# Patient Record
Sex: MALE | Race: WHITE | NOT HISPANIC OR LATINO | Employment: OTHER | URBAN - METROPOLITAN AREA
[De-identification: names, ages, dates, MRNs, and addresses within clinical notes are randomized per-mention and may not be internally consistent; named-entity substitution may affect disease eponyms.]

---

## 2017-01-03 ENCOUNTER — ALLSCRIPTS OFFICE VISIT (OUTPATIENT)
Dept: OTHER | Facility: OTHER | Age: 57
End: 2017-01-03

## 2017-01-05 ENCOUNTER — TRANSCRIBE ORDERS (OUTPATIENT)
Dept: ADMINISTRATIVE | Facility: HOSPITAL | Age: 57
End: 2017-01-05

## 2017-01-05 ENCOUNTER — APPOINTMENT (OUTPATIENT)
Dept: LAB | Facility: HOSPITAL | Age: 57
End: 2017-01-05
Payer: COMMERCIAL

## 2017-01-05 DIAGNOSIS — E11.9 DIABETES MELLITUS WITH NO COMPLICATION (HCC): ICD-10-CM

## 2017-01-05 DIAGNOSIS — Z87.891 HISTORY OF SMOKING 30 OR MORE PACK YEARS: Primary | ICD-10-CM

## 2017-01-05 DIAGNOSIS — Z87.891 HISTORY OF SMOKING 30 OR MORE PACK YEARS: ICD-10-CM

## 2017-01-05 LAB
ANION GAP SERPL CALCULATED.3IONS-SCNC: 8 MMOL/L (ref 4–13)
BUN SERPL-MCNC: 11 MG/DL (ref 5–25)
CALCIUM SERPL-MCNC: 8.8 MG/DL (ref 8.3–10.1)
CHLORIDE SERPL-SCNC: 106 MMOL/L (ref 100–108)
CHOLEST SERPL-MCNC: 171 MG/DL (ref 50–200)
CO2 SERPL-SCNC: 23 MMOL/L (ref 21–32)
CREAT SERPL-MCNC: 0.76 MG/DL (ref 0.6–1.3)
CREAT UR-MCNC: 191 MG/DL
GFR SERPL CREATININE-BSD FRML MDRD: >60 ML/MIN/1.73SQ M
GLUCOSE SERPL-MCNC: 272 MG/DL (ref 65–140)
HDLC SERPL-MCNC: 41 MG/DL (ref 40–60)
LDLC SERPL CALC-MCNC: 118 MG/DL (ref 0–100)
MICROALBUMIN UR-MCNC: 39.4 MG/L (ref 0–20)
MICROALBUMIN/CREAT 24H UR: 21 MG/G CREATININE (ref 0–30)
POTASSIUM SERPL-SCNC: 4.6 MMOL/L (ref 3.5–5.3)
SODIUM SERPL-SCNC: 137 MMOL/L (ref 136–145)
TRIGL SERPL-MCNC: 61 MG/DL

## 2017-01-05 PROCEDURE — 82043 UR ALBUMIN QUANTITATIVE: CPT | Performed by: FAMILY MEDICINE

## 2017-01-05 PROCEDURE — 80061 LIPID PANEL: CPT

## 2017-01-05 PROCEDURE — 36415 COLL VENOUS BLD VENIPUNCTURE: CPT

## 2017-01-05 PROCEDURE — 82570 ASSAY OF URINE CREATININE: CPT | Performed by: FAMILY MEDICINE

## 2017-01-05 PROCEDURE — 80048 BASIC METABOLIC PNL TOTAL CA: CPT

## 2017-01-06 ENCOUNTER — GENERIC CONVERSION - ENCOUNTER (OUTPATIENT)
Dept: OTHER | Facility: OTHER | Age: 57
End: 2017-01-06

## 2017-01-06 ENCOUNTER — ALLSCRIPTS OFFICE VISIT (OUTPATIENT)
Dept: RADIOLOGY | Facility: CLINIC | Age: 57
End: 2017-01-06
Payer: COMMERCIAL

## 2017-01-14 ENCOUNTER — HOSPITAL ENCOUNTER (OUTPATIENT)
Dept: CT IMAGING | Facility: HOSPITAL | Age: 57
Discharge: HOME | End: 2017-01-14
Payer: COMMERCIAL

## 2017-01-14 ENCOUNTER — TRANSCRIBE ORDERS (OUTPATIENT)
Dept: ADMINISTRATIVE | Facility: HOSPITAL | Age: 57
End: 2017-01-14

## 2017-01-14 DIAGNOSIS — Z87.891 HISTORY OF SMOKING 30 OR MORE PACK YEARS: ICD-10-CM

## 2017-01-19 ENCOUNTER — GENERIC CONVERSION - ENCOUNTER (OUTPATIENT)
Dept: OTHER | Facility: OTHER | Age: 57
End: 2017-01-19

## 2017-02-07 ENCOUNTER — ALLSCRIPTS OFFICE VISIT (OUTPATIENT)
Dept: OTHER | Facility: OTHER | Age: 57
End: 2017-02-07

## 2017-02-07 DIAGNOSIS — Z01.818 ENCOUNTER FOR OTHER PREPROCEDURAL EXAMINATION: ICD-10-CM

## 2017-02-07 DIAGNOSIS — Z12.11 ENCOUNTER FOR SCREENING FOR MALIGNANT NEOPLASM OF COLON: ICD-10-CM

## 2017-02-14 ENCOUNTER — ALLSCRIPTS OFFICE VISIT (OUTPATIENT)
Dept: OTHER | Facility: OTHER | Age: 57
End: 2017-02-14

## 2017-02-20 ENCOUNTER — APPOINTMENT (OUTPATIENT)
Dept: LAB | Facility: HOSPITAL | Age: 57
End: 2017-02-20
Attending: INTERNAL MEDICINE
Payer: COMMERCIAL

## 2017-02-20 ENCOUNTER — GENERIC CONVERSION - ENCOUNTER (OUTPATIENT)
Dept: OTHER | Facility: OTHER | Age: 57
End: 2017-02-20

## 2017-02-20 ENCOUNTER — TRANSCRIBE ORDERS (OUTPATIENT)
Dept: LAB | Facility: HOSPITAL | Age: 57
End: 2017-02-20

## 2017-02-20 ENCOUNTER — ALLSCRIPTS OFFICE VISIT (OUTPATIENT)
Dept: OTHER | Facility: OTHER | Age: 57
End: 2017-02-20

## 2017-02-20 DIAGNOSIS — Z12.11 ENCOUNTER FOR SCREENING FOR MALIGNANT NEOPLASM OF COLON: ICD-10-CM

## 2017-02-20 LAB
ERYTHROCYTE [DISTWIDTH] IN BLOOD BY AUTOMATED COUNT: 13.1 % (ref 11.6–15.1)
HCT VFR BLD AUTO: 46.7 % (ref 36.5–49.3)
HGB BLD-MCNC: 16.4 G/DL (ref 12–17)
MCH RBC QN AUTO: 31.1 PG (ref 26.8–34.3)
MCHC RBC AUTO-ENTMCNC: 35.1 G/DL (ref 31.4–37.4)
MCV RBC AUTO: 88 FL (ref 82–98)
PLATELET # BLD AUTO: 206 THOUSANDS/UL (ref 149–390)
PMV BLD AUTO: 10.5 FL (ref 8.9–12.7)
RBC # BLD AUTO: 5.28 MILLION/UL (ref 3.88–5.62)
WBC # BLD AUTO: 11.49 THOUSAND/UL (ref 4.31–10.16)

## 2017-02-20 PROCEDURE — 36415 COLL VENOUS BLD VENIPUNCTURE: CPT

## 2017-02-20 PROCEDURE — 85027 COMPLETE CBC AUTOMATED: CPT

## 2017-02-21 ENCOUNTER — ALLSCRIPTS OFFICE VISIT (OUTPATIENT)
Dept: OTHER | Facility: OTHER | Age: 57
End: 2017-02-21

## 2017-02-24 ENCOUNTER — GENERIC CONVERSION - ENCOUNTER (OUTPATIENT)
Dept: OTHER | Facility: OTHER | Age: 57
End: 2017-02-24

## 2017-03-03 RX ORDER — TAMSULOSIN HYDROCHLORIDE 0.4 MG/1
0.4 CAPSULE ORAL
COMMUNITY
End: 2018-03-13 | Stop reason: SDUPTHER

## 2017-03-06 ENCOUNTER — ANESTHESIA EVENT (OUTPATIENT)
Dept: GASTROENTEROLOGY | Facility: HOSPITAL | Age: 57
End: 2017-03-06
Payer: COMMERCIAL

## 2017-03-06 ENCOUNTER — HOSPITAL ENCOUNTER (OUTPATIENT)
Facility: HOSPITAL | Age: 57
Setting detail: OUTPATIENT SURGERY
Discharge: HOME | End: 2017-03-06
Attending: INTERNAL MEDICINE | Admitting: INTERNAL MEDICINE
Payer: COMMERCIAL

## 2017-03-06 ENCOUNTER — ANESTHESIA (OUTPATIENT)
Dept: GASTROENTEROLOGY | Facility: HOSPITAL | Age: 57
End: 2017-03-06
Payer: COMMERCIAL

## 2017-03-06 ENCOUNTER — GENERIC CONVERSION - ENCOUNTER (OUTPATIENT)
Dept: OTHER | Facility: OTHER | Age: 57
End: 2017-03-06

## 2017-03-06 VITALS
SYSTOLIC BLOOD PRESSURE: 109 MMHG | BODY MASS INDEX: 30.1 KG/M2 | HEIGHT: 71 IN | DIASTOLIC BLOOD PRESSURE: 70 MMHG | WEIGHT: 215 LBS | OXYGEN SATURATION: 97 % | TEMPERATURE: 97.8 F | HEART RATE: 79 BPM | RESPIRATION RATE: 18 BRPM

## 2017-03-06 DIAGNOSIS — Z12.11 ENCOUNTER FOR SCREENING FOR MALIGNANT NEOPLASM OF COLON: ICD-10-CM

## 2017-03-06 PROCEDURE — 88305 TISSUE EXAM BY PATHOLOGIST: CPT | Performed by: INTERNAL MEDICINE

## 2017-03-06 RX ORDER — PROPOFOL 10 MG/ML
INJECTION, EMULSION INTRAVENOUS AS NEEDED
Status: DISCONTINUED | OUTPATIENT
Start: 2017-03-06 | End: 2017-03-06 | Stop reason: SURG

## 2017-03-06 RX ORDER — SODIUM CHLORIDE 9 MG/ML
100 INJECTION, SOLUTION INTRAVENOUS CONTINUOUS
Status: CANCELLED | OUTPATIENT
Start: 2017-03-06

## 2017-03-06 RX ORDER — SODIUM CHLORIDE 9 MG/ML
50 INJECTION, SOLUTION INTRAVENOUS CONTINUOUS
Status: DISCONTINUED | OUTPATIENT
Start: 2017-03-06 | End: 2017-03-06 | Stop reason: HOSPADM

## 2017-03-06 RX ADMIN — PROPOFOL 50 MG: 10 INJECTION, EMULSION INTRAVENOUS at 15:24

## 2017-03-06 RX ADMIN — PROPOFOL 20 MG: 10 INJECTION, EMULSION INTRAVENOUS at 15:35

## 2017-03-06 RX ADMIN — SODIUM CHLORIDE 50 ML/HR: 0.9 INJECTION, SOLUTION INTRAVENOUS at 14:02

## 2017-03-06 RX ADMIN — PROPOFOL 30 MG: 10 INJECTION, EMULSION INTRAVENOUS at 15:27

## 2017-03-06 RX ADMIN — PROPOFOL 30 MG: 10 INJECTION, EMULSION INTRAVENOUS at 15:30

## 2017-03-06 RX ADMIN — PROPOFOL 120 MG: 10 INJECTION, EMULSION INTRAVENOUS at 15:21

## 2017-03-06 RX ADMIN — PROPOFOL 50 MG: 10 INJECTION, EMULSION INTRAVENOUS at 15:33

## 2017-03-07 ENCOUNTER — GENERIC CONVERSION - ENCOUNTER (OUTPATIENT)
Dept: OTHER | Facility: OTHER | Age: 57
End: 2017-03-07

## 2017-03-07 ENCOUNTER — HOSPITAL ENCOUNTER (OUTPATIENT)
Dept: RADIOLOGY | Facility: HOSPITAL | Age: 57
Discharge: HOME | End: 2017-03-07
Payer: COMMERCIAL

## 2017-03-07 DIAGNOSIS — Z01.818 ENCOUNTER FOR OTHER PREPROCEDURAL EXAMINATION: ICD-10-CM

## 2017-03-07 PROCEDURE — 72146 MRI CHEST SPINE W/O DYE: CPT

## 2017-03-08 ENCOUNTER — GENERIC CONVERSION - ENCOUNTER (OUTPATIENT)
Dept: OTHER | Facility: OTHER | Age: 57
End: 2017-03-08

## 2017-03-22 ENCOUNTER — GENERIC CONVERSION - ENCOUNTER (OUTPATIENT)
Dept: OTHER | Facility: OTHER | Age: 57
End: 2017-03-22

## 2017-04-04 ENCOUNTER — APPOINTMENT (OUTPATIENT)
Dept: LAB | Facility: HOSPITAL | Age: 57
End: 2017-04-04
Attending: FAMILY MEDICINE
Payer: COMMERCIAL

## 2017-04-04 ENCOUNTER — ALLSCRIPTS OFFICE VISIT (OUTPATIENT)
Dept: OTHER | Facility: OTHER | Age: 57
End: 2017-04-04

## 2017-04-04 ENCOUNTER — TRANSCRIBE ORDERS (OUTPATIENT)
Dept: LAB | Facility: HOSPITAL | Age: 57
End: 2017-04-04

## 2017-04-04 DIAGNOSIS — E13.8 DIABETES MELLITUS OF OTHER TYPE WITH COMPLICATION: Primary | ICD-10-CM

## 2017-04-04 DIAGNOSIS — E13.8 DIABETES MELLITUS OF OTHER TYPE WITH COMPLICATION: ICD-10-CM

## 2017-04-04 LAB
EST. AVERAGE GLUCOSE BLD GHB EST-MCNC: 226 MG/DL
HBA1C MFR BLD: 9.5 % (ref 4.2–6.3)

## 2017-04-04 PROCEDURE — 36415 COLL VENOUS BLD VENIPUNCTURE: CPT

## 2017-04-04 PROCEDURE — 83036 HEMOGLOBIN GLYCOSYLATED A1C: CPT

## 2017-04-06 ENCOUNTER — GENERIC CONVERSION - ENCOUNTER (OUTPATIENT)
Dept: OTHER | Facility: OTHER | Age: 57
End: 2017-04-06

## 2017-04-10 ENCOUNTER — ALLSCRIPTS OFFICE VISIT (OUTPATIENT)
Dept: OTHER | Facility: OTHER | Age: 57
End: 2017-04-10

## 2017-04-24 ENCOUNTER — GENERIC CONVERSION - ENCOUNTER (OUTPATIENT)
Dept: OTHER | Facility: OTHER | Age: 57
End: 2017-04-24

## 2017-04-25 ENCOUNTER — GENERIC CONVERSION - ENCOUNTER (OUTPATIENT)
Dept: OTHER | Facility: OTHER | Age: 57
End: 2017-04-25

## 2017-05-04 ENCOUNTER — GENERIC CONVERSION - ENCOUNTER (OUTPATIENT)
Dept: OTHER | Facility: OTHER | Age: 57
End: 2017-05-04

## 2017-05-11 ENCOUNTER — GENERIC CONVERSION - ENCOUNTER (OUTPATIENT)
Dept: OTHER | Facility: OTHER | Age: 57
End: 2017-05-11

## 2017-05-23 ENCOUNTER — GENERIC CONVERSION - ENCOUNTER (OUTPATIENT)
Dept: OTHER | Facility: OTHER | Age: 57
End: 2017-05-23

## 2017-05-23 DIAGNOSIS — Z98.890 OTHER SPECIFIED POSTPROCEDURAL STATES: ICD-10-CM

## 2017-05-23 DIAGNOSIS — M96.1 POSTLAMINECTOMY SYNDROME, NOT ELSEWHERE CLASSIFIED: ICD-10-CM

## 2017-05-23 DIAGNOSIS — Z01.812 ENCOUNTER FOR PREPROCEDURAL LABORATORY EXAMINATION: ICD-10-CM

## 2017-05-23 DIAGNOSIS — M54.16 RADICULOPATHY OF LUMBAR REGION: ICD-10-CM

## 2017-05-23 DIAGNOSIS — G89.4 CHRONIC PAIN SYNDROME: ICD-10-CM

## 2017-05-23 DIAGNOSIS — F52.21 MALE ERECTILE DISORDER (CODE): ICD-10-CM

## 2017-05-24 ENCOUNTER — ALLSCRIPTS OFFICE VISIT (OUTPATIENT)
Dept: OTHER | Facility: OTHER | Age: 57
End: 2017-05-24

## 2017-05-25 ENCOUNTER — TRANSCRIBE ORDERS (OUTPATIENT)
Dept: LAB | Facility: HOSPITAL | Age: 57
End: 2017-05-25

## 2017-05-25 ENCOUNTER — APPOINTMENT (OUTPATIENT)
Dept: LAB | Facility: HOSPITAL | Age: 57
End: 2017-05-25
Attending: ANESTHESIOLOGY
Payer: COMMERCIAL

## 2017-05-25 DIAGNOSIS — G89.4 CHRONIC PAIN SYNDROME: ICD-10-CM

## 2017-05-25 DIAGNOSIS — E11.9 TYPE 2 DIABETES MELLITUS WITHOUT COMPLICATION, WITHOUT LONG-TERM CURRENT USE OF INSULIN (HCC): Primary | ICD-10-CM

## 2017-05-25 DIAGNOSIS — Z01.812 ENCOUNTER FOR PREPROCEDURAL LABORATORY EXAMINATION: ICD-10-CM

## 2017-05-25 DIAGNOSIS — M96.1 POSTLAMINECTOMY SYNDROME, NOT ELSEWHERE CLASSIFIED: ICD-10-CM

## 2017-05-25 DIAGNOSIS — E11.9 TYPE 2 DIABETES MELLITUS WITHOUT COMPLICATION, WITHOUT LONG-TERM CURRENT USE OF INSULIN (HCC): ICD-10-CM

## 2017-05-25 DIAGNOSIS — M54.16 RADICULOPATHY OF LUMBAR REGION: ICD-10-CM

## 2017-05-25 LAB
ALBUMIN SERPL BCP-MCNC: 3.8 G/DL (ref 3.5–5)
ALP SERPL-CCNC: 69 U/L (ref 46–116)
ALT SERPL W P-5'-P-CCNC: 62 U/L (ref 12–78)
ANION GAP SERPL CALCULATED.3IONS-SCNC: 6 MMOL/L (ref 4–13)
APTT PPP: 28 SECONDS (ref 23–35)
AST SERPL W P-5'-P-CCNC: 23 U/L (ref 5–45)
BASOPHILS # BLD AUTO: 0.04 THOUSANDS/ΜL (ref 0–0.1)
BASOPHILS NFR BLD AUTO: 0 % (ref 0–1)
BILIRUB SERPL-MCNC: 0.45 MG/DL (ref 0.2–1)
BUN SERPL-MCNC: 13 MG/DL (ref 5–25)
CALCIUM SERPL-MCNC: 8.6 MG/DL (ref 8.3–10.1)
CHLORIDE SERPL-SCNC: 106 MMOL/L (ref 100–108)
CO2 SERPL-SCNC: 26 MMOL/L (ref 21–32)
CREAT SERPL-MCNC: 0.78 MG/DL (ref 0.6–1.3)
CREAT UR-MCNC: 114 MG/DL
EOSINOPHIL # BLD AUTO: 0.28 THOUSAND/ΜL (ref 0–0.61)
EOSINOPHIL NFR BLD AUTO: 3 % (ref 0–6)
ERYTHROCYTE [DISTWIDTH] IN BLOOD BY AUTOMATED COUNT: 13.7 % (ref 11.6–15.1)
EST. AVERAGE GLUCOSE BLD GHB EST-MCNC: 189 MG/DL
GFR SERPL CREATININE-BSD FRML MDRD: >60 ML/MIN/1.73SQ M
GLUCOSE SERPL-MCNC: 226 MG/DL (ref 65–140)
HBA1C MFR BLD: 8.2 % (ref 4.2–6.3)
HCT VFR BLD AUTO: 42.3 % (ref 36.5–49.3)
HGB BLD-MCNC: 14.7 G/DL (ref 12–17)
INR PPP: 0.96 (ref 0.86–1.16)
LYMPHOCYTES # BLD AUTO: 2.55 THOUSANDS/ΜL (ref 0.6–4.47)
LYMPHOCYTES NFR BLD AUTO: 28 % (ref 14–44)
MCH RBC QN AUTO: 30.7 PG (ref 26.8–34.3)
MCHC RBC AUTO-ENTMCNC: 34.8 G/DL (ref 31.4–37.4)
MCV RBC AUTO: 88 FL (ref 82–98)
MICROALBUMIN UR-MCNC: 9.6 MG/L (ref 0–20)
MICROALBUMIN/CREAT 24H UR: 8 MG/G CREATININE (ref 0–30)
MONOCYTES # BLD AUTO: 0.55 THOUSAND/ΜL (ref 0.17–1.22)
MONOCYTES NFR BLD AUTO: 6 % (ref 4–12)
NEUTROPHILS # BLD AUTO: 5.56 THOUSANDS/ΜL (ref 1.85–7.62)
NEUTS SEG NFR BLD AUTO: 63 % (ref 43–75)
NRBC BLD AUTO-RTO: 0 /100 WBCS
PLATELET # BLD AUTO: 207 THOUSANDS/UL (ref 149–390)
PMV BLD AUTO: 9.9 FL (ref 8.9–12.7)
POTASSIUM SERPL-SCNC: 4.5 MMOL/L (ref 3.5–5.3)
PROT SERPL-MCNC: 6.8 G/DL (ref 6.4–8.2)
PROTHROMBIN TIME: 12.8 SECONDS (ref 12.1–14.4)
RBC # BLD AUTO: 4.79 MILLION/UL (ref 3.88–5.62)
SODIUM SERPL-SCNC: 138 MMOL/L (ref 136–145)
WBC # BLD AUTO: 9.01 THOUSAND/UL (ref 4.31–10.16)

## 2017-05-25 PROCEDURE — 85610 PROTHROMBIN TIME: CPT

## 2017-05-25 PROCEDURE — 82570 ASSAY OF URINE CREATININE: CPT | Performed by: FAMILY MEDICINE

## 2017-05-25 PROCEDURE — 83036 HEMOGLOBIN GLYCOSYLATED A1C: CPT

## 2017-05-25 PROCEDURE — 85025 COMPLETE CBC W/AUTO DIFF WBC: CPT

## 2017-05-25 PROCEDURE — 36415 COLL VENOUS BLD VENIPUNCTURE: CPT

## 2017-05-25 PROCEDURE — 80053 COMPREHEN METABOLIC PANEL: CPT

## 2017-05-25 PROCEDURE — 85730 THROMBOPLASTIN TIME PARTIAL: CPT

## 2017-05-25 PROCEDURE — 82043 UR ALBUMIN QUANTITATIVE: CPT | Performed by: FAMILY MEDICINE

## 2017-06-02 ENCOUNTER — GENERIC CONVERSION - ENCOUNTER (OUTPATIENT)
Dept: OTHER | Facility: OTHER | Age: 57
End: 2017-06-02

## 2017-06-09 ENCOUNTER — ALLSCRIPTS OFFICE VISIT (OUTPATIENT)
Dept: RADIOLOGY | Facility: CLINIC | Age: 57
End: 2017-06-09
Payer: COMMERCIAL

## 2017-06-09 PROCEDURE — C1787 PATIENT PROGR, NEUROSTIM: HCPCS | Performed by: ANESTHESIOLOGY

## 2017-06-09 PROCEDURE — 96374 THER/PROPH/DIAG INJ IV PUSH: CPT | Performed by: ANESTHESIOLOGY

## 2017-06-09 PROCEDURE — C1897 LEAD, NEUROSTIM TEST KIT: HCPCS | Performed by: ANESTHESIOLOGY

## 2017-06-14 ENCOUNTER — GENERIC CONVERSION - ENCOUNTER (OUTPATIENT)
Dept: OTHER | Facility: OTHER | Age: 57
End: 2017-06-14

## 2017-06-16 ENCOUNTER — ALLSCRIPTS OFFICE VISIT (OUTPATIENT)
Dept: OTHER | Facility: OTHER | Age: 57
End: 2017-06-16

## 2017-06-16 ENCOUNTER — HOSPITAL ENCOUNTER (OUTPATIENT)
Dept: RADIOLOGY | Facility: HOSPITAL | Age: 57
Discharge: HOME | End: 2017-06-16
Attending: ANESTHESIOLOGY
Payer: COMMERCIAL

## 2017-06-16 DIAGNOSIS — Z98.890 OTHER SPECIFIED POSTPROCEDURAL STATES: ICD-10-CM

## 2017-06-16 PROCEDURE — 72070 X-RAY EXAM THORAC SPINE 2VWS: CPT

## 2017-06-20 ENCOUNTER — ALLSCRIPTS OFFICE VISIT (OUTPATIENT)
Dept: OTHER | Facility: OTHER | Age: 57
End: 2017-06-20

## 2017-06-27 ENCOUNTER — GENERIC CONVERSION - ENCOUNTER (OUTPATIENT)
Dept: OTHER | Facility: OTHER | Age: 57
End: 2017-06-27

## 2017-06-27 ENCOUNTER — ALLSCRIPTS OFFICE VISIT (OUTPATIENT)
Dept: OTHER | Facility: OTHER | Age: 57
End: 2017-06-27

## 2017-07-13 ENCOUNTER — GENERIC CONVERSION - ENCOUNTER (OUTPATIENT)
Dept: OTHER | Facility: OTHER | Age: 57
End: 2017-07-13

## 2017-07-18 ENCOUNTER — ALLSCRIPTS OFFICE VISIT (OUTPATIENT)
Dept: OTHER | Facility: OTHER | Age: 57
End: 2017-07-18

## 2017-07-18 ENCOUNTER — GENERIC CONVERSION - ENCOUNTER (OUTPATIENT)
Dept: OTHER | Facility: OTHER | Age: 57
End: 2017-07-18

## 2017-07-31 ENCOUNTER — GENERIC CONVERSION - ENCOUNTER (OUTPATIENT)
Dept: OTHER | Facility: OTHER | Age: 57
End: 2017-07-31

## 2017-08-02 ENCOUNTER — GENERIC CONVERSION - ENCOUNTER (OUTPATIENT)
Dept: OTHER | Facility: OTHER | Age: 57
End: 2017-08-02

## 2017-08-18 ENCOUNTER — ALLSCRIPTS OFFICE VISIT (OUTPATIENT)
Dept: RADIOLOGY | Facility: CLINIC | Age: 57
End: 2017-08-18
Payer: COMMERCIAL

## 2017-08-22 ENCOUNTER — GENERIC CONVERSION - ENCOUNTER (OUTPATIENT)
Dept: OTHER | Facility: OTHER | Age: 57
End: 2017-08-22

## 2017-08-31 ENCOUNTER — GENERIC CONVERSION - ENCOUNTER (OUTPATIENT)
Dept: OTHER | Facility: OTHER | Age: 57
End: 2017-08-31

## 2017-09-05 ENCOUNTER — TRANSCRIBE ORDERS (OUTPATIENT)
Dept: LAB | Facility: HOSPITAL | Age: 57
End: 2017-09-05

## 2017-09-05 ENCOUNTER — LAB REQUISITION (OUTPATIENT)
Dept: LAB | Facility: HOSPITAL | Age: 57
End: 2017-09-05
Payer: COMMERCIAL

## 2017-09-05 ENCOUNTER — LAB (OUTPATIENT)
Dept: LAB | Facility: HOSPITAL | Age: 57
End: 2017-09-05
Payer: COMMERCIAL

## 2017-09-05 DIAGNOSIS — G89.4 CHRONIC PAIN SYNDROME: ICD-10-CM

## 2017-09-05 DIAGNOSIS — F52.21 ERECTILE DYSFUNCTION OF NONORGANIC ORIGIN: Primary | ICD-10-CM

## 2017-09-05 DIAGNOSIS — Z79.01 LONG TERM CURRENT USE OF ANTICOAGULANT: ICD-10-CM

## 2017-09-05 DIAGNOSIS — F52.21 ERECTILE DYSFUNCTION OF NONORGANIC ORIGIN: ICD-10-CM

## 2017-09-05 DIAGNOSIS — E11.9 TYPE 2 DIABETES MELLITUS WITHOUT COMPLICATION, WITHOUT LONG-TERM CURRENT USE OF INSULIN (HCC): ICD-10-CM

## 2017-09-05 DIAGNOSIS — Z01.812 PRE-OPERATIVE LABORATORY EXAMINATION: ICD-10-CM

## 2017-09-05 DIAGNOSIS — Z79.01 LONG TERM (CURRENT) USE OF ANTICOAGULANTS: ICD-10-CM

## 2017-09-05 DIAGNOSIS — F52.21 MALE ERECTILE DISORDER (CODE): ICD-10-CM

## 2017-09-05 DIAGNOSIS — Z01.812 ENCOUNTER FOR PREPROCEDURAL LABORATORY EXAMINATION: ICD-10-CM

## 2017-09-05 DIAGNOSIS — G89.4 CHRONIC PAIN SYNDROME: Primary | ICD-10-CM

## 2017-09-05 LAB
ABO GROUP BLD: NORMAL
ANION GAP SERPL CALCULATED.3IONS-SCNC: 8 MMOL/L (ref 4–13)
APTT PPP: 30 SECONDS (ref 23–35)
ATRIAL RATE: 89 BPM
ATRIAL RATE: 91 BPM
BASOPHILS # BLD AUTO: 0.05 THOUSANDS/ΜL (ref 0–0.1)
BASOPHILS NFR BLD AUTO: 0 % (ref 0–1)
BILIRUB UR QL STRIP: NEGATIVE
BLD GP AB SCN SERPL QL: NEGATIVE
BUN SERPL-MCNC: 10 MG/DL (ref 5–25)
CALCIUM SERPL-MCNC: 9.4 MG/DL (ref 8.3–10.1)
CHLORIDE SERPL-SCNC: 101 MMOL/L (ref 100–108)
CLARITY UR: CLEAR
CO2 SERPL-SCNC: 26 MMOL/L (ref 21–32)
COLOR UR: YELLOW
CREAT SERPL-MCNC: 0.76 MG/DL (ref 0.6–1.3)
EOSINOPHIL # BLD AUTO: 0.31 THOUSAND/ΜL (ref 0–0.61)
EOSINOPHIL NFR BLD AUTO: 3 % (ref 0–6)
ERYTHROCYTE [DISTWIDTH] IN BLOOD BY AUTOMATED COUNT: 13.1 % (ref 11.6–15.1)
EST. AVERAGE GLUCOSE BLD GHB EST-MCNC: 151 MG/DL
GFR SERPL CREATININE-BSD FRML MDRD: 101 ML/MIN/1.73SQ M
GLUCOSE SERPL-MCNC: 164 MG/DL (ref 65–140)
GLUCOSE UR STRIP-MCNC: ABNORMAL MG/DL
HBA1C MFR BLD: 6.9 % (ref 4.2–6.3)
HCT VFR BLD AUTO: 48.2 % (ref 36.5–49.3)
HGB BLD-MCNC: 16.6 G/DL (ref 12–17)
HGB UR QL STRIP.AUTO: NEGATIVE
INR PPP: 0.91 (ref 0.86–1.16)
KETONES UR STRIP-MCNC: NEGATIVE MG/DL
LEUKOCYTE ESTERASE UR QL STRIP: NEGATIVE
LYMPHOCYTES # BLD AUTO: 3.42 THOUSANDS/ΜL (ref 0.6–4.47)
LYMPHOCYTES NFR BLD AUTO: 30 % (ref 14–44)
MCH RBC QN AUTO: 30.7 PG (ref 26.8–34.3)
MCHC RBC AUTO-ENTMCNC: 34.4 G/DL (ref 31.4–37.4)
MCV RBC AUTO: 89 FL (ref 82–98)
MONOCYTES # BLD AUTO: 0.97 THOUSAND/ΜL (ref 0.17–1.22)
MONOCYTES NFR BLD AUTO: 8 % (ref 4–12)
NEUTROPHILS # BLD AUTO: 6.83 THOUSANDS/ΜL (ref 1.85–7.62)
NEUTS SEG NFR BLD AUTO: 59 % (ref 43–75)
NITRITE UR QL STRIP: NEGATIVE
NRBC BLD AUTO-RTO: 0 /100 WBCS
P AXIS: 59 DEGREES
P AXIS: 65 DEGREES
PH UR STRIP.AUTO: 5.5 [PH] (ref 4.5–8)
PLATELET # BLD AUTO: 237 THOUSANDS/UL (ref 149–390)
PMV BLD AUTO: 10.1 FL (ref 8.9–12.7)
POTASSIUM SERPL-SCNC: 4.3 MMOL/L (ref 3.5–5.3)
PR INTERVAL: 188 MS
PR INTERVAL: 196 MS
PROT UR STRIP-MCNC: NEGATIVE MG/DL
PROTHROMBIN TIME: 12.2 SECONDS (ref 12.1–14.4)
QRS AXIS: 25 DEGREES
QRS AXIS: 28 DEGREES
QRSD INTERVAL: 94 MS
QRSD INTERVAL: 96 MS
QT INTERVAL: 340 MS
QT INTERVAL: 344 MS
QTC INTERVAL: 418 MS
QTC INTERVAL: 418 MS
RBC # BLD AUTO: 5.4 MILLION/UL (ref 3.88–5.62)
RH BLD: NEGATIVE
SODIUM SERPL-SCNC: 135 MMOL/L (ref 136–145)
SP GR UR STRIP.AUTO: 1.02 (ref 1–1.03)
SPECIMEN EXPIRATION DATE: NORMAL
T WAVE AXIS: 56 DEGREES
T WAVE AXIS: 60 DEGREES
TSH SERPL DL<=0.05 MIU/L-ACNC: 3.16 UIU/ML (ref 0.36–3.74)
UROBILINOGEN UR QL STRIP.AUTO: 0.2 E.U./DL
VENTRICULAR RATE: 89 BPM
VENTRICULAR RATE: 91 BPM
WBC # BLD AUTO: 11.61 THOUSAND/UL (ref 4.31–10.16)

## 2017-09-05 PROCEDURE — 84443 ASSAY THYROID STIM HORMONE: CPT

## 2017-09-05 PROCEDURE — 84402 ASSAY OF FREE TESTOSTERONE: CPT

## 2017-09-05 PROCEDURE — 36415 COLL VENOUS BLD VENIPUNCTURE: CPT

## 2017-09-05 PROCEDURE — 93005 ELECTROCARDIOGRAM TRACING: CPT

## 2017-09-05 PROCEDURE — 84403 ASSAY OF TOTAL TESTOSTERONE: CPT

## 2017-09-05 PROCEDURE — 85025 COMPLETE CBC W/AUTO DIFF WBC: CPT

## 2017-09-05 PROCEDURE — 85730 THROMBOPLASTIN TIME PARTIAL: CPT

## 2017-09-05 PROCEDURE — 81003 URINALYSIS AUTO W/O SCOPE: CPT | Performed by: NEUROLOGICAL SURGERY

## 2017-09-05 PROCEDURE — 80048 BASIC METABOLIC PNL TOTAL CA: CPT

## 2017-09-05 PROCEDURE — 86901 BLOOD TYPING SEROLOGIC RH(D): CPT | Performed by: NEUROLOGICAL SURGERY

## 2017-09-05 PROCEDURE — 85610 PROTHROMBIN TIME: CPT

## 2017-09-05 PROCEDURE — 83036 HEMOGLOBIN GLYCOSYLATED A1C: CPT

## 2017-09-05 PROCEDURE — 86900 BLOOD TYPING SEROLOGIC ABO: CPT | Performed by: NEUROLOGICAL SURGERY

## 2017-09-05 PROCEDURE — 86850 RBC ANTIBODY SCREEN: CPT | Performed by: NEUROLOGICAL SURGERY

## 2017-09-06 LAB
TESTOST FREE SERPL-MCNC: 9.3 PG/ML (ref 7.2–24)
TESTOST SERPL-MCNC: 564 NG/DL (ref 264–916)

## 2017-09-12 ENCOUNTER — GENERIC CONVERSION - ENCOUNTER (OUTPATIENT)
Dept: OTHER | Facility: OTHER | Age: 57
End: 2017-09-12

## 2017-09-28 RX ORDER — LISINOPRIL 5 MG/1
5 TABLET ORAL DAILY
COMMUNITY
End: 2018-03-13 | Stop reason: HOSPADM

## 2017-09-28 RX ORDER — CYCLOBENZAPRINE HCL 10 MG
10 TABLET ORAL
Status: ON HOLD | COMMUNITY
End: 2018-05-18 | Stop reason: CLARIF

## 2017-09-28 RX ORDER — GLIPIZIDE 10 MG/1
10 TABLET, FILM COATED, EXTENDED RELEASE ORAL DAILY
COMMUNITY
End: 2018-07-09 | Stop reason: SDUPTHER

## 2017-09-28 RX ORDER — ATORVASTATIN CALCIUM 10 MG/1
10 TABLET, FILM COATED ORAL DAILY
COMMUNITY
End: 2018-02-06 | Stop reason: SDUPTHER

## 2017-09-28 RX ORDER — NORTRIPTYLINE HYDROCHLORIDE 25 MG/1
75 CAPSULE ORAL
Status: ON HOLD | COMMUNITY
End: 2018-04-26

## 2017-09-28 NOTE — PRE-PROCEDURE INSTRUCTIONS
Pre-Surgery Instructions:   Medication Instructions    atorvastatin (LIPITOR) 10 mg tablet Patient was instructed by Physician and understands   cyclobenzaprine (FLEXERIL) 10 mg tablet Patient was instructed by Physician and understands   glipiZIDE (GLUCOTROL XL) 10 mg 24 hr tablet Patient was instructed by Physician and understands   lisinopril (ZESTRIL) 5 mg tablet Patient was instructed by Physician and understands   metFORMIN (GLUCOPHAGE) 1000 MG tablet Patient was instructed by Physician and understands   nortriptyline (PAMELOR) 25 mg capsule Patient was instructed by Physician and understands   tamsulosin (FLOMAX) 0 4 mg Patient was instructed by Physician and understands  Before your operation, you play an important role in decreasing your risk for infection by washing with special antiseptic soap  This is an effective way to reduce bacteria on the skin which may help to prevent infections at the surgical site  Please read the following directions in advance  1  In the week before your operation purchase a 4 ounce bottle of antiseptic soap containing chlorhexidine gluconate 4%  Some brand names include: Aplicare, Endure, and Hibiclens  The cost is usually less than $5 00  · For your convenience, the [address] [place] [address] carries the soap  · It may also be available at your doctor's office or pre-admission testing center, and at most retail pharmacies  · If you are allergic or sensitive to soaps containing chlorhexidine gluconate (CHG), please let your doctor know so another antiseptic soap can be suggested  · CHG antiseptic soap is for external use only  2      The day before your operation follow these directions carefully to get ready  · Place clean lines (sheets) on your bed; you should sleep on clean sheets after your evening shower    · Get clean towels and washcloths ready - you need enough for 2 showers  · Set aside clean underwear, pajamas, and clothing to wear after the shower  Reminders:  · DO NOT use any other soap or body rinse on your skin during or after the antiseptic showers  · DO NOT use lotion , powder, deodorant, or perfume/aftershave of any kind on your skin after your antiseptic shower  · DO NOT shave any body parts in the 24 hours/the day before your operation  · DO NOT get the antiseptic soap in your eyes, ears, nose, mouth, or vaginal area  3      You will need to shower the night before AND the morning of your Surgery  Shower 1:  · The evening before your operation, take the fist shower  · First, shampoo your hair with regular shampoo and rinse it completely before you use the anitseptic soap  After washing and rinsing your hair, rinse your body  · Next, use a clean wash cloth to apply the antiseptic soap and wash your body from the neck down to your toes using 1/2 bottle of the antiseptic soap  You will use the other 1/2 bottle for the second shower  · Clean the area where your incision will be; later this area well for about 2 minutes  · If you ar having head or neck surgery, wash areas with the antiseptic soap  · Rinse yourself completely with running water  · Use a clean towel to dry off  · Wear clean underwear and clothing/pajamas  Shower 2:  · The Morning of your operation, take the second shower following the same steps as Shower 1 using the second 1/2 of the bottle of antiseptic soap  · Use clean cloths and towels to was and dry yourself off  · Wear clean underwear and clothing

## 2017-10-02 ENCOUNTER — GENERIC CONVERSION - ENCOUNTER (OUTPATIENT)
Dept: OTHER | Facility: OTHER | Age: 57
End: 2017-10-02

## 2017-10-02 RX ORDER — CHLORHEXIDINE GLUCONATE 0.12 MG/ML
15 RINSE ORAL ONCE
Status: COMPLETED | OUTPATIENT
Start: 2017-10-03 | End: 2017-10-03

## 2017-10-02 RX ORDER — SODIUM CHLORIDE, SODIUM LACTATE, POTASSIUM CHLORIDE, CALCIUM CHLORIDE 600; 310; 30; 20 MG/100ML; MG/100ML; MG/100ML; MG/100ML
20 INJECTION, SOLUTION INTRAVENOUS CONTINUOUS
Status: DISCONTINUED | OUTPATIENT
Start: 2017-10-03 | End: 2017-10-03 | Stop reason: HOSPADM

## 2017-10-03 ENCOUNTER — HOSPITAL ENCOUNTER (OUTPATIENT)
Facility: HOSPITAL | Age: 57
Setting detail: OUTPATIENT SURGERY
Discharge: HOME | End: 2017-10-03
Attending: NEUROLOGICAL SURGERY | Admitting: NEUROLOGICAL SURGERY
Payer: COMMERCIAL

## 2017-10-03 ENCOUNTER — ANESTHESIA EVENT (OUTPATIENT)
Dept: PERIOP | Facility: HOSPITAL | Age: 57
End: 2017-10-03
Payer: COMMERCIAL

## 2017-10-03 ENCOUNTER — APPOINTMENT (OUTPATIENT)
Dept: RADIOLOGY | Facility: HOSPITAL | Age: 57
End: 2017-10-03
Payer: COMMERCIAL

## 2017-10-03 ENCOUNTER — ANESTHESIA (OUTPATIENT)
Dept: PERIOP | Facility: HOSPITAL | Age: 57
End: 2017-10-03
Payer: COMMERCIAL

## 2017-10-03 VITALS
WEIGHT: 220 LBS | HEIGHT: 71 IN | RESPIRATION RATE: 20 BRPM | SYSTOLIC BLOOD PRESSURE: 127 MMHG | OXYGEN SATURATION: 98 % | BODY MASS INDEX: 30.8 KG/M2 | HEART RATE: 76 BPM | TEMPERATURE: 97.8 F | DIASTOLIC BLOOD PRESSURE: 71 MMHG

## 2017-10-03 LAB
ABO GROUP BLD: NORMAL
BLD GP AB SCN SERPL QL: NEGATIVE
GLUCOSE SERPL-MCNC: 134 MG/DL (ref 65–140)
GLUCOSE SERPL-MCNC: 167 MG/DL (ref 65–140)
RH BLD: NEGATIVE
SPECIMEN EXPIRATION DATE: NORMAL

## 2017-10-03 PROCEDURE — C1778 LEAD, NEUROSTIMULATOR: HCPCS | Performed by: NEUROLOGICAL SURGERY

## 2017-10-03 PROCEDURE — 72070 X-RAY EXAM THORAC SPINE 2VWS: CPT

## 2017-10-03 PROCEDURE — 86900 BLOOD TYPING SEROLOGIC ABO: CPT | Performed by: NEUROLOGICAL SURGERY

## 2017-10-03 PROCEDURE — 82948 REAGENT STRIP/BLOOD GLUCOSE: CPT

## 2017-10-03 PROCEDURE — 86901 BLOOD TYPING SEROLOGIC RH(D): CPT | Performed by: NEUROLOGICAL SURGERY

## 2017-10-03 PROCEDURE — 86850 RBC ANTIBODY SCREEN: CPT | Performed by: NEUROLOGICAL SURGERY

## 2017-10-03 PROCEDURE — C1820 GENERATOR NEURO RECHG BAT SY: HCPCS | Performed by: NEUROLOGICAL SURGERY

## 2017-10-03 DEVICE — LEAD NEUROSTIM PENTA 3MM X 60CM: Type: IMPLANTABLE DEVICE | Site: EPIDURAL SPACE | Status: FUNCTIONAL

## 2017-10-03 DEVICE — IMPLANTABLE PULSE GENERATOR
Type: IMPLANTABLE DEVICE | Site: BUTTOCKS | Status: FUNCTIONAL
Brand: PROCLAIM™

## 2017-10-03 RX ORDER — OMEPRAZOLE 20 MG/1
20 CAPSULE, DELAYED RELEASE ORAL DAILY
COMMUNITY
End: 2019-01-14 | Stop reason: SDUPTHER

## 2017-10-03 RX ORDER — BUPIVACAINE HYDROCHLORIDE AND EPINEPHRINE 5; 5 MG/ML; UG/ML
INJECTION, SOLUTION EPIDURAL; INTRACAUDAL; PERINEURAL AS NEEDED
Status: DISCONTINUED | OUTPATIENT
Start: 2017-10-03 | End: 2017-10-03 | Stop reason: HOSPADM

## 2017-10-03 RX ORDER — IBUPROFEN 200 MG
TABLET ORAL EVERY 6 HOURS PRN
COMMUNITY
End: 2017-10-03 | Stop reason: HOSPADM

## 2017-10-03 RX ORDER — GLYCOPYRROLATE 0.2 MG/ML
INJECTION INTRAMUSCULAR; INTRAVENOUS AS NEEDED
Status: DISCONTINUED | OUTPATIENT
Start: 2017-10-03 | End: 2017-10-03 | Stop reason: SURG

## 2017-10-03 RX ORDER — ROCURONIUM BROMIDE 10 MG/ML
INJECTION, SOLUTION INTRAVENOUS AS NEEDED
Status: DISCONTINUED | OUTPATIENT
Start: 2017-10-03 | End: 2017-10-03 | Stop reason: SURG

## 2017-10-03 RX ORDER — PROPOFOL 10 MG/ML
INJECTION, EMULSION INTRAVENOUS AS NEEDED
Status: DISCONTINUED | OUTPATIENT
Start: 2017-10-03 | End: 2017-10-03 | Stop reason: SURG

## 2017-10-03 RX ORDER — CHLORHEXIDINE GLUCONATE 0.12 MG/ML
RINSE ORAL
Status: DISCONTINUED
Start: 2017-10-03 | End: 2017-10-03 | Stop reason: HOSPADM

## 2017-10-03 RX ORDER — SUCCINYLCHOLINE CHLORIDE 20 MG/ML
INJECTION INTRAMUSCULAR; INTRAVENOUS AS NEEDED
Status: DISCONTINUED | OUTPATIENT
Start: 2017-10-03 | End: 2017-10-03 | Stop reason: SURG

## 2017-10-03 RX ORDER — KETAMINE HYDROCHLORIDE 50 MG/ML
INJECTION, SOLUTION, CONCENTRATE INTRAMUSCULAR; INTRAVENOUS AS NEEDED
Status: DISCONTINUED | OUTPATIENT
Start: 2017-10-03 | End: 2017-10-03 | Stop reason: SURG

## 2017-10-03 RX ORDER — FENTANYL CITRATE/PF 50 MCG/ML
25 SYRINGE (ML) INJECTION
Status: DISCONTINUED | OUTPATIENT
Start: 2017-10-03 | End: 2017-10-03 | Stop reason: HOSPADM

## 2017-10-03 RX ORDER — FENTANYL CITRATE 50 UG/ML
INJECTION, SOLUTION INTRAMUSCULAR; INTRAVENOUS AS NEEDED
Status: DISCONTINUED | OUTPATIENT
Start: 2017-10-03 | End: 2017-10-03 | Stop reason: SURG

## 2017-10-03 RX ORDER — ONDANSETRON 2 MG/ML
INJECTION INTRAMUSCULAR; INTRAVENOUS AS NEEDED
Status: DISCONTINUED | OUTPATIENT
Start: 2017-10-03 | End: 2017-10-03 | Stop reason: SURG

## 2017-10-03 RX ORDER — MIDAZOLAM HYDROCHLORIDE 1 MG/ML
INJECTION INTRAMUSCULAR; INTRAVENOUS AS NEEDED
Status: DISCONTINUED | OUTPATIENT
Start: 2017-10-03 | End: 2017-10-03 | Stop reason: SURG

## 2017-10-03 RX ORDER — OXYCODONE HYDROCHLORIDE AND ACETAMINOPHEN 5; 325 MG/1; MG/1
2 TABLET ORAL EVERY 4 HOURS PRN
Status: DISCONTINUED | OUTPATIENT
Start: 2017-10-03 | End: 2017-10-03 | Stop reason: HOSPADM

## 2017-10-03 RX ORDER — PROPOFOL 10 MG/ML
INJECTION, EMULSION INTRAVENOUS CONTINUOUS PRN
Status: DISCONTINUED | OUTPATIENT
Start: 2017-10-03 | End: 2017-10-03 | Stop reason: SURG

## 2017-10-03 RX ADMIN — VANCOMYCIN HYDROCHLORIDE 1500 MG: 1 INJECTION, POWDER, LYOPHILIZED, FOR SOLUTION INTRAVENOUS at 06:15

## 2017-10-03 RX ADMIN — CHLORHEXIDINE GLUCONATE 15 ML: 1.2 RINSE ORAL at 06:01

## 2017-10-03 RX ADMIN — PROPOFOL 200 MG: 10 INJECTION, EMULSION INTRAVENOUS at 06:40

## 2017-10-03 RX ADMIN — SODIUM CHLORIDE, SODIUM LACTATE, POTASSIUM CHLORIDE, AND CALCIUM CHLORIDE: .6; .31; .03; .02 INJECTION, SOLUTION INTRAVENOUS at 07:45

## 2017-10-03 RX ADMIN — ROCURONIUM BROMIDE 5 MG: 10 INJECTION, SOLUTION INTRAVENOUS at 06:39

## 2017-10-03 RX ADMIN — KETAMINE HYDROCHLORIDE 10 MG: 50 INJECTION INTRAMUSCULAR; INTRAVENOUS at 06:53

## 2017-10-03 RX ADMIN — ONDANSETRON 4 MG: 2 INJECTION INTRAMUSCULAR; INTRAVENOUS at 07:53

## 2017-10-03 RX ADMIN — ROCURONIUM BROMIDE 10 MG: 10 INJECTION, SOLUTION INTRAVENOUS at 06:50

## 2017-10-03 RX ADMIN — OXYCODONE HYDROCHLORIDE AND ACETAMINOPHEN 2 TABLET: 5; 325 TABLET ORAL at 09:42

## 2017-10-03 RX ADMIN — SODIUM CHLORIDE, SODIUM LACTATE, POTASSIUM CHLORIDE, AND CALCIUM CHLORIDE 20 ML/HR: .6; .31; .03; .02 INJECTION, SOLUTION INTRAVENOUS at 05:56

## 2017-10-03 RX ADMIN — MIDAZOLAM HYDROCHLORIDE 2 MG: 1 INJECTION, SOLUTION INTRAMUSCULAR; INTRAVENOUS at 06:32

## 2017-10-03 RX ADMIN — PROPOFOL 100 MCG/KG/MIN: 10 INJECTION, EMULSION INTRAVENOUS at 06:53

## 2017-10-03 RX ADMIN — FENTANYL CITRATE 50 MCG: 50 INJECTION, SOLUTION INTRAMUSCULAR; INTRAVENOUS at 07:48

## 2017-10-03 RX ADMIN — FENTANYL CITRATE 100 MCG: 50 INJECTION, SOLUTION INTRAMUSCULAR; INTRAVENOUS at 06:36

## 2017-10-03 RX ADMIN — GLYCOPYRROLATE 0.2 MG: 0.2 INJECTION, SOLUTION INTRAMUSCULAR; INTRAVENOUS at 06:36

## 2017-10-03 RX ADMIN — VANCOMYCIN HYDROCHLORIDE 1500 MG: 1 INJECTION, POWDER, LYOPHILIZED, FOR SOLUTION INTRAVENOUS at 06:24

## 2017-10-03 RX ADMIN — KETAMINE HYDROCHLORIDE 10 MG: 50 INJECTION INTRAMUSCULAR; INTRAVENOUS at 07:04

## 2017-10-03 RX ADMIN — FENTANYL CITRATE 50 MCG: 50 INJECTION, SOLUTION INTRAMUSCULAR; INTRAVENOUS at 07:00

## 2017-10-03 RX ADMIN — SUCCINYLCHOLINE CHLORIDE 100 MG: 20 INJECTION, SOLUTION INTRAMUSCULAR; INTRAVENOUS at 06:43

## 2017-10-03 NOTE — ANESTHESIA POSTPROCEDURE EVALUATION
Post-Op Assessment Note      CV Status:  Stable    Mental Status:  Alert and awake    Hydration Status:  Euvolemic    PONV Controlled:  Controlled    Airway Patency:  Patent    Post Op Vitals Reviewed: Yes          Staff: Anesthesiologist, CRNA           BP      Temp      Pulse     Resp      SpO2

## 2017-10-03 NOTE — OP NOTE
OPERATIVE REPORT  PATIENT NAME: Michelet Prado    :  1960  MRN: [id number]  Pt Location: [facility] OR ROOM 01    SURGERY DATE: 10/3/2017    Surgeon(s) and Role:     * Marisabel Huang MD - Primary     * Christin Peters PA-C  No qualified resident available for exposure  PA Present throughout procedure  Assisted with exposure and wound closure providing essential assistance throughout including retraction and hemostasis to achieve the necessary surgical goal       Preop Diagnosis:  Chronic pain syndrome [G89 4]  Post laminectomy syndrome [M96 1]    Post-Op Diagnosis Codes:     * Chronic pain syndrome [G89 4]     * Post laminectomy syndrome [M96 1]    Specimen(s):  * No specimens in log *    Estimated Blood Loss:   100 mL    Drains:       Anesthesia Type:   General    Operative Indications:  Chronic pain syndrome [G89 4]  Post laminectomy syndrome [H20 8]      Complications:   None    Procedure and Technique:  1  Placement of a thoracic spinal cord stimulator paddle electrode via T9-10 laminectomy    2  Placement of a left buttock implantable pulse generator and    3  Electronic analysis complex programming spinal cord stimulator system postoperative period approximately 1 hour    Operative Findings:  SSEPS and Motors Stable, EMG responses, One repositioning of lead    Implants:  1  [employer] Penta 60cm electrode  Ref 3228  SN [id number]  2  [employer] Proclaim 5 Elite Non- rechargeable Generator Ref [id number]  SN [id number]    Indications for procedure; This is a 51-year-old male with a long-standing history of chronic pain involving the lower back and lower extremities  Underwent successful SCS trial and presents for permament implantation  The risks and benefits of the procedure reviewed with the patient and family and they wished to proceed  Description of procedure; The patient was identified and brought to the operating room where they underwent the induction of general anesthesia   Placed prone on the operating room table with chest rolls  Prepped and draped in the usual sterile fashion  1 5g Vanco was administered as antibiotic prophylaxis  Bilateral lower extremity SCDs were placed for DVT prophylaxis  A timeout was then performed  Live fluoroscopic imaging was performed in order to liss appropriate positioning of the spine as well as skin incision  The thoracic midline incision was incised with a 10 blade after it was anesthetized with 1% lidocaine with epinephrine  Bovie electrocautery dissected the subcutaneous tissue down to the underlying spinous processes  Fluoroscopic imaging then confirmed appropriate level  Once this was confirmed the muscle was dissected off in a medial to lateral direction exposing the underlying lamina of T9 and T10  The superior portion of the T10 spinous process and the inferior portion of the T9 spinous process was removed with a rongeur  A laminectomy was then completed utilizing the [employer] drill bit and Kerrison punches to expose the ligamentum flavum  Nerve hook was then passed freely beneath the ligamentum flavum and it was resected with a Kerrison punch to expose the epidural space  Paulino Conception was then passed freely superiorly in the epidural space  The penta paddle electrode was then advanced under flouroscopc imaging to cover T8-T9 interspace  EMG motor responses were then utilized to determine physiological midline  There was one repositioning of the lead that was necessary  The lead was then secured in place with a 2-0 silk suture at the base of the paddle to the ligamentum flavum  An additional strain relief loop was created and secured in 3 locations with a 2-0 silk suture to the paraspinal musculature  Attention was then placed on the left buttock incision which was anesthetized with 1% lidocaine with epinephrine and incised with a 10 blade  Bovie electrocautery dissected the subcutaneous tissue   Then using sharp and blunt dissection a pocket was created in the inferior direction above the fascia  A tunneling tool then used to connect both incisions and the electrode was then passed through the tunneling tool to the buttock incision  The distal portion of the electrode was then connected to the generator and secured  Both the excess lead and generator were then placed into the pocket and secured with a 2-0 silk suture  System was interrogated and working within normal limits with normal impedances  Both incisions were copiously irrigated with antibiotic-induced solution  Closure was begun  Thoracic paraspinal musculature and fascia was reapproximated over the strain relief loop with an interrupted 2-0 Vicryl plus suture  The skin was then approximated with an interrupted inverted 2-0 Vicryl plus suture with stainless steel staples for the skin  The left buttock incision had the pocket closed over the generator with an interrupted 2-0 Vicryl plus suture  The skin was approximated with interrupted inverted 2-0 Vicryl plus suture with stainless steel staples for the skin  Routine sterile dressings were then applied  At the end of the case all counts were reported to be correct  There was no breaks in surgical technique or complications encountered during the procedure  The patient woke from anesthesia in stable condition being taken to the recovery room  In the postoperative period the patient underwent electronic analysis and complex programming of the spinal cord stimulator system for approximately 1 hour  The final settings used gaped bipole configuration on the Penta electrode, contact 10 positive and 8 negative  This was run at [place] with a pulse width of 1000 and a rate of 40 Hz with intraburst at 500Hz         I was present for the entire procedure    Patient Disposition:  extubated and stable    SIGNATURE: Shannan Perez MD  DATE: October 3, 2017  TIME: 8:02 AM

## 2017-10-03 NOTE — DISCHARGE INSTRUCTIONS
Follow Up Dr Taylor Tobar 2 weeks  Remove Dressing 3 days  Antibiotics prescription at pharmacy  Oxycodone prescription for pain  No NSAIDs for 2 weeks, may resume after that     Juan Christianson    What happens when I go home after the Spinal Cord Stimulator? Surgical site: The dressing may be removed after 3 days and left off  There is no special care for your incision  Activity:   Resume normal activity level, but limit bending, lifting, and twisting for 2 weeks  Bathing: You may shower after 3 days  Driving: You may resume driving in one week  The stimulator must be turned off when driving  If riding as a passenger, it is okay to leave the stimulator on  Pain Medicine: If you were given a prescription for a new pain medicine, it can be taken in addition to any pain medicine you are already taking  The new medicine should only be needed for about one week to help with any surgical pain  Take the new medicine only as needed  Please call the office at [phone] if you have any of the followin  Redness, swelling, heat, or unusual drainage (green, yellow, white, smelly) from the surgical site  2  Heavy bleeding from the surgical site  3  Chills or fever above 101 degrees   4  Pain not relieved by medicine  5  Questions or concerns about your surgery

## 2017-10-03 NOTE — INTERIM OP NOTE
PLACEMENT THORACIC FOR INSERTION DORSAL COLUMN SPINAL CORD STIMULATOR (DCS) WITH BUTTOCK IMPLANTABLE PULSE GENERATOR(IMPULSE)  Postoperative Note  PATIENT NAME: Horacio Baum  : 1960  MRN: [id number]  [facility]    Surgery Date: 10/3/2017    Preop Diagnosis:  Chronic pain syndrome [G89 4]  Post laminectomy syndrome [M96 1]    Post-Op Diagnosis Codes:     * Chronic pain syndrome [G89 4]     * Post laminectomy syndrome [M96 1]    Procedure(s) (LRB):  PLACEMENT THORACIC FOR INSERTION DORSAL COLUMN SPINAL CORD STIMULATOR (DCS) WITH BUTTOCK IMPLANTABLE PULSE GENERATOR(IMPULSE) (Left)    Surgeon(s) and Role:     * Lonnie Hamilton MD - Primary     * Stanley White PA-C    Specimens:  * No specimens in log *    Estimated Blood Loss:   100 mL    Anesthesia Type:   General     Findings:     SSEPS and Motors Stable  EMG responses   One repositioning of lead    Complications:   None    SIGNATURE: Lonnie Hamilton MD   DATE: October 3, 2017   TIME: 8:02 AM

## 2017-10-03 NOTE — ANESTHESIA PREPROCEDURE EVALUATION
Review of Systems/Medical History  Patient summary reviewed  Chart reviewed      Cardiovascular  EKG reviewed, Exercise tolerance: good,     Pulmonary  Smoker more than 10 packs per year , ,        GI/Hepatic    GERD well controlled,             Endo/Other  Diabetes type 2 Oral agent,      GYN       Hematology   Musculoskeletal       Neurology    Neuromuscular disease ,   Comment: Chronic pain Psychology           Physical Exam    Airway    Mallampati score:  I  TM Distance: >3 FB  Neck ROM: full     Dental       Cardiovascular  Cardiovascular exam normal    Pulmonary  Pulmonary exam normal Breath sounds clear to auscultation,     Other Findings        Anesthesia Plan  ASA Score- 2       Anesthesia Type- general        Induction- intravenous      Informed Consent  Anesthetic plan and risks discussed with patient

## 2017-10-05 ENCOUNTER — GENERIC CONVERSION - ENCOUNTER (OUTPATIENT)
Dept: OTHER | Facility: OTHER | Age: 57
End: 2017-10-05

## 2017-10-17 ENCOUNTER — ALLSCRIPTS OFFICE VISIT (OUTPATIENT)
Dept: OTHER | Facility: OTHER | Age: 57
End: 2017-10-17

## 2017-10-20 NOTE — PROGRESS NOTES
Assessment  1  Chronic low back pain (724 2,338 29) (M54 5,G89 29)   2  Chronic lumbar radiculopathy (724 4) (M54 16)   3  Chronic pain syndrome (338 4) (G89 4)   4  Herniation of lumbar intervertebral disc with radiculopathy (722 10) (M51 16)   5  Lumbar spondylosis (721 3) (M47 816)   6  Postlaminectomy syndrome, lumbar (722 83) (M96 1)   7  Status post surgery (V45 89) (Z98 890)   8  Encounter for staple removal (V58 32) (Z48 02)    Plan  DM type 2 (diabetes mellitus, type 2)    · Atorvastatin Calcium 10 MG Oral Tablet; TAKE 1 TABLET AT BEDTIME   Rx By: Ephriam Barrington; Dispense: 30 Days ; #:30 Tablet; Refill: 3;For: DM type 2 (diabetes mellitus, type 2); BEATRIZ = N; Verified Transmission to [facility]/PHARMACY #[id number] Last Updated By: System, [employer]; 10/16/2017 5:43:55 PM   · Lisinopril 5 MG Oral Tablet; TAKE 1 TABLET DAILY AS DIRECTED   Rx By: [facility]; Dispense: 0 Days ; #:30 Tablet; Refill: 3;For: DM type 2 (diabetes mellitus, type 2); BEATRIZ = N; Verified Transmission to [facility]/PHARMACY #[id number] Last Updated By: System, 1calendar; 10/16/2017 5:43:54 PM    Discussion/Summary    61 YO F presents for 2 week postoperative visit for staple removal  is s/p surgery 10/3 w/ DR Tia Neville;  and Technique:Placement of a thoracic spinal cord stimulator paddle electrode via T9-10 laminectomy  Placement of a left buttock implantable pulse generator and  Electronic analysis complex programming spinal cord stimulator system postoperative period approximately 1 hour  Findings:and Motors Stable, EMG responses, One repositioning of lead  [employer] Penta 60cm electrode  Ref 3228  SN [id number] Proclaim 5 Elite Non- rechargeable Generator Ref [id number]  SN [id number]  for procedure;is a 62 -year -old male with a long-standing history of chronic pain involving the lower back and lower extremities     SCS efficacy, following instructions are reviewed in detail and continue thru next 4 week until follow-up visit with Dr Jena Neville to observe incisions for redness, drainage, swelling dehiscence, increased pain, fever >/= 101, warmth to touch incision or skin surrounding, if occur call or report to office immediately for reassessment  not apply any lotions, creams, powder, or ointments to surgical incisionsas tolerated, no bending, lifting greater than 10 lbs, turning, or stretching, ambulation as tolerated  Immersion in water such as swimming, hot tub, or tub bath  of follow-up appointment in 4 week with Dr Strickland Sport there is any significant change in her neurologic status, call and/or return to the office immediately for reassessment expressed an understanding of information communicated during visit  with product rep for SCS programing and teaching for device use and management  with Patient representative, Briana Frazier , completed handicap plackard form , temporary  Chief Complaint  2 week postoperative visit for staple removal  Complains of incisional irritation and burning on his left buttock area incision  Post-Op  Post-Op Lumbar/Sacral Spine:   Mansoor Sole is status post on 10/3/2017--   refer to discussio for procedure  History of Present Illness:   Physical Examination:   Evaluation of the back demonstrates swelling, but-- no warmth,-- no erythema,-- no induration,-- no ecchymosis-- and-- no tenderness  The gait was normal and the station was normal    Lower Extremity DVT Assessment: no signs or symptoms suggestive of deep vein thrombosis  Motor Exam: motor groups within normal limits of strength & tone bilaterally  Assessment:   Post-op, the patient is doing well--   Met with Rep for SCS programing  Plan:   Patient instructed to follow up in 4 weeks  To Do For Next Visit: clinical recheck  Review of Systems    Constitutional: No fever or chills, feels well, no tiredness, no recent weight gain or weight loss  Eyes: No complaints of eye pain, no red eyes, no discharge from eyes, no itchy eyes     ENT: Dry throat, but-- no earache,-- no nosebleeds,-- no sore throat,-- no hearing loss,-- no nasal discharge-- and-- no hoarseness  Cardiovascular: No complaints of slow heart rate, no fast heart rate, no chest pain, no palpitations, no leg claudication, no lower extremity  Respiratory: cough, but-- no shortness of breath,-- no orthopnea,-- no wheezing,-- no shortness of breath during exertion-- and-- no PND  Gastrointestinal: No complaints of abdominal pain, no constipation, no nausea or vomiting, no diarrhea or bloody stools  Genitourinary: No complaints of dysuria, no incontinence, no hesitancy, no nocturia, no genital lesion, no testicular pain  Musculoskeletal: limb pain,-- myalgias-- and-- joint stiffness, but-- no joint swelling-- and-- no limb swelling--    The patient presents with complaints of lower back arthralgias (right upper leg)  Integumentary: incisional irritation, but-- no rashes,-- no itching,-- no dry skin,-- no skin lesions-- and-- no skin wound  Neurological: numbness-- and-- difficulty walking, but-- no headache,-- no tingling,-- no confusion,-- no dizziness,-- no limb weakness,-- no convulsions-- and-- no fainting  Psychiatric: Is not suicidal, no sleep disturbances, no anxiety or depression, no change in personality, no emotional problems  Endocrine: No complaints of proptosis, no hot flashes, no muscle weakness, no erectile dysfunction, no deepening of the voice, no feelings of weakness  Hematologic/Lymphatic: No complaints of swollen glands, no swollen glands in the neck, does not bleed easily, no easy bruising  Active Problems  1  Acute post-operative pain (338 18) (G89 18)   2  Blood pressure elevated without history of HTN (796 2) (R03 0)   3  BPH with urinary obstruction (600 01,599 69) (N40 1,N13 8)   4  Chronic low back pain (724 2,338 29) (M54 5,G89 29)   5  Chronic lumbar radiculopathy (724 4) (M54 16)   6  Chronic pain syndrome (338 4) (G89 4)   7   Colon cancer screening (V76 51) (Z12 11)   8  DM type 2 (diabetes mellitus, type 2) (250 00) (E11 9)   9  Erectile dysfunction of non-organic origin (302 72) (F52 21)   10  Heartburn (787 1) (R12)   11  Herniation of lumbar intervertebral disc with radiculopathy (722 10) (M51 16)   12  History of smoking 30 or more pack years (V15 82) (Z87 891)   13  Lumbar spondylosis (721 3) (M47 816)   14  Myofascial pain syndrome (729 1) (M79 1)   15  Postlaminectomy syndrome, lumbar (722 83) (M96 1)   16  Pre-procedural examination (V72 84) (Z01 818)   17  Pre-procedure lab exam (V72 63) (Z01 812)   18  Sciatica (724 3) (M54 30)   19  Special screening examination for neoplasm of prostate (V76 44) (Z12 5)   20  Status post surgery (V45 89) (Z98 890)    Social History   · Active smoker (305 1) (Z72 0)   · Denied: History of Alcohol Use (History)   · Caffeine Use   · Coffee   · Denied: History of Drug Use (305 90)   · History of smoking 30 or more pack years (V15 82) (Z87 891)   · Marital History - Single   · Occupation:   ·  for a [employer]    Current Meds   1  Atorvastatin Calcium 10 MG Oral Tablet; TAKE 1 TABLET AT BEDTIME; Therapy: 50BSU4590 to (Anay Laurenia)  Requested for: 65WKK0491; Last   Rx:59Vcy2458 Ordered   2  Cyclobenzaprine HCl - 10 MG Oral Tablet; TAKE 1 TABLET Bedtime PRN muscle   spasms; Therapy: 49SQG6098 to (Evaluate:53Smi3225)  Requested for: 26Mwc3593; Last   Rx:38Ptp2791 Ordered   3  FreeStyle Insu[employer]x System w/Device Kit; ACCU-CHEK SUZANNA SMART VIEW   GLUCOMETER USE AS DIRECTED TWICE DAILY DX 82406; Therapy: 32Qxl5864 to (Last Rx:01Fhr2460)  Requested for: 11Otk7763 Ordered   4  FreeStyle [employer]x Test In Vitro Strip; TEST TWICE DAILY DX 58480; Therapy: 71Qju5928 to (Last Rx:14Epl1098)  Requested for: 80OYK0860 Ordered   5  FreeStyle Lancets Miscellaneous; BID USE AS DIRECTED DX E11 65; Therapy: 44Nwv8778 to (Last Rx:71Ouz1584)  Requested for: 14QIC7335 Ordered   6   GlipiZIDE XL 10 MG Oral Tablet Extended Release 24 Hour; TAKE 1 TABLET DAILY WITH   BREAKFAST; Therapy: 18Gxw2381 to (Evaluate:59Mvw5826)  Requested for: 61Irx5733; Last   Rx:93Qwv0993 Ordered   7  Lisinopril 5 MG Oral Tablet; TAKE 1 TABLET DAILY AS DIRECTED; Therapy: 39Aqo3021 to (Last [place]coso Ty)  Requested for: 20Jun2017 Ordered   8  MetFORMIN HCl - 1000 MG Oral Tablet; TAKE 1 TABLET TWICE DAILY; Therapy: 35PAB1408 to (Evaluate:92Scq4374)  Requested for: 20Jun2017; Last   Rx:20Jun2017 Ordered   9  Oxycodone-Acetaminophen 5-325 MG Oral Tablet; TAKE 1 TO 2 TABLETS EVERY 4   HOURS AS NEEDED FOR PAIN  NO MORE THAN 8 TABLETS PER DAY; Therapy: 89LRK7191 to (Last Rx:02Oct2017) Ordered   10  Tamsulosin HCl - 0 4 MG Oral Capsule; TAKE 1 CAPSULE Bedtime; Therapy: 26QTW6050 to (Evaluate:18Oct2017)  Requested for: 20Jun2017; Last    Rx:20Jun2017 Ordered    Allergies  1  Penicillin V Potassium SOLR   2  Penicillins    Vitals   Recorded: 51EIE4442 09:54AM   Temperature 97 6 F   Heart Rate 92   Respiration 18   Systolic 033   Diastolic 90   Height 5 ft 11 in   Weight 224 lb    BMI Calculated 31 24   BSA Calculated 2 21   O2 Saturation 97     Future Appointments    Date/Time Provider Specialty Site   12/19/2017 09:00 AM ELAINE Reyes  [address] [place] [address]   11/20/2017 02:30 PM ELAINE Mejía   [address]     Signatures   Electronically signed by : Alieen Alfaro; Oct 18 2017 10:00AM EST                       (Author)    Electronically signed by : ELAINE Irvin ; Oct 19 2017  9:56AM EST                       (Author)

## 2017-11-20 ENCOUNTER — GENERIC CONVERSION - ENCOUNTER (OUTPATIENT)
Dept: OTHER | Facility: OTHER | Age: 57
End: 2017-11-20

## 2018-01-09 NOTE — MISCELLANEOUS
Message  Received message from Cleve Solomon on Monday 04/03/17 in regards to a request that was sent to our office  I called and left message for Cleve Solomon @ # [phone]  Diamante Amezcua that I haven't received or seen such request come thru to my office  I advised her it probably went to one of our other offices and it's probably on it's way to me  I also provided my fax # in case she would like to fax over the request to my attn directly  Waiting on a return call for a fax  Active Problems    1  BPH with urinary obstruction (600 01,599 69) (N40 1,N13 8)   2  Chronic pain syndrome (338 4) (G89 4)   3  Colon cancer screening (V76 51) (Z12 11)   4  Cough (786 2) (R05)   5  DM type 2 (diabetes mellitus, type 2) (250 00) (E11 9)   6  Erectile dysfunction of non-organic origin (302 72) (F52 21)   7  Heartburn (787 1) (R12)   8  Herniation of lumbar intervertebral disc with radiculopathy (722 10) (M51 16)   9  History of smoking 30 or more pack years (V15 82) (Z87 891)   10  Lumbago (724 2) (M54 5)   11  Lumbar radiculopathy (724 4) (M54 16)   12  Lumbar spondylosis (721 3) (M47 816)   13  Myofascial pain syndrome (729 1) (M79 1)   14  Postlaminectomy syndrome, lumbar (722 83) (M96 1)   15  Pre-procedural examination (V72 84) (Z01 818)   16  Sciatica (724 3) (M54 30)   17  Severe low back pain (724 2) (M54 5)   18  Special screening examination for neoplasm of prostate (V76 44) (Z12 5)    Current Meds   1  Cyclobenzaprine HCl - 10 MG Oral Tablet; TAKE 1 TABLET Bedtime for muscle spasm; Therapy: 71DXH2446 to (Evaluate:03Jun2017)  Requested for: 31FCT6351; Last   Rx:04Apr2017 Ordered   2  MetFORMIN HCl - 1000 MG Oral Tablet; TAKE 1 TABLET TWICE DAILY; Therapy: 29TON0283 to (Evaluate:61Szq2973)  Requested for: 87LSK1741; Last   Rx:15Jan2017 Ordered   3  Nicorette 2 MG Mouth/Throat Lozenge; ALLOW 1 LOZENGE TO DISSOLVE SLOWLY IN   MOUTH AS DIRECTED;    Therapy: 03BYO4251 to (Evaluate:23Jan2017)  Requested for: 47ABR1687; Last   LH:21YBQ3764 Ordered   4  Nortriptyline HCl - 25 MG Oral Capsule; TAKE 2 CAPSULES AT BEDTIME; Therapy: 48NPU8122 to (Evaluate:03Jun2017)  Requested for: 61PUV7526; Last   Rx:04Apr2017 Ordered   5  Tamsulosin HCl - 0 4 MG Oral Capsule; TAKE 1 CAPSULE Bedtime; Therapy: 99PHT0434 to (Zabrina Valdovinos)  Requested for: 43SEY3690; Last   Rx:03Jan2017 Ordered    Allergies    1  Penicillin V Potassium SOLR   2   Penicillins    Signatures   Electronically signed by : Teetee Horta, ; Apr 6 2017  9:18AM EST                       (Author)

## 2018-01-09 NOTE — RESULT NOTES
Message   Please forward to Dr Sania Cain pre SCS trial     Verified Results  * MRI THORACIC SPINE [address] [id number] 06:44AM Nabila Agrawaldavid Order Number: [id number]   Performing Comments: Pre SCS trial   - Patient Instructions: To schedule this appointment, please contact Central Scheduling at [phone]  Test Name Result Flag Reference   MRI THORACIC SPINE [address] (Report)     This is a summary report  The complete report is available in the patient's medical record  If you cannot access the medical record, please contact the sending organization for a detailed fax or copy  MRI THORACIC SPINE WITHOUT CONTRAST     INDICATION: 71-year-old male, chronic back pain, pre neurostimulator placement   COMPARISON: None  TECHNIQUE: Sagittal T1, sagittal T2, sagittal inversion recovery, axial T2, axial 2D MERGE  IMAGE QUALITY: Diagnostic  FINDINGS:   Incompletely visualized degenerative spondylosis with central canal and bilateral foraminal stenosis C4-5 and C5-C6  Dedicated cervical spine MRI exam recommended if clinically appropriate     ALIGNMENT: Normal alignment of the thoracic spine  No compression fracture  No subluxation  No scoliosis  MARROW SIGNAL: Normal marrow signal is identified within the visualized bony structures  No discrete marrow lesion  THORACIC CORD: Normal signal within the thoracic cord  PARAVERTEBRAL SOFT TISSUES: Paraspinal soft tissues are unremarkable  THORACIC DEGENERATIVE CHANGE:   Minimal degenerative disc and facet disease involves mid and lower thoracic segments  No evidence of disc herniation, central canal or foraminal stenosis  IMPRESSION:   Minimal multilevel degenerative spondylosis     No evidence of thoracic disc herniation, central canal or foraminal stenosis     Normal appearance thoracic spinal cord     Degenerative spondylosis, central canal and bilateral foraminal stenosis C4-5 and C5-6, incompletely characterized  Workstation performed: [id number]     Signed by:   Azul Mccarthy MD   3/7/17

## 2018-01-10 NOTE — RESULT NOTES
Message   Recorded as Task   Date: 01/13/2017 09:11 AM, Created By: Monica Diego   Task Name: Follow Up   Assigned To: [facility]   Regarding Patient: Courtney Henderson, Status: Active   Comment:    Sera Zepeda - 13 Jan 2017 9:11 AM     TASK CREATED  L/m to return call  S/pRIGHT L4 L5 TFESI done 1/6/17 @ [facility]  Sera Zepeda - 13 Jan 2017 9:35 AM     TASK EDITED   [facility] - 18 Jan 4125 27:26 AM     TASK EDITED  Pt reports not much difference in pain  He states he still has nerve pain and numbness in right leg  He rates his pain about 3/10  Please advise next step  [facility] - 18 Jan 2017 12:13 PM     TASK REPLIED TO: Previously Assigned To [facility]  At this point, he can trial a repeat right L4 and L5 TFESI to see if he would get any added benefit from a second injection close to the first injection  If he is agreeable, please schedule him for a repeat injection at least 3 weeks from the date of the first     If he is not interested, I think it is reasonable to get a surgical evaluation to see if he is a surgical candidate to get the lumbar disc herniation surgically repaired  If he is interested, I can refer him to a spinal surgeon  [facility] - 19 Jan 4661 22:39 AM     TASK EDITED  Patient scheduled w/ Vera Davey to discuss tx   [facility] - 19 Jan 1968 40:65 AM     TASK EDITED  Patient not really interested in surgery          Signatures   Electronically signed by : Janny Pace, ; Jan 19 2017 10:14AM EST                       (Author)

## 2018-01-10 NOTE — MISCELLANEOUS
Message   Recorded as Task   Date: 06/09/2017 03:30 PM, Created By: Shamar Bhardwaj   Task Name: Follow Up   Assigned To: [facility]   Regarding Patient: Yfn Marvin, Status: In Progress   Belem Watsoner - 09 Jun 2017 3:30 PM     TASK CREATED  *Pt to be called 6/12/17*    Pt is post [facility] SCS trial done on 6/9/17 w/Dr Olmedo  Lead removal 6/16/17 arrival for 2:45pm    Agnes Steward - 12 Jun 2017 11:17 AM     TASK EDITED     ***BTH pt**    pt reports things are going well and he's hardly having any nerve pain and not really having any LB pain  He has talked with Meryle Course from [facility]  He said his drsg is fine they just had to reinforce it last night  He denies chills or fevers and is taking his abx  I confirmed arrival time for lead removal for Fri 6/16 at 2:45  Pt said he was told that if it was working he would have to go get an xray taken before it was removed so he wanted to know who decides if it is working and if he should go get an xray? Told pt I would need to ask Dr Antoine Tee to advise about this as I am not familiar with Dr Carias Pulling post SCS routine  Our office will c/b with reply from Dr Antoine Tee  [address] - 12 Jun 2017 12:04 PM     TASK REPLIED TO: Previously Assigned To [address]  Before his appointment to have it removed, he should go to [facility] to have the x-ray done  He needs to come early to get it done  [facility] - 12 Jun 2017 12:56 PM     TASK EDITED  Left vm for pt to c/b and leave best c/b time and c/b number and then I will return his call  Agnes Steward - 12 Jun 2017 12:56 PM     TASK IN PROGRESS   Jacquelin Keys - 14 Jun 2017 8:43 AM     TASK EDITED  S/W pt  Advised pt of the same  Pt appreciative  Active Problems    1  BPH with urinary obstruction (600 01,599 69) (N40 1,N13 8)   2  Chronic low back pain (724 2,338 29) (M54 5,G89 29)   3  Chronic lumbar radiculopathy (724 4) (T15 16)   4   Chronic pain syndrome (338 4) (G89 4)   5  Colon cancer screening (V76 51) (Z12 11)   6  DM type 2 (diabetes mellitus, type 2) (250 00) (E11 9)   7  Erectile dysfunction of non-organic origin (302 72) (F52 21)   8  Heartburn (787 1) (R12)   9  Herniation of lumbar intervertebral disc with radiculopathy (722 10) (M51 16)   10  History of smoking 30 or more pack years (V15 82) (Z87 891)   11  Lumbar spondylosis (721 3) (M47 816)   12  Myofascial pain syndrome (729 1) (M79 1)   13  Postlaminectomy syndrome, lumbar (722 83) (M96 1)   14  Pre-procedural examination (V72 84) (Z01 818)   15  Pre-procedure lab exam (V72 63) (Z01 812)   16  Sciatica (724 3) (M54 30)   17  Special screening examination for neoplasm of prostate (V76 44) (Z12 5)   18  Status post surgery (V45 89) (Z53 623)    Current Meds   1  Ciprofloxacin HCl - 500 MG Oral Tablet; 1 (one) tablet BID x 7 days; Therapy: 02CWO8018 to (Last Rx:09Jun2017)  Requested for: 36UPN1353 Ordered   2  FreeStyle InsuLinx System w/Device Kit; ACCU-CHEK SUZANNA SMART VIEW   GLUCOMETER USE AS DIRECTED TWICE DAILY DX 44447; Therapy: 62Eli6204 to (Last Rx:92Htd5052)  Requested for: 65Rjq3976 Ordered   3  FreeStyle InsuLinx Test In Vitro Strip; TEST TWICE DAILY DX 73656; Therapy: 96Qic0377 to (Last Rx:16Tzo2544)  Requested for: 42Svs7187 Ordered   4  FreeStyle Lancets Miscellaneous; BID USE AS DIRECTED DX E11 65; Therapy: 13Klc8875 to (Last Rx:98Msu2112)  Requested for: 76Qor2293 Ordered   5  GlipiZIDE XL 10 MG Oral Tablet Extended Release 24 Hour; TAKE 1 TABLET DAILY   WITH BREAKFAST; Therapy: 93Uzx3637 to (Evaluate:98Cte8019)  Requested for: 02Lve4975; Last   Rx:78Bdu8085 Ordered   6  Lisinopril 5 MG Oral Tablet; TAKE 1 TABLET DAILY AS DIRECTED; Therapy: 58Uxa1856 to (Last Rx:84Pwo3165)  Requested for: 54Ghq8816 Ordered   7  MetFORMIN HCl - 1000 MG Oral Tablet; TAKE 1 TABLET TWICE DAILY;    Therapy: 18IJR5453 to (Evaluate:22Dlv7489)  Requested for: 61SSJ1528; Last Rx:73Hov0947 Ordered   8  Tamsulosin HCl - 0 4 MG Oral Capsule; TAKE 1 CAPSULE Bedtime; Therapy: 57NXB6790 to (Evaluate:50Eis7055)  Requested for: 86BXN8449; Last   UL:87KEB6295 Ordered    Allergies    1  Penicillin V Potassium SOLR   2   Penicillins    Signatures   Electronically signed by : Shakila Ontiveros, ; Jun 14 2017  8:43AM EST                       (Author)

## 2018-01-10 NOTE — MISCELLANEOUS
Message  Post operative call s/p surgery on 10/3 w/ DR Fior Cortez s/p ;   Procedure and Technique:  1  Placement of a thoracic spinal cord stimulator paddle electrode via T9-10 laminectomy Â   2  Placement of a left buttock implantable pulse generator and Â   3  Electronic analysis complex programming spinal cord stimulator system postoperative period approximately 1 hour    Operative Findings:  SSEPS and Motors Stable, EMG responses, One repositioning of lead    Implants:  1  [facility] [employer] Penta 60cm electrode  Ref 3228  SN [id number]  2  [employer] Proclaim 5 Elite Non- rechargeable Generator Ref [id number]  SN [id number]    Post operative pain:  Antibiotic:CLINDAMYCIN   Gastrointestinal (BM, NVD, Appetite /Fluid intake) NO bm X 2 DAYS PLAN TO START EXLAX TONIGHT , HE REPORTS IF THIS DOES NOT WORK WILL DRINK MILK, THAT ALWAYS WORKS  Urine; denies urinary problems   Call if you develop any signs or symptoms of incision (s) redness, drainage, dehiscence, or a fever of 101 or higher , uncontrolled nausea and /or vomiting  Wound/dressing; as per postoperative discharge instructions remove dressings today, plan to remove tonight   Post operative Hygiene: Gently wash surgical incision(s) with a clean wash cloth and pat dry using a clean wash towel  Reinforced use clean wash cloth and towel daily, use antibacterial soap, change bed linens 1-2 times per week and pajamas several times per week  Post operative Activity: Ambulate as tolerate, Lift no greater than 10 LBS until 6 weeks, Avoid submersion in water x 3 weeks, and refrain for bending or twisting until about 4 weeks -light duty until then      Patent verbalized an understanding of information communicated       Signatures   Electronically signed by : CORAZON Klein; Oct  5 2017 11:19AM EST                       (Author)

## 2018-01-10 NOTE — MISCELLANEOUS
Message   Recorded as Task   Date: 06/26/2017 08:08 AM, Created By: Leny Leon   Task Name: Miscellaneous   Assigned To: [facility]   Regarding Patient: Yobani Jose, Status: In Progress   Tez Egan - 26 Jun 2017 8:08 AM     TASK CREATED  Pt called and wanted to know if he really needs to keep his appt  with Rios Jung tomorrow since he is seeing Dr Flaca Matias  Please call to discuss at [phone]  Janina Dunn - 26 Jun 2017 8:28 AM     TASK EDITED  PT is wondering if he needs to keep his Aptt with you, if he is seeing Dr Flaca Matias        ****please Advise******   Citlalli Smoker - 27 Jun 2017 9:24 AM     TASK REPLIED TO: Previously Assigned To Citlalli Smoker  no he does not need to keep his appt  will wait to see what Dr Flaca Matias says Isabel Sands - 27 Jun 2017 11:24 AM     TASK EDITED  lm TO HAVE PT Gilma Reich - 27 Jun 2017 2:14 PM     TASK EDITED  pt returning call he is unsure why you are calling him  please call pt back at [phone]  he has an appt  with Dr Flaca Matias on 7/31/17 so he thought that he didnt need to keep his appt today because he called yesterday  Janina Dunn - 27 Jun 2017 2:19 PM     TASK EDITED  spoke with pt , about not having to keeping appt with AO        Active Problems    1  BPH with urinary obstruction (600 01,599 69) (N40 1,N13 8)   2  Chronic low back pain (724 2,338 29) (M54 5,G89 29)   3  Chronic lumbar radiculopathy (724 4) (M54 16)   4  Chronic pain syndrome (338 4) (G89 4)   5  Colon cancer screening (V76 51) (Z12 11)   6  DM type 2 (diabetes mellitus, type 2) (250 00) (E11 9)   7  Erectile dysfunction of non-organic origin (302 72) (F52 21)   8  Heartburn (787 1) (R12)   9  Herniation of lumbar intervertebral disc with radiculopathy (722 10) (M51 16)   10  History of smoking 30 or more pack years (V15 82) (Z87 891)   11  Lumbar spondylosis (721 3) (M47 816)   12  Myofascial pain syndrome (729 1) (M79 1)   13  Postlaminectomy syndrome, lumbar (722 83) (M96 1)   14  Pre-procedural examination (V72 84) (Z01 818)   15  Pre-procedure lab exam (V72 63) (Z01 812)   16  Sciatica (724 3) (M54 30)   17  Special screening examination for neoplasm of prostate (V76 44) (Z12 5)   18  Status post surgery (V45 89) (D73 268)    Current Meds   1  Ciprofloxacin HCl - 500 MG Oral Tablet; 1 (one) tablet BID x 7 days; Therapy: 15NMM4480 to (Last Rx:09Jun2017)  Requested for: 55LIT4611 Ordered   2  FreeStyle InsuLinx System w/Device Kit; ACCU-CHEK SUZANNA SMART VIEW   GLUCOMETER USE AS DIRECTED TWICE DAILY DX 04956; Therapy: 48Fsm1828 to (Last Rx:10Apr2017)  Requested for: 10Apr2017 Ordered   3  FreeStyle InsuLinx Test In Vitro Strip; TEST TWICE DAILY DX 03289; Therapy: 10Apr2017 to (Last Rx:10Apr2017)  Requested for: 12Jio9019 Ordered   4  FreeStyle Lancets Miscellaneous; BID USE AS DIRECTED DX E11 65; Therapy: 11Klk1220 to (Last Rx:10Apr2017)  Requested for: 10Apr2017 Ordered   5  GlipiZIDE XL 10 MG Oral Tablet Extended Release 24 Hour; TAKE 1 TABLET DAILY   WITH BREAKFAST; Therapy: 91Rxx7084 to (Evaluate:06Jan2018)  Requested for: 20Jun2017; Last   Rx:20Jun2017 Ordered   6  Lisinopril 5 MG Oral Tablet; TAKE 1 TABLET DAILY AS DIRECTED; Therapy: 95Zqa4439 to (Last Lupie Lei)  Requested for: 20Jun2017 Ordered   7  MetFORMIN HCl - 1000 MG Oral Tablet; TAKE 1 TABLET TWICE DAILY; Therapy: 50TCC4569 to (Evaluate:43Dde6023)  Requested for: 20Jun2017; Last   Rx:20Jun2017 Ordered   8  Tamsulosin HCl - 0 4 MG Oral Capsule; TAKE 1 CAPSULE Bedtime; Therapy: 66IKM8254 to (Evaluate:18Oct2017)  Requested for: 20Jun2017; Last   Rx:20Jun2017 Ordered    Allergies    1  Penicillin V Potassium SOLR   2   Penicillins    Signatures   Electronically signed by : Princess He, ; Jun 27 2017  2:20PM EST                       (Author)

## 2018-01-10 NOTE — MISCELLANEOUS
Message   Recorded as Task   Date: 05/11/2017 11:34 AM, Created By: Jonathan Martin   Task Name: Miscellaneous   Assigned To: [facility],Team   Regarding Patient: Feliciano Del Angel, Status: Active   Comment:    Jonathan Martin - 11 May 2017 11:34 AM     TASK CREATED  Caller: Self; [phone] (Home); [phone] (Work)  Eyal Obando called for [employer] in regards to psych eval  he spoke w/ his Leaf and they stated they have not heard from us yet so he was calling for a follow up  Please call patient at [phone]   Magdalena Rivers - 11 May 2017 4:24 PM     TASK EDITED  Spoke to pt and this is being addressed in a seperate task  Active Problems    1  BPH with urinary obstruction (600 01,599 69) (N40 1,N13 8)   2  Chronic pain syndrome (338 4) (G89 4)   3  Colon cancer screening (V76 51) (Z12 11)   4  DM type 2 (diabetes mellitus, type 2) (250 00) (E11 9)   5  Erectile dysfunction of non-organic origin (302 72) (F52 21)   6  Heartburn (787 1) (R12)   7  Herniation of lumbar intervertebral disc with radiculopathy (722 10) (M51 16)   8  History of smoking 30 or more pack years (V15 82) (Z87 891)   9  Lumbago (724 2) (M54 5)   10  Lumbar radiculopathy (724 4) (M54 16)   11  Lumbar spondylosis (721 3) (M47 816)   12  Myofascial pain syndrome (729 1) (M79 1)   13  Postlaminectomy syndrome, lumbar (722 83) (M96 1)   14  Pre-procedural examination (V72 84) (Z01 818)   15  Sciatica (724 3) (M54 30)   16  Severe low back pain (724 2) (M54 5)   17  Special screening examination for neoplasm of prostate (V76 44) (Z12 5)    Current Meds   1  Chlorzoxazone 500 MG Oral Tablet; TAKE 1 TABLET 3 times daily PRN muscle spasms; Therapy: 33Jfy4125 to (Evaluate:80Dyc1167)  Requested for: 11Gba6978; Last   Rx:25Apr2017 Ordered   2  Cyclobenzaprine HCl - 10 MG Oral Tablet; TAKE 1 TABLET Bedtime for muscle spasm; Therapy: 59FWF0437 to (Evaluate:03Jun2017)  Requested for: 63KHN4508; Last   Rx:04Apr2017 Ordered   3   FreeStyle InsuLinx System w/Device Kit; ACCU-CHEK SUZANNA SMART VIEW   GLUCOMETER USE AS DIRECTED TWICE DAILY DX 50411; Therapy: 74Ott1033 to (Last Rx:84Olk4148)  Requested for: 98Eqf2944 Ordered   4  FreeStyle InsuLinx Test In Vitro Strip; TEST TWICE DAILY DX 44520; Therapy: 10Apr2017 to (Last Rx:10Apr2017)  Requested for: 20Gih7284 Ordered   5  FreeStyle Lancets Miscellaneous; BID USE AS DIRECTED DX E11 65; Therapy: 10Apr2017 to (Last Rx:10Apr2017)  Requested for: 10Apr2017 Ordered   6  GlipiZIDE XL 10 MG Oral Tablet Extended Release 24 Hour; TAKE 1 TABLET DAILY   WITH BREAKFAST; Therapy: 10Apr2017 to (Evaluate:02Odf4913)  Requested for: 24Hqm4125; Last   Rx:10Apr2017 Ordered   7  Lisinopril 5 MG Oral Tablet; TAKE 1 TABLET DAILY AS DIRECTED; Therapy: 10Apr2017 to (Last Rx:56Hdf4862)  Requested for: 10Apr2017 Ordered   8  Medrol 4 MG Oral Tablet Therapy Pack (MethylPREDNISolone); TAKE AS PRESRIBED; Therapy: 18TBB1265 to (Evaluate:06Pgy9454)  Requested for: 36CIS5898; Last   SH:98AQN1730 Ordered   9  MetFORMIN HCl - 1000 MG Oral Tablet; TAKE 1 TABLET TWICE DAILY; Therapy: 38SJO3336 to (Evaluate:34Aqa2610)  Requested for: 10NJV7638; Last   Rx:15Jan2017 Ordered   10  Nicorette 2 MG Mouth/Throat Lozenge; ALLOW 1 LOZENGE TO DISSOLVE SLOWLY IN    MOUTH AS DIRECTED; Therapy: 36CBV0776 to (AQGSSEGZ:14QDF6989)  Requested for: 79AVU8917; Last    Rx:03Jan2017 Ordered   11  Nortriptyline HCl - 25 MG Oral Capsule; TAKE 2 CAPSULES AT BEDTIME; Therapy: 55MXZ1518 to (Evaluate:03Jun2017)  Requested for: 06PTT1870; Last    Rx:04Apr2017 Ordered   12  Tamsulosin HCl - 0 4 MG Oral Capsule; TAKE 1 CAPSULE Bedtime; Therapy: 22IJR4124 to (Evaluate:85Wlm1900)  Requested for: 46TII8661; Last    AU:67YVC5518 Ordered    Allergies    1  Penicillin V Potassium SOLR   2   Penicillins    Signatures   Electronically signed by : Jw Ellis, ; May 11 2017  4:24PM EST                       (Author)

## 2018-01-10 NOTE — RESULT NOTES
Message   Recorded as Task   Date: 08/22/2017 10:40 AM, Created By: Yanna Clements   Task Name: Miscellaneous   Assigned To: [facility]   Regarding Patient: Felicity Almanzar, Status: Active   CommentIlene Blum - 22 Aug 2017 10:40 AM     TASK CREATED  Pt called and stated he needs pain meds for sleeping at night  The shot helped his leg but not the middle of his back  Please call [phone]  Keya Wetzel - 22 Aug 2017 10:46 AM     TASK EDITED  Attempted to call the pt  to clarify  Looks like he had a lumbar TFESI on 8/18  LMOM to CB  Keya Wetzel - 22 Aug 2017 10:46 AM     TASK IN PROGRESS   Yanna Clements - 22 Aug 2017 10:50 AM     TASK EDITED  Pt called back  Please call [phone]  Keya Wetzel - 22 Aug 2017 11:29 AM     TASK EDITED  Attempted to call the pt  again and left a detailed mom in regards to postop instructions  Pt  to cb if any questions  Keya Wetzel - 22 Aug 2017 11:29 AM     TASK IN PROGRESS   Denise Menon - 22 Aug 2017 11:50 AM     TASK EDITED  [address] [place] [address]- patient returned call   c/b [phone]   Gogo Wetzely - 22 Aug 2017 1:16 PM     TASK EDITED  S/w the pt  and he stated he feels alot of relief in his LE's but no relief in his LB, middle more on the left side  He said there is nothing he can do for that and he is on the list to have the permanent stimulator placed  He stated he is taking nothing for pain  He is requesting something strong for pain that he can take before bed , so that he sleep  AS to advise  Thanks   [address] - 22 Aug 2017 2:09 PM     TASK REPLIED TO: Previously Assigned To Mamadou Rubi 44 like Larry Fofana had prescribed him nortriptyline before bedtime before and cyclobenzaprine as needed for muscle spasms  Is he still taking those? Are they helping? Keya Wetzel - 22 Aug 2017 2:21 PM     TASK EDITED  Inquired about medications  He  stated he is taking nothing  He requested something strong to take because he does not sleep  Luba Aviles - 22 Aug 2017 2:28 PM     TASK REPLIED TO: Previously Assigned To Luba Aviles  Refilled the cyclobenzaprine and nortriptyline  Keya Wetzel - 22 Aug 2017 3:51 PM     TASK EDITED  S/w the pt  and he is aware          Signatures   Electronically signed by : Valentino Nelson, ; Aug 22 2017  3:51PM EST                       (Author)

## 2018-01-11 NOTE — MISCELLANEOUS
Message   Recorded as Task   Date: 04/24/2017 04:04 PM, Created By: Lamberto Alanis   Task Name: Call Back   Assigned To: Jae Stahl   Regarding Patient: Arielle Grey, Status: Active   Comment:    Michaelle Santos - 24 Apr 2017 4:04 PM     TASK CREATED  received message from answering service that pt would like you to call them regarding the status of psych testing [phone]  Thank you  Ale Bland - 24 Apr 2017 5:52 PM     TASK EDITED  Spoke with pt and he informed me he had his psych eval done by Dr Pendleton More on 4/13/17  Advised pt that as soon as I received eval I will call him and let him know we will begin the insurance auth process  Ale Bland - 24 Apr 2017 5:53 PM     TASK EDITED        Active Problems    1  BPH with urinary obstruction (600 01,599 69) (N40 1,N13 8)   2  Chronic pain syndrome (338 4) (G89 4)   3  Colon cancer screening (V76 51) (Z12 11)   4  DM type 2 (diabetes mellitus, type 2) (250 00) (E11 9)   5  Erectile dysfunction of non-organic origin (302 72) (F52 21)   6  Heartburn (787 1) (R12)   7  Herniation of lumbar intervertebral disc with radiculopathy (722 10) (M51 16)   8  History of smoking 30 or more pack years (V15 82) (Z87 891)   9  Lumbago (724 2) (M54 5)   10  Lumbar radiculopathy (724 4) (M54 16)   11  Lumbar spondylosis (721 3) (M47 816)   12  Myofascial pain syndrome (729 1) (M79 1)   13  Postlaminectomy syndrome, lumbar (722 83) (M96 1)   14  Pre-procedural examination (V72 84) (Z01 818)   15  Sciatica (724 3) (M54 30)   16  Severe low back pain (724 2) (M54 5)   17  Special screening examination for neoplasm of prostate (V76 44) (Z12 5)    Current Meds   1  Cyclobenzaprine HCl - 10 MG Oral Tablet; TAKE 1 TABLET Bedtime for muscle spasm; Therapy: 91FBZ2599 to (Evaluate:03Jun2017)  Requested for: 20PNR1743; Last   Rx:04Apr2017 Ordered   2  FreeStyle InsuLinx System w/Device Kit; ACCU-CHEK SUZANNA SMART VIEW   GLUCOMETER USE AS DIRECTED TWICE DAILY DX 88496;    Therapy: 66PHZ5022 to (Last Rx:10Apr2017)  Requested for: 10Apr2017 Ordered   3  FreeStyle InsuLinx Test In Vitro Strip; TEST TWICE DAILY DX 45803; Therapy: 10Apr2017 to (Last Rx:10Apr2017)  Requested for: 10Apr2017 Ordered   4  FreeStyle Lancets Miscellaneous; BID USE AS DIRECTED DX E11 65; Therapy: 10Apr2017 to (Last Rx:10Apr2017)  Requested for: 10Apr2017 Ordered   5  GlipiZIDE XL 10 MG Oral Tablet Extended Release 24 Hour; TAKE 1 TABLET DAILY   WITH BREAKFAST; Therapy: 10Apr2017 to (Evaluate:92Sxg2356)  Requested for: 10Apr2017; Last   Rx:10Apr2017 Ordered   6  Lisinopril 5 MG Oral Tablet; TAKE 1 TABLET DAILY AS DIRECTED; Therapy: 10Apr2017 to (Last Rx:10Apr2017)  Requested for: 10Apr2017 Ordered   7  MetFORMIN HCl - 1000 MG Oral Tablet; TAKE 1 TABLET TWICE DAILY; Therapy: 47SVF7521 to (Evaluate:86Zdw9751)  Requested for: 49AKS2973; Last   Rx:15Jan2017 Ordered   8  Nicorette 2 MG Mouth/Throat Lozenge; ALLOW 1 LOZENGE TO DISSOLVE SLOWLY IN   MOUTH AS DIRECTED; Therapy: 70TMF7609 to (FSBDLRYJ:71TUX5023)  Requested for: 54ZIE9998; Last   XJ:29TZF5360 Ordered   9  Nortriptyline HCl - 25 MG Oral Capsule; TAKE 2 CAPSULES AT BEDTIME; Therapy: 68OEF9922 to (Evaluate:03Jun2017)  Requested for: 75UJZ6523; Last   Rx:04Apr2017 Ordered   10  Tamsulosin HCl - 0 4 MG Oral Capsule; TAKE 1 CAPSULE Bedtime; Therapy: 19EAN7200 to (Rigo Loges)  Requested for: 16JXD4205; Last    Rx:03Jan2017 Ordered    Allergies    1  Penicillin V Potassium SOLR   2   Penicillins    Signatures   Electronically signed by : Ke Ochoa, ; Apr 24 2017  5:53PM EST                       (Author)

## 2018-01-11 NOTE — MISCELLANEOUS
Message   Recorded as Task   Date: 2017 09:04 AM, Created By: Suman Sawant   Task Name: Miscellaneous   Assigned To: [facility]   Regarding Patient: Edmar Marsh, Status: Active   CommentChalice Kathleen - 2017 9:04 AM     TASK CREATED  Given to: [address] [facility]  Reason: ROUTINE/OFFICE  Pt's Dr: Abiola Holcomb  For: OFFICE     2nd Call: NO    From: [facility]  Phone: [phone]   Ext:     Pt Name: Junito Pierre  Pt :   /  /        Message: NEEDS TO [address] [place] [address]              -----------------------------------------------------------------  CALLER ID: [id number]      CSN: [id number]  Taken By: Ella Anderson 2017 08:59 AM  Delivered By: FERMIN 2017 09:01 AM  -----------------------------------------------------------------   [facility] - 2017 9:22 AM     TASK EDITED  Spoke with pt, pt has a case that is in litigation for workman compensation and because it takes awhile to [employer] [place] the pt had obtain a  to apply for SSI disability and needs you to fill paperwork out  Crystal Olmedo - 2017 12:30 PM     TASK REPLIED TO: Previously Assigned To Natalie Hyman  I don't do disability paperwork  I refer the patient's back to the PCP for the paperwork  Janina Dunn - 2017 12:35 PM     TASK EDITED  made pt aware that he has to take the disability paperwork to his pcp        Active Problems    1  BPH with urinary obstruction (600 01,599 69) (N40 1,N13 8)   2  Chronic low back pain (724 2,338 29) (M54 5,G89 29)   3  Chronic lumbar radiculopathy (724 4) (M54 16)   4  Chronic pain syndrome (338 4) (G89 4)   5  Colon cancer screening (V76 51) (Z12 11)   6  DM type 2 (diabetes mellitus, type 2) (250 00) (E11 9)   7  Erectile dysfunction of non-organic origin (302 72) (F52 21)   8  Heartburn (787 1) (R12)   9  Herniation of lumbar intervertebral disc with radiculopathy (722 10) (M51 16)   10   History of smoking 30 or more pack years (V15 82) (Z87 891)   11  Lumbar spondylosis (721 3) (M47 816)   12  Myofascial pain syndrome (729 1) (M79 1)   13  Postlaminectomy syndrome, lumbar (722 83) (M96 1)   14  Pre-procedural examination (V72 84) (Z01 818)   15  Pre-procedure lab exam (V72 63) (Z01 812)   16  Sciatica (724 3) (M54 30)   17  Special screening examination for neoplasm of prostate (V76 44) (Z12 5)   18  Status post surgery (V45 89) (T51 097)    Current Meds   1  Atorvastatin Calcium 10 MG Oral Tablet; TAKE 1 TABLET AT BEDTIME; Therapy: 45KJN9575 to (Geraldine Maloney)  Requested for: 77AEL1363; Last   Rx:35Dzr2018 Ordered   2  FreeStyle InsuLinx System w/Device Kit; ACCU-CHEK SUZANNA SMART VIEW   GLUCOMETER USE AS DIRECTED TWICE DAILY DX 04117; Therapy: 79Ihx3881 to (Last Rx:65Ggj3607)  Requested for: 63Hsv7069 Ordered   3  FreeStyle InsuLinx Test In Vitro Strip; TEST TWICE DAILY DX 06954; Therapy: 50Irn1151 to (Last Rx:77Dlu3515)  Requested for: 38Hxn4380 Ordered   4  FreeStyle Lancets Miscellaneous; BID USE AS DIRECTED DX E11 65; Therapy: 06Nvt0032 to (Last Rx:49Iam1070)  Requested for: 34Rbb0561 Ordered   5  GlipiZIDE XL 10 MG Oral Tablet Extended Release 24 Hour; TAKE 1 TABLET DAILY   WITH BREAKFAST; Therapy: 16Qxz7306 to (Evaluate:06Jan2018)  Requested for: 20Jun2017; Last   Rx:20Jun2017 Ordered   6  Lisinopril 5 MG Oral Tablet; TAKE 1 TABLET DAILY AS DIRECTED; Therapy: 57Cly3231 to (Last Amarjit Span)  Requested for: 20Jun2017 Ordered   7  MetFORMIN HCl - 1000 MG Oral Tablet; TAKE 1 TABLET TWICE DAILY; Therapy: 91BOE6276 to (Evaluate:29Pfy5193)  Requested for: 20Jun2017; Last   Rx:20Jun2017 Ordered   8  Tamsulosin HCl - 0 4 MG Oral Capsule; TAKE 1 CAPSULE Bedtime; Therapy: 50LWC6407 to (Evaluate:18Oct2017)  Requested for: 20Jun2017; Last   Rx:20Jun2017 Ordered    Allergies    1  Penicillin V Potassium SOLR   2   Penicillins    Signatures   Electronically signed by : Elvis Alas, ; Jul 13 2017 12:35PM EST                       (Author)

## 2018-01-11 NOTE — MISCELLANEOUS
Message   Recorded as Task   Date: 08/24/2017 12:50 PM, Created By: Daysi Monique   Task Name: Follow Up   Assigned To: [facility]   Regarding Patient: Anthony Pineda, Status: Active   Comment:    DuaneSera - 24 Aug 2017 12:50 PM     TASK CREATED  L/m to return call with % of relief  S/p RT  L4-L5 TFESI done 8/18/17  by dr Mona ZepedaSera - 24 Aug 2017 1:08 PM     TASK EDITED   DuaneSera - 30 Aug 2017 10:19 AM     TASK EDITED  2nd Attempt  Kait Dennis L - 30 Aug 2017 1:58 PM     TASK EDITED  [facility]- patient called back stating he feels the same  Stated the pill prescribed to help him sleep are not helping  Relief 20% if that  Elisabeth Settle [place] Sett[place] Pain level 5/10   any questions c/b [phone]   DuaneSera - 30 Aug 2017 2:23 PM     TASK EDITED  Please read previous note first     Patient was given Nortiptyline 25mg 2 tabs at bed time and Cyclobenzaprine 1tab at bedtime prn  Also patient has an appt for [facility] with Dr Jose M Carey on 10/3/17  Please advise  Leah Navas - 30 Aug 2017 4:14 PM     TASK REPLIED TO: Previously Assigned To Leah vann, can try 3 tabs of nortriptyline but would not go higher than that    would also recommend follow up with NP LUCHO ZepedaJully - 31 Aug 2017 11:19 AM     TASK EDITED  Patient agreed with you recommendation  He will wait to see if  increasing the Notriptyline will help him before he schedules a f/u with Dr Valentino Nelson, Thank you  Leah Navas - 31 Aug 2017 12:15 PM     TASK REPLIED TO: Previously Assigned To Donnell Schmidt        Active Problems    1  BPH with urinary obstruction (600 01,599 69) (N40 1,N13 8)   2  Chronic low back pain (724 2,338 29) (M54 5,G89 29)   3  Chronic lumbar radiculopathy (724 4) (M54 16)   4  Chronic pain syndrome (338 4) (G89 4)   5  Colon cancer screening (V76 51) (Z12 11)   6  DM type 2 (diabetes mellitus, type 2) (250 00) (E11 9)   7   Erectile dysfunction of non-organic origin (302 72) (F52 21)   8  Heartburn (787 1) (R12)   9  Herniation of lumbar intervertebral disc with radiculopathy (722 10) (M51 16)   10  History of smoking 30 or more pack years (V15 82) (Z87 891)   11  Lumbar spondylosis (721 3) (M47 816)   12  Myofascial pain syndrome (729 1) (M79 1)   13  Postlaminectomy syndrome, lumbar (722 83) (M96 1)   14  Pre-procedural examination (V72 84) (Z01 818)   15  Pre-procedure lab exam (V72 63) (Z01 812)   16  Sciatica (724 3) (M54 30)   17  Special screening examination for neoplasm of prostate (V76 44) (Z12 5)   18  Status post surgery (V45 89) (H00 956)    Current Meds   1  Atorvastatin Calcium 10 MG Oral Tablet; TAKE 1 TABLET AT BEDTIME; Therapy: 07OSK1119 to (Gladys Chua)  Requested for: 51ART4941; Last   Rx:47Owj4802 Ordered   2  Cyclobenzaprine HCl - 10 MG Oral Tablet; TAKE 1 TABLET Bedtime PRN muscle   spasms; Therapy: 31OEA4706 to (Evaluate:97Bmd9466)  Requested for: 50Nbw7859; Last   Rx:88Fbg3652 Ordered   3  FreeStyle InsuLinx System w/Device Kit; ACCU-CHEK SUZANNA SMART VIEW   GLUCOMETER USE AS DIRECTED TWICE DAILY DX 73244; Therapy: 02Edo8291 to (Last Rx:10Apr2017)  Requested for: 57Hbo5609 Ordered   4  FreeStyle InsuLinx Test In Vitro Strip; TEST TWICE DAILY DX 16297; Therapy: 38Exp2688 to (Last Rx:35Nqh3763)  Requested for: 74Rdi6455 Ordered   5  FreeStyle Lancets Miscellaneous; BID USE AS DIRECTED DX E11 65; Therapy: 82Met4042 to (Last Rx:60Uxb7270)  Requested for: 28Iti1075 Ordered   6  GlipiZIDE XL 10 MG Oral Tablet Extended Release 24 Hour; TAKE 1 TABLET DAILY   WITH BREAKFAST; Therapy: 10Apr2017 to (Evaluate:06Jan2018)  Requested for: 20Jun2017; Last   Rx:20Jun2017 Ordered   7  Lisinopril 5 MG Oral Tablet; TAKE 1 TABLET DAILY AS DIRECTED; Therapy: 94Shi7267 to (Last Geremias Newell)  Requested for: 20Jun2017 Ordered   8  MetFORMIN HCl - 1000 MG Oral Tablet; TAKE 1 TABLET TWICE DAILY;    Therapy: 65VNG4224 to (Evaluate:98Elg0364) Requested for: 20Jun2017; Last   Rx:20Jun2017 Ordered   9  Nortriptyline HCl - 25 MG Oral Capsule; TAKE 2 CAPSULES AT BEDTIME; Therapy: 39ITP9431 to (Evaluate:73Ucc6634)  Requested for: 22Aug2017; Last   Rx:22Aug2017 Ordered   10  Tamsulosin HCl - 0 4 MG Oral Capsule; TAKE 1 CAPSULE Bedtime; Therapy: 93COW7349 to (Evaluate:18Oct2017)  Requested for: 20Jun2017; Last    Rx:20Jun2017 Ordered    Allergies    1  Penicillin V Potassium SOLR   2   Penicillins    Signatures   Electronically signed by : Haris Wade MA; Aug 31 2017 12:41PM EST                       (Author)

## 2018-01-11 NOTE — MISCELLANEOUS
Message   Recorded as Task   Date: 04/25/2017 07:49 AM, Created By: John Pearson   Task Name: Medical Complaint Callback   Assigned To: [facility]   Regarding Patient: Alannah Peña, Status: Active   CommentDuc Quezada - 25 Apr 2017 7:49 AM     TASK CREATED  Caller: Self; Medical Complaint; [phone] (Home)  pt left message on nurse triage line at 5:51pm on 4/24/17  Pt stating "wants to talk to Dr Richard Bartlett or Dr Jori Grace if I could change the muscle relaxer pills? I can't take them  They are very bad for me "   Hermelinda Anthony - 25 Apr 2017 9:00 AM     TASK EDITED  S/W pt  Pt stated Cyclobenzaprine 10mg, takes 1 tablet at hs- gives him "really severe heart burn  Couple of times I felt like throwing up "  Pt stated he stopped the cyclobenzaprine last week but "took one by accident last night and then got heart burn "  Please advise  Jerry Marie - 25 Apr 2017 10:57 AM     TASK REPLIED TO: Previously Assigned To Jerry Marie  stop Cyclobenzaprine  I sent chlorzoxazone 500mg he can take 1 tablet  up to 3 times daily as needed  Hermelinda Anthony - 25 Apr 2017 11:20 AM     TASK EDITED  S/W pt  Advised pt of the same  Pt aware [facility]   Pt appreciative  Active Problems    1  BPH with urinary obstruction (600 01,599 69) (N40 1,N13 8)   2  Chronic pain syndrome (338 4) (G89 4)   3  Colon cancer screening (V76 51) (Z12 11)   4  DM type 2 (diabetes mellitus, type 2) (250 00) (E11 9)   5  Erectile dysfunction of non-organic origin (302 72) (F52 21)   6  Heartburn (787 1) (R12)   7  Herniation of lumbar intervertebral disc with radiculopathy (722 10) (M51 16)   8  History of smoking 30 or more pack years (V15 82) (Z87 891)   9  Lumbago (724 2) (M54 5)   10  Lumbar radiculopathy (724 4) (M54 16)   11  Lumbar spondylosis (721 3) (M47 816)   12  Myofascial pain syndrome (729 1) (M79 1)   13  Postlaminectomy syndrome, lumbar (722 83) (M96 1)   14   Pre-procedural examination (V72 84) (Z01 818)   15  Sciatica (724 3) (M54 30)   16  Severe low back pain (724 2) (M54 5)   17  Special screening examination for neoplasm of prostate (V76 44) (Z12 5)    Current Meds   1  Chlorzoxazone 500 MG Oral Tablet; TAKE 1 TABLET 3 times daily PRN muscle spasms; Therapy: 44Xvh0117 to (Evaluate:75Ygu4178)  Requested for: 35Qbg6386; Last   Rx:52Ytr6642 Ordered   2  Cyclobenzaprine HCl - 10 MG Oral Tablet; TAKE 1 TABLET Bedtime for muscle spasm; Therapy: 97YED3114 to (Evaluate:78Kll7956)  Requested for: 79LRK6288; Last   Rx:06Oal7282 Ordered   3  FreeStyle InsuLinx System w/Device Kit; ACCU-CHEK SUZANNA SMART VIEW   GLUCOMETER USE AS DIRECTED TWICE DAILY DX 53604; Therapy: 30Wcd3463 to (Last Rx:54Nqz3245)  Requested for: 23Nnp0576 Ordered   4  FreeStyle InsuLinx Test In Vitro Strip; TEST TWICE DAILY DX 43407; Therapy: 60Ggb4725 to (Last Rx:80Kfs4124)  Requested for: 28Bhm1235 Ordered   5  FreeStyle Lancets Miscellaneous; BID USE AS DIRECTED DX E11 65; Therapy: 56Cbx5107 to (Last Rx:39Vum2935)  Requested for: 24Sdi9011 Ordered   6  GlipiZIDE XL 10 MG Oral Tablet Extended Release 24 Hour; TAKE 1 TABLET DAILY   WITH BREAKFAST; Therapy: 78Ekl2815 to (Evaluate:01Ebm1541)  Requested for: 21Osj6267; Last   Rx:41Oin9220 Ordered   7  Lisinopril 5 MG Oral Tablet; TAKE 1 TABLET DAILY AS DIRECTED; Therapy: 79Nft3425 to (Last Rx:96Rqf5246)  Requested for: 00Imx1261 Ordered   8  MetFORMIN HCl - 1000 MG Oral Tablet; TAKE 1 TABLET TWICE DAILY; Therapy: 17HHV6783 to (Evaluate:66Pko8204)  Requested for: 56ONU1731; Last   Rx:15Jan2017 Ordered   9  Nicorette 2 MG Mouth/Throat Lozenge; ALLOW 1 LOZENGE TO DISSOLVE SLOWLY IN   MOUTH AS DIRECTED; Therapy: 58QBZ6472 to (TMACVWIM:52YRK3962)  Requested for: 93OVM6001; Last   EM:73FGE2190 Ordered   10  Nortriptyline HCl - 25 MG Oral Capsule; TAKE 2 CAPSULES AT BEDTIME;     Therapy: 70ZDO8199 to (Evaluate:03Jun2017)  Requested for: 87FWY9115; Last    Rx:04Apr2017 Ordered 11  Tamsulosin HCl - 0 4 MG Oral Capsule; TAKE 1 CAPSULE Bedtime; Therapy: 49LEJ0192 to (Laura Arnold)  Requested for: 61ZIV5683; Last    Rx:03Jan2017 Ordered    Allergies    1  Penicillin V Potassium SOLR   2   Penicillins    Signatures   Electronically signed by : Constantine Dillard, ; Apr 25 2017 11:20AM EST                       (Author)

## 2018-01-11 NOTE — RESULT NOTES
Message   Recorded as Task   Date: 03/07/2017 08:15 AM, Created By: Lonnie Lerma   Task Name: Call Back   Assigned To: [facility]   Regarding Patient: Alexis Ferris, Status: Active   Comment:    SonnyKait GRETEL - 07 Mar 2017 8:15 AM     TASK CREATED  Please call patient back  Patient called stating he ran out of meds and would like something stronger as he is still unable to sleep  [phone]  RashardKeya - 07 Mar 2017 8:55 AM     TASK EDITED  S/w the pt  today and he stated he has been taking 2 of the nortriptyline at HS and it is not helping with the pain in his LB and down his legs  He stated he is not really sleeping  AO to advise  Thanks  Wilhemenia Runner - 07 Mar 2017 12:13 PM     TASK REPLIED TO: Previously Assigned To Wilhemenia Runner  patient needs to give the medication more time, it could take 4 weeks   Keya Wetzel - 07 Mar 2017 12:48 PM     TASK EDITED  S/w the pt  and he is aware          Signatures   Electronically signed by : Betty Galeas, ; Mar  7 2017 12:48PM EST                       (Author)

## 2018-01-12 VITALS
RESPIRATION RATE: 18 BRPM | OXYGEN SATURATION: 97 % | TEMPERATURE: 97.6 F | HEART RATE: 92 BPM | DIASTOLIC BLOOD PRESSURE: 90 MMHG | WEIGHT: 224 LBS | SYSTOLIC BLOOD PRESSURE: 114 MMHG | HEIGHT: 71 IN | BODY MASS INDEX: 31.36 KG/M2

## 2018-01-12 VITALS
WEIGHT: 218 LBS | HEART RATE: 78 BPM | BODY MASS INDEX: 30.52 KG/M2 | SYSTOLIC BLOOD PRESSURE: 112 MMHG | DIASTOLIC BLOOD PRESSURE: 80 MMHG | TEMPERATURE: 96.7 F | RESPIRATION RATE: 16 BRPM | HEIGHT: 71 IN

## 2018-01-12 NOTE — MISCELLANEOUS
Signatures   Electronically signed by : Bryant Muñoz, [address]; Jun 27 2017  2:22PM EST                       (Author)

## 2018-01-12 NOTE — MISCELLANEOUS
Message   Recorded as Task   Date: 03/07/2017 08:15 AM, Created By: Katya Jimenez   Task Name: Call Back   Assigned To: [facility]   Regarding Patient: Nadeem Noel, Status: Active   Comment:    Kait Dennis - 07 Mar 2017 8:15 AM     TASK CREATED  Please call patient back  Patient called stating he ran out of meds and would like something stronger as he is still unable to sleep  [phone]  Keya Wetzel - 07 Mar 2017 8:55 AM     TASK EDITED  S/w the pt  today and he stated he has been taking 2 of the nortriptyline at  and it is not helping with the pain in his LB and down his legs  He stated he is not really sleeping  AO to advise  Thanks  Arloa Gaucher - 07 Mar 2017 12:13 PM     TASK REPLIED TO: Previously Assigned To Argeorgea Gaucher  patient needs to give the medication more time, it could take 4 weeks   Keya Wetzel - 07 Mar 2017 12:48 PM     TASK EDITED  S/w the pt  and he is aware  Keya Wetzel - 07 Mar 2017 12:48 PM     TASK COMPLETED   Agnes Steward - 07 Mar 2017 4:12 PM     TASK REACTIVATED   Agnes Steward - 07 Mar 2017 4:18 PM     TASK EDITED  Message from phone room forwarded to [facility] that pt needs RF and request c/b at [phone]  S/W pt who said he s/w Fco Dubon earlier today about the nortriptyline, he said at last ov Landon Aragon told him he could inc to 2 capsules at [facility] which he's doing but now he is running out of them sooner b/c of that  He has 1 pill left, he doesn't think our office called in a RF when he called earlier today  Pt [facility]  Told pt I would send message to Landon Araogn but it prob won't get addressed by Landon Aragon until tomorrow  Pt understood and ok with that  *Pt said [facility] will text him once the RF is ready for p/u  Consuella Emperor - 08 Mar 2017 9:35 AM     TASK REPLIED TO: Previously Assigned To Arloa Gaucher  refill sent to pharmacy   Alcira Gibson - 08 Mar 2017 9:56 AM     TASK EDITED  Pt aware  Active Problems    1  BPH with urinary obstruction (600 01,599 69) (N40 1,N13 8)   2  Chronic pain syndrome (338 4) (G89 4)   3  Colon cancer screening (V76 51) (Z12 11)   4  Cough (786 2) (R05)   5  DM type 2 (diabetes mellitus, type 2) (250 00) (E11 9)   6  Erectile dysfunction of non-organic origin (302 72) (F52 21)   7  Heartburn (787 1) (R12)   8  Herniation of lumbar intervertebral disc with radiculopathy (722 10) (M51 16)   9  History of smoking 30 or more pack years (V15 82) (Z87 891)   10  Lumbago (724 2) (M54 5)   11  Lumbar radiculopathy (724 4) (M54 16)   12  Lumbar spondylosis (721 3) (M47 816)   13  Myofascial pain syndrome (729 1) (M79 1)   14  Postlaminectomy syndrome, lumbar (722 83) (M96 1)   15  Pre-procedural examination (V72 84) (Z01 818)   16  Sciatica (724 3) (M54 30)   17  Severe low back pain (724 2) (M54 5)   18  Special screening examination for neoplasm of prostate (V76 44) (Z12 5)    Current Meds   1  MetFORMIN HCl - 1000 MG Oral Tablet; TAKE 1 TABLET TWICE DAILY; Therapy: 28LGK3963 to (Evaluate:13Xhn5224)  Requested for: 10LTD6723; Last   Rx:15Jan2017 Ordered   2  Nicorette 2 MG Mouth/Throat Lozenge; ALLOW 1 LOZENGE TO DISSOLVE SLOWLY IN   MOUTH AS DIRECTED; Therapy: 29PWS3435 to (RNXNZGZC:53AYL8470)  Requested for: 90BSX5670; Last   Rx:03Jan2017 Ordered   3  Nortriptyline HCl - 25 MG Oral Capsule; TAKE 2 CAPSULES AT BEDTIME; Therapy: 93TAH3629 to (Yesica Gupta)  Requested for: 20SMC4014; Last   Rx:08Mar2017 Ordered   4  Tamsulosin HCl - 0 4 MG Oral Capsule; TAKE 1 CAPSULE Bedtime; Therapy: 37YTZ8273 to (Rhett Navas)  Requested for: 20ZEP3622; Last   Rx:03Jan2017 Ordered    Allergies    1  Penicillin V Potassium SOLR   2   Penicillins    Signatures   Electronically signed by : Christopher Patel RN; Mar  8 2017  9:56AM EST                       (Author)

## 2018-01-13 VITALS
OXYGEN SATURATION: 98 % | BODY MASS INDEX: 29.57 KG/M2 | HEART RATE: 83 BPM | WEIGHT: 212 LBS | TEMPERATURE: 94.9 F | SYSTOLIC BLOOD PRESSURE: 122 MMHG | DIASTOLIC BLOOD PRESSURE: 84 MMHG

## 2018-01-13 VITALS
BODY MASS INDEX: 30.17 KG/M2 | WEIGHT: 215.5 LBS | TEMPERATURE: 96.8 F | SYSTOLIC BLOOD PRESSURE: 118 MMHG | DIASTOLIC BLOOD PRESSURE: 60 MMHG | HEIGHT: 71 IN | RESPIRATION RATE: 18 BRPM | HEART RATE: 100 BPM

## 2018-01-13 VITALS
HEIGHT: 71 IN | TEMPERATURE: 98.1 F | RESPIRATION RATE: 16 BRPM | SYSTOLIC BLOOD PRESSURE: 111 MMHG | WEIGHT: 212 LBS | DIASTOLIC BLOOD PRESSURE: 80 MMHG | BODY MASS INDEX: 29.68 KG/M2 | HEART RATE: 98 BPM

## 2018-01-13 VITALS
SYSTOLIC BLOOD PRESSURE: 107 MMHG | TEMPERATURE: 98.4 F | BODY MASS INDEX: 30.24 KG/M2 | HEART RATE: 98 BPM | DIASTOLIC BLOOD PRESSURE: 68 MMHG | WEIGHT: 216 LBS | HEIGHT: 71 IN

## 2018-01-13 NOTE — RESULT NOTES
Message   Pelase inform the patient that he is not anemic, will proceed with colonoscopy alone, thanks     Verified Results  (1) CBC/ PLT (NO DIFF) 86IJK0996 11:06AM Regis Nicholas Order Number: [id number]     Test Name Result Flag Reference   HEMATOCRIT 46 7 %  36 5-49 3   HEMOGLOBIN 16 4 g/dL  12 0-17 0   MCHC 35 1 g/dL  31 4-37 4   MCH 31 1 pg  26 8-34 3   MCV 88 fL  82-98   PLATELET COUNT 963 Thousands/uL  149-390   RBC COUNT 5 28 Million/uL  3 88-5 62   RDW 13 1 %  11 6-15 1   WBC COUNT 11 49 Thousand/uL H 4 31-10 16   MPV 10 5 fL  8 9-12 7

## 2018-01-13 NOTE — CONSULTS
Assessment    1  Lumbar radiculopathy (724 4) (M54 16)   2  Lumbar spondylosis (721 3) (M47 816)    Plan  Lumbar spondylosis    · Follow-up PRN Evaluation and Treatment  Follow-up  Status: Complete  Done:  09CBB6050   Ordered; For: Lumbar spondylosis; Ordered By: Colin Tijerina Performed:  Due: 11WAG8655   · 1 - Honorio LAZARO, Katarina Costello (Pain Management) Physician Referral  Consult Only: the  expectation is that the referring provider will communicate back to the patient on  treatment options  Evaluation and Treatment: the expectation is that the referred to  provider will communicate back to the patient on treatment options  please assess for SCS  Status: Active  Requested for: 32Cqi5834   Ordered; For: Lumbar spondylosis; Ordered By: Colin Tijerina Performed:  Due: 24ARA8499; Last Updated By: Mil Emerson; 2/20/2017 11:01:40 AM  Care Summary provided  : Yes    Discussion/Summary    Mr Jaycee Ag is being referred by Dr June Proctor  He presents with acute on chronic LBP with right sided radiculopathy  we reviewed conservative medical options including PT, VIDHYA and medications  i asked him to continue to fu with Dr June Proctor and to discuss the option of SCS  I explained to him that revision surgery in the form of decompression/fusion, especially in the setting of epidural scarring, would offer him no guarantee of relief  I will see him on a PRN basis  The patient has the current Goals: Reduce pain  The patent has the current Barriers: None  Patient is able to Self-Care  Chief Complaint    1  Back Pain  Hx of previous right L5/S1 hemilaminectomy/decompression  Acute on chronic back and right leg pain in S1 distribution x 6 months, relief with VIDHYA  Modest benefit with PT  History of Present Illness    ANASTACIO ESTRELLA presents with complaints of gradual onset of constant episodes of mild bilateral lower back pain, radiating to the right buttock, right thigh and right lower leg   On a scale of 1 to 10, the patient rates the pain as 2  Episodes started about 15 years ago  Symptoms are made worse by prolonged standing, lifting and bending  Symptoms are improving  Associated symptoms include spasm and stiffness, but no fever, no night sweats, no abdominal pain, no general malaise, no weight loss, no arm numbness, no arm weakness, no leg weakness, no urinary incontinence, no fecal incontinence, no urinary retention and no rash localized to the area of pain    leg numbness (right lateral LE numbness into the toes)  Review of Systems    Constitutional: no fever and no chills  Eyes: no eyesight problems  ENT: no hearing loss  Cardiovascular: no chest pain and no palpitations  Respiratory: no shortness of breath, no cough and no wheezing  Gastrointestinal: no nausea  Genitourinary: no incontinence  Musculoskeletal: limb pain and joint stiffness, but as noted in HPI, no joint swelling, no myalgias and no limb swelling    The patient presents with complaints of gradual onset of mild lower back arthralgias  Neurological: numbness, but as noted in HPI, no headache, no tingling, no confusion, no dizziness, no limb weakness, no convulsions, no fainting and no difficulty walking  Psychiatric: anxiety, but no depression  Endocrine: no muscle weakness  Hematologic/Lymphatic: no tendency for easy bleeding and no tendency for easy bruising  ROS reviewed  Active Problems    1  BPH with urinary obstruction (600 01,599 69) (N40 1,N13 8)   2  Colon cancer screening (V76 51) (Z12 11)   3  Cough (786 2) (R05)   4  DM type 2 (diabetes mellitus, type 2) (250 00) (E11 9)   5  Erectile dysfunction of non-organic origin (302 72) (F52 21)   6  Heartburn (787 1) (R12)   7  Herniation of lumbar intervertebral disc with radiculopathy (722 10) (M51 16)   8  History of smoking 30 or more pack years (V15 82) (Z87 891)   9  Lumbago (724 2) (M54 5)   10  Lumbar radiculopathy (724 4) (M54 16)   11   Lumbar spondylosis (721 3) (M47 816)   12  Myofascial pain syndrome (729 1) (M79 1)   13  Postlaminectomy syndrome, lumbar (722 83) (M96 1)   14  Sciatica (724 3) (M54 30)   15  Severe low back pain (724 2) (M54 5)   16  Special screening examination for neoplasm of prostate (V76 44) (Z12 5)    Past Medical History    1  History of Acute bronchitis with bronchospasm (466 0) (J20 9)   2  History of Denial Of Any Significant Medical History   3  History of arthritis (V13 4) (Z87 39)    The active problems and past medical history were reviewed and updated today  Surgical History    1  History of Back Surgery   2  History of Nose Surgery    The surgical history was reviewed and updated today  Family History  Mother    1  Denied: Family history of Colon cancer   2  Denied: Family history of colonic polyps   3  Denied: Family history of liver disease  Father    4  Family history of Acute Myocardial Infarction (V17 3)   5  Denied: Family history of Colon cancer   6  Family history of Diabetes Mellitus (V18 0)   7  Denied: Family history of colonic polyps   8  Denied: Family history of liver disease   9  Family history of Hypertension (V17 49)    The family history was reviewed and updated today  Social History    · Active smoker (305 1) (Z72 0)   · Denied: History of Alcohol Use (History)   · Caffeine Use   · Denied: History of Drug Use (305 90)   · History of smoking 30 or more pack years (V15 82) (Z87 891)   · Marital History - Single   · Occupation:  The social history was reviewed and updated today  Current Meds   1  MetFORMIN HCl - 1000 MG Oral Tablet; TAKE 1 TABLET TWICE DAILY; Therapy: 68GEY4513 to (Evaluate:67Joe5425)  Requested for: 49AOO8895; Last   Rx:15Jan2017 Ordered   2  MetFORMIN HCl - 500 MG Oral Tablet; TAKE 1 TABLET TWICE DAILY WITH MEALS; Therapy: 42DZX0530 to ([phone] 46 74)  Requested for: 45TTT7105; Last   Rx:03Jan2017 Ordered   3   Nicorette 2 MG Mouth/Throat Lozenge; ALLOW 1 LOZENGE TO DISSOLVE SLOWLY IN   MOUTH AS DIRECTED; Therapy: 91TCQ4888 to (ZSNGWNRU:33TMC3220)  Requested for: 58XXR9061; Last   Rx:03Jan2017 Ordered   4  Nortriptyline HCl - 25 MG Oral Capsule; TAKE 1 CAPSULE AT BEDTIME; Therapy: 60KDE6789 to (Evaluate:15Apr2017)  Requested for: 41KJR1564; Last   Rx:17Pyd3796 Ordered   5  Tamsulosin HCl - 0 4 MG Oral Capsule; TAKE 1 CAPSULE Bedtime; Therapy: 85QIR7006 to (Amaury Cullenricky)  Requested for: 48HJG1042; Last   Rx:03Jan2017 Ordered    The medication list was reviewed and updated today  Allergies    1  Penicillin V Potassium SOLR   2  Penicillins    Vitals  Vital Signs    Recorded: 20Feb2017 10:33AM   Temperature 98 1 F   Heart Rate 98   Respiration 16   Systolic 931   Diastolic 80   Height 5 ft 11 in   Weight 212 lb    BMI Calculated 29 57   BSA Calculated 2 16   Pain Scale 2     Physical Exam   (negative SLR, intact strength bilateral LE, reduced sensatoin right side in S1 distribution, intact DTR, normal gait, paravertebral spasm)   Constitutional Patient appears healthy and well developed  No signs of acute distress present  Eyes Vision is grossly normal  Discs flat with sharp margins  Respiratory Auscultation of lungs: Clear to auscultation bilaterally  Cardiovascular Auscultation of heart: Rate is regular  Rhythm is regular  No heart murmur appreciated  Carotid pulses: 2+ and equal bilaterally, without bruits  Peripheral vascular exam: Pedal Pulses: 2+ and equal bilaterally  Radial pulses: 2+ and equal bilaterally  Extremities: No edema of the lower limbs bilaterally  Abdomen The abdomen is flat  No abdominal scars  Abdomen is soft, nontender, and nondistended  Anus, perineum, and rectum: Exam deferred  Neurologic - Mental Status: Alert and Oriented x3  Mood and affect: Affect is normal   Memory is grossly intact  Attention is WNL  Speech is articulated and fluent  Knowledge and vocabulary consistent with education      Cranial Nerve Exam:  2nd cranial nerve: Visual field was full to confrontation  3rd, 4th, and 6th cranial nerves: Normal with no deficit  5th cranial nerve: Sensation was intact in all three divisions to light touch and temperature  Motor function was intact  7th cranial nerve: Face symmetrical at grimace and at rest  8th cranial nerve: Grossly intact to finger rub bilaterally  9th and 10th cranial nerves: Uvula is midline  11th cranial nerve: Shoulder shrug equal bilaterally  12th cranial nerve: Tongue mideline, no atrophy present  Motor System General Motor Strength: No pronator drift and no parietal drift  Motor System - Upper Extremities: Normal to inspection and palpation  Strength: Deltoids 5/5 bilaterally  Biceps 5/5 bilaterally  Triceps 5/5 bilaterally  Extensor carpi radials is 5/5 bilaterally  Extensor digitorum 5/5 bilaterally  Intrinsic 5/5 bilaterally   5/5 bilaterally  Muscle tone: Normal bilaterally  Muscle Bulk: Normal bilaterally  Motor System - Lower Extremities: Normal to inspection and palpation  Strength: Iliopsoas 5/5 bilaterally  Quadriceps 5/5 bilaterally  Hamstrings 5/5 bilaterally  Gastrocnemius 5/5 bilaterally  Muscle tone: Normal bilaterally  Reflexes: Biceps reflexes are 2+ bilaterally  Triceps reflexes are 2+ bilaterally  Achilles reflexes are 2+ bilaterally  Babinski's reflex is 2+ down going bilaterally  Ankle clonus is absent bilaterally  Coordination: Finger to nose was normal    Sensory: Sensation grossly intact to light touch  Sensation grossly intact to light touch  Gait and Station: [place] with a normal gait  Results/Data    I personally reviewed the MRI in detail with the patient  MRI Review multilevel lumbar DDD worse at L4/5 and L5/S1, previous right L5/S1 hemilaminectomy with lateral recess/foraminal stenosis secondary to epidural scarring, L4/5 disc degeneration with lateral recess involvement        Future Appointments    Date/Time Provider Specialty Site   04/10/2017 01:00 PM ELAINE Casillas  [address] [facility]   04/04/2017 02:45 PM [facility] [place] [facility]   03/06/2017 02:30 PM ELAINE Mayorga   Gastroenterology Adult [address]     Signatures   Electronically signed by : ELAINE Stock ; Feb 20 2017 11:05AM EST                       (Author)

## 2018-01-13 NOTE — MISCELLANEOUS
Message   Recorded as Task   Date: 05/04/2017 08:06 AM, Created By: Stephenie Hodgkin   Task Name: Miscellaneous   Assigned To: [facility] [facility],Team   Regarding Patient: Justin Allison, Status: Active   CommentJabier Davis - 04 May 2017 8:06 AM     TASK CREATED  Pt called and is having a lot of pain in his lower back  Would like to know what he can do  Please call [phone]  Keya Wetzel - 04 May 2017 10:11 AM     TASK EDITED  ******[facility] PT*****    S/w the pt  and he stated "none of these pills are working" I'm still having alot of LBP  He stated he is taking the Chlorzoxazone and nortriptyline  He has not heard back about the SCS trial  He is aware that AS is away and oncall provider to advise  Tami FQ  Please advise  Smitha Melendez - 04 May 2017 11:50 AM     TASK REPLIED TO: Previously Assigned To Mono Melendez  does he want to try nucynta again? Keya Wetzel - 04 May 2017 1:16 PM     TASK EDITED  S/w the pt  and he stated he " just wants something for the pain and so that he can sleep at night " He does not want to try the Katie Allred again  FQ to advise  Smitha Melendez - 04 May 2017 1:50 PM     TASK REPLIED TO: Previously Assigned To Mono Melendez  can call in a steroid cathy for him if he would like   Keya Wetzel - 04 May 2017 2:11 PM     TASK EDITED  S/w the pt  and he stated at this point he will try anything  He [facility] uses the [facility] on [facility]  Reviewed not take any NSAID's while taking the steroid pack  Can you please forward this to Bautista Hereford to see where we are in the approval for the SCS trial? Thanks! !   Mono Melendez - 04 May 2017 2:23 PM     TASK REPLIED TO: Previously Assigned To Mono Melendez  e-rx sent for medrol dosepak  received psych eval and signed off on it   awaiting insurance authorization   [facility] - 04 May 2017 2:44 PM     TASK EDITED  s/w pt  Advised pt of the same  Pt verbalized understanding  Active Problems    1   BPH with urinary obstruction (600 01,599 69) (N40 1,N13 8)   2  Chronic pain syndrome (338 4) (G89 4)   3  Colon cancer screening (V76 51) (Z12 11)   4  DM type 2 (diabetes mellitus, type 2) (250 00) (E11 9)   5  Erectile dysfunction of non-organic origin (302 72) (F52 21)   6  Heartburn (787 1) (R12)   7  Herniation of lumbar intervertebral disc with radiculopathy (722 10) (M51 16)   8  History of smoking 30 or more pack years (V15 82) (Z87 891)   9  Lumbago (724 2) (M54 5)   10  Lumbar radiculopathy (724 4) (M54 16)   11  Lumbar spondylosis (721 3) (M47 816)   12  Myofascial pain syndrome (729 1) (M79 1)   13  Postlaminectomy syndrome, lumbar (722 83) (M96 1)   14  Pre-procedural examination (V72 84) (Z01 818)   15  Sciatica (724 3) (M54 30)   16  Severe low back pain (724 2) (M54 5)   17  Special screening examination for neoplasm of prostate (V76 44) (Z12 5)    Current Meds   1  Chlorzoxazone 500 MG Oral Tablet; TAKE 1 TABLET 3 times daily PRN muscle spasms; Therapy: 08Kfp8253 to (Evaluate:54Yta8105)  Requested for: 84Fsr3771; Last   Rx:37Wyy8174 Ordered   2  Cyclobenzaprine HCl - 10 MG Oral Tablet; TAKE 1 TABLET Bedtime for muscle spasm; Therapy: 77JSJ5876 to (Evaluate:90Ipe8213)  Requested for: 60WLW0489; Last   Rx:58Epp7479 Ordered   3  FreeStyle InsuLinx System w/Device Kit; ACCU-CHEK SUZANNA SMART VIEW   GLUCOMETER USE AS DIRECTED TWICE DAILY DX 48164; Therapy: 33Vjg6590 to (Last Rx:43Igf4837)  Requested for: 29Jib6381 Ordered   4  FreeStyle InsuLinx Test In Vitro Strip; TEST TWICE DAILY DX 05558; Therapy: 36Plj2775 to (Last Rx:80Gci0370)  Requested for: 41Iqs0813 Ordered   5  FreeStyle Lancets Miscellaneous; BID USE AS DIRECTED DX E11 65; Therapy: 73Dne9525 to (Last Rx:10Apr2017)  Requested for: 10Apr2017 Ordered   6  GlipiZIDE XL 10 MG Oral Tablet Extended Release 24 Hour; TAKE 1 TABLET DAILY   WITH BREAKFAST; Therapy: 10Apr2017 to (Evaluate:85Pro5455)  Requested for: 10Apr2017; Last   Rx:10Apr2017 Ordered   7   Lisinopril 5 MG Oral Tablet; TAKE 1 TABLET DAILY AS DIRECTED; Therapy: 89Men9050 to (Last Rx:55Fxf8932)  Requested for: 57Wfr8487 Ordered   8  Medrol 4 MG Oral Tablet Therapy Pack (MethylPREDNISolone); TAKE AS PRESRIBED; Therapy: 91AVG2534 to (Evaluate:01Iav9350)  Requested for: 40MAZ0321; Last   SA:57NGU1943 Ordered   9  MetFORMIN HCl - 1000 MG Oral Tablet; TAKE 1 TABLET TWICE DAILY; Therapy: 76FMY1321 to (Evaluate:17Nym7853)  Requested for: 83XSS3519; Last   Rx:15Jan2017 Ordered   10  Nicorette 2 MG Mouth/Throat Lozenge; ALLOW 1 LOZENGE TO DISSOLVE SLOWLY IN    MOUTH AS DIRECTED; Therapy: 36AAH7332 to (PJIMGESS:68ADF4847)  Requested for: 91ASS8452; Last    Rx:03Jan2017 Ordered   11  Nortriptyline HCl - 25 MG Oral Capsule; TAKE 2 CAPSULES AT BEDTIME; Therapy: 07QJC3593 to (Evaluate:03Jun2017)  Requested for: 30GEZ0448; Last    Rx:04Apr2017 Ordered   12  Tamsulosin HCl - 0 4 MG Oral Capsule; TAKE 1 CAPSULE Bedtime; Therapy: 26FCH4510 to (Evaluate:03Yjg8501)  Requested for: 55IBV1621; Last    IX:73NNR2827 Ordered    Allergies    1  Penicillin V Potassium SOLR   2   Penicillins    Signatures   Electronically signed by : Harish Barlow, ; May  4 2017  2:44PM EST                       (Author)

## 2018-01-13 NOTE — MISCELLANEOUS
Message   Recorded as Task   Date: 07/18/2017 10:58 AM, Created By: Lilliam Vickers   Task Name: Care Coordination   Assigned To: Lilliam Vickres   Regarding Patient: Emily Martinez, Status: Active   CommentEdwina Sear - 18 Jul 2017 10:58 Georgie Beltran from [address] [facility] regarding this pt  Pt is there now and he just needs 2 minutes  He's calling to check if disability is needed  Would ilke to discuss with you  Please call [phone]  [facility] - 18 Jul 2017 1:55 PM     TASK REPLIED TO: Previously Assigned To [address] to him and gave him recommendations regarding either filling out the disability paperwork himself or to refer to Dr Ryder Dominguez if he is uncomfortable filling it out  Active Problems    1  BPH with urinary obstruction (600 01,599 69) (N40 1,N13 8)   2  Chronic low back pain (724 2,338 29) (M54 5,G89 29)   3  Chronic lumbar radiculopathy (724 4) (M54 16)   4  Chronic pain syndrome (338 4) (G89 4)   5  Colon cancer screening (V76 51) (Z12 11)   6  DM type 2 (diabetes mellitus, type 2) (250 00) (E11 9)   7  Erectile dysfunction of non-organic origin (302 72) (F52 21)   8  Heartburn (787 1) (R12)   9  Herniation of lumbar intervertebral disc with radiculopathy (722 10) (M51 16)   10  History of smoking 30 or more pack years (V15 82) (Z87 891)   11  Lumbar spondylosis (721 3) (M47 816)   12  Myofascial pain syndrome (729 1) (M79 1)   13  Postlaminectomy syndrome, lumbar (722 83) (M96 1)   14  Pre-procedural examination (V72 84) (Z01 818)   15  Pre-procedure lab exam (V72 63) (Z01 812)   16  Sciatica (724 3) (M54 30)   17  Special screening examination for neoplasm of prostate (V76 44) (Z12 5)   18  Status post surgery (V45 89) (H06 533)    Current Meds   1  Atorvastatin Calcium 10 MG Oral Tablet; TAKE 1 TABLET AT BEDTIME; Therapy: 73BWX0289 to (Marcus Francis)  Requested for: 05LJA8100; Last   Rx:93Gbg5686 Ordered   2   FreeStyle InsuLinx System w/Device Kit; ACCU-CHEK SUZANNA SMART VIEW   GLUCOMETER USE AS DIRECTED TWICE DAILY DX 44965; Therapy: 27Hsb2243 to (Last Rx:10Apr2017)  Requested for: 10Apr2017 Ordered   3  FreeStyle InsuLinx Test In Vitro Strip; TEST TWICE DAILY DX 82262; Therapy: 75Izw2319 to (Last Rx:10Apr2017)  Requested for: 10Apr2017 Ordered   4  FreeStyle Lancets Miscellaneous; BID USE AS DIRECTED DX E11 65; Therapy: 10Apr2017 to (Last Rx:10Apr2017)  Requested for: 10Apr2017 Ordered   5  GlipiZIDE XL 10 MG Oral Tablet Extended Release 24 Hour; TAKE 1 TABLET DAILY   WITH BREAKFAST; Therapy: 58Flp4750 to (Evaluate:06Jan2018)  Requested for: 20Jun2017; Last   Rx:20Jun2017 Ordered   6  Lisinopril 5 MG Oral Tablet; TAKE 1 TABLET DAILY AS DIRECTED; Therapy: 10Nlg7512 to (Last Renee Muskrat)  Requested for: 20Jun2017 Ordered   7  MetFORMIN HCl - 1000 MG Oral Tablet; TAKE 1 TABLET TWICE DAILY; Therapy: 90HUK3010 to (Evaluate:84Uhe3162)  Requested for: 20Jun2017; Last   Rx:20Jun2017 Ordered   8  Tamsulosin HCl - 0 4 MG Oral Capsule; TAKE 1 CAPSULE Bedtime; Therapy: 33HOI7489 to (Evaluate:18Oct2017)  Requested for: 20Jun2017; Last   Rx:20Jun2017 Ordered    Allergies    1  Penicillin V Potassium SOLR   2   Penicillins    Signatures   Electronically signed by : Kelsi Spain, ; Jul 18 2017  2:53PM EST                       (Author)

## 2018-01-14 VITALS
SYSTOLIC BLOOD PRESSURE: 100 MMHG | WEIGHT: 214.25 LBS | DIASTOLIC BLOOD PRESSURE: 70 MMHG | RESPIRATION RATE: 16 BRPM | HEIGHT: 71 IN | OXYGEN SATURATION: 98 % | BODY MASS INDEX: 29.99 KG/M2 | HEART RATE: 84 BPM

## 2018-01-14 VITALS
BODY MASS INDEX: 29.68 KG/M2 | WEIGHT: 212 LBS | HEART RATE: 79 BPM | DIASTOLIC BLOOD PRESSURE: 71 MMHG | TEMPERATURE: 98 F | HEIGHT: 71 IN | SYSTOLIC BLOOD PRESSURE: 122 MMHG

## 2018-01-14 VITALS
WEIGHT: 212 LBS | SYSTOLIC BLOOD PRESSURE: 120 MMHG | DIASTOLIC BLOOD PRESSURE: 72 MMHG | BODY MASS INDEX: 29.68 KG/M2 | HEART RATE: 80 BPM | HEIGHT: 71 IN

## 2018-01-14 NOTE — RESULT NOTES
Verified Results  (1) MICROALBUMIN CREATININE RATIO, RANDOM URINE 44QFI4160 09:45AM [facility] [employer]     Test Name Result Flag Reference   MICROALBUMIN/ CREAT R 21 mg/g creatinine  0-30   MICROALBUMIN,URINE 39 4 mg/L H 0 0-20 0   CREATININE URINE 191 0 mg/dL       (1) LIPID PANEL FASTING W DIRECT LDL REFLEX 94KEJ5992 09:42AM [facility]     Test Name Result Flag Reference   CHOLESTEROL 171 mg/dL     LDL CHOLESTEROL CALCULATED 118 mg/dL H 0-100   Triglyceride:         Normal              <150 mg/dl       Borderline High    150-199 mg/dl       High               200-499 mg/dl       Very High          >499 mg/dl  Cholesterol:         Desirable        <200 mg/dl      Borderline High  200-239 mg/dl      High             >239 mg/dl  HDL Cholesterol:        High    >59 mg/dL      Low     <41 mg/dL  LDL Cholesterol:        Optimal          <100 mg/dl        Near Optimal     100-129 mg/dl        Above Optimal          Borderline High   130-159 mg/dl          High              160-189 mg/dl          Very High        >189 mg/dl  LDL CALCULATED:    This screening LDL is a calculated result  It does not have the accuracy of the Direct Measured LDL in the monitoring of patients with hyperlipidemia and/or statin therapy  Direct Measure LDL (SQQ515) must be ordered separately in these patients  TRIGLYCERIDES 61 mg/dL  <=150   Specimen collection should occur prior to N-Acetylcysteine or Metamizole administration due to the potential for falsely depressed results  HDL,DIRECT 41 mg/dL  40-60   Specimen collection should occur prior to Metamizole administration due to the potential for falsely depressed results

## 2018-01-14 NOTE — MISCELLANEOUS
Message   Recorded as Task   Date: 03/07/2017 02:47 PM, Created By: Awa Miller   Task Name: Intake   Assigned To: [facility] Mendel Courser   Regarding Patient: Bryanna Miller, Status: Active   Comment:    Nevin Lindquist - 07 Mar 2017 2:47 PM     TASK CREATED  Pt called with medication issues -- refill and dosage change  Pls call pt at [phone]  Agnes Steward - 07 Mar 2017 4:19 PM     TASK EDITED  Already addressed in another task  Active Problems    1  BPH with urinary obstruction (600 01,599 69) (N40 1,N13 8)   2  Chronic pain syndrome (338 4) (G89 4)   3  Colon cancer screening (V76 51) (Z12 11)   4  Cough (786 2) (R05)   5  DM type 2 (diabetes mellitus, type 2) (250 00) (E11 9)   6  Erectile dysfunction of non-organic origin (302 72) (F52 21)   7  Heartburn (787 1) (R12)   8  Herniation of lumbar intervertebral disc with radiculopathy (722 10) (M51 16)   9  History of smoking 30 or more pack years (V15 82) (Z87 891)   10  Lumbago (724 2) (M54 5)   11  Lumbar radiculopathy (724 4) (M54 16)   12  Lumbar spondylosis (721 3) (M47 816)   13  Myofascial pain syndrome (729 1) (M79 1)   14  Postlaminectomy syndrome, lumbar (722 83) (M96 1)   15  Pre-procedural examination (V72 84) (Z01 818)   16  Sciatica (724 3) (M54 30)   17  Severe low back pain (724 2) (M54 5)   18  Special screening examination for neoplasm of prostate (V76 44) (Z12 5)    Current Meds   1  MetFORMIN HCl - 1000 MG Oral Tablet; TAKE 1 TABLET TWICE DAILY; Therapy: 57GOM9071 to (Evaluate:14Wbv0533)  Requested for: 02JNX4839; Last   Rx:15Jan2017 Ordered   2  Nicorette 2 MG Mouth/Throat Lozenge; ALLOW 1 LOZENGE TO DISSOLVE SLOWLY IN   MOUTH AS DIRECTED; Therapy: 51BBO7727 to (UCYSZKTB:11LCM2287)  Requested for: 88HBM8067; Last   Rx:03Jan2017 Ordered   3  Nortriptyline HCl - 25 MG Oral Capsule; TAKE 1 CAPSULE AT BEDTIME;    Therapy: 52LFD3242 to (Evaluate:15Apr2017)  Requested for: 31ZVP7909; Last   Rx:79Dwm9968 Ordered 4  Tamsulosin HCl - 0 4 MG Oral Capsule; TAKE 1 CAPSULE Bedtime; Therapy: 29OVP2958 to (Patrick Burgos)  Requested for: 47CFO4977; Last   Rx:03Jan2017 Ordered    Allergies    1  Penicillin V Potassium SOLR   2   Penicillins    Signatures   Electronically signed by : Brandon Askew, ; Mar  7 2017  4:19PM EST                       (Author)

## 2018-01-14 NOTE — MISCELLANEOUS
Message   Recorded as Task   Date: 02/24/2017 12:12 PM, Created By: Brandon Brown   Task Name: Miscellaneous   Assigned To: [facility] stim,Team   Regarding Patient: Sarika Osborn, Status: In Progress   Comment:    Anabelle Arauz - 24 Feb 2017 12:12 PM     TASK CREATED  Auth panfilo'd on MRI Tspine and patient will schedule for hopefully next week  He told me that he was having a very difficult time finding PAR provider for the neuropysch evaluation  He states that he has the list that he was provided and has called several but notes that a few of phone numbers do not connect  I told him that I would pass this along to you and have you call him, perhaps you know who would accept his [place]  [phone]   Ale Bland - 27 Feb 2017 9:19 AM     TASK EDITED  Dr Jayne Duran, Are you okay with pt doing the Jordan eval due to his health insurance? Via [employer] 17 - 06 Mar 2017 1:07 PM     TASK REPLIED TO: Previously Assigned To Ale Marcelino - 07 Mar 2017 8:21 AM     TASK EDITED  Spoke with pt and he is going to come to the [facility] office on 3/10/17 to take the Jordan evalElise Kate - 07 Mar 2017 8:37 AM     TASK EDITED  Pt to be a [facility] S[facility] trial with Dr Jayne Duran at [place]  Pt signed release of info  Pt completed education  Pt  completed thoracic MRI  Clinical info excluding jordan eval has been faxed to [facility] at [address] [place] [address] Mar 2017 8:37 AM     TASK REASSIGNED: Previously Assigned To Vinod Talbot - 10 Mar 2017 11:27 AM     TASK EDITED  Completed Jordan eval faxed to [facility] for scoring  Pt aware I will call him once we have ins auth  Ale Bland - 10 Mar 2017 2:53 PM     TASK EDITED  Scored [facility] sent to Dr Jayne Duran for review     Ale Bland - 10 Mar 2017 3:24 PM     TASK EDITED  Signed off psych eval faxed to [facility] at [facility] - 16 Mar 2017 8:35 AM     TASK EDITED  Dr Verlena Nyhan denied trial stating no evidence of pt being cleared from a psychological stand point, meaning they do not recognize Cline eval  Would you like pt to have a psychological eval or do a peer to peer? Via [employer] 17 - 16 Mar 2017 3:31 PM     TASK REPLIED TO: Previously Assigned To Via [place] 17  Have patient see a psychologist    Win Vanegas - 17 Mar 2017 10:52 AM     TASK EDITED  Attempt to reach pt to discuss below  Left detailed message informing pt that he will need a face to face psych eval for [place] to consider authorization of stim trial    Ale Bland - 17 Mar 2017 1:46 PM     TASK EDITED  Spoke with pt and he will have a psych eval done  Ale Bland - 24 Apr 2017 5:54 PM     TASK EDITED  Pt reports having psych eval done by Dr Alexandra Perez on 4/13/17  Advised pt I am still waiting report and once I have received it I will call him to let him know we will start auth process  Ale Bland - 27 Apr 2017 7:58 AM     TASK EDITED  Received psych eval, forwarded to Dr Evert Lyles to review since Dr Gorge Hernandez is out of the office for an extended time  Ale Bland - 27 Apr 2017 8:55 AM     TASK EDITED  LM informing pt of staus  Ale Bland - 11 May 2017 4:24 PM     TASK EDITED  Signed off psych eval faxed to [facility] at [facility]  Spoke with pt and made him aware  Win Vanegas - 23 May 2017 10:36 AM     TASK EDITED  Received Lance Haile confirmation from [place], [place] [id number], valid 5/24/17-6/23/17  Spoke with pt and he is scheduled as follows:  5/24/17 @ 0499 52 06 34 w/ Damian in [place]  6/9/17 arriving @ 1030 for 1130 trial in [place]  6/16/17 @ 1500 for lead removal  PCN allergy  Pt takes PRN Ibuprofen but denies any other blood thinning meds  Email sent to [facility] to inform them of trial dates   Ale Bland - 23 May 2017 11:56 AM     TASK EDITED  Paperwork for 5/24/17 SOVS sent to the [place] office via email, copies scanned in to pt's chart     Ale Bland - 23 May 2017 12:00 PM     TASK EDITED        Active Problems    1  BPH with urinary obstruction (600 01,599 69) (N40 1,N13 8)   2  Chronic pain syndrome (338 4) (G89 4)   3  Colon cancer screening (V76 51) (Z12 11)   4  DM type 2 (diabetes mellitus, type 2) (250 00) (E11 9)   5  Erectile dysfunction of non-organic origin (302 72) (F52 21)   6  Heartburn (787 1) (R12)   7  Herniation of lumbar intervertebral disc with radiculopathy (722 10) (M51 16)   8  History of smoking 30 or more pack years (V15 82) (Z87 891)   9  Lumbago (724 2) (M54 5)   10  Lumbar radiculopathy (724 4) (M54 16)   11  Lumbar spondylosis (721 3) (M47 816)   12  Myofascial pain syndrome (729 1) (M79 1)   13  Postlaminectomy syndrome, lumbar (722 83) (M96 1)   14  Pre-procedural examination (V72 84) (Z01 818)   15  Pre-procedure lab exam (V72 63) (Z01 812)   16  Sciatica (724 3) (M54 30)   17  Severe low back pain (724 2) (M54 5)   18  Special screening examination for neoplasm of prostate (V76 44) (Z12 5)   19  Status post surgery (V45 89) (Z00 165)    Current Meds   1  Chlorzoxazone 500 MG Oral Tablet; TAKE 1 TABLET 3 times daily PRN muscle spasms; Therapy: 28Bky4512 to (Evaluate:24Jun2017)  Requested for: 40Kjv0433; Last   Rx:25Apr2017 Ordered   2  Cyclobenzaprine HCl - 10 MG Oral Tablet; TAKE 1 TABLET Bedtime for muscle spasm; Therapy: 57NWX9521 to (Evaluate:89Qby5804)  Requested for: 92SNW7805; Last   Rx:69Fun2873 Ordered   3  FreeStyle InsuLinx System w/Device Kit; ACCU-CHEK SUZANNA SMART VIEW   GLUCOMETER USE AS DIRECTED TWICE DAILY DX 46733; Therapy: 80Hoe0761 to (Last Rx:44Fyj6093)  Requested for: 22Ywn0061 Ordered   4  FreeStyle InsuLinx Test In Vitro Strip; TEST TWICE DAILY DX 28376; Therapy: 63Ibo0070 to (Last Rx:10Apr2017)  Requested for: 10Apr2017 Ordered   5  FreeStyle Lancets Miscellaneous; BID USE AS DIRECTED DX E11 65; Therapy: 10Apr2017 to (Last Rx:10Apr2017)  Requested for: 10Apr2017 Ordered   6   GlipiZIDE XL 10 MG Oral Tablet Extended Release 24 Hour; TAKE 1 TABLET DAILY   WITH BREAKFAST; Therapy: 70Bbn7860 to (Evaluate:76Irf6224)  Requested for: 77Vqg9041; Last   Rx:10Apr2017 Ordered   7  Lisinopril 5 MG Oral Tablet; TAKE 1 TABLET DAILY AS DIRECTED; Therapy: 84Vda1776 to (Last Rx:10Apr2017)  Requested for: 10Apr2017 Ordered   8  Medrol 4 MG Oral Tablet Therapy Pack (MethylPREDNISolone); TAKE AS PRESRIBED; Therapy: 33QTE8315 to (Evaluate:24Mok4690)  Requested for: 80NEZ3036; Last   GN:12RVL3415 Ordered   9  MetFORMIN HCl - 1000 MG Oral Tablet; TAKE 1 TABLET TWICE DAILY; Therapy: 89EQK1763 to (Evaluate:51Uyi6856)  Requested for: 02NEW3051; Last   Rx:15Jan2017 Ordered   10  Nicorette 2 MG Mouth/Throat Lozenge; ALLOW 1 LOZENGE TO DISSOLVE SLOWLY IN    MOUTH AS DIRECTED; Therapy: 76WAQ5147 to ([facility])  Requested for: 07BHG8391; Last    Rx:03Jan2017 Ordered   11  Nortriptyline HCl - 25 MG Oral Capsule; TAKE 2 CAPSULES AT BEDTIME; Therapy: 12GJN4440 to (Evaluate:03Jun2017)  Requested for: 14SCK6496; Last    Rx:04Apr2017 Ordered   12  Tamsulosin HCl - 0 4 MG Oral Capsule; TAKE 1 CAPSULE Bedtime; Therapy: 31AAW0368 to (Evaluate:31Ket7123)  Requested for: 11TIQ4252; Last    NV:75EGQ7781 Ordered    Allergies    1  Penicillin V Potassium SOLR   2  Penicillins    Plan  Chronic pain syndrome, Lumbar radiculopathy, Postlaminectomy syndrome, lumbar,  Pre-procedure lab exam    · (1) APTT; Status:Active; Requested for:23May2017;    · (1) CBC/PLT/DIFF; Status:Active; Requested for:23May2017;    · (1) COMPREHENSIVE METABOLIC PANEL; Status:Active; Requested for:23May2017;    · (1) PT WITH INR; Status:Active; Requested for:23May2017;   Status post surgery    · Ciprofloxacin HCl - 500 MG Oral Tablet; 1 (one) tablet BID x 7 days   · XR SPINE THORACIC 2 VIEW; Status:Active;  Requested for:78Ymg7799;     Signatures   Electronically signed by : Kristin Hernandez, ; May 23 2017 12:00PM EST                       (Author)

## 2018-01-15 VITALS
HEIGHT: 71 IN | SYSTOLIC BLOOD PRESSURE: 120 MMHG | TEMPERATURE: 96.9 F | BODY MASS INDEX: 29.68 KG/M2 | HEART RATE: 72 BPM | DIASTOLIC BLOOD PRESSURE: 80 MMHG | WEIGHT: 212 LBS | RESPIRATION RATE: 16 BRPM

## 2018-01-15 VITALS
SYSTOLIC BLOOD PRESSURE: 111 MMHG | HEIGHT: 71 IN | DIASTOLIC BLOOD PRESSURE: 67 MMHG | BODY MASS INDEX: 30.94 KG/M2 | WEIGHT: 221 LBS | TEMPERATURE: 98.2 F | HEART RATE: 84 BPM

## 2018-01-15 VITALS
HEIGHT: 71 IN | SYSTOLIC BLOOD PRESSURE: 131 MMHG | BODY MASS INDEX: 30.24 KG/M2 | DIASTOLIC BLOOD PRESSURE: 80 MMHG | HEART RATE: 93 BPM | TEMPERATURE: 98.2 F | WEIGHT: 216 LBS

## 2018-01-15 NOTE — MISCELLANEOUS
Message  Pre operative call day prior surgery scheduled in the AM w/ DR Perry Coffey the following information is confirmed / discussed: Allergies Reviewed yes PCN   Hold medications reviewed: yes metformin and glipizide  NPO after MN, night prior surgery reviewed:  Medication (s) instructed by healthcare provider to take the morning of surgery w/ sip of water 4 OZ discussed:-  Post operative scripts electronic transmission: clindamycin  PDMP site reviewed accessed and reviewed scheduled drug list printed and scanned into record  Pain management script:oxycodone x 4 days supply  Pre- operative shower protocol reviewed; Clarify instructions as per protocol, third chlorhexidine shower tonight before surgery, then use WANDA wipes as per packaging instructions, Use a clean towel and wash cloth starting tonight and continue nightly until seen 2 weeks post operative visit for staple removal  Change bed linens tonight and continue at least 1-2 times weekly  Smoking cessation discussed ---trying to cutback has NicoDerm  Informed will receive a telephone call tonight from a hospital representative with time to report on surgery day: Informed will receive a f/u call within in 24 -48 hours post op for DR Perry Coffey NP to see how you are recovering, provide additional instructions, and to answer any questions  PAT packet review complete per protocol:done pending call back form medical DR RE; > WBC 11 61 not addressed in medical clearance---Dr/ in office this afternoon  Follow-up appointments reviewed 2 week w/ CORAZON 6 week w/ Perry Coffey  Patient verbalized understanding information provided /discussed  ---yes      Plan  Acute post-operative pain    · Oxycodone-Acetaminophen 5-325 MG Oral Tablet (Percocet); TAKE 1 TO 2  TABLETS EVERY 4 HOURS AS NEEDED FOR PAIN   NO MORE THAN 8 TABLETS  PER DAY  Status post surgery    · Clindamycin HCl - 300 MG Oral Capsule; TAKE 2 CAPSULE Every 8 hours    Signatures   Electronically signed by : CORAZON Farah; Oct  2 2017 12:30PM EST                       (Author)

## 2018-01-16 NOTE — RESULT NOTES
Verified Results  (1) HEMOGLOBIN A1C [id number] 09:09AM [facility]     Test Name Result Flag Reference   HEMOGLOBIN A1C 8 2 % H 4 2-6 3   EST  AVG   GLUCOSE 189 mg/dl       (1) MICROALBUMIN CREATININE RATIO, RANDOM URINE [id number] 09:09AM [facility]     Test Name Result Flag Reference   MICROALBUMIN/ CREAT R 8 mg/g creatinine  0-30   MICROALBUMIN,URINE 9 6 mg/L  0 0-20 0   CREATININE URINE 114 0 mg/dL

## 2018-01-16 NOTE — MISCELLANEOUS
Message  Received call form PAC SLQ RN there is no mention of leukocytosis in the medical clearance note form medicine  Several attempts to reach  [facility] today , per office nurse, Adair Sanderson, not in the office today, messages to cell phone are going to VM  She finally reached DR Sandoval ([facility] group) ---Her statement is the leukocytosis is likely secondary to the spinal steroid injection and should not prevent surgery scheduled for 10/3/2017 w/ DR Hodan Delgadillo , patient remains medically cleared to proceed        Signatures   Electronically signed by : Tommy Perez; Oct  2 2017  3:08PM EST                       (Author)

## 2018-01-16 NOTE — MISCELLANEOUS
Message   Recorded as Task   Date: 08/01/2017 08:41 AM, Created By: Karen Dixon   Task Name: Miscellaneous   Assigned To: [facility] [place] [facility],Team   Regarding Patient: Feliciano Del Angel, Status: Active   Comment:    Karen Dixon - 01 Aug 2017 8:41 AM     TASK CREATED  pt saw Franck Wong and is scheduled for the implant surgery in october and the pain is getting worse and needs something for the pain or maybe injection to hold him over until he gets the implant please call pt back at [phone]   [facility] - 01 Aug 2017 9:08 AM     TASK EDITED  Spoke with pt, having alot of pain in his lower back  pt states that he saw DR Colleen Hadley yesterday and that pt is scheduled for the implant surgery  pt wants to know if he can have a injection to help him with the pain due to the implant being 2 months away  Norwood Moritz - 01 Aug 2017 9:16 AM     TASK REPLIED TO: Previously Assigned To Norwood Moritz  If he is interested, we can schedule the repeat TFESI  Janina Dunn - 64 Aug 2017 10:23 AM     TASK EDITED  sheduled pt for a repeat TFESI ON 08/18/17  MIMA OVER ORE-OPERARIVE  INSTRUCTION   PT VERBALIZED UNDERSTANDING   Crystal Olmedo - 22 Aug 2017 10:35 AM     TASK REPLIED TO: Previously Assigned To [place]  Thanks  Active Problems    1  BPH with urinary obstruction (600 01,599 69) (N40 1,N13 8)   2  Chronic low back pain (724 2,338 29) (M54 5,G89 29)   3  Chronic lumbar radiculopathy (724 4) (M54 16)   4  Chronic pain syndrome (338 4) (G89 4)   5  Colon cancer screening (V76 51) (Z12 11)   6  DM type 2 (diabetes mellitus, type 2) (250 00) (E11 9)   7  Erectile dysfunction of non-organic origin (302 72) (F52 21)   8  Heartburn (787 1) (R12)   9  Herniation of lumbar intervertebral disc with radiculopathy (722 10) (M51 16)   10  History of smoking 30 or more pack years (V15 82) (Z87 891)   11  Lumbar spondylosis (721 3) (M47 816)   12  Myofascial pain syndrome (729 1) (M79 1)   13  Postlaminectomy syndrome, lumbar (722 83) (M96 1)   14  Pre-procedural examination (V72 84) (Z01 818)   15  Pre-procedure lab exam (V72 63) (Z01 812)   16  Sciatica (724 3) (M54 30)   17  Special screening examination for neoplasm of prostate (V76 44) (Z12 5)   18  Status post surgery (V45 89) (U14 728)    Current Meds   1  Atorvastatin Calcium 10 MG Oral Tablet; TAKE 1 TABLET AT BEDTIME; Therapy: 43APK4931 to (Suellyn Frankel)  Requested for: 32MHP7669; Last   Rx:12Whu8604 Ordered   2  FreeStyle InsuLinx System w/Device Kit; ACCU-CHEK SUZANNA SMART VIEW   GLUCOMETER USE AS DIRECTED TWICE DAILY DX 63897; Therapy: 25Vur1478 to (Last Rx:64Ddw5986)  Requested for: 31Msk9267 Ordered   3  FreeStyle InsuLinx Test In Vitro Strip; TEST TWICE DAILY DX 54837; Therapy: 84Cbs3106 to (Last Rx:17Hxs0703)  Requested for: 23Uay7412 Ordered   4  FreeStyle Lancets Miscellaneous; BID USE AS DIRECTED DX E11 65; Therapy: 47Ifk0177 to (Last Rx:18Utp0263)  Requested for: 88Mya3060 Ordered   5  GlipiZIDE XL 10 MG Oral Tablet Extended Release 24 Hour; TAKE 1 TABLET DAILY   WITH BREAKFAST; Therapy: 98Bnd3599 to (Evaluate:06Jan2018)  Requested for: 20Jun2017; Last   Rx:20Jun2017 Ordered   6  Lisinopril 5 MG Oral Tablet; TAKE 1 TABLET DAILY AS DIRECTED; Therapy: 73Osg1306 to (Last Rip Ray)  Requested for: 20Jun2017 Ordered   7  MetFORMIN HCl - 1000 MG Oral Tablet; TAKE 1 TABLET TWICE DAILY; Therapy: 05GLU8164 to (Evaluate:53Cgn8462)  Requested for: 20Jun2017; Last   Rx:20Jun2017 Ordered   8  Tamsulosin HCl - 0 4 MG Oral Capsule; TAKE 1 CAPSULE Bedtime; Therapy: 27OEX2718 to (Evaluate:18Oct2017)  Requested for: 20Jun2017; Last   Rx:20Jun2017 Ordered    Allergies    1  Penicillin V Potassium SOLR   2   Penicillins    Signatures   Electronically signed by : Latasha Da Silva, ; Aug  2 2017 11:15AM EST                       (Author)

## 2018-01-17 NOTE — RESULT NOTES
Message   Recorded as Task   Date: 11/15/2016 10:29 AM, Created By: Alicja Craig   Task Name: Financial Authorization   Assigned To: Alicja Craig   Regarding Patient: Anthony Pineda, Status: Active   Comment:    Alicja Craig - 15 Nov 2016 10:29 AM     TASK CREATED  Patient was seen today by Carmen Muñoz requested the patient be scheduled for an injection - called patient's worker's comp and S/W Maryan Koyanagi (since [place], patient's  was unavailable) - she stated the claim is open and active but all medical's are being denied (effective 10/26/16) due to claim is under investigation and in litagation - Rachellebai [address]u 39  and patient aware - patient has court hearing on 11/29/16 so we will wait for the determination        Signatures   Electronically signed by : Fatoumata Diez, ; Nov 15 2016 10:29AM EST                       (Author)

## 2018-01-18 NOTE — RESULT NOTES
Message   2 precancerous polyps, needs repeat colonoscopy in 3 years with 2 day prep  Verified Results  (1) TISSUE EXAM [id number] 03:31PM Bard Watson     Test Name Result Flag Reference   LAB AP CASE REPORT (Report)     Surgical Pathology Report             Case: [id number]                   Authorizing Provider: Shaorn Henry MD    Collected:      03/06/2017 1531        Ordering Location:   [address] [place] [address]   Received:      03/07/2017 [facility]                               Pathologist:      Stepan Madera DO                               Specimen:  Polyp, Colorectal, Sigmoid x 2   LAB AP FINAL DIAGNOSIS      A  Colon, sigmoid polyps x2, biopsy:  - Tubular adenomas (2)  - Negative for high-grade dysplasia  Interpretation performed at [facility] [address]  [facility] [address],   [address]  Electronically signed by Stepan Madera DO on 3/8/2017 at 11:50 AM   LAB AP SURGICAL ADDITIONAL INFORMATION (Report)     These tests were developed and their performance characteristics   determined by [facility] or [address] [place] [address]  They may not be cleared or approved by the [place]  Food and   Drug Administration  The FDA has determined that such clearance or   approval is not necessary  These tests are used for clinical purposes  They should not be regarded as investigational or for research  This   laboratory has been approved by [place] [address], designated as a high-complexity   laboratory and is qualified to perform these tests  LAB AP GROSS DESCRIPTION (Report)     A  The specimen is received in formalin, labeled with the patient's name   and hospital number, and is designated sigmoid ??2   The specimen   consists of 3 rubbery tan-brown tissue fragments measuring from 0 2 up to   0 3 cm in greatest dimension  Entirely submitted  One cassette      Note: The estimated total formalin fixation time based upon information   provided by the submitting clinician and the standard processing schedule   is 37 0 hours  José Pelaez 43     COLONOSCOPY [id number] 03:04PM Lore Wallace     Test Name Result Flag Reference   Colonoscopy 03/06/2017        Summary / No summary entered :      No summary entered   Documents attached :      EPIC OP NOTE - Lore Wallace; Enc: [id number] - Appointment -      Lore Wallace - (Gastroenterology Adult) (Additional Information      Document)    Plan  Colon cancer screening    · COLONOSCOPY ; every 3 years;  Last 90DXV8567; Next [id number]; Status:Active

## 2018-01-22 VITALS
BODY MASS INDEX: 30.1 KG/M2 | WEIGHT: 215 LBS | HEIGHT: 71 IN | RESPIRATION RATE: 20 BRPM | HEART RATE: 95 BPM | SYSTOLIC BLOOD PRESSURE: 142 MMHG | TEMPERATURE: 97.2 F | DIASTOLIC BLOOD PRESSURE: 76 MMHG

## 2018-01-22 VITALS
HEART RATE: 84 BPM | TEMPERATURE: 95.6 F | WEIGHT: 224.25 LBS | DIASTOLIC BLOOD PRESSURE: 62 MMHG | BODY MASS INDEX: 31.4 KG/M2 | HEIGHT: 71 IN | RESPIRATION RATE: 16 BRPM | SYSTOLIC BLOOD PRESSURE: 116 MMHG

## 2018-01-22 VITALS
BODY MASS INDEX: 31.08 KG/M2 | DIASTOLIC BLOOD PRESSURE: 70 MMHG | WEIGHT: 222 LBS | RESPIRATION RATE: 16 BRPM | TEMPERATURE: 96.9 F | SYSTOLIC BLOOD PRESSURE: 129 MMHG | HEIGHT: 71 IN | HEART RATE: 127 BPM

## 2018-02-06 ENCOUNTER — OFFICE VISIT (OUTPATIENT)
Dept: FAMILY MEDICINE CLINIC | Facility: CLINIC | Age: 58
End: 2018-02-06
Payer: COMMERCIAL

## 2018-02-06 VITALS
DIASTOLIC BLOOD PRESSURE: 60 MMHG | BODY MASS INDEX: 28.84 KG/M2 | SYSTOLIC BLOOD PRESSURE: 114 MMHG | WEIGHT: 206 LBS | RESPIRATION RATE: 16 BRPM | HEIGHT: 71 IN | TEMPERATURE: 96.8 F | HEART RATE: 78 BPM

## 2018-02-06 DIAGNOSIS — J11.1 FLU: ICD-10-CM

## 2018-02-06 DIAGNOSIS — E11.9 DM2 (DIABETES MELLITUS, TYPE 2) (HCC): ICD-10-CM

## 2018-02-06 DIAGNOSIS — F32.A DEPRESSION: ICD-10-CM

## 2018-02-06 DIAGNOSIS — M54.9 BACK PAIN: Primary | ICD-10-CM

## 2018-02-06 DIAGNOSIS — R42 DIZZINESS: ICD-10-CM

## 2018-02-06 PROCEDURE — 99213 OFFICE O/P EST LOW 20 MIN: CPT | Performed by: FAMILY MEDICINE

## 2018-02-06 RX ORDER — DULOXETIN HYDROCHLORIDE 20 MG/1
20 CAPSULE, DELAYED RELEASE ORAL DAILY
Qty: 30 CAPSULE | Refills: 1 | Status: ON HOLD | OUTPATIENT
Start: 2018-02-06 | End: 2018-05-18 | Stop reason: CLARIF

## 2018-02-06 RX ORDER — ATORVASTATIN CALCIUM 10 MG/1
20 TABLET, FILM COATED ORAL DAILY
Qty: 60 TABLET | Refills: 1 | Status: SHIPPED | OUTPATIENT
Start: 2018-02-06 | End: 2018-05-23 | Stop reason: SDUPTHER

## 2018-02-06 NOTE — PROGRESS NOTES
Triny Lee 1960 male MRN: [id number]    Family Medicine Follow-up Visit    ASSESSMENT/PLAN  Problem List Items Addressed This Visit     None      Visit Diagnoses     Back pain    -  Primary    Relevant Medications    DULoxetine (CYMBALTA) 20 mg capsule    Depression        Relevant Medications    DULoxetine (CYMBALTA) 20 mg capsule    Dizziness        Relevant Orders    Ambulatory referral to Ophthalmology    Basic metabolic panel    DM2 (diabetes mellitus, type 2) (HCC)        Relevant Medications    atorvastatin (LIPITOR) 10 mg tablet    Other Relevant Orders    Ambulatory referral to Ophthalmology    Ambulatory referral to Podiatry    Lipid Panel with Direct LDL reflex    Hemoglobin A1c    Flu              40-year-old male with the followin  Lower back pain -  Status post recent spinal stimulator insertion  Advised to follow up with pain specialist   2  Depression - Lower back pain is a large contributor  No current suicidal ideations, but admits to recent thoughts of "offing himself"  Having his wife has kept him from making any steps towards this  Start him on duloxetine as this can also assist with #1  Will monitor closely  3  Dizziness and blurry vision - Possibly secondary to uncontrolled blood sugars  Will obtain blood work for further evaluation  Due to new onset worsening of blurry vision will refer to ophthalmologist for retinopathy screening  4  DM2 - Has only taking metformin 1 g b i d   Encouraged to fill prescription for glipizide 10 mg daily now that he has insurance  Microfilament examination positive for decreased sensation to the left heel  Referred to Podiatry  Will obtain hemoglobin A1c   5   Hyperlipidemia - Well controlled per lipid panel of 2017  However, will increase Atorvastatin from 10 to 20 mg due to #4, smoking history and an ASCVD risk of 17 38% which would require moderate to high-intensity statin therapy   At follow-up appointment in 1 month, will increase to 40 mg daily  Will obtain a lipid panel  6  Return to clinic in 1 month for re-evaluation of the above  No future appointments  SUBJECTIVE  CC: Back Pain and Cold Like Symptoms      HPI:  Cameron Rincon is a 62 y o  male who presents for lower back pain, flu-like symptoms and dizziness  Status post permanent spinal cord stimulator insertion  Followed by pain specialist   Reports alterations in milliamps dose to achieve best pain control  Still has lower back pain  Admitted to sometimes having suicidal ideation, but denies any today  Had recently changed insurance to [facility]  There was an initial lapse in his insurance which caused him to be unable to afford renewing his medications  Currently is only taking metformin 1 g b i d  Has not been taking his glipizide for the past month  Today complains of intermittent dizziness and blurry vision  He last saw an ophthalmologist years ago  Last week he began having flu-like symptoms  Was in bed for long periods of time during the day  Remained well hydrated and a lot of chicken soup  Symptoms are now much improved  Quit smoking about 2 weeks ago  Review of Systems   Constitutional: Positive for chills  Negative for fever  HENT: Positive for ear pain  Negative for rhinorrhea  Eyes: Negative for pain and redness  Respiratory: Negative for cough and shortness of breath  Cardiovascular: Negative for chest pain  Gastrointestinal: Negative for abdominal pain, nausea and vomiting  Genitourinary: Negative for dysuria  Musculoskeletal: Positive for back pain  Negative for joint swelling and myalgias  Skin: Positive for rash  Neurological: Positive for dizziness  Negative for weakness and headaches  Psychiatric/Behavioral: Positive for dysphoric mood and suicidal ideas  Negative for agitation and confusion         Historical Information   The patient history was reviewed as follows:    Past Medical History:   Diagnosis Date    BPH (benign prostatic hypertrophy) with urinary obstruction     Chronic pain disorder     Diabetes mellitus ([facility] [address] )     type 2    Herniation of lumbar intervertebral disc with radiculopathy     Lumbago     Myofascial pain syndrome     Sciatica      Past Surgical History:   Procedure Laterality Date    ABSCESS DRAINAGE      groin    BACK SURGERY      COLONOSCOPY      NOSE SURGERY      [place] COLONOSCOPY FLX DX W/COLLJ SPEC WHEN PFRMD N/A 3/6/2017    Procedure: COLONOSCOPY;  Surgeon: Mariana Jaeger MD;  Location: [facility]; Service: Gastroenterology    [place] SURG IMPLNT NEUROELECT,EPIDURAL Left 10/3/2017    Procedure: PLACEMENT THORACIC FOR INSERTION DORSAL COLUMN SPINAL CORD STIMULATOR (DCS) WITH BUTTOCK IMPLANTABLE PULSE GENERATOR(IMPULSE);   Surgeon: Kassandra Porter MD;  [facility];  Service: Neurosurgery     Family History   Problem Relation Age of Onset    Diabetes Mother     Diabetes Father       Social History   History   Alcohol Use    Yes     Comment: social     History   Drug Use No     History   Smoking Status    Former Smoker    Packs/day: 0 25    Quit date: 1/27/2018   Smokeless Tobacco    Never Used     Comment: encouraged smoking cessation       Medications:     Current Outpatient Prescriptions:     metFORMIN (GLUCOPHAGE) 1000 MG tablet, Take 1,000 mg by mouth 2 (two) times a day with meals, Disp: , Rfl:     atorvastatin (LIPITOR) 10 mg tablet, Take 10 mg by mouth daily, Disp: , Rfl:     cyclobenzaprine (FLEXERIL) 10 mg tablet, Take 10 mg by mouth daily at bedtime as needed for muscle spasms, Disp: , Rfl:     glipiZIDE (GLUCOTROL XL) 10 mg 24 hr tablet, Take 10 mg by mouth daily, Disp: , Rfl:     lisinopril (ZESTRIL) 5 mg tablet, Take 5 mg by mouth daily, Disp: , Rfl:     nortriptyline (PAMELOR) 25 mg capsule, Take 75 mg by mouth daily at bedtime, Disp: , Rfl:     omeprazole (PriLOSEC) 20 mg delayed release capsule, Take 20 mg by mouth daily, Disp: , Rfl:     tamsulosin (FLOMAX) 0 4 mg, Take 0 4 mg by mouth daily with dinner, Disp: , Rfl:   Allergies   Allergen Reactions    Penicillins Swelling       OBJECTIVE    Vitals:   Vitals:    02/06/18 1126   BP: 114/60   Pulse: 78   Resp: 16   Temp: (!) 96 8 °F (36 °C)   Weight: 93 4 kg (206 lb)   Height: 5' 11" (1 803 m)         Physical Exam   Constitutional: He is oriented to person, place, and time  He appears well-developed and well-nourished  He appears distressed (discomfort from back pain)  HENT:   Head: Normocephalic and atraumatic  Right Ear: External ear normal    Left Ear: External ear normal    Eyes: Conjunctivae and EOM are normal  Pupils are equal, round, and reactive to light  Right eye exhibits no discharge  Left eye exhibits no discharge  Neck: Normal range of motion  No tracheal deviation present  Cardiovascular: Normal rate, regular rhythm and normal heart sounds  No murmur heard  Pulmonary/Chest: Effort normal and breath sounds normal  No respiratory distress  He has no wheezes  He has no rales  Musculoskeletal: He exhibits tenderness  He exhibits no edema  Mild tenderness over the paraspinal areas of the sacral region  Neurological: He is alert and oriented to person, place, and time  Skin: Skin is warm and dry  No rash noted  He is not diaphoretic  No erythema  Psychiatric: He has a normal mood and affect   His behavior is normal  Judgment and thought content normal

## 2018-02-06 NOTE — PROGRESS NOTES
Attending Physician Statement  I have discussed the care of Jonathon Ospina including pertinent history and exam findings,  with the resident  I have reviewed the key elements of all parts of the encounter with the resident  I agree with the assessment, plan and orders as documented by the resident    (GE Modifier)

## 2018-02-27 PROBLEM — N40.1 BPH WITH URINARY OBSTRUCTION: Status: ACTIVE | Noted: 2017-01-03

## 2018-02-27 PROBLEM — G89.18 ACUTE POST-OPERATIVE PAIN: Status: ACTIVE | Noted: 2017-10-02

## 2018-02-27 PROBLEM — G89.4 CHRONIC PAIN DISORDER: Status: ACTIVE | Noted: 2017-02-21

## 2018-02-27 PROBLEM — R03.0 BLOOD PRESSURE ELEVATED WITHOUT HISTORY OF HTN: Status: ACTIVE | Noted: 2017-09-12

## 2018-02-27 PROBLEM — E11.9 DM TYPE 2 (DIABETES MELLITUS, TYPE 2) (HCC): Status: ACTIVE | Noted: 2017-01-03

## 2018-02-27 PROBLEM — N13.8 BPH WITH URINARY OBSTRUCTION: Status: ACTIVE | Noted: 2017-01-03

## 2018-02-27 PROBLEM — R12 HEARTBURN: Status: ACTIVE | Noted: 2017-02-07

## 2018-03-09 ENCOUNTER — APPOINTMENT (OUTPATIENT)
Dept: LAB | Facility: HOSPITAL | Age: 58
End: 2018-03-09
Payer: COMMERCIAL

## 2018-03-09 DIAGNOSIS — E11.9 DM2 (DIABETES MELLITUS, TYPE 2) (HCC): ICD-10-CM

## 2018-03-09 DIAGNOSIS — R42 DIZZINESS: ICD-10-CM

## 2018-03-09 LAB
ANION GAP SERPL CALCULATED.3IONS-SCNC: 7 MMOL/L (ref 4–13)
BUN SERPL-MCNC: 9 MG/DL (ref 5–25)
CALCIUM SERPL-MCNC: 8.8 MG/DL (ref 8.3–10.1)
CHLORIDE SERPL-SCNC: 100 MMOL/L (ref 100–108)
CHOLEST SERPL-MCNC: 104 MG/DL (ref 50–200)
CO2 SERPL-SCNC: 27 MMOL/L (ref 21–32)
CREAT SERPL-MCNC: 0.75 MG/DL (ref 0.6–1.3)
EST. AVERAGE GLUCOSE BLD GHB EST-MCNC: 235 MG/DL
GFR SERPL CREATININE-BSD FRML MDRD: 102 ML/MIN/1.73SQ M
GLUCOSE P FAST SERPL-MCNC: 316 MG/DL (ref 65–99)
HBA1C MFR BLD: 9.8 % (ref 4.2–6.3)
HDLC SERPL-MCNC: 40 MG/DL (ref 40–60)
LDLC SERPL CALC-MCNC: 52 MG/DL (ref 0–100)
POTASSIUM SERPL-SCNC: 4.5 MMOL/L (ref 3.5–5.3)
SODIUM SERPL-SCNC: 134 MMOL/L (ref 136–145)
TRIGL SERPL-MCNC: 58 MG/DL

## 2018-03-09 PROCEDURE — 36415 COLL VENOUS BLD VENIPUNCTURE: CPT

## 2018-03-09 PROCEDURE — 80061 LIPID PANEL: CPT

## 2018-03-09 PROCEDURE — 83036 HEMOGLOBIN GLYCOSYLATED A1C: CPT

## 2018-03-09 PROCEDURE — 80048 BASIC METABOLIC PNL TOTAL CA: CPT

## 2018-03-13 ENCOUNTER — OFFICE VISIT (OUTPATIENT)
Dept: FAMILY MEDICINE CLINIC | Facility: CLINIC | Age: 58
End: 2018-03-13
Payer: COMMERCIAL

## 2018-03-13 VITALS
RESPIRATION RATE: 18 BRPM | HEART RATE: 90 BPM | SYSTOLIC BLOOD PRESSURE: 128 MMHG | HEIGHT: 71 IN | WEIGHT: 213.4 LBS | OXYGEN SATURATION: 97 % | BODY MASS INDEX: 29.88 KG/M2 | DIASTOLIC BLOOD PRESSURE: 82 MMHG

## 2018-03-13 DIAGNOSIS — G89.29 CHRONIC LOW BACK PAIN, UNSPECIFIED BACK PAIN LATERALITY, WITH SCIATICA PRESENCE UNSPECIFIED: ICD-10-CM

## 2018-03-13 DIAGNOSIS — N40.1 BPH WITH URINARY OBSTRUCTION: Primary | ICD-10-CM

## 2018-03-13 DIAGNOSIS — G47.09 OTHER INSOMNIA: ICD-10-CM

## 2018-03-13 DIAGNOSIS — M54.5 CHRONIC LOW BACK PAIN, UNSPECIFIED BACK PAIN LATERALITY, WITH SCIATICA PRESENCE UNSPECIFIED: ICD-10-CM

## 2018-03-13 DIAGNOSIS — E11.9 TYPE 2 DIABETES MELLITUS WITHOUT COMPLICATION, WITHOUT LONG-TERM CURRENT USE OF INSULIN (HCC): ICD-10-CM

## 2018-03-13 DIAGNOSIS — N13.8 BPH WITH URINARY OBSTRUCTION: Primary | ICD-10-CM

## 2018-03-13 PROCEDURE — 99214 OFFICE O/P EST MOD 30 MIN: CPT | Performed by: PHYSICIAN ASSISTANT

## 2018-03-13 RX ORDER — HYDROCODONE BITARTRATE AND ACETAMINOPHEN 5; 325 MG/1; MG/1
1 TABLET ORAL EVERY 6 HOURS PRN
COMMUNITY
End: 2018-03-13 | Stop reason: SDUPTHER

## 2018-03-13 RX ORDER — TAMSULOSIN HYDROCHLORIDE 0.4 MG/1
0.8 CAPSULE ORAL
Qty: 60 CAPSULE | Refills: 1 | Status: SHIPPED | OUTPATIENT
Start: 2018-03-13 | End: 2018-05-05 | Stop reason: SDUPTHER

## 2018-03-13 RX ORDER — HYDROCODONE BITARTRATE AND ACETAMINOPHEN 5; 325 MG/1; MG/1
1 TABLET ORAL EVERY 6 HOURS PRN
Qty: 8 TABLET | Refills: 0 | Status: SHIPPED | OUTPATIENT
Start: 2018-03-13 | End: 2018-06-25 | Stop reason: ALTCHOICE

## 2018-03-13 RX ORDER — OXYCODONE HYDROCHLORIDE AND ACETAMINOPHEN 5; 325 MG/1; MG/1
1-2 TABLET ORAL EVERY 4 HOURS PRN
COMMUNITY
End: 2018-04-17 | Stop reason: SDUPTHER

## 2018-03-13 RX ORDER — GLIPIZIDE 10 MG/1
10 TABLET, FILM COATED, EXTENDED RELEASE ORAL DAILY
Qty: 30 TABLET | Refills: 1 | Status: ON HOLD | OUTPATIENT
Start: 2018-03-13 | End: 2018-04-26

## 2018-03-13 NOTE — ASSESSMENT & PLAN NOTE
- Recommended trial of melatonin  - Recommended improved sleep hygiene  Discussed no screen time prior to bed  Sleeping on a schedule in a cool dark room  No exercise 2-3 hours prior to bed     - Return in 4 weeks to FU

## 2018-03-13 NOTE — ASSESSMENT & PLAN NOTE
- Discussed recent elevated A1C of 9 8   - Emphasized importance of tight diabetes management  - Continue Metformin 1000 mg twice daily  - Restart on Glipizide, 10mg  - FU in 4 weeks  - Will repeat A1C in 3 months  - Poor management may have been contributing to previous burry vision

## 2018-03-13 NOTE — ASSESSMENT & PLAN NOTE
- Directed Pt to go to pain management for continued management  - Discussed that opioids are not long a viable term management  - PDMP showed only 1 percocet filled last year  - Percocet 5-325 PRN for unremitting pain  - Recommended continued activity and exercise  - Recommended Physical therapy   Pt deferred as "it didn't help in the past"

## 2018-03-13 NOTE — ASSESSMENT & PLAN NOTE
- Pt has discontinued lisinopril 5mg for the past 2 months  - BP us currently moderate well controlled  Discontinued lisinopril currently  May need to restart if change to Bp

## 2018-03-13 NOTE — PATIENT INSTRUCTIONS
Insomnia   AMBULATORY CARE:   Insomnia  is a condition that makes it hard to fall or stay asleep  Lack of sleep can lead to attention or memory problems during the day  You may also be gao, depressed, clumsy, or have headaches  Contact your healthcare provider if:   · Your symptoms do not get better, or they get worse  · You begin to use drugs or alcohol to fall asleep  · You have questions or concerns about your condition or care  Medicines:   · Medicines  may help you sleep more regularly or help you feel less anxious  · Take your medicine as directed  Contact your healthcare provider if you think your medicine is not helping or if you have side effects  Tell him or her if you are allergic to any medicine  Keep a list of the medicines, vitamins, and herbs you take  Include the amounts, and when and why you take them  Bring the list or the pill bottles to follow-up visits  Carry your medicine list with you in case of an emergency  What you can do to improve your sleep:   · Create a sleep schedule  This will help you form a sleep routine  Keep a record of your sleep patterns, and any sleeping problems you have  Bring the record to follow-up visits with healthcare providers  · Do not take naps  Naps could make it hard for you to fall asleep at bedtime  · Keep your bedroom cool, quiet, and dark  Turn on white noise, such as a fan, to help you relax  Do not use your bed for any activity that will keep you awake  Do not read, exercise, eat, or watch TV in your bedroom  · Get up if you do not fall asleep within 20 minutes  Move to another room and do something relaxing until you become sleepy  · Limit caffeine, alcohol, and food to earlier in the day  Only drink caffeine in the morning  Do not drink alcohol within 6 hours of bedtime  Do not eat a heavy meal right before you go to bed  · Exercise regularly  Daily exercise may help you sleep better   Do not exercise within 4 hours of bedtime  Follow up with your healthcare provider as directed: Your healthcare provider may refer you for cognitive behavioral therapy  A behavioral therapist may help you find ways to relax, decrease stress, and improve sleep  Write down your questions so you remember to ask them during your visits  © 2017 [address] Information is for End User's use only and may not be sold, redistributed or otherwise used for commercial purposes  All illustrations and images included in CareNotes® are the copyrighted property of [employer] , [employer]  or [employer]ais[employer]  The above information is an  only  It is not intended as medical advice for individual conditions or treatments  Talk to your doctor, nurse or pharmacist before following any medical regimen to see if it is safe and effective for you

## 2018-03-28 ENCOUNTER — TELEPHONE (OUTPATIENT)
Dept: PAIN MEDICINE | Facility: CLINIC | Age: 58
End: 2018-03-28

## 2018-03-29 ENCOUNTER — TELEPHONE (OUTPATIENT)
Dept: PAIN MEDICINE | Facility: CLINIC | Age: 58
End: 2018-03-29

## 2018-04-05 ENCOUNTER — TELEPHONE (OUTPATIENT)
Dept: PAIN MEDICINE | Facility: CLINIC | Age: 58
End: 2018-04-05

## 2018-04-05 NOTE — TELEPHONE ENCOUNTER
Pt called stating he was a prior pt of Dr Clinton Durham in [place] and is in a lot of pain  Pt isn't scheduled to see Dr Loly Gill until May 7  Pt doesn't know what to do  Would like to speak with someone  Please call [phone]

## 2018-04-05 NOTE — TELEPHONE ENCOUNTER
S/W pt  Pt states he is in a lot of pain with his back and nothing is helping  He has been in touch with Andrew Najera) regarding the SCS and have made several adjustments and still no relief  Pt states, " this is a workers compensation claim and in the beginning he was seeing [facility] and [facility] and had [employer] when he had the accident at work"  Pt said at that time he had "temporary workers compensation"  Now the patient states that the judgement is in his favor and the  is in his favor regarding the claim and he has " full workers compensation"  Pt states that he has relayed all of this information to the [place] office  Pt is scheduled in May with Amber Diego, but says he only lives 10 minutes from [facility] in [place]  Pt states he would like to transfer to [facility] office due to being close to home and the fact AS knows him  Advised pt that if he is in that much pain he should go to the ER  Pt verbalized understanding  Explained to patient that AS will have to accept the patient from Amber Diego  Advised pt will c/b with JW recommendations

## 2018-04-05 NOTE — TELEPHONE ENCOUNTER
AS, please read previous notes regarding transfer of care from [address] [place] [address] to [facility]  This is a patient seen by AS in [place]

## 2018-04-06 ENCOUNTER — HOSPITAL ENCOUNTER (EMERGENCY)
Facility: HOSPITAL | Age: 58
Discharge: HOME | End: 2018-04-06
Attending: EMERGENCY MEDICINE | Admitting: EMERGENCY MEDICINE
Payer: OTHER MISCELLANEOUS

## 2018-04-06 ENCOUNTER — DOCUMENTATION (OUTPATIENT)
Dept: PAIN MEDICINE | Facility: CLINIC | Age: 58
End: 2018-04-06

## 2018-04-06 ENCOUNTER — TELEPHONE (OUTPATIENT)
Dept: OBGYN CLINIC | Facility: HOSPITAL | Age: 58
End: 2018-04-06

## 2018-04-06 VITALS
HEART RATE: 97 BPM | RESPIRATION RATE: 18 BRPM | DIASTOLIC BLOOD PRESSURE: 76 MMHG | OXYGEN SATURATION: 99 % | BODY MASS INDEX: 30.1 KG/M2 | SYSTOLIC BLOOD PRESSURE: 138 MMHG | WEIGHT: 215 LBS | HEIGHT: 71 IN | TEMPERATURE: 97.9 F

## 2018-04-06 DIAGNOSIS — M54.9 BACK PAIN: Primary | ICD-10-CM

## 2018-04-06 PROCEDURE — 96372 THER/PROPH/DIAG INJ SC/IM: CPT

## 2018-04-06 PROCEDURE — 99283 EMERGENCY DEPT VISIT LOW MDM: CPT

## 2018-04-06 RX ORDER — NAPROXEN 500 MG/1
500 TABLET ORAL 2 TIMES DAILY WITH MEALS
Qty: 30 TABLET | Refills: 0 | Status: SHIPPED | OUTPATIENT
Start: 2018-04-06 | End: 2018-04-17 | Stop reason: SDUPTHER

## 2018-04-06 RX ORDER — METHOCARBAMOL 500 MG/1
500 TABLET, FILM COATED ORAL 2 TIMES DAILY
Qty: 20 TABLET | Refills: 0 | Status: SHIPPED | OUTPATIENT
Start: 2018-04-06 | End: 2018-04-17 | Stop reason: SDUPTHER

## 2018-04-06 RX ORDER — HYDROCODONE BITARTRATE AND ACETAMINOPHEN 5; 325 MG/1; MG/1
1 TABLET ORAL EVERY 6 HOURS PRN
Qty: 8 TABLET | Refills: 0 | Status: SHIPPED | OUTPATIENT
Start: 2018-04-06 | End: 2018-04-08

## 2018-04-06 RX ORDER — KETOROLAC TROMETHAMINE 30 MG/ML
30 INJECTION, SOLUTION INTRAMUSCULAR; INTRAVENOUS ONCE
Status: COMPLETED | OUTPATIENT
Start: 2018-04-06 | End: 2018-04-06

## 2018-04-06 RX ORDER — LIDOCAINE 50 MG/G
2 PATCH TOPICAL DAILY
Status: DISCONTINUED | OUTPATIENT
Start: 2018-04-06 | End: 2018-04-06 | Stop reason: HOSPADM

## 2018-04-06 RX ORDER — LIDOCAINE 50 MG/G
1 PATCH TOPICAL DAILY
Qty: 30 PATCH | Refills: 0 | Status: SHIPPED | OUTPATIENT
Start: 2018-04-06 | End: 2018-05-23

## 2018-04-06 RX ADMIN — LIDOCAINE 2 PATCH: 50 PATCH TOPICAL at 11:17

## 2018-04-06 RX ADMIN — KETOROLAC TROMETHAMINE 30 MG: 30 INJECTION, SOLUTION INTRAMUSCULAR at 11:13

## 2018-04-06 NOTE — PROGRESS NOTES
FYI - AS, Management aware of open workers compensation claim and states that the patient has to continue care in [place] since that is where the claim took place  Still waiting for fax from pt's

## 2018-04-06 NOTE — TELEPHONE ENCOUNTER
Patient is returning a call  He is unsure who called him  He is a work comp case and has been seen here before for this  Is is asking for a call back   Please advise  Call back number: [phone]

## 2018-04-06 NOTE — TELEPHONE ENCOUNTER
Aware  Thanks  Ok to schedule the patient with me  Also ask Anabelle/Driss/Chucky to look at programming issues with the SCS  Sometimes another set of eyes helps with the programming of the SCS

## 2018-04-06 NOTE — TELEPHONE ENCOUNTER
FYI - AS, Management aware of open workers compensation claim and states that the patient has to continue care in [address]  Advised pt  Pt states that he is going to fax over information from his  that states he does not have to continue care in [address]  Advised will make AS aware

## 2018-04-06 NOTE — TELEPHONE ENCOUNTER
ZUYL - S/W patient  Pt is currently in the ER with increased pain in his back  Advised pt that [facility] and  AS agree the patient can transfer care to AS office  Patient has an open worker's compensation claim in [place]  Awaiting to receive a fax from his  with the details regarding his claim  Spoke with Alisa Foreman at [facility] and he is going to follow up with the patient

## 2018-04-06 NOTE — ED PROVIDER NOTES
History  Chief Complaint   Patient presents with    Back Pain     Pt  reports low back pain and right buttock pain  Pt  reports ongoing for months, pt  has stimulator that is fairly new  Pt  reports high tolerance for pain and medication  Pt  reports percocet not working  Pt  told to come to ER by pain and spine specialist     61 y/o M with Pmhx of Dm, sciatica s/p thoracic spinal cord stimulator (placed 10/3/2017 by Dr [facility]), who presents to the ED for acute on chronic low back pain x 1 week  Patient reports that he has continued to experience pain following the placement of the spinal cord stimulator, but that the pain has improved  The sharp shooting pain has only gotten worse over the past week  Endorses intermittent radiation down the right lower extremity  Pain is worse with movement and walking  Improved with lying down  Endorses numbness (unchanged from baseline), tingling, weakness, bowel/bladder dysfunction  Denies fevers, chills, redness, warmth, nausea, vomiting, diarrhea, abdominal pain, urinary pain/freq/urngeyc  Denies penile or testicular pain or discharge  Attempted to resolve pain with IBU, APAP without relief  Finally attained relief with use of Norco, but states that he took the last one  PAPMED reviewed and showed last narcotic fill was on 3 13 18 for 8 tablets for Norco  He denies new trauma  History provided by:  Patient and medical records   used: No        Prior to Admission Medications   Prescriptions Last Dose Informant Patient Reported? Taking?    DULoxetine (CYMBALTA) 20 mg capsule 4/6/2018 at Unknown time  No Yes   Sig: Take 1 capsule (20 mg total) by mouth daily   HYDROcodone-acetaminophen (NORCO) 5-325 mg per tablet 4/6/2018 at Unknown time  No Yes   Sig: Take 1 tablet by mouth every 6 (six) hours as needed for pain Earliest Fill Date: 3/13/18 Max Daily Amount: 4 tablets   atorvastatin (LIPITOR) 10 mg tablet 4/6/2018 at Unknown time  No Yes   Sig: Take 2 tablets (20 mg total) by mouth daily   cyclobenzaprine (FLEXERIL) 10 mg tablet  Self Yes No   Sig: Take 10 mg by mouth daily at bedtime as needed for muscle spasms   glipiZIDE (GLUCOTROL XL) 10 mg 24 hr tablet 4/6/2018 at Unknown time Self Yes Yes   Sig: Take 10 mg by mouth daily   glipiZIDE (GLUCOTROL XL) 10 mg 24 hr tablet 4/5/2018 at Unknown time  No Yes   Sig: Take 1 tablet (10 mg total) by mouth daily   metFORMIN (GLUCOPHAGE) 1000 MG tablet Unknown at Unknown time Self Yes No   Sig: Take 1,000 mg by mouth 2 (two) times a day with meals   nortriptyline (PAMELOR) 25 mg capsule Unknown at Unknown time Self Yes No   Sig: Take 75 mg by mouth daily at bedtime   omeprazole (PriLOSEC) 20 mg delayed release capsule Unknown at Unknown time Self Yes No   Sig: Take 20 mg by mouth daily   oxyCODONE-acetaminophen (PERCOCET) 5-325 mg per tablet Unknown at Unknown time Self Yes No   Sig: Take 1-2 tablets by mouth every 4 (four) hours as needed for moderate pain   tamsulosin (FLOMAX) 0 4 mg 4/5/2018 at Unknown time  No Yes   Sig: Take 2 capsules (0 8 mg total) by mouth daily with dinner      Facility-Administered Medications: None       Past Medical History:   Diagnosis Date    BPH (benign prostatic hypertrophy) with urinary obstruction     Chronic pain disorder     Diabetes mellitus (HCC)     type 2    Herniation of lumbar intervertebral disc with radiculopathy     Lumbago     Myofascial pain syndrome     Sciatica        Past Surgical History:   Procedure Laterality Date    ABSCESS DRAINAGE      groin    BACK SURGERY      COLONOSCOPY      NOSE SURGERY      LA COLONOSCOPY FLX DX W/COLLJ SPEC WHEN PFRMD N/A 3/6/2017    Procedure: COLONOSCOPY;  Surgeon: Colin Mars MD;  Location: [facility];   Service: Gastroenterology    LA SURG IMPLNT NEUROELECT,EPIDURAL Left 10/3/2017    Procedure: PLACEMENT THORACIC FOR INSERTION DORSAL COLUMN SPINAL CORD STIMULATOR (DCS) WITH BUTTOCK IMPLANTABLE PULSE GENERATOR(IMPULSE); Surgeon: Neda Rausch MD;  Location:  MAIN OR;  Service: Neurosurgery       Family History   Problem Relation Age of Onset    Diabetes Mother     Diabetes Father      I have reviewed and agree with the history as documented  Social History   Substance Use Topics    Smoking status: Former Smoker     Packs/day: 0 25    Smokeless tobacco: Never Used      Comment: encouraged smoking cessation    Alcohol use No        Review of Systems   Constitutional: Negative for chills and fever  HENT: Negative for congestion, ear pain, postnasal drip, rhinorrhea, sinus pain, sinus pressure, sore throat and trouble swallowing  Eyes: Negative  Respiratory: Negative for cough, chest tightness, shortness of breath and wheezing  Cardiovascular: Negative for chest pain, palpitations and leg swelling  Gastrointestinal: Negative for abdominal pain, anal bleeding, constipation, diarrhea, nausea and vomiting  Genitourinary: Negative for dysuria, flank pain, frequency, hematuria and urgency  Musculoskeletal: Positive for back pain  Negative for arthralgias, gait problem, joint swelling, myalgias, neck pain and neck stiffness  Skin: Negative for color change, pallor, rash and wound  Neurological: Negative for dizziness, syncope, weakness, light-headedness, numbness and headaches  Psychiatric/Behavioral: Negative  Physical Exam  ED Triage Vitals [04/06/18 1008]   Temperature Pulse Respirations Blood Pressure SpO2   97 9 °F (36 6 °C) 80 18 137/78 99 %      Temp Source Heart Rate Source Patient Position - Orthostatic VS BP Location FiO2 (%)   Oral Monitor Sitting Right arm --      Pain Score       7           Orthostatic Vital Signs  Vitals:    04/06/18 1008 04/06/18 1015 04/06/18 1118   BP: 137/78 137/78 138/76   Pulse: 80  97   Patient Position - Orthostatic VS: Sitting  Sitting       Physical Exam   Constitutional: He is oriented to person, place, and time   He appears well-developed and well-nourished  HENT:   Head: Normocephalic and atraumatic  Eyes: Conjunctivae and EOM are normal  Pupils are equal, round, and reactive to light  Neck: Normal range of motion  Neck supple  Cardiovascular: Normal rate, regular rhythm and intact distal pulses  Pulmonary/Chest: Effort normal and breath sounds normal  He has no wheezes  He has no rales  Abdominal: Soft  Bowel sounds are normal  He exhibits no distension  There is no tenderness  There is no rebound and no guarding  Musculoskeletal: Normal range of motion  He exhibits tenderness (bilateral lower back (R>L), TTP right gluteus contreras  )  He exhibits no edema  Positive straight leg raise on the right  Neurological: He is alert and oriented to person, place, and time  No cranial nerve deficit or sensory deficit  He exhibits normal muscle tone  Coordination normal    Skin: Skin is warm and dry  Capillary refill takes less than 2 seconds  Psychiatric: He has a normal mood and affect  His behavior is normal    Nursing note and vitals reviewed  ED Medications  Medications   ketorolac (TORADOL) injection 30 mg (30 mg Intramuscular Given 4/6/18 1113)       Diagnostic Studies  Results Reviewed     None                 No orders to display              Procedures  Procedures       Phone Contacts  ED Phone Contact    ED Course  ED Course                                MDM  Number of Diagnoses or Management Options  Back pain:   Diagnosis management comments: 63 y/o M with Pmhx of Dm, sciatica s/p thoracic spinal cord stimulator (placed 10/3/2017 by Dr Samantha Reynoso), who presents to the ED for acute on chronic low back pain x 1 week  Differential Diagnosis includes but is not limited to:  Musculoskeletal pain, muscle strain/sprain, cauda equina, contusion  Low suspicion for cauda equina as patient is neurovascularly intact  Low suspicion for renal or GI pathology as no associated symptoms   Pain improved with lidocaine patch, anti-inflammatory  Unable to give muscle relaxants the patient is driving home  Given short prescription for Norco (8 tablets)  Patient informed that he should follow up with his PMD for narcotic medications, but will make the exception to manage his chronic pain as he has documented notes in [facility] stating that he is trying to get into pain management, but is having difficulty getting the workman's comp pushed through  Patient will be discharged home with primary care, pain management, and spine specialist follow-up  Ambulatory upon discharge  CritCare Time    Disposition  Final diagnoses:   Back pain     Time reflects when diagnosis was documented in both MDM as applicable and the Disposition within this note     Time User Action Codes Description Comment    4/6/2018 11:02 AM Gilma Osborn Add [M54 9] Back pain       ED Disposition     ED Disposition Condition Comment    Discharge  Rene Araiza discharge to home/self care      Condition at discharge: Good        Follow-up Information     Follow up With Specialties Details Why Chelo Reed 1, [facility], Physician Assistant Schedule an appointment as soon as possible for a visit in 3 days  [address]  [phone]      Spine specialist  Schedule an appointment as soon as possible for a visit in 3 days          Discharge Medication List as of 4/6/2018 11:07 AM      START taking these medications    Details   !! HYDROcodone-acetaminophen (NORCO) 5-325 mg per tablet Take 1 tablet by mouth every 6 (six) hours as needed for pain for up to 2 days Max Daily Amount: 4 tablets, Starting Fri 4/6/2018, Until Sun 4/8/2018, Print      lidocaine (LIDODERM) 5 % Place 1 patch on the skin daily Remove & Discard patch within 12 hours or as directed by MD, Starting Fri 4/6/2018, Print      methocarbamol (ROBAXIN) 500 mg tablet Take 1 tablet (500 mg total) by mouth 2 (two) times a day, Starting Fri 4/6/2018, Print naproxen (NAPROSYN) 500 mg tablet Take 1 tablet (500 mg total) by mouth 2 (two) times a day with meals, Starting Fri 4/6/2018, Print       !! - Potential duplicate medications found  Please discuss with provider  CONTINUE these medications which have NOT CHANGED    Details   atorvastatin (LIPITOR) 10 mg tablet Take 2 tablets (20 mg total) by mouth daily, Starting Tue 2/6/2018, Normal      cyclobenzaprine (FLEXERIL) 10 mg tablet Take 10 mg by mouth daily at bedtime as needed for muscle spasms, Historical Med      DULoxetine (CYMBALTA) 20 mg capsule Take 1 capsule (20 mg total) by mouth daily, Starting Tue 2/6/2018, Normal      !! glipiZIDE (GLUCOTROL XL) 10 mg 24 hr tablet Take 10 mg by mouth daily, Historical Med      !! glipiZIDE (GLUCOTROL XL) 10 mg 24 hr tablet Take 1 tablet (10 mg total) by mouth daily, Starting Tue 3/13/2018, Normal      !! HYDROcodone-acetaminophen (NORCO) 5-325 mg per tablet Take 1 tablet by mouth every 6 (six) hours as needed for pain Earliest Fill Date: 3/13/18 Max Daily Amount: 4 tablets, Starting Tue 3/13/2018, Print      metFORMIN (GLUCOPHAGE) 1000 MG tablet Take 1,000 mg by mouth 2 (two) times a day with meals, Historical Med      nortriptyline (PAMELOR) 25 mg capsule Take 75 mg by mouth daily at bedtime, Historical Med      omeprazole (PriLOSEC) 20 mg delayed release capsule Take 20 mg by mouth daily, Historical Med      oxyCODONE-acetaminophen (PERCOCET) 5-325 mg per tablet Take 1-2 tablets by mouth every 4 (four) hours as needed for moderate pain, Historical Med      tamsulosin (FLOMAX) 0 4 mg Take 2 capsules (0 8 mg total) by mouth daily with dinner, Starting Tue 3/13/2018, Normal       !! - Potential duplicate medications found  Please discuss with provider  No discharge procedures on file      ED Provider  Electronically Signed by           Joycelyn Jerome PA-C  04/08/18 2021

## 2018-04-06 NOTE — ED NOTES
PT goes to pain management and has spinal cord stimulator     Carmenza Cortes, TIFFNAIE  04/06/18 [address] Monica Rogers [place]  04/06/18 1014

## 2018-04-12 ENCOUNTER — OFFICE VISIT (OUTPATIENT)
Dept: FAMILY MEDICINE CLINIC | Facility: CLINIC | Age: 58
End: 2018-04-12
Payer: OTHER MISCELLANEOUS

## 2018-04-12 VITALS
SYSTOLIC BLOOD PRESSURE: 124 MMHG | RESPIRATION RATE: 18 BRPM | BODY MASS INDEX: 30.94 KG/M2 | WEIGHT: 221 LBS | DIASTOLIC BLOOD PRESSURE: 70 MMHG | OXYGEN SATURATION: 98 % | HEART RATE: 86 BPM | HEIGHT: 71 IN

## 2018-04-12 DIAGNOSIS — N40.1 BPH WITH URINARY OBSTRUCTION: ICD-10-CM

## 2018-04-12 DIAGNOSIS — G47.09 OTHER INSOMNIA: ICD-10-CM

## 2018-04-12 DIAGNOSIS — N13.8 BPH WITH URINARY OBSTRUCTION: ICD-10-CM

## 2018-04-12 DIAGNOSIS — E11.9 TYPE 2 DIABETES MELLITUS WITHOUT COMPLICATION, WITHOUT LONG-TERM CURRENT USE OF INSULIN (HCC): Primary | ICD-10-CM

## 2018-04-12 DIAGNOSIS — M54.16 CHRONIC LUMBAR RADICULOPATHY: ICD-10-CM

## 2018-04-12 DIAGNOSIS — R03.0 BLOOD PRESSURE ELEVATED WITHOUT HISTORY OF HTN: ICD-10-CM

## 2018-04-12 PROCEDURE — 99214 OFFICE O/P EST MOD 30 MIN: CPT | Performed by: PHYSICIAN ASSISTANT

## 2018-04-12 NOTE — ASSESSMENT & PLAN NOTE
- Scheduled to go to pain management for continued management in 3 5 weeks  - Pt has prescriptions from ED for Robaxin, naproxen, and Norco  - Recommended continued activity and exercise  - Recommended Physical therapy  Pt agreed that he would try it at this time     - FU in 2 months

## 2018-04-12 NOTE — ASSESSMENT & PLAN NOTE
- Recommended trial of melatonin  - Discussed need for improved sleep hygiene  Discussed no screen time prior to bed  Sleeping on a schedule in a cool dark room  No exercise 2-3 hours prior to bed

## 2018-04-12 NOTE — ASSESSMENT & PLAN NOTE
Improved visual blurriness  - Emphasized importance of tight diabetes management  - Continue Metformin 1000 mg, twice daily  - continue Glipizide 10mg  - FU in 2 months  - Will repeat A1C in 2 months  - Referral to see Optometry  Pt agreed and will schedule an appointment soon

## 2018-04-12 NOTE — PROGRESS NOTES
Assessment/Plan:    Other insomnia  - Recommended trial of melatonin  - Discussed need for improved sleep hygiene  Discussed no screen time prior to bed  Sleeping on a schedule in a cool dark room  No exercise 2-3 hours prior to bed  Blood pressure elevated without history of HTN  - Recent BP WNL  Previously managed by lisinopril but none for the last 2 months   - Continue to follow    BPH with urinary obstruction  - Discussed importance of adherence to tamsulosin 0 8mg nighly  - FU in 2 months    Chronic lumbar radiculopathy  - Scheduled to go to pain management for continued management in 3 5 weeks  - Pt has prescriptions from ED for Robaxin, naproxen, and Norco  - Recommended continued activity and exercise  - Recommended Physical therapy  Pt agreed that he would try it at this time  - FU in 2 months    DM type 2 (diabetes mellitus, type 2) ([facility])  Improved visual blurriness  - Emphasized importance of tight diabetes management  - Continue Metformin 1000 mg, twice daily  - continue Glipizide 10mg  - FU in 2 months  - Will repeat A1C in 2 months  - Referral to see Optometry  Pt agreed and will schedule an appointment soon  Diagnoses and all orders for this visit:    Type 2 diabetes mellitus without complication, without long-term current use of insulin ([facility])  -     metFORMIN (GLUCOPHAGE) 1000 MG tablet; Take 1 tablet (1,000 mg total) by mouth 2 (two) times a day with meals for 30 days    Other insomnia    Blood pressure elevated without history of HTN    Chronic lumbar radiculopathy  -     Ambulatory referral to Physical Therapy; Future    BPH with urinary obstruction          Subjective:    Patient ID: Patricia Rooney is a 62 y o  male  Pt is presenting today for FU from ED visit 6 days ago  He visited due to increased back pain and was prescribed 4 medications including additional Norco  He has been unable to fill the prescriptions due to no coverage from his workers compensation    2 days ago he saw [facility] for any update to the programing of spinal nerve implant  He has an appointment with pain specialists in 3 5 weeks  He is hoping to change his pain specliast to get into Dr Bonnell Apgar  His back pain today is well controlled with no numbness or tingling of his leg, which is his usual baseline  When asked about blurriness he denies any in the past month  He thinks he is better controlling his BGL  He continues to have difficulty with urinary flow and is getting up twice most nights  He does not take his flomax regularly and will try to improve his adherence  He has continued to have issues with insomnia, secondary to his back pain  He has not tried melatonin or made any adjustments to his sleep schedule or adhered to any sleep hygiene  The following portions of the patient's history were reviewed and updated as appropriate: allergies, current medications and problem list     Review of Systems   Constitutional: Negative for activity change, fatigue, fever and unexpected weight change  HENT: Negative for rhinorrhea and sore throat  Respiratory: Negative for cough, shortness of breath and wheezing  Cardiovascular: Negative for chest pain, palpitations and leg swelling  Gastrointestinal: Negative for constipation, diarrhea, nausea and vomiting  Genitourinary: Positive for decreased urine volume and difficulty urinating  Negative for flank pain, scrotal swelling and testicular pain  Musculoskeletal: Positive for back pain  Negative for neck pain and neck stiffness  Neurological: Negative for dizziness, weakness, light-headedness and headaches  Psychiatric/Behavioral: Negative for behavioral problems  The patient is not nervous/anxious  Objective:  /70   Pulse 86   Resp 18   Ht 5' 11" (1 803 m)   Wt 100 kg (221 lb)   SpO2 98%   BMI 30 82 kg/m²      Physical Exam   Constitutional: He appears well-developed and well-nourished     HENT:   Head: Normocephalic and atraumatic  Right Ear: Tympanic membrane and external ear normal    Left Ear: Tympanic membrane and external ear normal    Nose: Nose normal  No rhinorrhea  Mouth/Throat: Oropharynx is clear and moist  No oropharyngeal exudate  Cardiovascular: Normal rate, regular rhythm and normal heart sounds  Exam reveals no gallop and no friction rub  No murmur heard  Pulmonary/Chest: Effort normal and breath sounds normal  He has no wheezes  He has no rales  Musculoskeletal:        Lumbar back: He exhibits no tenderness, no bony tenderness, no pain and no spasm  Right upper leg: He exhibits no tenderness  Left upper leg: He exhibits no tenderness  Lymphadenopathy:        Head (right side): No submental, no submandibular, no tonsillar, no preauricular and no posterior auricular adenopathy present  Head (left side): No submental, no submandibular, no tonsillar, no preauricular and no posterior auricular adenopathy present  He has no cervical adenopathy  Psychiatric: He has a normal mood and affect  His behavior is normal    Vitals reviewed

## 2018-04-12 NOTE — ASSESSMENT & PLAN NOTE
- Recent BP WNL   Previously managed by lisinopril but none for the last 2 months   - Continue to follow

## 2018-04-17 ENCOUNTER — TELEPHONE (OUTPATIENT)
Dept: PAIN MEDICINE | Facility: MEDICAL CENTER | Age: 58
End: 2018-04-17

## 2018-04-17 ENCOUNTER — OFFICE VISIT (OUTPATIENT)
Dept: PAIN MEDICINE | Facility: CLINIC | Age: 58
End: 2018-04-17
Payer: OTHER MISCELLANEOUS

## 2018-04-17 VITALS
HEART RATE: 89 BPM | HEIGHT: 71 IN | WEIGHT: 220 LBS | TEMPERATURE: 97.8 F | DIASTOLIC BLOOD PRESSURE: 60 MMHG | RESPIRATION RATE: 18 BRPM | BODY MASS INDEX: 30.8 KG/M2 | SYSTOLIC BLOOD PRESSURE: 110 MMHG

## 2018-04-17 DIAGNOSIS — G89.29 CHRONIC LOW BACK PAIN, UNSPECIFIED BACK PAIN LATERALITY, WITH SCIATICA PRESENCE UNSPECIFIED: ICD-10-CM

## 2018-04-17 DIAGNOSIS — M47.816 LUMBAR SPONDYLOSIS: ICD-10-CM

## 2018-04-17 DIAGNOSIS — M54.5 CHRONIC LOW BACK PAIN, UNSPECIFIED BACK PAIN LATERALITY, WITH SCIATICA PRESENCE UNSPECIFIED: ICD-10-CM

## 2018-04-17 DIAGNOSIS — M54.16 LUMBAR RADICULOPATHY: ICD-10-CM

## 2018-04-17 DIAGNOSIS — G89.4 CHRONIC PAIN DISORDER: Primary | ICD-10-CM

## 2018-04-17 DIAGNOSIS — M54.5 CHRONIC BILATERAL LOW BACK PAIN, WITH SCIATICA PRESENCE UNSPECIFIED: ICD-10-CM

## 2018-04-17 DIAGNOSIS — G89.29 CHRONIC BILATERAL LOW BACK PAIN, WITH SCIATICA PRESENCE UNSPECIFIED: ICD-10-CM

## 2018-04-17 PROBLEM — M54.50 LOW BACK PAIN: Status: ACTIVE | Noted: 2018-04-17

## 2018-04-17 PROCEDURE — 99214 OFFICE O/P EST MOD 30 MIN: CPT | Performed by: ANESTHESIOLOGY

## 2018-04-17 RX ORDER — NAPROXEN 500 MG/1
500 TABLET ORAL 2 TIMES DAILY PRN
Qty: 60 TABLET | Refills: 0 | Status: SHIPPED | OUTPATIENT
Start: 2018-04-17 | End: 2018-05-31 | Stop reason: ALTCHOICE

## 2018-04-17 RX ORDER — OXYCODONE HYDROCHLORIDE AND ACETAMINOPHEN 5; 325 MG/1; MG/1
1 TABLET ORAL DAILY PRN
Qty: 30 TABLET | Refills: 0 | Status: SHIPPED | OUTPATIENT
Start: 2018-04-17 | End: 2018-06-25 | Stop reason: SDUPTHER

## 2018-04-17 RX ORDER — METHOCARBAMOL 500 MG/1
500 TABLET, FILM COATED ORAL 3 TIMES DAILY PRN
Qty: 90 TABLET | Refills: 0 | Status: SHIPPED | OUTPATIENT
Start: 2018-04-17 | End: 2018-05-31 | Stop reason: ALTCHOICE

## 2018-04-17 NOTE — PROGRESS NOTES
Pain Medicine Follow-Up Note    Assessment:  1  Chronic pain disorder    2  Chronic low back pain, unspecified back pain laterality, with sciatica presence unspecified    3  Lumbar spondylosis    4  Lumbar radiculopathy    5  Chronic bilateral low back pain, with sciatica presence unspecified        Plan:  My impressions and treatment recommendations were discussed in detail with the patient who verbalized understanding and had no further questions  The patient is reporting primarily low back pain with radiation into the right lower extremity  He states that the [employer] spinal cord stimulator permanent implantation is taking care of his right lower extremity pain, but he is still left with low back pain that is significantly debilitating  The patient does have signs and symptoms consistent with bilateral lumbar spondylosis at today's visit, so I felt a reasonable to have the patient undergo bilateral L3-S1 diagnostic and confirmatory medial branch blocks  The procedures, its risks, and benefits were explained in detail to the patient  Risks include but are not limited to bleeding, infection, hematoma formation, abscess formation, weakness, headache, failure the pain to improve, nerve irritation or damage, and potential worsening of the pain  The patient verbalized understanding and wished to proceed with the procedure  The patient is also requesting oxycodone/acetaminophen 5/325 mg 1 tablet once daily as needed for pain  He reports that he was given Vicodin and the emergency department, but it was not giving him significant relief so he has not been using it  I did feel it reasonable to have the patient trial using oxycodone/acetaminophen 5/325 mg 1 tablet once daily as needed for pain  I had the patient sign an opioid treatment agreement at today's visit  The risks and side effects of chronic opioid treatment were discussed in detail with the patient   Side effects include but are not limited to nausea, vomiting, GI intolerance, sedation, constipation, mental clouding, opioid-induced hyperalgesia, endocrine dysfunction, addiction, dependence, and tolerance  The patient was asked to take his medications only as prescribed and directed, never in excess, and never for any other reason other than for pain control  The patient was also asked to keep his medications out of the reach of others and away from children, preferably in a locked drawer  The patient verbalized understanding and wished to use these opioid medications  A urine drug test was collected at today's office visit as part of our medication management protocol  The point of care testing results were appropriate for what was being prescribed  The specimen will be sent for confirmatory testing  The drug screen is medically necessary because the patient is either dependent on opioid medication or is being considered for opioid medication therapy and the results could impact ongoing or future treatment  The drug screen is to evaluate for the presence or absence of prescribed, non-prescribed, and/or illicit drugs and substances  In addition, the patient is requesting refills on naproxen as well as methocarbamol  I felt a reasonable to prescribe the patient naproxen 500 mg 1 tablet twice daily as needed for pain as well as methocarbamol 500 mg 1 tablet 3 times daily as needed for muscle spasms  Side effects were reviewed with the patient  Follow-up is planned with the patient in 4 weeks time or sooner as warranted  Discharge instructions were provided  I personally saw and examined the patient and I agree with the above discussed plan of care  History of Present Illness:    Gillian Seals is a 62 y o  male who presents to [facility] for interval re-evaluation of the above stated pain complaints  The patient has a past medical and chronic pain history as outlined in the assessment section   He was last seen on August 18, 2017  At today's office visit, the patient's pain score is 6 to 7/10 on the verbal numerical pain rating scale  The patient states that his pain is primarily in his low back with radiation to the right lower extremity  He states that his pain is worse in the morning, evening, and night  His pain is constant in nature and he describes the quality of his pain as sharp, throbbing, pressure-like, shooting, numbness, and pins and needles    The patient does state that his right lower extremity pain is well controlled with the spinal cord stimulator  He is complaining primarily of low back pain at today's visit  He is requesting opioid pain medications at today's visit for his low back  He is also requesting naproxen and methocarbamol for his low back pain  Other than as stated above, the patient denies any interval changes in medications, medical condition, mental condition, symptoms, or allergies since the last office visit  Review of Systems:    Review of Systems   Respiratory: Negative for shortness of breath  Cardiovascular: Negative for chest pain  Gastrointestinal: Negative for constipation, diarrhea, nausea and vomiting  Musculoskeletal: Positive for gait problem (difficulty walking/ decreased range of motion) and joint swelling (joint stiffness)  Negative for arthralgias and myalgias  Skin: Negative for rash  Neurological: Negative for dizziness, seizures and weakness  All other systems reviewed and are negative          Patient Active Problem List   Diagnosis    Heartburn    Erectile dysfunction of non-organic origin    DM type 2 (diabetes mellitus, type 2) ([facility])    Chronic pain disorder    Chronic low back pain    Lumbar radiculopathy    BPH with urinary obstruction    Blood pressure elevated without history of HTN    Sciatica    Postlaminectomy syndrome, lumbar    Myofascial pain syndrome    Lumbar spondylosis    Herniation of lumbar intervertebral disc with radiculopathy    Acute post-operative pain    Other insomnia       Past Medical History:   Diagnosis Date    BPH (benign prostatic hypertrophy) with urinary obstruction     Chronic pain disorder     Diabetes mellitus (HCC)     type 2    Herniation of lumbar intervertebral disc with radiculopathy     Lumbago     Myofascial pain syndrome     Sciatica        Past Surgical History:   Procedure Laterality Date    ABSCESS DRAINAGE      groin    BACK SURGERY      COLONOSCOPY      NOSE SURGERY      WI COLONOSCOPY FLX DX W/COLLJ SPEC WHEN PFRMD N/A 3/6/2017    Procedure: COLONOSCOPY;  Surgeon: Chanda Velasco MD;  Location: [facility]; Service: Gastroenterology    WI SURG IMPLNT NEUROELECT,EPIDURAL Left 10/3/2017    Procedure: PLACEMENT THORACIC FOR INSERTION DORSAL COLUMN SPINAL CORD STIMULATOR (DCS) WITH BUTTOCK IMPLANTABLE PULSE GENERATOR(IMPULSE); Surgeon: Alon Pastor MD;  Location:  MAIN OR;  Service: Neurosurgery       Family History   Problem Relation Age of Onset    Diabetes Mother     Diabetes Father        Social History     Occupational History    Not on file       Social History Main Topics    Smoking status: Former Smoker     Packs/day: 0 25    Smokeless tobacco: Never Used      Comment: encouraged smoking cessation    Alcohol use No    Drug use: No    Sexual activity: Not on file         Current Outpatient Prescriptions:     atorvastatin (LIPITOR) 10 mg tablet, Take 2 tablets (20 mg total) by mouth daily, Disp: 60 tablet, Rfl: 1    glipiZIDE (GLUCOTROL XL) 10 mg 24 hr tablet, Take 10 mg by mouth daily, Disp: , Rfl:     HYDROcodone-acetaminophen (NORCO) 5-325 mg per tablet, Take 1 tablet by mouth every 6 (six) hours as needed for pain Earliest Fill Date: 3/13/18 Max Daily Amount: 4 tablets, Disp: 8 tablet, Rfl: 0    lidocaine (LIDODERM) 5 %, Place 1 patch on the skin daily Remove & Discard patch within 12 hours or as directed by MD, Disp: 30 patch, Rfl: 0    metFORMIN (GLUCOPHAGE) 1000 MG tablet, Take 1 tablet (1,000 mg total) by mouth 2 (two) times a day with meals for 30 days, Disp: 60 tablet, Rfl: 2    methocarbamol (ROBAXIN) 500 mg tablet, Take 1 tablet (500 mg total) by mouth 3 (three) times a day as needed for muscle spasms for up to 30 days, Disp: 90 tablet, Rfl: 0    naproxen (NAPROSYN) 500 mg tablet, Take 1 tablet (500 mg total) by mouth 2 (two) times a day as needed (PAIN (WITH FOOD)) for up to 30 days, Disp: 60 tablet, Rfl: 0    tamsulosin (FLOMAX) 0 4 mg, Take 2 capsules (0 8 mg total) by mouth daily with dinner, Disp: 60 capsule, Rfl: 1    cyclobenzaprine (FLEXERIL) 10 mg tablet, Take 10 mg by mouth daily at bedtime as needed for muscle spasms, Disp: , Rfl:     DULoxetine (CYMBALTA) 20 mg capsule, Take 1 capsule (20 mg total) by mouth daily, Disp: 30 capsule, Rfl: 1    glipiZIDE (GLUCOTROL XL) 10 mg 24 hr tablet, Take 1 tablet (10 mg total) by mouth daily, Disp: 30 tablet, Rfl: 1    nortriptyline (PAMELOR) 25 mg capsule, Take 75 mg by mouth daily at bedtime, Disp: , Rfl:     omeprazole (PriLOSEC) 20 mg delayed release capsule, Take 20 mg by mouth daily, Disp: , Rfl:     oxyCODONE-acetaminophen (PERCOCET) 5-325 mg per tablet, Take 1 tablet by mouth daily as needed (PAIN) for up to 30 days Max Daily Amount: 1 tablet, Disp: 30 tablet, Rfl: 0    Allergies   Allergen Reactions    Penicillins Swelling       Physical Exam:    /60 (BP Location: Right arm, Patient Position: Standing, Cuff Size: Standard)   Pulse 89   Temp 97 8 °F (36 6 °C) (Oral)   Resp 18   Ht 5' 11" (1 803 m)   Wt 99 8 kg (220 lb)   BMI 30 68 kg/m²     Constitutional:normal, well developed, well nourished, alert, in no distress and non-toxic and no overt pain behavior    Eyes:anicteric  HEENT:grossly intact  Neck:supple, symmetric, trachea midline and no masses   Pulmonary:even and unlabored  Cardiovascular:No edema or pitting edema present  Skin:Normal without rashes or lesions and well hydrated  Psychiatric:Mood and affect appropriate  Neurologic:Cranial Nerves II-XII grossly intact  Musculoskeletal:normal, lumbar facet loading is positive bilaterally  Imaging  No orders to display   3/7/17  MRI THORACIC SPINE WITHOUT CONTRAST     INDICATION:  14-year-old male, chronic back pain, pre neurostimulator placement  COMPARISON:  None      TECHNIQUE:  Sagittal T1, sagittal T2, sagittal inversion recovery, axial T2,  axial 2D MERGE          IMAGE QUALITY: Diagnostic      FINDINGS:  Incompletely visualized degenerative spondylosis with central canal and bilateral foraminal stenosis C4-5 and C5-C6  Dedicated cervical spine MRI exam recommended if clinically appropriate     ALIGNMENT: Normal alignment of the thoracic spine  No compression fracture  No subluxation  No scoliosis      MARROW SIGNAL:  Normal marrow signal is identified within the visualized bony structures  No discrete marrow lesion      THORACIC CORD: Normal signal within the thoracic cord      PARAVERTEBRAL SOFT TISSUES: Paraspinal soft tissues are unremarkable      THORACIC DEGENERATIVE CHANGE:  Minimal degenerative disc and facet disease involves mid and lower thoracic segments    No evidence of disc herniation, central canal or foraminal stenosis      IMPRESSION:  Minimal multilevel degenerative spondylosis     No evidence of thoracic disc herniation, central canal or foraminal stenosis     Normal appearance thoracic spinal cord     Degenerative spondylosis, central canal and bilateral foraminal stenosis C4-5 and C5-6, incompletely characterized         Workstation performed: [id number]      Imaging     MRI thoracic spine wo contrast (Order #[id number]) on 3/7/2017 - Imaging Information   Result History     MRI thoracic spine wo contrast (Order #[id number]) on 3/7/2017 - Order Result History Report

## 2018-04-17 NOTE — TELEPHONE ENCOUNTER
Karrie Mendieta called from [facility] called stating that he received prescriptions today for the Methocarbamol and Naproxen, but not for the Oxycodone  He said if we can escribe it, they can accept it that way  The mailing address is [facility] [address] [place] [address]  He will be faxing the forms over as well  He can be reached at [phone] option 3

## 2018-04-17 NOTE — LETTER
April 17, 2018     Fallon Amado PA-C  [address]    Patient: Susie Mendez   YOB: 1960   Date of Visit: 4/17/2018       Dear Dr Lindsey Fairly: Thank you for referring Susie Mendez to me for evaluation  Below are my notes for this consultation  If you have questions, please do not hesitate to call me  I look forward to following your patient along with you  Sincerely,        Vicente Hennessy MD        CC: No Recipients  Vicente Hennessy MD  4/17/2018  3:14 PM  Sign at close encounter  Pain Medicine Follow-Up Note    Assessment:  1  Chronic pain disorder    2  Chronic low back pain, unspecified back pain laterality, with sciatica presence unspecified    3  Lumbar spondylosis    4  Lumbar radiculopathy    5  Chronic bilateral low back pain, with sciatica presence unspecified        Plan:  My impressions and treatment recommendations were discussed in detail with the patient who verbalized understanding and had no further questions  The patient is reporting primarily low back pain with radiation into the right lower extremity  He states that the [employer] spinal cord stimulator permanent implantation is taking care of his right lower extremity pain, but he is still left with low back pain that is significantly debilitating  The patient does have signs and symptoms consistent with bilateral lumbar spondylosis at today's visit, so I felt a reasonable to have the patient undergo bilateral L3-S1 diagnostic and confirmatory medial branch blocks  The procedures, its risks, and benefits were explained in detail to the patient  Risks include but are not limited to bleeding, infection, hematoma formation, abscess formation, weakness, headache, failure the pain to improve, nerve irritation or damage, and potential worsening of the pain  The patient verbalized understanding and wished to proceed with the procedure      The patient is also requesting oxycodone/acetaminophen 5/325 mg 1 tablet once daily as needed for pain  He reports that he was given Vicodin and the emergency department, but it was not giving him significant relief so he has not been using it  I did feel it reasonable to have the patient trial using oxycodone/acetaminophen 5/325 mg 1 tablet once daily as needed for pain  I had the patient sign an opioid treatment agreement at today's visit  The risks and side effects of chronic opioid treatment were discussed in detail with the patient  Side effects include but are not limited to nausea, vomiting, GI intolerance, sedation, constipation, mental clouding, opioid-induced hyperalgesia, endocrine dysfunction, addiction, dependence, and tolerance  The patient was asked to take his medications only as prescribed and directed, never in excess, and never for any other reason other than for pain control  The patient was also asked to keep his medications out of the reach of others and away from children, preferably in a locked drawer  The patient verbalized understanding and wished to use these opioid medications  A urine drug test was collected at today's office visit as part of our medication management protocol  The point of care testing results were appropriate for what was being prescribed  The specimen will be sent for confirmatory testing  The drug screen is medically necessary because the patient is either dependent on opioid medication or is being considered for opioid medication therapy and the results could impact ongoing or future treatment  The drug screen is to evaluate for the presence or absence of prescribed, non-prescribed, and/or illicit drugs and substances  In addition, the patient is requesting refills on naproxen as well as methocarbamol  I felt a reasonable to prescribe the patient naproxen 500 mg 1 tablet twice daily as needed for pain as well as methocarbamol 500 mg 1 tablet 3 times daily as needed for muscle spasms    Side effects were reviewed with the patient  Follow-up is planned with the patient in 4 weeks time or sooner as warranted  Discharge instructions were provided  I personally saw and examined the patient and I agree with the above discussed plan of care  History of Present Illness:    Yolie Etienne is a 62 y o  male who presents to [facility] for interval re-evaluation of the above stated pain complaints  The patient has a past medical and chronic pain history as outlined in the assessment section  He was last seen on August 18, 2017  At today's office visit, the patient's pain score is 6 to 7/10 on the verbal numerical pain rating scale  The patient states that his pain is primarily in his low back with radiation to the right lower extremity  He states that his pain is worse in the morning, evening, and night  His pain is constant in nature and he describes the quality of his pain as sharp, throbbing, pressure-like, shooting, numbness, and pins and needles    The patient does state that his right lower extremity pain is well controlled with the spinal cord stimulator  He is complaining primarily of low back pain at today's visit  He is requesting opioid pain medications at today's visit for his low back  He is also requesting naproxen and methocarbamol for his low back pain  Other than as stated above, the patient denies any interval changes in medications, medical condition, mental condition, symptoms, or allergies since the last office visit  Review of Systems:    Review of Systems   Respiratory: Negative for shortness of breath  Cardiovascular: Negative for chest pain  Gastrointestinal: Negative for constipation, diarrhea, nausea and vomiting  Musculoskeletal: Positive for gait problem (difficulty walking/ decreased range of motion) and joint swelling (joint stiffness)  Negative for arthralgias and myalgias  Skin: Negative for rash     Neurological: Negative for dizziness, seizures and weakness  All other systems reviewed and are negative  Patient Active Problem List   Diagnosis    Heartburn    Erectile dysfunction of non-organic origin    DM type 2 (diabetes mellitus, type 2) ([facility])    Chronic pain disorder    Chronic low back pain    Lumbar radiculopathy    BPH with urinary obstruction    Blood pressure elevated without history of HTN    Sciatica    Postlaminectomy syndrome, lumbar    Myofascial pain syndrome    Lumbar spondylosis    Herniation of lumbar intervertebral disc with radiculopathy    Acute post-operative pain    Other insomnia       Past Medical History:   Diagnosis Date    BPH (benign prostatic hypertrophy) with urinary obstruction     Chronic pain disorder     Diabetes mellitus ([facility] [address] )     type 2    Herniation of lumbar intervertebral disc with radiculopathy     Lumbago     Myofascial pain syndrome     Sciatica        Past Surgical History:   Procedure Laterality Date    ABSCESS DRAINAGE      groin    BACK SURGERY      COLONOSCOPY      NOSE SURGERY      HI COLONOSCOPY FLX DX W/COLLJ SPEC WHEN PFRMD N/A 3/6/2017    Procedure: COLONOSCOPY;  Surgeon: Ruy Stewart MD;  Location: [facility]; Service: Gastroenterology    HI SURG IMPLNT NEUROELECT,EPIDURAL Left 10/3/2017    Procedure: PLACEMENT THORACIC FOR INSERTION DORSAL COLUMN SPINAL CORD STIMULATOR (DCS) WITH BUTTOCK IMPLANTABLE PULSE GENERATOR(IMPULSE); Surgeon: Domenic Combs MD;  Location:  MAIN OR;  Service: Neurosurgery       Family History   Problem Relation Age of Onset    Diabetes Mother     Diabetes Father        Social History     Occupational History    Not on file       Social History Main Topics    Smoking status: Former Smoker     Packs/day: 0 25    Smokeless tobacco: Never Used      Comment: encouraged smoking cessation    Alcohol use No    Drug use: No    Sexual activity: Not on file         Current Outpatient Prescriptions:     atorvastatin (LIPITOR) 10 mg tablet, Take 2 tablets (20 mg total) by mouth daily, Disp: 60 tablet, Rfl: 1    glipiZIDE (GLUCOTROL XL) 10 mg 24 hr tablet, Take 10 mg by mouth daily, Disp: , Rfl:     HYDROcodone-acetaminophen (NORCO) 5-325 mg per tablet, Take 1 tablet by mouth every 6 (six) hours as needed for pain Earliest Fill Date: 3/13/18 Max Daily Amount: 4 tablets, Disp: 8 tablet, Rfl: 0    lidocaine (LIDODERM) 5 %, Place 1 patch on the skin daily Remove & Discard patch within 12 hours or as directed by MD, Disp: 30 patch, Rfl: 0    metFORMIN (GLUCOPHAGE) 1000 MG tablet, Take 1 tablet (1,000 mg total) by mouth 2 (two) times a day with meals for 30 days, Disp: 60 tablet, Rfl: 2    methocarbamol (ROBAXIN) 500 mg tablet, Take 1 tablet (500 mg total) by mouth 3 (three) times a day as needed for muscle spasms for up to 30 days, Disp: 90 tablet, Rfl: 0    naproxen (NAPROSYN) 500 mg tablet, Take 1 tablet (500 mg total) by mouth 2 (two) times a day as needed (PAIN (WITH FOOD)) for up to 30 days, Disp: 60 tablet, Rfl: 0    tamsulosin (FLOMAX) 0 4 mg, Take 2 capsules (0 8 mg total) by mouth daily with dinner, Disp: 60 capsule, Rfl: 1    cyclobenzaprine (FLEXERIL) 10 mg tablet, Take 10 mg by mouth daily at bedtime as needed for muscle spasms, Disp: , Rfl:     DULoxetine (CYMBALTA) 20 mg capsule, Take 1 capsule (20 mg total) by mouth daily, Disp: 30 capsule, Rfl: 1    glipiZIDE (GLUCOTROL XL) 10 mg 24 hr tablet, Take 1 tablet (10 mg total) by mouth daily, Disp: 30 tablet, Rfl: 1    nortriptyline (PAMELOR) 25 mg capsule, Take 75 mg by mouth daily at bedtime, Disp: , Rfl:     omeprazole (PriLOSEC) 20 mg delayed release capsule, Take 20 mg by mouth daily, Disp: , Rfl:     oxyCODONE-acetaminophen (PERCOCET) 5-325 mg per tablet, Take 1 tablet by mouth daily as needed (PAIN) for up to 30 days Max Daily Amount: 1 tablet, Disp: 30 tablet, Rfl: 0    Allergies   Allergen Reactions    Penicillins Swelling       Physical Exam:    BP 110/60 (BP Location: Right arm, Patient Position: Standing, Cuff Size: Standard)   Pulse 89   Temp 97 8 °F (36 6 °C) (Oral)   Resp 18   Ht 5' 11" (1 803 m)   Wt 99 8 kg (220 lb)   BMI 30 68 kg/m²      Constitutional:normal, well developed, well nourished, alert, in no distress and non-toxic and no overt pain behavior  Eyes:anicteric  HEENT:grossly intact  Neck:supple, symmetric, trachea midline and no masses   Pulmonary:even and unlabored  Cardiovascular:No edema or pitting edema present  Skin:Normal without rashes or lesions and well hydrated  Psychiatric:Mood and affect appropriate  Neurologic:Cranial Nerves II-XII grossly intact  Musculoskeletal:normal, lumbar facet loading is positive bilaterally  Imaging  No orders to display   3/7/17  MRI THORACIC SPINE WITHOUT CONTRAST     INDICATION:  49-year-old male, chronic back pain, pre neurostimulator placement  COMPARISON:  None      TECHNIQUE:  Sagittal T1, sagittal T2, sagittal inversion recovery, axial T2,  axial 2D MERGE          IMAGE QUALITY: Diagnostic      FINDINGS:  Incompletely visualized degenerative spondylosis with central canal and bilateral foraminal stenosis C4-5 and C5-C6  Dedicated cervical spine MRI exam recommended if clinically appropriate     ALIGNMENT: Normal alignment of the thoracic spine  No compression fracture  No subluxation  No scoliosis      MARROW SIGNAL:  Normal marrow signal is identified within the visualized bony structures  No discrete marrow lesion      THORACIC CORD: Normal signal within the thoracic cord      PARAVERTEBRAL SOFT TISSUES: Paraspinal soft tissues are unremarkable      THORACIC DEGENERATIVE CHANGE:  Minimal degenerative disc and facet disease involves mid and lower thoracic segments    No evidence of disc herniation, central canal or foraminal stenosis      IMPRESSION:  Minimal multilevel degenerative spondylosis     No evidence of thoracic disc herniation, central canal or foraminal stenosis     Normal appearance thoracic spinal cord     Degenerative spondylosis, central canal and bilateral foraminal stenosis C4-5 and C5-6, incompletely characterized         Workstation performed: [id number]      Imaging     MRI thoracic spine wo contrast (Order #[id number]) on 3/7/2017 - Imaging Information   Result History     MRI thoracic spine wo contrast (Order #[id number]) on 3/7/2017 - Order Result History Report

## 2018-04-18 NOTE — TELEPHONE ENCOUNTER
S/w Raffi from [facility], pt's [facility], he received the Robaxin and Naproxen scripts yesterday from [facility] but did not receive the Percocet  He said the rx could be e-scribed, and that the patient has enough for the next four days  Could you retry to escribe it to [facility]? I put it on the patient's chart as the preferred pharmacy  Informed him that Dr Fredy Brewer is out today and it will be handled tomorrow when he returns, verbalized understanding

## 2018-04-19 ENCOUNTER — TELEPHONE (OUTPATIENT)
Dept: PAIN MEDICINE | Facility: MEDICAL CENTER | Age: 58
End: 2018-04-19

## 2018-04-19 ENCOUNTER — TELEPHONE (OUTPATIENT)
Dept: PAIN MEDICINE | Facility: CLINIC | Age: 58
End: 2018-04-19

## 2018-04-19 NOTE — TELEPHONE ENCOUNTER
RN s/w Raffi from [facility] and advised him of Dr Luigi Han response  Raffi aware that the oxycodone script was mailed and that when the pt may need future medications that they can be exscribed  Raffi appreciative of call back

## 2018-04-19 NOTE — TELEPHONE ENCOUNTER
We spoke to the pharmacy when I saw the patient  There was no e-prescribing available in [facility] at the time  The pharmacy said to mail in the oxycodone prescription, so we did that  I had EPIC update it so that pharmacy is now listed for future e-prescribing

## 2018-04-19 NOTE — TELEPHONE ENCOUNTER
Spoke w/ pt today and ask if he can come in tomorrow to get a urine sample, because there was a rejection with the first one we received   He will be here between 8am- 4pm 4/20/18

## 2018-04-23 ENCOUNTER — EVALUATION (OUTPATIENT)
Dept: PHYSICAL THERAPY | Facility: REHABILITATION | Age: 58
End: 2018-04-23
Payer: OTHER MISCELLANEOUS

## 2018-04-23 DIAGNOSIS — M54.16 CHRONIC LUMBAR RADICULOPATHY: ICD-10-CM

## 2018-04-23 PROCEDURE — G8990 OTHER PT/OT CURRENT STATUS: HCPCS | Performed by: PHYSICAL THERAPIST

## 2018-04-23 PROCEDURE — 97164 PT RE-EVAL EST PLAN CARE: CPT | Performed by: PHYSICAL THERAPIST

## 2018-04-23 PROCEDURE — G8991 OTHER PT/OT GOAL STATUS: HCPCS | Performed by: PHYSICAL THERAPIST

## 2018-04-25 ENCOUNTER — OFFICE VISIT (OUTPATIENT)
Dept: PHYSICAL THERAPY | Facility: REHABILITATION | Age: 58
End: 2018-04-25
Payer: OTHER MISCELLANEOUS

## 2018-04-25 DIAGNOSIS — M54.16 CHRONIC LUMBAR RADICULOPATHY: Primary | ICD-10-CM

## 2018-04-25 PROCEDURE — 97010 HOT OR COLD PACKS THERAPY: CPT | Performed by: PHYSICAL THERAPIST

## 2018-04-25 PROCEDURE — 97110 THERAPEUTIC EXERCISES: CPT | Performed by: PHYSICAL THERAPIST

## 2018-04-25 NOTE — PROGRESS NOTES
Daily Note     Today's date: 2018  Patient name: Chrystal Tobias  : 1960  MRN: [id number]  Referring provider: Trudi Conway  Dx:   Encounter Diagnosis     ICD-10-CM    1  Chronic lumbar radiculopathy M54 [id number]                   Subjective: Marquis Amado reports pain rating 7/10 upon arrival to therapy today  he verbalizes non-compliance with HEP at this time  He reports increased pain following IE , with PT explaining to patient need to examine as much of low back and lower quadrant as possible to best assess  He reports he was approved for "more shots" and will have those performed tomorrow, 18  Objective: See treatment diary below  Precautions: chronic pain, elevated fear avoidance, BMI > 30, type II diabetes, sleep dysfunction  Access code: [email or web address]  * Indicates part of HEP     Daily Treatment Diary     Manual              QL TrP bilateral             Bilateral hip ROM                                                        Exercise Diary             Abdominal bracing* with DB :05x10 3 min           LTR* :05x5 :05x5 ea           Clamshells              Isometrics- hip abd/add             Hamstring stretch             Diaphragmatic breathing  3 min           Frogs             Prone lying*  3 min           Bike   5 min                                                                                                                                                              Modalities              MHP to finish 10 min                                          Assessment: Tolerated treatment poor  Patient limited with exercises able to perform throughout treatment session at this time  Patient noting "feeling the nerve pain" when in supine hooklying position  Patient reporting increased pain following 3 minutes of diaphragmatic breathing and abdominal bracing with DB   Patient unable to tolerate any position for more than 3 minutes   Frequently noting throughout treatment session, "i'll do whatever you want me to do, I just know whatever I try it's going to hurt " No manuals performed today secondary to patient reporting inability to tolerate any position for more than 3 minutes with therapeutic exercises today  However, inconsistency noted when Patient able to lay prone for 10 minutes of MHP following treatment session today  Plan: Progress treatment as tolerated

## 2018-04-26 ENCOUNTER — HOSPITAL ENCOUNTER (OUTPATIENT)
Dept: RADIOLOGY | Facility: HOSPITAL | Age: 58
Setting detail: OUTPATIENT SURGERY
Discharge: HOME | End: 2018-04-26
Payer: OTHER MISCELLANEOUS

## 2018-04-26 ENCOUNTER — HOSPITAL ENCOUNTER (OUTPATIENT)
Facility: AMBULARY SURGERY CENTER | Age: 58
Setting detail: OUTPATIENT SURGERY
Discharge: HOME | End: 2018-04-26
Attending: ANESTHESIOLOGY | Admitting: ANESTHESIOLOGY
Payer: OTHER MISCELLANEOUS

## 2018-04-26 VITALS
DIASTOLIC BLOOD PRESSURE: 80 MMHG | HEART RATE: 83 BPM | OXYGEN SATURATION: 98 % | SYSTOLIC BLOOD PRESSURE: 136 MMHG | RESPIRATION RATE: 18 BRPM | TEMPERATURE: 97 F

## 2018-04-26 PROCEDURE — 72020 X-RAY EXAM OF SPINE 1 VIEW: CPT

## 2018-04-26 PROCEDURE — 64494 INJ PARAVERT F JNT L/S 2 LEV: CPT | Performed by: ANESTHESIOLOGY

## 2018-04-26 PROCEDURE — 64493 INJ PARAVERT F JNT L/S 1 LEV: CPT | Performed by: ANESTHESIOLOGY

## 2018-04-26 PROCEDURE — 64495 INJ PARAVERT F JNT L/S 3 LEV: CPT | Performed by: ANESTHESIOLOGY

## 2018-04-26 RX ORDER — LIDOCAINE HYDROCHLORIDE 10 MG/ML
INJECTION, SOLUTION EPIDURAL; INFILTRATION; INTRACAUDAL; PERINEURAL AS NEEDED
Status: DISCONTINUED | OUTPATIENT
Start: 2018-04-26 | End: 2018-04-26 | Stop reason: HOSPADM

## 2018-04-26 RX ORDER — LIDOCAINE WITH 8.4% SOD BICARB 0.9%(10ML)
SYRINGE (ML) INJECTION AS NEEDED
Status: DISCONTINUED | OUTPATIENT
Start: 2018-04-26 | End: 2018-04-26 | Stop reason: HOSPADM

## 2018-04-26 NOTE — OP NOTE
ATTENDING PHYSICIAN:  Garett Oro MD     PRE-PROCEDURE DIAGNOSIS:  Lumbar facet arthropathy  POST-PROCEDURE DIAGNOSIS:  Lumbar facet arthropathy  PROCEDURE:  Bilateral lumbar diagnostic medial branch blocks at the L3, L4, L5, and S1 levels  ESTIMATED BLOOD LOSS:  Minimal     COMPLICATIONS:  None  ANESTHESIA:  Local     LOCATION:  [address]  CONSENT: Today's procedure and its risks, benefits, as well as alternatives were explained to the patient  Risks include but are not limited to bleeding, infection, weakness, nerve irritation or damage, hematoma formation, abscess formation, failure the pain to improve, or potential worsening of the pain  The patient verbalized understanding and wished to proceed with the procedure  Written informed consent was thereby obtained  DESCRIPTION OF PROCEDURE: After written informed consent was obtained, the patient was taken to the fluoroscopy suite and placed in the prone position  Anatomical landmarks were identified with the aid of fluoroscopy in the PA and oblique views  The patient's lumbar region was prepped with antiseptic solution and draped in the usual sterile fashion  Strict aseptic technique was utilized  The skin and subcutaneous tissues at each needle entry site was infiltrated with a small amount of 1% preservative-free lidocaine using a 25-gauge 1-1/2-inch needle  A 25-gauge 3-1/2-inch needle was then incrementally advanced under fluoroscopic guidance in multiple views at each level such that the needle tip was advanced to contact os at the junction of the superior articulating process and the superomedial border of the transverse process at each of the cephalad levels as well as to contact os at the junction of the superior articulating process and the superomedial border of the sacral ala at the S1 level   After negative aspiration was confirmed, 0 5 mL of preservative-free 1% Lidocaine was injected into the cephalad needles and 1 mL of preservative-free 1% Lidocaine was injected into the needles at the S1 level  All needles were removed intact and hemostasis was maintained throughout  The patient tolerated the procedure well and no paresthesias or other complications were reported during or following this procedure  The antiseptic was wiped clean and Band-Aids were placed as appropriate  The patient was transferred to the recovery area and was observed for an appropriate period of time during which the patient remained hemodynamically stable and neurovascularly intact as the patient was prior to the procedure  The patient was subsequently discharged home in stable condition with supervision  The patient was instructed to call the office in a few days with an update  Discharge instructions were provided  I was present for and participated in all key and critical portions of this procedure      Katie Colindres MD  4/26/2018  3:36 PM

## 2018-04-26 NOTE — DISCHARGE INSTRUCTIONS

## 2018-04-26 NOTE — H&P (VIEW-ONLY)
Pain Medicine Follow-Up Note    Assessment:  1  Chronic pain disorder    2  Chronic low back pain, unspecified back pain laterality, with sciatica presence unspecified    3  Lumbar spondylosis    4  Lumbar radiculopathy    5  Chronic bilateral low back pain, with sciatica presence unspecified        Plan:  My impressions and treatment recommendations were discussed in detail with the patient who verbalized understanding and had no further questions  The patient is reporting primarily low back pain with radiation into the right lower extremity  He states that the [employer] spinal cord stimulator permanent implantation is taking care of his right lower extremity pain, but he is still left with low back pain that is significantly debilitating  The patient does have signs and symptoms consistent with bilateral lumbar spondylosis at today's visit, so I felt a reasonable to have the patient undergo bilateral L3-S1 diagnostic and confirmatory medial branch blocks  The procedures, its risks, and benefits were explained in detail to the patient  Risks include but are not limited to bleeding, infection, hematoma formation, abscess formation, weakness, headache, failure the pain to improve, nerve irritation or damage, and potential worsening of the pain  The patient verbalized understanding and wished to proceed with the procedure  The patient is also requesting oxycodone/acetaminophen 5/325 mg 1 tablet once daily as needed for pain  He reports that he was given Vicodin and the emergency department, but it was not giving him significant relief so he has not been using it  I did feel it reasonable to have the patient trial using oxycodone/acetaminophen 5/325 mg 1 tablet once daily as needed for pain  I had the patient sign an opioid treatment agreement at today's visit  The risks and side effects of chronic opioid treatment were discussed in detail with the patient   Side effects include but are not limited to nausea, vomiting, GI intolerance, sedation, constipation, mental clouding, opioid-induced hyperalgesia, endocrine dysfunction, addiction, dependence, and tolerance  The patient was asked to take his medications only as prescribed and directed, never in excess, and never for any other reason other than for pain control  The patient was also asked to keep his medications out of the reach of others and away from children, preferably in a locked drawer  The patient verbalized understanding and wished to use these opioid medications  A urine drug test was collected at today's office visit as part of our medication management protocol  The point of care testing results were appropriate for what was being prescribed  The specimen will be sent for confirmatory testing  The drug screen is medically necessary because the patient is either dependent on opioid medication or is being considered for opioid medication therapy and the results could impact ongoing or future treatment  The drug screen is to evaluate for the presence or absence of prescribed, non-prescribed, and/or illicit drugs and substances  In addition, the patient is requesting refills on naproxen as well as methocarbamol  I felt a reasonable to prescribe the patient naproxen 500 mg 1 tablet twice daily as needed for pain as well as methocarbamol 500 mg 1 tablet 3 times daily as needed for muscle spasms  Side effects were reviewed with the patient  Follow-up is planned with the patient in 4 weeks time or sooner as warranted  Discharge instructions were provided  I personally saw and examined the patient and I agree with the above discussed plan of care  History of Present Illness:    Rivka Oakley is a 62 y o  male who presents to [facility] for interval re-evaluation of the above stated pain complaints  The patient has a past medical and chronic pain history as outlined in the assessment section   He was last seen on August 18, 2017  At today's office visit, the patient's pain score is 6 to 7/10 on the verbal numerical pain rating scale  The patient states that his pain is primarily in his low back with radiation to the right lower extremity  He states that his pain is worse in the morning, evening, and night  His pain is constant in nature and he describes the quality of his pain as sharp, throbbing, pressure-like, shooting, numbness, and pins and needles    The patient does state that his right lower extremity pain is well controlled with the spinal cord stimulator  He is complaining primarily of low back pain at today's visit  He is requesting opioid pain medications at today's visit for his low back  He is also requesting naproxen and methocarbamol for his low back pain  Other than as stated above, the patient denies any interval changes in medications, medical condition, mental condition, symptoms, or allergies since the last office visit  Review of Systems:    Review of Systems   Respiratory: Negative for shortness of breath  Cardiovascular: Negative for chest pain  Gastrointestinal: Negative for constipation, diarrhea, nausea and vomiting  Musculoskeletal: Positive for gait problem (difficulty walking/ decreased range of motion) and joint swelling (joint stiffness)  Negative for arthralgias and myalgias  Skin: Negative for rash  Neurological: Negative for dizziness, seizures and weakness  All other systems reviewed and are negative          Patient Active Problem List   Diagnosis    Heartburn    Erectile dysfunction of non-organic origin    DM type 2 (diabetes mellitus, type 2) ([facility])    Chronic pain disorder    Chronic low back pain    Lumbar radiculopathy    BPH with urinary obstruction    Blood pressure elevated without history of HTN    Sciatica    Postlaminectomy syndrome, lumbar    Myofascial pain syndrome    Lumbar spondylosis    Herniation of lumbar intervertebral disc with radiculopathy    Acute post-operative pain    Other insomnia       Past Medical History:   Diagnosis Date    BPH (benign prostatic hypertrophy) with urinary obstruction     Chronic pain disorder     Diabetes mellitus (HCC)     type 2    Herniation of lumbar intervertebral disc with radiculopathy     Lumbago     Myofascial pain syndrome     Sciatica        Past Surgical History:   Procedure Laterality Date    ABSCESS DRAINAGE      groin    BACK SURGERY      COLONOSCOPY      NOSE SURGERY      NC COLONOSCOPY FLX DX W/COLLJ SPEC WHEN PFRMD N/A 3/6/2017    Procedure: COLONOSCOPY;  Surgeon: Angel Christie MD;  Location: [facility]; Service: Gastroenterology    NC SURG IMPLNT NEUROELECT,EPIDURAL Left 10/3/2017    Procedure: PLACEMENT THORACIC FOR INSERTION DORSAL COLUMN SPINAL CORD STIMULATOR (DCS) WITH BUTTOCK IMPLANTABLE PULSE GENERATOR(IMPULSE); Surgeon: Bhavana Santizo MD;  Location:  MAIN OR;  Service: Neurosurgery       Family History   Problem Relation Age of Onset    Diabetes Mother     Diabetes Father        Social History     Occupational History    Not on file       Social History Main Topics    Smoking status: Former Smoker     Packs/day: 0 25    Smokeless tobacco: Never Used      Comment: encouraged smoking cessation    Alcohol use No    Drug use: No    Sexual activity: Not on file         Current Outpatient Prescriptions:     atorvastatin (LIPITOR) 10 mg tablet, Take 2 tablets (20 mg total) by mouth daily, Disp: 60 tablet, Rfl: 1    glipiZIDE (GLUCOTROL XL) 10 mg 24 hr tablet, Take 10 mg by mouth daily, Disp: , Rfl:     HYDROcodone-acetaminophen (NORCO) 5-325 mg per tablet, Take 1 tablet by mouth every 6 (six) hours as needed for pain Earliest Fill Date: 3/13/18 Max Daily Amount: 4 tablets, Disp: 8 tablet, Rfl: 0    lidocaine (LIDODERM) 5 %, Place 1 patch on the skin daily Remove & Discard patch within 12 hours or as directed by MD, Disp: 30 patch, Rfl: 0    metFORMIN (GLUCOPHAGE) 1000 MG tablet, Take 1 tablet (1,000 mg total) by mouth 2 (two) times a day with meals for 30 days, Disp: 60 tablet, Rfl: 2    methocarbamol (ROBAXIN) 500 mg tablet, Take 1 tablet (500 mg total) by mouth 3 (three) times a day as needed for muscle spasms for up to 30 days, Disp: 90 tablet, Rfl: 0    naproxen (NAPROSYN) 500 mg tablet, Take 1 tablet (500 mg total) by mouth 2 (two) times a day as needed (PAIN (WITH FOOD)) for up to 30 days, Disp: 60 tablet, Rfl: 0    tamsulosin (FLOMAX) 0 4 mg, Take 2 capsules (0 8 mg total) by mouth daily with dinner, Disp: 60 capsule, Rfl: 1    cyclobenzaprine (FLEXERIL) 10 mg tablet, Take 10 mg by mouth daily at bedtime as needed for muscle spasms, Disp: , Rfl:     DULoxetine (CYMBALTA) 20 mg capsule, Take 1 capsule (20 mg total) by mouth daily, Disp: 30 capsule, Rfl: 1    glipiZIDE (GLUCOTROL XL) 10 mg 24 hr tablet, Take 1 tablet (10 mg total) by mouth daily, Disp: 30 tablet, Rfl: 1    nortriptyline (PAMELOR) 25 mg capsule, Take 75 mg by mouth daily at bedtime, Disp: , Rfl:     omeprazole (PriLOSEC) 20 mg delayed release capsule, Take 20 mg by mouth daily, Disp: , Rfl:     oxyCODONE-acetaminophen (PERCOCET) 5-325 mg per tablet, Take 1 tablet by mouth daily as needed (PAIN) for up to 30 days Max Daily Amount: 1 tablet, Disp: 30 tablet, Rfl: 0    Allergies   Allergen Reactions    Penicillins Swelling       Physical Exam:    /60 (BP Location: Right arm, Patient Position: Standing, Cuff Size: Standard)   Pulse 89   Temp 97 8 °F (36 6 °C) (Oral)   Resp 18   Ht 5' 11" (1 803 m)   Wt 99 8 kg (220 lb)   BMI 30 68 kg/m²     Constitutional:normal, well developed, well nourished, alert, in no distress and non-toxic and no overt pain behavior    Eyes:anicteric  HEENT:grossly intact  Neck:supple, symmetric, trachea midline and no masses   Pulmonary:even and unlabored  Cardiovascular:No edema or pitting edema present  Skin:Normal without rashes or lesions and well hydrated  Psychiatric:Mood and affect appropriate  Neurologic:Cranial Nerves II-XII grossly intact  Musculoskeletal:normal, lumbar facet loading is positive bilaterally  Imaging  No orders to display   3/7/17  MRI THORACIC SPINE WITHOUT CONTRAST     INDICATION:  55-year-old male, chronic back pain, pre neurostimulator placement  COMPARISON:  None      TECHNIQUE:  Sagittal T1, sagittal T2, sagittal inversion recovery, axial T2,  axial 2D MERGE          IMAGE QUALITY: Diagnostic      FINDINGS:  Incompletely visualized degenerative spondylosis with central canal and bilateral foraminal stenosis C4-5 and C5-C6  Dedicated cervical spine MRI exam recommended if clinically appropriate     ALIGNMENT: Normal alignment of the thoracic spine  No compression fracture  No subluxation  No scoliosis      MARROW SIGNAL:  Normal marrow signal is identified within the visualized bony structures  No discrete marrow lesion      THORACIC CORD: Normal signal within the thoracic cord      PARAVERTEBRAL SOFT TISSUES: Paraspinal soft tissues are unremarkable      THORACIC DEGENERATIVE CHANGE:  Minimal degenerative disc and facet disease involves mid and lower thoracic segments    No evidence of disc herniation, central canal or foraminal stenosis      IMPRESSION:  Minimal multilevel degenerative spondylosis     No evidence of thoracic disc herniation, central canal or foraminal stenosis     Normal appearance thoracic spinal cord     Degenerative spondylosis, central canal and bilateral foraminal stenosis C4-5 and C5-6, incompletely characterized         Workstation performed: [id number]      Imaging     MRI thoracic spine wo contrast (Order #[id number]) on 3/7/2017 - Imaging Information   Result History     MRI thoracic spine wo contrast (Order #[id number]) on 3/7/2017 - Order Result History Report

## 2018-04-30 ENCOUNTER — OFFICE VISIT (OUTPATIENT)
Dept: PHYSICAL THERAPY | Facility: REHABILITATION | Age: 58
End: 2018-04-30
Payer: OTHER MISCELLANEOUS

## 2018-04-30 DIAGNOSIS — M54.16 CHRONIC LUMBAR RADICULOPATHY: Primary | ICD-10-CM

## 2018-04-30 PROCEDURE — 97010 HOT OR COLD PACKS THERAPY: CPT

## 2018-04-30 PROCEDURE — 97110 THERAPEUTIC EXERCISES: CPT | Performed by: PHYSICAL THERAPIST

## 2018-04-30 PROCEDURE — 97140 MANUAL THERAPY 1/> REGIONS: CPT | Performed by: PHYSICAL THERAPIST

## 2018-04-30 NOTE — PROGRESS NOTES
Daily Note     Today's date: 2018  Patient name: Michelet Prado  : 1960  MRN: [id number]  Referring provider: Melyssa Dang  Dx:   Encounter Diagnosis     ICD-10-CM    1  Chronic lumbar radiculopathy M54 16                   Subjective: Patient reports receiving multiple injections on Thursday reporting relief after completion until last evening stating "felt good all weekend, but last night back to a lot of pain " He reports increased discomfort after previous session, with only relief coming from heat  Patient denies compliance with HEP  Objective: See treatment diary below  Precautions: chronic pain, elevated fear avoidance, BMI > 30, type II diabetes, sleep dysfunction  Access code: [email or web address]  * Indicates part of HEP     Daily Treatment Diary     Manual             QL TrP bilateral             Bilateral hip ROM   10 min                                                     Exercise Diary            Abdominal bracing* with DB :05x10 3 min :05x10          LTR* :05x5 :05x5 ea :05x10 ea          Clamshells              Isometrics- hip abd/add   :05x10          Hamstring stretch             Diaphragmatic breathing  3 min 3 min          Frogs             Prone lying*  3 min 3 min          Bike   5 min 5 min                                                                                                                                                             Modalities             MHP to finish 10 min 10 min                                             Assessment: Tolerated treatment poor  Patient demonstrated fatigue post treatment and would benefit from continued PT Patient required constant verbal and tactile cues for proper form with abdominal bracing, no carry over  Patient had reports of pain with all TE, however patient able to tolerate manuals this session, constant cues required for relaxation   Patient unable to lie prone for 3 min with reports of significant pain, however patient able to lie prone for 10 minutes with heat with no reports of pain, only relief  Plan: Continue per plan of care  Progress treatment as tolerated

## 2018-05-02 ENCOUNTER — OFFICE VISIT (OUTPATIENT)
Dept: PHYSICAL THERAPY | Facility: REHABILITATION | Age: 58
End: 2018-05-02
Payer: OTHER MISCELLANEOUS

## 2018-05-02 DIAGNOSIS — M54.16 CHRONIC LUMBAR RADICULOPATHY: Primary | ICD-10-CM

## 2018-05-02 PROCEDURE — 97010 HOT OR COLD PACKS THERAPY: CPT | Performed by: PHYSICAL THERAPIST

## 2018-05-02 PROCEDURE — 97112 NEUROMUSCULAR REEDUCATION: CPT | Performed by: PHYSICAL THERAPIST

## 2018-05-02 NOTE — PROGRESS NOTES
Daily Note     Today's date: 2018  Patient name: Chris George  : 1960  MRN: [id number]  Referring provider: Jagruti Srivastava  Dx:   Encounter Diagnosis     ICD-10-CM    1  Chronic lumbar radiculopathy M54 16                   Subjective: Jolly Simms reports his back "about the same " He reports he is to have another series of injections performed on 18  Objective: See treatment diary below  Precautions: chronic pain, elevated fear avoidance, BMI > 30, type II diabetes, sleep dysfunction  Access code: [email or web address]  * Indicates part of HEP     Daily Treatment Diary     Manual            QL TrP bilateral             Bilateral hip ROM   10 min          Hip flexion     5 reps ea         Supine sciatic nerve glides    10 ea                          Exercise Diary           Abdominal bracing* with DB :05x10 3 min :05x10 :05x10         LTR* :05x5 :05x5 ea :05x10 ea :05x10 ea         Clamshells              Isometrics- hip abd/add   :05x10 :05x10          Hamstring stretch             Diaphragmatic breathing  3 min 3 min 3 min         Frogs             Prone lying*  3 min 3 min 3 min         Bike   5 min 5 min 5 min                                                                                                                                                            Modalities            MHP to finish 10 min 10 min 10 min                                          Assessment: Tolerated treatment poor  Patient unable to tolerate exercises today, exercises limited as a result  Patient reporting "back starting to hurt" at the end of 3 minutes of diaphragmatic breathing  Trialed manual sciatic nerve glides, only 10 repetitions performed on each LE secondary to intolerance to manuals  Patient also noting "numbness" with hip flexion PROM, only 5 repetitions performed as a result   Patient able to tolerate 10 minutes of prone heat today, unable to tolerate any exercise position for more than 3 minutes without needing to take a break and switch positions  Plan: Continue per plan of care  Progress treatment as tolerated

## 2018-05-03 ENCOUNTER — OFFICE VISIT (OUTPATIENT)
Dept: PHYSICAL THERAPY | Facility: REHABILITATION | Age: 58
End: 2018-05-03
Payer: OTHER MISCELLANEOUS

## 2018-05-03 DIAGNOSIS — M54.16 CHRONIC LUMBAR RADICULOPATHY: Primary | ICD-10-CM

## 2018-05-03 PROCEDURE — 97010 HOT OR COLD PACKS THERAPY: CPT | Performed by: PHYSICAL THERAPIST

## 2018-05-03 PROCEDURE — 97110 THERAPEUTIC EXERCISES: CPT | Performed by: PHYSICAL THERAPIST

## 2018-05-03 NOTE — PROGRESS NOTES
Daily Note     Today's date: 5/3/2018  Patient name: Rivka Oakley  : 1960  MRN: [id number]  Referring provider: Aletta Goldberg  Dx:   Encounter Diagnosis     ICD-10-CM    1  Chronic lumbar radiculopathy M54 16                   Subjective: Jairo Beltran reports "little sore" upon arrival to therapy today, reporting "tried to clean the house today, the more I do the more sore I get "         Objective: See treatment diary below  Precautions: chronic pain, elevated fear avoidance, BMI > 30, type II diabetes, sleep dysfunction  Access code: [email or web address]  * Indicates part of HEP     Daily Treatment Diary     Manual   4/25 4/30 5/2 5/3        QL TrP bilateral             Bilateral hip ROM   10 min          Hip flexion     5 reps ea 6 reps ea        Supine sciatic nerve glides    10 ea 12 ea                         Exercise Diary  4/23 4/25 4/30 5/2 5/3        Abdominal bracing* with DB :05x10 3 min :05x10 :05x10 :05x   3 min        LTR* :05x5 :05x5 ea :05x10 ea :05x10 ea :05x10 ea        Clamshells              Isometrics- hip abd/add   :05x10 :05x10  :05x10        Hamstring stretch             Diaphragmatic breathing  3 min 3 min 3 min 3 min        Frogs             Prone lying*  3 min 3 min 3 min NP today        Bike   5 min 5 min 5 min 5 min level 1                                                                                                                                                           Modalities  4/25 4/30 5/2 5/3         MHP to finish in prone 10 min 10 min 10 min 10 min                                         Assessment: Tolerated treatment poor  Patient reporting N/T in R foot with minimal manuals performed today  Patient reporting most discomfort with isometric hip adduction  Able to complete diaphragmatic breathing for 3 minutes today without noting increase in pain  Plan: Continue per plan of care  Progress treatment as tolerated

## 2018-05-05 DIAGNOSIS — E11.9 TYPE 2 DIABETES MELLITUS WITHOUT COMPLICATION, WITHOUT LONG-TERM CURRENT USE OF INSULIN (HCC): ICD-10-CM

## 2018-05-05 DIAGNOSIS — N40.1 BPH WITH URINARY OBSTRUCTION: ICD-10-CM

## 2018-05-05 DIAGNOSIS — N13.8 BPH WITH URINARY OBSTRUCTION: ICD-10-CM

## 2018-05-07 ENCOUNTER — OFFICE VISIT (OUTPATIENT)
Dept: PHYSICAL THERAPY | Facility: REHABILITATION | Age: 58
End: 2018-05-07
Payer: OTHER MISCELLANEOUS

## 2018-05-07 DIAGNOSIS — M54.16 CHRONIC LUMBAR RADICULOPATHY: Primary | ICD-10-CM

## 2018-05-07 PROCEDURE — 97112 NEUROMUSCULAR REEDUCATION: CPT | Performed by: PHYSICAL THERAPIST

## 2018-05-07 PROCEDURE — 97010 HOT OR COLD PACKS THERAPY: CPT | Performed by: PHYSICAL THERAPIST

## 2018-05-07 PROCEDURE — 97110 THERAPEUTIC EXERCISES: CPT | Performed by: PHYSICAL THERAPIST

## 2018-05-07 RX ORDER — TAMSULOSIN HYDROCHLORIDE 0.4 MG/1
0.8 CAPSULE ORAL
Qty: 60 CAPSULE | Refills: 1 | Status: SHIPPED | OUTPATIENT
Start: 2018-05-07 | End: 2018-07-03 | Stop reason: SDUPTHER

## 2018-05-07 RX ORDER — GLIPIZIDE 10 MG/1
10 TABLET, FILM COATED, EXTENDED RELEASE ORAL DAILY
Qty: 30 TABLET | Refills: 1 | Status: SHIPPED | OUTPATIENT
Start: 2018-05-07 | End: 2018-07-03 | Stop reason: SDUPTHER

## 2018-05-07 NOTE — PROGRESS NOTES
Daily Note     Today's date: 2018  Patient name: Christal Rouse  : 1960  MRN: [id number]  Referring provider: Kristin Hernández  Dx:   Encounter Diagnosis     ICD-10-CM    1  Chronic lumbar radiculopathy M54 16        Start Time: 758  Stop Time: 835  Total time in clinic (min): 37 minutes    Subjective: Rolf Perry reports his back "not too bad" upon arrival to therapy today, also noting "wasn't too bad yesterday either " Patient to follow-up with MD on 18        Objective: See treatment diary below  Precautions: chronic pain, elevated fear avoidance, BMI > 30, type II diabetes, sleep dysfunction  Access code: [email or web address]  * Indicates part of HEP     Daily Treatment Diary     Manual   4/25 4/30 5/2 5/3 5/7       QL TrP bilateral             Bilateral hip ROM   10 min          Hip flexion     5 reps ea 6 reps ea NP today       Supine sciatic nerve glides    10 ea 12 ea NP today                        Exercise Diary  4/23 4/25 4/30 5/2 5/3 5/7       Abdominal bracing* with DB :05x10 3 min :05x10 :05x10 :05x   3 min :05x3 min       LTR* :05x5 :05x5 ea :05x10 ea :05x10 ea :05x10 ea :05x10 ea       Clamshells              Isometrics- hip abd/add   :05x10 :05x10  :05x10 :05x10       Hamstring stretch             Diaphragmatic breathing  3 min 3 min 3 min 3 min 3 min       Frogs             Prone lying*  3 min 3 min 3 min NP today        Bike   5 min 5 min 5 min 5 min level 1 5 min level 1       TG squats      Level 15 2x10                                                                                                                                             Modalities  4/25 4/30 5/2 5/3 5/7        MHP to finish in prone 10 min 10 min 10 min 10 min 10 min                                        Assessment: Tolerated treatment poor  Patient continues to be limited by exercises performed today   Patient able to perform all exercises, PT needing to tell patient to stay within "range that doesn't increase pain " He is demonstrating improvements in diaphragmatic breathing, less accessory musculature used with breathing  Added TG squats today with fair tolerance, patient able to perform, most difficulty getting on and off of the machine  Patient finished with MHP in prone with patient reporting "feel a pain, but I know it's not from the heat "        Plan: Continue per plan of care  Progress treatment as tolerated

## 2018-05-09 ENCOUNTER — OFFICE VISIT (OUTPATIENT)
Dept: PHYSICAL THERAPY | Facility: REHABILITATION | Age: 58
End: 2018-05-09
Payer: OTHER MISCELLANEOUS

## 2018-05-09 DIAGNOSIS — M54.16 CHRONIC LUMBAR RADICULOPATHY: Primary | ICD-10-CM

## 2018-05-09 PROCEDURE — 97112 NEUROMUSCULAR REEDUCATION: CPT

## 2018-05-09 PROCEDURE — 97010 HOT OR COLD PACKS THERAPY: CPT

## 2018-05-09 PROCEDURE — 97140 MANUAL THERAPY 1/> REGIONS: CPT

## 2018-05-09 PROCEDURE — 97110 THERAPEUTIC EXERCISES: CPT

## 2018-05-11 ENCOUNTER — APPOINTMENT (OUTPATIENT)
Dept: PHYSICAL THERAPY | Facility: REHABILITATION | Age: 58
End: 2018-05-11
Payer: OTHER MISCELLANEOUS

## 2018-05-11 ENCOUNTER — HOSPITAL ENCOUNTER (OUTPATIENT)
Dept: RADIOLOGY | Facility: HOSPITAL | Age: 58
Setting detail: OUTPATIENT SURGERY
Discharge: HOME | End: 2018-05-11
Payer: OTHER MISCELLANEOUS

## 2018-05-11 ENCOUNTER — HOSPITAL ENCOUNTER (OUTPATIENT)
Facility: AMBULARY SURGERY CENTER | Age: 58
Setting detail: OUTPATIENT SURGERY
Discharge: HOME | End: 2018-05-11
Attending: ANESTHESIOLOGY | Admitting: ANESTHESIOLOGY
Payer: OTHER MISCELLANEOUS

## 2018-05-11 VITALS
TEMPERATURE: 96.2 F | OXYGEN SATURATION: 97 % | RESPIRATION RATE: 18 BRPM | SYSTOLIC BLOOD PRESSURE: 125 MMHG | HEART RATE: 77 BPM | DIASTOLIC BLOOD PRESSURE: 67 MMHG

## 2018-05-11 DIAGNOSIS — E11.9 TYPE 2 DIABETES MELLITUS WITHOUT COMPLICATION, WITHOUT LONG-TERM CURRENT USE OF INSULIN (HCC): ICD-10-CM

## 2018-05-11 PROCEDURE — 64495 INJ PARAVERT F JNT L/S 3 LEV: CPT | Performed by: ANESTHESIOLOGY

## 2018-05-11 PROCEDURE — 72020 X-RAY EXAM OF SPINE 1 VIEW: CPT

## 2018-05-11 PROCEDURE — 64493 INJ PARAVERT F JNT L/S 1 LEV: CPT | Performed by: ANESTHESIOLOGY

## 2018-05-11 PROCEDURE — 64494 INJ PARAVERT F JNT L/S 2 LEV: CPT | Performed by: ANESTHESIOLOGY

## 2018-05-11 RX ORDER — LIDOCAINE WITH 8.4% SOD BICARB 0.9%(10ML)
SYRINGE (ML) INJECTION AS NEEDED
Status: DISCONTINUED | OUTPATIENT
Start: 2018-05-11 | End: 2018-05-11 | Stop reason: HOSPADM

## 2018-05-11 RX ORDER — BUPIVACAINE HYDROCHLORIDE 2.5 MG/ML
INJECTION, SOLUTION EPIDURAL; INFILTRATION; INTRACAUDAL AS NEEDED
Status: DISCONTINUED | OUTPATIENT
Start: 2018-05-11 | End: 2018-05-11 | Stop reason: HOSPADM

## 2018-05-11 NOTE — H&P (VIEW-ONLY)
Pain Medicine Follow-Up Note    Assessment:  1  Chronic pain disorder    2  Chronic low back pain, unspecified back pain laterality, with sciatica presence unspecified    3  Lumbar spondylosis    4  Lumbar radiculopathy    5  Chronic bilateral low back pain, with sciatica presence unspecified        Plan:  My impressions and treatment recommendations were discussed in detail with the patient who verbalized understanding and had no further questions  The patient is reporting primarily low back pain with radiation into the right lower extremity  He states that the [employer] spinal cord stimulator permanent implantation is taking care of his right lower extremity pain, but he is still left with low back pain that is significantly debilitating  The patient does have signs and symptoms consistent with bilateral lumbar spondylosis at today's visit, so I felt a reasonable to have the patient undergo bilateral L3-S1 diagnostic and confirmatory medial branch blocks  The procedures, its risks, and benefits were explained in detail to the patient  Risks include but are not limited to bleeding, infection, hematoma formation, abscess formation, weakness, headache, failure the pain to improve, nerve irritation or damage, and potential worsening of the pain  The patient verbalized understanding and wished to proceed with the procedure  The patient is also requesting oxycodone/acetaminophen 5/325 mg 1 tablet once daily as needed for pain  He reports that he was given Vicodin and the emergency department, but it was not giving him significant relief so he has not been using it  I did feel it reasonable to have the patient trial using oxycodone/acetaminophen 5/325 mg 1 tablet once daily as needed for pain  I had the patient sign an opioid treatment agreement at today's visit  The risks and side effects of chronic opioid treatment were discussed in detail with the patient   Side effects include but are not limited to nausea, vomiting, GI intolerance, sedation, constipation, mental clouding, opioid-induced hyperalgesia, endocrine dysfunction, addiction, dependence, and tolerance  The patient was asked to take his medications only as prescribed and directed, never in excess, and never for any other reason other than for pain control  The patient was also asked to keep his medications out of the reach of others and away from children, preferably in a locked drawer  The patient verbalized understanding and wished to use these opioid medications  A urine drug test was collected at today's office visit as part of our medication management protocol  The point of care testing results were appropriate for what was being prescribed  The specimen will be sent for confirmatory testing  The drug screen is medically necessary because the patient is either dependent on opioid medication or is being considered for opioid medication therapy and the results could impact ongoing or future treatment  The drug screen is to evaluate for the presence or absence of prescribed, non-prescribed, and/or illicit drugs and substances  In addition, the patient is requesting refills on naproxen as well as methocarbamol  I felt a reasonable to prescribe the patient naproxen 500 mg 1 tablet twice daily as needed for pain as well as methocarbamol 500 mg 1 tablet 3 times daily as needed for muscle spasms  Side effects were reviewed with the patient  Follow-up is planned with the patient in 4 weeks time or sooner as warranted  Discharge instructions were provided  I personally saw and examined the patient and I agree with the above discussed plan of care  History of Present Illness:    Horacio Baum is a 62 y o  male who presents to [facility] for interval re-evaluation of the above stated pain complaints  The patient has a past medical and chronic pain history as outlined in the assessment section   He was last seen on August 18, 2017  At today's office visit, the patient's pain score is 6 to 7/10 on the verbal numerical pain rating scale  The patient states that his pain is primarily in his low back with radiation to the right lower extremity  He states that his pain is worse in the morning, evening, and night  His pain is constant in nature and he describes the quality of his pain as sharp, throbbing, pressure-like, shooting, numbness, and pins and needles    The patient does state that his right lower extremity pain is well controlled with the spinal cord stimulator  He is complaining primarily of low back pain at today's visit  He is requesting opioid pain medications at today's visit for his low back  He is also requesting naproxen and methocarbamol for his low back pain  Other than as stated above, the patient denies any interval changes in medications, medical condition, mental condition, symptoms, or allergies since the last office visit  Review of Systems:    Review of Systems   Respiratory: Negative for shortness of breath  Cardiovascular: Negative for chest pain  Gastrointestinal: Negative for constipation, diarrhea, nausea and vomiting  Musculoskeletal: Positive for gait problem (difficulty walking/ decreased range of motion) and joint swelling (joint stiffness)  Negative for arthralgias and myalgias  Skin: Negative for rash  Neurological: Negative for dizziness, seizures and weakness  All other systems reviewed and are negative          Patient Active Problem List   Diagnosis    Heartburn    Erectile dysfunction of non-organic origin    DM type 2 (diabetes mellitus, type 2) ([facility])    Chronic pain disorder    Chronic low back pain    Lumbar radiculopathy    BPH with urinary obstruction    Blood pressure elevated without history of HTN    Sciatica    Postlaminectomy syndrome, lumbar    Myofascial pain syndrome    Lumbar spondylosis    Herniation of lumbar intervertebral disc with radiculopathy    Acute post-operative pain    Other insomnia       Past Medical History:   Diagnosis Date    BPH (benign prostatic hypertrophy) with urinary obstruction     Chronic pain disorder     Diabetes mellitus (HCC)     type 2    Herniation of lumbar intervertebral disc with radiculopathy     Lumbago     Myofascial pain syndrome     Sciatica        Past Surgical History:   Procedure Laterality Date    ABSCESS DRAINAGE      groin    BACK SURGERY      COLONOSCOPY      NOSE SURGERY      ND COLONOSCOPY FLX DX W/COLLJ SPEC WHEN PFRMD N/A 3/6/2017    Procedure: COLONOSCOPY;  Surgeon: Anna Brownlee MD;  Location: [facility]; Service: Gastroenterology    ND SURG IMPLNT NEUROELECT,EPIDURAL Left 10/3/2017    Procedure: PLACEMENT THORACIC FOR INSERTION DORSAL COLUMN SPINAL CORD STIMULATOR (DCS) WITH BUTTOCK IMPLANTABLE PULSE GENERATOR(IMPULSE); Surgeon: Oliva Montilla MD;  Location:  MAIN OR;  Service: Neurosurgery       Family History   Problem Relation Age of Onset    Diabetes Mother     Diabetes Father        Social History     Occupational History    Not on file       Social History Main Topics    Smoking status: Former Smoker     Packs/day: 0 25    Smokeless tobacco: Never Used      Comment: encouraged smoking cessation    Alcohol use No    Drug use: No    Sexual activity: Not on file         Current Outpatient Prescriptions:     atorvastatin (LIPITOR) 10 mg tablet, Take 2 tablets (20 mg total) by mouth daily, Disp: 60 tablet, Rfl: 1    glipiZIDE (GLUCOTROL XL) 10 mg 24 hr tablet, Take 10 mg by mouth daily, Disp: , Rfl:     HYDROcodone-acetaminophen (NORCO) 5-325 mg per tablet, Take 1 tablet by mouth every 6 (six) hours as needed for pain Earliest Fill Date: 3/13/18 Max Daily Amount: 4 tablets, Disp: 8 tablet, Rfl: 0    lidocaine (LIDODERM) 5 %, Place 1 patch on the skin daily Remove & Discard patch within 12 hours or as directed by MD, Disp: 30 patch, Rfl: 0    metFORMIN (GLUCOPHAGE) 1000 MG tablet, Take 1 tablet (1,000 mg total) by mouth 2 (two) times a day with meals for 30 days, Disp: 60 tablet, Rfl: 2    methocarbamol (ROBAXIN) 500 mg tablet, Take 1 tablet (500 mg total) by mouth 3 (three) times a day as needed for muscle spasms for up to 30 days, Disp: 90 tablet, Rfl: 0    naproxen (NAPROSYN) 500 mg tablet, Take 1 tablet (500 mg total) by mouth 2 (two) times a day as needed (PAIN (WITH FOOD)) for up to 30 days, Disp: 60 tablet, Rfl: 0    tamsulosin (FLOMAX) 0 4 mg, Take 2 capsules (0 8 mg total) by mouth daily with dinner, Disp: 60 capsule, Rfl: 1    cyclobenzaprine (FLEXERIL) 10 mg tablet, Take 10 mg by mouth daily at bedtime as needed for muscle spasms, Disp: , Rfl:     DULoxetine (CYMBALTA) 20 mg capsule, Take 1 capsule (20 mg total) by mouth daily, Disp: 30 capsule, Rfl: 1    glipiZIDE (GLUCOTROL XL) 10 mg 24 hr tablet, Take 1 tablet (10 mg total) by mouth daily, Disp: 30 tablet, Rfl: 1    nortriptyline (PAMELOR) 25 mg capsule, Take 75 mg by mouth daily at bedtime, Disp: , Rfl:     omeprazole (PriLOSEC) 20 mg delayed release capsule, Take 20 mg by mouth daily, Disp: , Rfl:     oxyCODONE-acetaminophen (PERCOCET) 5-325 mg per tablet, Take 1 tablet by mouth daily as needed (PAIN) for up to 30 days Max Daily Amount: 1 tablet, Disp: 30 tablet, Rfl: 0    Allergies   Allergen Reactions    Penicillins Swelling       Physical Exam:    /60 (BP Location: Right arm, Patient Position: Standing, Cuff Size: Standard)   Pulse 89   Temp 97 8 °F (36 6 °C) (Oral)   Resp 18   Ht 5' 11" (1 803 m)   Wt 99 8 kg (220 lb)   BMI 30 68 kg/m²     Constitutional:normal, well developed, well nourished, alert, in no distress and non-toxic and no overt pain behavior    Eyes:anicteric  HEENT:grossly intact  Neck:supple, symmetric, trachea midline and no masses   Pulmonary:even and unlabored  Cardiovascular:No edema or pitting edema present  Skin:Normal without rashes or lesions and well hydrated  Psychiatric:Mood and affect appropriate  Neurologic:Cranial Nerves II-XII grossly intact  Musculoskeletal:normal, lumbar facet loading is positive bilaterally  Imaging  No orders to display   3/7/17  MRI THORACIC SPINE WITHOUT CONTRAST     INDICATION:  14-year-old male, chronic back pain, pre neurostimulator placement  COMPARISON:  None      TECHNIQUE:  Sagittal T1, sagittal T2, sagittal inversion recovery, axial T2,  axial 2D MERGE          IMAGE QUALITY: Diagnostic      FINDINGS:  Incompletely visualized degenerative spondylosis with central canal and bilateral foraminal stenosis C4-5 and C5-C6  Dedicated cervical spine MRI exam recommended if clinically appropriate     ALIGNMENT: Normal alignment of the thoracic spine  No compression fracture  No subluxation  No scoliosis      MARROW SIGNAL:  Normal marrow signal is identified within the visualized bony structures  No discrete marrow lesion      THORACIC CORD: Normal signal within the thoracic cord      PARAVERTEBRAL SOFT TISSUES: Paraspinal soft tissues are unremarkable      THORACIC DEGENERATIVE CHANGE:  Minimal degenerative disc and facet disease involves mid and lower thoracic segments    No evidence of disc herniation, central canal or foraminal stenosis      IMPRESSION:  Minimal multilevel degenerative spondylosis     No evidence of thoracic disc herniation, central canal or foraminal stenosis     Normal appearance thoracic spinal cord     Degenerative spondylosis, central canal and bilateral foraminal stenosis C4-5 and C5-6, incompletely characterized         Workstation performed: [id number]      Imaging     MRI thoracic spine wo contrast (Order #[id number]) on 3/7/2017 - Imaging Information   Result History     MRI thoracic spine wo contrast (Order #[id number]) on 3/7/2017 - Order Result History Report

## 2018-05-11 NOTE — DISCHARGE INSTRUCTIONS

## 2018-05-11 NOTE — OP NOTE
ATTENDING PHYSICIAN:  Adam Mabry MD     PRE-PROCEDURE DIAGNOSIS:  Lumbar facet arthropathy  POST-PROCEDURE DIAGNOSIS:  Lumbar facet arthropathy  PROCEDURE:  Bilateral lumbar diagnostic medial branch blocks at the L3, L4, L5, and S1 levels  ESTIMATED BLOOD LOSS:  Minimal     COMPLICATIONS:  None  ANESTHESIA:  Local     LOCATION:  [facility]  CONSENT: Today's procedure and its risks, benefits, as well as alternatives were explained to the patient  Risks include but are not limited to bleeding, infection, weakness, nerve irritation or damage, hematoma formation, abscess formation, failure the pain to improve, or potential worsening of the pain  The patient verbalized understanding and wished to proceed with the procedure  Written informed consent was thereby obtained  DESCRIPTION OF PROCEDURE: After written informed consent was obtained, the patient was taken to the fluoroscopy suite and placed in the prone position  Anatomical landmarks were identified with the aid of fluoroscopy in the PA and oblique views  The patient's lumbar region was prepped with antiseptic solution and draped in the usual sterile fashion  Strict aseptic technique was utilized  The skin and subcutaneous tissues at each needle entry site was infiltrated with a small amount of 1% preservative-free lidocaine using a 25-gauge 1-1/2-inch needle  A 25-gauge 3-1/2-inch needle was then incrementally advanced under fluoroscopic guidance in multiple views at each level such that the needle tip was advanced to contact os at the junction of the superior articulating process and the superomedial border of the transverse process at each of the cephalad levels as well as to contact os at the junction of the superior articulating process and the superomedial border of the sacral ala at the S1 level   After negative aspiration was confirmed, 0 5 mL of preservative-free 0 25% Bupivicaine was injected into the cephalad needles and 1 mL of preservative-free 0 25% Bupivicaine was injected into the needles at the S1 level  All needles were removed intact and hemostasis was maintained throughout  The patient tolerated the procedure well and no paresthesias or other complications were reported during or following this procedure  The antiseptic was wiped clean and Band-Aids were placed as appropriate  The patient was transferred to the recovery area and was observed for an appropriate period of time during which the patient remained hemodynamically stable and neurovascularly intact as the patient was prior to the procedure  The patient was subsequently discharged home in stable condition with supervision  The patient was instructed to call the office in a few days with an update  Discharge instructions were provided  I was present for and participated in all key and critical portions of this procedure      Abad Deutsch MD  5/11/2018  7:59 AM

## 2018-05-14 ENCOUNTER — OFFICE VISIT (OUTPATIENT)
Dept: PHYSICAL THERAPY | Facility: REHABILITATION | Age: 58
End: 2018-05-14
Payer: OTHER MISCELLANEOUS

## 2018-05-14 DIAGNOSIS — M54.16 CHRONIC LUMBAR RADICULOPATHY: Primary | ICD-10-CM

## 2018-05-14 PROCEDURE — 97110 THERAPEUTIC EXERCISES: CPT | Performed by: PHYSICAL THERAPIST

## 2018-05-14 PROCEDURE — 97010 HOT OR COLD PACKS THERAPY: CPT | Performed by: PHYSICAL THERAPIST

## 2018-05-14 NOTE — PROGRESS NOTES
Daily Note     Today's date: 2018  Patient name: Alfornia Rinne  : 1960  MRN: [id number]  Referring provider: Maverick Davis  Dx:   Encounter Diagnosis     ICD-10-CM    1  Chronic lumbar radiculopathy M54 16        Start Time: 0800  Stop Time: 0830  Total time in clinic (min): 30 minutes    Subjective: Karyn Anderson reports his back "really hurting today " He reports he had injections performed on 18 and "felt really good Friday and Saturday and then the pain came back " He reports he is to follow-up with MD within next 2 weeks           Objective: See treatment diary below  Precautions: chronic pain, elevated fear avoidance, BMI > 30, type II diabetes, sleep dysfunction  Access code: [email or web address]  * Indicates part of HEP     Daily Treatment Diary     Manual   4/25 4/30 5/2 5/3 5/7 5/9      QL TrP bilateral             Bilateral hip ROM   10 min    8 min      Hip flexion     5 reps ea 6 reps ea NP today       Supine sciatic nerve glides    10 ea 12 ea NP today                        Exercise Diary  4/23 4/25 4/30 5/2 5/3 5/7 5/9 5/14     Abdominal bracing* with DB :05x10 3 min :05x10 :05x10 :05x   3 min :05x3 min :05x3 min :05x3 min     LTR* :05x5 :05x5 ea :05x10 ea :05x10 ea :05x10 ea :05x10 ea :05x10 ea      Clamshells              Isometrics- hip abd/add   :05x10 :05x10  :05x10 :05x10 :05x20      Hamstring stretch       :10x1 ea      Diaphragmatic breathing  3 min 3 min 3 min 3 min 3 min 3 min 3 min     Frogs             Prone lying*  3 min 3 min 3 min NP today   3 min     Bike   5 min 5 min 5 min 5 min level 1 5 min level 1 5 min Level 2 5 min level 2     TG squats      Level 15 2x10 L17 3x10      Frogs         :05x 10                                                                                                                              Modalities  4/25 4/30 5/2 5/3 5/7 5/9 5/14      MHP to finish in prone 10 min 10 min 10 min 10 min 10 min 10 min 10 min Assessment: Tolerated treatment poor  Patient unable to tolerate exercises secondary to "can't get comfortable in any position " Patient reporting increased pain with all exercises including diaphragmatic breathing  Patient able to tolerate 10 minutes in prone for MHP  Plan: Continue per plan of care  Progress treatment as tolerated

## 2018-05-16 ENCOUNTER — OFFICE VISIT (OUTPATIENT)
Dept: PHYSICAL THERAPY | Facility: REHABILITATION | Age: 58
End: 2018-05-16
Payer: OTHER MISCELLANEOUS

## 2018-05-16 DIAGNOSIS — M54.16 CHRONIC LUMBAR RADICULOPATHY: Primary | ICD-10-CM

## 2018-05-16 PROCEDURE — 97140 MANUAL THERAPY 1/> REGIONS: CPT

## 2018-05-16 PROCEDURE — 97010 HOT OR COLD PACKS THERAPY: CPT

## 2018-05-16 PROCEDURE — 97110 THERAPEUTIC EXERCISES: CPT

## 2018-05-16 NOTE — PROGRESS NOTES
Daily Note     Today's date: 2018  Patient name: Adryan Marie  : 1960  MRN: [id number]  Referring provider: Jose Swartz  Dx:   Encounter Diagnosis     ICD-10-CM    1  Chronic lumbar radiculopathy M54 16                   Subjective:  Patient reports "a lot of pain today " He reports this Friday he is to have a rhizotomy completed  Objective: See treatment diary below  Precautions: chronic pain, elevated fear avoidance, BMI > 30, type II diabetes, sleep dysfunction  Access code: [email or web address]  * Indicates part of HEP     Daily Treatment Diary     Manual   4/25 4/30 5/2 5/3 5/7 5/9 5/16     QL TrP bilateral             Bilateral hip ROM   10 min    8 min 8 min     Hip flexion     5 reps ea 6 reps ea NP today       Supine sciatic nerve glides    10 ea 12 ea NP today                        Exercise Diary  4/23 4/25 4/30 5/2 5/3 5/7 5/9 5/14 5/16    Abdominal bracing* with DB :05x10 3 min :05x10 :05x10 :05x   3 min :05x3 min :05x3 min :05x3 min :05x3 min    LTR* :05x5 :05x5 ea :05x10 ea :05x10 ea :05x10 ea :05x10 ea :05x10 ea  :05x10 ea    Clamshells              Isometrics- hip abd/add   :05x10 :05x10  :05x10 :05x10 :05x20  :05x10    Hamstring stretch       :10x1 ea  :10x3 ea    Diaphragmatic breathing  3 min 3 min 3 min 3 min 3 min 3 min 3 min 3 min    Frogs             Prone lying*  3 min 3 min 3 min NP today   3 min NP pain    Bike   5 min 5 min 5 min 5 min level 1 5 min level 1 5 min Level 2 5 min level 2 5 min level 2    TG squats      Level 15 2x10 L17 3x10  L16 2x10    Frogs         :05x 10 NP                                                                                                                             Modalities  4/25 4/30 5/2 5/3 5/7 5/9 5/14 5/16     MHP to finish in prone 10 min 10 min 10 min 10 min 10 min 10 min 10 min 10 min                                   Assessment: Tolerated treatment poor   Patient demonstrated fatigue post treatment and would benefit from continued PT Patient had reports of discomfort with all TE performed, unable to complete certain TE secondary to pain levels  Patient unable to lie prone during session, however patient able to lie prone 10 minutes with heat this session  Plan: Continue per plan of care  Progress treatment as tolerated

## 2018-05-17 ENCOUNTER — OFFICE VISIT (OUTPATIENT)
Dept: PHYSICAL THERAPY | Facility: REHABILITATION | Age: 58
End: 2018-05-17
Payer: OTHER MISCELLANEOUS

## 2018-05-17 DIAGNOSIS — M54.16 CHRONIC LUMBAR RADICULOPATHY: Primary | ICD-10-CM

## 2018-05-17 PROCEDURE — G8991 OTHER PT/OT GOAL STATUS: HCPCS | Performed by: PHYSICAL THERAPIST

## 2018-05-17 PROCEDURE — G8990 OTHER PT/OT CURRENT STATUS: HCPCS | Performed by: PHYSICAL THERAPIST

## 2018-05-17 PROCEDURE — 97140 MANUAL THERAPY 1/> REGIONS: CPT

## 2018-05-17 PROCEDURE — G8992 OTHER PT/OT  D/C STATUS: HCPCS | Performed by: PHYSICAL THERAPIST

## 2018-05-17 PROCEDURE — 97110 THERAPEUTIC EXERCISES: CPT

## 2018-05-17 PROCEDURE — 97010 HOT OR COLD PACKS THERAPY: CPT

## 2018-05-17 NOTE — PROGRESS NOTES
Daily Note     Today's date: 2018  Patient name: Sharifa Allen  : 1960  MRN: [id number]  Referring provider: Ty Hoffmann  Dx:   Encounter Diagnosis     ICD-10-CM    1  Chronic lumbar radiculopathy M54 16                   Subjective: Patient reports feeling "a little better than yesterday, not much but a little "       Objective: See treatment diary below    Precautions: chronic pain, elevated fear avoidance, BMI > 30, type II diabetes, sleep dysfunction  Access code: [email or web address]  * Indicates part of HEP     Daily Treatment Diary     Manual   4/25 4/30 5/2 5/3 5/7 5/9 5/16 5/17    QL TrP bilateral             Bilateral hip ROM   10 min    8 min 8 min 8 min    Hip flexion     5 reps ea 6 reps ea NP today       Supine sciatic nerve glides    10 ea 12 ea NP today                        Exercise Diary  4/23 4/25 4/30 5/2 5/3 5/7 5/9 5/14 5/16 5/17   Abdominal bracing* with DB :05x10 3 min :05x10 :05x10 :05x   3 min :05x3 min :05x3 min :05x3 min :05x3 min :05x3 min   LTR* :05x5 :05x5 ea :05x10 ea :05x10 ea :05x10 ea :05x10 ea :05x10 ea  :05x10 ea :05x10 ea   Clamshells              Isometrics- hip abd/add   :05x10 :05x10  :05x10 :05x10 :05x20  :05x10 :05x20   Hamstring stretch       :10x1 ea  :10x3 ea :10x3 ea   Diaphragmatic breathing  3 min 3 min 3 min 3 min 3 min 3 min 3 min 3 min 3 min   Frogs             Prone lying*  3 min 3 min 3 min NP today   3 min NP pain np   Bike   5 min 5 min 5 min 5 min level 1 5 min level 1 5 min Level 2 5 min level 2 5 min level 2 5 min level 2   TG squats      Level 15 2x10 L17 3x10  L16 2x10 L17 2x10   Frogs         :05x 10 NP pain                                                                                                                            Modalities   5/ 5/3 5/7 5/9 5/14 5/16     MHP to finish in prone 10 min 10 min 10 min 10 min 10 min 10 min 10 min 10 min                                     Assessment: Tolerated treatment poor   Patient demonstrated fatigue post treatment and therapist instructed patient on discussing with MD tomorrow after procedure what process should be taken in regards to PT with patient reporting understanding  Patient continues to report pain with all exercise today, only relief noted with heat  Plan: Patient will follow up with therapist after MD appointment tomorrow

## 2018-05-17 NOTE — PROGRESS NOTES
PT Re-Evaluation and PT Discharge    Today's date: 2018  Patient name: Danica Mark  : 1960  MRN: [id number]  Referring provider: Karina Mauricio  Dx:   Encounter Diagnosis     ICD-10-CM    1  Chronic lumbar radiculopathy M54 16 PT plan of care cert/re-cert       Start Time: 0800  Stop Time: 0845  Total time in clinic (min): 45 minutes    Assessment  Impairments: abnormal coordination, abnormal or restricted ROM, activity intolerance, impaired physical strength, lacks appropriate home exercise program and pain with function  Functional limitations: Patient reports to evaluation with Chronic lumbar radiculopathy with the following functional impairments: walking,  stair ambulation, squatting, lifting, sitting, driving   Assessment details: Danica Mark is a 62y o  year old male who presents to [facility] with:   Chronic lumbar radiculopathy     Elvin Fall has made the following improvements since beginning PT: decreased pain, increased ROM and increased strength   Elvin Fall continues to present with the impairments as listed above  Patient would continue to benefit from skilled PT to address these deficits and to maximize function  Understanding of Dx/Px/POC: good   Prognosis: good  Prognosis details: Elvin Fall prognosis may be affected by the following: chronic pain, elevated fear avoidance, BMI > 30, type II diabetes, sleep dysfunction  Patient noting throughout treatment session "we'll see if this works, I don't know about this," "I haven't really been doing anything,"    Goals  Short-Term Goals:   1  Patient's ROM will increase by 25% in 4 weeks - Partially Met  2  Patient will have pain less than 5/10 in 4 weeks  - Partially Met    Long-Term Goals:   1  Patient will be able to ambulate with minimal to no pain at time of discharge  - Partially Met  2  Patient independent with HEP at time of discharge  - Partially Met    Plan  Treatment plan discussed with: patient  Plan details:  Thank you for referring Elvin Fall Rex Kay to Physical Therapy at [place] [address] and for the opportunity to coordinate care  Patient reporting elevated pain levels with entire physical examination  Tegan Maya called PT office following his procedure performed on 18 to report he is on hold by MD at this time secondary to procedure for remainder of month  Patient to be discharged at this time secondary to expiration of POC at end of the month  Subjective Evaluation    History of Present Illness  Date of onset: 8/10/2016  Mechanism of injury: Tegan Maya has been seen for total of 10 visits for OP PT for low back pain  Patient's global rating of change is " A little bit better (2) " Patient reports most difficulty with prolonged walking, standing, sitting  He reports "the pain in the leg is just all the time, but the stimulator helps with that " He continues to report fluctuations in pain levels and N/T in R LE  Patient is to have nerve ablation performed on 18  Quality of life: fair    Pain  Current pain ratin  At best pain ratin  At worst pain ratin  Location: Low back   Quality: throbbing  Aggravating factors: standing, walking and sitting  Progression: no change    Social Support    Employment status: working ([employer]- [employer] )  Treatments  Current treatment: physical therapy  Patient Goals  Patient goals for therapy: decreased pain, increased motion, increased strength, independence with ADLs/IADLs and return to work          Objective     Special Questions  Positive for disturbed sleep  Negative for bladder dysfunction, bowel dysfunction and saddle (S4) numbness    Postural Observations    Additional Postural Observation Details  Patient demonstrates following posture: Increased thoracic kyphosis   Cervical protrusion  Rounded shoulders, increased IR  Patient demonstrating R lateral shift posture while sitting in chair with L LE extended  He currently uses back L pocket for wallet     He has scar along thoracic spine and L posterior ilium for cortical stimulator  Palpation   Left   Hypertonic in the quadratus lumborum  Tenderness of the erector spinae and quadratus lumborum  Right   Hypertonic in the quadratus lumborum  Tenderness of the erector spinae and quadratus lumborum  Tenderness     Lumbar Spine  Tenderness in the spinous process  Left Hip   Tenderness in the PSIS  Right Hip   Tenderness in the PSIS  Additional Tenderness Details  He reports pain with P/A mobilizations T10-S1    Neurological Testing     Sensation     Lumbar   Left   Intact: light touch    Right   Diminished: light touch    Additional Neurological Details  Patient reports N/T in R LE at this time  20% sensation loss in dermatomes L3, L4, and L5  Active Range of Motion     Additional Active Range of Motion Details  Lumbar flexion: 50%  Lumbar extension: 50%  L Lateral flexion: 25%  R lateral flexion: 25%  L lateral rotation: 25%  R lateral rotation: 25%  *patient reporting pain with all ROM  Strength/Myotome Testing     Left Hip   Planes of Motion   Flexion: 4  Extension: 3  Abduction: 3    Right Hip   Planes of Motion   Flexion: 4  Extension: 3  Abduction: 3    Left Knee   Flexion: 4  Extension: 4    Right Knee   Flexion: 4  Extension: 4    Left Ankle/Foot   Dorsiflexion: 4  Plantar flexion: 4    Right Ankle/Foot   Dorsiflexion: 4  Plantar flexion: 4    Tests       Thoracic   Positive slump  Lumbar   Positive slumped  Left   Positive passive SLR and lumbar push  Right   Positive passive SLR and lumbar push  Left Pelvic Girdle/Sacrum   Positive: sacrum compression, sacral spring and thigh thrust      Right Pelvic Girdle/Sacrum   Positive: sacrum compression, sacral spring and thigh thrust      General Comments     Lumbar Comments  Gait: Patient ambulating with R antalgic gait at this time, increased forward trunk flexion and R lateral flexion with decreased R hip extension with toe off  Flowsheet Rows      Most Recent Value   PT/OT G-Codes   Current Score  44   Projected Score  42   FOTO information reviewed  Yes   Assessment Type  Discharge   G code set  Other PT/OT Primary   Other PT Primary Current Status ()  CK   Other PT Primary Goal Status ()  CK   Other PT Primary Discharge Status ()  CK

## 2018-05-18 ENCOUNTER — TELEPHONE (OUTPATIENT)
Dept: PAIN MEDICINE | Facility: CLINIC | Age: 58
End: 2018-05-18

## 2018-05-18 ENCOUNTER — APPOINTMENT (OUTPATIENT)
Dept: PHYSICAL THERAPY | Facility: REHABILITATION | Age: 58
End: 2018-05-18
Payer: OTHER MISCELLANEOUS

## 2018-05-18 ENCOUNTER — HOSPITAL ENCOUNTER (OUTPATIENT)
Facility: AMBULARY SURGERY CENTER | Age: 58
Setting detail: OUTPATIENT SURGERY
Discharge: HOME | End: 2018-05-18
Attending: ANESTHESIOLOGY | Admitting: ANESTHESIOLOGY
Payer: OTHER MISCELLANEOUS

## 2018-05-18 ENCOUNTER — HOSPITAL ENCOUNTER (OUTPATIENT)
Dept: RADIOLOGY | Facility: HOSPITAL | Age: 58
Setting detail: OUTPATIENT SURGERY
Discharge: HOME | End: 2018-05-18
Payer: OTHER MISCELLANEOUS

## 2018-05-18 VITALS
TEMPERATURE: 96.8 F | WEIGHT: 215 LBS | HEART RATE: 83 BPM | DIASTOLIC BLOOD PRESSURE: 75 MMHG | OXYGEN SATURATION: 100 % | HEIGHT: 71 IN | RESPIRATION RATE: 18 BRPM | BODY MASS INDEX: 30.1 KG/M2 | SYSTOLIC BLOOD PRESSURE: 125 MMHG

## 2018-05-18 DIAGNOSIS — Z92.241 S/P EPIDURAL STEROID INJECTION: ICD-10-CM

## 2018-05-18 PROCEDURE — 64494 INJ PARAVERT F JNT L/S 2 LEV: CPT | Performed by: ANESTHESIOLOGY

## 2018-05-18 PROCEDURE — 72020 X-RAY EXAM OF SPINE 1 VIEW: CPT

## 2018-05-18 PROCEDURE — 64495 INJ PARAVERT F JNT L/S 3 LEV: CPT | Performed by: ANESTHESIOLOGY

## 2018-05-18 PROCEDURE — 64493 INJ PARAVERT F JNT L/S 1 LEV: CPT | Performed by: ANESTHESIOLOGY

## 2018-05-18 RX ORDER — LIDOCAINE HYDROCHLORIDE 20 MG/ML
INJECTION, SOLUTION EPIDURAL; INFILTRATION; INTRACAUDAL; PERINEURAL AS NEEDED
Status: DISCONTINUED | OUTPATIENT
Start: 2018-05-18 | End: 2018-05-18 | Stop reason: HOSPADM

## 2018-05-18 RX ORDER — LIDOCAINE WITH 8.4% SOD BICARB 0.9%(10ML)
SYRINGE (ML) INJECTION AS NEEDED
Status: DISCONTINUED | OUTPATIENT
Start: 2018-05-18 | End: 2018-05-18 | Stop reason: HOSPADM

## 2018-05-18 NOTE — H&P
History of Present Illness: The patient is a 62 y o  male who presents with complaints of low back pain  Patient Active Problem List   Diagnosis    Heartburn    Erectile dysfunction of non-organic origin    DM type [facility]di[facility] mellitus[facility] type 2) [facility])    Chronic pain disorder    Chronic low back pain    Lumbar radiculopathy    BPH with urinary obstruction    Blood pressure elevated without history of HTN    Sciatica    Postlaminectomy syndrome, lumbar    Myofascial pain syndrome    Lumbar spondylosis    Herniation of lumbar intervertebral disc with radiculopathy    Acute post-operative pain    Other insomnia    Chronic pain syndrome    Low back pain       Past Medical History:   Diagnosis Date    BPH (benign prostatic hypertrophy) with urinary obstruction     Chronic pain disorder     Diabetes mellitus ([facility] [address] )     type 2    Herniation of lumbar intervertebral disc with radiculopathy     Lumbago     Myofascial pain syndrome     Sciatica        Past Surgical History:   Procedure Laterality Date    ABSCESS DRAINAGE      groin    BACK SURGERY      implant    COLONOSCOPY      NERVE BLOCK Bilateral 4/26/2018    Procedure: B/L L3 L4 L5 S1 MBB #1 ([id number]); Surgeon: Lori Douglas MD;  Location: [facility] MAIN OR;  Service: Pain Management     NERVE BLOCK Bilateral 5/11/2018    Procedure: B/L L3 L4 L5 S1 Medial Branch Block #2;  Surgeon: Lori Douglas MD;  Location: [facility] MAIN OR;  Service: Pain Management     NOSE SURGERY      WI COLONOSCOPY FLX DX W/COLLJ SPEC WHEN PFRMD N/A 3/6/2017    Procedure: COLONOSCOPY;  Surgeon: Ave Davidson MD;  [facility]: [facility]; Service: Gastroenterology    WI SURG IMPLNT NEUROELECT,EPIDURAL Left 10/3/2017    Procedure: PLACEMENT THORACIC FOR INSERTION DORSAL COLUMN SPINAL CORD STIMULATOR (DCS) WITH BUTTOCK IMPLANTABLE PULSE GENERATOR(IMPULSE);   Surgeon: Neda Rausch MD;  Location:  MAIN OR;  Service: Neurosurgery       No current facility-administered medications for this encounter       Allergies   Allergen Reactions    Penicillins Swelling       Physical Exam:   Vitals:    05/18/18 0931   BP: 133/70   Pulse: 77   Resp: 16   Temp: (!) 96 8 °F (36 °C)   SpO2: 99%     General: Awake, Alert, Oriented x 3, Mood and affect appropriate  Respiratory: Respirations even and unlabored  Cardiovascular: Peripheral pulses intact; no edema  Musculoskeletal Exam:  Lumbar facet loading is positive bilaterally    ASA Score:  2    Assessment:  Bilateral lumbar spondylosis    Plan:  Proceed with L3-S1 lumbar facet radiofrequency ablation procedure

## 2018-05-18 NOTE — TELEPHONE ENCOUNTER
To be called on 5/21/18  S/P Right L3,L4,L5,S1 RFA with AS on 5/18/18  Pt has an f/u appt scheduled on 5/24 with AS  Pt may need to be rescheduled for 4 week f/u

## 2018-05-18 NOTE — DISCHARGE INSTRUCTIONS

## 2018-05-18 NOTE — OP NOTE
ATTENDING PHYSICIAN:   Katie Colindres MD      PREPROCEDURE DIAGNOSIS:  Right lumbar facet arthropathy  POSTPROCEDURE DIAGNOSIS:  Right lumbar facet arthropathy  PROCEDURE: Right L3, L4, L5, and S1 lumbar facet nerve radiofrequency ablation under fluoroscopic guidance  ANESTHESIA:  Local     ESTIMATED BLOOD LOSS:  Minimal     COMPLICATIONS:  None  LOCATION:   [facility]  HISTORY OF PRESENTING ILLNESS:   We feel radiofrequency denervation of the L3, L4, L5, and S1 facets is medically necessary, given the patient has disabling low back pain, which we suspect is facet mediated in the absence of nerve root compression or radicular pain  The patient has had two positive confirmatory diagnostic medial branch blocks  The patient has also failed three months of conservative therapy, including nonopioid medications, manipulation, physical therapy and home exercise program   The patient has not been treated with radiofrequency denervation at this anatomic location within the past six months  CONSENT:  Today's procedure, its potential benefits as well as its risks and potential side effects were reviewed  Discussed risks of the procedure including bleeding, infection, nerve irritation or damage, reactions to the medications, failure of the pain to improve, and potential worsening of the pain were explained in detail to the patient who verbalized understanding and who wished to proceed  Written informed consent was thereby obtained  DESCRIPTION OF THE PROCEDURE: After written informed consent was obtained, the patient was taken to the fluoroscopy suite and placed in the prone position  Anatomical landmarks were identified by way of palpation with fluoroscopy in the PA and oblique views  The patient's lumbar region was then prepped and draped in the usual sterile fashion using Chlorhexidine    The skin and subcutaneous tissues were subsequently infiltrated with approximately 1 ml of 1% preservative free Lidocaine at each of the four intended needle entry sites using a 25 gauge 1-1/2 inch needle  Via fluoroscopy in the AP and oblique views, an active tip needle was then incrementally advanced under fluoroscopic guidance at each level  At each of these levels, the needle tip was made to contact os at the superior medial border of the junction of the transverse process of the lumbar levels and to contact the os at the medial aspect of the groove formed by the sacral ala in the superior articular process of S1  After proper needle placement was confirmed with fluoroscopic guidance at each level, sensory and motor stimulation was performed at 2 Hz and 50 Hz  At all levels, there was negative extension into the lower extremities  Following this portion of the procedure, a 1 ml injectate of 2% preservative free Lidocaine was instilled at all of the levels  After a period of approximately 90 seconds, each level was lesioned at 90 degrees Celsius  Following the initial lesioning, each needle tip was repositioned x 2 under fluoroscopic guidance in a clockwise and counterclockwise fashion  Following each reposition, a total of two additional lesions at each side were performed for 90 seconds at 90 degrees Celsius  All needles were removed with the tips intact  The patient tolerated the procedure and hemostasis was maintained  There were no apparent paresthesias or complications  This skin was wiped clean and a Band-Aid was placed as appropriate  The patient was monitored for an appropriate period of time following the procedure and remained hemodynamically stable and neurovascularly intact  The patient was ultimately discharged to home with supervision in good condition and instructed to call the office to report the response  I was present for and participated in all key and critical portions of this procedure      Adán Cruz MD  5/18/2018  10:49 AM

## 2018-05-21 ENCOUNTER — TELEPHONE (OUTPATIENT)
Dept: PAIN MEDICINE | Facility: CLINIC | Age: 58
End: 2018-05-21

## 2018-05-21 ENCOUNTER — APPOINTMENT (OUTPATIENT)
Dept: PHYSICAL THERAPY | Facility: REHABILITATION | Age: 58
End: 2018-05-21
Payer: OTHER MISCELLANEOUS

## 2018-05-21 NOTE — TELEPHONE ENCOUNTER
S/W patient, states he has some bruising at the injection site  Patient states he did  not use ice or take anything for pain  Pt denies any fever, redness, drainage, s/s of infection, sun-burning sensation, stiffness of neck or headache  Confirmed appt with AS on 5/23/18  Pt is scheduled for 6/1/18 for Left L3,L4,L5,S1 RFA

## 2018-05-21 NOTE — TELEPHONE ENCOUNTER
Scheduled pt for Lt L3 L4 L5 S1 RFA ([id number])  Went over pre-procedure instructions below:    Nothing to eat or drink for 1 hr prior to procedure  Need to arrange transportation  Proper clothing for procedure  If ill or put on antibiotics please call to reschedule

## 2018-05-23 ENCOUNTER — OFFICE VISIT (OUTPATIENT)
Dept: PAIN MEDICINE | Facility: CLINIC | Age: 58
End: 2018-05-23
Payer: OTHER MISCELLANEOUS

## 2018-05-23 ENCOUNTER — APPOINTMENT (OUTPATIENT)
Dept: PHYSICAL THERAPY | Facility: REHABILITATION | Age: 58
End: 2018-05-23
Payer: OTHER MISCELLANEOUS

## 2018-05-23 VITALS
BODY MASS INDEX: 31.64 KG/M2 | SYSTOLIC BLOOD PRESSURE: 130 MMHG | DIASTOLIC BLOOD PRESSURE: 48 MMHG | HEART RATE: 83 BPM | WEIGHT: 226 LBS | TEMPERATURE: 96.4 F | HEIGHT: 71 IN | RESPIRATION RATE: 18 BRPM

## 2018-05-23 DIAGNOSIS — M54.16 LUMBAR RADICULOPATHY: ICD-10-CM

## 2018-05-23 DIAGNOSIS — E78.2 MIXED HYPERLIPIDEMIA: Primary | ICD-10-CM

## 2018-05-23 DIAGNOSIS — M54.5 CHRONIC LOW BACK PAIN, UNSPECIFIED BACK PAIN LATERALITY, WITH SCIATICA PRESENCE UNSPECIFIED: ICD-10-CM

## 2018-05-23 DIAGNOSIS — M47.816 LUMBAR SPONDYLOSIS: ICD-10-CM

## 2018-05-23 DIAGNOSIS — M96.1 POSTLAMINECTOMY SYNDROME, LUMBAR: ICD-10-CM

## 2018-05-23 DIAGNOSIS — G89.29 CHRONIC LOW BACK PAIN, UNSPECIFIED BACK PAIN LATERALITY, WITH SCIATICA PRESENCE UNSPECIFIED: ICD-10-CM

## 2018-05-23 DIAGNOSIS — G89.4 CHRONIC PAIN SYNDROME: Primary | ICD-10-CM

## 2018-05-23 PROCEDURE — 99214 OFFICE O/P EST MOD 30 MIN: CPT | Performed by: ANESTHESIOLOGY

## 2018-05-23 RX ORDER — ATORVASTATIN CALCIUM 20 MG/1
20 TABLET, FILM COATED ORAL DAILY
Qty: 90 TABLET | Refills: 0 | Status: SHIPPED | OUTPATIENT
Start: 2018-05-23 | End: 2018-08-24 | Stop reason: SDUPTHER

## 2018-05-23 RX ORDER — LIDOCAINE 50 MG/G
1 PATCH TOPICAL DAILY
Qty: 30 PATCH | Refills: 0 | Status: SHIPPED | OUTPATIENT
Start: 2018-05-23 | End: 2018-06-25 | Stop reason: SDUPTHER

## 2018-05-23 NOTE — TELEPHONE ENCOUNTER
His prescription was updated to 20mg daily  It has been submitted to his pharmacy  Please inform the patient of this

## 2018-05-23 NOTE — PATIENT INSTRUCTIONS
Cervical, Thoracic and Lumbar Medial Branch (Facet) Radiofrequency Neurotomy (Rhizotomy)      What is a medial branch neurotomy and why is it helpful? A medial branch neurotomy is a non-surgical procedure which cauterizes the nerves (through very localized heating) that allow you to feel pain caused by your facet joints  Your facet has been proven to be painful by diagnostic injection procedures, but your pain has not been reduced by other treatment methods  Likely, we have previously numbed the medial branch nerves to see if you were a candidate for the neurotomy  The pain relief you obtained from numbing the facet nerve supply (medial branch nerves) should persist for several months after locally heating  (cauterizing) these nerves  In some patients, the pain never returns  The neurotomy (if technically successful) theoretically prevents the pain signal from traveling through these nerves (from your joints to your brain) so you cannot feel or sense your injured and/or diseased spinal joints  These medial branch nerves do not control any muscles or sensation in your arms or legs  They only allow you to feel these joints and small nearby ligaments and control a short, small muscle (multifidus muscle) in your neck, mid-back or low back  Studies have shown that there is no functional or clinical significance from ablating the multifidi muscles  What happens during the procedure? Lying on your stomach, the skin over your neck, mid-back or low back will be well cleaned  The physician will numb a small area of skin with numbing medicine which may sting for a few seconds  The physician will use X-ray guidance to direct a special (radiofrequency) needle along side the targeted medial branch nerve  A small amount of electrical current will be carefully given through the tip of the radiofrequency needle to assure the needle is precisely next to the target medial branch nerve and not any other larger nerves   This may, for a few seconds, recreate your pain and cause a muscle twitch in your neck or back  The medial branch nerves will then be numbed so you will feel little to nothing while the nerve is being heated (locally destroyed) for one minute  This process will be repeated for usually one to five additional nerves  The entire procedure takes about 30 minutes  What should I do after the procedure? You will wait 30 to 60 minutes in recovery before going home  You may not drive for eight hours  You will want to substantially limit your activity for two days after the procedure  What should I expect after the procedure? Your neck or back may often be moderately sore during the next one to four days  This pain is usually caused by muscle spasms and irritability while the medial branch nerves are disrupted from the heat lesion over the next 7 to 14 days  Your physician will give you medicine to treat the expected spasms and soreness  Pain relief usually isnt experienced until about two to three weeks after the procedure when, on occasion, your back or neck may feel slightly weak for several weeks after the procedure  The nerves will eventually grow back (regenerate) but the pain may not recur in 9 to 14 months, or on occasion, sooner  If the pain does recur when the nerves grow back and the nerve signal is re-established, you may have the procedure repeated with equal success seen in most patients  Some patients never have a return of their pain, but we cannot predict when this happens  General Pre/Post Instructions  Eating  You may eat a light, but not full meal at least six hours before the procedure  If you are an insulin dependent diabetic do not alter your normal food intake     Medications  Take your routine medications before the procedure (such as high blood pressure and diabetes medications) except for those that need to be discontinued five days before the procedure such as aspirin and all anti-inflammatory medications (e g  Motrin/Ibuprofen, Aleve, Relafen, Daypro)  These medicines may be re-started the day after the procedure  You may take your regular pain medicine as needed before/after the procedure  If you are taking Coumadin, Heparin, Lovenox, Plavix or Ticlid you must notify the office so that the timing of stopping these medications can be explained  Exercise  You must bring a  with you  You may return to your current level of activities the next day including return to work  Things that may Delay the Procedure  If you are on antibiotics please notify our office; we may delay the procedure  If you have an active infection or fever we will not do the procedure

## 2018-05-23 NOTE — TELEPHONE ENCOUNTER
Patient stopped in the office regarding his Atorvastatin 10 mg had the bottle with him and it was 10 mg once a day  Per his previous physician note in Feb patient was to increase to 10 mg 2 x daily making it 20 mg and then mentioned increased to possible 40 mg  However, in notes when you saw patient hyperlipidemia is not mentioned and the medication is on his medication list   Patient is now going to be out of meds as he was taking 2    He does have a follow up with you June 12 th

## 2018-05-23 NOTE — LETTER
May 23, 2018     Flakita Hensley PA-C  [address] [facility] [place],[address]  [address]    Patient: Horacio Baum   YOB: 1960   Date of Visit: 5/23/2018       Dear Dr Antoine Flank: Thank you for referring Horacio Baum to me for evaluation  Below are my notes for this consultation  If you have questions, please do not hesitate to call me  I look forward to following your patient along with you  Sincerely,        Kay Ludwig MD        CC: No Recipients  Kay Ludwig MD  5/23/2018 10:29 AM  Sign at close encounter  Pain Medicine Follow-Up Note    Assessment:  1  Chronic pain syndrome    2  Chronic low back pain, unspecified back pain laterality, with sciatica presence unspecified    3  Postlaminectomy syndrome, lumbar    4  Lumbar spondylosis    5  Lumbar radiculopathy        Plan:  New Medications Ordered This Visit   Medications    lidocaine (LIDODERM) 5 %     Sig: Place 1 patch on the skin daily Remove & Discard patch within 12 hours or as directed by MD     Dispense:  30 patch     Refill:  0     My impressions and treatment recommendations were discussed in detail with the patient who verbalized understanding and had no further questions  The patient has been doing extraordinarily well since undergoing the right L3, L4, L5, and S1 lumbar facet radiofrequency ablation procedure on May 18, 2018  He reports that he is using less of all of his medications including the methocarbamol, naproxen, and oxycodone/acetaminophen  He currently has 12 tablets remaining on the oxycodone/acetaminophen and states that he would like to continue using the medications until he has the left L3-S1 lumbar facet radiofrequency ablation procedure performed in June 2018  The procedures, its risks, and benefits were explained in detail to the patient   Risks include but are not limited to bleeding, infection, hematoma formation, abscess formation, weakness, headache, failure the pain to improve, nerve irritation or damage, and potential worsening of the pain  The patient verbalized understanding and wished to proceed with the procedure  In addition, the patient reports excellent pain relief with the lidocaine 5% patch applied to the affected area 12 hr on and 12 hr off  He is requesting a refill of this medication  I provided him a refill of this  Side effects were reviewed with the patient  Follow-up is planned with the patient in 4 weeks time or sooner as warranted  Discharge instructions were provided  I personally saw and examined the patient and I agree with the above discussed plan of care  [place] Prescription Drug Monitoring Program report was reviewed and was appropriate     History of Present Illness:    Rocio Parkinson is a 62 y o  male who presents to [facility] for interval re-evaluation of the above stated pain complaints  The patient has a past medical and chronic pain history as outlined in the assessment section  He was last seen on April 17, 2018 at which time he was maintained on oxycodone/acetaminophen 5/325 mg 1 tablet twice daily as needed for pain, naproxen 500 mg every 12 hr as needed for pain, and methocarbamol 500 mg 3 times daily as needed for muscle spasms  At today's office visit, the patient's pain score is 5 to 6/10 on the verbal numerical pain rating scale  The patient states that his pain is worse in the morning, evening, and night  His pain is constant in nature and he reports the quality of his pain as burning, sharp, throbbing, pressure-like, shooting, numbness, and pins and needles    The patient reports 25% relief of symptoms with the combination of his medications  He also reports that right L3-S1 lumbar facet radiofrequency ablation procedure has given him significant relief of symptoms and he is anxious Lili awaiting the left lumbar facet radiofrequency ablation procedure   He also met with the spinal cord stimulator representatives at today's office visit for an adjustment in his spinal cord stimulator settings  Last Urine Drug Screen:  April 17, 2018    Other than as stated above, the patient denies any interval changes in medications, medical condition, mental condition, symptoms, or allergies since the last office visit  Review of Systems:    Review of Systems   Respiratory: Negative for shortness of breath  Cardiovascular: Negative for chest pain  Gastrointestinal: Negative for constipation, diarrhea, nausea and vomiting  Musculoskeletal: Positive for gait problem (difficulty walking) and joint swelling (joint stiffness)  Negative for arthralgias and myalgias  Skin: Negative for rash  Neurological: Positive for weakness (muscle weakness)  Negative for dizziness and seizures  All other systems reviewed and are negative  Patient Active Problem List   Diagnosis    Heartburn    Erectile dysfunction of non-organic origin    DM type 2 (diabetes mellitus, type 2) ([facility])    Chronic low back pain    Lumbar radiculopathy    BPH with urinary obstruction    Blood pressure elevated without history of HTN    Sciatica    Postlaminectomy syndrome, lumbar    Myofascial pain syndrome    Lumbar spondylosis    Herniation of lumbar intervertebral disc with radiculopathy    Acute post-operative pain    Other insomnia    Chronic pain syndrome    Low back pain       Past Medical History:   Diagnosis Date    BPH (benign prostatic hypertrophy) with urinary obstruction     Chronic pain disorder     [facility]     type 2    Herniation of lumbar intervertebral disc with radiculopathy     Lumbago     Myofascial pain syndrome     Sciatica        Past Surgical History:   Procedure Laterality Date    ABSCESS DRAINAGE      groin    BACK SURGERY      implant    COLONOSCOPY      NERVE BLOCK Bilateral 4/26/2018    Procedure: B/L L3 L4 L5 S1 MBB #1 ([id number]);   Surgeon: Yanely Mon Cheri Hyatt MD;  [facility] MAIN OR;  Service: Pain Management     NERVE BLOCK Bilateral 5/11/2018    Procedure: B/L L3 L4 L5 S1 Medial Branch Block #2;  Surgeon: Bessie Don MD;  [facility] MAIN OR;  Service: Pain Management     NOSE SURGERY      ID COLONOSCOPY FLX DX W/COLLJ SPEC WHEN PFRMD N/A 3/6/2017    Procedure: COLONOSCOPY;  Surgeon: Keeley Aguila MD;  [facility]; Service: Gastroenterology    ID SURG IMPLNT NEUROELECT,EPIDURAL Left 10/3/2017    Procedure: PLACEMENT THORACIC FOR INSERTION DORSAL COLUMN SPINAL CORD STIMULATOR (DCS) WITH BUTTOCK IMPLANTABLE PULSE GENERATOR(IMPULSE); Surgeon: Israel Centeno MD;  Location:  MAIN OR;  Service: Neurosurgery    [address] Right 5/18/2018    Procedure: Rt L3 L4 L5 S1 Radio Frequency Ablation ([id number]); Surgeon: Bessie Don MD;  Location: [facility] MAIN OR;  Service: Pain Management        Family History   Problem Relation Age of Onset    Diabetes Mother     Diabetes Father        Social History     Occupational History    Not on file       Social History Main Topics    Smoking status: Current Every Day Smoker     Packs/day: 1 00     Years: 35 00    Smokeless tobacco: Never Used      Comment: encouraged smoking cessation    Alcohol use No    Drug use: No    Sexual activity: Not on file         Current Outpatient Prescriptions:     atorvastatin (LIPITOR) 10 mg tablet, Take 2 tablets (20 mg total) by mouth daily, Disp: 60 tablet, Rfl: 1    glipiZIDE (GLUCOTROL XL) 10 mg 24 hr tablet, Take 10 mg by mouth daily, Disp: , Rfl:     glipiZIDE (GLUCOTROL XL) 10 mg 24 hr tablet, TAKE 1 TABLET (10 MG TOTAL) BY MOUTH DAILY, Disp: 30 tablet, Rfl: 1    metFORMIN (GLUCOPHAGE) 1000 MG tablet, Take 1 tablet (1,000 mg total) by mouth 2 (two) times a day with meals for 30 days, Disp: 180 tablet, Rfl: 0    omeprazole (PriLOSEC) 20 mg delayed release capsule, Take 20 mg by mouth daily, Disp: , Rfl:     tamsulosin (FLOMAX) 0 4 mg, TAKE 2 CAPSULES (0 8 MG TOTAL) BY MOUTH DAILY WITH DINNER, Disp: 60 capsule, Rfl: 1    HYDROcodone-acetaminophen (NORCO) 5-325 mg per tablet, Take 1 tablet by mouth every 6 (six) hours as needed for pain Earliest Fill Date: 3/13/18 Max Daily Amount: 4 tablets, Disp: 8 tablet, Rfl: 0    lidocaine (LIDODERM) 5 %, Place 1 patch on the skin daily Remove & Discard patch within 12 hours or as directed by MD, Disp: 30 patch, Rfl: 0    methocarbamol (ROBAXIN) 500 mg tablet, Take 1 tablet (500 mg total) by mouth 3 (three) times a day as needed for muscle spasms for up to 30 days, Disp: 90 tablet, Rfl: 0    naproxen (NAPROSYN) 500 mg tablet, Take 1 tablet (500 mg total) by mouth 2 (two) times a day as needed (PAIN (WITH FOOD)) for up to 30 days, Disp: 60 tablet, Rfl: 0    oxyCODONE-acetaminophen (PERCOCET) 5-325 mg per tablet, Take 1 tablet by mouth daily as needed (PAIN) for up to 30 days Max Daily Amount: 1 tablet, Disp: 30 tablet, Rfl: 0    Allergies   Allergen Reactions    Penicillins Swelling       Physical Exam:    BP (!) 130/48 (BP Location: Left arm, Patient Position: Sitting, Cuff Size: Standard)   Pulse 83   Temp (!) 96 4 °F (35 8 °C) (Oral)   Resp 18   Ht 5' 11" (1 803 m)   Wt 103 kg (226 lb)   BMI 31 52 kg/m²      Constitutional:normal, well developed, well nourished, alert, in no distress and non-toxic and no overt pain behavior    Eyes:anicteric  HEENT:grossly intact  Neck:supple, symmetric, trachea midline and no masses   Pulmonary:even and unlabored  Cardiovascular:No edema or pitting edema present  Skin:Normal without rashes or lesions and well hydrated  Psychiatric:Mood and affect appropriate  Neurologic:Cranial Nerves II-XII grossly intact  Musculoskeletal:normal

## 2018-05-23 NOTE — PROGRESS NOTES
Pain Medicine Follow-Up Note    Assessment:  1  Chronic pain syndrome    2  Chronic low back pain, unspecified back pain laterality, with sciatica presence unspecified    3  Postlaminectomy syndrome, lumbar    4  Lumbar spondylosis    5  Lumbar radiculopathy        Plan:  New Medications Ordered This Visit   Medications    lidocaine (LIDODERM) 5 %     Sig: Place 1 patch on the skin daily Remove & Discard patch within 12 hours or as directed by MD     Dispense:  30 patch     Refill:  0     My impressions and treatment recommendations were discussed in detail with the patient who verbalized understanding and had no further questions  The patient has been doing extraordinarily well since undergoing the right L3, L4, L5, and S1 lumbar facet radiofrequency ablation procedure on May 18, 2018  He reports that he is using less of all of his medications including the methocarbamol, naproxen, and oxycodone/acetaminophen  He currently has 12 tablets remaining on the oxycodone/acetaminophen and states that he would like to continue using the medications until he has the left L3-S1 lumbar facet radiofrequency ablation procedure performed in June 2018  The procedures, its risks, and benefits were explained in detail to the patient  Risks include but are not limited to bleeding, infection, hematoma formation, abscess formation, weakness, headache, failure the pain to improve, nerve irritation or damage, and potential worsening of the pain  The patient verbalized understanding and wished to proceed with the procedure  In addition, the patient reports excellent pain relief with the lidocaine 5% patch applied to the affected area 12 hr on and 12 hr off  He is requesting a refill of this medication  I provided him a refill of this  Side effects were reviewed with the patient  Follow-up is planned with the patient in 4 weeks time or sooner as warranted  Discharge instructions were provided   I personally saw and examined the patient and I agree with the above discussed plan of care  [place] Prescription Drug Monitoring Program report was reviewed and was appropriate     History of Present Illness:    Horacio Baum is a 62 y o  male who presents to [facility] for interval re-evaluation of the above stated pain complaints  The patient has a past medical and chronic pain history as outlined in the assessment section  He was last seen on April 17, 2018 at which time he was maintained on oxycodone/acetaminophen 5/325 mg 1 tablet twice daily as needed for pain, naproxen 500 mg every 12 hr as needed for pain, and methocarbamol 500 mg 3 times daily as needed for muscle spasms  At today's office visit, the patient's pain score is 5 to 6/10 on the verbal numerical pain rating scale  The patient states that his pain is worse in the morning, evening, and night  His pain is constant in nature and he reports the quality of his pain as burning, sharp, throbbing, pressure-like, shooting, numbness, and pins and needles    The patient reports 25% relief of symptoms with the combination of his medications  He also reports that right L3-S1 lumbar facet radiofrequency ablation procedure has given him significant relief of symptoms and he is anxious Lili awaiting the left lumbar facet radiofrequency ablation procedure  He also met with the spinal cord stimulator representatives at today's office visit for an adjustment in his spinal cord stimulator settings  Last Urine Drug Screen:  April 17, 2018    Other than as stated above, the patient denies any interval changes in medications, medical condition, mental condition, symptoms, or allergies since the last office visit  Review of Systems:    Review of Systems   Respiratory: Negative for shortness of breath  Cardiovascular: Negative for chest pain  Gastrointestinal: Negative for constipation, diarrhea, nausea and vomiting     Musculoskeletal: Positive for gait problem (difficulty walking) and joint swelling (joint stiffness)  Negative for arthralgias and myalgias  Skin: Negative for rash  Neurological: Positive for weakness (muscle weakness)  Negative for dizziness and seizures  All other systems reviewed and are negative  Patient Active Problem List   Diagnosis    Heartburn    Erectile dysfunction of non-organic origin    DM type 2 (diabetes mellitus, type 2) ([facility])    Chronic low back pain    Lumbar radiculopathy    BPH with urinary obstruction    Blood pressure elevated without history of HTN    Sciatica    Postlaminectomy syndrome, lumbar    Myofascial pain syndrome    Lumbar spondylosis    Herniation of lumbar intervertebral disc with radiculopathy    Acute post-operative pain    Other insomnia    Chronic pain syndrome    Low back pain       Past Medical History:   Diagnosis Date    BPH (benign prostatic hypertrophy) with urinary obstruction     Chronic pain disorder     Diabetes mellitus ([facility] )     type 2    Herniation of lumbar intervertebral disc with radiculopathy     Lumbago     Myofascial pain syndrome     Sciatica        Past Surgical History:   Procedure Laterality Date    ABSCESS DRAINAGE      groin    BACK SURGERY      implant    COLONOSCOPY      NERVE BLOCK Bilateral 4/26/2018    Procedure: B/L L3 L4 L5 S1 MBB #1 ([id number]); Surgeon: Lori Douglas MD;  Location: [facility] MAIN OR;  Service: Pain Management     NERVE BLOCK Bilateral 5/11/2018    Procedure: B/L L3 L4 L5 S1 Medial Branch Block #2;  Surgeon: Lori Douglas MD;  Location: [facility] MAIN OR;  Service: Pain Management     NOSE SURGERY      NJ COLONOSCOPY FLX DX W/COLLJ SPEC WHEN PFRMD N/A 3/6/2017    Procedure: COLONOSCOPY;  Surgeon: Ave Davidson MD;  [facility];   Service: Gastroenterology    [place] SURG IMPLNT NEUROELECT,EPIDURAL Left 10/3/2017    Procedure: PLACEMENT THORACIC FOR INSERTION DORSAL COLUMN SPINAL CORD STIMULATOR (DCS) WITH BUTTOCK IMPLANTABLE PULSE GENERATOR(IMPULSE); Surgeon: Lazaro Bran MD;  Location:  MAIN OR;  Service: Neurosurgery    [address] 5/18/2018    Procedure: Rt L3 L4 L5 S1 Radio Frequency Ablation ([id number]); Surgeon: Stella Conway MD;  Location: [facility] MAIN OR;  Service: Pain Management        Family History   Problem Relation Age of Onset    Diabetes Mother     Diabetes Father        Social History     Occupational History    Not on file       Social History Main Topics    Smoking status: Current Every Day Smoker     Packs/day: 1 00     Years: 35 00    Smokeless tobacco: Never Used      Comment: encouraged smoking cessation    Alcohol use No    Drug use: No    Sexual activity: Not on file         Current Outpatient Prescriptions:     atorvastatin (LIPITOR) 10 mg tablet, Take 2 tablets (20 mg total) by mouth daily, Disp: 60 tablet, Rfl: 1    glipiZIDE (GLUCOTROL XL) 10 mg 24 hr tablet, Take 10 mg by mouth daily, Disp: , Rfl:     glipiZIDE (GLUCOTROL XL) 10 mg 24 hr tablet, TAKE 1 TABLET (10 MG TOTAL) BY MOUTH DAILY, Disp: 30 tablet, Rfl: 1    metFORMIN (GLUCOPHAGE) 1000 MG tablet, Take 1 tablet (1,000 mg total) by mouth 2 (two) times a day with meals for 30 days, Disp: 180 tablet, Rfl: 0    omeprazole (PriLOSEC) 20 mg delayed release capsule, Take 20 mg by mouth daily, Disp: , Rfl:     tamsulosin (FLOMAX) 0 4 mg, TAKE 2 CAPSULES (0 8 MG TOTAL) BY MOUTH DAILY WITH DINNER, Disp: 60 capsule, Rfl: 1    HYDROcodone-acetaminophen (NORCO) 5-325 mg per tablet, Take 1 tablet by mouth every 6 (six) hours as needed for pain Earliest Fill Date: 3/13/18 Max Daily Amount: 4 tablets, Disp: 8 tablet, Rfl: 0    lidocaine (LIDODERM) 5 %, Place 1 patch on the skin daily Remove & Discard patch within 12 hours or as directed by MD, Disp: 30 patch, Rfl: 0    methocarbamol (ROBAXIN) 500 mg tablet, Take 1 tablet (500 mg total) by mouth 3 (three) times a day as needed for muscle spasms for up to 30 days, Disp: 90 tablet, Rfl: 0    naproxen (NAPROSYN) 500 mg tablet, Take 1 tablet (500 mg total) by mouth 2 (two) times a day as needed (PAIN (WITH FOOD)) for up to 30 days, Disp: 60 tablet, Rfl: 0    oxyCODONE-acetaminophen (PERCOCET) 5-325 mg per tablet, Take 1 tablet by mouth daily as needed (PAIN) for up to 30 days Max Daily Amount: 1 tablet, Disp: 30 tablet, Rfl: 0    Allergies   Allergen Reactions    Penicillins Swelling       Physical Exam:    BP (!) 130/48 (BP Location: Left arm, Patient Position: Sitting, Cuff Size: Standard)   Pulse 83   Temp (!) 96 4 °F (35 8 °C) (Oral)   Resp 18   Ht 5' 11" (1 803 m)   Wt 103 kg (226 lb)   BMI 31 52 kg/m²     Constitutional:normal, well developed, well nourished, alert, in no distress and non-toxic and no overt pain behavior    Eyes:anicteric  HEENT:grossly intact  Neck:supple, symmetric, trachea midline and no masses   Pulmonary:even and unlabored  Cardiovascular:No edema or pitting edema present  Skin:Normal without rashes or lesions and well hydrated  Psychiatric:Mood and affect appropriate  Neurologic:Cranial Nerves II-XII grossly intact  Musculoskeletal:normal

## 2018-05-23 NOTE — TELEPHONE ENCOUNTER
Thank you for reviewing the previous notes  His prescription was updated to 20mg daily  It has been submitted to his pharmacy  Please inform the patient of this

## 2018-05-29 ENCOUNTER — APPOINTMENT (OUTPATIENT)
Dept: PHYSICAL THERAPY | Facility: REHABILITATION | Age: 58
End: 2018-05-29
Payer: OTHER MISCELLANEOUS

## 2018-05-31 ENCOUNTER — OFFICE VISIT (OUTPATIENT)
Dept: FAMILY MEDICINE CLINIC | Facility: CLINIC | Age: 58
End: 2018-05-31
Payer: COMMERCIAL

## 2018-05-31 ENCOUNTER — APPOINTMENT (OUTPATIENT)
Dept: PHYSICAL THERAPY | Facility: REHABILITATION | Age: 58
End: 2018-05-31
Payer: OTHER MISCELLANEOUS

## 2018-05-31 VITALS
HEART RATE: 87 BPM | SYSTOLIC BLOOD PRESSURE: 132 MMHG | DIASTOLIC BLOOD PRESSURE: 82 MMHG | OXYGEN SATURATION: 97 % | RESPIRATION RATE: 20 BRPM | TEMPERATURE: 98.2 F | WEIGHT: 219.8 LBS | HEIGHT: 71 IN | BODY MASS INDEX: 30.77 KG/M2

## 2018-05-31 DIAGNOSIS — H66.92 OTITIS OF LEFT EAR: ICD-10-CM

## 2018-05-31 DIAGNOSIS — E11.9 TYPE 2 DIABETES MELLITUS WITHOUT COMPLICATION, WITHOUT LONG-TERM CURRENT USE OF INSULIN (HCC): Primary | ICD-10-CM

## 2018-05-31 PROBLEM — E66.811 CLASS 1 OBESITY DUE TO EXCESS CALORIES WITH SERIOUS COMORBIDITY AND BODY MASS INDEX (BMI) OF 30.0 TO 30.9 IN ADULT: Status: ACTIVE | Noted: 2018-05-31

## 2018-05-31 PROBLEM — E66.09 CLASS 1 OBESITY DUE TO EXCESS CALORIES WITH SERIOUS COMORBIDITY AND BODY MASS INDEX (BMI) OF 30.0 TO 30.9 IN ADULT: Status: ACTIVE | Noted: 2018-05-31

## 2018-05-31 PROCEDURE — 99213 OFFICE O/P EST LOW 20 MIN: CPT | Performed by: PHYSICIAN ASSISTANT

## 2018-05-31 RX ORDER — CIPROFLOXACIN AND DEXAMETHASONE 3; 1 MG/ML; MG/ML
4 SUSPENSION/ DROPS AURICULAR (OTIC) 2 TIMES DAILY
Qty: 7.5 ML | Refills: 0 | Status: SHIPPED | OUTPATIENT
Start: 2018-05-31 | End: 2018-08-27 | Stop reason: HOSPADM

## 2018-05-31 NOTE — PROGRESS NOTES
Assessment/Plan:     Diagnoses and all orders for this visit:    Otitis of left ear  Increased concern due to poorly controlled diabetes  Tenderness of tragus but otherwise canal is with out drainage or swelling    - Ciprodex solution  - Directed to return if any fever, chills, increase in pain or drainage  - FU scheduled in 2 weeks  -     ciprofloxacin-dexamethasone (CIPRODEX) otic suspension; Administer 4 drops into the left ear 2 (two) times a day          Subjective:    Patient ID: Horacio Baum is a 62 y o  male  Pt is presenting today for Sharp left ear pain for 2 days  He went swimming twice 6 days ago and progressed to left ear pain and sore throat on his neck  When he pushes on his tragus he is able to reproduce the pain  He denies any fevers, chills or pain behind his ear  He been used unknown ear drops the last 2 days which have not helped  He is concerned due a surgery which is scheduled to be performed tomorrow  He will have left L3 L4 L5 S1 Radio Frequency Ablation for his chronic back pain  Earache    There is pain in the left ear  This is a new problem  There has been no fever  The pain is moderate  Associated symptoms include a sore throat (left)  Pertinent negatives include no abdominal pain, coughing, diarrhea, ear discharge, headaches, hearing loss, neck pain, rhinorrhea or vomiting  He has tried ear drops for the symptoms  The treatment provided no relief  There is no history of a chronic ear infection, hearing loss or a tympanostomy tube  The following portions of the patient's history were reviewed and updated as appropriate: allergies, current medications and problem list     Review of Systems   Constitutional: Negative for activity change, fatigue, fever and unexpected weight change  HENT: Positive for ear pain and sore throat (left)  Negative for ear discharge, hearing loss and rhinorrhea  Respiratory: Negative for cough, shortness of breath and wheezing  Cardiovascular: Negative for chest pain, palpitations and leg swelling  Gastrointestinal: Negative for abdominal pain, constipation, diarrhea, nausea and vomiting  Musculoskeletal: Negative for back pain, neck pain and neck stiffness  Neurological: Negative for headaches  Objective:  /82   Pulse 87   Temp 98 2 °F (36 8 °C)   Resp 20   Ht 5' 11" (1 803 m)   Wt 99 7 kg (219 lb 12 8 oz)   SpO2 97%   BMI 30 66 kg/m²      Physical Exam   Constitutional: He appears well-developed and well-nourished  HENT:   Head: Normocephalic and atraumatic  Right Ear: Tympanic membrane, external ear and ear canal normal  No drainage or tenderness  No mastoid tenderness  No decreased hearing is noted  Left Ear: Tympanic membrane normal  There is tenderness  No drainage or swelling  No mastoid tenderness  No decreased hearing is noted  Nose: Nose normal  No rhinorrhea  Mouth/Throat: Oropharynx is clear and moist  No oropharyngeal exudate  Cardiovascular: Normal rate, regular rhythm and normal heart sounds  Exam reveals no gallop and no friction rub  No murmur heard  Pulmonary/Chest: Effort normal and breath sounds normal  He has no wheezes  He has no rales  Lymphadenopathy:        Head (right side): No submental, no submandibular, no tonsillar, no preauricular and no posterior auricular adenopathy present  Head (left side): No submental, no submandibular, no tonsillar, no preauricular and no posterior auricular adenopathy present  He has no cervical adenopathy

## 2018-06-01 ENCOUNTER — HOSPITAL ENCOUNTER (OUTPATIENT)
Facility: AMBULARY SURGERY CENTER | Age: 58
Setting detail: OUTPATIENT SURGERY
Discharge: HOME | End: 2018-06-01
Attending: ANESTHESIOLOGY | Admitting: ANESTHESIOLOGY
Payer: OTHER MISCELLANEOUS

## 2018-06-01 ENCOUNTER — HOSPITAL ENCOUNTER (OUTPATIENT)
Dept: RADIOLOGY | Facility: HOSPITAL | Age: 58
Setting detail: OUTPATIENT SURGERY
Discharge: HOME | End: 2018-06-01
Payer: OTHER MISCELLANEOUS

## 2018-06-01 ENCOUNTER — TELEPHONE (OUTPATIENT)
Dept: PAIN MEDICINE | Facility: CLINIC | Age: 58
End: 2018-06-01

## 2018-06-01 VITALS
HEART RATE: 88 BPM | TEMPERATURE: 96.4 F | SYSTOLIC BLOOD PRESSURE: 129 MMHG | OXYGEN SATURATION: 98 % | DIASTOLIC BLOOD PRESSURE: 67 MMHG | RESPIRATION RATE: 18 BRPM

## 2018-06-01 PROCEDURE — 64635 DESTROY LUMB/SAC FACET JNT: CPT | Performed by: ANESTHESIOLOGY

## 2018-06-01 PROCEDURE — 64636 DESTROY L/S FACET JNT ADDL: CPT | Performed by: ANESTHESIOLOGY

## 2018-06-01 PROCEDURE — 72020 X-RAY EXAM OF SPINE 1 VIEW: CPT

## 2018-06-01 RX ORDER — LIDOCAINE HYDROCHLORIDE 20 MG/ML
INJECTION, SOLUTION EPIDURAL; INFILTRATION; INTRACAUDAL; PERINEURAL AS NEEDED
Status: DISCONTINUED | OUTPATIENT
Start: 2018-06-01 | End: 2018-06-01 | Stop reason: HOSPADM

## 2018-06-01 RX ORDER — LIDOCAINE WITH 8.4% SOD BICARB 0.9%(10ML)
SYRINGE (ML) INJECTION AS NEEDED
Status: DISCONTINUED | OUTPATIENT
Start: 2018-06-01 | End: 2018-06-01 | Stop reason: HOSPADM

## 2018-06-01 NOTE — OP NOTE
ATTENDING PHYSICIAN:   Ivette Delgado MD      PREPROCEDURE DIAGNOSIS:  Left lumbar facet arthropathy  POSTPROCEDURE DIAGNOSIS:  Left lumbar facet arthropathy  PROCEDURE: Left L3, L4, L5, and S1 lumbar facet nerve radiofrequency ablation under fluoroscopic guidance  ANESTHESIA:  Local     ESTIMATED BLOOD LOSS:  Minimal     COMPLICATIONS:  None  LOCATION:   [place] [address], [facility]  HISTORY OF PRESENTING ILLNESS:   We feel radiofrequency denervation of the L3, L4, L5, and S1 facets is medically necessary, given the patient has disabling low back pain, which we suspect is facet mediated in the absence of nerve root compression or radicular pain  The patient has had two positive confirmatory diagnostic medial branch blocks  The patient has also failed three months of conservative therapy, including nonopioid medications, manipulation, physical therapy and home exercise program   The patient has not been treated with radiofrequency denervation at this anatomic location within the past six months  CONSENT:  Today's procedure, its potential benefits as well as its risks and potential side effects were reviewed  Discussed risks of the procedure including bleeding, infection, nerve irritation or damage, reactions to the medications, failure of the pain to improve, and potential worsening of the pain were explained in detail to the patient who verbalized understanding and who wished to proceed  Written informed consent was thereby obtained  DESCRIPTION OF THE PROCEDURE: After written informed consent was obtained, the patient was taken to the fluoroscopy suite and placed in the prone position  Anatomical landmarks were identified by way of palpation with fluoroscopy in the PA and oblique views  The patient's lumbar region was then prepped and draped in the usual sterile fashion using Chlorhexidine    The skin and subcutaneous tissues were subsequently infiltrated with approximately 1 ml of 1% preservative free Lidocaine at each of the four intended needle entry sites using a 25 gauge 1-1/2 inch needle  Via fluoroscopy in the AP and oblique views, an active tip needle was then incrementally advanced under fluoroscopic guidance at each level  At each of these levels, the needle tip was made to contact os at the superior medial border of the junction of the transverse process of the lumbar levels and to contact the os at the medial aspect of the groove formed by the sacral ala in the superior articular process of S1  After proper needle placement was confirmed with fluoroscopic guidance at each level, sensory and motor stimulation was performed at 2 Hz and 50 Hz  At all levels, there was negative extension into the lower extremities  Following this portion of the procedure, a 1 ml injectate of 2% preservative free Lidocaine was instilled at all of the levels  After a period of approximately 90 seconds, each level was lesioned at 90 degrees Celsius  Following the initial lesioning, each needle tip was repositioned x 2 under fluoroscopic guidance in a clockwise and counterclockwise fashion  Following each reposition, a total of two additional lesions at each side were performed for 90 seconds at 90 degrees Celsius  All needles were removed with the tips intact  The patient tolerated the procedure and hemostasis was maintained  There were no apparent paresthesias or complications  This skin was wiped clean and a Band-Aid was placed as appropriate  The patient was monitored for an appropriate period of time following the procedure and remained hemodynamically stable and neurovascularly intact  The patient was ultimately discharged to home with supervision in good condition and instructed to call the office to report the response  I was present for and participated in all key and critical portions of this procedure      Liza Zepeda MD  6/1/2018  9:51 AM

## 2018-06-01 NOTE — TELEPHONE ENCOUNTER
To be called on 6/4/18  S/P Left L3, L4,L5, S1 RFA on 6/1/18 with AS  Pt is scheduled for 4 week SOVS on 6/25/18 with AS

## 2018-06-01 NOTE — H&P (VIEW-ONLY)
Pain Medicine Follow-Up Note    Assessment:  1  Chronic pain syndrome    2  Chronic low back pain, unspecified back pain laterality, with sciatica presence unspecified    3  Postlaminectomy syndrome, lumbar    4  Lumbar spondylosis    5  Lumbar radiculopathy        Plan:  New Medications Ordered This Visit   Medications    lidocaine (LIDODERM) 5 %     Sig: Place 1 patch on the skin daily Remove & Discard patch within 12 hours or as directed by MD     Dispense:  30 patch     Refill:  0     My impressions and treatment recommendations were discussed in detail with the patient who verbalized understanding and had no further questions  The patient has been doing extraordinarily well since undergoing the right L3, L4, L5, and S1 lumbar facet radiofrequency ablation procedure on May 18, 2018  He reports that he is using less of all of his medications including the methocarbamol, naproxen, and oxycodone/acetaminophen  He currently has 12 tablets remaining on the oxycodone/acetaminophen and states that he would like to continue using the medications until he has the left L3-S1 lumbar facet radiofrequency ablation procedure performed in June 2018  The procedures, its risks, and benefits were explained in detail to the patient  Risks include but are not limited to bleeding, infection, hematoma formation, abscess formation, weakness, headache, failure the pain to improve, nerve irritation or damage, and potential worsening of the pain  The patient verbalized understanding and wished to proceed with the procedure  In addition, the patient reports excellent pain relief with the lidocaine 5% patch applied to the affected area 12 hr on and 12 hr off  He is requesting a refill of this medication  I provided him a refill of this  Side effects were reviewed with the patient  Follow-up is planned with the patient in 4 weeks time or sooner as warranted  Discharge instructions were provided   I personally saw and examined the patient and I agree with the above discussed plan of care  [place] Prescription Drug Monitoring Program report was reviewed and was appropriate     History of Present Illness:    Bean Rodriguez is a 62 y o  male who presents to [facility] for interval re-evaluation of the above stated pain complaints  The patient has a past medical and chronic pain history as outlined in the assessment section  He was last seen on April 17, 2018 at which time he was maintained on oxycodone/acetaminophen 5/325 mg 1 tablet twice daily as needed for pain, naproxen 500 mg every 12 hr as needed for pain, and methocarbamol 500 mg 3 times daily as needed for muscle spasms  At today's office visit, the patient's pain score is 5 to 6/10 on the verbal numerical pain rating scale  The patient states that his pain is worse in the morning, evening, and night  His pain is constant in nature and he reports the quality of his pain as burning, sharp, throbbing, pressure-like, shooting, numbness, and pins and needles    The patient reports 25% relief of symptoms with the combination of his medications  He also reports that right L3-S1 lumbar facet radiofrequency ablation procedure has given him significant relief of symptoms and he is anxious Lili awaiting the left lumbar facet radiofrequency ablation procedure  He also met with the spinal cord stimulator representatives at today's office visit for an adjustment in his spinal cord stimulator settings  Last Urine Drug Screen:  April 17, 2018    Other than as stated above, the patient denies any interval changes in medications, medical condition, mental condition, symptoms, or allergies since the last office visit  Review of Systems:    Review of Systems   Respiratory: Negative for shortness of breath  Cardiovascular: Negative for chest pain  Gastrointestinal: Negative for constipation, diarrhea, nausea and vomiting     Musculoskeletal: Positive for gait problem (difficulty walking) and joint swelling (joint stiffness)  Negative for arthralgias and myalgias  Skin: Negative for rash  Neurological: Positive for weakness (muscle weakness)  Negative for dizziness and seizures  All other systems reviewed and are negative  Patient Active Problem List   Diagnosis    Heartburn    Erectile dysfunction of non-organic origin    DM type 2 (diabetes mellitus, type 2) ([facility])    Chronic low back pain    Lumbar radiculopathy    BPH with urinary obstruction    Blood pressure elevated without history of HTN    Sciatica    Postlaminectomy syndrome, lumbar    Myofascial pain syndrome    Lumbar spondylosis    Herniation of lumbar intervertebral disc with radiculopathy    Acute post-operative pain    Other insomnia    Chronic pain syndrome    Low back pain       Past Medical History:   Diagnosis Date    BPH (benign prostatic hypertrophy) with urinary obstruction     Chronic pain disorder     Diabetes mellitus ([facility] [address] )     type 2    Herniation of lumbar intervertebral disc with radiculopathy     Lumbago     Myofascial pain syndrome     Sciatica        Past Surgical History:   Procedure Laterality Date    ABSCESS DRAINAGE      groin    BACK SURGERY      implant    COLONOSCOPY      NERVE BLOCK Bilateral 4/26/2018    Procedure: B/L L3 L4 L5 S1 MBB #1 ([id number]); Surgeon: Sabiha Shirley MD;  Location: [facility] MAIN OR;  Service: Pain Management     NERVE BLOCK Bilateral 5/11/2018    Procedure: B/L L3 L4 L5 S1 Medial Branch Block #2;  Surgeon: Sabiha Shirley MD;  Location: [facility] MAIN OR;  Service: Pain Management     NOSE SURGERY      FL COLONOSCOPY FLX DX W/COLLJ SPEC WHEN PFRMD N/A 3/6/2017    Procedure: COLONOSCOPY;  Surgeon: Jamal oD MD;  [facility];   Service: Gastroenterology    FL SURG IMPLNT NEUROELECT,EPIDURAL Left 10/3/2017    Procedure: PLACEMENT THORACIC FOR INSERTION DORSAL COLUMN SPINAL CORD STIMULATOR (DCS) WITH BUTTOCK IMPLANTABLE PULSE GENERATOR(IMPULSE); Surgeon: Taye Joy MD;  Location:  MAIN OR;  Service: Neurosurgery    [address] Right 5/18/2018    Procedure: Rt L3 L4 L5 S1 Radio Frequency Ablation ([id number]); Surgeon: Ehsan Dyer MD;  Location: [facility] MAIN OR;  Service: Pain Management        Family History   Problem Relation Age of Onset    Diabetes Mother     Diabetes Father        Social History     Occupational History    Not on file       Social History Main Topics    Smoking status: Current Every Day Smoker     Packs/day: 1 00     Years: 35 00    Smokeless tobacco: Never Used      Comment: encouraged smoking cessation    Alcohol use No    Drug use: No    Sexual activity: Not on file         Current Outpatient Prescriptions:     atorvastatin (LIPITOR) 10 mg tablet, Take 2 tablets (20 mg total) by mouth daily, Disp: 60 tablet, Rfl: 1    glipiZIDE (GLUCOTROL XL) 10 mg 24 hr tablet, Take 10 mg by mouth daily, Disp: , Rfl:     glipiZIDE (GLUCOTROL XL) 10 mg 24 hr tablet, TAKE 1 TABLET (10 MG TOTAL) BY MOUTH DAILY, Disp: 30 tablet, Rfl: 1    metFORMIN (GLUCOPHAGE) 1000 MG tablet, Take 1 tablet (1,000 mg total) by mouth 2 (two) times a day with meals for 30 days, Disp: 180 tablet, Rfl: 0    omeprazole (PriLOSEC) 20 mg delayed release capsule, Take 20 mg by mouth daily, Disp: , Rfl:     tamsulosin (FLOMAX) 0 4 mg, TAKE 2 CAPSULES (0 8 MG TOTAL) BY MOUTH DAILY WITH DINNER, Disp: 60 capsule, Rfl: 1    HYDROcodone-acetaminophen (NORCO) 5-325 mg per tablet, Take 1 tablet by mouth every 6 (six) hours as needed for pain Earliest Fill Date: 3/13/18 Max Daily Amount: 4 tablets, Disp: 8 tablet, Rfl: 0    lidocaine (LIDODERM) 5 %, Place 1 patch on the skin daily Remove & Discard patch within 12 hours or as directed by MD, Disp: 30 patch, Rfl: 0    methocarbamol (ROBAXIN) 500 mg tablet, Take 1 tablet (500 mg total) by mouth 3 (three) times a day as needed for muscle spasms for up to 30 days, Disp: 90 tablet, Rfl: 0    naproxen (NAPROSYN) 500 mg tablet, Take 1 tablet (500 mg total) by mouth 2 (two) times a day as needed (PAIN (WITH FOOD)) for up to 30 days, Disp: 60 tablet, Rfl: 0    oxyCODONE-acetaminophen (PERCOCET) 5-325 mg per tablet, Take 1 tablet by mouth daily as needed (PAIN) for up to 30 days Max Daily Amount: 1 tablet, Disp: 30 tablet, Rfl: 0    Allergies   Allergen Reactions    Penicillins Swelling       Physical Exam:    BP (!) 130/48 (BP Location: Left arm, Patient Position: Sitting, Cuff Size: Standard)   Pulse 83   Temp (!) 96 4 °F (35 8 °C) (Oral)   Resp 18   Ht 5' 11" (1 803 m)   Wt 103 kg (226 lb)   BMI 31 52 kg/m²     Constitutional:normal, well developed, well nourished, alert, in no distress and non-toxic and no overt pain behavior    Eyes:anicteric  HEENT:grossly intact  Neck:supple, symmetric, trachea midline and no masses   Pulmonary:even and unlabored  Cardiovascular:No edema or pitting edema present  Skin:Normal without rashes or lesions and well hydrated  Psychiatric:Mood and affect appropriate  Neurologic:Cranial Nerves II-XII grossly intact  Musculoskeletal:normal

## 2018-06-01 NOTE — DISCHARGE INSTRUCTIONS

## 2018-06-01 NOTE — PERIOPERATIVE NURSING NOTE
Blood Sugar checked at home 244  MD aware  Patient taking Ear drops for ear infection   MD made aware

## 2018-06-04 ENCOUNTER — OFFICE VISIT (OUTPATIENT)
Dept: FAMILY MEDICINE CLINIC | Facility: CLINIC | Age: 58
End: 2018-06-04
Payer: COMMERCIAL

## 2018-06-04 ENCOUNTER — TELEPHONE (OUTPATIENT)
Dept: FAMILY MEDICINE CLINIC | Facility: CLINIC | Age: 58
End: 2018-06-04

## 2018-06-04 VITALS
TEMPERATURE: 99.2 F | BODY MASS INDEX: 30.24 KG/M2 | HEART RATE: 97 BPM | OXYGEN SATURATION: 97 % | DIASTOLIC BLOOD PRESSURE: 66 MMHG | SYSTOLIC BLOOD PRESSURE: 128 MMHG | WEIGHT: 216 LBS | HEIGHT: 71 IN | RESPIRATION RATE: 18 BRPM

## 2018-06-04 DIAGNOSIS — H66.92 OTITIS OF LEFT EAR: ICD-10-CM

## 2018-06-04 DIAGNOSIS — J00 ACUTE NASOPHARYNGITIS: Primary | ICD-10-CM

## 2018-06-04 PROCEDURE — 99213 OFFICE O/P EST LOW 20 MIN: CPT | Performed by: PHYSICIAN ASSISTANT

## 2018-06-04 RX ORDER — BENZONATATE 100 MG/1
100 CAPSULE ORAL 3 TIMES DAILY PRN
Qty: 20 CAPSULE | Refills: 0 | Status: SHIPPED | OUTPATIENT
Start: 2018-06-04 | End: 2018-08-24 | Stop reason: ALTCHOICE

## 2018-06-04 NOTE — PROGRESS NOTES
Assessment/Plan:     Diagnoses and all orders for this visit:    Acute nasopharyngitis, x 4 days  Lungs CTA, VS WNL  - Started on TESSALON perles Prn cough  - advised to continue OTC supportive care with Tylenol/Motrin, Mucinex and saline gargle   - encouraged rest and fluid intake   - educated Pt that cough can take 10-14 days total to resolve  - directed to return if fever over 102, symptoms persist for 14 days, thick productive cough  - Pt will call back in 3 days if symptoms continue  Will consider sending antibiotic at that time  Otitis of left ear  Greatly improved with otic drops  - Directed to continue Ciprodex  - Directed to return if any fever, chills, increase in pain or drainage        Subjective:    Patient ID: Cece Bush is a 62 y o  male  Pt is presenting today for Dry Cough and left sided nasal congestion for 4 days  He denies fever, chills, N/V/D  He was seen last week for sharp left ear pain and was started on Ciprodex  His pain on left ear has greatly decreased  He was taking Nyquil all last night which helped him sleep  He is wanting an antibiotic to feel all better  Cough   The cough is non-productive  Associated symptoms include ear pain and nasal congestion  Pertinent negatives include no chest pain, chills, fever, headaches, heartburn, postnasal drip, rhinorrhea, sore throat, shortness of breath or wheezing  The following portions of the patient's history were reviewed and updated as appropriate: allergies, current medications and problem list     Review of Systems   Constitutional: Negative for activity change, chills, fatigue, fever and unexpected weight change  HENT: Positive for ear pain  Negative for postnasal drip, rhinorrhea and sore throat  Respiratory: Positive for cough  Negative for shortness of breath and wheezing  Cardiovascular: Negative for chest pain, palpitations and leg swelling     Gastrointestinal: Negative for constipation, diarrhea, heartburn, nausea and vomiting  Musculoskeletal: Negative for back pain, neck pain and neck stiffness  Neurological: Negative for headaches  Objective:  /66   Pulse 97   Temp 99 2 °F (37 3 °C)   Resp 18   Ht 5' 11" (1 803 m)   Wt 98 kg (216 lb)   SpO2 97%   BMI 30 13 kg/m²      Physical Exam   Constitutional: He appears well-developed and well-nourished  HENT:   Head: Normocephalic and atraumatic  Right Ear: Tympanic membrane, external ear and ear canal normal    Left Ear: External ear and ear canal normal  No drainage, swelling or tenderness  Tympanic membrane is injected  Nose: Nose normal  No rhinorrhea  Right sinus exhibits no maxillary sinus tenderness and no frontal sinus tenderness  Left sinus exhibits no maxillary sinus tenderness and no frontal sinus tenderness  Mouth/Throat: Oropharynx is clear and moist  No oropharyngeal exudate or posterior oropharyngeal erythema  Cardiovascular: Normal rate, regular rhythm and normal heart sounds  Exam reveals no gallop and no friction rub  No murmur heard  Pulmonary/Chest: Effort normal and breath sounds normal  He has no wheezes  He has no rales  Lymphadenopathy:        Head (right side): No submental, no submandibular, no tonsillar, no preauricular and no posterior auricular adenopathy present  Head (left side): No submental, no submandibular, no tonsillar, no preauricular and no posterior auricular adenopathy present  He has no cervical adenopathy

## 2018-06-04 NOTE — TELEPHONE ENCOUNTER
Please contact the patient to schedule an appointment  We can get an assessment and discuss possible treatments

## 2018-06-04 NOTE — TELEPHONE ENCOUNTER
**FYI**    S/w pt, s/p RFA on 6/1, pt said he was in a lot of pain on Friday and now it is sore in his back  RN asked pt if he has tried using any heat/ice or pain medication for the soreness, pt denied  RN explained he can use ice/heat/pain medication as prescribed  Pt denied any s/s of infection, fevers or sunburn like sensation  Confirmed f/u appt, pt to c/b with any issues before then

## 2018-06-04 NOTE — TELEPHONE ENCOUNTER
Patient was recently seen by you and called today to advise he is not feeling better  Advised that the drops you gave him for his ear helped the ear pain but he feels no better  Face feels swollen and he has a cough  Is requesting an antibiotic

## 2018-06-06 ENCOUNTER — TELEPHONE (OUTPATIENT)
Dept: FAMILY MEDICINE CLINIC | Facility: CLINIC | Age: 58
End: 2018-06-06

## 2018-06-06 DIAGNOSIS — J00 ACUTE NASOPHARYNGITIS: Primary | ICD-10-CM

## 2018-06-06 NOTE — TELEPHONE ENCOUNTER
Pt called stated that if his symptoms persisted that Margarita Williamson told him to call in and he would send a script for him   I did advise him that Margarita Williamson was not here today

## 2018-06-06 NOTE — TELEPHONE ENCOUNTER
Please let patient know if he is feeling very sick that he can be checked by another provider today or michael can address tomorrow

## 2018-06-07 RX ORDER — AZITHROMYCIN 250 MG/1
TABLET, FILM COATED ORAL
Qty: 6 TABLET | Refills: 0 | Status: SHIPPED | OUTPATIENT
Start: 2018-06-07 | End: 2018-06-11

## 2018-06-07 NOTE — TELEPHONE ENCOUNTER
Spoke with the patient on the phone  He is continuing with nasal congestion and is needing an antibiotic  He denies any fevers or chills, N/V/D  His left sinus are congested  His cough improves with the Tessalon at night  Pt is allergic to PCN  Pt will FU in 4 days if symptoms persist   - ZPak sent to pharmacy  Pt notified

## 2018-06-07 NOTE — TELEPHONE ENCOUNTER
Patient came to the window to say that he is still not feeling better and that he needs antibiotics  This task has not been addressed and all providers were in a meeting  I told the patient he can wait until the meeting is over or we can give him a call  He also cancelled his appointment for 06/12/18

## 2018-06-12 ENCOUNTER — OFFICE VISIT (OUTPATIENT)
Dept: FAMILY MEDICINE CLINIC | Facility: CLINIC | Age: 58
End: 2018-06-12
Payer: COMMERCIAL

## 2018-06-12 ENCOUNTER — TELEPHONE (OUTPATIENT)
Dept: FAMILY MEDICINE CLINIC | Facility: CLINIC | Age: 58
End: 2018-06-12

## 2018-06-12 VITALS
OXYGEN SATURATION: 99 % | HEIGHT: 71 IN | TEMPERATURE: 97.8 F | WEIGHT: 217 LBS | RESPIRATION RATE: 16 BRPM | BODY MASS INDEX: 30.38 KG/M2 | HEART RATE: 79 BPM | SYSTOLIC BLOOD PRESSURE: 120 MMHG | DIASTOLIC BLOOD PRESSURE: 70 MMHG

## 2018-06-12 DIAGNOSIS — H66.92 OTITIS OF LEFT EAR: ICD-10-CM

## 2018-06-12 DIAGNOSIS — E11.9 TYPE 2 DIABETES MELLITUS WITHOUT COMPLICATION, WITHOUT LONG-TERM CURRENT USE OF INSULIN (HCC): Primary | ICD-10-CM

## 2018-06-12 PROCEDURE — 99213 OFFICE O/P EST LOW 20 MIN: CPT | Performed by: PHYSICIAN ASSISTANT

## 2018-06-12 RX ORDER — ATORVASTATIN CALCIUM 10 MG/1
10 TABLET, FILM COATED ORAL
Refills: 3 | COMMUNITY
Start: 2018-06-04 | End: 2018-08-24 | Stop reason: SDUPTHER

## 2018-06-12 NOTE — TELEPHONE ENCOUNTER
PT seen today at [facility] in [place]  He wasn't happy with the Dr's assessment and wants to speak with Dr Pamela Mar  He understands if he wants to be seen here he would have to change the PCP back to our office  Please explain to him you are leaving at end of Sarah

## 2018-06-12 NOTE — PROGRESS NOTES
Assessment/Plan:     Diagnoses and all orders for this visit:    Otitis of left ear  Pt was treated for Otitis media and Otitis externa  No abnormal findings in left ear was noted  No mastoid tenderness noted or LAD  - Referral to ENT  - Requested call back if difficulty getting in   - FU if fever, chills, or ear drainage   -     Ambulatory Referral to Otolaryngology; Future        Subjective:    Patient ID: Josiah Vickers is a 62 y o  male  Pt is presenting today for Continued left ear pain and "buzzing of his ear" for 2 weeks  He was started on Ciprodex ear drops approximately 2 weeks ago after being diagnosed with Otitis Externa which improved his pain and reduced his swelling  Pt followed up last with with no drainage but continued ear pain  Pt was prescribed Azithromycin which did not improve his symptoms  Pt is requesting antibiotics to manage his pain  Denies fevers, drainage, nausea or dizziness  Drops decreases the pain but does not change anything else  The following portions of the patient's history were reviewed and updated as appropriate: allergies, current medications, past medical history, past social history, past surgical history and problem list     Review of Systems   Constitutional: Negative for activity change, chills, fatigue, fever and unexpected weight change  HENT: Positive for ear pain  Negative for ear discharge, postnasal drip, rhinorrhea, sinus pain, sinus pressure, sore throat and trouble swallowing  Respiratory: Negative for shortness of breath and wheezing  Cardiovascular: Negative for chest pain, palpitations and leg swelling  Gastrointestinal: Negative for constipation, diarrhea, nausea and vomiting  Musculoskeletal: Negative for back pain, neck pain and neck stiffness  Neurological: Negative for headaches           Objective:  /70   Pulse 79   Temp 97 8 °F (36 6 °C)   Resp 16   Ht 5' 11" (1 803 m)   Wt 98 4 kg (217 lb)   SpO2 99%   BMI 30 27 kg/m²      Physical Exam   Constitutional: He appears well-developed and well-nourished  HENT:   Head: Normocephalic and atraumatic  Right Ear: Tympanic membrane, external ear and ear canal normal    Left Ear: Tympanic membrane, external ear and ear canal normal    Nose: Nose normal  No rhinorrhea  Mouth/Throat: Oropharynx is clear and moist  No oropharyngeal exudate  Cardiovascular: Normal rate, regular rhythm and normal heart sounds  Exam reveals no gallop and no friction rub  No murmur heard  Pulmonary/Chest: Effort normal and breath sounds normal  He has no wheezes  He has no rales  Lymphadenopathy:        Head (right side): No submental, no submandibular, no tonsillar, no preauricular and no posterior auricular adenopathy present  Head (left side): No submental, no submandibular, no tonsillar, no preauricular and no posterior auricular adenopathy present  He has no cervical adenopathy

## 2018-06-12 NOTE — ASSESSMENT & PLAN NOTE
Lab Results   Component Value Date    HGBA1C 9 8 (H) 03/09/2018       No results for input(s): POCGLU in the last 72 hours  Blood Sugar Average: Last 72 hrs:   BGL log of last month extremes in 276-137, averaging in the 140-170

## 2018-06-25 ENCOUNTER — OFFICE VISIT (OUTPATIENT)
Dept: PAIN MEDICINE | Facility: CLINIC | Age: 58
End: 2018-06-25
Payer: OTHER MISCELLANEOUS

## 2018-06-25 VITALS
BODY MASS INDEX: 30.8 KG/M2 | RESPIRATION RATE: 16 BRPM | SYSTOLIC BLOOD PRESSURE: 118 MMHG | WEIGHT: 220 LBS | DIASTOLIC BLOOD PRESSURE: 58 MMHG | TEMPERATURE: 98.1 F | HEIGHT: 71 IN | HEART RATE: 83 BPM

## 2018-06-25 DIAGNOSIS — M54.5 CHRONIC LOW BACK PAIN, UNSPECIFIED BACK PAIN LATERALITY, WITH SCIATICA PRESENCE UNSPECIFIED: ICD-10-CM

## 2018-06-25 DIAGNOSIS — M47.816 LUMBAR SPONDYLOSIS: ICD-10-CM

## 2018-06-25 DIAGNOSIS — G89.29 CHRONIC LOW BACK PAIN, UNSPECIFIED BACK PAIN LATERALITY, WITH SCIATICA PRESENCE UNSPECIFIED: ICD-10-CM

## 2018-06-25 DIAGNOSIS — M54.16 LUMBAR RADICULOPATHY: ICD-10-CM

## 2018-06-25 DIAGNOSIS — G89.4 CHRONIC PAIN SYNDROME: Primary | ICD-10-CM

## 2018-06-25 DIAGNOSIS — M96.1 POSTLAMINECTOMY SYNDROME, LUMBAR: ICD-10-CM

## 2018-06-25 PROCEDURE — 99214 OFFICE O/P EST MOD 30 MIN: CPT | Performed by: ANESTHESIOLOGY

## 2018-06-25 RX ORDER — OXYCODONE HYDROCHLORIDE AND ACETAMINOPHEN 5; 325 MG/1; MG/1
1 TABLET ORAL DAILY PRN
Qty: 30 TABLET | Refills: 0 | Status: SHIPPED | OUTPATIENT
Start: 2018-06-25 | End: 2018-07-03 | Stop reason: SDUPTHER

## 2018-06-25 RX ORDER — LIDOCAINE 50 MG/G
1 PATCH TOPICAL DAILY
Qty: 30 PATCH | Refills: 1 | Status: SHIPPED | OUTPATIENT
Start: 2018-06-25 | End: 2018-08-27 | Stop reason: SDUPTHER

## 2018-06-25 NOTE — PROGRESS NOTES
Pain Medicine Follow-Up Note    Assessment:  1  Chronic pain syndrome    2  Chronic low back pain, unspecified back pain laterality, with sciatica presence unspecified    3  Lumbar spondylosis    4  Lumbar radiculopathy    5  Postlaminectomy syndrome, lumbar        Plan:  New Medications Ordered This Visit   Medications    oxyCODONE-acetaminophen (PERCOCET) 5-325 mg per tablet     Sig: Take 1 tablet by mouth daily as needed (PAIN) for up to 30 days Max Daily Amount: 1 tablet     Dispense:  30 tablet     Refill:  0    lidocaine (LIDODERM) 5 %     Sig: Place 1 patch on the skin daily Remove & Discard patch within 12 hours or as directed by MD     Dispense:  30 patch     Refill:  1    etodolac (LODINE) 300 MG capsule     Sig: Take 1 capsule (300 mg total) by mouth 3 (three) times a day as needed (pain (with food)) for up to 30 days     Dispense:  90 capsule     Refill:  1     My impressions and treatment recommendations were discussed in detail with the patient who verbalized understanding and had no further questions  The patient does report significant relief following the left L3-S1 lumbar facet radiofrequency ablation on June 1, 2018 as well as the right L3-S1 lumbar facet radiofrequency ablation on May 18, 2018  He does report that he has increased his activity level and is stating that he is getting some slight pain in his left-sided low back  I did discuss with the patient that he could continue to get pain relief for up to 8 weeks following the procedure  The patient verbalized understanding  Given that the patient reports overall reduced pain and improved level of functioning without significant side effects, I felt a reasonable to continue the patient on oxycodone/acetaminophen 5/325 mg 1 tablet once daily as needed for pain, lidocaine 5% patch to be applied to his low back region 12 hr on and 12 hr off    I did feel reasonable to trial the patient on and nonsteroidal anti-inflammatory medication in the form of etodolac 300 mg 3 times daily as needed for pain  Asked the patient to take this medication with food  Side effects were reviewed with the patient  The risks and side effects of chronic opioid treatment were discussed in detail with the patient  Side effects include but are not limited to nausea, vomiting, GI intolerance, sedation, constipation, mental clouding, opioid-induced hyperalgesia, endocrine dysfunction, addiction, dependence, and tolerance  The patient was asked to take his medications only as prescribed and directed, never in excess, and never for any other reason other than for pain control  The patient was also asked to keep his medications out of the reach of others and away from children, preferably in a locked drawer  The patient verbalized understanding and wished to use these opioid medications  The [place] prescription monitoring program was reviewed and was appropriate  Follow-up is planned in 2 months time or sooner as warranted  Discharge instructions were provided  I personally saw and examined the patient and I agree with the above discussed plan of care  History of Present Illness:    Christal Rouse is a 62 y o  male who presents to [facility] for interval re-evaluation of the above stated pain complaints  The patient has a past medical and chronic pain history as outlined in the assessment section  He was last seen on June 1, 2018 at which time he underwent a left L3-S1 lumbar facet radiofrequency ablation  Of note, he did undergo a right L3-S1 lumbar facet radiofrequency ablation on May 18, 2018  At today's office visit, the patient's pain score is 7 to 8/10 on the verbal numerical pain rating scale  The patient states that his pain is worse in the morning, evening, and night  His pain is constant in nature and he reports the quality of his pain as dull/aching, throbbing, shooting, numbness, and pins and needles    He reports significant relief of symptoms following the left L3-S1 lumbar facet radiofrequency ablation as well as the right L3-S1 lumbar facet radiofrequency ablation  He is reporting increased pain in his low back as result of increased activity  He does continue to use the oxycodone/acetaminophen 5/325 mg 1 tablet once daily as needed for pain  He is requesting a nonsteroidal anti-inflammatory medication at today's visit  The patient denies opioid induced constipation    Last Urine Drug Screen:  April 7, 2018    Other than as stated above, the patient denies any interval changes in medications, medical condition, mental condition, symptoms, or allergies since the last office visit  Review of Systems:    Review of Systems   Respiratory: Negative for shortness of breath  Cardiovascular: Negative for chest pain  Gastrointestinal: Negative for constipation, diarrhea, nausea and vomiting  Musculoskeletal: Positive for gait problem (difficulty walking/' decreased range of motion) and joint swelling (joint stiffness)  Negative for arthralgias and myalgias  Skin: Negative for rash  Neurological: Negative for dizziness, seizures and weakness  All other systems reviewed and are negative          Patient Active Problem List   Diagnosis    Heartburn    Erectile dysfunction of non-organic origin    DM type 2 (diabetes mellitus, type 2) ([facility])    Chronic low back pain    Lumbar radiculopathy    BPH with urinary obstruction    Blood pressure elevated without history of HTN    Sciatica    Postlaminectomy syndrome, lumbar    Myofascial pain syndrome    Lumbar spondylosis    Herniation of lumbar intervertebral disc with radiculopathy    Acute post-operative pain    Other insomnia    Chronic pain syndrome    Low back pain    Class 1 obesity due to excess calories with serious comorbidity and body mass index (BMI) of 30 0 to 30 9 in adult       Past Medical History:   Diagnosis Date    Arthritis     BPH (benign prostatic hypertrophy) with urinary obstruction     Chronic pain disorder     Diabetes mellitus ([facility] [address] )     type 2    Ear infection     Herniation of lumbar intervertebral disc with radiculopathy     Lumbago     Myofascial pain syndrome     Sciatica        Past Surgical History:   Procedure Laterality Date    ABSCESS DRAINAGE      groin    BACK SURGERY      implant - resolved 2004    COLONOSCOPY      NERVE BLOCK Bilateral 4/26/2018    Procedure: B/L L3 L4 L5 S1 MBB #1 ([id number]); Surgeon: Garett Oro MD;  Location[facility] MAIN OR;  Service: Pain Management     NERVE BLOCK Bilateral 5/11/2018    Procedure: B/L L3 L4 L5 S1 Medial Branch Block #2;  Surgeon: Garett Oro MD;  Location: [facility] MAIN OR;  Service: Pain Management     NOSE SURGERY      NV COLONOSCOPY FLX DX W/COLLJ SPEC WHEN PFRMD N/A 3/6/2017    Procedure: COLONOSCOPY;  Surgeon: Adonay Viveros MD;  Location:  GI LAB; Service: Gastroenterology    NV SURG IMPLNT NEUROELECT,EPIDURAL Left 10/3/2017    Procedure: PLACEMENT THORACIC FOR INSERTION DORSAL COLUMN SPINAL CORD STIMULATOR (DCS) WITH BUTTOCK IMPLANTABLE PULSE GENERATOR(IMPULSE); Surgeon: Wei Khan MD;  Location:  MAIN OR;  Service: Neurosurgery    [address]  Right 5/18/2018    Procedure: Rt L3 L4 L5 S1 Radio Frequency Ablation ([id number]); Surgeon: Garett Oro MD;  Location: [facility] MAIN OR;  Service: Pain Management     RADIOFREQUENCY ABLATION Left 6/1/2018    Procedure: Lt L3 L4 L5 S1 Radio Frequency Ablation ([id number]);   Surgeon: Garett Oro MD;  Location: [facility] MAIN OR;  Service: Pain Management        Family History   Problem Relation Age of Onset    Diabetes Mother     Diabetes Father         mellitus     Heart attack Father     Hypertension Father        Social History     Occupational History     for [employer]       Social History Main Topics    Smoking status: Current Every Day Smoker     Packs/day: 1 00     Years: 35 00    Smokeless tobacco: Never Used      Comment: encouraged smoking cessation - history of smoking 30 or more pack years     Alcohol use No      Comment: Social alcohol use noted in "allscripts"     Drug use: No    Sexual activity: Not on file         Current Outpatient Prescriptions:     atorvastatin (LIPITOR) 10 mg tablet, Take 10 mg by mouth daily at bedtime, Disp: , Rfl: 3    atorvastatin (LIPITOR) 20 mg tablet, Take 1 tablet (20 mg total) by mouth daily for 90 days, Disp: 90 tablet, Rfl: 0    glipiZIDE (GLUCOTROL XL) 10 mg 24 hr tablet, Take 10 mg by mouth daily, Disp: , Rfl:     glipiZIDE (GLUCOTROL XL) 10 mg 24 hr tablet, TAKE 1 TABLET (10 MG TOTAL) BY MOUTH DAILY, Disp: 30 tablet, Rfl: 1    lidocaine (LIDODERM) 5 %, Place 1 patch on the skin daily Remove & Discard patch within 12 hours or as directed by MD, Disp: 30 patch, Rfl: 1    omeprazole (PriLOSEC) 20 mg delayed release capsule, Take 20 mg by mouth daily, Disp: , Rfl:     tamsulosin (FLOMAX) 0 4 mg, TAKE 2 CAPSULES (0 8 MG TOTAL) BY MOUTH DAILY WITH DINNER, Disp: 60 capsule, Rfl: 1    benzonatate (TESSALON PERLES) 100 mg capsule, Take 1 capsule (100 mg total) by mouth 3 (three) times a day as needed for cough, Disp: 20 capsule, Rfl: 0    ciprofloxacin-dexamethasone (CIPRODEX) otic suspension, Administer 4 drops into the left ear 2 (two) times a day, Disp: 7 5 mL, Rfl: 0    etodolac (LODINE) 300 MG capsule, Take 1 capsule (300 mg total) by mouth 3 (three) times a day as needed (pain (with food)) for up to 30 days, Disp: 90 capsule, Rfl: 1    metFORMIN (GLUCOPHAGE) 1000 MG tablet, Take 1 tablet (1,000 mg total) by mouth 2 (two) times a day with meals for 30 days, Disp: 180 tablet, Rfl: 0    oxyCODONE-acetaminophen (PERCOCET) 5-325 mg per tablet, Take 1 tablet by mouth daily as needed (PAIN) for up to 30 days Max Daily Amount: 1 tablet, Disp: 30 tablet, Rfl: 0    Allergies   Allergen Reactions  Penicillins Swelling       Physical Exam:    /58 (BP Location: Left arm, Patient Position: Sitting, Cuff Size: Standard)   Pulse 83   Temp 98 1 °F (36 7 °C) (Oral)   Resp 16   Ht 5' 11" (1 803 m)   Wt 99 8 kg (220 lb)   BMI 30 68 kg/m²     Constitutional:overweight  Eyes:anicteric  HEENT:grossly intact  Neck:supple, symmetric, trachea midline and no masses   Pulmonary:even and unlabored  Cardiovascular:No edema or pitting edema present  Skin:Normal without rashes or lesions and well hydrated  Psychiatric:Mood and affect appropriate  Neurologic:Cranial Nerves II-XII grossly intact  Musculoskeletal:normal

## 2018-06-25 NOTE — LETTER
June 25, 2018     Theresa Rodriguez PA-C  [address]    Patient: Gillian Seals   YOB: 1960   Date of Visit: 6/25/2018       Dear Dr Ted Pyle: Thank you for referring Gillian Pod to me for evaluation  Below are my notes for this consultation  If you have questions, please do not hesitate to call me  I look forward to following your patient along with you  Sincerely,        Jody Pringle MD        CC: No Recipients  Jody Pringle MD  6/25/2018  9:21 AM  Sign at close encounter  Pain Medicine Follow-Up Note    Assessment:  1  Chronic pain syndrome    2  Chronic low back pain, unspecified back pain laterality, with sciatica presence unspecified    3  Lumbar spondylosis    4  Lumbar radiculopathy    5  Postlaminectomy syndrome, lumbar        Plan:  New Medications Ordered This Visit   Medications    oxyCODONE-acetaminophen (PERCOCET) 5-325 mg per tablet     Sig: Take 1 tablet by mouth daily as needed (PAIN) for up to 30 days Max Daily Amount: 1 tablet     Dispense:  30 tablet     Refill:  0    lidocaine (LIDODERM) 5 %     Sig: Place 1 patch on the skin daily Remove & Discard patch within 12 hours or as directed by MD     Dispense:  30 patch     Refill:  1    etodolac (LODINE) 300 MG capsule     Sig: Take 1 capsule (300 mg total) by mouth 3 (three) times a day as needed (pain (with food)) for up to 30 days     Dispense:  90 capsule     Refill:  1     My impressions and treatment recommendations were discussed in detail with the patient who verbalized understanding and had no further questions  The patient does report significant relief following the left L3-S1 lumbar facet radiofrequency ablation on June 1, 2018 as well as the right L3-S1 lumbar facet radiofrequency ablation on May 18, 2018  He does report that he has increased his activity level and is stating that he is getting some slight pain in his left-sided low back    I did discuss with the patient that he could continue to get pain relief for up to 8 weeks following the procedure  The patient verbalized understanding  Given that the patient reports overall reduced pain and improved level of functioning without significant side effects, I felt a reasonable to continue the patient on oxycodone/acetaminophen 5/325 mg 1 tablet once daily as needed for pain, lidocaine 5% patch to be applied to his low back region 12 hr on and 12 hr off  I did feel reasonable to trial the patient on and nonsteroidal anti-inflammatory medication in the form of etodolac 300 mg 3 times daily as needed for pain  Asked the patient to take this medication with food  Side effects were reviewed with the patient  The risks and side effects of chronic opioid treatment were discussed in detail with the patient  Side effects include but are not limited to nausea, vomiting, GI intolerance, sedation, constipation, mental clouding, opioid-induced hyperalgesia, endocrine dysfunction, addiction, dependence, and tolerance  The patient was asked to take his medications only as prescribed and directed, never in excess, and never for any other reason other than for pain control  The patient was also asked to keep his medications out of the reach of others and away from children, preferably in a locked drawer  The patient verbalized understanding and wished to use these opioid medications  The [place] prescription monitoring program was reviewed and was appropriate  Follow-up is planned in 2 months time or sooner as warranted  Discharge instructions were provided  I personally saw and examined the patient and I agree with the above discussed plan of care  History of Present Illness:    Christal Rouse is a 62 y o  male who presents to [facility] for interval re-evaluation of the above stated pain complaints  The patient has a past medical and chronic pain history as outlined in the assessment section   He was last seen on June 1, 2018 at which time he underwent a left L3-S1 lumbar facet radiofrequency ablation  Of note, he did undergo a right L3-S1 lumbar facet radiofrequency ablation on May 18, 2018  At today's office visit, the patient's pain score is 7 to 8/10 on the verbal numerical pain rating scale  The patient states that his pain is worse in the morning, evening, and night  His pain is constant in nature and he reports the quality of his pain as dull/aching, throbbing, shooting, numbness, and pins and needles    He reports significant relief of symptoms following the left L3-S1 lumbar facet radiofrequency ablation as well as the right L3-S1 lumbar facet radiofrequency ablation  He is reporting increased pain in his low back as result of increased activity  He does continue to use the oxycodone/acetaminophen 5/325 mg 1 tablet once daily as needed for pain  He is requesting a nonsteroidal anti-inflammatory medication at today's visit  The patient denies opioid induced constipation    Last Urine Drug Screen:  April 7, 2018    Other than as stated above, the patient denies any interval changes in medications, medical condition, mental condition, symptoms, or allergies since the last office visit  Review of Systems:    Review of Systems   Respiratory: Negative for shortness of breath  Cardiovascular: Negative for chest pain  Gastrointestinal: Negative for constipation, diarrhea, nausea and vomiting  Musculoskeletal: Positive for gait problem (difficulty walking/' decreased range of motion) and joint swelling (joint stiffness)  Negative for arthralgias and myalgias  Skin: Negative for rash  Neurological: Negative for dizziness, seizures and weakness  All other systems reviewed and are negative          Patient Active Problem List   Diagnosis    Heartburn    Erectile dysfunction of non-organic origin    DM type 2 (diabetes mellitus, type 2) ([facility])    Chronic low back pain    Lumbar radiculopathy    BPH with urinary obstruction    Blood pressure elevated without history of HTN    Sciatica    Postlaminectomy syndrome, lumbar    Myofascial pain syndrome    Lumbar spondylosis    Herniation of lumbar intervertebral disc with radiculopathy    Acute post-operative pain    Other insomnia    Chronic pain syndrome    Low back pain    Class 1 obesity due to excess calories with serious comorbidity and body mass index (BMI) of 30 0 to 30 9 in adult       Past Medical History:   Diagnosis Date    Arthritis     BPH (benign prostatic hypertrophy) with urinary obstruction     Chronic pain disorder     Diabetes mellitus ([facility] [address] )     type 2    Ear infection     Herniation of lumbar intervertebral disc with radiculopathy     Lumbago     Myofascial pain syndrome     Sciatica        Past Surgical History:   Procedure Laterality Date    ABSCESS DRAINAGE      groin    BACK SURGERY      implant - resolved 2004    COLONOSCOPY      NERVE BLOCK Bilateral 4/26/2018    Procedure: B/L L3 L4 L5 S1 MBB #1 ([id number]); Surgeon: Tash Gilmore MD;  Location: [facility] MAIN OR;  Service: Pain Management     NERVE BLOCK Bilateral 5/11/2018    Procedure: B/L L3 L4 L5 S1 Medial Branch Block #2;  Surgeon: Tash Gilmore MD;  Location: [facility] MAIN OR;  Service: Pain Management     NOSE SURGERY      WI COLONOSCOPY FLX DX W/COLLJ SPEC WHEN PFRMD N/A 3/6/2017    Procedure: COLONOSCOPY;  Surgeon: Hawk Pickard MD;  [facility]; Service: Gastroenterology    WI SURG IMPLNT NEUROELECT,EPIDURAL Left 10/3/2017    Procedure: PLACEMENT THORACIC FOR INSERTION DORSAL COLUMN SPINAL CORD STIMULATOR (DCS) WITH BUTTOCK IMPLANTABLE PULSE GENERATOR(IMPULSE); Surgeon: Braeden Shannon MD;  Location:  MAIN OR;  Service: Neurosurgery    [address] [facility] Right 5/18/2018    Procedure: Rt L3 L4 L5 S1 Radio Frequency Ablation ([id number]);   Surgeon: Tash Gilmore MD;  Location: [facility] MAIN OR; Service: Pain Management     RADIOFREQUENCY ABLATION Left 6/1/2018    Procedure: Lt L3 L4 L5 S1 Radio Frequency Ablation ([id number]);   Surgeon: Suzanne Parham MD;  Location: [facility] MAIN OR;  Service: Pain Management        Family History   Problem Relation Age of Onset    Diabetes Mother     Diabetes Father         mellitus     Heart attack Father     Hypertension Father        Social History     Occupational History     for [employer]       Social History Main Topics    Smoking status: Current Every Day Smoker     Packs/day: 1 00     Years: 35 00    Smokeless tobacco: Never Used      Comment: encouraged smoking cessation - history of smoking 30 or more pack years     Alcohol use No      Comment: Social alcohol use noted in "allscripts"     Drug use: No    Sexual activity: Not on file         Current Outpatient Prescriptions:     atorvastatin (LIPITOR) 10 mg tablet, Take 10 mg by mouth daily at bedtime, Disp: , Rfl: 3    atorvastatin (LIPITOR) 20 mg tablet, Take 1 tablet (20 mg total) by mouth daily for 90 days, Disp: 90 tablet, Rfl: 0    glipiZIDE (GLUCOTROL XL) 10 mg 24 hr tablet, Take 10 mg by mouth daily, Disp: , Rfl:     glipiZIDE (GLUCOTROL XL) 10 mg 24 hr tablet, TAKE 1 TABLET (10 MG TOTAL) BY MOUTH DAILY, Disp: 30 tablet, Rfl: 1    lidocaine (LIDODERM) 5 %, Place 1 patch on the skin daily Remove & Discard patch within 12 hours or as directed by MD, Disp: 30 patch, Rfl: 1    omeprazole (PriLOSEC) 20 mg delayed release capsule, Take 20 mg by mouth daily, Disp: , Rfl:     tamsulosin (FLOMAX) 0 4 mg, TAKE 2 CAPSULES (0 8 MG TOTAL) BY MOUTH DAILY WITH DINNER, Disp: 60 capsule, Rfl: 1    benzonatate (TESSALON PERLES) 100 mg capsule, Take 1 capsule (100 mg total) by mouth 3 (three) times a day as needed for cough, Disp: 20 capsule, Rfl: 0    ciprofloxacin-dexamethasone (CIPRODEX) otic suspension, Administer 4 drops into the left ear 2 (two) times a day, Disp: 7 5 mL, Rfl: 0   etodolac (LODINE) 300 MG capsule, Take 1 capsule (300 mg total) by mouth 3 (three) times a day as needed (pain (with food)) for up to 30 days, Disp: 90 capsule, Rfl: 1    metFORMIN (GLUCOPHAGE) 1000 MG tablet, Take 1 tablet (1,000 mg total) by mouth 2 (two) times a day with meals for 30 days, Disp: 180 tablet, Rfl: 0    oxyCODONE-acetaminophen (PERCOCET) 5-325 mg per tablet, Take 1 tablet by mouth daily as needed (PAIN) for up to 30 days Max Daily Amount: 1 tablet, Disp: 30 tablet, Rfl: 0    Allergies   Allergen Reactions    Penicillins Swelling       Physical Exam:    /58 (BP Location: Left arm, Patient Position: Sitting, Cuff Size: Standard)   Pulse 83   Temp 98 1 °F (36 7 °C) (Oral)   Resp 16   Ht 5' 11" (1 803 m)   Wt 99 8 kg (220 lb)   BMI 30 68 kg/m²      Constitutional:overweight  Eyes:anicteric  HEENT:grossly intact  Neck:supple, symmetric, trachea midline and no masses   Pulmonary:even and unlabored  Cardiovascular:No edema or pitting edema present  Skin:Normal without rashes or lesions and well hydrated  Psychiatric:Mood and affect appropriate  Neurologic:Cranial Nerves II-XII grossly intact  Musculoskeletal:normal

## 2018-07-03 ENCOUNTER — TELEPHONE (OUTPATIENT)
Dept: OBGYN CLINIC | Facility: HOSPITAL | Age: 58
End: 2018-07-03

## 2018-07-03 DIAGNOSIS — E11.9 TYPE 2 DIABETES MELLITUS WITHOUT COMPLICATION, WITHOUT LONG-TERM CURRENT USE OF INSULIN (HCC): ICD-10-CM

## 2018-07-03 DIAGNOSIS — N40.1 BPH WITH URINARY OBSTRUCTION: ICD-10-CM

## 2018-07-03 DIAGNOSIS — N13.8 BPH WITH URINARY OBSTRUCTION: ICD-10-CM

## 2018-07-03 RX ORDER — OXYCODONE HYDROCHLORIDE AND ACETAMINOPHEN 5; 325 MG/1; MG/1
1 TABLET ORAL DAILY PRN
Qty: 30 TABLET | Refills: 0 | Status: SHIPPED | OUTPATIENT
Start: 2018-07-03 | End: 2018-10-12

## 2018-07-03 RX ORDER — TAMSULOSIN HYDROCHLORIDE 0.4 MG/1
0.8 CAPSULE ORAL
Qty: 60 CAPSULE | Refills: 1 | Status: SHIPPED | OUTPATIENT
Start: 2018-07-03 | End: 2018-07-18 | Stop reason: SDUPTHER

## 2018-07-03 RX ORDER — GLIPIZIDE 10 MG/1
10 TABLET, FILM COATED, EXTENDED RELEASE ORAL DAILY
Qty: 30 TABLET | Refills: 1 | Status: SHIPPED | OUTPATIENT
Start: 2018-07-03 | End: 2018-07-09

## 2018-07-03 NOTE — TELEPHONE ENCOUNTER
[facility] will not allow me to e-prescribe to the pharmacy -- please reach out to IT to help fix this issue  I printed out a prescription and gave it to Rosibel  Please let her know exactly who to send this prescription to

## 2018-07-03 NOTE — TELEPHONE ENCOUNTER
Called and s/w Francie from [facility], she said that they did not receive the rx  She said it was most likely b/c it was sent to their physical address rather than their PO box  She said we can re-mail it OR they do accept e-scribing of narcotics now  Please advise

## 2018-07-03 NOTE — TELEPHONE ENCOUNTER
Patient is calling stating that the script for oxycodone has to get [facility] and they said they tried to call but we would not accept the phone call  Patient would like a call back when everything is redone      Honorio velásquez

## 2018-07-09 DIAGNOSIS — E11.9 TYPE 2 DIABETES MELLITUS WITHOUT COMPLICATION, WITHOUT LONG-TERM CURRENT USE OF INSULIN (HCC): ICD-10-CM

## 2018-07-09 NOTE — TELEPHONE ENCOUNTER
Pharmacy called stating that Pt needs refills and they must be sent in as 80 day supplies in order for the insurance to cover it

## 2018-07-10 RX ORDER — GLIPIZIDE 10 MG/1
10 TABLET, FILM COATED, EXTENDED RELEASE ORAL DAILY
Qty: 90 TABLET | Refills: 0 | Status: SHIPPED | OUTPATIENT
Start: 2018-07-10 | End: 2018-11-14 | Stop reason: SDUPTHER

## 2018-07-18 DIAGNOSIS — N40.1 BPH WITH URINARY OBSTRUCTION: ICD-10-CM

## 2018-07-18 DIAGNOSIS — N13.8 BPH WITH URINARY OBSTRUCTION: ICD-10-CM

## 2018-07-18 RX ORDER — TAMSULOSIN HYDROCHLORIDE 0.4 MG/1
0.8 CAPSULE ORAL
Qty: 60 CAPSULE | Refills: 0 | Status: SHIPPED | OUTPATIENT
Start: 2018-07-18 | End: 2018-09-21 | Stop reason: SDUPTHER

## 2018-08-24 DIAGNOSIS — R03.0 BLOOD PRESSURE ELEVATED WITHOUT HISTORY OF HTN: ICD-10-CM

## 2018-08-24 DIAGNOSIS — E11.9 TYPE 2 DIABETES MELLITUS WITHOUT COMPLICATION, WITHOUT LONG-TERM CURRENT USE OF INSULIN (HCC): Primary | ICD-10-CM

## 2018-08-24 DIAGNOSIS — E66.09 CLASS 1 OBESITY DUE TO EXCESS CALORIES WITH SERIOUS COMORBIDITY AND BODY MASS INDEX (BMI) OF 30.0 TO 30.9 IN ADULT: ICD-10-CM

## 2018-08-24 DIAGNOSIS — E78.2 MIXED HYPERLIPIDEMIA: ICD-10-CM

## 2018-08-24 RX ORDER — ATORVASTATIN CALCIUM 20 MG/1
TABLET, FILM COATED ORAL
Qty: 30 TABLET | Refills: 0 | Status: SHIPPED | OUTPATIENT
Start: 2018-08-24 | End: 2018-09-24 | Stop reason: SDUPTHER

## 2018-08-24 NOTE — TELEPHONE ENCOUNTER
Please call patient and request that he schedule a FU to discuss his diabetes, elevated blood pressure and other chronic conditions  Orders have been submitted for lab work to be completed before the appointment  30 day refill of his lipitor will be sent but we need to check his A1C to follow his blood sugar which has been elevated

## 2018-08-27 ENCOUNTER — TELEPHONE (OUTPATIENT)
Dept: PAIN MEDICINE | Facility: CLINIC | Age: 58
End: 2018-08-27

## 2018-08-27 ENCOUNTER — OFFICE VISIT (OUTPATIENT)
Dept: PAIN MEDICINE | Facility: CLINIC | Age: 58
End: 2018-08-27
Payer: OTHER MISCELLANEOUS

## 2018-08-27 VITALS
HEIGHT: 71 IN | HEART RATE: 84 BPM | SYSTOLIC BLOOD PRESSURE: 112 MMHG | BODY MASS INDEX: 16.6 KG/M2 | DIASTOLIC BLOOD PRESSURE: 64 MMHG | WEIGHT: 118.6 LBS | RESPIRATION RATE: 16 BRPM

## 2018-08-27 DIAGNOSIS — M54.5 CHRONIC LOW BACK PAIN, UNSPECIFIED BACK PAIN LATERALITY, WITH SCIATICA PRESENCE UNSPECIFIED: ICD-10-CM

## 2018-08-27 DIAGNOSIS — M96.1 POSTLAMINECTOMY SYNDROME, LUMBAR: ICD-10-CM

## 2018-08-27 DIAGNOSIS — M54.16 LUMBAR RADICULOPATHY: ICD-10-CM

## 2018-08-27 DIAGNOSIS — Z79.891 LONG-TERM CURRENT USE OF OPIATE ANALGESIC: ICD-10-CM

## 2018-08-27 DIAGNOSIS — F11.20 UNCOMPLICATED OPIOID DEPENDENCE (HCC): ICD-10-CM

## 2018-08-27 DIAGNOSIS — G89.4 CHRONIC PAIN SYNDROME: Primary | ICD-10-CM

## 2018-08-27 DIAGNOSIS — G89.29 CHRONIC LOW BACK PAIN, UNSPECIFIED BACK PAIN LATERALITY, WITH SCIATICA PRESENCE UNSPECIFIED: ICD-10-CM

## 2018-08-27 DIAGNOSIS — M47.816 LUMBAR SPONDYLOSIS: ICD-10-CM

## 2018-08-27 PROCEDURE — 80305 DRUG TEST PRSMV DIR OPT OBS: CPT | Performed by: ANESTHESIOLOGY

## 2018-08-27 PROCEDURE — 99214 OFFICE O/P EST MOD 30 MIN: CPT | Performed by: ANESTHESIOLOGY

## 2018-08-27 RX ORDER — FLUTICASONE PROPIONATE 50 MCG
SPRAY, SUSPENSION (ML) NASAL
Refills: 6 | COMMUNITY
Start: 2018-08-03 | End: 2018-09-12 | Stop reason: SDUPTHER

## 2018-08-27 RX ORDER — LIDOCAINE 50 MG/G
1 PATCH TOPICAL DAILY
Qty: 30 PATCH | Refills: 1 | Status: SHIPPED | OUTPATIENT
Start: 2018-08-27 | End: 2018-10-18 | Stop reason: SDUPTHER

## 2018-08-27 RX ORDER — TRAMADOL HYDROCHLORIDE 50 MG/1
50 TABLET ORAL 2 TIMES DAILY PRN
Qty: 60 TABLET | Refills: 0 | Status: SHIPPED | OUTPATIENT
Start: 2018-08-27 | End: 2018-10-18 | Stop reason: SDUPTHER

## 2018-08-27 NOTE — LETTER
August 27, 2018     Kelsi Shah PA-C  [address]    Patient: Rocio Parkinson   YOB: 1960   Date of Visit: 8/27/2018       Dear Dr Zane Grove: Thank you for referring Rocoi Parkinson to me for evaluation  Below are my notes for this consultation  If you have questions, please do not hesitate to call me  I look forward to following your patient along with you  Sincerely,        Kalani Garcia MD        CC: No Recipients  Kalani Garcia MD  8/27/2018  8:54 AM  Sign at close encounter  Pain Medicine Follow-Up Note    Assessment:  1  Chronic pain syndrome    2  Chronic low back pain, unspecified back pain laterality, with sciatica presence unspecified    3  Lumbar spondylosis    4  Lumbar radiculopathy    5  Postlaminectomy syndrome, lumbar        Plan:  New Medications Ordered This Visit   Medications    fluticasone (FLONASE) 50 mcg/act nasal spray     Sig: USE 2 (TWO) SPRAYS DAILY     Refill:  6    etodolac (LODINE) 300 MG capsule     Sig: Take 1 capsule (300 mg total) by mouth 3 (three) times a day as needed (pain (with food)) for up to 30 days     Dispense:  90 capsule     Refill:  1    lidocaine (LIDODERM) 5 %     Sig: Place 1 patch on the skin daily Remove & Discard patch within 12 hours or as directed by MD     Dispense:  30 patch     Refill:  1    traMADol (ULTRAM) 50 mg tablet     Sig: Take 1 tablet (50 mg total) by mouth 2 (two) times a day as needed for severe pain     Dispense:  60 tablet     Refill:  0     My impressions and treatment recommendations were discussed in detail with the patient who verbalized understanding and had no further questions  The patient reports that the etodolac 300 mg 3 times daily as needed for pain as well as lidocaine 5% 12 hr on and 12 hr off is giving him significant relief of symptoms    He does report using the oxycodone/acetaminophen 5/325 mg 1 tablet once daily as needed for pain very sparingly, but notes that it is not giving him significant relief of symptoms and is making him very itchy  He is willing to trial a different medication to help alleviate his pain  As such, I felt a reasonable to continue the patient on etodolac and lidocaine and trial the patient on tramadol 50 mg 1 tablet up to twice daily as needed for pain  The risks and side effects of chronic opioid treatment were discussed in detail with the patient  Side effects include but are not limited to nausea, vomiting, GI intolerance, sedation, constipation, mental clouding, opioid-induced hyperalgesia, endocrine dysfunction, addiction, dependence, and tolerance  The patient was asked to take his medications only as prescribed and directed, never in excess, and never for any other reason other than for pain control  The patient was also asked to keep his medications out of the reach of others and away from children, preferably in a locked drawer  The patient verbalized understanding and wished to use these opioid medications  A urine drug test was collected at today's office visit as part of our medication management protocol  The point of care testing results were appropriate for what was being prescribed  The specimen will be sent for confirmatory testing  The drug screen is medically necessary because the patient is either dependent on opioid medication or is being considered for opioid medication therapy and the results could impact ongoing or future treatment  The drug screen is to evaluate for the presence or absence of prescribed, non-prescribed, and/or illicit drugs and substances  [place] Prescription Drug Monitoring Program report was reviewed and was appropriate     Follow-up is planned in 2 months time or sooner as warranted  Discharge instructions were provided  I personally saw and examined the patient and I agree with the above discussed plan of care      History of Present Illness:    Alfornia Rinne is a 62 y o  male who presents to [facility] for interval re-evaluation of the above stated pain complaints  The patient has a past medical and chronic pain history as outlined in the assessment section  He was last seen on June 25, 2018 at which time he was maintained on oxycodone/acetaminophen 5/325 mg 1 tablet up to once daily as needed for pain, etodolac 300 mg 3 times daily as needed for pain, and lidocaine 5% patch 12 hr on and 12 hr off  At today's office visit, the patient's pain score is 3 to 6/10 on the verbal numerical pain rating scale  The patient states that his pain is worse in the morning, evening, and night  His pain is constant in nature and he reports the quality of his pain as dull/aching, sharp, throbbing, shooting, and numbness    Patient reports 25% relief of symptoms with the combination of his medications  He denies opioid induced constipation  Last Urine Drug Screen:  August 27, 2018    Other than as stated above, the patient denies any interval changes in medications, medical condition, mental condition, symptoms, or allergies since the last office visit  Review of Systems:    Review of Systems   Respiratory: Negative for shortness of breath  Cardiovascular: Negative for chest pain  Gastrointestinal: Negative for constipation, diarrhea, nausea and vomiting  Musculoskeletal: Positive for gait problem (difficulty walking/ decreased range of motion) and joint swelling (joint stiffness)  Negative for arthralgias and myalgias  Skin: Negative for rash  Neurological: Negative for dizziness, seizures and weakness  All other systems reviewed and are negative          Patient Active Problem List   Diagnosis    Heartburn    Erectile dysfunction of non-organic origin    DM type 2 [facility]diabetes mellitus[facility] type 2) [facility])    Chronic low back pain    Lumbar radiculopathy    BPH with urinary obstruction    Blood pressure elevated without history of HTN    Sciatica    Postlaminectomy syndrome, lumbar    Myofascial pain syndrome    Lumbar spondylosis    Herniation of lumbar intervertebral disc with radiculopathy    Acute post-operative pain    Other insomnia    Chronic pain syndrome    Low back pain    Class 1 obesity due to excess calories with serious comorbidity and body mass index (BMI) of 30 0 to 30 9 in adult       Past Medical History:   Diagnosis Date    Arthritis     BPH (benign prostatic hypertrophy) with urinary obstruction     Chronic pain disorder     Diabetes mellitus ([facility] [address] )     type 2    Ear infection     Herniation of lumbar intervertebral disc with radiculopathy     Lumbago     Myofascial pain syndrome     Sciatica        Past Surgical History:   Procedure Laterality Date    ABSCESS DRAINAGE      groin    BACK SURGERY      implant - resolved 2004    COLONOSCOPY      NERVE BLOCK Bilateral 4/26/2018    Procedure: B/L L3 L4 L5 S1 MBB #1 ([id number]); Surgeon: Cruz Edgar MD;  Location: [facility] MAIN OR;  Service: Pain Management     NERVE BLOCK Bilateral 5/11/2018    Procedure: B/L L3 L4 L5 S1 Medial Branch Block #2;  Surgeon: Cruz Edgar MD;  Location: [facility] MAIN OR;  Service: Pain Management     NOSE SURGERY      CT COLONOSCOPY FLX DX W/COLLJ SPEC WHEN PFRMD N/A 3/6/2017    Procedure: COLONOSCOPY;  Surgeon: Mirela Xavier MD;  [facility]; Service: Gastroenterology    CT SURG IMPLNT NEUROELECT,EPIDURAL Left 10/3/2017    Procedure: PLACEMENT THORACIC FOR INSERTION DORSAL COLUMN SPINAL CORD STIMULATOR (DCS) WITH BUTTOCK IMPLANTABLE PULSE GENERATOR(IMPULSE); Surgeon: Susie Trujillo MD;  Location:  MAIN OR;  Service: Neurosurgery    [address] [place] [address] Right 5/18/2018    Procedure: Rt L3 L4 L5 S1 Radio Frequency Ablation ([id number]);   Surgeon: Cruz Edgar MD;  Location: [facility] MAIN OR;  Service: Pain Management     RADIOFREQUENCY ABLATION Left 6/1/2018    Procedure: Lt L3 L4 L5 S1 Radio Frequency Ablation ([id number]);   Surgeon: Bessie Don MD;  Location: [facility] MAIN OR;  Service: Pain Management        Family History   Problem Relation Age of Onset    Diabetes Mother     Diabetes Father         mellitus     Heart attack Father     Hypertension Father        Social History     Occupational History     for [employer]       Social History Main Topics    Smoking status: Current Every Day Smoker     Packs/day: 1 00     Years: 35 00    Smokeless tobacco: Never Used      Comment: encouraged smoking cessation - history of smoking 30 or more pack years     Alcohol use No      Comment: Social alcohol use noted in "allscripts"     Drug use: No    Sexual activity: Not on file         Current Outpatient Prescriptions:     atorvastatin (LIPITOR) 20 mg tablet, TAKE 1 TABLET BY MOUTH EVERY DAY, Disp: 30 tablet, Rfl: 0    fluticasone (FLONASE) 50 mcg/act nasal spray, USE 2 (TWO) SPRAYS DAILY, Disp: , Rfl: 6    glipiZIDE (GLUCOTROL XL) 10 mg 24 hr tablet, Take 1 tablet (10 mg total) by mouth daily, Disp: 90 tablet, Rfl: 0    lidocaine (LIDODERM) 5 %, Place 1 patch on the skin daily Remove & Discard patch within 12 hours or as directed by MD, Disp: 30 patch, Rfl: 1    omeprazole (PriLOSEC) 20 mg delayed release capsule, Take 20 mg by mouth daily, Disp: , Rfl:     tamsulosin (FLOMAX) 0 4 mg, Take 2 capsules (0 8 mg total) by mouth daily with dinner, Disp: 60 capsule, Rfl: 0    etodolac (LODINE) 300 MG capsule, Take 1 capsule (300 mg total) by mouth 3 (three) times a day as needed (pain (with food)) for up to 30 days, Disp: 90 capsule, Rfl: 1    metFORMIN (GLUCOPHAGE) 1000 MG tablet, Take 1 tablet (1,000 mg total) by mouth 2 (two) times a day with meals for 30 days, Disp: 180 tablet, Rfl: 0    oxyCODONE-acetaminophen (PERCOCET) 5-325 mg per tablet, Take 1 tablet by mouth daily as needed (PAIN) for up to 30 days Max Daily Amount: 1 tablet, Disp: 30 tablet, Rfl: 0    traMADol (ULTRAM) 50 mg tablet, Take 1 tablet (50 mg total) by mouth 2 (two) times a day as needed for severe pain, Disp: 60 tablet, Rfl: 0    Allergies   Allergen Reactions    Penicillins Swelling       Physical Exam:    /64 (BP Location: Left arm, Patient Position: Sitting, Cuff Size: Standard)   Pulse 84   Resp 16   Ht 5' 11" (1 803 m)   Wt 53 8 kg (118 lb 9 6 oz)   BMI 16 54 kg/m²      Constitutional:normal, well developed, well nourished, alert, in no distress and non-toxic and no overt pain behavior    Eyes:anicteric  HEENT:grossly intact  Neck:supple, symmetric, trachea midline and no masses   Pulmonary:even and unlabored  Cardiovascular:No edema or pitting edema present  Skin:Normal without rashes or lesions and well hydrated  Psychiatric:Mood and affect appropriate  Neurologic:Cranial Nerves II-XII grossly intact  Musculoskeletal:normal

## 2018-08-27 NOTE — PROGRESS NOTES
Pain Medicine Follow-Up Note    Assessment:  1  Chronic pain syndrome    2  Chronic low back pain, unspecified back pain laterality, with sciatica presence unspecified    3  Lumbar spondylosis    4  Lumbar radiculopathy    5  Postlaminectomy syndrome, lumbar        Plan:  New Medications Ordered This Visit   Medications    fluticasone (FLONASE) 50 mcg/act nasal spray     Sig: USE 2 (TWO) SPRAYS DAILY     Refill:  6    etodolac (LODINE) 300 MG capsule     Sig: Take 1 capsule (300 mg total) by mouth 3 (three) times a day as needed (pain (with food)) for up to 30 days     Dispense:  90 capsule     Refill:  1    lidocaine (LIDODERM) 5 %     Sig: Place 1 patch on the skin daily Remove & Discard patch within 12 hours or as directed by MD     Dispense:  30 patch     Refill:  1    traMADol (ULTRAM) 50 mg tablet     Sig: Take 1 tablet (50 mg total) by mouth 2 (two) times a day as needed for severe pain     Dispense:  60 tablet     Refill:  0     My impressions and treatment recommendations were discussed in detail with the patient who verbalized understanding and had no further questions  The patient reports that the etodolac 300 mg 3 times daily as needed for pain as well as lidocaine 5% 12 hr on and 12 hr off is giving him significant relief of symptoms  He does report using the oxycodone/acetaminophen 5/325 mg 1 tablet once daily as needed for pain very sparingly, but notes that it is not giving him significant relief of symptoms and is making him very itchy  He is willing to trial a different medication to help alleviate his pain  As such, I felt a reasonable to continue the patient on etodolac and lidocaine and trial the patient on tramadol 50 mg 1 tablet up to twice daily as needed for pain  The risks and side effects of chronic opioid treatment were discussed in detail with the patient   Side effects include but are not limited to nausea, vomiting, GI intolerance, sedation, constipation, mental clouding, opioid-induced hyperalgesia, endocrine dysfunction, addiction, dependence, and tolerance  The patient was asked to take his medications only as prescribed and directed, never in excess, and never for any other reason other than for pain control  The patient was also asked to keep his medications out of the reach of others and away from children, preferably in a locked drawer  The patient verbalized understanding and wished to use these opioid medications  A urine drug test was collected at today's office visit as part of our medication management protocol  The point of care testing results were appropriate for what was being prescribed  The specimen will be sent for confirmatory testing  The drug screen is medically necessary because the patient is either dependent on opioid medication or is being considered for opioid medication therapy and the results could impact ongoing or future treatment  The drug screen is to evaluate for the presence or absence of prescribed, non-prescribed, and/or illicit drugs and substances  [place] Prescription Drug Monitoring Program report was reviewed and was appropriate     Follow-up is planned in 2 months time or sooner as warranted  Discharge instructions were provided  I personally saw and examined the patient and I agree with the above discussed plan of care  History of Present Illness:    Chrystal Tobias is a 62 y o  male who presents to [facility] for interval re-evaluation of the above stated pain complaints  The patient has a past medical and chronic pain history as outlined in the assessment section  He was last seen on June 25, 2018 at which time he was maintained on oxycodone/acetaminophen 5/325 mg 1 tablet up to once daily as needed for pain, etodolac 300 mg 3 times daily as needed for pain, and lidocaine 5% patch 12 hr on and 12 hr off      At today's office visit, the patient's pain score is 3 to 6/10 on the verbal numerical pain rating scale  The patient states that his pain is worse in the morning, evening, and night  His pain is constant in nature and he reports the quality of his pain as dull/aching, sharp, throbbing, shooting, and numbness    Patient reports 25% relief of symptoms with the combination of his medications  He denies opioid induced constipation  Last Urine Drug Screen:  August 27, 2018    Other than as stated above, the patient denies any interval changes in medications, medical condition, mental condition, symptoms, or allergies since the last office visit  Review of Systems:    Review of Systems   Respiratory: Negative for shortness of breath  Cardiovascular: Negative for chest pain  Gastrointestinal: Negative for constipation, diarrhea, nausea and vomiting  Musculoskeletal: Positive for gait problem (difficulty walking/ decreased range of motion) and joint swelling (joint stiffness)  Negative for arthralgias and myalgias  Skin: Negative for rash  Neurological: Negative for dizziness, seizures and weakness  All other systems reviewed and are negative          Patient Active Problem List   Diagnosis    Heartburn    Erectile dysfunction of non-organic origin    DM type 2 (diabetes mellitus, type 2) ([facility])    Chronic low back pain    Lumbar radiculopathy    BPH with urinary obstruction    Blood pressure elevated without history of HTN    Sciatica    Postlaminectomy syndrome, lumbar    Myofascial pain syndrome    Lumbar spondylosis    Herniation of lumbar intervertebral disc with radiculopathy    Acute post-operative pain    Other insomnia    Chronic pain syndrome    Low back pain    Class 1 obesity due to excess calories with serious comorbidity and body mass index (BMI) of 30 0 to 30 9 in adult       Past Medical History:   Diagnosis Date    Arthritis     BPH (benign prostatic hypertrophy) with urinary obstruction     Chronic pain disorder     [facility]     type 2  Ear infection     Herniation of lumbar intervertebral disc with radiculopathy     Lumbago     Myofascial pain syndrome     Sciatica        Past Surgical History:   Procedure Laterality Date    ABSCESS DRAINAGE      groin    BACK SURGERY      implant - resolved 2004    COLONOSCOPY      NERVE BLOCK Bilateral 4/26/2018    Procedure: B/L L3 L4 L5 S1 MBB #1 ([id number]); Surgeon: Jody Pringle MD;  [facility]: [facility] MAIN OR;  Service: Pain Management     NERVE BLOCK Bilateral 5/11/2018    Procedure: B/L L3 L4 L5 S1 Medial Branch Block #2;  Surgeon: Jody Pringle MD;  Location: [place] [address] MAIN OR;  Service: Pain Management     NOSE SURGERY      DE COLONOSCOPY FLX DX W/COLLJ SPEC WHEN PFRMD N/A 3/6/2017    Procedure: COLONOSCOPY;  Surgeon: Denton Muñoz MD;  Location:  GI LAB; Service: Gastroenterology    DE SURG IMPLNT NEUROELECT,EPIDURAL Left 10/3/2017    Procedure: PLACEMENT THORACIC FOR INSERTION DORSAL COLUMN SPINAL CORD STIMULATOR (DCS) WITH BUTTOCK IMPLANTABLE PULSE GENERATOR(IMPULSE); Surgeon: Mara Baca MD;  Location:  MAIN OR;  Service: Neurosurgery    [address] 5/18/2018    Procedure: Rt L3 L4 L5 S1 Radio Frequency Ablation ([id number]); Surgeon: Jody Pringle MD;  Location: [facility] MAIN OR;  Service: Pain Management     RADIOFREQUENCY ABLATION Left 6/1/2018    Procedure: Lt L3 L4 L5 S1 Radio Frequency Ablation ([id number]);   Surgeon: Jody Pringle MD;  Location: [facility] MAIN OR;  Service: Pain Management        Family History   Problem Relation Age of Onset    Diabetes Mother     Diabetes Father         mellitus     Heart attack Father     Hypertension Father        Social History     Occupational History     for [employer]       Social History Main Topics    Smoking status: Current Every Day Smoker     Packs/day: 1 00     Years: 35 00    Smokeless tobacco: Never Used      Comment: encouraged smoking cessation - history of smoking 30 or more pack years     Alcohol use No      Comment: Social alcohol use noted in "allscripts"     Drug use: No    Sexual activity: Not on file         Current Outpatient Prescriptions:     atorvastatin (LIPITOR) 20 mg tablet, TAKE 1 TABLET BY MOUTH EVERY DAY, Disp: 30 tablet, Rfl: 0    fluticasone (FLONASE) 50 mcg/act nasal spray, USE 2 (TWO) SPRAYS DAILY, Disp: , Rfl: 6    glipiZIDE (GLUCOTROL XL) 10 mg 24 hr tablet, Take 1 tablet (10 mg total) by mouth daily, Disp: 90 tablet, Rfl: 0    lidocaine (LIDODERM) 5 %, Place 1 patch on the skin daily Remove & Discard patch within 12 hours or as directed by MD, Disp: 30 patch, Rfl: 1    omeprazole (PriLOSEC) 20 mg delayed release capsule, Take 20 mg by mouth daily, Disp: , Rfl:     tamsulosin (FLOMAX) 0 4 mg, Take 2 capsules (0 8 mg total) by mouth daily with dinner, Disp: 60 capsule, Rfl: 0    etodolac (LODINE) 300 MG capsule, Take 1 capsule (300 mg total) by mouth 3 (three) times a day as needed (pain (with food)) for up to 30 days, Disp: 90 capsule, Rfl: 1    metFORMIN (GLUCOPHAGE) 1000 MG tablet, Take 1 tablet (1,000 mg total) by mouth 2 (two) times a day with meals for 30 days, Disp: 180 tablet, Rfl: 0    oxyCODONE-acetaminophen (PERCOCET) 5-325 mg per tablet, Take 1 tablet by mouth daily as needed (PAIN) for up to 30 days Max Daily Amount: 1 tablet, Disp: 30 tablet, Rfl: 0    traMADol (ULTRAM) 50 mg tablet, Take 1 tablet (50 mg total) by mouth 2 (two) times a day as needed for severe pain, Disp: 60 tablet, Rfl: 0    Allergies   Allergen Reactions    Penicillins Swelling       Physical Exam:    /64 (BP Location: Left arm, Patient Position: Sitting, Cuff Size: Standard)   Pulse 84   Resp 16   Ht 5' 11" (1 803 m)   Wt 53 8 kg (118 lb 9 6 oz)   BMI 16 54 kg/m²     Constitutional:normal, well developed, well nourished, alert, in no distress and non-toxic and no overt pain behavior    Eyes:anicteric  HEENT:grossly intact  Neck:supple, symmetric, trachea midline and no masses   Pulmonary:even and unlabored  Cardiovascular:No edema or pitting edema present  Skin:Normal without rashes or lesions and well hydrated  Psychiatric:Mood and affect appropriate  Neurologic:Cranial Nerves II-XII grossly intact  Musculoskeletal:normal

## 2018-08-27 NOTE — TELEPHONE ENCOUNTER
Left message for patient's adjustor to call back to verify that claim is still open and active  Patient's W/C was shown as inactive in registration

## 2018-09-05 ENCOUNTER — APPOINTMENT (OUTPATIENT)
Dept: LAB | Facility: HOSPITAL | Age: 58
End: 2018-09-05
Payer: COMMERCIAL

## 2018-09-05 DIAGNOSIS — E66.09 CLASS 1 OBESITY DUE TO EXCESS CALORIES WITH SERIOUS COMORBIDITY AND BODY MASS INDEX (BMI) OF 30.0 TO 30.9 IN ADULT: ICD-10-CM

## 2018-09-05 DIAGNOSIS — R03.0 BLOOD PRESSURE ELEVATED WITHOUT HISTORY OF HTN: ICD-10-CM

## 2018-09-05 DIAGNOSIS — E11.9 TYPE 2 DIABETES MELLITUS WITHOUT COMPLICATION, WITHOUT LONG-TERM CURRENT USE OF INSULIN (HCC): ICD-10-CM

## 2018-09-05 LAB
ANION GAP SERPL CALCULATED.3IONS-SCNC: 7 MMOL/L (ref 4–13)
BUN SERPL-MCNC: 13 MG/DL (ref 5–25)
CALCIUM SERPL-MCNC: 9 MG/DL (ref 8.3–10.1)
CHLORIDE SERPL-SCNC: 107 MMOL/L (ref 100–108)
CO2 SERPL-SCNC: 24 MMOL/L (ref 21–32)
CREAT SERPL-MCNC: 0.7 MG/DL (ref 0.6–1.3)
ERYTHROCYTE [DISTWIDTH] IN BLOOD BY AUTOMATED COUNT: 13.2 % (ref 11.6–15.1)
EST. AVERAGE GLUCOSE BLD GHB EST-MCNC: 146 MG/DL
GFR SERPL CREATININE-BSD FRML MDRD: 104 ML/MIN/1.73SQ M
GLUCOSE SERPL-MCNC: 75 MG/DL (ref 65–140)
HBA1C MFR BLD: 6.7 % (ref 4.2–6.3)
HCT VFR BLD AUTO: 41.8 % (ref 36.5–49.3)
HGB BLD-MCNC: 13.8 G/DL (ref 12–17)
MCH RBC QN AUTO: 30.4 PG (ref 26.8–34.3)
MCHC RBC AUTO-ENTMCNC: 33 G/DL (ref 31.4–37.4)
MCV RBC AUTO: 92 FL (ref 82–98)
PLATELET # BLD AUTO: 222 THOUSANDS/UL (ref 149–390)
PMV BLD AUTO: 10 FL (ref 8.9–12.7)
POTASSIUM SERPL-SCNC: 3.9 MMOL/L (ref 3.5–5.3)
RBC # BLD AUTO: 4.54 MILLION/UL (ref 3.88–5.62)
SODIUM SERPL-SCNC: 138 MMOL/L (ref 136–145)
WBC # BLD AUTO: 10.27 THOUSAND/UL (ref 4.31–10.16)

## 2018-09-05 PROCEDURE — 3044F HG A1C LEVEL LT 7.0%: CPT | Performed by: PHYSICIAN ASSISTANT

## 2018-09-05 PROCEDURE — 36415 COLL VENOUS BLD VENIPUNCTURE: CPT | Performed by: PHYSICIAN ASSISTANT

## 2018-09-05 PROCEDURE — 83036 HEMOGLOBIN GLYCOSYLATED A1C: CPT | Performed by: PHYSICIAN ASSISTANT

## 2018-09-05 PROCEDURE — 80048 BASIC METABOLIC PNL TOTAL CA: CPT

## 2018-09-05 PROCEDURE — 85027 COMPLETE CBC AUTOMATED: CPT

## 2018-09-12 ENCOUNTER — OFFICE VISIT (OUTPATIENT)
Dept: FAMILY MEDICINE CLINIC | Facility: CLINIC | Age: 58
End: 2018-09-12
Payer: COMMERCIAL

## 2018-09-12 VITALS
HEART RATE: 87 BPM | DIASTOLIC BLOOD PRESSURE: 78 MMHG | BODY MASS INDEX: 30.24 KG/M2 | OXYGEN SATURATION: 98 % | WEIGHT: 216 LBS | SYSTOLIC BLOOD PRESSURE: 122 MMHG | HEIGHT: 71 IN | RESPIRATION RATE: 16 BRPM

## 2018-09-12 DIAGNOSIS — J30.2 SEASONAL ALLERGIC RHINITIS, UNSPECIFIED TRIGGER: ICD-10-CM

## 2018-09-12 DIAGNOSIS — E11.9 TYPE 2 DIABETES MELLITUS WITHOUT COMPLICATION, WITHOUT LONG-TERM CURRENT USE OF INSULIN (HCC): Primary | ICD-10-CM

## 2018-09-12 DIAGNOSIS — E11.41 DIABETIC MONONEUROPATHY ASSOCIATED WITH TYPE 2 DIABETES MELLITUS (HCC): ICD-10-CM

## 2018-09-12 DIAGNOSIS — Z23 NEED FOR INFLUENZA VACCINATION: ICD-10-CM

## 2018-09-12 DIAGNOSIS — Z23 NEED FOR PNEUMOCOCCAL VACCINATION: ICD-10-CM

## 2018-09-12 DIAGNOSIS — F17.200 SMOKER: ICD-10-CM

## 2018-09-12 DIAGNOSIS — Z23 NEED FOR TDAP VACCINATION: ICD-10-CM

## 2018-09-12 DIAGNOSIS — G47.00 INSOMNIA, UNSPECIFIED TYPE: ICD-10-CM

## 2018-09-12 PROCEDURE — 90471 IMMUNIZATION ADMIN: CPT

## 2018-09-12 PROCEDURE — 90682 RIV4 VACC RECOMBINANT DNA IM: CPT

## 2018-09-12 PROCEDURE — 3008F BODY MASS INDEX DOCD: CPT | Performed by: FAMILY MEDICINE

## 2018-09-12 PROCEDURE — 90732 PPSV23 VACC 2 YRS+ SUBQ/IM: CPT

## 2018-09-12 PROCEDURE — 90715 TDAP VACCINE 7 YRS/> IM: CPT

## 2018-09-12 PROCEDURE — 99214 OFFICE O/P EST MOD 30 MIN: CPT | Performed by: FAMILY MEDICINE

## 2018-09-12 PROCEDURE — 90472 IMMUNIZATION ADMIN EACH ADD: CPT

## 2018-09-12 RX ORDER — TRAZODONE HYDROCHLORIDE 50 MG/1
50 TABLET ORAL
Qty: 30 TABLET | Refills: 0 | Status: SHIPPED | OUTPATIENT
Start: 2018-09-12 | End: 2018-10-12 | Stop reason: SDUPTHER

## 2018-09-12 RX ORDER — GABAPENTIN 100 MG/1
100 CAPSULE ORAL 3 TIMES DAILY
Qty: 90 CAPSULE | Refills: 0 | Status: SHIPPED | OUTPATIENT
Start: 2018-09-12 | End: 2018-10-12 | Stop reason: SDUPTHER

## 2018-09-12 RX ORDER — FLUTICASONE PROPIONATE 50 MCG
2 SPRAY, SUSPENSION (ML) NASAL DAILY
Qty: 16 G | Refills: 0 | Status: SHIPPED | OUTPATIENT
Start: 2018-09-12 | End: 2018-11-05 | Stop reason: SDUPTHER

## 2018-09-12 NOTE — PROGRESS NOTES
Assessment/Plan:  No problem-specific Assessment & Plan notes found for this encounter  Diagnoses and all orders for this visit:  Type 2 diabetes mellitus without complication, without long-term current use of insulin ([facility])  -  HbA1c 6 7 and BMP within normal limits  - well controlled on Metformin 1000mg BID and Glipizide 10mg QD   - does not check his daily sugars  - discussed diet and exercise  - will get Tdap, Pneumovax and Flu in the office today   - referral given for Optho for full fundoscopic eye exam   -   Pneumococcal Polysaccharide Vaccine 23-Valent =>3yo SQ IM  -     Ambulatory referral to Ophthalmology; Future    Need for Tdap vaccination  -     TDAP VACCINE GREATER THAN OR EQUAL TO 6YO IM    Need for pneumococcal vaccination  -     Pneumococcal Polysaccharide Vaccine 23-Valent =>3yo SQ IM    Diabetic mononeuropathy associated with type 2 diabetes mellitus ([facility] [address] )  - some concerns for peripheral neuropathy - though in the office sensation in b/l feet intact  - will do trial of Gabapentin 100mg TID and refer to Podiatry  -   gabapentin (NEURONTIN) 100 mg capsule; Take 1 capsule (100 mg total) by mouth 3 (three) times a day  -     Ambulatory referral to Podiatry; Future    Smoker  - current everyday smoker - 1PPD/35yrs   -      CT lung screening program; Future    Need for influenza vaccination  -     influenza vaccine, [id number], quadrivalent, recombinant, PF, 0 5 mL, for patients 18-49 yr with comorbidities ([facility])    Insomnia, unspecified type  - discussed sleep hygiene and to limit/stop fluid intake 2hrs before bed  -    traZODone (DESYREL) 50 mg tablet;  Take 1 tablet (50 mg total) by mouth daily at bedtime Take 25mg QHS x4days and then uptitrate to 50mg QHS  - RTO in 1month for f/u - pt aware and agreeable     Seasonal allergic rhinitis, unspecified trigger  -     fluticasone (FLONASE) 50 mcg/act nasal spray; 2 sprays into each nostril daily          Subjective:    Patient ID: Mariana Costelloon is a 62 y o  male  63yo M presents to the office for f/u of labs   1) DM  - HbA1c 6 7  - currently on Metformin 1000mg BID and Glipizide 10mg QD   - has been noticing numbness in his b/l feet for the past 2-3months (R>L) - next to the big toe on the R-has been numb   - has h/o chronic low back pain   - does not check his sugars at home   - has not gone to see Podiatry or Optho in the past = though has been referred   - does not exercise   - (+) current everyday smoker - 1PPD/35yrs   - interested in getting Tdap, Pneumovax and Flu vaccine in the office today  - denies F/C/N/V/CP/palpitations/SOB/wheezing/abd pain/D  2) HL   - currently on Lipitor 20mg QHS and tolerating it well   3) Insomnia  - hasn't been able to sleep   - went to bed to 9pm and got up at 330am and then unable to fall back asleep   - could be 2/2 to his chronic pain syndrome as having a hard time getting comfortable   - (+) b/l UE gets numb in the middle of the night, but gets better as he changes positions (tends to sleep on his outstretched arms)   4) needs a RF for his Flonase       The following portions of the patient's history were reviewed and updated as appropriate: allergies, current medications, past family history, past medical history, past social history, past surgical history and problem list     Review of Systems  as per HPI     Objective:  /78   Pulse 87   Resp 16   Ht 5' 11" (1 803 m)   Wt 98 kg (216 lb)   SpO2 98%   BMI 30 13 kg/m²    Physical Exam   Constitutional: He is oriented to person, place, and time  He appears well-developed and well-nourished  No distress  HENT:   Head: Normocephalic and atraumatic  Nose: Nose normal    Eyes: Conjunctivae and EOM are normal  Right eye exhibits no discharge  Left eye exhibits no discharge  No scleral icterus  Neck: Normal range of motion  Neck supple  Cardiovascular: Normal rate, regular rhythm and normal heart sounds  Exam reveals no gallop and no friction rub      No murmur heard  Pulmonary/Chest: Effort normal and breath sounds normal  No respiratory distress  He has no wheezes  He has no rales  Abdominal: Soft  Bowel sounds are normal    Musculoskeletal: Normal range of motion  He exhibits no edema, tenderness or deformity  Feet:   Right Foot:   Skin Integrity: Negative for ulcer, skin breakdown, erythema, warmth, callus or dry skin  Left Foot:   Skin Integrity: Negative for ulcer, skin breakdown, erythema, warmth, callus or dry skin  Neurological: He is alert and oriented to person, place, and time  Skin: He is not diaphoretic  Vitals reviewed  Patient's shoes and socks removed  Right Foot/Ankle   Right Foot Inspection  Skin Exam: skin normal and skin intact no dry skin, no warmth, no callus, no erythema, no maceration, no abnormal color, no pre-ulcer, no ulcer and no callus                          Toe Exam: ROM and strength within normal limitsno swelling, no tenderness, erythema and  no right toe deformity  Sensory   Vibration: intact  Proprioception: intact   Monofilament testing: intact      Left Foot/Ankle  Left Foot Inspection  Skin Exam: skin normal and skin intactno dry skin, no warmth, no erythema, no maceration, normal color, no pre-ulcer, no ulcer and no callus                         Toe Exam: ROM and strength within normal limitsno swelling, no tenderness, no erythema and no left toe deformity                   Sensory   Vibration: intact  Proprioception: intact  Monofilament: intact    Assign Risk Category:  No deformity present;  No loss of protective sensation;        Risk: 0

## 2018-09-21 DIAGNOSIS — N13.8 BPH WITH URINARY OBSTRUCTION: ICD-10-CM

## 2018-09-21 DIAGNOSIS — N40.1 BPH WITH URINARY OBSTRUCTION: ICD-10-CM

## 2018-09-21 RX ORDER — TAMSULOSIN HYDROCHLORIDE 0.4 MG/1
0.8 CAPSULE ORAL
Qty: 60 CAPSULE | Refills: 1 | Status: SHIPPED | OUTPATIENT
Start: 2018-09-21 | End: 2018-10-23 | Stop reason: SDUPTHER

## 2018-09-24 DIAGNOSIS — E78.2 MIXED HYPERLIPIDEMIA: ICD-10-CM

## 2018-09-24 RX ORDER — ATORVASTATIN CALCIUM 20 MG/1
TABLET, FILM COATED ORAL
Qty: 30 TABLET | Refills: 0 | Status: SHIPPED | OUTPATIENT
Start: 2018-09-24 | End: 2018-10-21 | Stop reason: SDUPTHER

## 2018-10-05 ENCOUNTER — TELEPHONE (OUTPATIENT)
Dept: PAIN MEDICINE | Facility: CLINIC | Age: 58
End: 2018-10-05

## 2018-10-05 NOTE — TELEPHONE ENCOUNTER
Attempted to reach patient to find out how many pills he has left and if the medication is continuing to help with his pain  Patient is scheduled for ov on 10/18/18 with AS  Left c/b #, [place] provided

## 2018-10-05 NOTE — TELEPHONE ENCOUNTER
SW patient, states same as previous task  Patient has 8 pills left  Advised patient will make AS aware and will c/b with recommendations on Monday  Patient states that the medication is not helping much  Discussed with patient that AS may want to change the medication or he may want to discuss his medications at his office visit on 10/18/18

## 2018-10-05 NOTE — TELEPHONE ENCOUNTER
Pt contacted Call Center requested refill of their medication  Medication Name: Tramadol      Dosage of Med: 50mg      Frequency of Med: Take 1 tablet (50 mg total) by mouth 2 (two) times a day as needed for severe pain      Remaining Medication:      Pharmacy and [address]      Thank you

## 2018-10-05 NOTE — TELEPHONE ENCOUNTER
Pt is calling back stating its hard to say if its working cause his family doctor has been giving him sleeping pills but pt doesn't think its helping much  Pt has 8 pills left

## 2018-10-08 DIAGNOSIS — G47.00 INSOMNIA, UNSPECIFIED TYPE: ICD-10-CM

## 2018-10-08 DIAGNOSIS — E11.41 DIABETIC MONONEUROPATHY ASSOCIATED WITH TYPE 2 DIABETES MELLITUS (HCC): ICD-10-CM

## 2018-10-08 NOTE — TELEPHONE ENCOUNTER
SW patient, advised AS recommendations  Patient states he is going to stop taking the tramadol and see if he can tell a difference  Patient states he has an appt with AS on 10/18/18 and will follow up then    Patient appreciative of c/b

## 2018-10-10 RX ORDER — TRAZODONE HYDROCHLORIDE 50 MG/1
TABLET ORAL
Qty: 30 TABLET | Refills: 0 | OUTPATIENT
Start: 2018-10-10

## 2018-10-10 RX ORDER — GABAPENTIN 100 MG/1
CAPSULE ORAL
Qty: 90 CAPSULE | Refills: 0 | OUTPATIENT
Start: 2018-10-10

## 2018-10-12 ENCOUNTER — OFFICE VISIT (OUTPATIENT)
Dept: FAMILY MEDICINE CLINIC | Facility: CLINIC | Age: 58
End: 2018-10-12
Payer: COMMERCIAL

## 2018-10-12 VITALS
DIASTOLIC BLOOD PRESSURE: 60 MMHG | BODY MASS INDEX: 30.94 KG/M2 | SYSTOLIC BLOOD PRESSURE: 110 MMHG | WEIGHT: 221 LBS | HEIGHT: 71 IN | OXYGEN SATURATION: 98 % | HEART RATE: 76 BPM | RESPIRATION RATE: 16 BRPM

## 2018-10-12 DIAGNOSIS — E11.41 DIABETIC MONONEUROPATHY ASSOCIATED WITH TYPE 2 DIABETES MELLITUS (HCC): Primary | ICD-10-CM

## 2018-10-12 DIAGNOSIS — G47.00 INSOMNIA, UNSPECIFIED TYPE: ICD-10-CM

## 2018-10-12 PROCEDURE — 99214 OFFICE O/P EST MOD 30 MIN: CPT | Performed by: FAMILY MEDICINE

## 2018-10-12 RX ORDER — TRAZODONE HYDROCHLORIDE 50 MG/1
50 TABLET ORAL
Qty: 30 TABLET | Refills: 2 | Status: SHIPPED | OUTPATIENT
Start: 2018-10-12 | End: 2018-11-14 | Stop reason: SDUPTHER

## 2018-10-12 RX ORDER — GABAPENTIN 100 MG/1
100 CAPSULE ORAL 3 TIMES DAILY
Qty: 90 CAPSULE | Refills: 2 | Status: SHIPPED | OUTPATIENT
Start: 2018-10-12 | End: 2018-11-14 | Stop reason: SDUPTHER

## 2018-10-12 NOTE — PROGRESS NOTES
Assessment/Plan:  No problem-specific Assessment & Plan notes found for this encounter  Diagnoses and all orders for this visit:  Diabetic mononeuropathy associated with type 2 diabetes mellitus ([facility] [address] )  - HbA1c 6 7 (9/5/2018) and BMP within normal limits  - well controlled on Metformin 1000mg BID, Glipizide 10mg QD and Gabapentin 100mg TID   - UTD with Tdap, Pneumovax and Flu vaccines  - has referral to Optho and Podiatry but has not been able to f/u as having financial strains   - referred to [facility] and pt will think about it   - unable to exercise 2/2 chronic pain, but discussed diet and to watch his "cheat days" as has gained a few lbs since last OV  - cont Neurontin 100mg TID as working for neuropathy   - RTO in 3months - pt aware and agreeable   -    gabapentin (NEURONTIN) 100 mg capsule; Take 1 capsule (100 mg total) by mouth 3 (three) times a day  -     Microalbumin / creatinine urine ratio  -     HEMOGLOBIN A1C W/ EAG ESTIMATION; Future    Insomnia, unspecified type  - discussed sleep hygiene   - advised to take Trazodone 50mg QHS vs PRN as pt has been taking  -    traZODone (DESYREL) 50 mg tablet; Take 1 tablet (50 mg total) by mouth daily at bedtime          Subjective:    Patient ID: Savannah Valentin is a 62 y o  male    63yo M presents to the office for f/u of insomnia and DM neuropathy   1) DM neuropathy   - HbA1c 6 7  - currently on Metformin 1000mg BID, Glipizide 10mg QD and Gabapentin 100mg TID   - has h/o chronic low back pain   - has gained a few lbs since last OV - has been "cheating"   - feels like his vision has gotten better   - has not gone to see Podiatry or Optho in the past = though has been referred bc having financial probs (SS cut his paycheck by $1400/month)   - does not exercise   - (+) current everyday smoker - 1PPD/35yrs   - denies F/C/N/V/CP/palpitations/SOB/wheezing/abd pain/D  - feels like the numbness on the R-side has gotten better with Neurontin, difficult to discern on the L-side as has ongoing chronic back issues/pain  - did get Tdap, Pneumovax and Flu vaccine at last OV   2) Insomnia  - has been taking the Trazodone 50mg QHS on a PRN basis  - tried to increase Trazodone to 100mg QHS and had nausea and diarrhea   - could be 2/2 to his chronic pain syndrome as having a hard time getting comfortable - also of note, his Tramadol was d/c'd by his Pain Specialist (whom he has an appt with on 10/18)   3) Seasonal Allergies  - much better after restarting his Flonase        The following portions of the patient's history were reviewed and updated as appropriate: allergies, current medications, past family history, past medical history, past social history, past surgical history and problem list     Review of Systems  as per HPI     Objective:  /68   Pulse 76   Resp 16   Ht 5' 11" (1 803 m)   Wt 100 kg (221 lb)   SpO2 98%   BMI 30 82 kg/m²    Physical Exam   Constitutional: He is oriented to person, place, and time  He appears well-developed and well-nourished  No distress  HENT:   Head: Normocephalic and atraumatic  Nose: Nose normal    Eyes: Conjunctivae and EOM are normal  Right eye exhibits no discharge  Left eye exhibits no discharge  No scleral icterus  Neck: Normal range of motion  Neck supple  Cardiovascular: Normal rate, regular rhythm and normal heart sounds  Exam reveals no gallop and no friction rub  No murmur heard  Pulmonary/Chest: Effort normal and breath sounds normal  No stridor  No respiratory distress  He has no wheezes  He has no rales  Abdominal: Soft  Bowel sounds are normal    Musculoskeletal: He exhibits no edema, tenderness or deformity  Neurological: He is alert and oriented to person, place, and time  Skin: Skin is warm and dry  He is not diaphoretic  Psychiatric: He has a normal mood and affect  His behavior is normal  Judgment and thought content normal    Vitals reviewed

## 2018-10-18 ENCOUNTER — OFFICE VISIT (OUTPATIENT)
Dept: PAIN MEDICINE | Facility: CLINIC | Age: 58
End: 2018-10-18
Payer: OTHER MISCELLANEOUS

## 2018-10-18 VITALS
HEIGHT: 71 IN | RESPIRATION RATE: 16 BRPM | DIASTOLIC BLOOD PRESSURE: 68 MMHG | WEIGHT: 225.2 LBS | HEART RATE: 82 BPM | BODY MASS INDEX: 31.53 KG/M2 | SYSTOLIC BLOOD PRESSURE: 126 MMHG

## 2018-10-18 DIAGNOSIS — G89.4 CHRONIC PAIN SYNDROME: ICD-10-CM

## 2018-10-18 DIAGNOSIS — M54.16 LUMBAR RADICULOPATHY: ICD-10-CM

## 2018-10-18 DIAGNOSIS — M54.5 CHRONIC LOW BACK PAIN, UNSPECIFIED BACK PAIN LATERALITY, WITH SCIATICA PRESENCE UNSPECIFIED: ICD-10-CM

## 2018-10-18 DIAGNOSIS — M96.1 POSTLAMINECTOMY SYNDROME, LUMBAR: ICD-10-CM

## 2018-10-18 DIAGNOSIS — M47.816 LUMBAR SPONDYLOSIS: ICD-10-CM

## 2018-10-18 DIAGNOSIS — G89.29 CHRONIC LOW BACK PAIN, UNSPECIFIED BACK PAIN LATERALITY, WITH SCIATICA PRESENCE UNSPECIFIED: ICD-10-CM

## 2018-10-18 PROCEDURE — 99214 OFFICE O/P EST MOD 30 MIN: CPT | Performed by: ANESTHESIOLOGY

## 2018-10-18 RX ORDER — TRAMADOL HYDROCHLORIDE 50 MG/1
100 TABLET ORAL 2 TIMES DAILY PRN
Qty: 120 TABLET | Refills: 0 | Status: SHIPPED | OUTPATIENT
Start: 2018-10-18 | End: 2018-11-27 | Stop reason: SDUPTHER

## 2018-10-18 RX ORDER — LIDOCAINE 50 MG/G
2 PATCH TOPICAL DAILY
Qty: 60 PATCH | Refills: 1 | Status: SHIPPED | OUTPATIENT
Start: 2018-10-18 | End: 2018-11-27 | Stop reason: SDUPTHER

## 2018-10-18 NOTE — PATIENT INSTRUCTIONS
Caudal Epidural Injection      What is the epidural space? The covering over the nerve roots in the spine is called the dura  The space surrounding this dura is called the epidural space  This space is commonly used to deliver medications to the spine  Nerves travel through the epidural space before they travel down into your legs  The nerves leave the spine from small nerve holes, foramen  Inflammation of these nerve roots may cause pain in your hip, buttock and legs  These nerve roots may become inflamed due to irritation from a damaged disc or from contact with a bone spur  What is a caudal epidural injection and why is it helpful? A caudal epidural injection places anti-inflammatory medicine into the epidural space to stop nerve root inflammation, therefore hopefully reducing hip, buttock and leg pain  This is more commonly used in those with previous back surgery to avoid the scar tissue at the surgical levels  By stopping or limiting nerve root inflammation we may be able to reduce your pain  The epidural injection may assist the injury to heal by reducing inflammation  Although not always helpful, it usually reduces pain within three to seven days  It may provide permanent relief or provide a period of pain relief that will allow other treatments like physical therapy to be more effective  What happens during the procedure? The physician will numb a small area of skin just above the crease in between your buttocks which may sting for a few seconds  Next, the physician will use X-ray guidance to direct a small needle into the epidural space through the tiny bone opening (sacral hiatus) just above the crease in between your buttocks  He will then inject contrast dye to confirm that the medicine spreads to the affected nerve root(s) in the epidural space   If the medicine does not travel high enough to reach the affected nerve root(s) the physician may use a small catheter to reach the target epidural space  After this, the physician will inject a combination of numbing medicine and anti-inflammatory steroid  What happens after the procedure? A dressing may be applied to the injection site  You will remain in the office for about 15-20 minutes and the nurse will monitor your blood pressure and pulse  The nurse will review your discharge instructions and you will be able to go home  You may experience numbness or weakness to the affected limb for a few hours after the procedure  If this happens do not walk without assistance  Your physician may refer you to a physical therapist or chiropractor immediately afterwards while the numbing medicine is effective and over the next two weeks  while the cortisone is working  It may be beneficial to repeat the procedure in about two weeks  A series of treatments (up to four injections one to two weeks apart) often proves more helpful than a single injection  General Pre/Post Instructions  Eating: You may eat a light, but not full meal at least one hour before the procedure, unless receiving intravenous sedation  If you are an insulin dependent diabetic do not alter your normal food intake  Medications: Take your routine medications before the procedure (such as high blood pressure and diabetes medications) except for those that need to be discontinued five days before the procedure such as aspirin and all anti-inflammatory medications (e g  Motrin/Ibuprofen, Aleve, Relafen, Daypro)  These medicines may be re-started the day after the procedure  You may take your regular pain medicine as needed before/after the procedure  If you are taking Coumadin, Heparin, Lovenox, Plavix or Ticlid you must notify the office so that the timing of stopping these medications can be explained  Exercise: You must bring a  with you  You may return to your current level of activities the next day including return to work      Things that may Delay the Procedure  If you are on antibiotics please notify our office; we may delay the procedure  If you have an active infection or fever we will not do the procedure

## 2018-10-18 NOTE — LETTER
October 18, 2018     Cora Hahn PA-C  [address]    Patient: Mehreen Moreno   YOB: 1960   Date of Visit: 10/18/2018       Dear Dr Inna Sagastume: Thank you for referring Mehreen Moreno to me for evaluation  Below are my notes for this consultation  If you have questions, please do not hesitate to call me  I look forward to following your patient along with you  Sincerely,        Samaria Solitario MD        CC: No Recipients  Samaria Solitario MD  10/18/2018  8:53 AM  Sign at close encounter  Pain Medicine Follow-Up Note    Assessment:  1  Chronic low back pain, unspecified back pain laterality, with sciatica presence unspecified    2  Lumbar spondylosis    3  Lumbar radiculopathy    4  Postlaminectomy syndrome, lumbar    5  Chronic pain syndrome        Plan:  New Medications Ordered This Visit   Medications    traMADol (ULTRAM) 50 mg tablet     Sig: Take 2 tablets (100 mg total) by mouth 2 (two) times a day as needed for severe pain     Dispense:  120 tablet     Refill:  0    lidocaine (LIDODERM) 5 %     Sig: Apply 2 patches topically daily Remove & Discard patch within 12 hours or as directed by MD     Dispense:  60 patch     Refill:  1    etodolac (LODINE) 300 MG capsule     Sig: Take 1 capsule (300 mg total) by mouth 3 (three) times a day as needed (pain (with food)) for up to 30 days     Dispense:  90 capsule     Refill:  1     My impressions and treatment recommendations were discussed in detail with the patient who verbalized understanding and had no further questions  Given that the patient has signs and symptoms of low back pain and right lower extremity radiculopathy in the context of lumbar post-laminectomy syndrome, I discussed the rationale of undergoing a caudal epidural steroid injections since this could be potentially therapeutic  The procedures, its risks, and benefits were explained in detail to the patient   Risks include but are not limited to bleeding, infection, hematoma formation, abscess formation, weakness, headache, failure the pain to improve, nerve irritation or damage, and potential worsening of the pain  The patient verbalized understanding and wished to proceed with the procedure  The patient also reports that his best pain relief occurs when he uses 2 lidocaine patches on his back every 12 hours  As such, I felt a reasonable to have the patient use lidocaine 5% patch 2 patches applied 12 hours on and 12 hours off  I also felt a reasonable to increase the patient's tramadol to tramadol 100 mg twice daily as needed for pain  Side effects were reviewed with the patient  In addition, I did feel reasonable to reach out to the spinal cord stimulator representatives to help adjust the spinal cord stimulator he has in place  Follow-up is planned with the patient in 2 months time or sooner as warranted  Discharge instructions were provided  I personally saw and examined patient and I agree with the above discussed plan of care  [place] Prescription Drug Monitoring Program report was reviewed and was appropriate     History of Present Illness:    Althea Vallecillo is a 62 y o  male who presents to [facility] for interval re-evaluation of the above stated pain complaints  The patient has a past medical and chronic pain history as outlined in the assessment section  He was last seen on August 28, 2018 at which time he was maintained on etodolac 300 mg 3 times daily as needed for pain, lidocaine 5% patch 12 hours on and 12 hours off, and tramadol 50 mg twice daily as needed for pain  At today's office visit, the patient's pain score is 4 to 7/10 on the verbal numerical pain rating scale  The patient states that his pain is worse in the morning, evening, and night    His pain is constant in nature and he describes the quality of his pain as burning, dull/aching, sharp, throbbing, shooting, numbness, and pins and needles    He reports that the best pain relief he gets is using the lidocaine 5% patch, but when he uses 2 patches applied to his low back  He also reports that the tramadol is somewhat helpful, but he is not sure whether he is on an adequate dosage of tramadol to help him  He continues to use 3 tablets of etodolac per day  Last Urine Drug Screen:  August 27, 2018    Other than as stated above, the patient denies any interval changes in medications, medical condition, mental condition, symptoms, or allergies since the last office visit  Review of Systems:    Review of Systems   Respiratory: Negative for shortness of breath  Cardiovascular: Negative for chest pain  Gastrointestinal: Positive for diarrhea  Negative for constipation, nausea and vomiting  Musculoskeletal: Positive for gait problem (difficulty walking/ decreased range of motion) and joint swelling (joint stiffness)  Negative for arthralgias and myalgias  Skin: Negative for rash  Neurological: Negative for dizziness, seizures and weakness  All other systems reviewed and are negative          Patient Active Problem List   Diagnosis    Heartburn    Erectile dysfunction of non-organic origin    DM type 2 [facility]di[facility] mellitus, type 2) [facility]    Chronic low back pain    Lumbar radiculopathy    BPH with urinary obstruction    Blood pressure elevated without history of HTN    Sciatica    Postlaminectomy syndrome, lumbar    Myofascial pain syndrome    Lumbar spondylosis    Herniation of lumbar intervertebral disc with radiculopathy    Acute post-operative pain    Insomnia    Chronic pain syndrome    Low back pain    Class 1 obesity due to excess calories with serious comorbidity and body mass index (BMI) of 30 0 to 30 9 in adult    Smoker    Seasonal allergic rhinitis    Diabetic mononeuropathy associated with type 2 diabetes mellitus ([facility] )       Past Medical History:   Diagnosis Date    Arthritis     BPH (benign prostatic hypertrophy) with urinary obstruction     Chronic pain disorder     Diabetes mellitus ([facility] [address] )     type 2    Ear infection     Herniation of lumbar intervertebral disc with radiculopathy     Lumbago     Myofascial pain syndrome     Sciatica        Past Surgical History:   Procedure Laterality Date    ABSCESS DRAINAGE      groin    BACK SURGERY      implant - resolved 2004    COLONOSCOPY      NERVE BLOCK Bilateral 4/26/2018    Procedure: B/L L3 L4 L5 S1 MBB #1 ([id number]); Surgeon: Marvel Ahn MD;  Location: [facility] MAIN OR;  Service: Pain Management     NERVE BLOCK Bilateral 5/11/2018    Procedure: B/L L3 L4 L5 S1 Medial Branch Block #2;  Surgeon: Marvel Ahn MD;  Location: [facility] MAIN OR;  Service: Pain Management     NOSE SURGERY      AZ COLONOSCOPY FLX DX W/COLLJ SPEC WHEN PFRMD N/A 3/6/2017    Procedure: COLONOSCOPY;  Surgeon: Priscila Chavarria MD;  Location:  GI LAB; Service: Gastroenterology    [place] SURG IMPLNT NEUROELECT,EPIDURAL Left 10/3/2017    Procedure: PLACEMENT THORACIC FOR INSERTION DORSAL COLUMN SPINAL CORD STIMULATOR (DCS) WITH BUTTOCK IMPLANTABLE PULSE GENERATOR(IMPULSE); Surgeon: Alton Dean MD;  Location:  MAIN OR;  Service: Neurosurgery    [address] Right 5/18/2018    Procedure: Rt L3 L4 L5 S1 Radio Frequency Ablation ([id number]); Surgeon: Marvel Ahn MD;  Location: [facility] MAIN OR;  Service: Pain Management     RADIOFREQUENCY ABLATION Left 6/1/2018    Procedure: Lt L3 L4 L5 S1 Radio Frequency Ablation ([id number]);   Surgeon: Marvel Ahn MD;  Location: [facility] MAIN OR;  Service: Pain Management        Family History   Problem Relation Age of Onset    Diabetes Mother     Diabetes Father         mellitus     Heart attack Father 58    Hypertension Father        Social History     Occupational History     for [employer]       Social History Main Topics    Smoking status: Current Every Day Smoker Packs/day: 1 00     Years: 35 00    Smokeless tobacco: Never Used      Comment: encouraged smoking cessation - history of smoking 30 or more pack years     Alcohol use No      Comment: rare    Drug use: No    Sexual activity: Not on file         Current Outpatient Prescriptions:     atorvastatin (LIPITOR) 20 mg tablet, TAKE 1 TABLET BY MOUTH EVERY DAY, Disp: 30 tablet, Rfl: 0    fluticasone (FLONASE) 50 mcg/act nasal spray, 2 sprays into each nostril daily, Disp: 16 g, Rfl: 0    gabapentin (NEURONTIN) 100 mg capsule, Take 1 capsule (100 mg total) by mouth 3 (three) times a day, Disp: 90 capsule, Rfl: 2    glipiZIDE (GLUCOTROL XL) 10 mg 24 hr tablet, Take 1 tablet (10 mg total) by mouth daily, Disp: 90 tablet, Rfl: 0    lidocaine (LIDODERM) 5 %, Apply 2 patches topically daily Remove & Discard patch within 12 hours or as directed by MD, Disp: 60 patch, Rfl: 1    omeprazole (PriLOSEC) 20 mg delayed release capsule, Take 20 mg by mouth daily, Disp: , Rfl:     tamsulosin (FLOMAX) 0 4 mg, TAKE 2 CAPSULES (0 8 MG TOTAL) BY MOUTH DAILY WITH DINNER, Disp: 60 capsule, Rfl: 1    traMADol (ULTRAM) 50 mg tablet, Take 2 tablets (100 mg total) by mouth 2 (two) times a day as needed for severe pain, Disp: 120 tablet, Rfl: 0    traZODone (DESYREL) 50 mg tablet, Take 1 tablet (50 mg total) by mouth daily at bedtime, Disp: 30 tablet, Rfl: 2    etodolac (LODINE) 300 MG capsule, Take 1 capsule (300 mg total) by mouth 3 (three) times a day as needed (pain (with food)) for up to 30 days, Disp: 90 capsule, Rfl: 1    metFORMIN (GLUCOPHAGE) 1000 MG tablet, Take 1 tablet (1,000 mg total) by mouth 2 (two) times a day with meals for 30 days, Disp: 180 tablet, Rfl: 0    Allergies   Allergen Reactions    Penicillins Swelling       Physical Exam:    /68 (BP Location: Left arm, Patient Position: Sitting, Cuff Size: Standard)   Pulse 82   Resp 16   Ht 5' 11" (1 803 m)   Wt 102 kg (225 lb 3 2 oz)   BMI 31 41 kg/m² Constitutional:obese  Eyes:anicteric  HEENT:grossly intact  Neck:supple, symmetric, trachea midline and no masses   Pulmonary:even and unlabored  Cardiovascular:No edema or pitting edema present  Skin:Normal without rashes or lesions and well hydrated  Psychiatric:Mood and affect appropriate  Neurologic:Cranial Nerves II-XII grossly intact  Musculoskeletal:normal

## 2018-10-18 NOTE — PROGRESS NOTES
Pain Medicine Follow-Up Note    Assessment:  1  Chronic low back pain, unspecified back pain laterality, with sciatica presence unspecified    2  Lumbar spondylosis    3  Lumbar radiculopathy    4  Postlaminectomy syndrome, lumbar    5  Chronic pain syndrome        Plan:  New Medications Ordered This Visit   Medications    traMADol (ULTRAM) 50 mg tablet     Sig: Take 2 tablets (100 mg total) by mouth 2 (two) times a day as needed for severe pain     Dispense:  120 tablet     Refill:  0    lidocaine (LIDODERM) 5 %     Sig: Apply 2 patches topically daily Remove & Discard patch within 12 hours or as directed by MD     Dispense:  60 patch     Refill:  1    etodolac (LODINE) 300 MG capsule     Sig: Take 1 capsule (300 mg total) by mouth 3 (three) times a day as needed (pain (with food)) for up to 30 days     Dispense:  90 capsule     Refill:  1     My impressions and treatment recommendations were discussed in detail with the patient who verbalized understanding and had no further questions  Given that the patient has signs and symptoms of low back pain and right lower extremity radiculopathy in the context of lumbar post-laminectomy syndrome, I discussed the rationale of undergoing a caudal epidural steroid injections since this could be potentially therapeutic  The procedures, its risks, and benefits were explained in detail to the patient  Risks include but are not limited to bleeding, infection, hematoma formation, abscess formation, weakness, headache, failure the pain to improve, nerve irritation or damage, and potential worsening of the pain  The patient verbalized understanding and wished to proceed with the procedure  The patient also reports that his best pain relief occurs when he uses 2 lidocaine patches on his back every 12 hours  As such, I felt a reasonable to have the patient use lidocaine 5% patch 2 patches applied 12 hours on and 12 hours off    I also felt a reasonable to increase the patient's tramadol to tramadol 100 mg twice daily as needed for pain  Side effects were reviewed with the patient  In addition, I did feel reasonable to reach out to the spinal cord stimulator representatives to help adjust the spinal cord stimulator he has in place  Follow-up is planned with the patient in 2 months time or sooner as warranted  Discharge instructions were provided  I personally saw and examined patient and I agree with the above discussed plan of care  [place] Prescription Drug Monitoring Program report was reviewed and was appropriate     History of Present Illness:    Leticia Alvarenga is a 62 y o  male who presents to [facility] for interval re-evaluation of the above stated pain complaints  The patient has a past medical and chronic pain history as outlined in the assessment section  He was last seen on August 28, 2018 at which time he was maintained on etodolac 300 mg 3 times daily as needed for pain, lidocaine 5% patch 12 hours on and 12 hours off, and tramadol 50 mg twice daily as needed for pain  At today's office visit, the patient's pain score is 4 to 7/10 on the verbal numerical pain rating scale  The patient states that his pain is worse in the morning, evening, and night  His pain is constant in nature and he describes the quality of his pain as burning, dull/aching, sharp, throbbing, shooting, numbness, and pins and needles    He reports that the best pain relief he gets is using the lidocaine 5% patch, but when he uses 2 patches applied to his low back  He also reports that the tramadol is somewhat helpful, but he is not sure whether he is on an adequate dosage of tramadol to help him  He continues to use 3 tablets of etodolac per day      Last Urine Drug Screen:  August 27, 2018    Other than as stated above, the patient denies any interval changes in medications, medical condition, mental condition, symptoms, or allergies since the last office visit  Review of Systems:    Review of Systems   Respiratory: Negative for shortness of breath  Cardiovascular: Negative for chest pain  Gastrointestinal: Positive for diarrhea  Negative for constipation, nausea and vomiting  Musculoskeletal: Positive for gait problem (difficulty walking/ decreased range of motion) and joint swelling (joint stiffness)  Negative for arthralgias and myalgias  Skin: Negative for rash  Neurological: Negative for dizziness, seizures and weakness  All other systems reviewed and are negative  Patient Active Problem List   Diagnosis    Heartburn    Erectile dysfunction of non-organic origin    DM type [facility] type 2) [facility]    Chronic low back pain    Lumbar radiculopathy    BPH with urinary obstruction    Blood pressure elevated without history of HTN    Sciatica    Postlaminectomy syndrome, lumbar    Myofascial pain syndrome    Lumbar spondylosis    Herniation of lumbar intervertebral disc with radiculopathy    Acute post-operative pain    Insomnia    Chronic pain syndrome    Low back pain    Class 1 obesity due to excess calories with serious comorbidity and body mass index (BMI) of 30 0 to 30 9 in adult    Smoker    Seasonal allergic rhinitis    Diabetic mononeuropathy associated with type 2 diabetes mellitus ([facility] [address] )       Past Medical History:   Diagnosis Date    Arthritis     BPH (benign prostatic hypertrophy) with urinary obstruction     Chronic pain disorder     Diabetes mellitus ([address] )     type 2    Ear infection     Herniation of lumbar intervertebral disc with radiculopathy     Lumbago     Myofascial pain syndrome     Sciatica        Past Surgical History:   Procedure Laterality Date    ABSCESS DRAINAGE      groin    BACK SURGERY      implant - resolved 2004    COLONOSCOPY      NERVE BLOCK Bilateral 4/26/2018    Procedure: B/L L3 L4 L5 S1 MBB #1 ([id number]);   Surgeon: Lucía Dockery MD; Location: [facility] MAIN OR;  Service: Pain Management     NERVE BLOCK Bilateral 5/11/2018    Procedure: B/L L3 L4 L5 S1 Medial Branch Block #2;  Surgeon: Jeb Kincaid MD;  Location: [facility] MAIN OR;  Service: Pain Management     NOSE SURGERY      VT COLONOSCOPY FLX DX W/COLLJ SPEC WHEN PFRMD N/A 3/6/2017    Procedure: COLONOSCOPY;  Surgeon: Cristela Guerrero MD;  [facility]; Service: Gastroenterology    VT SURG IMPLNT NEUROELECT,EPIDURAL Left 10/3/2017    Procedure: PLACEMENT THORACIC FOR INSERTION DORSAL COLUMN SPINAL CORD STIMULATOR (DCS) WITH BUTTOCK IMPLANTABLE PULSE GENERATOR(IMPULSE); Surgeon: Kinjal Robledo MD;  Location:  MAIN OR;  Service: Neurosurgery    [facility] [facility] Right 5/18/2018    Procedure: Rt L3 L4 L5 S1 Radio Frequency Ablation ([id number]); Surgeon: Jeb Kincaid MD;  Location: [facility] MAIN OR;  Service: Pain Management     RADIOFREQUENCY ABLATION Left 6/1/2018    Procedure: Lt L3 L4 L5 S1 Radio Frequency Ablation ([id number]);   Surgeon: Jeb Kincaid MD;  Location: [facility] MAIN OR;  Service: Pain Management        Family History   Problem Relation Age of Onset    Diabetes Mother     Diabetes Father         mellitus     Heart attack Father 58    Hypertension Father        Social History     Occupational History     for [employer]       Social History Main Topics    Smoking status: Current Every Day Smoker     Packs/day: 1 00     Years: 35 00    Smokeless tobacco: Never Used      Comment: encouraged smoking cessation - history of smoking 30 or more pack years     Alcohol use No      Comment: rare    Drug use: No    Sexual activity: Not on file         Current Outpatient Prescriptions:     atorvastatin (LIPITOR) 20 mg tablet, TAKE 1 TABLET BY MOUTH EVERY DAY, Disp: 30 tablet, Rfl: 0    fluticasone (FLONASE) 50 mcg/act nasal spray, 2 sprays into each nostril daily, Disp: 16 g, Rfl: 0    gabapentin (NEURONTIN) 100 mg capsule, Take 1 capsule (100 mg total) by mouth 3 (three) times a day, Disp: 90 capsule, Rfl: 2    glipiZIDE (GLUCOTROL XL) 10 mg 24 hr tablet, Take 1 tablet (10 mg total) by mouth daily, Disp: 90 tablet, Rfl: 0    lidocaine (LIDODERM) 5 %, Apply 2 patches topically daily Remove & Discard patch within 12 hours or as directed by MD, Disp: 60 patch, Rfl: 1    omeprazole (PriLOSEC) 20 mg delayed release capsule, Take 20 mg by mouth daily, Disp: , Rfl:     tamsulosin (FLOMAX) 0 4 mg, TAKE 2 CAPSULES (0 8 MG TOTAL) BY MOUTH DAILY WITH DINNER, Disp: 60 capsule, Rfl: 1    traMADol (ULTRAM) 50 mg tablet, Take 2 tablets (100 mg total) by mouth 2 (two) times a day as needed for severe pain, Disp: 120 tablet, Rfl: 0    traZODone (DESYREL) 50 mg tablet, Take 1 tablet (50 mg total) by mouth daily at bedtime, Disp: 30 tablet, Rfl: 2    etodolac (LODINE) 300 MG capsule, Take 1 capsule (300 mg total) by mouth 3 (three) times a day as needed (pain (with food)) for up to 30 days, Disp: 90 capsule, Rfl: 1    metFORMIN (GLUCOPHAGE) 1000 MG tablet, Take 1 tablet (1,000 mg total) by mouth 2 (two) times a day with meals for 30 days, Disp: 180 tablet, Rfl: 0    Allergies   Allergen Reactions    Penicillins Swelling       Physical Exam:    /68 (BP Location: Left arm, Patient Position: Sitting, Cuff Size: Standard)   Pulse 82   Resp 16   Ht 5' 11" (1 803 m)   Wt 102 kg (225 lb 3 2 oz)   BMI 31 41 kg/m²     Constitutional:obese  Eyes:anicteric  HEENT:grossly intact  Neck:supple, symmetric, trachea midline and no masses   Pulmonary:even and unlabored  Cardiovascular:No edema or pitting edema present  Skin:Normal without rashes or lesions and well hydrated  Psychiatric:Mood and affect appropriate  Neurologic:Cranial Nerves II-XII grossly intact  Musculoskeletal:normal

## 2018-10-21 DIAGNOSIS — E78.2 MIXED HYPERLIPIDEMIA: ICD-10-CM

## 2018-10-22 RX ORDER — ATORVASTATIN CALCIUM 20 MG/1
TABLET, FILM COATED ORAL
Qty: 90 TABLET | Refills: 1 | Status: SHIPPED | OUTPATIENT
Start: 2018-10-22 | End: 2018-11-14 | Stop reason: SDUPTHER

## 2018-10-23 DIAGNOSIS — E11.9 TYPE 2 DIABETES MELLITUS WITHOUT COMPLICATION, WITHOUT LONG-TERM CURRENT USE OF INSULIN (HCC): ICD-10-CM

## 2018-10-23 DIAGNOSIS — G47.00 INSOMNIA, UNSPECIFIED TYPE: ICD-10-CM

## 2018-10-23 DIAGNOSIS — N13.8 BPH WITH URINARY OBSTRUCTION: ICD-10-CM

## 2018-10-23 DIAGNOSIS — E11.41 DIABETIC MONONEUROPATHY ASSOCIATED WITH TYPE 2 DIABETES MELLITUS (HCC): ICD-10-CM

## 2018-10-23 DIAGNOSIS — N40.1 BPH WITH URINARY OBSTRUCTION: ICD-10-CM

## 2018-10-23 PROBLEM — M96.1 POSTLAMINECTOMY SYNDROME, LUMBAR REGION: Status: ACTIVE | Noted: 2018-10-23

## 2018-10-23 NOTE — TELEPHONE ENCOUNTER
This patient is requesting a refill for his medications  You saw him most recently  Please let me know if you have any questions

## 2018-10-26 ENCOUNTER — HOSPITAL ENCOUNTER (OUTPATIENT)
Dept: RADIOLOGY | Facility: HOSPITAL | Age: 58
Setting detail: OUTPATIENT SURGERY
Discharge: HOME | End: 2018-10-26
Payer: OTHER MISCELLANEOUS

## 2018-10-26 ENCOUNTER — HOSPITAL ENCOUNTER (OUTPATIENT)
Facility: AMBULARY SURGERY CENTER | Age: 58
Setting detail: OUTPATIENT SURGERY
Discharge: HOME | End: 2018-10-26
Attending: ANESTHESIOLOGY | Admitting: ANESTHESIOLOGY
Payer: OTHER MISCELLANEOUS

## 2018-10-26 VITALS
SYSTOLIC BLOOD PRESSURE: 127 MMHG | BODY MASS INDEX: 31.36 KG/M2 | WEIGHT: 224.87 LBS | DIASTOLIC BLOOD PRESSURE: 69 MMHG | RESPIRATION RATE: 20 BRPM | HEART RATE: 69 BPM | OXYGEN SATURATION: 100 % | TEMPERATURE: 96.2 F

## 2018-10-26 LAB — GLUCOSE SERPL-MCNC: 159 MG/DL (ref 65–140)

## 2018-10-26 PROCEDURE — 72220 X-RAY EXAM SACRUM TAILBONE: CPT

## 2018-10-26 PROCEDURE — 62323 NJX INTERLAMINAR LMBR/SAC: CPT | Performed by: ANESTHESIOLOGY

## 2018-10-26 PROCEDURE — 82948 REAGENT STRIP/BLOOD GLUCOSE: CPT

## 2018-10-26 RX ORDER — METHYLPREDNISOLONE ACETATE 40 MG/ML
INJECTION, SUSPENSION INTRA-ARTICULAR; INTRALESIONAL; INTRAMUSCULAR; SOFT TISSUE AS NEEDED
Status: DISCONTINUED | OUTPATIENT
Start: 2018-10-26 | End: 2018-10-26 | Stop reason: HOSPADM

## 2018-10-26 RX ORDER — METHYLPREDNISOLONE ACETATE 80 MG/ML
INJECTION, SUSPENSION INTRA-ARTICULAR; INTRALESIONAL; INTRAMUSCULAR; SOFT TISSUE AS NEEDED
Status: DISCONTINUED | OUTPATIENT
Start: 2018-10-26 | End: 2018-10-26 | Stop reason: HOSPADM

## 2018-10-26 RX ORDER — LIDOCAINE WITH 8.4% SOD BICARB 0.9%(10ML)
SYRINGE (ML) INJECTION AS NEEDED
Status: DISCONTINUED | OUTPATIENT
Start: 2018-10-26 | End: 2018-10-26 | Stop reason: HOSPADM

## 2018-10-26 RX ORDER — LIDOCAINE HYDROCHLORIDE 10 MG/ML
INJECTION, SOLUTION EPIDURAL; INFILTRATION; INTRACAUDAL; PERINEURAL AS NEEDED
Status: DISCONTINUED | OUTPATIENT
Start: 2018-10-26 | End: 2018-10-26 | Stop reason: HOSPADM

## 2018-10-26 NOTE — H&P (VIEW-ONLY)
Pain Medicine Follow-Up Note    Assessment:  1  Chronic low back pain, unspecified back pain laterality, with sciatica presence unspecified    2  Lumbar spondylosis    3  Lumbar radiculopathy    4  Postlaminectomy syndrome, lumbar    5  Chronic pain syndrome        Plan:  New Medications Ordered This Visit   Medications    traMADol (ULTRAM) 50 mg tablet     Sig: Take 2 tablets (100 mg total) by mouth 2 (two) times a day as needed for severe pain     Dispense:  120 tablet     Refill:  0    lidocaine (LIDODERM) 5 %     Sig: Apply 2 patches topically daily Remove & Discard patch within 12 hours or as directed by MD     Dispense:  60 patch     Refill:  1    etodolac (LODINE) 300 MG capsule     Sig: Take 1 capsule (300 mg total) by mouth 3 (three) times a day as needed (pain (with food)) for up to 30 days     Dispense:  90 capsule     Refill:  1     My impressions and treatment recommendations were discussed in detail with the patient who verbalized understanding and had no further questions  Given that the patient has signs and symptoms of low back pain and right lower extremity radiculopathy in the context of lumbar post-laminectomy syndrome, I discussed the rationale of undergoing a caudal epidural steroid injections since this could be potentially therapeutic  The procedures, its risks, and benefits were explained in detail to the patient  Risks include but are not limited to bleeding, infection, hematoma formation, abscess formation, weakness, headache, failure the pain to improve, nerve irritation or damage, and potential worsening of the pain  The patient verbalized understanding and wished to proceed with the procedure  The patient also reports that his best pain relief occurs when he uses 2 lidocaine patches on his back every 12 hours  As such, I felt a reasonable to have the patient use lidocaine 5% patch 2 patches applied 12 hours on and 12 hours off    I also felt a reasonable to increase the patient's tramadol to tramadol 100 mg twice daily as needed for pain  Side effects were reviewed with the patient  In addition, I did feel reasonable to reach out to the spinal cord stimulator representatives to help adjust the spinal cord stimulator he has in place  Follow-up is planned with the patient in 2 months time or sooner as warranted  Discharge instructions were provided  I personally saw and examined patient and I agree with the above discussed plan of care  [place] Prescription Drug Monitoring Program report was reviewed and was appropriate     History of Present Illness:    Triny Lee is a 62 y o  male who presents to [facility] for interval re-evaluation of the above stated pain complaints  The patient has a past medical and chronic pain history as outlined in the assessment section  He was last seen on August 28, 2018 at which time he was maintained on etodolac 300 mg 3 times daily as needed for pain, lidocaine 5% patch 12 hours on and 12 hours off, and tramadol 50 mg twice daily as needed for pain  At today's office visit, the patient's pain score is 4 to 7/10 on the verbal numerical pain rating scale  The patient states that his pain is worse in the morning, evening, and night  His pain is constant in nature and he describes the quality of his pain as burning, dull/aching, sharp, throbbing, shooting, numbness, and pins and needles    He reports that the best pain relief he gets is using the lidocaine 5% patch, but when he uses 2 patches applied to his low back  He also reports that the tramadol is somewhat helpful, but he is not sure whether he is on an adequate dosage of tramadol to help him  He continues to use 3 tablets of etodolac per day      Last Urine Drug Screen:  August 27, 2018    Other than as stated above, the patient denies any interval changes in medications, medical condition, mental condition, symptoms, or allergies since the last office visit  Review of Systems:    Review of Systems   Respiratory: Negative for shortness of breath  Cardiovascular: Negative for chest pain  Gastrointestinal: Positive for diarrhea  Negative for constipation, nausea and vomiting  Musculoskeletal: Positive for gait problem (difficulty walking/ decreased range of motion) and joint swelling (joint stiffness)  Negative for arthralgias and myalgias  Skin: Negative for rash  Neurological: Negative for dizziness, seizures and weakness  All other systems reviewed and are negative  Patient Active Problem List   Diagnosis    Heartburn    Erectile dysfunction of non-organic origin    DM type 2 (diabetes mellitus, type 2) ([facility])    Chronic low back pain    Lumbar radiculopathy    BPH with urinary obstruction    Blood pressure elevated without history of HTN    Sciatica    Postlaminectomy syndrome, lumbar    Myofascial pain syndrome    Lumbar spondylosis    Herniation of lumbar intervertebral disc with radiculopathy    Acute post-operative pain    Insomnia    Chronic pain syndrome    Low back pain    Class 1 obesity due to excess calories with serious comorbidity and body mass index (BMI) of 30 0 to 30 9 in adult    Smoker    Seasonal allergic rhinitis    Diabetic mononeuropathy associated with type 2 diabetes mellitus ([facility] [address] )       Past Medical History:   Diagnosis Date    Arthritis     BPH (benign prostatic hypertrophy) with urinary obstruction     Chronic pain disorder     Diabetes mellitus ([address] )     type 2    Ear infection     Herniation of lumbar intervertebral disc with radiculopathy     Lumbago     Myofascial pain syndrome     Sciatica        Past Surgical History:   Procedure Laterality Date    ABSCESS DRAINAGE      groin    BACK SURGERY      implant - resolved 2004    COLONOSCOPY      NERVE BLOCK Bilateral 4/26/2018    Procedure: B/L L3 L4 L5 S1 MBB #1 ([id number]);   Surgeon: Oscar Beebe MD; Location: [place] [address] MAIN OR;  Service: Pain Management     NERVE BLOCK Bilateral 5/11/2018    Procedure: B/L L3 L4 L5 S1 Medial Branch Block #2;  Surgeon: Leigh Olivo MD;  Location: [place] [address] MAIN OR;  Service: Pain Management     NOSE SURGERY      UT COLONOSCOPY FLX DX W/COLLJ SPEC WHEN PFRMD N/A 3/6/2017    Procedure: COLONOSCOPY;  Surgeon: Alessio Steele MD;  Location:  GI LAB; Service: Gastroenterology    UT SURG IMPLNT NEUROELECT,EPIDURAL Left 10/3/2017    Procedure: PLACEMENT THORACIC FOR INSERTION DORSAL COLUMN SPINAL CORD STIMULATOR (DCS) WITH BUTTOCK IMPLANTABLE PULSE GENERATOR(IMPULSE); Surgeon: Holly Faith MD;  Location:  MAIN OR;  Service: Neurosurgery    [address] Right 5/18/2018    Procedure: Rt L3 L4 L5 S1 Radio Frequency Ablation ([id number]); Surgeon: Leigh Olivo MD;  Location: [facility] MAIN OR;  Service: Pain Management     RADIOFREQUENCY ABLATION Left 6/1/2018    Procedure: Lt L3 L4 L5 S1 Radio Frequency Ablation ([id number]);   Surgeon: Leigh Olivo MD;  Location: [facility] MAIN OR;  Service: Pain Management        Family History   Problem Relation Age of Onset    Diabetes Mother     Diabetes Father         mellitus     Heart attack Father 58    Hypertension Father        Social History     Occupational History     for distribution center       Social History Main Topics    Smoking status: Current Every Day Smoker     Packs/day: 1 00     Years: 35 00    Smokeless tobacco: Never Used      Comment: encouraged smoking cessation - history of smoking 30 or more pack years     Alcohol use No      Comment: rare    Drug use: No    Sexual activity: Not on file         Current Outpatient Prescriptions:     atorvastatin (LIPITOR) 20 mg tablet, TAKE 1 TABLET BY MOUTH EVERY DAY, Disp: 30 tablet, Rfl: 0    fluticasone (FLONASE) 50 mcg/act nasal spray, 2 sprays into each nostril daily, Disp: 16 g, Rfl: 0    gabapentin (NEURONTIN) 100 mg capsule, Take 1 capsule (100 mg total) by mouth 3 (three) times a day, Disp: 90 capsule, Rfl: 2    glipiZIDE (GLUCOTROL XL) 10 mg 24 hr tablet, Take 1 tablet (10 mg total) by mouth daily, Disp: 90 tablet, Rfl: 0    lidocaine (LIDODERM) 5 %, Apply 2 patches topically daily Remove & Discard patch within 12 hours or as directed by MD, Disp: 60 patch, Rfl: 1    omeprazole (PriLOSEC) 20 mg delayed release capsule, Take 20 mg by mouth daily, Disp: , Rfl:     tamsulosin (FLOMAX) 0 4 mg, TAKE 2 CAPSULES (0 8 MG TOTAL) BY MOUTH DAILY WITH DINNER, Disp: 60 capsule, Rfl: 1    traMADol (ULTRAM) 50 mg tablet, Take 2 tablets (100 mg total) by mouth 2 (two) times a day as needed for severe pain, Disp: 120 tablet, Rfl: 0    traZODone (DESYREL) 50 mg tablet, Take 1 tablet (50 mg total) by mouth daily at bedtime, Disp: 30 tablet, Rfl: 2    etodolac (LODINE) 300 MG capsule, Take 1 capsule (300 mg total) by mouth 3 (three) times a day as needed (pain (with food)) for up to 30 days, Disp: 90 capsule, Rfl: 1    metFORMIN (GLUCOPHAGE) 1000 MG tablet, Take 1 tablet (1,000 mg total) by mouth 2 (two) times a day with meals for 30 days, Disp: 180 tablet, Rfl: 0    Allergies   Allergen Reactions    Penicillins Swelling       Physical Exam:    /68 (BP Location: Left arm, Patient Position: Sitting, Cuff Size: Standard)   Pulse 82   Resp 16   Ht 5' 11" (1 803 m)   Wt 102 kg (225 lb 3 2 oz)   BMI 31 41 kg/m²     Constitutional:obese  Eyes:anicteric  HEENT:grossly intact  Neck:supple, symmetric, trachea midline and no masses   Pulmonary:even and unlabored  Cardiovascular:No edema or pitting edema present  Skin:Normal without rashes or lesions and well hydrated  Psychiatric:Mood and affect appropriate  Neurologic:Cranial Nerves II-XII grossly intact  Musculoskeletal:normal

## 2018-10-26 NOTE — OP NOTE
ATTENDING PHYSICIAN:  Mono Myers MD     PROCEDURE: Caudal epidural steroid injection under fluoroscopic guidance  PREPROCEDURE DIAGNOSIS:  Lumbar post-laminectomy syndrome  POSTPROCEDURE DIAGNOSIS:  Lumbar post-laminectomy syndrome  ANESTHESIA:  Local     ESTIMATED BLOOD LOSS:  Minimal     COMPLICATIONS:  None  LOCATION:  [address]  CONSENT:  Today's procedure, its potential benefits as well as its risks and potential side effects were reviewed  Discussed risks of the procedure including bleeding, infection, nerve irritation or damage, reactions to the medications, headache, failure of the pain to improve, and potential worsening of the pain were explained in detail to the patient who verbalized understanding and who wished to proceed  Written informed consent was thereby obtained  DESCRIPTION OF THE PROCEDURE: After written informed consent was obtained, the patient was taken to the fluoroscopy suite and placed in the prone position  Anatomical landmarks were identified by palpation to identify the sacral hiatus and by way of fluoroscopy in the PA and lateral views  The skin overlying the sacral hiatus region was prepped using antiseptic applied x3 and draped in the usual sterile fashion  Strict aseptic technique was utilized  The skin and subcutaneous tissues at the needle entry site were infiltrated with 3 mL of 1% preservative-free lidocaine using a 25-gauge 1-1/2-inch needle  A 22-gauge spinal needle was then advanced under fluoroscopic guidance in the lateral view at the sacral hiatus and guided to enter the sacral canal and directed towards the epidural space  Proper placement into the epidural space of the sacral canal in the midline was confirmed with fluoroscopy in the PA view  After negative aspiration for CSF or heme, contrast was injected which delineated the epidural space under fluoroscopy in the PA and lateral views      After negative aspiration was reconfirmed, a 13 mL injectate consisting of 1 mL of 40 mg/mL of Depo-Medrol, 1 mL of 80 mg/mL of Depo-Medrol, 3 mL of 1% preservative-free lidocaine, and 8 mL of preservative-free normal saline was slowly injected incrementally with negative aspiration between aliquots  The patient tolerated the procedure well and all needles were removed intact  Hemostasis was obtained and there were no paresthesias or apparent complications  The skin was wiped free of the antiseptic and a Band-Aid was placed as appropriate  The patient was monitored for an appropriate period of time following the procedure and remained hemodynamically stable and neurovascularly intact following the procedure  The patient was ultimately discharged to home with supervision in good condition and instructed to call the office in approximately 7-10 days with an update or sooner as warranted  Discharge and follow up instructions were provided  I was present for and participated in all key and critical portions of this procedure      Bridgette Godfrey MD  10/26/2018  8:08 AM

## 2018-10-31 RX ORDER — TRAZODONE HYDROCHLORIDE 50 MG/1
50 TABLET ORAL
Qty: 30 TABLET | Refills: 0 | OUTPATIENT
Start: 2018-10-31

## 2018-10-31 RX ORDER — GABAPENTIN 100 MG/1
100 CAPSULE ORAL 3 TIMES DAILY
Qty: 90 CAPSULE | Refills: 0 | OUTPATIENT
Start: 2018-10-31

## 2018-10-31 RX ORDER — TAMSULOSIN HYDROCHLORIDE 0.4 MG/1
0.8 CAPSULE ORAL
Qty: 60 CAPSULE | Refills: 0 | Status: SHIPPED | OUTPATIENT
Start: 2018-10-31 | End: 2018-11-14 | Stop reason: SDUPTHER

## 2018-11-02 ENCOUNTER — TELEPHONE (OUTPATIENT)
Dept: PAIN MEDICINE | Facility: CLINIC | Age: 58
End: 2018-11-02

## 2018-11-02 NOTE — TELEPHONE ENCOUNTER
Patient returned your call  He states the first 4 days he hardly had pain  Now he does have some pain  Currently, he states he has 30% relief

## 2018-11-02 NOTE — TELEPHONE ENCOUNTER
S/p Caudal Epidural Steroid Injection on 10/26/18 by Dr Chyna Valles, no follow up scheduled  1st attempt   Left msg on voice mail with call back number   Need rate of pain and % of relief

## 2018-11-05 DIAGNOSIS — J30.2 SEASONAL ALLERGIC RHINITIS, UNSPECIFIED TRIGGER: ICD-10-CM

## 2018-11-05 RX ORDER — FLUTICASONE PROPIONATE 50 MCG
2 SPRAY, SUSPENSION (ML) NASAL DAILY
Qty: 16 G | Refills: 0 | Status: SHIPPED | OUTPATIENT
Start: 2018-11-05 | End: 2018-11-05 | Stop reason: SDUPTHER

## 2018-11-07 DIAGNOSIS — E11.41 DIABETIC MONONEUROPATHY ASSOCIATED WITH TYPE 2 DIABETES MELLITUS (HCC): ICD-10-CM

## 2018-11-07 DIAGNOSIS — G47.00 INSOMNIA, UNSPECIFIED TYPE: ICD-10-CM

## 2018-11-07 RX ORDER — TRAZODONE HYDROCHLORIDE 50 MG/1
50 TABLET ORAL
Qty: 30 TABLET | Refills: 1 | OUTPATIENT
Start: 2018-11-07

## 2018-11-07 RX ORDER — GABAPENTIN 100 MG/1
CAPSULE ORAL
Qty: 90 CAPSULE | Refills: 0 | OUTPATIENT
Start: 2018-11-07

## 2018-11-09 RX ORDER — FLUTICASONE PROPIONATE 50 MCG
2 SPRAY, SUSPENSION (ML) NASAL DAILY
Qty: 16 G | Refills: 0 | Status: SHIPPED | OUTPATIENT
Start: 2018-11-09 | End: 2019-01-14 | Stop reason: SDUPTHER

## 2018-11-09 NOTE — TELEPHONE ENCOUNTER
Patient called stating that he is still having pain  He states the pain is the same as last week  Patient is asking when he is supposed to follow up

## 2018-11-12 NOTE — TELEPHONE ENCOUNTER
I would still have him reach out to see if they can get any better coverage  Can you call the [employer] reps to see if they can meet with him prior to the follow up appointment?

## 2018-11-12 NOTE — TELEPHONE ENCOUNTER
Has he reached out to the [employer] reps regarding stimulator coverage? If not, please reach out to them on the patient's behalf

## 2018-11-12 NOTE — TELEPHONE ENCOUNTER
**FYI**    S/w pt, he said he had an adjustment right before the injection  Pt said he made a f/u for 11/27 and will just follow up then

## 2018-11-14 ENCOUNTER — OFFICE VISIT (OUTPATIENT)
Dept: FAMILY MEDICINE CLINIC | Facility: CLINIC | Age: 58
End: 2018-11-14
Payer: COMMERCIAL

## 2018-11-14 VITALS
DIASTOLIC BLOOD PRESSURE: 64 MMHG | TEMPERATURE: 97.8 F | HEIGHT: 71 IN | RESPIRATION RATE: 16 BRPM | BODY MASS INDEX: 31.22 KG/M2 | SYSTOLIC BLOOD PRESSURE: 120 MMHG | HEART RATE: 79 BPM | OXYGEN SATURATION: 98 % | WEIGHT: 223 LBS

## 2018-11-14 DIAGNOSIS — R10.9 FLANK PAIN: ICD-10-CM

## 2018-11-14 DIAGNOSIS — E78.2 MIXED HYPERLIPIDEMIA: ICD-10-CM

## 2018-11-14 DIAGNOSIS — E11.41 DIABETIC MONONEUROPATHY ASSOCIATED WITH TYPE 2 DIABETES MELLITUS (HCC): ICD-10-CM

## 2018-11-14 DIAGNOSIS — N40.1 BPH WITH URINARY OBSTRUCTION: ICD-10-CM

## 2018-11-14 DIAGNOSIS — N13.8 BPH WITH URINARY OBSTRUCTION: ICD-10-CM

## 2018-11-14 DIAGNOSIS — G47.00 INSOMNIA, UNSPECIFIED TYPE: ICD-10-CM

## 2018-11-14 DIAGNOSIS — E11.9 TYPE 2 DIABETES MELLITUS WITHOUT COMPLICATION, WITHOUT LONG-TERM CURRENT USE OF INSULIN (HCC): ICD-10-CM

## 2018-11-14 DIAGNOSIS — R07.81 RIB PAIN: Primary | ICD-10-CM

## 2018-11-14 LAB
SL AMB  POCT GLUCOSE, UA: ABNORMAL
SL AMB LEUKOCYTE ESTERASE,UA: ABNORMAL
SL AMB POCT BILIRUBIN,UA: 3
SL AMB POCT BLOOD,UA: ABNORMAL
SL AMB POCT CLARITY,UA: CLEAR
SL AMB POCT COLOR,UA: ABNORMAL
SL AMB POCT KETONES,UA: ABNORMAL
SL AMB POCT NITRITE,UA: ABNORMAL
SL AMB POCT PH,UA: 5
SL AMB POCT SPECIFIC GRAVITY,UA: 1.02
SL AMB POCT URINE PROTEIN: ABNORMAL
SL AMB POCT UROBILINOGEN: ABNORMAL

## 2018-11-14 PROCEDURE — 3008F BODY MASS INDEX DOCD: CPT | Performed by: FAMILY MEDICINE

## 2018-11-14 PROCEDURE — 99214 OFFICE O/P EST MOD 30 MIN: CPT | Performed by: FAMILY MEDICINE

## 2018-11-14 PROCEDURE — 81002 URINALYSIS NONAUTO W/O SCOPE: CPT | Performed by: FAMILY MEDICINE

## 2018-11-14 RX ORDER — TRAZODONE HYDROCHLORIDE 50 MG/1
50 TABLET ORAL
Qty: 90 TABLET | Refills: 0 | Status: SHIPPED | OUTPATIENT
Start: 2018-11-14 | End: 2019-01-07 | Stop reason: SDUPTHER

## 2018-11-14 RX ORDER — GABAPENTIN 100 MG/1
100 CAPSULE ORAL 3 TIMES DAILY
Qty: 270 CAPSULE | Refills: 0 | Status: SHIPPED | OUTPATIENT
Start: 2018-11-14 | End: 2019-01-14 | Stop reason: SDUPTHER

## 2018-11-14 RX ORDER — TAMSULOSIN HYDROCHLORIDE 0.4 MG/1
0.8 CAPSULE ORAL
Qty: 180 CAPSULE | Refills: 0 | Status: SHIPPED | OUTPATIENT
Start: 2018-11-14 | End: 2019-01-07 | Stop reason: SDUPTHER

## 2018-11-14 RX ORDER — ATORVASTATIN CALCIUM 20 MG/1
20 TABLET, FILM COATED ORAL DAILY
Qty: 90 TABLET | Refills: 0 | Status: SHIPPED | OUTPATIENT
Start: 2018-11-14 | End: 2019-01-07 | Stop reason: SDUPTHER

## 2018-11-14 RX ORDER — GLIPIZIDE 10 MG/1
10 TABLET, FILM COATED, EXTENDED RELEASE ORAL DAILY
Qty: 90 TABLET | Refills: 0 | Status: SHIPPED | OUTPATIENT
Start: 2018-11-14 | End: 2019-01-07 | Stop reason: SDUPTHER

## 2018-11-14 NOTE — PROGRESS NOTES
Assessment/Plan:  No problem-specific Assessment & Plan notes found for this encounter  Diagnoses and all orders for this visit:  Rib pain  -     XR chest 4 + vw; Future  - difficult to discern as with PMHx of chronic back pain, but L-sided lower ribs tender to touch - will get Xray to start  - does see pain management - currently on Tramadol 100mg BID PRN and Lidoderm patch   - OTC pain control advised for now     Flank pain  -     POCT urine dip with no signs of UTI in the office today     Type 2 diabetes mellitus without complication, without long-term current use of insulin (HCC)  -     glipiZIDE (GLUCOTROL XL) 10 mg 24 hr tablet; Take 1 tablet (10 mg total) by mouth daily  -     metFORMIN (GLUCOPHAGE) 1000 MG tablet; Take 1 tablet (1,000 mg total) by mouth 2 (two) times a day with meals  -     Comprehensive metabolic panel; Future    Insomnia, unspecified type  -     traZODone (DESYREL) 50 mg tablet; Take 1 tablet (50 mg total) by mouth daily at bedtime    Mixed hyperlipidemia  -     atorvastatin (LIPITOR) 20 mg tablet; Take 1 tablet (20 mg total) by mouth daily  -     Lipid panel; Future    Diabetic mononeuropathy associated with type 2 diabetes mellitus (HCC)  -     gabapentin (NEURONTIN) 100 mg capsule; Take 1 capsule (100 mg total) by mouth 3 (three) times a day    BPH with urinary obstruction  -     tamsulosin (FLOMAX) 0 4 mg; Take 2 capsules (0 8 mg total) by mouth daily with dinner          Subjective:    Patient ID: Kanu Neal is a 62 y o  male    63yo M presents to the office for eval of flank pain  1) Flank pain  - (+) L-sided rib pain   - for the past 5-7days   - constant nagging pain, but nothing sharp/piercing   - has been having gas everyday   - has had intermittent burning on urination   - no activity out of the ordinary - no heavy lifting, nothing extrenuous, no trauma   - doesn't feel like when he broke his ribs - at that time couldn't breathe  - no F/C/N/V/CP/SOB  2) losing insurance switching to Medicare/[employer] which starts Feb 1st   - will give 90 day supply of meds: Gabapentin, Trazodone, Tamsulosin, Metformin, Glipizide, Atorvastatin   - has not gotten lung cancer screening CT yet  - due to get his A1c and urine microalbumin   3) General   - Gabapentin is working for his neuropathy - sometimes forgets TID and just does BID dosing, doesn't want to f/u with Podiatry   - Sleeping better on Trazodone 50mg QHS       The following portions of the patient's history were reviewed and updated as appropriate: allergies, current medications, past family history, past medical history, past social history, past surgical history and problem list     Review of Systems  as per HPI     Objective:  /64   Pulse 79   Temp 97 8 °F (36 6 °C)   Resp 16   Ht 5' 11" (1 803 m)   Wt 101 kg (223 lb)   SpO2 98%   BMI 31 10 kg/m²    Physical Exam   Constitutional: He is oriented to person, place, and time  He appears well-developed and well-nourished  No distress  HENT:   Head: Normocephalic and atraumatic  Right Ear: External ear normal    Left Ear: External ear normal    Nose: Nose normal    Eyes: Conjunctivae and EOM are normal  Right eye exhibits no discharge  Left eye exhibits no discharge  No scleral icterus  Neck: Normal range of motion  Neck supple  Cardiovascular: Normal rate, regular rhythm and normal heart sounds  Exam reveals no gallop and no friction rub  No murmur heard  Pulmonary/Chest: Effort normal and breath sounds normal  No respiratory distress  He has no wheezes  He has no rales  He exhibits tenderness    (+) L-sided lower ribs tender to palpation    Abdominal: Soft  Bowel sounds are normal  He exhibits no distension  There is no tenderness  There is no rebound and no guarding  Obese abdomen    Musculoskeletal: Normal range of motion  He exhibits no edema or deformity  Neurological: He is alert and oriented to person, place, and time  Skin: Skin is warm   He is not diaphoretic  Psychiatric: He has a normal mood and affect  His behavior is normal  Judgment and thought content normal    Vitals reviewed

## 2018-11-27 ENCOUNTER — OFFICE VISIT (OUTPATIENT)
Dept: PAIN MEDICINE | Facility: CLINIC | Age: 58
End: 2018-11-27
Payer: OTHER MISCELLANEOUS

## 2018-11-27 ENCOUNTER — APPOINTMENT (OUTPATIENT)
Dept: LAB | Facility: CLINIC | Age: 58
End: 2018-11-27
Payer: COMMERCIAL

## 2018-11-27 ENCOUNTER — HOSPITAL ENCOUNTER (OUTPATIENT)
Dept: CT IMAGING | Facility: HOSPITAL | Age: 58
Discharge: HOME | End: 2018-11-27
Payer: COMMERCIAL

## 2018-11-27 ENCOUNTER — HOSPITAL ENCOUNTER (OUTPATIENT)
Dept: RADIOLOGY | Facility: HOSPITAL | Age: 58
Discharge: HOME | End: 2018-11-27
Payer: COMMERCIAL

## 2018-11-27 VITALS
DIASTOLIC BLOOD PRESSURE: 66 MMHG | HEIGHT: 71 IN | HEART RATE: 75 BPM | SYSTOLIC BLOOD PRESSURE: 112 MMHG | BODY MASS INDEX: 31.22 KG/M2 | WEIGHT: 223 LBS | RESPIRATION RATE: 16 BRPM

## 2018-11-27 DIAGNOSIS — G89.29 CHRONIC LOW BACK PAIN, UNSPECIFIED BACK PAIN LATERALITY, WITH SCIATICA PRESENCE UNSPECIFIED: ICD-10-CM

## 2018-11-27 DIAGNOSIS — E11.9 TYPE 2 DIABETES MELLITUS WITHOUT COMPLICATION, WITHOUT LONG-TERM CURRENT USE OF INSULIN (HCC): ICD-10-CM

## 2018-11-27 DIAGNOSIS — M54.16 LUMBAR RADICULOPATHY: ICD-10-CM

## 2018-11-27 DIAGNOSIS — R07.81 RIB PAIN: ICD-10-CM

## 2018-11-27 DIAGNOSIS — E11.41 DIABETIC MONONEUROPATHY ASSOCIATED WITH TYPE 2 DIABETES MELLITUS (HCC): ICD-10-CM

## 2018-11-27 DIAGNOSIS — M96.1 POSTLAMINECTOMY SYNDROME, LUMBAR: ICD-10-CM

## 2018-11-27 DIAGNOSIS — F17.200 SMOKER: ICD-10-CM

## 2018-11-27 DIAGNOSIS — G89.4 CHRONIC PAIN SYNDROME: ICD-10-CM

## 2018-11-27 DIAGNOSIS — M47.816 LUMBAR SPONDYLOSIS: ICD-10-CM

## 2018-11-27 DIAGNOSIS — M54.5 CHRONIC LOW BACK PAIN, UNSPECIFIED BACK PAIN LATERALITY, WITH SCIATICA PRESENCE UNSPECIFIED: ICD-10-CM

## 2018-11-27 DIAGNOSIS — E78.2 MIXED HYPERLIPIDEMIA: ICD-10-CM

## 2018-11-27 LAB
ALBUMIN SERPL BCP-MCNC: 4.3 G/DL (ref 3.5–5)
ALP SERPL-CCNC: 64 U/L (ref 46–116)
ALT SERPL W P-5'-P-CCNC: 85 U/L (ref 12–78)
ANION GAP SERPL CALCULATED.3IONS-SCNC: 9 MMOL/L (ref 4–13)
AST SERPL W P-5'-P-CCNC: 26 U/L (ref 5–45)
BILIRUB SERPL-MCNC: 0.4 MG/DL (ref 0.2–1)
BUN SERPL-MCNC: 16 MG/DL (ref 5–25)
CALCIUM SERPL-MCNC: 9.4 MG/DL (ref 8.3–10.1)
CHLORIDE SERPL-SCNC: 103 MMOL/L (ref 100–108)
CHOLEST SERPL-MCNC: 116 MG/DL (ref 50–200)
CO2 SERPL-SCNC: 28 MMOL/L (ref 21–32)
CREAT SERPL-MCNC: 0.81 MG/DL (ref 0.6–1.3)
CREAT UR-MCNC: 195 MG/DL
EST. AVERAGE GLUCOSE BLD GHB EST-MCNC: 148 MG/DL
GFR SERPL CREATININE-BSD FRML MDRD: 98 ML/MIN/1.73SQ M
GLUCOSE P FAST SERPL-MCNC: 128 MG/DL (ref 65–99)
HBA1C MFR BLD: 6.8 % (ref 4.2–6.3)
HDLC SERPL-MCNC: 42 MG/DL (ref 40–60)
LDLC SERPL CALC-MCNC: 64 MG/DL (ref 0–100)
MICROALBUMIN UR-MCNC: 17.4 MG/L (ref 0–20)
MICROALBUMIN/CREAT 24H UR: 9 MG/G CREATININE (ref 0–30)
NONHDLC SERPL-MCNC: 74 MG/DL
POTASSIUM SERPL-SCNC: 5.1 MMOL/L (ref 3.5–5.3)
PROT SERPL-MCNC: 7.4 G/DL (ref 6.4–8.2)
SODIUM SERPL-SCNC: 140 MMOL/L (ref 136–145)
TRIGL SERPL-MCNC: 52 MG/DL

## 2018-11-27 PROCEDURE — 99214 OFFICE O/P EST MOD 30 MIN: CPT | Performed by: ANESTHESIOLOGY

## 2018-11-27 PROCEDURE — 3061F NEG MICROALBUMINURIA REV: CPT | Performed by: FAMILY MEDICINE

## 2018-11-27 PROCEDURE — 36415 COLL VENOUS BLD VENIPUNCTURE: CPT

## 2018-11-27 PROCEDURE — 82043 UR ALBUMIN QUANTITATIVE: CPT | Performed by: FAMILY MEDICINE

## 2018-11-27 PROCEDURE — 80053 COMPREHEN METABOLIC PANEL: CPT

## 2018-11-27 PROCEDURE — 82570 ASSAY OF URINE CREATININE: CPT | Performed by: FAMILY MEDICINE

## 2018-11-27 PROCEDURE — 71048 X-RAY EXAM CHEST 4+ VIEWS: CPT

## 2018-11-27 PROCEDURE — 80061 LIPID PANEL: CPT

## 2018-11-27 PROCEDURE — 83036 HEMOGLOBIN GLYCOSYLATED A1C: CPT

## 2018-11-27 RX ORDER — LIDOCAINE 50 MG/G
2 PATCH TOPICAL DAILY
Qty: 60 PATCH | Refills: 0 | Status: SHIPPED | OUTPATIENT
Start: 2018-11-27 | End: 2019-01-28 | Stop reason: SDUPTHER

## 2018-11-27 RX ORDER — TRAMADOL HYDROCHLORIDE 50 MG/1
100 TABLET ORAL 2 TIMES DAILY PRN
Qty: 120 TABLET | Refills: 1 | Status: SHIPPED | OUTPATIENT
Start: 2018-11-27 | End: 2019-01-28 | Stop reason: SDUPTHER

## 2018-11-27 NOTE — PROGRESS NOTES
Pain Medicine Follow-Up Note    Assessment:  1  Chronic pain syndrome    2  Chronic low back pain, unspecified back pain laterality, with sciatica presence unspecified    3  Postlaminectomy syndrome, lumbar    4  Lumbar spondylosis    5  Lumbar radiculopathy        Plan:  New Medications Ordered This Visit   Medications    lidocaine (LIDODERM) 5 %     Sig: Apply 2 patches topically daily Remove & Discard patch within 12 hours or as directed by MD     Dispense:  60 patch     Refill:  0    etodolac (LODINE) 300 MG capsule     Sig: Take 1 capsule (300 mg total) by mouth 3 (three) times a day as needed (pain (with food)) for up to 30 days     Dispense:  90 capsule     Refill:  1    traMADol (ULTRAM) 50 mg tablet     Sig: Take 2 tablets (100 mg total) by mouth 2 (two) times a day as needed for severe pain Fill dates:  11/27/18 and 12/26/18     Dispense:  120 tablet     Refill:  1     My impressions and treatment recommendations were discussed in detail with the patient who verbalized understanding and had no further questions  Patient does report excellent pain relief with the caudal epidural steroid injection, but he only received relief for about 1 weeks duration  He is amenable to undergoing a repeat injection at this time  As such, I felt a reasonable to have the patient undergo a repeat caudal epidural steroid injection since this could be potentially therapeutic  The procedures, its risks, and benefits were explained in detail to the patient  Risks include but are not limited to bleeding, infection, hematoma formation, abscess formation, weakness, headache, failure the pain to improve, nerve irritation or damage, and potential worsening of the pain  The patient verbalized understanding and wished to proceed with the procedure      I also felt a reasonable to continue the patient on tramadol 50 mg 2 tablets twice daily as needed for pain, etodolac 300 mg 3 times daily as needed for pain, with food, and lidocaine 5% patch applied topically as needed  Side effects were reviewed with the patient  The risks and side effects of chronic opioid treatment were discussed in detail with the patient  Side effects include but are not limited to nausea, vomiting, GI intolerance, sedation, constipation, mental clouding, opioid-induced hyperalgesia, endocrine dysfunction, addiction, dependence, and tolerance  The patient was asked to take his medications only as prescribed and directed, never in excess, and never for any other reason other than for pain control  The patient was also asked to keep his medications out of the reach of others and away from children, preferably in a locked drawer  The patient verbalized understanding and wished to use these opioid medications  Follow-up is planned with the patient in 2 months time or sooner as warranted  Discharge instructions were provided  I personally saw and examined the patient and I agree with the above discussed plan of care  [place] Prescription Drug Monitoring Program report was reviewed and was appropriate     History of Present Illness:    Manjit Silva is a 62 y o  male who presents to [facility] for interval re-evaluation of the above stated pain complaints  The patient has a past medical and chronic pain history as outlined in the assessment section  He was last seen on October 26, 2018 if the at which time he underwent a caudal epidural steroid injection  He has also been maintained on etodolac 300 mg 3 times daily as needed for pain, with food, lidocaine 5% patch applied topically as needed, and tramadol 50 mg 2 tablets twice daily as needed for pain  At today's office visit, the patient's pain score is 4 to 5/10 on the verbal numerical pain rating scale  The patient states that his pain is primarily in his low back  He describes his pain as worse in the morning, evening, and night    His pain is constant in nature and he describes the quality of his pain as sharp, throbbing, shooting, numbness, and pins and needles    He does report excellent pain relief with the caudal epidural steroid injection for about 1 weeks duration  He also states that etodolac 300 mg 3 times daily as needed for pain, with food, lidocaine 5% patch as needed, and tramadol 50 mg 2 tablets twice daily as needed for pain is giving him significant relief of symptoms  He denies opioid induced constipation  Other than as stated above, the patient denies any interval changes in medications, medical condition, mental condition, symptoms, or allergies since the last office visit  Review of Systems:    Review of Systems   Respiratory: Negative for shortness of breath  Cardiovascular: Negative for chest pain  Gastrointestinal: Negative for constipation, diarrhea, nausea and vomiting  Musculoskeletal: Positive for gait problem and joint swelling  Negative for arthralgias and myalgias  Skin: Negative for rash  Neurological: Negative for dizziness, seizures and weakness  All other systems reviewed and are negative          Patient Active Problem List   Diagnosis    Heartburn    Erectile dysfunction of non-organic origin    DM [facility]di[facility] 2) [facility]    Chronic low back pain    Lumbar radiculopathy    BPH with urinary obstruction    Blood pressure elevated without history of HTN    Sciatica    Postlaminectomy syndrome, lumbar    Myofascial pain syndrome    Lumbar spondylosis    Herniation of lumbar intervertebral disc with radiculopathy    Acute post-operative pain    Insomnia    Chronic pain syndrome    Low back pain    Class 1 obesity due to excess calories with serious comorbidity and body mass index (BMI) of 30 0 to 30 9 in adult    Smoker    Seasonal allergic rhinitis    Diabetic mononeuropathy associated with type 2 diabetes mellitus ([facility] [address]    Postlaminectomy syndrome, lumbar region       Past Medical History:   Diagnosis Date    Arthritis     BPH (benign prostatic hypertrophy) with urinary obstruction     Chronic pain disorder     Diabetes mellitus ([facility] [address] )     type 2    Ear infection     Herniation of lumbar intervertebral disc with radiculopathy     Lumbago     Myofascial pain syndrome     Sciatica        Past Surgical History:   Procedure Laterality Date    ABSCESS DRAINAGE      groin    BACK SURGERY      implant - resolved 2004    CAUDAL BLOCK N/A 10/26/2018    Procedure: Caudal Epidural Steroid Injection (15692); Surgeon: Elroy Elliott MD;  Location: [facility] MAIN OR;  Service: Pain Management     COLONOSCOPY      NERVE BLOCK Bilateral 4/26/2018    Procedure: B/L L3 L4 L5 S1 MBB #1 (08640,[id number]); Surgeon: Elroy Elliott MD;  Location: [facility] MAIN OR;  Service: Pain Management     NERVE BLOCK Bilateral 5/11/2018    Procedure: B/L L3 L4 L5 S1 Medial Branch Block #2;  Surgeon: Elroy Elliott MD;  Location: [facility] MAIN OR;  Service: Pain Management     NOSE SURGERY      MT COLONOSCOPY FLX DX W/COLLJ SPEC WHEN PFRMD N/A 3/6/2017    Procedure: COLONOSCOPY;  Surgeon: Shalini Bridges MD;  [facility]; Service: Gastroenterology    MT SURG IMPLNT NEUROELECT,EPIDURAL Left 10/3/2017    Procedure: PLACEMENT THORACIC FOR INSERTION DORSAL COLUMN SPINAL CORD STIMULATOR (DCS) WITH BUTTOCK IMPLANTABLE PULSE GENERATOR(IMPULSE); Surgeon: Deirdre Menezes MD;  Location:  MAIN OR;  Service: Neurosurgery    [address] [place] [address] Right 5/18/2018    Procedure: Rt L3 L4 L5 S1 Radio Frequency Ablation ([id number]); Surgeon: Elroy Elliott MD;  Location: [facility] MAIN OR;  Service: Pain Management     RADIOFREQUENCY ABLATION Left 6/1/2018    Procedure: Lt L3 L4 L5 S1 Radio Frequency Ablation ([id number],[id number]);   Surgeon: Elroy Elliott MD;  Location: [facility] MAIN OR;  Service: Pain Management        Family History   Problem Relation Age of Onset    Diabetes Mother     Diabetes Father mellitus     Heart attack Father 58    Hypertension Father        Social History     Occupational History     for [employer]       Social History Main Topics    Smoking status: Current Every Day Smoker     Packs/day: 1 00     Years: 35 00    Smokeless tobacco: Never Used      Comment: encouraged smoking cessation - history of smoking 30 or more pack years     Alcohol use No      Comment: rare    Drug use: No    Sexual activity: Not on file         Current Outpatient Prescriptions:     atorvastatin (LIPITOR) 20 mg tablet, Take 1 tablet (20 mg total) by mouth daily, Disp: 90 tablet, Rfl: 0    fluticasone (FLONASE) 50 mcg/act nasal spray, 2 sprays into each nostril daily, Disp: 16 g, Rfl: 0    gabapentin (NEURONTIN) 100 mg capsule, Take 1 capsule (100 mg total) by mouth 3 (three) times a day, Disp: 270 capsule, Rfl: 0    glipiZIDE (GLUCOTROL XL) 10 mg 24 hr tablet, Take 1 tablet (10 mg total) by mouth daily, Disp: 90 tablet, Rfl: 0    lidocaine (LIDODERM) 5 %, Apply 2 patches topically daily Remove & Discard patch within 12 hours or as directed by MD, Disp: 60 patch, Rfl: 0    metFORMIN (GLUCOPHAGE) 1000 MG tablet, Take 1 tablet (1,000 mg total) by mouth 2 (two) times a day with meals, Disp: 180 tablet, Rfl: 0    omeprazole (PriLOSEC) 20 mg delayed release capsule, Take 20 mg by mouth daily, Disp: , Rfl:     tamsulosin (FLOMAX) 0 4 mg, Take 2 capsules (0 8 mg total) by mouth daily with dinner, Disp: 180 capsule, Rfl: 0    traMADol (ULTRAM) 50 mg tablet, Take 2 tablets (100 mg total) by mouth 2 (two) times a day as needed for severe pain Fill dates:  11/27/18 and 12/26/18, Disp: 120 tablet, Rfl: 1    traZODone (DESYREL) 50 mg tablet, Take 1 tablet (50 mg total) by mouth daily at bedtime, Disp: 90 tablet, Rfl: 0    etodolac (LODINE) 300 MG capsule, Take 1 capsule (300 mg total) by mouth 3 (three) times a day as needed (pain (with food)) for up to 30 days, Disp: 90 capsule, Rfl: 1    Allergies   Allergen Reactions    Penicillins Swelling       Physical Exam:    /66 (BP Location: Left arm, Patient Position: Sitting, Cuff Size: Standard)   Pulse 75   Resp 16   Ht 5' 11" (1 803 m)   Wt 101 kg (223 lb)   BMI 31 10 kg/m²     Constitutional:normal, well developed, well nourished, alert, in no distress and non-toxic and no overt pain behavior    Eyes:anicteric  HEENT:grossly intact  Neck:supple, symmetric, trachea midline and no masses   Pulmonary:even and unlabored  Cardiovascular:No edema or pitting edema present  Skin:Normal without rashes or lesions and well hydrated  Psychiatric:Mood and affect appropriate  Neurologic:Cranial Nerves II-XII grossly intact  Musculoskeletal:normal

## 2018-11-27 NOTE — LETTER
November 27, 2018     Tiff FilibertoDO  [address]  [place] [address]    Patient: Daxa Vila   YOB: 1960   Date of Visit: 11/27/2018       Dear Dr Mariama Hughes:    Thank you for referring Daxa Vila to me for evaluation  Below are my notes for this consultation  If you have questions, please do not hesitate to call me  I look forward to following your patient along with you  Sincerely,        Deonna Marcano MD        CC: No Recipients  Deonna Marcano MD  11/27/2018  8:15 AM  Sign at close encounter  Pain Medicine Follow-Up Note    Assessment:  1  Chronic pain syndrome    2  Chronic low back pain, unspecified back pain laterality, with sciatica presence unspecified    3  Postlaminectomy syndrome, lumbar    4  Lumbar spondylosis    5  Lumbar radiculopathy        Plan:  New Medications Ordered This Visit   Medications    lidocaine (LIDODERM) 5 %     Sig: Apply 2 patches topically daily Remove & Discard patch within 12 hours or as directed by MD     Dispense:  60 patch     Refill:  0    etodolac (LODINE) 300 MG capsule     Sig: Take 1 capsule (300 mg total) by mouth 3 (three) times a day as needed (pain (with food)) for up to 30 days     Dispense:  90 capsule     Refill:  1    traMADol (ULTRAM) 50 mg tablet     Sig: Take 2 tablets (100 mg total) by mouth 2 (two) times a day as needed for severe pain Fill dates:  11/27/18 and 12/26/18     Dispense:  120 tablet     Refill:  1     My impressions and treatment recommendations were discussed in detail with the patient who verbalized understanding and had no further questions  Patient does report excellent pain relief with the caudal epidural steroid injection, but he only received relief for about 1 weeks duration  He is amenable to undergoing a repeat injection at this time  As such, I felt a reasonable to have the patient undergo a repeat caudal epidural steroid injection since this could be potentially therapeutic      The procedures, its risks, and benefits were explained in detail to the patient  Risks include but are not limited to bleeding, infection, hematoma formation, abscess formation, weakness, headache, failure the pain to improve, nerve irritation or damage, and potential worsening of the pain  The patient verbalized understanding and wished to proceed with the procedure  I also felt a reasonable to continue the patient on tramadol 50 mg 2 tablets twice daily as needed for pain, etodolac 300 mg 3 times daily as needed for pain, with food, and lidocaine 5% patch applied topically as needed  Side effects were reviewed with the patient  The risks and side effects of chronic opioid treatment were discussed in detail with the patient  Side effects include but are not limited to nausea, vomiting, GI intolerance, sedation, constipation, mental clouding, opioid-induced hyperalgesia, endocrine dysfunction, addiction, dependence, and tolerance  The patient was asked to take his medications only as prescribed and directed, never in excess, and never for any other reason other than for pain control  The patient was also asked to keep his medications out of the reach of others and away from children, preferably in a locked drawer  The patient verbalized understanding and wished to use these opioid medications  Follow-up is planned with the patient in 2 months time or sooner as warranted  Discharge instructions were provided  I personally saw and examined the patient and I agree with the above discussed plan of care  [place] Prescription Drug Monitoring Program report was reviewed and was appropriate     History of Present Illness:    Mayito Santiago is a 62 y o  male who presents to [facility] for interval re-evaluation of the above stated pain complaints  The patient has a past medical and chronic pain history as outlined in the assessment section   He was last seen on October 26, 2018 if the at which time he underwent a caudal epidural steroid injection  He has also been maintained on etodolac 300 mg 3 times daily as needed for pain, with food, lidocaine 5% patch applied topically as needed, and tramadol 50 mg 2 tablets twice daily as needed for pain  At today's office visit, the patient's pain score is 4 to 5/10 on the verbal numerical pain rating scale  The patient states that his pain is primarily in his low back  He describes his pain as worse in the morning, evening, and night  His pain is constant in nature and he describes the quality of his pain as sharp, throbbing, shooting, numbness, and pins and needles    He does report excellent pain relief with the caudal epidural steroid injection for about 1 weeks duration  He also states that etodolac 300 mg 3 times daily as needed for pain, with food, lidocaine 5% patch as needed, and tramadol 50 mg 2 tablets twice daily as needed for pain is giving him significant relief of symptoms  He denies opioid induced constipation  Other than as stated above, the patient denies any interval changes in medications, medical condition, mental condition, symptoms, or allergies since the last office visit  Review of Systems:    Review of Systems   Respiratory: Negative for shortness of breath  Cardiovascular: Negative for chest pain  Gastrointestinal: Negative for constipation, diarrhea, nausea and vomiting  Musculoskeletal: Positive for gait problem and joint swelling  Negative for arthralgias and myalgias  Skin: Negative for rash  Neurological: Negative for dizziness, seizures and weakness  All other systems reviewed and are negative          Patient Active Problem List   Diagnosis    Heartburn    Erectile dysfunction of non-organic origin    DM type 2 (diabetes mellitus, type 2) [facility])    Chronic low back pain    Lumbar radiculopathy    BPH with urinary obstruction    Blood pressure elevated without history of HTN    Sciatica    Postlaminectomy syndrome, lumbar    Myofascial pain syndrome    Lumbar spondylosis    Herniation of lumbar intervertebral disc with radiculopathy    Acute post-operative pain    Insomnia    Chronic pain syndrome    Low back pain    Class 1 obesity due to excess calories with serious comorbidity and body mass index (BMI) of 30 0 to 30 9 in adult    Smoker    Seasonal allergic rhinitis    Diabetic [facility]one[facility]athy associated with type 2 [facility]    Postlaminectomy syndrome, lumbar region       Past Medical History:   Diagnosis Date    Arthritis     BPH (benign prostatic hypertrophy) with urinary obstruction     Chronic pain disorder     Diabetes mellitus ([facility] [address] )     type 2    Ear infection     Herniation of lumbar intervertebral disc with radiculopathy     Lumbago     Myofascial pain syndrome     Sciatica        Past Surgical History:   Procedure Laterality Date    ABSCESS DRAINAGE      groin    BACK SURGERY      implant - resolved 2004    CAUDAL BLOCK N/A 10/26/2018    Procedure: Caudal Epidural Steroid Injection (56425); Surgeon: Dominique Swann MD;  Location: [facility] MAIN OR;  Service: Pain Management     COLONOSCOPY      NERVE BLOCK Bilateral 4/26/2018    Procedure: B/L L3 L4 L5 S1 MBB #1 ([id number]); Surgeon: Dominique Swann MD;  Location: [facility] MAIN OR;  Service: Pain Management     NERVE BLOCK Bilateral 5/11/2018    Procedure: B/L L3 L4 L5 S1 Medial Branch Block #2;  Surgeon: Dominique Swann MD;  Location: [facility] MAIN OR;  Service: Pain Management     NOSE SURGERY      OR COLONOSCOPY FLX DX W/COLLJ SPEC WHEN PFRMD N/A 3/6/2017    Procedure: COLONOSCOPY;  Surgeon: Heath Chavez MD;  [facility]; Service: Gastroenterology    OR SURG IMPLNT NEUROELECT,EPIDURAL Left 10/3/2017    Procedure: PLACEMENT THORACIC FOR INSERTION DORSAL COLUMN SPINAL CORD STIMULATOR (DCS) WITH BUTTOCK IMPLANTABLE PULSE GENERATOR(IMPULSE);   Surgeon: Bernabe Gross MD;  Location:  MAIN OR;  Service: Neurosurgery    [address] Right 5/18/2018    Procedure: Rt L3 L4 L5 S1 Radio Frequency Ablation ([id number]); Surgeon: Deonna Marcano MD;  Location: [facility] MAIN OR;  Service: Pain Management     RADIOFREQUENCY ABLATION Left 6/1/2018    Procedure: Lt L3 L4 L5 S1 Radio Frequency Ablation ([id number]);   Surgeon: Deonna Marcano MD;  Location: [facility] MAIN OR;  Service: Pain Management        Family History   Problem Relation Age of Onset    Diabetes Mother     Diabetes Father         mellitus     Heart attack Father 58    Hypertension Father        Social History     Occupational History     for [employer]       Social History Main Topics    Smoking status: Current Every Day Smoker     Packs/day: 1 00     Years: 35 00    Smokeless tobacco: Never Used      Comment: encouraged smoking cessation - history of smoking 30 or more pack years     Alcohol use No      Comment: rare    Drug use: No    Sexual activity: Not on file         Current Outpatient Prescriptions:     atorvastatin (LIPITOR) 20 mg tablet, Take 1 tablet (20 mg total) by mouth daily, Disp: 90 tablet, Rfl: 0    fluticasone (FLONASE) 50 mcg/act nasal spray, 2 sprays into each nostril daily, Disp: 16 g, Rfl: 0    gabapentin (NEURONTIN) 100 mg capsule, Take 1 capsule (100 mg total) by mouth 3 (three) times a day, Disp: 270 capsule, Rfl: 0    glipiZIDE (GLUCOTROL XL) 10 mg 24 hr tablet, Take 1 tablet (10 mg total) by mouth daily, Disp: 90 tablet, Rfl: 0    lidocaine (LIDODERM) 5 %, Apply 2 patches topically daily Remove & Discard patch within 12 hours or as directed by MD, Disp: 60 patch, Rfl: 0    metFORMIN (GLUCOPHAGE) 1000 MG tablet, Take 1 tablet (1,000 mg total) by mouth 2 (two) times a day with meals, Disp: 180 tablet, Rfl: 0    omeprazole (PriLOSEC) 20 mg delayed release capsule, Take 20 mg by mouth daily, Disp: , Rfl:     tamsulosin (FLOMAX) 0 4 mg, Take 2 capsules (0 8 mg total) by mouth daily with dinner, Disp: 180 capsule, Rfl: 0    traMADol (ULTRAM) 50 mg tablet, Take 2 tablets (100 mg total) by mouth 2 (two) times a day as needed for severe pain Fill dates:  11/27/18 and 12/26/18, Disp: 120 tablet, Rfl: 1    traZODone (DESYREL) 50 mg tablet, Take 1 tablet (50 mg total) by mouth daily at bedtime, Disp: 90 tablet, Rfl: 0    etodolac (LODINE) 300 MG capsule, Take 1 capsule (300 mg total) by mouth 3 (three) times a day as needed (pain (with food)) for up to 30 days, Disp: 90 capsule, Rfl: 1    Allergies   Allergen Reactions    Penicillins Swelling       Physical Exam:    /66 (BP Location: Left arm, Patient Position: Sitting, Cuff Size: Standard)   Pulse 75   Resp 16   Ht 5' 11" (1 803 m)   Wt 101 kg (223 lb)   BMI 31 10 kg/m²      Constitutional:normal, well developed, well nourished, alert, in no distress and non-toxic and no overt pain behavior    Eyes:anicteric  HEENT:grossly intact  Neck:supple, symmetric, trachea midline and no masses   Pulmonary:even and unlabored  Cardiovascular:No edema or pitting edema present  Skin:Normal without rashes or lesions and well hydrated  Psychiatric:Mood and affect appropriate  Neurologic:Cranial Nerves II-XII grossly intact  Musculoskeletal:normal

## 2018-11-30 ENCOUNTER — HOSPITAL ENCOUNTER (OUTPATIENT)
Dept: RADIOLOGY | Facility: HOSPITAL | Age: 58
Setting detail: OUTPATIENT SURGERY
Discharge: HOME | End: 2018-11-30
Payer: OTHER MISCELLANEOUS

## 2018-11-30 ENCOUNTER — HOSPITAL ENCOUNTER (OUTPATIENT)
Facility: AMBULARY SURGERY CENTER | Age: 58
Setting detail: OUTPATIENT SURGERY
Discharge: HOME | End: 2018-11-30
Attending: ANESTHESIOLOGY | Admitting: ANESTHESIOLOGY
Payer: OTHER MISCELLANEOUS

## 2018-11-30 VITALS
TEMPERATURE: 95.3 F | OXYGEN SATURATION: 98 % | RESPIRATION RATE: 18 BRPM | SYSTOLIC BLOOD PRESSURE: 138 MMHG | HEART RATE: 79 BPM | DIASTOLIC BLOOD PRESSURE: 81 MMHG | BODY MASS INDEX: 31.1 KG/M2 | WEIGHT: 223 LBS

## 2018-11-30 LAB — GLUCOSE SERPL-MCNC: 152 MG/DL (ref 65–140)

## 2018-11-30 PROCEDURE — 62323 NJX INTERLAMINAR LMBR/SAC: CPT | Performed by: ANESTHESIOLOGY

## 2018-11-30 PROCEDURE — 82948 REAGENT STRIP/BLOOD GLUCOSE: CPT

## 2018-11-30 PROCEDURE — 72220 X-RAY EXAM SACRUM TAILBONE: CPT

## 2018-11-30 RX ORDER — LIDOCAINE WITH 8.4% SOD BICARB 0.9%(10ML)
SYRINGE (ML) INJECTION AS NEEDED
Status: DISCONTINUED | OUTPATIENT
Start: 2018-11-30 | End: 2018-11-30 | Stop reason: HOSPADM

## 2018-11-30 RX ORDER — METHYLPREDNISOLONE ACETATE 80 MG/ML
INJECTION, SUSPENSION INTRA-ARTICULAR; INTRALESIONAL; INTRAMUSCULAR; SOFT TISSUE AS NEEDED
Status: DISCONTINUED | OUTPATIENT
Start: 2018-11-30 | End: 2018-11-30 | Stop reason: HOSPADM

## 2018-11-30 RX ORDER — LIDOCAINE HYDROCHLORIDE 10 MG/ML
INJECTION, SOLUTION EPIDURAL; INFILTRATION; INTRACAUDAL; PERINEURAL AS NEEDED
Status: DISCONTINUED | OUTPATIENT
Start: 2018-11-30 | End: 2018-11-30 | Stop reason: HOSPADM

## 2018-11-30 RX ORDER — METHYLPREDNISOLONE ACETATE 40 MG/ML
INJECTION, SUSPENSION INTRA-ARTICULAR; INTRALESIONAL; INTRAMUSCULAR; SOFT TISSUE AS NEEDED
Status: DISCONTINUED | OUTPATIENT
Start: 2018-11-30 | End: 2018-11-30 | Stop reason: HOSPADM

## 2018-11-30 RX ORDER — 0.9 % SODIUM CHLORIDE 0.9 %
VIAL (ML) INJECTION AS NEEDED
Status: DISCONTINUED | OUTPATIENT
Start: 2018-11-30 | End: 2018-11-30 | Stop reason: HOSPADM

## 2018-11-30 NOTE — OP NOTE
ATTENDING PHYSICIAN:  Maliha Wagner MD     PROCEDURE: Caudal epidural steroid injection under fluoroscopic guidance  PREPROCEDURE DIAGNOSIS:  Lumbar post-laminectomy syndrome  POSTPROCEDURE DIAGNOSIS:  Lumbar post-laminectomy syndrome  ANESTHESIA:  Local     ESTIMATED BLOOD LOSS:  Minimal     COMPLICATIONS:  None  LOCATION:  [address]  CONSENT:  Today's procedure, its potential benefits as well as its risks and potential side effects were reviewed  Discussed risks of the procedure including bleeding, infection, nerve irritation or damage, reactions to the medications, headache, failure of the pain to improve, and potential worsening of the pain were explained in detail to the patient who verbalized understanding and who wished to proceed  Written informed consent was thereby obtained  DESCRIPTION OF THE PROCEDURE: After written informed consent was obtained, the patient was taken to the fluoroscopy suite and placed in the prone position  Anatomical landmarks were identified by palpation to identify the sacral hiatus and by way of fluoroscopy in the PA and lateral views  The skin overlying the sacral hiatus region was prepped using antiseptic applied x3 and draped in the usual sterile fashion  Strict aseptic technique was utilized  The skin and subcutaneous tissues at the needle entry site were infiltrated with 3 mL of 1% preservative-free lidocaine using a 25-gauge 1-1/2-inch needle  A 22-gauge spinal needle was then advanced under fluoroscopic guidance in the lateral view at the sacral hiatus and guided to enter the sacral canal and directed towards the epidural space  Proper placement into the epidural space of the sacral canal in the midline was confirmed with fluoroscopy in the PA view  After negative aspiration for CSF or heme, contrast was injected which delineated the epidural space under fluoroscopy in the PA and lateral views      After negative aspiration was reconfirmed, a 13 mL injectate consisting of 1 mL of 40 mg/mL of Depo-Medrol, 1 mL of 80 mg/mL of Depo-Medrol, 3 mL of 1% preservative-free lidocaine, and 8 mL of preservative-free normal saline was slowly injected incrementally with negative aspiration between aliquots  The patient tolerated the procedure well and all needles were removed intact  Hemostasis was obtained and there were no paresthesias or apparent complications  The skin was wiped free of the antiseptic and a Band-Aid was placed as appropriate  The patient was monitored for an appropriate period of time following the procedure and remained hemodynamically stable and neurovascularly intact following the procedure  The patient was ultimately discharged to home with supervision in good condition and instructed to call the office in approximately 7-10 days with an update or sooner as warranted  Discharge and follow up instructions were provided  I was present for and participated in all key and critical portions of this procedure      Marvel Ahn MD  11/30/2018  8:04 AM

## 2018-11-30 NOTE — H&P (VIEW-ONLY)
Pain Medicine Follow-Up Note    Assessment:  1  Chronic pain syndrome    2  Chronic low back pain, unspecified back pain laterality, with sciatica presence unspecified    3  Postlaminectomy syndrome, lumbar    4  Lumbar spondylosis    5  Lumbar radiculopathy        Plan:  New Medications Ordered This Visit   Medications    lidocaine (LIDODERM) 5 %     Sig: Apply 2 patches topically daily Remove & Discard patch within 12 hours or as directed by MD     Dispense:  60 patch     Refill:  0    etodolac (LODINE) 300 MG capsule     Sig: Take 1 capsule (300 mg total) by mouth 3 (three) times a day as needed (pain (with food)) for up to 30 days     Dispense:  90 capsule     Refill:  1    traMADol (ULTRAM) 50 mg tablet     Sig: Take 2 tablets (100 mg total) by mouth 2 (two) times a day as needed for severe pain Fill dates:  11/27/18 and 12/26/18     Dispense:  120 tablet     Refill:  1     My impressions and treatment recommendations were discussed in detail with the patient who verbalized understanding and had no further questions  Patient does report excellent pain relief with the caudal epidural steroid injection, but he only received relief for about 1 weeks duration  He is amenable to undergoing a repeat injection at this time  As such, I felt a reasonable to have the patient undergo a repeat caudal epidural steroid injection since this could be potentially therapeutic  The procedures, its risks, and benefits were explained in detail to the patient  Risks include but are not limited to bleeding, infection, hematoma formation, abscess formation, weakness, headache, failure the pain to improve, nerve irritation or damage, and potential worsening of the pain  The patient verbalized understanding and wished to proceed with the procedure      I also felt a reasonable to continue the patient on tramadol 50 mg 2 tablets twice daily as needed for pain, etodolac 300 mg 3 times daily as needed for pain, with food, and lidocaine 5% patch applied topically as needed  Side effects were reviewed with the patient  The risks and side effects of chronic opioid treatment were discussed in detail with the patient  Side effects include but are not limited to nausea, vomiting, GI intolerance, sedation, constipation, mental clouding, opioid-induced hyperalgesia, endocrine dysfunction, addiction, dependence, and tolerance  The patient was asked to take his medications only as prescribed and directed, never in excess, and never for any other reason other than for pain control  The patient was also asked to keep his medications out of the reach of others and away from children, preferably in a locked drawer  The patient verbalized understanding and wished to use these opioid medications  Follow-up is planned with the patient in 2 months time or sooner as warranted  Discharge instructions were provided  I personally saw and examined the patient and I agree with the above discussed plan of care  [place] Prescription Drug Monitoring Program report was reviewed and was appropriate     History of Present Illness:    Av Salazar is a 62 y o  male who presents to [facility] for interval re-evaluation of the above stated pain complaints  The patient has a past medical and chronic pain history as outlined in the assessment section  He was last seen on October 26, 2018 if the at which time he underwent a caudal epidural steroid injection  He has also been maintained on etodolac 300 mg 3 times daily as needed for pain, with food, lidocaine 5% patch applied topically as needed, and tramadol 50 mg 2 tablets twice daily as needed for pain  At today's office visit, the patient's pain score is 4 to 5/10 on the verbal numerical pain rating scale  The patient states that his pain is primarily in his low back  He describes his pain as worse in the morning, evening, and night    His pain is constant in nature and he describes the quality of his pain as sharp, throbbing, shooting, numbness, and pins and needles    He does report excellent pain relief with the caudal epidural steroid injection for about 1 weeks duration  He also states that etodolac 300 mg 3 times daily as needed for pain, with food, lidocaine 5% patch as needed, and tramadol 50 mg 2 tablets twice daily as needed for pain is giving him significant relief of symptoms  He denies opioid induced constipation  Other than as stated above, the patient denies any interval changes in medications, medical condition, mental condition, symptoms, or allergies since the last office visit  Review of Systems:    Review of Systems   Respiratory: Negative for shortness of breath  Cardiovascular: Negative for chest pain  Gastrointestinal: Negative for constipation, diarrhea, nausea and vomiting  Musculoskeletal: Positive for gait problem and joint swelling  Negative for arthralgias and myalgias  Skin: Negative for rash  Neurological: Negative for dizziness, seizures and weakness  All other systems reviewed and are negative          Patient Active Problem List   Diagnosis    Heartburn    Erectile dysfunction of non-organic origin    DM type 2 [facility]diabetes mellitus, type 2) [facility])    Chronic low back pain    Lumbar radiculopathy    BPH with urinary obstruction    Blood pressure elevated without history of HTN    Sciatica    Postlaminectomy syndrome, lumbar    Myofascial pain syndrome    Lumbar spondylosis    Herniation of lumbar intervertebral disc with radiculopathy    Acute post-operative pain    Insomnia    Chronic pain syndrome    Low back pain    Class 1 obesity due to excess calories with serious comorbidity and body mass index (BMI) of 30 0 to 30 9 in adult    Smoker    Seasonal allergic rhinitis    Diabetic mononeuropathy associated with type 2 diabetes mellitus ([facility] [address] )    Postlaminectomy syndrome, lumbar region       Past Medical History:   Diagnosis Date    Arthritis     BPH (benign prostatic hypertrophy) with urinary obstruction     Chronic pain disorder     Diabetes mellitus ([facility] [address] )     type 2    Ear infection     Herniation of lumbar intervertebral disc with radiculopathy     Lumbago     Myofascial pain syndrome     Sciatica        Past Surgical History:   Procedure Laterality Date    ABSCESS DRAINAGE      groin    BACK SURGERY      implant - resolved 2004    CAUDAL BLOCK N/A 10/26/2018    Procedure: Caudal Epidural Steroid Injection (30226); Surgeon: Nicolasa Castro MD;  Location: [facility] MAIN OR;  Service: Pain Management     COLONOSCOPY      NERVE BLOCK Bilateral 4/26/2018    Procedure: B/L L3 L4 L5 S1 MBB #1 (68404,[id number]); Surgeon: Nicolasa Castro MD;  Location: [facility] MAIN OR;  Service: Pain Management     NERVE BLOCK Bilateral 5/11/2018    Procedure: B/L L3 L4 L5 S1 Medial Branch Block #2;  Surgeon: Nicolasa Castro MD;  Location: [facility] MAIN OR;  Service: Pain Management     NOSE SURGERY      SC COLONOSCOPY FLX DX W/COLLJ SPEC WHEN PFRMD N/A 3/6/2017    Procedure: COLONOSCOPY;  Surgeon: Denae Mcclain MD;  [facility]: [facility]; Service: Gastroenterology    SC SURG IMPLNT NEUROELECT,EPIDURAL Left 10/3/2017    Procedure: PLACEMENT THORACIC FOR INSERTION DORSAL COLUMN SPINAL CORD STIMULATOR (DCS) WITH BUTTOCK IMPLANTABLE PULSE GENERATOR(IMPULSE); Surgeon: Magalie Gar MD;  Location:  MAIN OR;  Service: Neurosurgery    [address] Right 5/18/2018    Procedure: Rt L3 L4 L5 S1 Radio Frequency Ablation (22535,[id number]); Surgeon: Nicolasa Castro MD;  Location: [facility] MAIN OR;  Service: Pain Management     RADIOFREQUENCY ABLATION Left 6/1/2018    Procedure: Lt L3 L4 L5 S1 Radio Frequency Ablation ([id number],[id number]);   Surgeon: Nicolasa Castro MD;  Location: [facility] MAIN OR;  Service: Pain Management        Family History   Problem Relation Age of Onset    Diabetes Mother     Diabetes Father mellitus     Heart attack Father 58    Hypertension Father        Social History     Occupational History     for [employer]       Social History Main Topics    Smoking status: Current Every Day Smoker     Packs/day: 1 00     Years: 35 00    Smokeless tobacco: Never Used      Comment: encouraged smoking cessation - history of smoking 30 or more pack years     Alcohol use No      Comment: rare    Drug use: No    Sexual activity: Not on file         Current Outpatient Prescriptions:     atorvastatin (LIPITOR) 20 mg tablet, Take 1 tablet (20 mg total) by mouth daily, Disp: 90 tablet, Rfl: 0    fluticasone (FLONASE) 50 mcg/act nasal spray, 2 sprays into each nostril daily, Disp: 16 g, Rfl: 0    gabapentin (NEURONTIN) 100 mg capsule, Take 1 capsule (100 mg total) by mouth 3 (three) times a day, Disp: 270 capsule, Rfl: 0    glipiZIDE (GLUCOTROL XL) 10 mg 24 hr tablet, Take 1 tablet (10 mg total) by mouth daily, Disp: 90 tablet, Rfl: 0    lidocaine (LIDODERM) 5 %, Apply 2 patches topically daily Remove & Discard patch within 12 hours or as directed by MD, Disp: 60 patch, Rfl: 0    metFORMIN (GLUCOPHAGE) 1000 MG tablet, Take 1 tablet (1,000 mg total) by mouth 2 (two) times a day with meals, Disp: 180 tablet, Rfl: 0    omeprazole (PriLOSEC) 20 mg delayed release capsule, Take 20 mg by mouth daily, Disp: , Rfl:     tamsulosin (FLOMAX) 0 4 mg, Take 2 capsules (0 8 mg total) by mouth daily with dinner, Disp: 180 capsule, Rfl: 0    traMADol (ULTRAM) 50 mg tablet, Take 2 tablets (100 mg total) by mouth 2 (two) times a day as needed for severe pain Fill dates:  11/27/18 and 12/26/18, Disp: 120 tablet, Rfl: 1    traZODone (DESYREL) 50 mg tablet, Take 1 tablet (50 mg total) by mouth daily at bedtime, Disp: 90 tablet, Rfl: 0    etodolac (LODINE) 300 MG capsule, Take 1 capsule (300 mg total) by mouth 3 (three) times a day as needed (pain (with food)) for up to 30 days, Disp: 90 capsule, Rfl: 1    Allergies   Allergen Reactions    Penicillins Swelling       Physical Exam:    /66 (BP Location: Left arm, Patient Position: Sitting, Cuff Size: Standard)   Pulse 75   Resp 16   Ht 5' 11" (1 803 m)   Wt 101 kg (223 lb)   BMI 31 10 kg/m²     Constitutional:normal, well developed, well nourished, alert, in no distress and non-toxic and no overt pain behavior    Eyes:anicteric  HEENT:grossly intact  Neck:supple, symmetric, trachea midline and no masses   Pulmonary:even and unlabored  Cardiovascular:No edema or pitting edema present  Skin:Normal without rashes or lesions and well hydrated  Psychiatric:Mood and affect appropriate  Neurologic:Cranial Nerves II-XII grossly intact  Musculoskeletal:normal

## 2018-11-30 NOTE — DISCHARGE INSTRUCTIONS
Epidural Steroid Injection   WHAT YOU NEED TO KNOW:   An epidural steroid injection (VIDHYA) is a procedure to inject steroid medicine into the epidural space  The epidural space is between your spinal cord and vertebrae  Steroids reduce inflammation and fluid buildup in your spine that may be causing pain  You may be given pain medicine along with the steroids  ACTIVITY  · Do not drive or operate machinery today  · No strenuous activity today - bending, lifting, etc   · You may resume normal activites starting tomorrow - start slowly and as tolerated  · You may shower today, but no tub baths or hot tubs  · You may have numbness for several hours from the local anesthetic  Please use caution and common sense, especially with weight-bearing activities  CARE OF THE INJECTION SITE  · If you have soreness or pain, apply ice to the area today (20 minutes on/20 minutes off)  · Starting tomorrow, you may use warm, moist heat or ice if needed  · You may have an increase or change in your discomfort for 36-48 hours after your treatment  · Apply ice and continue with any pain medication you have been prescribed  · [facility]ify [facility] if you have any of the following: redness, drainage, swelling, headache, stiff neck or fever above 100°F     SPECIAL INSTRUCTIONS  · Our office will contact you in approximately 7 days for a progress report  MEDICATIONS  · Continue to take all routine medications  · Our office may have instructed you to hold some medications  If you have a problem specifically related to your procedure, please call our office at [phone]  Problems not related to your procedure should be directed to your primary care physician

## 2018-12-05 ENCOUNTER — OFFICE VISIT (OUTPATIENT)
Dept: FAMILY MEDICINE CLINIC | Facility: CLINIC | Age: 58
End: 2018-12-05
Payer: COMMERCIAL

## 2018-12-05 VITALS
RESPIRATION RATE: 18 BRPM | SYSTOLIC BLOOD PRESSURE: 128 MMHG | DIASTOLIC BLOOD PRESSURE: 62 MMHG | HEART RATE: 82 BPM | OXYGEN SATURATION: 96 % | WEIGHT: 224.8 LBS | BODY MASS INDEX: 31.47 KG/M2 | HEIGHT: 71 IN

## 2018-12-05 DIAGNOSIS — R07.89 CHEST TIGHTNESS: Primary | ICD-10-CM

## 2018-12-05 DIAGNOSIS — R07.81 RIB PAIN: ICD-10-CM

## 2018-12-05 DIAGNOSIS — E78.2 MIXED HYPERLIPIDEMIA: ICD-10-CM

## 2018-12-05 DIAGNOSIS — E11.9 TYPE 2 DIABETES MELLITUS WITHOUT COMPLICATION, WITHOUT LONG-TERM CURRENT USE OF INSULIN (HCC): ICD-10-CM

## 2018-12-05 DIAGNOSIS — E11.41 DIABETIC MONONEUROPATHY ASSOCIATED WITH TYPE 2 DIABETES MELLITUS (HCC): ICD-10-CM

## 2018-12-05 PROCEDURE — 93000 ELECTROCARDIOGRAM COMPLETE: CPT | Performed by: FAMILY MEDICINE

## 2018-12-05 PROCEDURE — 99214 OFFICE O/P EST MOD 30 MIN: CPT | Performed by: FAMILY MEDICINE

## 2018-12-05 PROCEDURE — 3008F BODY MASS INDEX DOCD: CPT | Performed by: FAMILY MEDICINE

## 2018-12-05 NOTE — PROGRESS NOTES
Assessment/Plan:   Diagnoses and all orders for this visit:  Chest tightness  - POCT ECG: NSR at 73bpm, no ST-T wave changes, nml intervals   - no other s/s - doubt cardiac in nature   - likely MSK - advised OTC supportive treatment with NSAIDs  - RTO in 3months   - ETC precautions discussed with pt     Rib pain  - nml CXR   - could be rib contusion   - difficult to discern as with PMHx of chronic back pain  - does see pain management - currently on Tramadol 100mg BID PRN and Lidoderm patch   - advised OTC supportive treatment with NSAIDs    Type 2 diabetes mellitus without complication, without long-term current use of insulin (HCC)  Diabetic mononeuropathy associated with type 2 diabetes mellitus (HCC)  - HbA1c 6 8   - nml urine microalbumin   - LDL 64   - cont current regimen: Glipizide 10mg QD and Metformin 1000mg BID  - cont Gabapentin for Neuropathy    - discussed diet and exercise  - will repeat labs in 3months   - does not want to follow with Podiatry   - aware that has to f/u with Optho - will do so when regains insurance in Feb 2019     Mixed hyperlipidemia  - Lipids under good control - LDL 64  - cont current regimen of Lipitor 20mg QHS         Subjective:    Patient ID: Adalberto Ormond is a 62 y o  male    63yo M presents to the office for f/u   1) Rib pain/Chest pain   - (+) L-sided rib pain which has been ongoing for the past month  - no activity out of the ordinary - no heavy lifting, nothing extrenuous, no trauma   - doesn't feel like when he broke his ribs - at that time couldn't breathe - worried he could have arthritis on his ribs   - states the pain is maybe a 4/10 on the pain scale   - no F/C/N/V/CP/SOB  - (+) intermittent palpitations and chest tightness, but denies CODY and pain radiation up his neck and down his L-arm   - (+) PMHx of DM, HL and is a smoker   - Xray nml and labs as listed below   2) labs  - HbA1c 6 8 <-- 6 7 (has been "cheating" a little and trying to limit himself to 4-pieces of candy/chocolate)  - Lipids within normal limits   - nml urine microalbumin   - CMP with elevated FBS and slightly elevated ALT   3) General   - losing insurance and switching to Medicare/[employer] which starts Feb 1st   - will give 90 day supply of meds at last OV 11/14: Gabapentin, Trazodone, Tamsulosin, Metformin, Glipizide, Atorvastatin   - lung CT (lung ca screening) - same as last year, 2 benign nodules - no change, annual screening recommended   - Gabapentin is working for his neuropathy - sometimes forgets TID and just does BID dosing, doesn't want to f/u with Podiatry   - Sleeping better on Trazodone 50mg QHS   - aware that needs to follow-up with Optho and will do so when gets his insurance       The following portions of the patient's history were reviewed and updated as appropriate: allergies, current medications, past family history, past medical history, past social history, past surgical history and problem list     Review of Systems  as per HPI    Objective:  /62   Pulse 82   Resp 18   Ht 5' 11" (1 803 m)   Wt 102 kg (224 lb 12 8 oz)   SpO2 96%   BMI 31 35 kg/m²    Physical Exam   Constitutional: He is oriented to person, place, and time  He appears well-developed and well-nourished  No distress  HENT:   Head: Normocephalic and atraumatic  Right Ear: External ear normal    Left Ear: External ear normal    Nose: Nose normal    Eyes: Conjunctivae and EOM are normal  Right eye exhibits no discharge  Left eye exhibits no discharge  No scleral icterus  Neck: Normal range of motion  Neck supple  No JVD present  Cardiovascular: Normal rate, regular rhythm and normal heart sounds  Exam reveals no gallop and no friction rub  No murmur heard  Pulmonary/Chest: Effort normal and breath sounds normal  No stridor  No respiratory distress  He has no wheezes  He has no rales  He exhibits no tenderness  Abdominal: Soft   Bowel sounds are normal    Obese abdomen   Musculoskeletal: Normal range of motion  Neurological: He is alert and oriented to person, place, and time  Skin: Skin is warm  He is not diaphoretic  Psychiatric: He has a normal mood and affect  His behavior is normal  Judgment and thought content normal    Vitals reviewed

## 2018-12-18 ENCOUNTER — OFFICE VISIT (OUTPATIENT)
Dept: PAIN MEDICINE | Facility: CLINIC | Age: 58
End: 2018-12-18
Payer: OTHER MISCELLANEOUS

## 2018-12-18 VITALS
BODY MASS INDEX: 31.84 KG/M2 | DIASTOLIC BLOOD PRESSURE: 60 MMHG | WEIGHT: 227.4 LBS | SYSTOLIC BLOOD PRESSURE: 122 MMHG | RESPIRATION RATE: 18 BRPM | HEART RATE: 80 BPM | HEIGHT: 71 IN

## 2018-12-18 DIAGNOSIS — F11.20 UNCOMPLICATED OPIOID DEPENDENCE (HCC): ICD-10-CM

## 2018-12-18 DIAGNOSIS — Z79.891 LONG-TERM CURRENT USE OF OPIATE ANALGESIC: ICD-10-CM

## 2018-12-18 DIAGNOSIS — G89.4 CHRONIC PAIN SYNDROME: Primary | ICD-10-CM

## 2018-12-18 DIAGNOSIS — G89.29 CHRONIC LOW BACK PAIN, UNSPECIFIED BACK PAIN LATERALITY, WITH SCIATICA PRESENCE UNSPECIFIED: ICD-10-CM

## 2018-12-18 DIAGNOSIS — M47.816 LUMBAR SPONDYLOSIS: ICD-10-CM

## 2018-12-18 DIAGNOSIS — M54.16 LUMBAR RADICULOPATHY: ICD-10-CM

## 2018-12-18 DIAGNOSIS — M54.5 CHRONIC LOW BACK PAIN, UNSPECIFIED BACK PAIN LATERALITY, WITH SCIATICA PRESENCE UNSPECIFIED: ICD-10-CM

## 2018-12-18 DIAGNOSIS — M96.1 POSTLAMINECTOMY SYNDROME, LUMBAR: ICD-10-CM

## 2018-12-18 PROCEDURE — 80305 DRUG TEST PRSMV DIR OPT OBS: CPT | Performed by: ANESTHESIOLOGY

## 2018-12-18 PROCEDURE — 99214 OFFICE O/P EST MOD 30 MIN: CPT | Performed by: ANESTHESIOLOGY

## 2018-12-18 NOTE — LETTER
December 18, 2018     Keanu Newman DO  [address] [address]  [place] [address]    Patient: Jessica Greene   YOB: 1960   Date of Visit: 12/18/2018       Dear Dr Nabil Lopez:    Thank you for referring Jessica Greene to me for evaluation  Below are my notes for this consultation  If you have questions, please do not hesitate to call me  I look forward to following your patient along with you  Sincerely,        Vincent Klinefelter, MD        CC: No Recipients  Vincent Klinefelter, MD  12/18/2018  8:44 AM  Sign at close encounter  Pain Medicine Follow-Up Note    Assessment:  1  Chronic pain syndrome    2  Chronic low back pain, unspecified back pain laterality, with sciatica presence unspecified    3  Lumbar spondylosis    4  Lumbar radiculopathy    5  Postlaminectomy syndrome, lumbar        Plan:  My impressions and treatment recommendations were discussed in detail with the patient who verbalized understanding and had no further questions  The patient reports the caudal epidural steroid injection on November 30, 2018 did not give him any relief of symptoms  At this point in time, the patient is undergoing significant reprogramming of the spinal cord stimulator with a completely new program   I asked him to trial out the reprogramming over the next several days to see how his pain response to it  The patient verbalized understanding  I feel reasonable to continue the patient on tramadol 50 mg tablets twice daily as needed for pain, etodolac 300 mg 3 times daily as needed for pain, and lidocaine 5% patch 12 hours on 12 hours off  He does not require any refills at today's visit  Side effects were reviewed with the patient  The risks and side effects of chronic opioid treatment were discussed in detail with the patient   Side effects include but are not limited to nausea, vomiting, GI intolerance, sedation, constipation, mental clouding, opioid-induced hyperalgesia, endocrine dysfunction, addiction, dependence, and tolerance  The patient was asked to take his medications only as prescribed and directed, never in excess, and never for any other reason other than for pain control  The patient was also asked to keep his medications out of the reach of others and away from children, preferably in a locked drawer  The patient verbalized understanding and wished to use these opioid medications  A urine drug test was collected at today's office visit as part of our medication management protocol  The point of care testing results were appropriate for what was being prescribed  The specimen will be sent for confirmatory testing  The drug screen is medically necessary because the patient is either dependent on opioid medication or is being considered for opioid medication therapy and the results could impact ongoing or future treatment  The drug screen is to evaluate for the presence or absence of prescribed, non-prescribed, and/or illicit drugs and substances  [place] Prescription Drug Monitoring Program report was reviewed and was appropriate     Follow-up is planned in 2 months time or sooner as warranted  Discharge instructions were provided  I personally saw and examined the patient and I agree with the above discussed plan of care  History of Present Illness:    Savannah Valentin is a 62 y o  male who presents to [facility] for interval re-evaluation of the above stated pain complaints  The patient has a past medical and chronic pain history as outlined in the assessment section  He was last seen on November 30, 2018 at which time he underwent a caudal epidural steroid injection  At today's office visit, the patient's pain score is 5 to 8/10 on the verbal numerical pain rating scale  The patient states that his pain is worse in the morning, evening, and night    His pain is constant in nature and he reports the quality of his pain as dull/aching, throbbing, cramping, shooting, numbness, pins and needles, and tingling    He reports that the caudal epidural steroid injection on November 30, 2018 did not give him significant relief of symptoms  He is wondering what other options he has  He has meeting with the spinal cord stimulator representatives from [address] [place] [address] Avenue at today's visit to undergo major reprogramming of his spinal cord stimulator  Other than as stated above, the patient denies any interval changes in medications, medical condition, mental condition, symptoms, or allergies since the last office visit  Review of Systems:    Review of Systems   Respiratory: Negative for shortness of breath  Cardiovascular: Negative for chest pain  Gastrointestinal: Negative for constipation, diarrhea, nausea and vomiting  Musculoskeletal: Positive for gait problem (difficulty walking/ decreased range of motion)  Negative for arthralgias, joint swelling and myalgias  Skin: Negative for rash  Neurological: Negative for dizziness, seizures and weakness  All other systems reviewed and are negative          Patient Active Problem List   Diagnosis    Heartburn    Erectile dysfunction of non-organic origin    DM type 2 (diabetes mellitus, type 2) (HCC)    Chronic low back pain    Lumbar radiculopathy    BPH with urinary obstruction    Blood pressure elevated without history of HTN    Sciatica    Postlaminectomy syndrome, lumbar    Myofascial pain syndrome    Lumbar spondylosis    Herniation of lumbar intervertebral disc with radiculopathy    Acute post-operative pain    Insomnia    Chronic pain syndrome    Low back pain    Class 1 obesity due to excess calories with serious comorbidity and body mass index (BMI) of 30 0 to 30 9 in adult    Smoker    Seasonal allergic rhinitis    Diabetic mononeuropathy associated with type 2 diabetes mellitus (HCC)    Postlaminectomy syndrome, lumbar region       Past Medical History:   Diagnosis Date    Arthritis     BPH (benign prostatic hypertrophy) with urinary obstruction     Chronic pain disorder     Diabetes mellitus ([facility] [address] )     type 2    Ear infection     Herniation of lumbar intervertebral disc with radiculopathy     Lumbago     Myofascial pain syndrome     Sciatica        Past Surgical History:   Procedure Laterality Date    ABSCESS DRAINAGE      groin    BACK SURGERY      implant - resolved 2004    CAUDAL BLOCK N/A 10/26/2018    Procedure: Caudal Epidural Steroid Injection (01737); Surgeon: Elmira Boswell MD;  Location: [facility] MAIN OR;  Service: Pain Management     CAUDAL BLOCK N/A 11/30/2018    Procedure: Caudal Epidural Steroid Injection (32599); Surgeon: Elmira Boswell MD;  Location: [facility] MAIN OR;  Service: Pain Management     COLONOSCOPY      NERVE BLOCK Bilateral 4/26/2018    Procedure: B/L L3 L4 L5 S1 MBB #1 ([id number]); Surgeon: Elmira Boswell MD;  Location: [facility] MAIN OR;  Service: Pain Management     NERVE BLOCK Bilateral 5/11/2018    Procedure: B/L L3 L4 L5 S1 Medial Branch Block #2;  Surgeon: Elmira Boswell MD;  Location: [place] [address] MAIN OR;  Service: Pain Management     NOSE SURGERY      OH COLONOSCOPY FLX DX W/COLLJ SPEC WHEN PFRMD N/A 3/6/2017    Procedure: COLONOSCOPY;  Surgeon: Francie Beavers MD;  [facility]; Service: Gastroenterology    OH SURG IMPLNT NEUROELECT,EPIDURAL Left 10/3/2017    Procedure: PLACEMENT THORACIC FOR INSERTION DORSAL COLUMN SPINAL CORD STIMULATOR (DCS) WITH BUTTOCK IMPLANTABLE PULSE GENERATOR(IMPULSE); Surgeon: Edy Patino MD;  Location:  MAIN OR;  Service: Neurosurgery    [address] Right 5/18/2018    Procedure: Rt L3 L4 L5 S1 Radio Frequency Ablation ([id number]); Surgeon: Elmira Boswell MD;  Location: [facility] MAIN OR;  Service: Pain Management     RADIOFREQUENCY ABLATION Left 6/1/2018    Procedure: Lt L3 L4 L5 S1 Radio Frequency Ablation ([id number]);   Surgeon: Elmira Boswell MD;  Location: [facility] MAIN OR;  Service: Pain Management        Family History   Problem Relation Age of Onset    Diabetes Mother     Diabetes Father         mellitus     Heart attack Father 58    Hypertension Father        Social History     Occupational History     for [employer]       Social History Main Topics    Smoking status: Current Every Day Smoker     Packs/day: 1 00     Years: 35 00    Smokeless tobacco: Never Used      Comment: encouraged smoking cessation - history of smoking 30 or more pack years     Alcohol use No      Comment: rare    Drug use: No    Sexual activity: Not on file         Current Outpatient Prescriptions:     atorvastatin (LIPITOR) 20 mg tablet, Take 1 tablet (20 mg total) by mouth daily, Disp: 90 tablet, Rfl: 0    etodolac (LODINE) 300 MG capsule, Take 1 capsule (300 mg total) by mouth 3 (three) times a day as needed (pain (with food)) for up to 30 days, Disp: 90 capsule, Rfl: 1    fluticasone (FLONASE) 50 mcg/act nasal spray, 2 sprays into each nostril daily, Disp: 16 g, Rfl: 0    gabapentin (NEURONTIN) 100 mg capsule, Take 1 capsule (100 mg total) by mouth 3 (three) times a day, Disp: 270 capsule, Rfl: 0    glipiZIDE (GLUCOTROL XL) 10 mg 24 hr tablet, Take 1 tablet (10 mg total) by mouth daily, Disp: 90 tablet, Rfl: 0    lidocaine (LIDODERM) 5 %, Apply 2 patches topically daily Remove & Discard patch within 12 hours or as directed by MD, Disp: 60 patch, Rfl: 0    metFORMIN (GLUCOPHAGE) 1000 MG tablet, Take 1 tablet (1,000 mg total) by mouth 2 (two) times a day with meals, Disp: 180 tablet, Rfl: 0    omeprazole (PriLOSEC) 20 mg delayed release capsule, Take 20 mg by mouth daily, Disp: , Rfl:     tamsulosin (FLOMAX) 0 4 mg, Take 2 capsules (0 8 mg total) by mouth daily with dinner, Disp: 180 capsule, Rfl: 0    traMADol (ULTRAM) 50 mg tablet, Take 2 tablets (100 mg total) by mouth 2 (two) times a day as needed for severe pain Fill dates:  11/27/18 and 12/26/18, Disp: 120 tablet, Rfl: 1    traZODone (DESYREL) 50 mg tablet, Take 1 tablet (50 mg total) by mouth daily at bedtime, Disp: 90 tablet, Rfl: 0    Allergies   Allergen Reactions    Penicillins Swelling       Physical Exam:    /60 (BP Location: Left arm, Patient Position: Sitting, Cuff Size: Standard)   Pulse 80   Resp 18   Ht 5' 11" (1 803 m)   Wt 103 kg (227 lb 6 4 oz)   BMI 31 72 kg/m²      Constitutional:obese  Eyes:anicteric  HEENT:grossly intact  Neck:supple, symmetric, trachea midline and no masses   Pulmonary:even and unlabored  Cardiovascular:No edema or pitting edema present  Skin:Normal without rashes or lesions and well hydrated  Psychiatric:Mood and affect appropriate  Neurologic:Cranial Nerves II-XII grossly intact  Musculoskeletal:normal

## 2018-12-18 NOTE — PROGRESS NOTES
Pain Medicine Follow-Up Note    Assessment:  1  Chronic pain syndrome    2  Chronic low back pain, unspecified back pain laterality, with sciatica presence unspecified    3  Lumbar spondylosis    4  Lumbar radiculopathy    5  Postlaminectomy syndrome, lumbar        Plan:  My impressions and treatment recommendations were discussed in detail with the patient who verbalized understanding and had no further questions  The patient reports the caudal epidural steroid injection on November 30, 2018 did not give him any relief of symptoms  At this point in time, the patient is undergoing significant reprogramming of the spinal cord stimulator with a completely new program   I asked him to trial out the reprogramming over the next several days to see how his pain response to it  The patient verbalized understanding  I feel reasonable to continue the patient on tramadol 50 mg tablets twice daily as needed for pain, etodolac 300 mg 3 times daily as needed for pain, and lidocaine 5% patch 12 hours on 12 hours off  He does not require any refills at today's visit  Side effects were reviewed with the patient  The risks and side effects of chronic opioid treatment were discussed in detail with the patient  Side effects include but are not limited to nausea, vomiting, GI intolerance, sedation, constipation, mental clouding, opioid-induced hyperalgesia, endocrine dysfunction, addiction, dependence, and tolerance  The patient was asked to take his medications only as prescribed and directed, never in excess, and never for any other reason other than for pain control  The patient was also asked to keep his medications out of the reach of others and away from children, preferably in a locked drawer  The patient verbalized understanding and wished to use these opioid medications  A urine drug test was collected at today's office visit as part of our medication management protocol   The point of care testing results were appropriate for what was being prescribed  The specimen will be sent for confirmatory testing  The drug screen is medically necessary because the patient is either dependent on opioid medication or is being considered for opioid medication therapy and the results could impact ongoing or future treatment  The drug screen is to evaluate for the presence or absence of prescribed, non-prescribed, and/or illicit drugs and substances  [place] Prescription Drug Monitoring Program report was reviewed and was appropriate     Follow-up is planned in 2 months time or sooner as warranted  Discharge instructions were provided  I personally saw and examined the patient and I agree with the above discussed plan of care  History of Present Illness:    Danley Boeck is a 62 y o  male who presents to [facility] for interval re-evaluation of the above stated pain complaints  The patient has a past medical and chronic pain history as outlined in the assessment section  He was last seen on November 30, 2018 at which time he underwent a caudal epidural steroid injection  At today's office visit, the patient's pain score is 5 to 8/10 on the verbal numerical pain rating scale  The patient states that his pain is worse in the morning, evening, and night  His pain is constant in nature and he reports the quality of his pain as dull/aching, throbbing, cramping, shooting, numbness, pins and needles, and tingling    He reports that the caudal epidural steroid injection on November 30, 2018 did not give him significant relief of symptoms  He is wondering what other options he has  He has meeting with the spinal cord stimulator representatives from [address] [place] [address] at today's visit to undergo major reprogramming of his spinal cord stimulator      Other than as stated above, the patient denies any interval changes in medications, medical condition, mental condition, symptoms, or allergies since the last office visit  Review of Systems:    Review of Systems   Respiratory: Negative for shortness of breath  Cardiovascular: Negative for chest pain  Gastrointestinal: Negative for constipation, diarrhea, nausea and vomiting  Musculoskeletal: Positive for gait problem (difficulty walking/ decreased range of motion)  Negative for arthralgias, joint swelling and myalgias  Skin: Negative for rash  Neurological: Negative for dizziness, seizures and weakness  All other systems reviewed and are negative  Patient Active Problem List   Diagnosis    Heartburn    Erectile dysfunction of non-organic origin    DM type 2 (diabetes mellitus, type 2) ([facility])    Chronic low back pain    Lumbar radiculopathy    BPH with urinary obstruction    Blood pressure elevated without history of HTN    Sciatica    Postlaminectomy syndrome, lumbar    Myofascial pain syndrome    Lumbar spondylosis    Herniation of lumbar intervertebral disc with radiculopathy    Acute post-operative pain    Insomnia    Chronic pain syndrome    Low back pain    Class 1 obesity due to excess calories with serious comorbidity and body mass index (BMI) of 30 0 to 30 9 in adult    Smoker    Seasonal allergic rhinitis    Diabetic mononeuropathy associated with type 2 diabetes mellitus ([address] )    Postlaminectomy syndrome, lumbar region       Past Medical History:   Diagnosis Date    Arthritis     BPH (benign prostatic hypertrophy) with urinary obstruction     Chronic pain disorder     Diabetes mellitus ([address] )     type 2    Ear infection     Herniation of lumbar intervertebral disc with radiculopathy     Lumbago     Myofascial pain syndrome     Sciatica        Past Surgical History:   Procedure Laterality Date    ABSCESS DRAINAGE      groin    BACK SURGERY      implant - resolved 2004    CAUDAL BLOCK N/A 10/26/2018    Procedure: Caudal Epidural Steroid Injection (60697);   Surgeon: Jeanna Vital MD;  [facility] MAIN OR;  Service: Pain Management     CAUDAL BLOCK N/A 11/30/2018    Procedure: Caudal Epidural Steroid Injection (72759); Surgeon: Gissel Pulido MD;  Location: [facility] MAIN OR;  Service: Pain Management     COLONOSCOPY      NERVE BLOCK Bilateral 4/26/2018    Procedure: B/L L3 L4 L5 S1 MBB #1 ([id number]); Surgeon: Gissel Pulido MD;  Location: [facility] MAIN OR;  Service: Pain Management     NERVE BLOCK Bilateral 5/11/2018    Procedure: B/L L3 L4 L5 S1 Medial Branch Block #2;  Surgeon: Gissel Pulido MD;  Location: [facility] MAIN OR;  Service: Pain Management     NOSE SURGERY      GA COLONOSCOPY FLX DX W/COLLJ SPEC WHEN PFRMD N/A 3/6/2017    Procedure: COLONOSCOPY;  Surgeon: Bertha Ling MD;  [facility]; Service: Gastroenterology    GA SURG IMPLNT NEUROELECT,EPIDURAL Left 10/3/2017    Procedure: PLACEMENT THORACIC FOR INSERTION DORSAL COLUMN SPINAL CORD STIMULATOR (DCS) WITH BUTTOCK IMPLANTABLE PULSE GENERATOR(IMPULSE); Surgeon: Dinesh Ellis MD;  Location:  MAIN OR;  Service: Neurosurgery    [address] 5/18/2018    Procedure: Rt L3 L4 L5 S1 Radio Frequency Ablation ([id number]); Surgeon: Gissel Pulido MD;  Location: [facility] MAIN OR;  Service: Pain Management     RADIOFREQUENCY ABLATION Left 6/1/2018    Procedure: Lt L3 L4 L5 S1 Radio Frequency Ablation ([id number]);   Surgeon: Gissel Pulido MD;  Location: [facility] MAIN OR;  Service: Pain Management        Family History   Problem Relation Age of Onset    Diabetes Mother     Diabetes Father         mellitus     Heart attack Father 58    Hypertension Father        Social History     Occupational History     for [employer]       Social History Main Topics    Smoking status: Current Every Day Smoker     Packs/day: 1 00     Years: 35 00    Smokeless tobacco: Never Used      Comment: encouraged smoking cessation - history of smoking 30 or more pack years     Alcohol use No      Comment: rare  Drug use: No    Sexual activity: Not on file         Current Outpatient Prescriptions:     atorvastatin (LIPITOR) 20 mg tablet, Take 1 tablet (20 mg total) by mouth daily, Disp: 90 tablet, Rfl: 0    etodolac (LODINE) 300 MG capsule, Take 1 capsule (300 mg total) by mouth 3 (three) times a day as needed (pain (with food)) for up to 30 days, Disp: 90 capsule, Rfl: 1    fluticasone (FLONASE) 50 mcg/act nasal spray, 2 sprays into each nostril daily, Disp: 16 g, Rfl: 0    gabapentin (NEURONTIN) 100 mg capsule, Take 1 capsule (100 mg total) by mouth 3 (three) times a day, Disp: 270 capsule, Rfl: 0    glipiZIDE (GLUCOTROL XL) 10 mg 24 hr tablet, Take 1 tablet (10 mg total) by mouth daily, Disp: 90 tablet, Rfl: 0    lidocaine (LIDODERM) 5 %, Apply 2 patches topically daily Remove & Discard patch within 12 hours or as directed by MD, Disp: 60 patch, Rfl: 0    metFORMIN (GLUCOPHAGE) 1000 MG tablet, Take 1 tablet (1,000 mg total) by mouth 2 (two) times a day with meals, Disp: 180 tablet, Rfl: 0    omeprazole (PriLOSEC) 20 mg delayed release capsule, Take 20 mg by mouth daily, Disp: , Rfl:     tamsulosin (FLOMAX) 0 4 mg, Take 2 capsules (0 8 mg total) by mouth daily with dinner, Disp: 180 capsule, Rfl: 0    traMADol (ULTRAM) 50 mg tablet, Take 2 tablets (100 mg total) by mouth 2 (two) times a day as needed for severe pain Fill dates:  11/27/18 and 12/26/18, Disp: 120 tablet, Rfl: 1    traZODone (DESYREL) 50 mg tablet, Take 1 tablet (50 mg total) by mouth daily at bedtime, Disp: 90 tablet, Rfl: 0    Allergies   Allergen Reactions    Penicillins Swelling       Physical Exam:    /60 (BP Location: Left arm, Patient Position: Sitting, Cuff Size: Standard)   Pulse 80   Resp 18   Ht 5' 11" (1 803 m)   Wt 103 kg (227 lb 6 4 oz)   BMI 31 72 kg/m²     Constitutional:obese  Eyes:anicteric  HEENT:grossly intact  Neck:supple, symmetric, trachea midline and no masses   Pulmonary:even and unlabored  Cardiovascular:No edema or pitting edema present  Skin:Normal without rashes or lesions and well hydrated  Psychiatric:Mood and affect appropriate  Neurologic:Cranial Nerves II-XII grossly intact  Musculoskeletal:normal

## 2019-01-03 ENCOUNTER — OFFICE VISIT (OUTPATIENT)
Dept: PAIN MEDICINE | Facility: CLINIC | Age: 59
End: 2019-01-03
Payer: OTHER MISCELLANEOUS

## 2019-01-03 VITALS
SYSTOLIC BLOOD PRESSURE: 116 MMHG | WEIGHT: 232 LBS | TEMPERATURE: 97.8 F | HEIGHT: 71 IN | BODY MASS INDEX: 32.48 KG/M2 | RESPIRATION RATE: 18 BRPM | HEART RATE: 83 BPM | DIASTOLIC BLOOD PRESSURE: 56 MMHG

## 2019-01-03 DIAGNOSIS — M54.16 LUMBAR RADICULOPATHY: ICD-10-CM

## 2019-01-03 DIAGNOSIS — M96.1 POSTLAMINECTOMY SYNDROME, LUMBAR: ICD-10-CM

## 2019-01-03 DIAGNOSIS — G89.29 CHRONIC LOW BACK PAIN, UNSPECIFIED BACK PAIN LATERALITY, WITH SCIATICA PRESENCE UNSPECIFIED: ICD-10-CM

## 2019-01-03 DIAGNOSIS — M47.816 LUMBAR SPONDYLOSIS: ICD-10-CM

## 2019-01-03 DIAGNOSIS — M54.5 CHRONIC LOW BACK PAIN, UNSPECIFIED BACK PAIN LATERALITY, WITH SCIATICA PRESENCE UNSPECIFIED: ICD-10-CM

## 2019-01-03 DIAGNOSIS — G89.4 CHRONIC PAIN SYNDROME: Primary | ICD-10-CM

## 2019-01-03 PROCEDURE — 99214 OFFICE O/P EST MOD 30 MIN: CPT | Performed by: ANESTHESIOLOGY

## 2019-01-03 NOTE — LETTER
January 3, 2019     Keanu Newman DO  [address]  [facility] [address]    Patient: Jessica Greene   YOB: 1960   Date of Visit: 1/3/2019       Dear Dr Nabil Lopez:    Thank you for referring Jessica Greene to me for evaluation  Below are my notes for this consultation  If you have questions, please do not hesitate to call me  I look forward to following your patient along with you  Sincerely,        Vincent Klinefelter, MD        CC: No Recipients  Vincent Klinefelter, MD  1/3/2019  8:03 AM  Sign at close encounter  Pain Medicine Follow-Up Note    Assessment:  1  Chronic pain syndrome    2  Chronic low back pain, unspecified back pain laterality, with sciatica presence unspecified    3  Lumbar spondylosis    4  Lumbar radiculopathy    5  Postlaminectomy syndrome, lumbar        Plan:  Orders Placed This Encounter   Procedures    MRI lumbar spine without contrast     Standing Status:   Future     Standing Expiration Date:   1/3/2023     Scheduling Instructions: There is no preparation for this test  Please leave your jewelry and valuables at home, wedding rings are the exception  Please bring your insurance cards, a form of photo ID and a list of your medications with you  Arrive 15 minutes prior to your appointment time in order to register  Please bring any prior CT or MRI studies of this area that were not performed at a [facility]  To schedule this appointment, please contact Central Scheduling at [phone]  Order Specific Question:   What is the patient's sedation requirement? Answer:   Unknown     Order Specific Question:   Does the patient have metallic implants? Answer:   Yes     Comments:   spinal cord stimulator     My impressions and treatment recommendations were discussed in detail with the patient who verbalized understanding and had no further questions        The patient reports absolutely no relief of symptoms since undergoing the reprogramming of his spinal cord stimulator about 2 weeks ago  At this point in time, since he is not getting good relief with the spinal cord stimulator despite multiple reprogramming attempts and considering that he is getting only temporary relief from caudal epidural steroid injections, I felt a reasonable to have the patient undergo a MRI of the lumbar spine to evaluate for any new pathology that may be contributing to his pain complaints  I did ask the patient to speak to the spinal cord stimulator representatives to see if they can recapture his right-sided pain since a recent reprogramming was designed to recapture his left-sided pain  I did evaluate the patient for signs of right sacroiliitis at today's visit, but there are no signs of right sacroiliitis currently  I asked him to continue using tramadol 50 mg 100 mg twice daily as needed for pain, lidocaine 5% patch 12 hours on and 12 hours off, and etodolac 300 mg 3 times daily as needed for pain, with food  Side effects were reviewed the patient  The risks and side effects of chronic opioid treatment were discussed in detail with the patient  Side effects include but are not limited to nausea, vomiting, GI intolerance, sedation, constipation, mental clouding, opioid-induced hyperalgesia, endocrine dysfunction, addiction, dependence, and tolerance  The patient was asked to take his medications only as prescribed and directed, never in excess, and never for any other reason other than for pain control  The patient was also asked to keep his medications out of the reach of others and away from children, preferably in a locked drawer  The patient verbalized understanding and wished to use these opioid medications  Follow-up is planned in 4 weeks time or sooner as warranted  Discharge instructions were provided  I personally saw and examined the patient and I agree with the above discussed plan of care      History of Present Illness:    Savannah Valentin is a 62 y o  male who presents to [facility] for interval re-evaluation of the above stated pain complaints  The patient has a past medical and chronic pain history as outlined in the assessment section  He was last seen on December 18, 2018 at which time he was maintained on etodolac 300 mg 3 times daily as needed for pain, lidocaine 5% patch 12 hours on and 12 hours off, and tramadol 50 mg 2 tablets twice daily as needed for pain  At today's office visit, the patient's pain score is 7/10 on the verbal numerical pain rating scale  The patient states that his pain is worse in the morning, evening, and night  His pain is constant in nature and reports the quality of his pain as burning, dull/aching, sharp, throbbing, shooting, numbness, and pins and needles    He reports no significant relief following the recent reprogramming of his spinal cord stimulator about 2 weeks ago  He continues to use tramadol, lidocaine, and etodolac as prescribed  Other than as stated above, the patient denies any interval changes in medications, medical condition, mental condition, symptoms, or allergies since the last office visit  Review of Systems:    Review of Systems   Respiratory: Negative for shortness of breath  Cardiovascular: Negative for chest pain  Gastrointestinal: Negative for constipation, diarrhea, nausea and vomiting  Musculoskeletal: Positive for gait problem (difficulty walking/ decreased range of motion) and joint swelling (joint stiffness)  Negative for arthralgias and myalgias  Skin: Negative for rash  Neurological: Negative for dizziness, seizures and weakness  All other systems reviewed and are negative          Patient Active Problem List   Diagnosis    Heartburn    Erectile dysfunction of non-organic origin    DM type 2 [facility]di[facility] mellitus[facility] type 2) ([facility])    Chronic low back pain    Lumbar radiculopathy    BPH with urinary obstruction    Blood pressure elevated without history of HTN    Sciatica    Postlaminectomy syndrome, lumbar    Myofascial pain syndrome    Lumbar spondylosis    Herniation of lumbar intervertebral disc with radiculopathy    Acute post-operative pain    Insomnia    Chronic pain syndrome    Low back pain    Class 1 obesity due to excess calories with serious comorbidity and body mass index (BMI) of 30 0 to 30 9 in adult    Smoker    Seasonal allergic rhinitis    Diabetic mononeuropathy associated with type 2 diabetes mellitus ([facility] )    Postlaminectomy syndrome, lumbar region       Past Medical History:   Diagnosis Date    Arthritis     BPH (benign prostatic hypertrophy) with urinary obstruction     Chronic pain disorder     [facility]     type 2    Ear infection     Herniation of lumbar intervertebral disc with radiculopathy     Lumbago     Myofascial pain syndrome     Sciatica        Past Surgical History:   Procedure Laterality Date    ABSCESS DRAINAGE      groin    BACK SURGERY      implant - resolved 2004    CAUDAL BLOCK N/A 10/26/2018    Procedure: Caudal Epidural Steroid Injection (40967); Surgeon: José Koo MD;  Location: [facility] MAIN OR;  Service: Pain Management     CAUDAL BLOCK N/A 11/30/2018    Procedure: Caudal Epidural Steroid Injection (61995); Surgeon: José Koo MD;  Location: [facility] MAIN OR;  Service: Pain Management     COLONOSCOPY      NERVE BLOCK Bilateral 4/26/2018    Procedure: B/L L3 L4 L5 S1 MBB #1 (87949,[id number]); Surgeon: José Koo MD;  Location: [facility] MAIN OR;  Service: Pain Management     NERVE BLOCK Bilateral 5/11/2018    Procedure: B/L L3 L4 L5 S1 Medial Branch Block #2;  Surgeon: José Koo MD;  Location: [place] [address] MAIN OR;  Service: Pain Management     NOSE SURGERY      IA COLONOSCOPY FLX DX W/COLLJ SPEC WHEN PFRMD N/A 3/6/2017    Procedure: COLONOSCOPY;  Surgeon: Flor Bone MD;  [facility];   Service: Gastroenterology    IA SURG IMPLNT NEUROELECT,EPIDURAL Left 10/3/2017    Procedure: PLACEMENT THORACIC FOR INSERTION DORSAL COLUMN SPINAL CORD STIMULATOR (DCS) WITH BUTTOCK IMPLANTABLE PULSE GENERATOR(IMPULSE); Surgeon: Yi Vicente MD;  Location:  MAIN OR;  Service: Neurosurgery    [address] [facility] Right 5/18/2018    Procedure: Rt L3 L4 L5 S1 Radio Frequency Ablation (24158,18375); Surgeon: Raphael Brown MD;  Location: [facility] MAIN OR;  Service: Pain Management     RADIOFREQUENCY ABLATION Left 6/1/2018    Procedure: Lt L3 L4 L5 S1 Radio Frequency Ablation ([id number],[id number]);   Surgeon: Raphael Brown MD;  Location: [facility] MAIN OR;  Service: Pain Management        Family History   Problem Relation Age of Onset    Diabetes Mother     Diabetes Father         mellitus     Heart attack Father 58    Hypertension Father        Social History     Occupational History     for [employer]       Social History Main Topics    Smoking status: Current Every Day Smoker     Packs/day: 1 00     Years: 35 00    Smokeless tobacco: Never Used      Comment: encouraged smoking cessation - history of smoking 30 or more pack years     Alcohol use No      Comment: rare    Drug use: No    Sexual activity: Not on file         Current Outpatient Prescriptions:     atorvastatin (LIPITOR) 20 mg tablet, Take 1 tablet (20 mg total) by mouth daily, Disp: 90 tablet, Rfl: 0    fluticasone (FLONASE) 50 mcg/act nasal spray, 2 sprays into each nostril daily, Disp: 16 g, Rfl: 0    gabapentin (NEURONTIN) 100 mg capsule, Take 1 capsule (100 mg total) by mouth 3 (three) times a day, Disp: 270 capsule, Rfl: 0    glipiZIDE (GLUCOTROL XL) 10 mg 24 hr tablet, Take 1 tablet (10 mg total) by mouth daily, Disp: 90 tablet, Rfl: 0    lidocaine (LIDODERM) 5 %, Apply 2 patches topically daily Remove & Discard patch within 12 hours or as directed by MD, Disp: 60 patch, Rfl: 0    metFORMIN (GLUCOPHAGE) 1000 MG tablet, Take 1 tablet (1,000 mg total) by mouth 2 (two) times a day with meals, Disp: 180 tablet, Rfl: 0    omeprazole (PriLOSEC) 20 mg delayed release capsule, Take 20 mg by mouth daily, Disp: , Rfl:     tamsulosin (FLOMAX) 0 4 mg, Take 2 capsules (0 8 mg total) by mouth daily with dinner, Disp: 180 capsule, Rfl: 0    traMADol (ULTRAM) 50 mg tablet, Take 2 tablets (100 mg total) by mouth 2 (two) times a day as needed for severe pain Fill dates:  11/27/18 and 12/26/18, Disp: 120 tablet, Rfl: 1    traZODone (DESYREL) 50 mg tablet, Take 1 tablet (50 mg total) by mouth daily at bedtime, Disp: 90 tablet, Rfl: 0    etodolac (LODINE) 300 MG capsule, Take 1 capsule (300 mg total) by mouth 3 (three) times a day as needed (pain (with food)) for up to 30 days, Disp: 90 capsule, Rfl: 1    Allergies   Allergen Reactions    Penicillins Swelling       Physical Exam:    /56 (BP Location: Left arm, Patient Position: Sitting, Cuff Size: Standard)   Pulse 83   Temp 97 8 °F (36 6 °C) (Oral)   Resp 18   Ht 5' 11" (1 803 m)   Wt 105 kg (232 lb)   BMI 32 36 kg/m²      Constitutional:obese  Eyes:anicteric  HEENT:grossly intact  Neck:supple, symmetric, trachea midline and no masses   Pulmonary:even and unlabored  Cardiovascular:No edema or pitting edema present  Skin:Normal without rashes or lesions and well hydrated  Psychiatric:Mood and affect appropriate  Neurologic:Cranial Nerves II-XII grossly intact  Musculoskeletal:normal, no tenderness noted over the right sacroiliac joint    Lumbar facet loading is negative bilaterally      Imaging  MRI lumbar spine without contrast    (Results Pending)         Orders Placed This Encounter   Procedures    MRI lumbar spine without contrast

## 2019-01-03 NOTE — PROGRESS NOTES
Pain Medicine Follow-Up Note    Assessment:  1  Chronic pain syndrome    2  Chronic low back pain, unspecified back pain laterality, with sciatica presence unspecified    3  Lumbar spondylosis    4  Lumbar radiculopathy    5  Postlaminectomy syndrome, lumbar        Plan:  Orders Placed This Encounter   Procedures    MRI lumbar spine without contrast     Standing Status:   Future     Standing Expiration Date:   1/3/2023     Scheduling Instructions: There is no preparation for this test  Please leave your jewelry and valuables at home, wedding rings are the exception  Please bring your insurance cards, a form of photo ID and a list of your medications with you  Arrive 15 minutes prior to your appointment time in order to register  Please bring any prior CT or MRI studies of this area that were not performed at a [facility]  To schedule this appointment, please contact Central Scheduling at [phone]  Order Specific Question:   What is the patient's sedation requirement? Answer:   Unknown     Order Specific Question:   Does the patient have metallic implants? Answer:   Yes     Comments:   spinal cord stimulator     My impressions and treatment recommendations were discussed in detail with the patient who verbalized understanding and had no further questions  The patient reports absolutely no relief of symptoms since undergoing the reprogramming of his spinal cord stimulator about 2 weeks ago  At this point in time, since he is not getting good relief with the spinal cord stimulator despite multiple reprogramming attempts and considering that he is getting only temporary relief from caudal epidural steroid injections, I felt a reasonable to have the patient undergo a MRI of the lumbar spine to evaluate for any new pathology that may be contributing to his pain complaints      I did ask the patient to speak to the spinal cord stimulator representatives to see if they can recapture his right-sided pain since a recent reprogramming was designed to recapture his left-sided pain  I did evaluate the patient for signs of right sacroiliitis at today's visit, but there are no signs of right sacroiliitis currently  I asked him to continue using tramadol 50 mg 100 mg twice daily as needed for pain, lidocaine 5% patch 12 hours on and 12 hours off, and etodolac 300 mg 3 times daily as needed for pain, with food  Side effects were reviewed the patient  The risks and side effects of chronic opioid treatment were discussed in detail with the patient  Side effects include but are not limited to nausea, vomiting, GI intolerance, sedation, constipation, mental clouding, opioid-induced hyperalgesia, endocrine dysfunction, addiction, dependence, and tolerance  The patient was asked to take his medications only as prescribed and directed, never in excess, and never for any other reason other than for pain control  The patient was also asked to keep his medications out of the reach of others and away from children, preferably in a locked drawer  The patient verbalized understanding and wished to use these opioid medications  Follow-up is planned in 4 weeks time or sooner as warranted  Discharge instructions were provided  I personally saw and examined the patient and I agree with the above discussed plan of care  History of Present Illness:    Mayito Santiago is a 62 y o  male who presents to [facility] for interval re-evaluation of the above stated pain complaints  The patient has a past medical and chronic pain history as outlined in the assessment section  He was last seen on December 18, 2018 at which time he was maintained on etodolac 300 mg 3 times daily as needed for pain, lidocaine 5% patch 12 hours on and 12 hours off, and tramadol 50 mg 2 tablets twice daily as needed for pain      At today's office visit, the patient's pain score is 7/10 on the verbal numerical pain rating scale  The patient states that his pain is worse in the morning, evening, and night  His pain is constant in nature and reports the quality of his pain as burning, dull/aching, sharp, throbbing, shooting, numbness, and pins and needles    He reports no significant relief following the recent reprogramming of his spinal cord stimulator about 2 weeks ago  He continues to use tramadol, lidocaine, and etodolac as prescribed  Other than as stated above, the patient denies any interval changes in medications, medical condition, mental condition, symptoms, or allergies since the last office visit  Review of Systems:    Review of Systems   Respiratory: Negative for shortness of breath  Cardiovascular: Negative for chest pain  Gastrointestinal: Negative for constipation, diarrhea, nausea and vomiting  Musculoskeletal: Positive for gait problem (difficulty walking/ decreased range of motion) and joint swelling (joint stiffness)  Negative for arthralgias and myalgias  Skin: Negative for rash  Neurological: Negative for dizziness, seizures and weakness  All other systems reviewed and are negative          Patient Active Problem List   Diagnosis    Heartburn    Erectile dysfunction of non-organic origin    DM type 2 (diabetes mellitus, type 2) ([facility])    Chronic low back pain    Lumbar radiculopathy    BPH with urinary obstruction    Blood pressure elevated without history of HTN    Sciatica    Postlaminectomy syndrome, lumbar    Myofascial pain syndrome    Lumbar spondylosis    Herniation of lumbar intervertebral disc with radiculopathy    Acute post-operative pain    Insomnia    Chronic pain syndrome    Low back pain    Class 1 obesity due to excess calories with serious comorbidity and body mass index (BMI) of 30 0 to 30 9 in adult    Smoker    Seasonal allergic rhinitis    Diabetic mononeuropathy associated with type 2 diabetes mellitus [facility]    Postlaminectomy syndrome, lumbar region       Past Medical History:   Diagnosis Date    Arthritis     BPH (benign prostatic hypertrophy) with urinary obstruction     Chronic pain disorder     Diabetes mellitus ([facility] )     type 2    Ear infection     Herniation of lumbar intervertebral disc with radiculopathy     Lumbago     Myofascial pain syndrome     Sciatica        Past Surgical History:   Procedure Laterality Date    ABSCESS DRAINAGE      groin    BACK SURGERY      implant - resolved 2004    CAUDAL BLOCK N/A 10/26/2018    Procedure: Caudal Epidural Steroid Injection (13171); Surgeon: Nicolasa Castro MD;  Location: [facility] MAIN OR;  Service: Pain Management     CAUDAL BLOCK N/A 11/30/2018    Procedure: Caudal Epidural Steroid Injection (43881); Surgeon: Nicolasa Castro MD;  Location: [facility] MAIN OR;  Service: Pain Management     COLONOSCOPY      NERVE BLOCK Bilateral 4/26/2018    Procedure: B/L L3 L4 L5 S1 MBB #1 (34072,[id number]); Surgeon: Nicolasa Castro MD;  Location: [facility] MAIN OR;  Service: Pain Management     NERVE BLOCK Bilateral 5/11/2018    Procedure: B/L L3 L4 L5 S1 Medial Branch Block #2;  Surgeon: Nicolasa Castro MD;  [facility];  Service: Pain Management     NOSE SURGERY      LA COLONOSCOPY FLX DX W/COLLJ SPEC WHEN PFRMD N/A 3/6/2017    Procedure: COLONOSCOPY;  Surgeon: Denae Mcclain MD;  [facility]; Service: Gastroenterology    LA SURG IMPLNT NEUROELECT,EPIDURAL Left 10/3/2017    Procedure: PLACEMENT THORACIC FOR INSERTION DORSAL COLUMN SPINAL CORD STIMULATOR (DCS) WITH BUTTOCK IMPLANTABLE PULSE GENERATOR(IMPULSE); Surgeon: Magalie Gar MD;  Location:  MAIN OR;  Service: Neurosurgery    [address] [facility] Right 5/18/2018    Procedure: Rt L3 L4 L5 S1 Radio Frequency Ablation (96186,04138);   Surgeon: Nicolasa Castro MD;  Location: [facility] MAIN OR;  Service: Pain Management     RADIOFREQUENCY ABLATION Left 6/1/2018    Procedure: Lt L3 L4 L5 S1 Radio Frequency Ablation ([id number]);   Surgeon: Bridgette Godfrey MD;  Location: [facility] MAIN OR;  Service: Pain Management        Family History   Problem Relation Age of Onset    Diabetes Mother     Diabetes Father         mellitus     Heart attack Father 58    Hypertension Father        Social History     Occupational History     for [employer]       Social History Main Topics    Smoking status: Current Every Day Smoker     Packs/day: 1 00     Years: 35 00    Smokeless tobacco: Never Used      Comment: encouraged smoking cessation - history of smoking 30 or more pack years     Alcohol use No      Comment: rare    Drug use: No    Sexual activity: Not on file         Current Outpatient Prescriptions:     atorvastatin (LIPITOR) 20 mg tablet, Take 1 tablet (20 mg total) by mouth daily, Disp: 90 tablet, Rfl: 0    fluticasone (FLONASE) 50 mcg/act nasal spray, 2 sprays into each nostril daily, Disp: 16 g, Rfl: 0    gabapentin (NEURONTIN) 100 mg capsule, Take 1 capsule (100 mg total) by mouth 3 (three) times a day, Disp: 270 capsule, Rfl: 0    glipiZIDE (GLUCOTROL XL) 10 mg 24 hr tablet, Take 1 tablet (10 mg total) by mouth daily, Disp: 90 tablet, Rfl: 0    lidocaine (LIDODERM) 5 %, Apply 2 patches topically daily Remove & Discard patch within 12 hours or as directed by MD, Disp: 60 patch, Rfl: 0    metFORMIN (GLUCOPHAGE) 1000 MG tablet, Take 1 tablet (1,000 mg total) by mouth 2 (two) times a day with meals, Disp: 180 tablet, Rfl: 0    omeprazole (PriLOSEC) 20 mg delayed release capsule, Take 20 mg by mouth daily, Disp: , Rfl:     tamsulosin (FLOMAX) 0 4 mg, Take 2 capsules (0 8 mg total) by mouth daily with dinner, Disp: 180 capsule, Rfl: 0    traMADol (ULTRAM) 50 mg tablet, Take 2 tablets (100 mg total) by mouth 2 (two) times a day as needed for severe pain Fill dates:  11/27/18 and 12/26/18, Disp: 120 tablet, Rfl: 1    traZODone (DESYREL) 50 mg tablet, Take 1 tablet (50 mg total) by mouth daily at bedtime, Disp: 90 tablet, Rfl: 0    etodolac (LODINE) 300 MG capsule, Take 1 capsule (300 mg total) by mouth 3 (three) times a day as needed (pain (with food)) for up to 30 days, Disp: 90 capsule, Rfl: 1    Allergies   Allergen Reactions    Penicillins Swelling       Physical Exam:    /56 (BP Location: Left arm, Patient Position: Sitting, Cuff Size: Standard)   Pulse 83   Temp 97 8 °F (36 6 °C) (Oral)   Resp 18   Ht 5' 11" (1 803 m)   Wt 105 kg (232 lb)   BMI 32 36 kg/m²     Constitutional:obese  Eyes:anicteric  HEENT:grossly intact  Neck:supple, symmetric, trachea midline and no masses   Pulmonary:even and unlabored  Cardiovascular:No edema or pitting edema present  Skin:Normal without rashes or lesions and well hydrated  Psychiatric:Mood and affect appropriate  Neurologic:Cranial Nerves II-XII grossly intact  Musculoskeletal:normal, no tenderness noted over the right sacroiliac joint    Lumbar facet loading is negative bilaterally      Imaging  MRI lumbar spine without contrast    (Results Pending)         Orders Placed This Encounter   Procedures    MRI lumbar spine without contrast

## 2019-01-04 ENCOUNTER — TELEPHONE (OUTPATIENT)
Dept: FAMILY MEDICINE CLINIC | Facility: CLINIC | Age: 59
End: 2019-01-04

## 2019-01-04 NOTE — TELEPHONE ENCOUNTER
Patient called in to let us know his pharmacy will be faxing over an updated list of medications for his new insurance for 2019

## 2019-01-07 DIAGNOSIS — G47.00 INSOMNIA, UNSPECIFIED TYPE: ICD-10-CM

## 2019-01-07 DIAGNOSIS — E78.2 MIXED HYPERLIPIDEMIA: ICD-10-CM

## 2019-01-07 DIAGNOSIS — N40.1 BPH WITH URINARY OBSTRUCTION: ICD-10-CM

## 2019-01-07 DIAGNOSIS — N13.8 BPH WITH URINARY OBSTRUCTION: ICD-10-CM

## 2019-01-07 DIAGNOSIS — E11.9 TYPE 2 DIABETES MELLITUS WITHOUT COMPLICATION, WITHOUT LONG-TERM CURRENT USE OF INSULIN (HCC): ICD-10-CM

## 2019-01-09 RX ORDER — ATORVASTATIN CALCIUM 20 MG/1
20 TABLET, FILM COATED ORAL DAILY
Qty: 90 TABLET | Refills: 0 | Status: SHIPPED | OUTPATIENT
Start: 2019-01-09 | End: 2019-01-15 | Stop reason: SDUPTHER

## 2019-01-09 RX ORDER — TAMSULOSIN HYDROCHLORIDE 0.4 MG/1
0.8 CAPSULE ORAL
Qty: 180 CAPSULE | Refills: 0 | Status: SHIPPED | OUTPATIENT
Start: 2019-01-09 | End: 2019-01-15 | Stop reason: SDUPTHER

## 2019-01-09 RX ORDER — GLIPIZIDE 10 MG/1
10 TABLET, FILM COATED, EXTENDED RELEASE ORAL DAILY
Qty: 90 TABLET | Refills: 0 | Status: SHIPPED | OUTPATIENT
Start: 2019-01-09 | End: 2019-01-15 | Stop reason: SDUPTHER

## 2019-01-09 RX ORDER — TRAZODONE HYDROCHLORIDE 50 MG/1
50 TABLET ORAL
Qty: 90 TABLET | Refills: 0 | Status: SHIPPED | OUTPATIENT
Start: 2019-01-09 | End: 2019-01-15 | Stop reason: SDUPTHER

## 2019-01-14 DIAGNOSIS — J30.2 SEASONAL ALLERGIC RHINITIS, UNSPECIFIED TRIGGER: ICD-10-CM

## 2019-01-14 DIAGNOSIS — E11.9 TYPE 2 DIABETES MELLITUS WITHOUT COMPLICATION, WITHOUT LONG-TERM CURRENT USE OF INSULIN (HCC): ICD-10-CM

## 2019-01-14 DIAGNOSIS — K21.9 GASTROESOPHAGEAL REFLUX DISEASE WITHOUT ESOPHAGITIS: Primary | ICD-10-CM

## 2019-01-14 DIAGNOSIS — E11.41 DIABETIC MONONEUROPATHY ASSOCIATED WITH TYPE 2 DIABETES MELLITUS (HCC): ICD-10-CM

## 2019-01-14 RX ORDER — GABAPENTIN 100 MG/1
100 CAPSULE ORAL 3 TIMES DAILY
Qty: 270 CAPSULE | Refills: 0 | Status: SHIPPED | OUTPATIENT
Start: 2019-01-14 | End: 2019-03-06

## 2019-01-14 RX ORDER — FLUTICASONE PROPIONATE 50 MCG
2 SPRAY, SUSPENSION (ML) NASAL DAILY
Qty: 24 G | Refills: 0 | Status: SHIPPED | OUTPATIENT
Start: 2019-01-14 | End: 2019-02-01 | Stop reason: SDUPTHER

## 2019-01-14 RX ORDER — OMEPRAZOLE 20 MG/1
20 CAPSULE, DELAYED RELEASE ORAL DAILY
Qty: 90 CAPSULE | Refills: 0 | Status: SHIPPED | OUTPATIENT
Start: 2019-01-14 | End: 2019-04-04 | Stop reason: SDUPTHER

## 2019-01-14 NOTE — TELEPHONE ENCOUNTER
Received call from pharmacy stating patient insurance changed and he is in need of medication refills  Patient was last seen in December and instructed to return in March

## 2019-01-15 DIAGNOSIS — E11.9 TYPE 2 DIABETES MELLITUS WITHOUT COMPLICATION, WITHOUT LONG-TERM CURRENT USE OF INSULIN (HCC): ICD-10-CM

## 2019-01-15 DIAGNOSIS — E78.2 MIXED HYPERLIPIDEMIA: ICD-10-CM

## 2019-01-15 DIAGNOSIS — N40.1 BPH WITH URINARY OBSTRUCTION: ICD-10-CM

## 2019-01-15 DIAGNOSIS — K21.9 GASTROESOPHAGEAL REFLUX DISEASE WITHOUT ESOPHAGITIS: ICD-10-CM

## 2019-01-15 DIAGNOSIS — G47.00 INSOMNIA, UNSPECIFIED TYPE: ICD-10-CM

## 2019-01-15 DIAGNOSIS — E11.41 DIABETIC MONONEUROPATHY ASSOCIATED WITH TYPE 2 DIABETES MELLITUS (HCC): ICD-10-CM

## 2019-01-15 DIAGNOSIS — J30.2 SEASONAL ALLERGIC RHINITIS, UNSPECIFIED TRIGGER: ICD-10-CM

## 2019-01-15 DIAGNOSIS — N13.8 BPH WITH URINARY OBSTRUCTION: ICD-10-CM

## 2019-01-15 RX ORDER — TRAZODONE HYDROCHLORIDE 50 MG/1
50 TABLET ORAL
Qty: 90 TABLET | Refills: 0 | Status: SHIPPED | OUTPATIENT
Start: 2019-01-15 | End: 2019-04-04 | Stop reason: SDUPTHER

## 2019-01-15 RX ORDER — TAMSULOSIN HYDROCHLORIDE 0.4 MG/1
0.8 CAPSULE ORAL
Qty: 180 CAPSULE | Refills: 0 | Status: SHIPPED | OUTPATIENT
Start: 2019-01-15 | End: 2019-04-04 | Stop reason: SDUPTHER

## 2019-01-15 RX ORDER — ATORVASTATIN CALCIUM 20 MG/1
20 TABLET, FILM COATED ORAL DAILY
Qty: 90 TABLET | Refills: 0 | Status: SHIPPED | OUTPATIENT
Start: 2019-01-15 | End: 2019-07-31 | Stop reason: SDUPTHER

## 2019-01-15 RX ORDER — GLIPIZIDE 10 MG/1
10 TABLET, FILM COATED, EXTENDED RELEASE ORAL DAILY
Qty: 90 TABLET | Refills: 0 | Status: SHIPPED | OUTPATIENT
Start: 2019-01-15 | End: 2019-04-04 | Stop reason: SDUPTHER

## 2019-01-21 ENCOUNTER — HOSPITAL ENCOUNTER (OUTPATIENT)
Dept: RADIOLOGY | Facility: HOSPITAL | Age: 59
Discharge: HOME | End: 2019-01-21
Payer: OTHER MISCELLANEOUS

## 2019-01-21 ENCOUNTER — HOSPITAL ENCOUNTER (OUTPATIENT)
Dept: RADIOLOGY | Facility: HOSPITAL | Age: 59
Discharge: HOME | End: 2019-01-21

## 2019-01-21 DIAGNOSIS — G89.29 CHRONIC LOW BACK PAIN, UNSPECIFIED BACK PAIN LATERALITY, WITH SCIATICA PRESENCE UNSPECIFIED: ICD-10-CM

## 2019-01-21 DIAGNOSIS — G89.4 CHRONIC PAIN SYNDROME: ICD-10-CM

## 2019-01-21 DIAGNOSIS — M54.16 LUMBAR RADICULOPATHY: ICD-10-CM

## 2019-01-21 DIAGNOSIS — M54.5 CHRONIC LOW BACK PAIN, UNSPECIFIED BACK PAIN LATERALITY, WITH SCIATICA PRESENCE UNSPECIFIED: ICD-10-CM

## 2019-01-21 DIAGNOSIS — M47.816 LUMBAR SPONDYLOSIS: ICD-10-CM

## 2019-01-21 DIAGNOSIS — M96.1 POSTLAMINECTOMY SYNDROME, LUMBAR: ICD-10-CM

## 2019-01-21 PROCEDURE — 72148 MRI LUMBAR SPINE W/O DYE: CPT

## 2019-01-22 ENCOUNTER — TELEPHONE (OUTPATIENT)
Dept: PAIN MEDICINE | Facility: CLINIC | Age: 59
End: 2019-01-22

## 2019-01-22 DIAGNOSIS — M96.1 POSTLAMINECTOMY SYNDROME, LUMBAR REGION: ICD-10-CM

## 2019-01-22 DIAGNOSIS — M54.5 CHRONIC LOW BACK PAIN, UNSPECIFIED BACK PAIN LATERALITY, WITH SCIATICA PRESENCE UNSPECIFIED: ICD-10-CM

## 2019-01-22 DIAGNOSIS — M51.16 HERNIATION OF LUMBAR INTERVERTEBRAL DISC WITH RADICULOPATHY: ICD-10-CM

## 2019-01-22 DIAGNOSIS — G89.29 CHRONIC LOW BACK PAIN, UNSPECIFIED BACK PAIN LATERALITY, WITH SCIATICA PRESENCE UNSPECIFIED: ICD-10-CM

## 2019-01-22 DIAGNOSIS — G89.4 CHRONIC PAIN SYNDROME: Primary | ICD-10-CM

## 2019-01-22 NOTE — TELEPHONE ENCOUNTER
S/ pt, reviewed MRI, pt would like to be referred back to Dr Suman Lacey  Told pt we will call him back tomorrow with referral information  Pt verbalized understanding

## 2019-01-22 NOTE — TELEPHONE ENCOUNTER
----- Message from Lucía Dockery MD sent at 1/22/2019  2:08 PM EST -----  Regarding: MRI L-spine results  MRI of the lumbar spine shows a left-sided disc herniation at the L4-L5 level and a right-sided disc herniation at the L5-S1 level  These disc herniations are similar in size to his previous MRI  There are no new disc herniations noted  At this point, the patient has undergone several injections in addition to several reprogramming attempts of his spinal cord stimulator  I think it would be reasonable for him to speak to a neurosurgeon or spinal surgeon regarding his pain and possibly a surgical repair of his disc herniations  He has seen Dr Kurtis Del Valle (neurosurgery) and Dr Kenia Damon (spinal surgery) in the past  I can refer him back to either specialist or, if he does not wish to see either, I can refer him to another surgeon

## 2019-01-23 NOTE — TELEPHONE ENCOUNTER
S/W pt and advised of same  Number given for Dr Desiree Camp office and Medical records as requested by pt

## 2019-01-28 ENCOUNTER — TELEPHONE (OUTPATIENT)
Dept: PAIN MEDICINE | Facility: CLINIC | Age: 59
End: 2019-01-28

## 2019-01-28 ENCOUNTER — OFFICE VISIT (OUTPATIENT)
Dept: NEUROSURGERY | Facility: CLINIC | Age: 59
End: 2019-01-28
Payer: OTHER MISCELLANEOUS

## 2019-01-28 VITALS
WEIGHT: 230 LBS | HEART RATE: 84 BPM | SYSTOLIC BLOOD PRESSURE: 140 MMHG | DIASTOLIC BLOOD PRESSURE: 74 MMHG | RESPIRATION RATE: 16 BRPM | HEIGHT: 71 IN | BODY MASS INDEX: 32.2 KG/M2

## 2019-01-28 DIAGNOSIS — G89.4 CHRONIC PAIN SYNDROME: Primary | ICD-10-CM

## 2019-01-28 DIAGNOSIS — G89.29 CHRONIC LOW BACK PAIN, UNSPECIFIED BACK PAIN LATERALITY, WITH SCIATICA PRESENCE UNSPECIFIED: ICD-10-CM

## 2019-01-28 DIAGNOSIS — M54.16 LUMBAR RADICULOPATHY: ICD-10-CM

## 2019-01-28 DIAGNOSIS — M54.5 CHRONIC LOW BACK PAIN, UNSPECIFIED BACK PAIN LATERALITY, WITH SCIATICA PRESENCE UNSPECIFIED: ICD-10-CM

## 2019-01-28 DIAGNOSIS — M54.40 BILATERAL LOW BACK PAIN WITH SCIATICA, SCIATICA LATERALITY UNSPECIFIED, UNSPECIFIED CHRONICITY: ICD-10-CM

## 2019-01-28 DIAGNOSIS — M47.816 LUMBAR SPONDYLOSIS: ICD-10-CM

## 2019-01-28 DIAGNOSIS — M96.1 POSTLAMINECTOMY SYNDROME, LUMBAR REGION: ICD-10-CM

## 2019-01-28 DIAGNOSIS — M96.1 POSTLAMINECTOMY SYNDROME, LUMBAR: ICD-10-CM

## 2019-01-28 DIAGNOSIS — G89.4 CHRONIC PAIN SYNDROME: ICD-10-CM

## 2019-01-28 DIAGNOSIS — M51.16 HERNIATION OF LUMBAR INTERVERTEBRAL DISC WITH RADICULOPATHY: ICD-10-CM

## 2019-01-28 PROCEDURE — 99213 OFFICE O/P EST LOW 20 MIN: CPT | Performed by: NEUROLOGICAL SURGERY

## 2019-01-28 RX ORDER — LIDOCAINE 50 MG/G
2 PATCH TOPICAL DAILY
Qty: 60 PATCH | Refills: 0 | Status: SHIPPED | OUTPATIENT
Start: 2019-01-28 | End: 2019-03-04 | Stop reason: SDUPTHER

## 2019-01-28 RX ORDER — TRAMADOL HYDROCHLORIDE 50 MG/1
100 TABLET ORAL 2 TIMES DAILY PRN
Qty: 120 TABLET | Refills: 0 | Status: SHIPPED | OUTPATIENT
Start: 2019-01-28 | End: 2019-03-04 | Stop reason: SDUPTHER

## 2019-01-28 NOTE — TELEPHONE ENCOUNTER
Per pt, the 1st caudal epidural provided him a few days of relief but the 2nd epidural only provided relief the day of injection  Advised pt scripts were printed and would be faxed to [facility]

## 2019-01-28 NOTE — PROGRESS NOTES
Assessment/Plan:    No problem-specific Assessment & Plan notes found for this encounter  Problem List Items Addressed This Visit        Nervous and Auditory    Herniation of lumbar intervertebral disc with radiculopathy       Other    Chronic low back pain    Chronic pain syndrome - Primary    Low back pain    Postlaminectomy syndrome, lumbar region            Subjective:      Patient ID: Myaito Santiago is a 62 y o  male  HPI    The following portions of the patient's history were reviewed and updated as appropriate: allergies, current medications, past family history, past medical history, past social history, past surgical history and problem list     Review of Systems   Constitutional: Negative for chills, fatigue and fever  HENT: Negative  Eyes: Negative for pain and visual disturbance  Respiratory: Negative for cough, shortness of breath and wheezing  Cardiovascular: Negative for chest pain and palpitations  Gastrointestinal: Negative for abdominal pain and nausea  Genitourinary: Negative for difficulty urinating  Musculoskeletal: Positive for arthralgias and back pain  Negative for gait problem, neck pain and neck stiffness  Neurological: Positive for numbness  Negative for dizziness, speech difficulty, weakness and headaches  Objective:      /74 (BP Location: Left arm, Patient Position: Sitting, Cuff Size: Standard)   Pulse 84   Resp 16   Ht 5' 11" (1 803 m)   Wt 104 kg (230 lb)   BMI 32 08 kg/m²          Physical Exam      HPI    Chronic back and and R>L leg pain with SCS since 2016  Improved leg pain with residual "4-7/10" back pain  Followed by Dr Julia Vallecillo and SCS rep  On tramadol 50 and gabapentin 100 tid  Denies weakness, loss of fine motor, gait/balance issues, bowel and bladder      Exam    Full strength bilateral LE  Negative SLR  Reduced sensation right lateral calf  Normal DTR  Normal gait and tandem  Able to toe and heel walk  Limited lumbar ROM  Paravertebral spasm R>L    Radiology    Stable L4/5 and L5/S1 disc degeneration with loss of disc space height and mild bilateral foraminal narrowing  No significant central canal stenosis  No significant interval change vs 08/2016    Summary and Plan    Mr Christofer Smith is improved follow SCS x 2 yrs, with some residual back pain symptoms  I reassured him that his MRI appears stable without evidence of significant adverse change  We reviewed the conservative options including PT, VIDHYA and medications  I encouraged him to remain active and to fu with Dr Jose Raul Mcdowell and the SCS rep  I will see him on a PRN basis

## 2019-01-28 NOTE — TELEPHONE ENCOUNTER
Patient called [facility] office stating Dr Shankar Anders ask him to f/u with pain management  He stated he needs medication refill for Etodolac/tramadol and lidocaine patches he states it has to be sent to [place]   Ty

## 2019-01-28 NOTE — TELEPHONE ENCOUNTER
Please reach out to the SCS reps from [employer] regarding reprogramming for him  In addition, how much does he have left of his medications?

## 2019-01-28 NOTE — TELEPHONE ENCOUNTER
S/w pt  States has enough etodolac for about a month  Has 1 week left of tramadol and needs lidocaine patches at this time  Pt states scripts are to be faxed to [facility] and that MA is aware and has fax #  Pt states he has been in touch with Delfino Deras with [facility] since last ov  Pt states it has been reprogrammed several times and he has 80% improvement in leg pain  Pt states no programs are covering back but he would rather have back pain than make any further adjustments and risk increased leg pain, stating now he can walk  Pt has no f/u scheduled  Advised will check with [facility] regarding scripts and when he wants to see pt back and call to schedule  Pt appreciative

## 2019-01-28 NOTE — TELEPHONE ENCOUNTER
Prescriptions for tramadol and lidocaine patch printed and given to [facility]  These need to be faxed to [facility]  Did he get any back pain relief when we did the caudal VIDHYA's for him?

## 2019-01-28 NOTE — TELEPHONE ENCOUNTER
Aware  Thanks  I will discuss further options with him when I see him next  He should schedule a follow up appointment on or before March 6, 2019

## 2019-01-28 NOTE — TELEPHONE ENCOUNTER
Patient  [phone]    Dr Itz Toscano    Patient called stating he want to speak to Dr Flores Griffith  Sandy Moura, he has some questions  And he would like a refill of his pain medication  The Lidocaine and the Tramadol  Lost phone connection  Was unable to finish

## 2019-02-01 ENCOUNTER — TELEPHONE (OUTPATIENT)
Dept: FAMILY MEDICINE CLINIC | Facility: CLINIC | Age: 59
End: 2019-02-01

## 2019-02-01 DIAGNOSIS — J30.2 SEASONAL ALLERGIC RHINITIS, UNSPECIFIED TRIGGER: ICD-10-CM

## 2019-02-01 RX ORDER — FLUTICASONE PROPIONATE 50 MCG
2 SPRAY, SUSPENSION (ML) NASAL DAILY
Qty: 24 G | Refills: 0 | Status: SHIPPED | OUTPATIENT
Start: 2019-02-01 | End: 2019-02-08 | Stop reason: SDUPTHER

## 2019-02-01 NOTE — TELEPHONE ENCOUNTER
[place] [facility] [place] 
details…

## 2019-02-01 NOTE — TELEPHONE ENCOUNTER
Patient called in requesting a call back from [facility]- he said it was in regards to his request he sent in 2 months ago? About a change in his prescription? This is all he would tell me and would just like a return call from her when she has a free moment?

## 2019-02-01 NOTE — TELEPHONE ENCOUNTER
Spoke with pt and did verify that all of his meds were refilled on 1/14 and 1/15 and sent to [address]

## 2019-02-06 ENCOUNTER — OFFICE VISIT (OUTPATIENT)
Dept: FAMILY MEDICINE CLINIC | Facility: CLINIC | Age: 59
End: 2019-02-06
Payer: COMMERCIAL

## 2019-02-06 VITALS
DIASTOLIC BLOOD PRESSURE: 68 MMHG | OXYGEN SATURATION: 98 % | RESPIRATION RATE: 16 BRPM | HEIGHT: 71 IN | HEART RATE: 89 BPM | BODY MASS INDEX: 32.48 KG/M2 | SYSTOLIC BLOOD PRESSURE: 110 MMHG | WEIGHT: 232 LBS

## 2019-02-06 DIAGNOSIS — E11.41 DIABETIC MONONEUROPATHY ASSOCIATED WITH TYPE 2 DIABETES MELLITUS (HCC): ICD-10-CM

## 2019-02-06 DIAGNOSIS — E78.2 MIXED HYPERLIPIDEMIA: ICD-10-CM

## 2019-02-06 DIAGNOSIS — E11.9 TYPE 2 DIABETES MELLITUS WITHOUT COMPLICATION, WITHOUT LONG-TERM CURRENT USE OF INSULIN (HCC): Primary | ICD-10-CM

## 2019-02-06 DIAGNOSIS — Z12.5 PROSTATE CANCER SCREENING: ICD-10-CM

## 2019-02-06 DIAGNOSIS — F17.200 SMOKER: ICD-10-CM

## 2019-02-06 DIAGNOSIS — R74.01 ELEVATED ALANINE AMINOTRANSFERASE (ALT) LEVEL: ICD-10-CM

## 2019-02-06 PROCEDURE — 99213 OFFICE O/P EST LOW 20 MIN: CPT | Performed by: FAMILY MEDICINE

## 2019-02-06 PROCEDURE — 3725F SCREEN DEPRESSION PERFORMED: CPT | Performed by: FAMILY MEDICINE

## 2019-02-06 PROCEDURE — G0438 PPPS, INITIAL VISIT: HCPCS | Performed by: FAMILY MEDICINE

## 2019-02-06 NOTE — PROGRESS NOTES
Assessment/Plan:   Diagnoses and all orders for this visit:  Type 2 diabetes mellitus without complication, without long-term current use of insulin ([facility] [address] )  Diabetic mononeuropathy associated with type 2 diabetes mellitus ([facility] [address] )  Smoker  -     Ambulatory referral to Ophthalmology; Future  -     HEMOGLOBIN A1C W/ EAG ESTIMATION; Future  - HbA1c 6 8   - nml urine microalbumin   - LDL 64   - cont current regimen: Glipizide 10mg QD and Metformin 1000mg BID  - cont Gabapentin for Neuropathy    - discussed diet and exercise  - RTO in 3months with repeat labs - pt aware and agreeable   - does not want to follow with Podiatry   - aware that has to f/u with Optho    Mixed hyperlipidemia  - Lipids under good control - LDL 64  - cont current regimen of Lipitor 20mg QHS     Elevated alanine aminotransferase (ALT) level  -     Comprehensive metabolic panel; Future    Prostate cancer screening  -     PSA, total and free; Future        Subjective:    Patient ID: Savannah Valentin is a 62 y o  male    63yo M presents to the office for f/u   1) Rib pain/Chest pain - resolved   2) DM   - HbA1c 6 8 <-- 6 7 (has been "cheating" a little and trying to limit himself to 4-pieces of candy/chocolate)  - Lipids within normal limits   - nml urine microalbumin   - well controlled on Glipidize 10mg QD and Metformin 1000mg BID   - does not check his sugars   3) General   - lung CT (lung ca screening) - same as last year, 2 benign nodules - no change, annual screening recommended   - Gabapentin is working for his neuropathy - sometimes forgets TID and just does BID dosing, doesn't want to f/u with Podiatry   - Sleeping better on Trazodone 50mg QHS   - aware that needs to follow-up with Optho and will do so now that he has insurance  - follows with PM - pain is "fair"   - has gained a few lbs since his last OV in 12/2018       The following portions of the patient's history were reviewed and updated as appropriate: allergies, current medications, past family history, past medical history, past social history, past surgical history and problem list     Review of Systems  as per HPI    Objective:  /68   Pulse 89   Resp 16   Ht 5' 11" (1 803 m)   Wt 105 kg (232 lb)   SpO2 (!) 68%   BMI 32 36 kg/m²    Physical Exam   Constitutional: He is oriented to person, place, and time  He appears well-developed and well-nourished  No distress  HENT:   Head: Normocephalic and atraumatic  Right Ear: External ear normal    Left Ear: External ear normal    Nose: Nose normal    Eyes: Conjunctivae and EOM are normal  Right eye exhibits no discharge  Left eye exhibits no discharge  No scleral icterus  Neck: Normal range of motion  Cardiovascular: Normal rate, regular rhythm and normal heart sounds  Exam reveals no gallop and no friction rub  Pulses are no weak pulses  No murmur heard  Pulses:       Dorsalis pedis pulses are 2+ on the right side, and 2+ on the left side  Pulmonary/Chest: Effort normal and breath sounds normal  No respiratory distress  He has no wheezes  He has no rales  Abdominal: Soft  Obese abdomen    Musculoskeletal: Normal range of motion  He exhibits no edema or tenderness  Feet:   Right Foot:   Skin Integrity: Negative for ulcer, skin breakdown, erythema, warmth, callus or dry skin  Left Foot:   Skin Integrity: Negative for ulcer, skin breakdown, erythema, warmth, callus or dry skin  Neurological: He is alert and oriented to person, place, and time  Skin: Skin is warm  He is not diaphoretic  Psychiatric: He has a normal mood and affect  His behavior is normal    Vitals reviewed  Patient's shoes and socks removed  Right Foot/Ankle   Right Foot Inspection  Skin Exam: skin normal and skin intact no dry skin, no warmth, no callus, no erythema, no maceration, no abnormal color, no pre-ulcer, no ulcer and no callus                          Toe Exam: ROM and strength within normal limitsno swelling, no tenderness, erythema and no right toe deformity  Sensory       Monofilament testing: intact  Vascular    The right DP pulse is 2+  Left Foot/Ankle  Left Foot Inspection  Skin Exam: skin normal and skin intactno dry skin, no warmth, no erythema, no maceration, normal color, no pre-ulcer, no ulcer and no callus                         Toe Exam: ROM and strength within normal limitsno swelling, no tenderness, no erythema and no left toe deformity                   Sensory       Monofilament: intact  Vascular    The left DP pulse is 2+  Assign Risk Category:  No deformity present; No loss of protective sensation;  No weak pulses       Risk: 0

## 2019-02-06 NOTE — PROGRESS NOTES
Assessment and Plan:  Problem List Items Addressed This Visit        Endocrine    DM type 2 (diabetes mellitus, type 2) ([facility] [address] ) - Primary    Relevant Orders    Ambulatory referral to Ophthalmology    HEMOGLOBIN A1C W/ EAG ESTIMATION    Diabetic mononeuropathy associated with type 2 diabetes mellitus ([facility] [address] )       Other    Smoker      Other Visit Diagnoses     Mixed hyperlipidemia        Elevated alanine aminotransferase (ALT) level        Relevant Orders    Comprehensive metabolic panel    Prostate cancer screening        Relevant Orders    PSA, total and free        Health Maintenance Due   Topic Date Due    Hepatitis C Screening  1960    DM Eye Exam  06/15/1970         HPI:  Patient Active Problem List   Diagnosis    Heartburn    Erectile dysfunction of non-organic origin    DM type 2 (diabetes mellitus, type 2) ([facility] [address] )    Chronic low back pain    Lumbar radiculopathy    BPH with urinary obstruction    Blood pressure elevated without history of HTN    Sciatica    Postlaminectomy syndrome, lumbar    Myofascial pain syndrome    Lumbar spondylosis    Herniation of lumbar intervertebral disc with radiculopathy    Acute post-operative pain    Insomnia    Chronic pain syndrome    Low back pain    Class 1 obesity due to excess calories with serious comorbidity and body mass index (BMI) of 30 0 to 30 9 in adult    Smoker    Seasonal allergic rhinitis    Diabetic mononeuropathy associated with type 2 diabetes mellitus ([facility] [address] )    Postlaminectomy syndrome, lumbar region     Past Medical History:   Diagnosis Date    Arthritis     BPH (benign prostatic hypertrophy) with urinary obstruction     Chronic pain disorder     Diabetes mellitus ([address] )     type 2    Ear infection     Herniation of lumbar intervertebral disc with radiculopathy     Lumbago     Myofascial pain syndrome     Sciatica      Past Surgical History:   Procedure Laterality Date    ABSCESS DRAINAGE      groin    BACK SURGERY implant - resolved 2004    CAUDAL BLOCK N/A 10/26/2018    Procedure: Caudal Epidural Steroid Injection (18428); Surgeon: Valentino Harris MD;  Location: [facility] MAIN OR;  Service: Pain Management     CAUDAL BLOCK N/A 11/30/2018    Procedure: Caudal Epidural Steroid Injection (55902); Surgeon: Valentino Harris MD;  Location: [facility] MAIN OR;  Service: Pain Management     COLONOSCOPY      NERVE BLOCK Bilateral 4/26/2018    Procedure: B/L L3 L4 L5 S1 MBB #1 ([id number]); Surgeon: Valentino Harris MD;  Location: [facility] MAIN OR;  Service: Pain Management     NERVE BLOCK Bilateral 5/11/2018    Procedure: B/L L3 L4 L5 S1 Medial Branch Block #2;  Surgeon: Valentino Harris MD;  Location: [facility] MAIN OR;  Service: Pain Management     NOSE SURGERY      SD COLONOSCOPY FLX DX W/COLLJ SPEC WHEN PFRMD N/A 3/6/2017    Procedure: COLONOSCOPY;  Surgeon: Tyler Barrera MD;  Location: [facility]; Service: Gastroenterology    SD SURG IMPLNT NEUROELECT,EPIDURAL Left 10/3/2017    Procedure: PLACEMENT THORACIC FOR INSERTION DORSAL COLUMN SPINAL CORD STIMULATOR (DCS) WITH BUTTOCK IMPLANTABLE PULSE GENERATOR(IMPULSE); Surgeon: Carolina Dominguez MD;  Location:  MAIN OR;  Service: Neurosurgery    [address] Right 5/18/2018    Procedure: Rt L3 L4 L5 S1 Radio Frequency Ablation ([id number]); Surgeon: Valentino Harris MD;  Location: [facility] MAIN OR;  Service: Pain Management     RADIOFREQUENCY ABLATION Left 6/1/2018    Procedure: Lt L3 L4 L5 S1 Radio Frequency Ablation ([id number]);   Surgeon: Valentino Harris MD;  Location: [facility] MAIN OR;  Service: Pain Management      Family History   Problem Relation Age of Onset    Diabetes Mother     Diabetes Father         mellitus     Heart attack Father 58    Hypertension Father      History   Smoking Status    Current Every Day Smoker    Packs/day: 0 75    Years: 35 00   Smokeless Tobacco    Never Used     Comment: encouraged smoking cessation - history of smoking 30 or more pack years      History   Alcohol Use No     Comment: rare      History   Drug Use No         Current Outpatient Prescriptions   Medication Sig Dispense Refill    atorvastatin (LIPITOR) 20 mg tablet Take 1 tablet (20 mg total) by mouth daily 90 tablet 0    etodolac (LODINE) 300 MG capsule Take 300 mg by mouth every 8 (eight) hours      fluticasone (FLONASE) 50 mcg/act nasal spray 2 sprays into each nostril daily 24 g 0    gabapentin (NEURONTIN) 100 mg capsule Take 1 capsule (100 mg total) by mouth 3 (three) times a day 270 capsule 0    glipiZIDE (GLUCOTROL XL) 10 mg 24 hr tablet Take 1 tablet (10 mg total) by mouth daily 90 tablet 0    lidocaine (LIDODERM) 5 % Apply 2 patches topically daily Remove & Discard patch within 12 hours or as directed by MD 60 patch 0    metFORMIN (GLUCOPHAGE) 1000 MG tablet Take 1 tablet (1,000 mg total) by mouth 2 (two) times a day with meals 180 tablet 0    omeprazole (PriLOSEC) 20 mg delayed release capsule Take 1 capsule (20 mg total) by mouth daily 90 capsule 0    tamsulosin (FLOMAX) 0 4 mg Take 2 capsules (0 8 mg total) by mouth daily with dinner 180 capsule 0    traMADol (ULTRAM) 50 mg tablet Take 2 tablets (100 mg total) by mouth 2 (two) times a day as needed for severe pain Fill date: 2/4/19 120 tablet 0    traZODone (DESYREL) 50 mg tablet Take 1 tablet (50 mg total) by mouth daily at bedtime 90 tablet 0     No current facility-administered medications for this visit        Allergies   Allergen Reactions    Penicillins Swelling     Immunization History   Administered Date(s) Administered    Influenza 09/12/2018    Influenza, recombinant, quadrivalent,injectable, preservative free 09/12/2018    Pneumococcal Polysaccharide PPV23 09/12/2018    Tdap 09/12/2018       Patient Care Team:  Roby Katz DO as PCP - General (Family Medicine)  MD Karin Marks MD Jerrol Alberta, CRNP Beryle Homme, MD Dwana Helling, MD Vianne Began, Radha Aponte MD    Medicare Screening Tests and Risk Assessments:  Juan Zavaleta is here for his Initial Wellness visit  Health Risk Assessment:  Patient rates overall health as fair  Patient feels that their physical health rating is Same  Eyesight was rated as Same  Hearing was rated as Same  Patient feels that their emotional and mental health rating is Same  Pain experienced by patient in the last 7 days has been A lot  Patient's pain rating has been 7/10  Emotional/Mental Health:  Patient has been feeling nervous/anxious  PHQ-9 Depression Screening:    Frequency of the following problems over the past two weeks:      1  Little interest or pleasure in doing things: 1 - several days      2  Feeling down, depressed, or hopeless: 1 - several days  PHQ-2 Score: 2          Broken Bones/Falls: Fall Risk Assessment:    In the past year, patient has experienced: No history of falling in past year          Bladder/Bowel:  Patient has not leaked urine accidently in the last six months  Patient reports no loss of bowel control  Immunizations:  Patient has had a flu vaccination within the last year  Patient has not received a pneumonia shot  Patient has not received a shingles shot  Home Safety:  Patient has trouble with stairs inside or outside of their home  Patient currently reports that there are safety hazards present in home , working smoke alarms, working carbon monoxide detectors  Preventative Screenings:   prostate cancer screen performed, colon cancer screen completed, cholesterol screen completed, no glaucoma eye exam completed    Nutrition:  Current diet: Regular and Unhealthy with servings of the following:    Medications:  Patient is not currently taking any over-the-counter supplements  Patient is able to manage medications  Lifestyle Choices:  Patient reports current tobacco use  Patient reports no alcohol use  Patient drives a vehicle  Patient wears seat belt  Activities of Daily Living:  Can get out of bed by his or her self, able to dress self, able to make own meals, able to do own shopping, able to bathe self, can do own laundry/housekeeping, can manage own money, pay bills and track expenses    Previous Hospitalizations:  Hospitalization or ED visit in past 12 months  Number of hospitalizations within the last year: 1-2        Advanced Directives:  Patient has decided on a power of   Patient has spoken to designated power of   Patient has completed advanced directive  Preventative Screening/Counseling:      Cardiovascular:      General: Risks and Benefits Discussed and Screening Current      Counseling: Healthy Diet and Healthy Weight      Comments: Lipid Panel done 11/2018        Diabetes:      General: Risks and Benefits Discussed and Screening Current      Counseling: Healthy Diet, Healthy Weight and Improve Physical Activity      Comments: HbA1c (11/27/2018): 6 8         Colorectal Cancer:      General: Risks and Benefits Discussed and Screening Current      Counseling: high fiber diet      Comments: Cscope done 3/2017         Prostate Cancer:      General: Risks and Benefits Discussed      Due for labs: PSA          Osteoporosis:      General: Risks and Benefits Discussed      Counseling: Calcium and Vitamin D Intake and Regular Weightbearing Exercise          AAA:  Patient has history of tobacco use  Glaucoma:      General: Risks and Benefits Discussed      Comments: Does not currently have an Optho         Advanced Directives:   Patient has living will for healthcare, has durable POA for healthcare, patient has an advanced directive       Immunizations:      Influenza: Influenza UTD This Year      Pneumococcal: Lifetime Vaccine Completed      TDAP: Tdap Vaccine UTD             Visual Acuity Screening    Right eye Left eye Both eyes   Without correction: 20/25 20/20    With correction:          Physical Exam:  Review of Systems   Gastrointestinal: Negative for bowel incontinence  Psychiatric/Behavioral: The patient is nervous/anxious  Vitals:    02/06/19 0746   BP: 110/68   Pulse: 89   Resp: 16   SpO2: 98%   Weight: 105 kg (232 lb)   Height: 5' 11" (1 803 m)   Body mass index is 32 36 kg/m²  Physical Exam   Constitutional: He is oriented to person, place, and time  He appears well-developed and well-nourished  No distress  HENT:   Head: Normocephalic and atraumatic  Right Ear: External ear normal    Left Ear: External ear normal    Nose: Nose normal    Eyes: EOM are normal  Right eye exhibits no discharge  Left eye exhibits no discharge  No scleral icterus  Neck: Normal range of motion  Neck supple  No JVD present  Cardiovascular: Normal rate, regular rhythm and normal heart sounds  Exam reveals no gallop and no friction rub  No murmur heard  Pulmonary/Chest: Effort normal and breath sounds normal  No stridor  No respiratory distress  He has no wheezes  He has no rales  He exhibits no tenderness  Abdominal: Soft  Musculoskeletal: He exhibits no edema, tenderness or deformity  Neurological: He is alert and oriented to person, place, and time  Skin: Skin is warm  He is not diaphoretic  Psychiatric: He has a normal mood and affect  His behavior is normal    Vitals reviewed

## 2019-02-08 DIAGNOSIS — J30.2 SEASONAL ALLERGIC RHINITIS, UNSPECIFIED TRIGGER: ICD-10-CM

## 2019-02-08 RX ORDER — FLUTICASONE PROPIONATE 50 MCG
2 SPRAY, SUSPENSION (ML) NASAL DAILY
Qty: 24 G | Refills: 0 | Status: SHIPPED | OUTPATIENT
Start: 2019-02-08 | End: 2019-03-27 | Stop reason: SDUPTHER

## 2019-03-04 ENCOUNTER — OFFICE VISIT (OUTPATIENT)
Dept: PAIN MEDICINE | Facility: CLINIC | Age: 59
End: 2019-03-04
Payer: OTHER MISCELLANEOUS

## 2019-03-04 ENCOUNTER — TELEPHONE (OUTPATIENT)
Dept: FAMILY MEDICINE CLINIC | Facility: CLINIC | Age: 59
End: 2019-03-04

## 2019-03-04 VITALS
DIASTOLIC BLOOD PRESSURE: 82 MMHG | WEIGHT: 222 LBS | BODY MASS INDEX: 31.08 KG/M2 | SYSTOLIC BLOOD PRESSURE: 123 MMHG | HEIGHT: 71 IN | HEART RATE: 82 BPM | RESPIRATION RATE: 18 BRPM

## 2019-03-04 DIAGNOSIS — M54.16 LUMBAR RADICULOPATHY: ICD-10-CM

## 2019-03-04 DIAGNOSIS — G89.29 CHRONIC LOW BACK PAIN, UNSPECIFIED BACK PAIN LATERALITY, WITH SCIATICA PRESENCE UNSPECIFIED: ICD-10-CM

## 2019-03-04 DIAGNOSIS — M47.816 LUMBAR SPONDYLOSIS: ICD-10-CM

## 2019-03-04 DIAGNOSIS — Z79.891 LONG-TERM CURRENT USE OF OPIATE ANALGESIC: ICD-10-CM

## 2019-03-04 DIAGNOSIS — G89.4 CHRONIC PAIN SYNDROME: Primary | ICD-10-CM

## 2019-03-04 DIAGNOSIS — F11.20 UNCOMPLICATED OPIOID DEPENDENCE (HCC): ICD-10-CM

## 2019-03-04 DIAGNOSIS — M96.1 POSTLAMINECTOMY SYNDROME, LUMBAR: ICD-10-CM

## 2019-03-04 DIAGNOSIS — M54.5 CHRONIC LOW BACK PAIN, UNSPECIFIED BACK PAIN LATERALITY, WITH SCIATICA PRESENCE UNSPECIFIED: ICD-10-CM

## 2019-03-04 PROCEDURE — 99214 OFFICE O/P EST MOD 30 MIN: CPT | Performed by: ANESTHESIOLOGY

## 2019-03-04 PROCEDURE — 80305 DRUG TEST PRSMV DIR OPT OBS: CPT | Performed by: ANESTHESIOLOGY

## 2019-03-04 RX ORDER — TRAMADOL HYDROCHLORIDE 50 MG/1
TABLET ORAL
Qty: 180 TABLET | Refills: 1 | Status: SHIPPED | OUTPATIENT
Start: 2019-03-04 | End: 2019-05-20 | Stop reason: SDUPTHER

## 2019-03-04 RX ORDER — LIDOCAINE 50 MG/G
2 PATCH TOPICAL DAILY
Qty: 60 PATCH | Refills: 1 | Status: SHIPPED | OUTPATIENT
Start: 2019-03-04 | End: 2019-07-02 | Stop reason: SDUPTHER

## 2019-03-04 NOTE — PROGRESS NOTES
Pain Medicine Follow-Up Note    Assessment:  1  Chronic pain syndrome    2  Chronic low back pain, unspecified back pain laterality, with sciatica presence unspecified    3  Postlaminectomy syndrome, lumbar    4  Lumbar spondylosis    5  Lumbar radiculopathy        Plan:  New Medications Ordered This Visit   Medications    etodolac (LODINE) 300 MG capsule     Sig: Take 1 capsule (300 mg total) by mouth 3 (three) times a day as needed (pain (with food))     Dispense:  90 capsule     Refill:  1    lidocaine (LIDODERM) 5 %     Sig: Apply 2 patches topically daily Remove & Discard patch within 12 hours or as directed by MD     Dispense:  60 patch     Refill:  1    traMADol (ULTRAM) 50 mg tablet     Si-2 tablets PO TID PRN PAIN Fill dates: 3/6/19 & 19     Dispense:  180 tablet     Refill:  1     My impressions and treatment recommendations were discussed in detail with the patient who verbalized understanding and had no further questions  Given that the patient reports overall reduced pain and improved level functioning without significant side effects, I felt a reasonable to continue the patient on lidocaine 5% patch 2 patches applied topically 12 hours on and 12 hours off as well as etodolac 300 mg 3 times daily as needed for pain, with food  The patient does report that his pain can bother him significantly at night time  He would like to trial a higher dose of the tramadol at night  As such, I felt a reasonable to prescribe the patient tramadol 50 mg 1-2 tablets up to 3 times daily as needed for pain  I told him to trial using 3 tablets of tramadol per night as he is currently using 100 mg of tramadol in the morning and 100 mg of tramadol at night  The patient verbalized understanding  I sent prescriptions to the pharmacy dated 2019 in 2019  The risks and side effects of chronic opioid treatment were discussed in detail with the patient   Side effects include but are not limited to nausea, vomiting, GI intolerance, sedation, constipation, mental clouding, opioid-induced hyperalgesia, endocrine dysfunction, addiction, dependence, and tolerance  The patient was asked to take his medications only as prescribed and directed, never in excess, and never for any other reason other than for pain control  The patient was also asked to keep his medications out of the reach of others and away from children, preferably in a locked drawer  The patient verbalized understanding and wished to use these opioid medications  A urine drug test was collected at today's office visit as part of our medication management protocol  The point of care testing results were appropriate for what was being prescribed  The specimen will be sent for confirmatory testing  The drug screen is medically necessary because the patient is either dependent on opioid medication or is being considered for opioid medication therapy and the results could impact ongoing or future treatment  The drug screen is to evaluate for the presence or absence of prescribed, non-prescribed, and/or illicit drugs and substances  [place] Prescription Drug Monitoring Program report was reviewed and was appropriate     Follow-up is planned in 2 months time or sooner as warranted  Discharge instructions were provided  I personally saw and examined the patient and I agree with the above discussed plan of care  History of Present Illness:    Efren Pantoja is a 62 y o  male who presents to [facility] for interval re-evaluation of the above stated pain complaints  The patient has a past medical and chronic pain history as outlined in the assessment section   He was last seen on January 3, 2019 at which time he was maintained on lidocaine 5% patch 2 patches applied 12 hours on and 12 hours off, tramadol 50 mg 2 tablets in the morning and 2 tablets at night as needed for pain, and etodolac 300 mg 3 times daily as needed for pain, with food  At today's office visit, the patient's pain score is 4/10 on the verbal numerical pain rating scale  The patient states that his pain is worse in the morning, evening, and night  His pain is constant in nature  He reports the quality of his pain as dull/aching, sharp, throbbing, cramping, shooting, numbness, and pins and needles    He reports excellent pain relief with the combination of his medications  He also reports that the spinal cord stimulator was adjusted recently and is now covering his pain much better than it was previously  He denies opioid induced constipation  Last Urine Drug Screen:  March 4, 2019    Other than as stated above, the patient denies any interval changes in medications, medical condition, mental condition, symptoms, or allergies since the last office visit  Review of Systems:    Review of Systems   Respiratory: Negative for shortness of breath  Cardiovascular: Negative for chest pain  Gastrointestinal: Negative for constipation, diarrhea, nausea and vomiting  Musculoskeletal: Positive for gait problem (difficulty walking/ decreased range of motion)  Negative for arthralgias, joint swelling and myalgias  Skin: Negative for rash  Neurological: Negative for dizziness, seizures and weakness  All other systems reviewed and are negative          Patient Active Problem List   Diagnosis    Heartburn    Erectile dysfunction of non-organic origin    DM type 2 [facility]diabetes mellitus, type 2) [facility])    Chronic low back pain    Lumbar radiculopathy    BPH with urinary obstruction    Blood pressure elevated without history of HTN    Sciatica    Postlaminectomy syndrome, lumbar    Myofascial pain syndrome    Lumbar spondylosis    Herniation of lumbar intervertebral disc with radiculopathy    Acute post-operative pain    Insomnia    Chronic pain syndrome    Low back pain    Class 1 obesity due to excess calories with serious comorbidity and body mass index (BMI) of 30 0 to 30 9 in adult    Smoker    Seasonal allergic rhinitis    Diabetic mononeuropathy associated with type 2 diabetes mellitus ([facility] [address] [facility]    Postlaminectomy syndrome, lumbar region       Past Medical History:   Diagnosis Date    Arthritis     BPH (benign prostatic hypertrophy) with urinary obstruction     Chronic pain disorder     Diabetes mellitus ([facility] [address] )     type 2    Ear infection     Herniation of lumbar intervertebral disc with radiculopathy     Lumbago     Myofascial pain syndrome     Sciatica        Past Surgical History:   Procedure Laterality Date    ABSCESS DRAINAGE      groin    BACK SURGERY      implant - resolved 2004    CAUDAL BLOCK N/A 10/26/2018    Procedure: Caudal Epidural Steroid Injection (42792); Surgeon: Kimberly Bryant MD;  Location: [facility] MAIN OR;  Service: Pain Management     CAUDAL BLOCK N/A 11/30/2018    Procedure: Caudal Epidural Steroid Injection (37316); Surgeon: Kimberly Bryant MD;  Location: [facility] MAIN OR;  Service: Pain Management     COLONOSCOPY      NERVE BLOCK Bilateral 4/26/2018    Procedure: B/L L3 L4 L5 S1 MBB #1 ([id number]); Surgeon: Kimberly Bryant MD;  Location: [facility] MAIN OR;  Service: Pain Management     NERVE BLOCK Bilateral 5/11/2018    Procedure: B/L L3 L4 L5 S1 Medial Branch Block #2;  Surgeon: Kimberly Bryant MD;  Location: [place] [address] MAIN OR;  Service: Pain Management     NOSE SURGERY      TX COLONOSCOPY FLX DX W/COLLJ SPEC WHEN PFRMD N/A 3/6/2017    Procedure: COLONOSCOPY;  Surgeon: Shannon Darling MD;  [facility]; Service: Gastroenterology    TX SURG IMPLNT NEUROELECT,EPIDURAL Left 10/3/2017    Procedure: PLACEMENT THORACIC FOR INSERTION DORSAL COLUMN SPINAL CORD STIMULATOR (DCS) WITH BUTTOCK IMPLANTABLE PULSE GENERATOR(IMPULSE);   Surgeon: Andreas Hardy MD;  Location:  MAIN OR;  Service: Neurosurgery    [facility] Right 5/18/2018    Procedure: Rt L3 L4 L5 S1 Radio Frequency Ablation ([id number]); Surgeon: Dominique Swann MD;  Location: [facility] MAIN OR;  Service: Pain Management     RADIOFREQUENCY ABLATION Left 6/1/2018    Procedure: Lt L3 L4 L5 S1 Radio Frequency Ablation ([id number]);   Surgeon: Dominique Swann MD;  Location: [facility] MAIN OR;  Service: Pain Management        Family History   Problem Relation Age of Onset    Diabetes Mother     Diabetes Father         mellitus     Heart attack Father 58    Hypertension Father        Social History     Occupational History    Occupation:  for [employer]    Tobacco Use    Smoking status: Current Every Day Smoker     Packs/day: 0 75     Years: 35 00     Pack years: 26 25    Smokeless tobacco: Never Used    Tobacco comment: encouraged smoking cessation - history of smoking 30 or more pack years    Substance and Sexual Activity    Alcohol use: No     Comment: rare    Drug use: No    Sexual activity: Not on file         Current Outpatient Medications:     atorvastatin (LIPITOR) 20 mg tablet, Take 1 tablet (20 mg total) by mouth daily, Disp: 90 tablet, Rfl: 0    etodolac (LODINE) 300 MG capsule, Take 1 capsule (300 mg total) by mouth 3 (three) times a day as needed (pain (with food)), Disp: 90 capsule, Rfl: 1    fluticasone (FLONASE) 50 mcg/act nasal spray, 2 sprays into each nostril daily, Disp: 24 g, Rfl: 0    gabapentin (NEURONTIN) 100 mg capsule, Take 1 capsule (100 mg total) by mouth 3 (three) times a day, Disp: 270 capsule, Rfl: 0    glipiZIDE (GLUCOTROL XL) 10 mg 24 hr tablet, Take 1 tablet (10 mg total) by mouth daily, Disp: 90 tablet, Rfl: 0    lidocaine (LIDODERM) 5 %, Apply 2 patches topically daily Remove & Discard patch within 12 hours or as directed by MD, Disp: 60 patch, Rfl: 1    metFORMIN (GLUCOPHAGE) 1000 MG tablet, Take 1 tablet (1,000 mg total) by mouth 2 (two) times a day with meals, Disp: 180 tablet, Rfl: 0    omeprazole (PriLOSEC) 20 mg delayed release capsule, Take 1 capsule (20 mg total) by mouth daily, Disp: 90 capsule, Rfl: 0    tamsulosin (FLOMAX) 0 4 mg, Take 2 capsules (0 8 mg total) by mouth daily with dinner, Disp: 180 capsule, Rfl: 0    traMADol (ULTRAM) 50 mg tablet, 1-2 tablets PO TID PRN PAIN Fill dates: 3/6/19 & 4/5/19, Disp: 180 tablet, Rfl: 1    traZODone (DESYREL) 50 mg tablet, Take 1 tablet (50 mg total) by mouth daily at bedtime, Disp: 90 tablet, Rfl: 0    Allergies   Allergen Reactions    Penicillins Swelling       Physical Exam:    /82 (BP Location: Right arm, Patient Position: Sitting, Cuff Size: Standard)   Pulse 82   Resp 18   Ht 5' 11" (1 803 m)   Wt 101 kg (222 lb)   BMI 30 96 kg/m²     Constitutional:normal, well developed, well nourished, alert, in no distress and non-toxic and no overt pain behavior    Eyes:anicteric  HEENT:grossly intact  Neck:supple, symmetric, trachea midline and no masses   Pulmonary:even and unlabored  Cardiovascular:No edema or pitting edema present  Skin:Normal without rashes or lesions and well hydrated  Psychiatric:Mood and affect appropriate  Neurologic:Cranial Nerves II-XII grossly intact  Musculoskeletal:normal

## 2019-03-06 ENCOUNTER — OFFICE VISIT (OUTPATIENT)
Dept: FAMILY MEDICINE CLINIC | Facility: CLINIC | Age: 59
End: 2019-03-06
Payer: COMMERCIAL

## 2019-03-06 VITALS
BODY MASS INDEX: 31.08 KG/M2 | SYSTOLIC BLOOD PRESSURE: 130 MMHG | OXYGEN SATURATION: 98 % | RESPIRATION RATE: 16 BRPM | HEART RATE: 92 BPM | WEIGHT: 222 LBS | HEIGHT: 71 IN | DIASTOLIC BLOOD PRESSURE: 70 MMHG

## 2019-03-06 DIAGNOSIS — L85.3 DRY SKIN: ICD-10-CM

## 2019-03-06 DIAGNOSIS — R20.2 NUMBNESS AND TINGLING IN BOTH HANDS: ICD-10-CM

## 2019-03-06 DIAGNOSIS — R20.0 NUMBNESS AND TINGLING IN BOTH HANDS: ICD-10-CM

## 2019-03-06 DIAGNOSIS — E11.41 DIABETIC MONONEUROPATHY ASSOCIATED WITH TYPE 2 DIABETES MELLITUS (HCC): Primary | ICD-10-CM

## 2019-03-06 PROCEDURE — 3008F BODY MASS INDEX DOCD: CPT | Performed by: FAMILY MEDICINE

## 2019-03-06 PROCEDURE — 99214 OFFICE O/P EST MOD 30 MIN: CPT | Performed by: FAMILY MEDICINE

## 2019-03-06 PROCEDURE — 4004F PT TOBACCO SCREEN RCVD TLK: CPT | Performed by: FAMILY MEDICINE

## 2019-03-06 RX ORDER — GABAPENTIN 100 MG/1
200 CAPSULE ORAL 3 TIMES DAILY
Qty: 540 CAPSULE | Refills: 0 | Status: SHIPPED | OUTPATIENT
Start: 2019-03-06 | End: 2019-07-31 | Stop reason: SDUPTHER

## 2019-03-06 NOTE — PROGRESS NOTES
Assessment/Plan:   Diagnoses and all orders for this visit:  Diabetic mononeuropathy associated with type 2 diabetes mellitus (HCC)  -     gabapentin (NEURONTIN) 100 mg capsule; Take 2 capsules (200 mg total) by mouth 3 (three) times a day  - will increase Gabapentin to 200mg TID from 100mg TID   - advised that can increase dose up to 300mg TID for neuropathy  - RTO for next scheduled appt - pt aware      Numbness and tingling in both hands  -     XR spine cervical complete 4 or 5 vw non injury; Future  - I believe this is due to his sleeping habits - pt does sleep on his arms/hands and notices that his hands are numb/tingling 1st thing in the AM and get better throughout the day   - no loss of sensation on my exam, L-hand is slightly weaker than R-hand but pt states that that is normal for him   - cervical ROM within normal limits but if persisting advised to get Cspine imaging and possible referral for nerve testing - pt aware and would like to hold off for now     Dry skin  - advised to slather his hands with Eucerin/Aquaphor or Vaseline at night and to sleep with a glove on to lock in moisture - pt aware        Subjective:    Patient ID: Manjit Silva is a 62 y o  male    61yo M presents to the office for eval of numbness/tingling in his extremities  - currently on Gabapentin 100mg TID (pt takes 100mg QAM and then 200mg QHS)   - (+) numbness/tingling in feet - (+) PMHx of DM2, chronic low back pain with sciatica, lumbar postlaminectomy syndrome, lumbar spondylosis and radiculopathy - thinks the Gabapentin does help    - now also with numbness and tingling in b/l UE - noted in b/l hands, not in any particular digit distribution pattern   - does sleep on his arms and reports that the numbness and tingling is worse in the AM and does get better during the day  - denies PMHx of Cspine pathology    - did break his R-hand 3x and his L-hand crushed in conveyor 5-6yrs ago (but no broken bones)   - (+) dry, cracked hands The following portions of the patient's history were reviewed and updated as appropriate: allergies, current medications, past family history, past medical history, past social history, past surgical history and problem list     Review of Systems  as per HPI    Objective:  /70   Pulse 92   Resp 16   Ht 5' 11" (1 803 m)   Wt 101 kg (222 lb)   SpO2 98%   BMI 30 96 kg/m²    Physical Exam   Constitutional: He is oriented to person, place, and time  He appears well-developed and well-nourished  No distress  HENT:   Head: Normocephalic and atraumatic  Right Ear: External ear normal    Left Ear: External ear normal    Eyes: Conjunctivae and EOM are normal  Right eye exhibits no discharge  Left eye exhibits no discharge  No scleral icterus  Neck: Normal range of motion  Neck supple  Normal Cspine ROM in F/E/SB/R    Pulmonary/Chest: Effort normal    Musculoskeletal:   L-hand slightly weaker than R-hand, no loss of sensation, NEG Tinel's, normal digit adduction and abduction    Neurological: He is alert and oriented to person, place, and time  Skin: Skin is dry  He is not diaphoretic  Psychiatric: He has a normal mood and affect  His behavior is normal    Vitals reviewed

## 2019-03-11 ENCOUNTER — TELEPHONE (OUTPATIENT)
Dept: PAIN MEDICINE | Facility: CLINIC | Age: 59
End: 2019-03-11

## 2019-03-11 NOTE — TELEPHONE ENCOUNTER
S/w pt, reviewed UDS, he said he has been taking Percocet some nights to help him sleep because the Tramadol has not come in yet  Pt denies taking any other meds that could contain codeine

## 2019-03-11 NOTE — TELEPHONE ENCOUNTER
----- Message from Marvel Ahn MD sent at 3/11/2019  7:44 AM EDT -----  Regarding: UDT Results  Urine drug testing from March 4, 2019 shows that he is positive for Codeine as well as tramadol  He was not prescribed any medication with codeine in it  Did he take anything over-the-counter or from another physician that could have had Codeine?

## 2019-03-19 ENCOUNTER — APPOINTMENT (OUTPATIENT)
Dept: LAB | Facility: CLINIC | Age: 59
End: 2019-03-19
Payer: COMMERCIAL

## 2019-03-19 ENCOUNTER — TRANSCRIBE ORDERS (OUTPATIENT)
Dept: LAB | Facility: CLINIC | Age: 59
End: 2019-03-19

## 2019-03-19 DIAGNOSIS — E11.9 DIABETES MELLITUS WITHOUT COMPLICATION (HCC): Primary | ICD-10-CM

## 2019-03-19 DIAGNOSIS — R74.01 ELEVATED ALANINE AMINOTRANSFERASE (ALT) LEVEL: ICD-10-CM

## 2019-03-19 DIAGNOSIS — Z12.5 PROSTATE CANCER SCREENING: ICD-10-CM

## 2019-03-19 DIAGNOSIS — E11.9 TYPE 2 DIABETES MELLITUS WITHOUT COMPLICATION, WITHOUT LONG-TERM CURRENT USE OF INSULIN (HCC): ICD-10-CM

## 2019-03-19 LAB
ALBUMIN SERPL BCP-MCNC: 4.1 G/DL (ref 3.5–5)
ALP SERPL-CCNC: 81 U/L (ref 46–116)
ALT SERPL W P-5'-P-CCNC: 61 U/L (ref 12–78)
ANION GAP SERPL CALCULATED.3IONS-SCNC: 9 MMOL/L (ref 4–13)
AST SERPL W P-5'-P-CCNC: 27 U/L (ref 5–45)
BILIRUB SERPL-MCNC: 0.4 MG/DL (ref 0.2–1)
BUN SERPL-MCNC: 15 MG/DL (ref 5–25)
CALCIUM SERPL-MCNC: 9.2 MG/DL (ref 8.3–10.1)
CHLORIDE SERPL-SCNC: 103 MMOL/L (ref 100–108)
CO2 SERPL-SCNC: 26 MMOL/L (ref 21–32)
CREAT SERPL-MCNC: 0.65 MG/DL (ref 0.6–1.3)
CREAT UR-MCNC: 200 MG/DL
EST. AVERAGE GLUCOSE BLD GHB EST-MCNC: 131 MG/DL
GFR SERPL CREATININE-BSD FRML MDRD: 107 ML/MIN/1.73SQ M
GLUCOSE SERPL-MCNC: 85 MG/DL (ref 65–140)
HBA1C MFR BLD: 6.2 % (ref 4.2–6.3)
MICROALBUMIN UR-MCNC: 98.3 MG/L (ref 0–20)
MICROALBUMIN/CREAT 24H UR: 49 MG/G CREATININE (ref 0–30)
POTASSIUM SERPL-SCNC: 4.5 MMOL/L (ref 3.5–5.3)
PROT SERPL-MCNC: 7.1 G/DL (ref 6.4–8.2)
SODIUM SERPL-SCNC: 138 MMOL/L (ref 136–145)

## 2019-03-19 PROCEDURE — 82043 UR ALBUMIN QUANTITATIVE: CPT | Performed by: FAMILY MEDICINE

## 2019-03-19 PROCEDURE — 83036 HEMOGLOBIN GLYCOSYLATED A1C: CPT

## 2019-03-19 PROCEDURE — 80053 COMPREHEN METABOLIC PANEL: CPT

## 2019-03-19 PROCEDURE — 84154 ASSAY OF PSA FREE: CPT

## 2019-03-19 PROCEDURE — 82570 ASSAY OF URINE CREATININE: CPT | Performed by: FAMILY MEDICINE

## 2019-03-19 PROCEDURE — 84153 ASSAY OF PSA TOTAL: CPT

## 2019-03-19 PROCEDURE — 36415 COLL VENOUS BLD VENIPUNCTURE: CPT

## 2019-03-20 LAB
PSA FREE MFR SERPL: 65 %
PSA FREE SERPL-MCNC: 0.13 NG/ML
PSA SERPL-MCNC: 0.2 NG/ML (ref 0–4)

## 2019-03-27 DIAGNOSIS — J30.2 SEASONAL ALLERGIC RHINITIS, UNSPECIFIED TRIGGER: Primary | ICD-10-CM

## 2019-03-27 DIAGNOSIS — R80.9 PROTEINURIA, UNSPECIFIED TYPE: ICD-10-CM

## 2019-03-27 RX ORDER — FLUTICASONE PROPIONATE 50 MCG
2 SPRAY, SUSPENSION (ML) NASAL DAILY
Qty: 2 BOTTLE | Refills: 0 | Status: SHIPPED | OUTPATIENT
Start: 2019-03-27 | End: 2019-05-10 | Stop reason: SDUPTHER

## 2019-03-27 RX ORDER — LISINOPRIL 2.5 MG/1
2.5 TABLET ORAL DAILY
Qty: 90 TABLET | Refills: 0 | Status: SHIPPED | OUTPATIENT
Start: 2019-03-27 | End: 2019-05-30 | Stop reason: SDUPTHER

## 2019-03-27 NOTE — PROGRESS NOTES
Pt with proteinuria - will start on low dose ACEI and RTO in 1month for f/u - pt aware and agreeable

## 2019-04-04 DIAGNOSIS — N13.8 BPH WITH URINARY OBSTRUCTION: ICD-10-CM

## 2019-04-04 DIAGNOSIS — E11.9 TYPE 2 DIABETES MELLITUS WITHOUT COMPLICATION, WITHOUT LONG-TERM CURRENT USE OF INSULIN (HCC): ICD-10-CM

## 2019-04-04 DIAGNOSIS — K21.9 GASTROESOPHAGEAL REFLUX DISEASE WITHOUT ESOPHAGITIS: ICD-10-CM

## 2019-04-04 DIAGNOSIS — G47.00 INSOMNIA, UNSPECIFIED TYPE: ICD-10-CM

## 2019-04-04 DIAGNOSIS — N40.1 BPH WITH URINARY OBSTRUCTION: ICD-10-CM

## 2019-04-05 RX ORDER — TRAZODONE HYDROCHLORIDE 50 MG/1
50 TABLET ORAL
Qty: 90 TABLET | Refills: 0 | Status: SHIPPED | OUTPATIENT
Start: 2019-04-05 | End: 2019-06-12 | Stop reason: SDUPTHER

## 2019-04-05 RX ORDER — GLIPIZIDE 10 MG/1
10 TABLET, FILM COATED, EXTENDED RELEASE ORAL DAILY
Qty: 90 TABLET | Refills: 0 | Status: SHIPPED | OUTPATIENT
Start: 2019-04-05 | End: 2019-06-12 | Stop reason: SDUPTHER

## 2019-04-05 RX ORDER — TAMSULOSIN HYDROCHLORIDE 0.4 MG/1
0.8 CAPSULE ORAL
Qty: 180 CAPSULE | Refills: 0 | Status: SHIPPED | OUTPATIENT
Start: 2019-04-05 | End: 2019-06-12 | Stop reason: SDUPTHER

## 2019-04-08 ENCOUNTER — TELEPHONE (OUTPATIENT)
Dept: FAMILY MEDICINE CLINIC | Facility: CLINIC | Age: 59
End: 2019-04-08

## 2019-04-10 DIAGNOSIS — R20.2 NUMBNESS AND TINGLING IN BOTH HANDS: Primary | ICD-10-CM

## 2019-04-10 DIAGNOSIS — R20.0 NUMBNESS AND TINGLING IN BOTH HANDS: Primary | ICD-10-CM

## 2019-04-10 RX ORDER — OMEPRAZOLE 20 MG/1
CAPSULE, DELAYED RELEASE ORAL
Qty: 90 CAPSULE | Refills: 0 | Status: SHIPPED | OUTPATIENT
Start: 2019-04-10 | End: 2019-06-12 | Stop reason: SDUPTHER

## 2019-04-24 ENCOUNTER — OFFICE VISIT (OUTPATIENT)
Dept: FAMILY MEDICINE CLINIC | Facility: CLINIC | Age: 59
End: 2019-04-24
Payer: COMMERCIAL

## 2019-04-24 VITALS
HEART RATE: 92 BPM | OXYGEN SATURATION: 98 % | HEIGHT: 71 IN | SYSTOLIC BLOOD PRESSURE: 122 MMHG | WEIGHT: 208 LBS | DIASTOLIC BLOOD PRESSURE: 60 MMHG | BODY MASS INDEX: 29.12 KG/M2 | RESPIRATION RATE: 16 BRPM

## 2019-04-24 DIAGNOSIS — E11.41 DIABETIC MONONEUROPATHY ASSOCIATED WITH TYPE 2 DIABETES MELLITUS (HCC): ICD-10-CM

## 2019-04-24 DIAGNOSIS — R20.0 NUMBNESS AND TINGLING IN BOTH HANDS: ICD-10-CM

## 2019-04-24 DIAGNOSIS — R20.2 NUMBNESS AND TINGLING IN BOTH HANDS: ICD-10-CM

## 2019-04-24 DIAGNOSIS — E11.9 TYPE 2 DIABETES MELLITUS WITHOUT COMPLICATION, WITHOUT LONG-TERM CURRENT USE OF INSULIN (HCC): Primary | ICD-10-CM

## 2019-04-24 DIAGNOSIS — R80.9 MICROALBUMINURIA: ICD-10-CM

## 2019-04-24 PROCEDURE — 3066F NEPHROPATHY DOC TX: CPT | Performed by: FAMILY MEDICINE

## 2019-04-24 PROCEDURE — 99214 OFFICE O/P EST MOD 30 MIN: CPT | Performed by: FAMILY MEDICINE

## 2019-04-30 ENCOUNTER — DOCTOR'S OFFICE (OUTPATIENT)
Dept: URBAN - METROPOLITAN AREA CLINIC 137 | Facility: CLINIC | Age: 59
Setting detail: OPHTHALMOLOGY
End: 2019-04-30
Payer: COMMERCIAL

## 2019-04-30 DIAGNOSIS — E11.9: ICD-10-CM

## 2019-04-30 DIAGNOSIS — H25.13: ICD-10-CM

## 2019-04-30 LAB
LEFT EYE DIABETIC RETINOPATHY: NORMAL
RIGHT EYE DIABETIC RETINOPATHY: NORMAL

## 2019-04-30 PROCEDURE — 99204 OFFICE O/P NEW MOD 45 MIN: CPT | Performed by: OPTOMETRIST

## 2019-04-30 ASSESSMENT — REFRACTION_MANIFEST
OS_VA1: 20/
OU_VA: 20/
OS_VA3: 20/
OS_VA2: 20/
OU_VA: 20/
OS_VA2: 20/
OD_VA2: 20/
OD_VA1: 20/
OD_VA3: 20/
OD_VA1: 20/
OD_VA3: 20/
OS_VA1: 20/
OD_VA2: 20/
OS_VA3: 20/

## 2019-04-30 ASSESSMENT — REFRACTION_CURRENTRX
OS_OVR_VA: 20/
OS_OVR_VA: 20/
OD_OVR_VA: 20/
OS_OVR_VA: 20/

## 2019-04-30 ASSESSMENT — VISUAL ACUITY
OD_BCVA: 20/25-1
OS_BCVA: 20/30-2

## 2019-04-30 ASSESSMENT — CONFRONTATIONAL VISUAL FIELD TEST (CVF)
OD_FINDINGS: FULL
OS_FINDINGS: FULL

## 2019-05-02 ENCOUNTER — TELEPHONE (OUTPATIENT)
Dept: PAIN MEDICINE | Facility: CLINIC | Age: 59
End: 2019-05-02

## 2019-05-02 ENCOUNTER — OFFICE VISIT (OUTPATIENT)
Dept: PAIN MEDICINE | Facility: CLINIC | Age: 59
End: 2019-05-02
Payer: OTHER MISCELLANEOUS

## 2019-05-02 VITALS
DIASTOLIC BLOOD PRESSURE: 74 MMHG | SYSTOLIC BLOOD PRESSURE: 120 MMHG | WEIGHT: 218 LBS | HEART RATE: 80 BPM | HEIGHT: 71 IN | BODY MASS INDEX: 30.52 KG/M2 | RESPIRATION RATE: 18 BRPM

## 2019-05-02 DIAGNOSIS — G89.29 CHRONIC BILATERAL LOW BACK PAIN WITH RIGHT-SIDED SCIATICA: ICD-10-CM

## 2019-05-02 DIAGNOSIS — G89.4 CHRONIC PAIN SYNDROME: Primary | ICD-10-CM

## 2019-05-02 DIAGNOSIS — M54.41 CHRONIC BILATERAL LOW BACK PAIN WITH RIGHT-SIDED SCIATICA: ICD-10-CM

## 2019-05-02 DIAGNOSIS — M51.16 HERNIATION OF LUMBAR INTERVERTEBRAL DISC WITH RADICULOPATHY: ICD-10-CM

## 2019-05-02 DIAGNOSIS — M96.1 POSTLAMINECTOMY SYNDROME, LUMBAR REGION: ICD-10-CM

## 2019-05-02 PROBLEM — M51.17 INTERVERTEBRAL DISC DISORDER WITH RADICULOPATHY OF LUMBOSACRAL REGION: Status: ACTIVE | Noted: 2019-05-02

## 2019-05-02 PROCEDURE — 99214 OFFICE O/P EST MOD 30 MIN: CPT | Performed by: ANESTHESIOLOGY

## 2019-05-09 ENCOUNTER — TELEPHONE (OUTPATIENT)
Dept: PAIN MEDICINE | Facility: CLINIC | Age: 59
End: 2019-05-09

## 2019-05-10 ENCOUNTER — APPOINTMENT (OUTPATIENT)
Dept: RADIOLOGY | Facility: HOSPITAL | Age: 59
End: 2019-05-10
Payer: OTHER MISCELLANEOUS

## 2019-05-10 ENCOUNTER — HOSPITAL ENCOUNTER (OUTPATIENT)
Facility: AMBULARY SURGERY CENTER | Age: 59
Setting detail: OUTPATIENT SURGERY
Discharge: HOME | End: 2019-05-10
Attending: ANESTHESIOLOGY | Admitting: ANESTHESIOLOGY
Payer: OTHER MISCELLANEOUS

## 2019-05-10 VITALS
TEMPERATURE: 99.1 F | OXYGEN SATURATION: 98 % | RESPIRATION RATE: 18 BRPM | HEART RATE: 78 BPM | SYSTOLIC BLOOD PRESSURE: 120 MMHG | DIASTOLIC BLOOD PRESSURE: 69 MMHG

## 2019-05-10 DIAGNOSIS — J30.2 SEASONAL ALLERGIC RHINITIS, UNSPECIFIED TRIGGER: ICD-10-CM

## 2019-05-10 LAB — GLUCOSE SERPL-MCNC: 145 MG/DL (ref 65–140)

## 2019-05-10 PROCEDURE — 72020 X-RAY EXAM OF SPINE 1 VIEW: CPT

## 2019-05-10 PROCEDURE — 82948 REAGENT STRIP/BLOOD GLUCOSE: CPT

## 2019-05-10 PROCEDURE — 64483 NJX AA&/STRD TFRM EPI L/S 1: CPT | Performed by: ANESTHESIOLOGY

## 2019-05-10 PROCEDURE — 64484 NJX AA&/STRD TFRM EPI L/S EA: CPT | Performed by: ANESTHESIOLOGY

## 2019-05-10 RX ORDER — METHYLPREDNISOLONE ACETATE 80 MG/ML
INJECTION, SUSPENSION INTRA-ARTICULAR; INTRALESIONAL; INTRAMUSCULAR; SOFT TISSUE AS NEEDED
Status: DISCONTINUED | OUTPATIENT
Start: 2019-05-10 | End: 2019-05-10 | Stop reason: HOSPADM

## 2019-05-10 RX ORDER — LIDOCAINE WITH 8.4% SOD BICARB 0.9%(10ML)
SYRINGE (ML) INJECTION AS NEEDED
Status: DISCONTINUED | OUTPATIENT
Start: 2019-05-10 | End: 2019-05-10 | Stop reason: HOSPADM

## 2019-05-10 RX ORDER — BUPIVACAINE HYDROCHLORIDE 2.5 MG/ML
INJECTION, SOLUTION EPIDURAL; INFILTRATION; INTRACAUDAL AS NEEDED
Status: DISCONTINUED | OUTPATIENT
Start: 2019-05-10 | End: 2019-05-10 | Stop reason: HOSPADM

## 2019-05-13 ENCOUNTER — TELEPHONE (OUTPATIENT)
Dept: FAMILY MEDICINE CLINIC | Facility: CLINIC | Age: 59
End: 2019-05-13

## 2019-05-14 RX ORDER — FLUTICASONE PROPIONATE 50 MCG
SPRAY, SUSPENSION (ML) NASAL
Qty: 32 G | Refills: 1 | Status: SHIPPED | OUTPATIENT
Start: 2019-05-14 | End: 2019-05-20 | Stop reason: SDUPTHER

## 2019-05-17 ENCOUNTER — TELEPHONE (OUTPATIENT)
Dept: PAIN MEDICINE | Facility: CLINIC | Age: 59
End: 2019-05-17

## 2019-05-17 ENCOUNTER — DOCTOR'S OFFICE (OUTPATIENT)
Dept: URBAN - METROPOLITAN AREA CLINIC 137 | Facility: CLINIC | Age: 59
Setting detail: OPHTHALMOLOGY
End: 2019-05-17
Payer: COMMERCIAL

## 2019-05-17 DIAGNOSIS — H52.4: ICD-10-CM

## 2019-05-17 PROBLEM — H25.13 CATARACT NUCLEAR SCLEROSIS; BOTH EYES: Status: ACTIVE | Noted: 2019-04-30

## 2019-05-17 PROBLEM — E11.9 DIABETES NON INSULIN: Status: ACTIVE | Noted: 2019-04-30

## 2019-05-17 PROBLEM — H52.223 ASTIGMATISM, REGULAR; BOTH EYES: Status: ACTIVE | Noted: 2019-05-17

## 2019-05-17 PROBLEM — H52.03 HYPEROPIA; BOTH EYES: Status: ACTIVE | Noted: 2019-05-17

## 2019-05-17 PROCEDURE — 92015 DETERMINE REFRACTIVE STATE: CPT | Performed by: OPTOMETRIST

## 2019-05-17 ASSESSMENT — REFRACTION_MANIFEST
OD_VA1: 20/
OD_SPHERE: +1.25
OD_VA3: 20/
OD_CYLINDER: -0.75
OD_VA2: 20/
OS_VA2: 20/
OD_AXIS: 80
OD_ADD: +2.00
OS_VA3: 20/
OS_VA3: 20/
OS_VA1: 20/
OU_VA: 20/
OS_CYLINDER: -1.00
OS_ADD: +2.00
OS_VA1: 20/20
OU_VA: 20/
OS_SPHERE: +1.50
OD_VA2: 20/
OS_AXIS: 95
OD_VA1: 20/20
OD_VA3: 20/
OS_VA2: 20/

## 2019-05-17 ASSESSMENT — VISUAL ACUITY
OS_BCVA: 20/20-1
OD_BCVA: 20/20-1

## 2019-05-17 ASSESSMENT — REFRACTION_AUTOREFRACTION
OS_AXIS: 101
OD_SPHERE: +1.25
OD_AXIS: 91
OS_SPHERE: +1.75
OD_CYLINDER: -1.00
OS_CYLINDER: -1.25

## 2019-05-17 ASSESSMENT — SPHEQUIV_DERIVED
OS_SPHEQUIV: 1
OD_SPHEQUIV: 0.75
OS_SPHEQUIV: 1.125
OD_SPHEQUIV: 0.875

## 2019-05-17 ASSESSMENT — REFRACTION_CURRENTRX
OS_OVR_VA: 20/
OD_OVR_VA: 20/
OS_OVR_VA: 20/
OS_OVR_VA: 20/
OD_OVR_VA: 20/
OD_OVR_VA: 20/

## 2019-05-20 ENCOUNTER — TELEPHONE (OUTPATIENT)
Dept: PAIN MEDICINE | Facility: CLINIC | Age: 59
End: 2019-05-20

## 2019-05-20 DIAGNOSIS — M54.5 CHRONIC LOW BACK PAIN, UNSPECIFIED BACK PAIN LATERALITY, WITH SCIATICA PRESENCE UNSPECIFIED: ICD-10-CM

## 2019-05-20 DIAGNOSIS — G89.29 CHRONIC LOW BACK PAIN, UNSPECIFIED BACK PAIN LATERALITY, WITH SCIATICA PRESENCE UNSPECIFIED: ICD-10-CM

## 2019-05-20 DIAGNOSIS — G89.4 CHRONIC PAIN SYNDROME: ICD-10-CM

## 2019-05-20 DIAGNOSIS — J30.2 SEASONAL ALLERGIC RHINITIS, UNSPECIFIED TRIGGER: ICD-10-CM

## 2019-05-20 DIAGNOSIS — M47.816 LUMBAR SPONDYLOSIS: ICD-10-CM

## 2019-05-20 DIAGNOSIS — M54.16 LUMBAR RADICULOPATHY: ICD-10-CM

## 2019-05-20 DIAGNOSIS — M96.1 POSTLAMINECTOMY SYNDROME, LUMBAR: ICD-10-CM

## 2019-05-20 RX ORDER — TRAMADOL HYDROCHLORIDE 50 MG/1
TABLET ORAL
Qty: 180 TABLET | Refills: 1 | Status: SHIPPED | OUTPATIENT
Start: 2019-05-20 | End: 2019-07-02 | Stop reason: ALTCHOICE

## 2019-05-22 RX ORDER — FLUTICASONE PROPIONATE 50 MCG
2 SPRAY, SUSPENSION (ML) NASAL DAILY
Qty: 32 G | Refills: 2 | Status: SHIPPED | OUTPATIENT
Start: 2019-05-22 | End: 2021-02-05

## 2019-05-30 DIAGNOSIS — R80.9 PROTEINURIA, UNSPECIFIED TYPE: ICD-10-CM

## 2019-05-31 PROCEDURE — 4010F ACE/ARB THERAPY RXD/TAKEN: CPT | Performed by: FAMILY MEDICINE

## 2019-05-31 RX ORDER — LISINOPRIL 2.5 MG/1
TABLET ORAL
Qty: 90 TABLET | Refills: 0 | Status: SHIPPED | OUTPATIENT
Start: 2019-05-31 | End: 2019-07-31 | Stop reason: SDUPTHER

## 2019-06-03 ENCOUNTER — OFFICE VISIT (OUTPATIENT)
Dept: FAMILY MEDICINE CLINIC | Facility: CLINIC | Age: 59
End: 2019-06-03
Payer: COMMERCIAL

## 2019-06-03 VITALS
BODY MASS INDEX: 30.52 KG/M2 | DIASTOLIC BLOOD PRESSURE: 70 MMHG | OXYGEN SATURATION: 98 % | SYSTOLIC BLOOD PRESSURE: 100 MMHG | HEIGHT: 71 IN | TEMPERATURE: 97.6 F | HEART RATE: 81 BPM | RESPIRATION RATE: 16 BRPM | WEIGHT: 218 LBS

## 2019-06-03 DIAGNOSIS — E11.9 TYPE 2 DIABETES MELLITUS WITHOUT COMPLICATION, WITHOUT LONG-TERM CURRENT USE OF INSULIN (HCC): ICD-10-CM

## 2019-06-03 DIAGNOSIS — J20.9 ACUTE BRONCHITIS, UNSPECIFIED ORGANISM: Primary | ICD-10-CM

## 2019-06-03 DIAGNOSIS — F17.200 SMOKER: ICD-10-CM

## 2019-06-03 PROCEDURE — 3725F SCREEN DEPRESSION PERFORMED: CPT | Performed by: FAMILY MEDICINE

## 2019-06-03 PROCEDURE — 3008F BODY MASS INDEX DOCD: CPT | Performed by: FAMILY MEDICINE

## 2019-06-03 PROCEDURE — 99214 OFFICE O/P EST MOD 30 MIN: CPT | Performed by: FAMILY MEDICINE

## 2019-06-03 RX ORDER — ALBUTEROL SULFATE 90 UG/1
2 AEROSOL, METERED RESPIRATORY (INHALATION) EVERY 6 HOURS PRN
Qty: 1 INHALER | Refills: 0 | Status: SHIPPED | OUTPATIENT
Start: 2019-06-03 | End: 2019-07-31

## 2019-06-03 RX ORDER — AZITHROMYCIN 250 MG/1
TABLET, FILM COATED ORAL
Qty: 6 TABLET | Refills: 0 | Status: SHIPPED | OUTPATIENT
Start: 2019-06-03 | End: 2019-06-07

## 2019-06-12 DIAGNOSIS — N40.1 BPH WITH URINARY OBSTRUCTION: ICD-10-CM

## 2019-06-12 DIAGNOSIS — E11.9 TYPE 2 DIABETES MELLITUS WITHOUT COMPLICATION, WITHOUT LONG-TERM CURRENT USE OF INSULIN (HCC): ICD-10-CM

## 2019-06-12 DIAGNOSIS — G47.00 INSOMNIA, UNSPECIFIED TYPE: ICD-10-CM

## 2019-06-12 DIAGNOSIS — N13.8 BPH WITH URINARY OBSTRUCTION: ICD-10-CM

## 2019-06-12 DIAGNOSIS — K21.9 GASTROESOPHAGEAL REFLUX DISEASE WITHOUT ESOPHAGITIS: ICD-10-CM

## 2019-06-14 RX ORDER — TAMSULOSIN HYDROCHLORIDE 0.4 MG/1
CAPSULE ORAL
Qty: 180 CAPSULE | Refills: 0 | Status: SHIPPED | OUTPATIENT
Start: 2019-06-14 | End: 2019-07-31

## 2019-06-14 RX ORDER — OMEPRAZOLE 20 MG/1
CAPSULE, DELAYED RELEASE ORAL
Qty: 90 CAPSULE | Refills: 0 | Status: SHIPPED | OUTPATIENT
Start: 2019-06-14 | End: 2021-02-05

## 2019-06-14 RX ORDER — TRAZODONE HYDROCHLORIDE 50 MG/1
TABLET ORAL
Qty: 90 TABLET | Refills: 0 | Status: SHIPPED | OUTPATIENT
Start: 2019-06-14 | End: 2019-07-31 | Stop reason: SDUPTHER

## 2019-06-14 RX ORDER — GLIPIZIDE 10 MG/1
TABLET, FILM COATED, EXTENDED RELEASE ORAL
Qty: 90 TABLET | Refills: 0 | Status: SHIPPED | OUTPATIENT
Start: 2019-06-14 | End: 2019-07-31 | Stop reason: SDUPTHER

## 2019-07-02 ENCOUNTER — OFFICE VISIT (OUTPATIENT)
Dept: PAIN MEDICINE | Facility: CLINIC | Age: 59
End: 2019-07-02
Payer: OTHER MISCELLANEOUS

## 2019-07-02 VITALS
HEART RATE: 81 BPM | RESPIRATION RATE: 16 BRPM | SYSTOLIC BLOOD PRESSURE: 129 MMHG | WEIGHT: 220 LBS | DIASTOLIC BLOOD PRESSURE: 68 MMHG | BODY MASS INDEX: 30.8 KG/M2 | HEIGHT: 71 IN

## 2019-07-02 DIAGNOSIS — M54.5 CHRONIC LOW BACK PAIN, UNSPECIFIED BACK PAIN LATERALITY, WITH SCIATICA PRESENCE UNSPECIFIED: ICD-10-CM

## 2019-07-02 DIAGNOSIS — Z79.891 LONG-TERM CURRENT USE OF OPIATE ANALGESIC: ICD-10-CM

## 2019-07-02 DIAGNOSIS — F11.20 UNCOMPLICATED OPIOID DEPENDENCE (HCC): ICD-10-CM

## 2019-07-02 DIAGNOSIS — M54.16 LUMBAR RADICULOPATHY: ICD-10-CM

## 2019-07-02 DIAGNOSIS — M47.816 LUMBAR SPONDYLOSIS: ICD-10-CM

## 2019-07-02 DIAGNOSIS — G89.29 CHRONIC LOW BACK PAIN, UNSPECIFIED BACK PAIN LATERALITY, WITH SCIATICA PRESENCE UNSPECIFIED: ICD-10-CM

## 2019-07-02 DIAGNOSIS — M96.1 POSTLAMINECTOMY SYNDROME, LUMBAR: ICD-10-CM

## 2019-07-02 DIAGNOSIS — G89.4 CHRONIC PAIN SYNDROME: Primary | ICD-10-CM

## 2019-07-02 PROCEDURE — 99214 OFFICE O/P EST MOD 30 MIN: CPT | Performed by: ANESTHESIOLOGY

## 2019-07-02 PROCEDURE — 80305 DRUG TEST PRSMV DIR OPT OBS: CPT | Performed by: ANESTHESIOLOGY

## 2019-07-02 RX ORDER — OXYCODONE HYDROCHLORIDE AND ACETAMINOPHEN 5; 325 MG/1; MG/1
1 TABLET ORAL 2 TIMES DAILY PRN
Qty: 60 TABLET | Refills: 0 | Status: SHIPPED | OUTPATIENT
Start: 2019-08-01 | End: 2019-08-31

## 2019-07-02 RX ORDER — LIDOCAINE 50 MG/G
2 PATCH TOPICAL DAILY
Qty: 60 PATCH | Refills: 1 | Status: SHIPPED | OUTPATIENT
Start: 2019-07-02 | End: 2021-01-11 | Stop reason: SDUPTHER

## 2019-07-02 RX ORDER — OXYCODONE HYDROCHLORIDE AND ACETAMINOPHEN 5; 325 MG/1; MG/1
1 TABLET ORAL 2 TIMES DAILY PRN
Qty: 60 TABLET | Refills: 0 | Status: SHIPPED | OUTPATIENT
Start: 2019-07-02 | End: 2019-08-01

## 2019-07-02 NOTE — PROGRESS NOTES
Pain Medicine Follow-Up Note    Assessment:  1  Chronic pain syndrome    2  Uncomplicated opioid dependence ([address] )    3  Long-term current use of opiate analgesic    4  Chronic low back pain, unspecified back pain laterality, with sciatica presence unspecified    5  Lumbar spondylosis    6  Lumbar radiculopathy    7  Postlaminectomy syndrome, lumbar        Plan:  Orders Placed This Encounter   Procedures    MM ALL_Prescribed Meds and Special Instructions       New Medications Ordered This Visit   Medications    etodolac (LODINE) 300 MG capsule     Sig: Take 1 capsule (300 mg total) by mouth 3 (three) times a day as needed (pain (with food))     Dispense:  90 capsule     Refill:  1    lidocaine (LIDODERM) 5 %     Sig: Apply 2 patches topically daily Remove & Discard patch within 12 hours or as directed by MD     Dispense:  60 patch     Refill:  1    oxyCODONE-acetaminophen (PERCOCET) 5-325 mg per tablet     Sig: Take 1 tablet by mouth 2 (two) times a day as needed for severe pain for up to 30 daysMax Daily Amount: 2 tablets     Dispense:  60 tablet     Refill:  0    oxyCODONE-acetaminophen (PERCOCET) 5-325 mg per tablet     Sig: Take 1 tablet by mouth 2 (two) times a day as needed for severe pain for up to 30 daysMax Daily Amount: 2 tablets     Dispense:  60 tablet     Refill:  0     My impressions and treatment recommendations were discussed in detail with the patient who verbalized understanding and had no further questions  Given that the patient reports overall reduced pain and improved level functioning without significant side effects, I felt a reasonable to continue him on etodolac 300 mg 3 times daily as needed for pain, with food, as well as lidocaine 5% patches 12 hr on and 12 hr off  He does report that the tramadol is not working for him as well as oxycodone/acetaminophen did previously  He would like to return to the oxycodone/acetaminophen at this time    As such, I discontinued Tramadol and I will trial the patient on oxycodone/acetaminophen 5/325 mg 1 tablet twice daily as needed for pain  I sent prescriptions to the pharmacy dated July 2, 2019 and August 1, 2019  The risks and side effects of chronic opioid treatment were discussed in detail with the patient  Side effects include but are not limited to nausea, vomiting, GI intolerance, sedation, constipation, mental clouding, opioid-induced hyperalgesia, endocrine dysfunction, addiction, dependence, and tolerance  The patient was asked to take his medications only as prescribed and directed, never in excess, and never for any other reason other than for pain control  The patient was also asked to keep his medications out of the reach of others and away from children, preferably in a locked drawer  The patient verbalized understanding and wished to use these opioid medications  A urine drug test was collected at today's office visit as part of our medication management protocol  The point of care testing results were appropriate for what was being prescribed  The specimen will be sent for confirmatory testing  The drug screen is medically necessary because the patient is either dependent on opioid medication or is being considered for opioid medication therapy and the results could impact ongoing or future treatment  The drug screen is to evaluate for the presence or absence of prescribed, non-prescribed, and/or illicit drugs and substances  [place] Prescription Drug Monitoring Program report was reviewed and was appropriate     Follow-up is planned in 8 weeks time or sooner as warranted  Discharge instructions were provided  I personally saw and examined the patient and I agree with the above discussed plan of care  History of Present Illness:    Rocio Parkinson is a 61 y o  male who presents to [facility] for interval re-evaluation of the above stated pain complaints   The patient has a past medical and chronic pain history as outlined in the assessment section  He was last seen on May 2, 2019 at which time he underwent a right L5 and S1 transforaminal epidural steroid injection  He was also maintained on etodolac 300 mg 3 times daily as needed for pain, tramadol 50 mg 1-2 tablets up to 3 times daily as needed for pain, and lidocaine patches 12 hr on and 12 hr off    At today's office visit, the patient's pain score is 6/10 on the verbal numerical pain rating scale  The patient states that his pain is worse in the morning, evening, and night  His pain is constant in nature  He reports the quality of his pain as vision burning, sharp, throbbing, and shooting    He is reporting excellent pain relief with the combination of his medications, but does report that the tramadol did not work as well as the oxycodone/acetaminophen did previously  He would like to return to the oxycodone/acetaminophen at this time  Other than as stated above, the patient denies any interval changes in medications, medical condition, mental condition, symptoms, or allergies since the last office visit  Review of Systems:    Review of Systems   Respiratory: Negative for shortness of breath  Cardiovascular: Negative for chest pain  Gastrointestinal: Negative for constipation, diarrhea, nausea and vomiting  Musculoskeletal: Positive for back pain (B/L LUMBAR RADIATING DOWN THE DISTAL RIGHT LEG), gait problem and myalgias  Negative for arthralgias and joint swelling  Skin: Negative for rash  Neurological: Negative for dizziness, seizures and weakness  All other systems reviewed and are negative          Patient Active Problem List   Diagnosis    Heartburn    Erectile dysfunction of non-organic origin    DM type 2 (diabetes mellitus, type 2) (HCC)    Chronic low back pain    BPH with urinary obstruction    Blood pressure elevated without history of HTN    Sciatica    Myofascial pain syndrome    Lumbar spondylosis    Herniation of lumbar intervertebral disc with radiculopathy    Acute post-operative pain    Insomnia    Chronic pain syndrome    Low back pain    Class 1 obesity due to excess calories with serious comorbidity and body mass index (BMI) of 30 0 to 30 9 in adult    Smoker    Seasonal allergic rhinitis    Diabetic mononeuropathy associated with type 2 diabetes mellitus (HCC)    Postlaminectomy syndrome, lumbar region    Numbness and tingling in both hands    Intervertebral disc disorder with radiculopathy of lumbosacral region    Acute bronchitis       Past Medical History:   Diagnosis Date    Arthritis     BPH (benign prostatic hypertrophy) with urinary obstruction     Chronic pain disorder     Ear infection     Herniation of lumbar intervertebral disc with radiculopathy     Myofascial pain syndrome     Sciatica        Past Surgical History:   Procedure Laterality Date    ABSCESS DRAINAGE      groin    BACK SURGERY      implant - resolved 2004    CAUDAL BLOCK N/A 10/26/2018    Procedure: Caudal Epidural Steroid Injection (29540); Surgeon: Nemesio Christine MD;  Location[facility];  Service: Pain Management     CAUDAL BLOCK N/A 11/30/2018    Procedure: Caudal Epidural Steroid Injection (01255); Surgeon: Nemesio Christine MD;  Location: [facility] MAIN OR;  Service: Pain Management     COLONOSCOPY      EPIDURAL BLOCK INJECTION Right 5/10/2019    Procedure: L5 S1 transforaminal Epidural Steroid Injection ([id number];  Surgeon: Nemesio Christine MD;  Location: [facility] MAIN OR;  Service: Pain Management     NERVE BLOCK Bilateral 4/26/2018    Procedure: B/L L3 L4 L5 S1 MBB #1 ([id number],[id number]);   Surgeon: Nemesio Christine MD;  Location: [facility] MAIN OR;  Service: Pain Management     NERVE BLOCK Bilateral 5/11/2018    Procedure: B/L L3 L4 L5 S1 Medial Branch Block #2;  Surgeon: Nemesio Christine MD;  Location: [facility] MAIN OR;  Service: Pain Management     NOSE SURGERY      WY COLONOSCOPY FLX DX W/COLLJ SPEC WHEN PFRMD N/A 3/6/2017    Procedure: COLONOSCOPY;  Surgeon: Keeley Aguila MD;  Location:  GI LAB; Service: Gastroenterology    RI SURG IMPLNT NEUROELECT,EPIDURAL Left 10/3/2017    Procedure: PLACEMENT THORACIC FOR INSERTION DORSAL COLUMN SPINAL CORD STIMULATOR (DCS) WITH BUTTOCK IMPLANTABLE PULSE GENERATOR(IMPULSE); Surgeon: Israel Centeno MD;  Location:  MAIN OR;  Service: Neurosurgery    [address] Right 5/18/2018    Procedure: Rt L3 L4 L5 S1 Radio Frequency Ablation ([id number],[id number]); Surgeon: Bessie Don MD;  Location: [facility] MAIN OR;  Service: Pain Management     RADIOFREQUENCY ABLATION Left 6/1/2018    Procedure: Lt L3 L4 L5 S1 Radio Frequency Ablation ([id number]);   Surgeon: Bessie Don MD;  Location: [facility] MAIN OR;  Service: Pain Management        Family History   Problem Relation Age of Onset    Diabetes Mother     Diabetes Father         mellitus     Heart attack Father 58    Hypertension Father        Social History     Occupational History    Occupation:  for [employer]    Tobacco Use    Smoking status: Current Every Day Smoker     Packs/day: 0 75     Years: 35 00     Pack years: 26 25    Smokeless tobacco: Never Used    Tobacco comment: encouraged smoking cessation - history of smoking 30 or more pack years    Substance and Sexual Activity    Alcohol use: No     Comment: rare    Drug use: No    Sexual activity: Not on file         Current Outpatient Medications:     albuterol (PROAIR HFA) 90 mcg/act inhaler, Inhale 2 puffs every 6 (six) hours as needed for wheezing, Disp: 1 Inhaler, Rfl: 0    atorvastatin (LIPITOR) 20 mg tablet, Take 1 tablet (20 mg total) by mouth daily, Disp: 90 tablet, Rfl: 0    etodolac (LODINE) 300 MG capsule, Take 1 capsule (300 mg total) by mouth 3 (three) times a day as needed (pain (with food)), Disp: 90 capsule, Rfl: 1    fluticasone (FLONASE) 50 mcg/act nasal spray, 2 sprays into each nostril daily, Disp: 32 g, Rfl: 2    gabapentin (NEURONTIN) 100 mg capsule, Take 2 capsules (200 mg total) by mouth 3 (three) times a day, Disp: 540 capsule, Rfl: 0    glipiZIDE (GLUCOTROL XL) 10 mg 24 hr tablet, TAKE 1 TABLET EVERY DAY, Disp: 90 tablet, Rfl: 0    lidocaine (LIDODERM) 5 %, Apply 2 patches topically daily Remove & Discard patch within 12 hours or as directed by MD, Disp: 60 patch, Rfl: 1    lisinopril (ZESTRIL) 2 5 mg tablet, TAKE 1 TABLET EVERY DAY, Disp: 90 tablet, Rfl: 0    metFORMIN (GLUCOPHAGE) 1000 MG tablet, TAKE 1 TABLET TWICE DAILY WITH MEALS, Disp: 180 tablet, Rfl: 0    omeprazole (PriLOSEC) 20 mg delayed release capsule, TAKE 1 CAPSULE EVERY DAY, Disp: 90 capsule, Rfl: 0    tamsulosin (FLOMAX) 0 4 mg, TAKE 2 CAPSULES EVERY DAY WITH DINNER, Disp: 180 capsule, Rfl: 0    traZODone (DESYREL) 50 mg tablet, TAKE 1 TABLET AT BEDTIME, Disp: 90 tablet, Rfl: 0    oxyCODONE-acetaminophen (PERCOCET) 5-325 mg per tablet, Take 1 tablet by mouth 2 (two) times a day as needed for severe pain for up to 30 daysMax Daily Amount: 2 tablets, Disp: 60 tablet, Rfl: 0    [START ON 8/1/2019] oxyCODONE-acetaminophen (PERCOCET) 5-325 mg per tablet, Take 1 tablet by mouth 2 (two) times a day as needed for severe pain for up to 30 daysMax Daily Amount: 2 tablets, Disp: 60 tablet, Rfl: 0    Allergies   Allergen Reactions    Penicillins Swelling           Tramadol last taken 7/02 AM   New UDS today  Physical Exam:    /68   Pulse 81   Resp 16   Ht 5' 11" (1 803 m)   Wt 99 8 kg (220 lb)   BMI 30 68 kg/m²     Constitutional:normal, well developed, well nourished, alert, in no distress and non-toxic and no overt pain behavior    Eyes:anicteric  HEENT:grossly intact  Neck:supple, symmetric, trachea midline and no masses   Pulmonary:even and unlabored  Cardiovascular:No edema or pitting edema present  Skin:Normal without rashes or lesions and well hydrated  Psychiatric:Mood and affect appropriate  Neurologic:Cranial Nerves II-XII grossly intact  Musculoskeletal:normal    Orders Placed This Encounter   Procedures    MM ALL_Prescribed Meds and Special Instructions

## 2019-07-09 ENCOUNTER — TELEPHONE (OUTPATIENT)
Dept: FAMILY MEDICINE CLINIC | Facility: CLINIC | Age: 59
End: 2019-07-09

## 2019-07-09 NOTE — TELEPHONE ENCOUNTER
Raul Wilhelm has appt with Dr Ferdinand Leyden on 7/24  Would like to know if he needs to have labs done for the appt  Please let him know

## 2019-07-11 ENCOUNTER — TELEPHONE (OUTPATIENT)
Dept: PAIN MEDICINE | Facility: CLINIC | Age: 59
End: 2019-07-11

## 2019-07-11 NOTE — TELEPHONE ENCOUNTER
Patient came in [employer] [facility] office stating he received scripts from [facility] Lid[facility] and Etodolac caps which is suppose to be billed to [facility] and mailed to him  Patient is very upset because this Is a workers compensation case and [facility]  Please call patient back ASAP at [phone]   Ty

## 2019-07-11 NOTE — TELEPHONE ENCOUNTER
Attempted to reach pt  LMOM for pt to c/b  -In Epic, it lists [facility] as pt's worker's comp pharmacy  -    Forwarding to clerical team for assistance regarding pt's worker's compensation claim, and where he is supposed to have his prescriptions sent so they will be covered by [place]  Thank you  Forwarding to Dr Rachel Banda for Amandeep Blinks and any further input  Thank you

## 2019-07-11 NOTE — TELEPHONE ENCOUNTER
Please speak to Juan Jose Vizcarra about this - he was the MA with me that day and I had told him to send it to [facility]

## 2019-07-12 DIAGNOSIS — E11.9 TYPE 2 DIABETES MELLITUS WITHOUT COMPLICATION, WITHOUT LONG-TERM CURRENT USE OF INSULIN (HCC): Primary | ICD-10-CM

## 2019-07-12 DIAGNOSIS — R80.9 MICROALBUMINURIA: ICD-10-CM

## 2019-07-19 ENCOUNTER — APPOINTMENT (OUTPATIENT)
Dept: LAB | Facility: CLINIC | Age: 59
End: 2019-07-19
Payer: COMMERCIAL

## 2019-07-19 DIAGNOSIS — E11.9 TYPE 2 DIABETES MELLITUS WITHOUT COMPLICATION, WITHOUT LONG-TERM CURRENT USE OF INSULIN (HCC): ICD-10-CM

## 2019-07-19 LAB
CREAT UR-MCNC: 180 MG/DL
EST. AVERAGE GLUCOSE BLD GHB EST-MCNC: 140 MG/DL
HBA1C MFR BLD: 6.5 % (ref 4.2–6.3)
MICROALBUMIN UR-MCNC: 31.6 MG/L (ref 0–20)
MICROALBUMIN/CREAT 24H UR: 18 MG/G CREATININE (ref 0–30)

## 2019-07-19 PROCEDURE — 82043 UR ALBUMIN QUANTITATIVE: CPT | Performed by: FAMILY MEDICINE

## 2019-07-19 PROCEDURE — 3061F NEG MICROALBUMINURIA REV: CPT | Performed by: FAMILY MEDICINE

## 2019-07-19 PROCEDURE — 82570 ASSAY OF URINE CREATININE: CPT | Performed by: FAMILY MEDICINE

## 2019-07-19 PROCEDURE — 36415 COLL VENOUS BLD VENIPUNCTURE: CPT

## 2019-07-19 PROCEDURE — 83036 HEMOGLOBIN GLYCOSYLATED A1C: CPT

## 2019-07-31 ENCOUNTER — OFFICE VISIT (OUTPATIENT)
Dept: FAMILY MEDICINE CLINIC | Facility: CLINIC | Age: 59
End: 2019-07-31
Payer: COMMERCIAL

## 2019-07-31 VITALS
HEIGHT: 71 IN | OXYGEN SATURATION: 98 % | WEIGHT: 218 LBS | BODY MASS INDEX: 30.52 KG/M2 | SYSTOLIC BLOOD PRESSURE: 110 MMHG | DIASTOLIC BLOOD PRESSURE: 60 MMHG | HEART RATE: 91 BPM | RESPIRATION RATE: 16 BRPM

## 2019-07-31 DIAGNOSIS — R80.9 PROTEINURIA, UNSPECIFIED TYPE: ICD-10-CM

## 2019-07-31 DIAGNOSIS — E78.5 HYPERLIPIDEMIA LDL GOAL <70: ICD-10-CM

## 2019-07-31 DIAGNOSIS — N13.8 BPH WITH URINARY OBSTRUCTION: ICD-10-CM

## 2019-07-31 DIAGNOSIS — G47.00 INSOMNIA, UNSPECIFIED TYPE: ICD-10-CM

## 2019-07-31 DIAGNOSIS — F17.200 SMOKER: ICD-10-CM

## 2019-07-31 DIAGNOSIS — E11.41 DIABETIC MONONEUROPATHY ASSOCIATED WITH TYPE 2 DIABETES MELLITUS (HCC): ICD-10-CM

## 2019-07-31 DIAGNOSIS — E78.2 MIXED HYPERLIPIDEMIA: ICD-10-CM

## 2019-07-31 DIAGNOSIS — N40.1 BPH WITH URINARY OBSTRUCTION: ICD-10-CM

## 2019-07-31 DIAGNOSIS — E11.69 TYPE 2 DIABETES MELLITUS WITH OTHER SPECIFIED COMPLICATION, WITHOUT LONG-TERM CURRENT USE OF INSULIN (HCC): Primary | ICD-10-CM

## 2019-07-31 PROCEDURE — 3008F BODY MASS INDEX DOCD: CPT | Performed by: FAMILY MEDICINE

## 2019-07-31 PROCEDURE — 99214 OFFICE O/P EST MOD 30 MIN: CPT | Performed by: FAMILY MEDICINE

## 2019-07-31 PROCEDURE — 3725F SCREEN DEPRESSION PERFORMED: CPT | Performed by: FAMILY MEDICINE

## 2019-07-31 PROCEDURE — 4010F ACE/ARB THERAPY RXD/TAKEN: CPT | Performed by: FAMILY MEDICINE

## 2019-07-31 RX ORDER — ATORVASTATIN CALCIUM 20 MG/1
20 TABLET, FILM COATED ORAL DAILY
Qty: 90 TABLET | Refills: 0 | Status: SHIPPED | OUTPATIENT
Start: 2019-07-31 | End: 2019-10-14 | Stop reason: SDUPTHER

## 2019-07-31 RX ORDER — GLIPIZIDE 10 MG/1
10 TABLET, FILM COATED, EXTENDED RELEASE ORAL DAILY
Qty: 90 TABLET | Refills: 0 | Status: SHIPPED | OUTPATIENT
Start: 2019-07-31 | End: 2019-12-27 | Stop reason: SDUPTHER

## 2019-07-31 RX ORDER — GABAPENTIN 100 MG/1
CAPSULE ORAL
Qty: 360 CAPSULE | Refills: 0 | Status: SHIPPED | OUTPATIENT
Start: 2019-07-31 | End: 2019-10-14 | Stop reason: SDUPTHER

## 2019-07-31 RX ORDER — TRAZODONE HYDROCHLORIDE 50 MG/1
50 TABLET ORAL
Qty: 90 TABLET | Refills: 0 | Status: SHIPPED | OUTPATIENT
Start: 2019-07-31 | End: 2021-02-05

## 2019-07-31 RX ORDER — TAMSULOSIN HYDROCHLORIDE 0.4 MG/1
0.8 CAPSULE ORAL
Qty: 180 CAPSULE | Refills: 0 | Status: SHIPPED | OUTPATIENT
Start: 2019-07-31 | End: 2019-12-27 | Stop reason: SDUPTHER

## 2019-07-31 RX ORDER — LISINOPRIL 2.5 MG/1
2.5 TABLET ORAL DAILY
Qty: 90 TABLET | Refills: 0 | Status: SHIPPED | OUTPATIENT
Start: 2019-07-31 | End: 2019-10-14 | Stop reason: SDUPTHER

## 2019-07-31 NOTE — PROGRESS NOTES
Assessment/Plan:   Diagnoses and all orders for this visit:    Type 2 diabetes mellitus with other specified complication, without long-term current use of insulin (HCC)  -     glipiZIDE (GLUCOTROL XL) 10 mg 24 hr tablet; Take 1 tablet (10 mg total) by mouth daily  Diabetic mononeuropathy associated with type 2 diabetes mellitus (HCC)  -     gabapentin (NEURONTIN) 100 mg capsule; Use 200mg in the AM and 200mg in the PM  Proteinuria, unspecified type  -     lisinopril (ZESTRIL) 2 5 mg tablet; Take 1 tablet (2 5 mg total) by mouth daily  Smoker  - H1c 6 5 <-- 6 2 <-- 6 8   - cont current regimen of Metformin 1000mg BID and Glipizide 10mg QD  - (+) microalbuminuria and was started on Lisinopril 2 5mg PO QD on 3/27/2019 - tolerating it well, numbers trending down   - "head fogginess" has almost resolved  - is moving to [place] at the end of August - plans to quit smoking after the move; strongly advised to establish with a PCP there - 3month RFs given - pt aware and agreeable     Mixed hyperlipidemia  -     atorvastatin (LIPITOR) 20 mg tablet; Take 1 tablet (20 mg total) by mouth daily  - LDL (11/2018): 64  Hyperlipidemia LDL goal <70    BPH with urinary obstruction  -     tamsulosin (FLOMAX) 0 4 mg; Take 2 capsules (0 8 mg total) by mouth daily with dinner    Insomnia, unspecified type  -     traZODone (DESYREL) 50 mg tablet; Take 1 tablet (50 mg total) by mouth daily at bedtime        Subjective:    Patient ID: Eriberto Chadwick is a 61 y o  male    61yo M presents to the office for f/u of multiple medical issues  1) DM2/Neuropathy/Microalbuminuria   - currently on Metformin 1000mg BID, Glipizide 10mg QD, Gabapentin and was recently started on Lisinopril 2 5mg QD    - H1c 6 5 <-- 6 2  - (+) microalbuminuria for which ACEI was started on 3/27/2019 - numbers trending down   - did follow up with Optho for diabetic eye exam   - neuropathic pain well controlled on Gabapentin 200mg BID  - "head fogginess" almost resolved    - denies F/C/N/V/change in vision/CP/palpitations/SOB/wheezing/abd pain  2) Insomnia   - well controlled on Trazodone 50mg   3) General   - moving to [place] at the end of August - still smoking but plans on quitting when moves   - needs RFs     The following portions of the patient's history were reviewed and updated as appropriate: allergies, current medications, past family history, past medical history, past social history, past surgical history and problem list     Review of Systems  as per HPI    Objective:  /60   Pulse 91   Resp 16   Ht 5' 11" (1 803 m)   Wt 98 9 kg (218 lb)   SpO2 98%   BMI 30 40 kg/m²    Physical Exam   Constitutional: He is oriented to person, place, and time  He appears well-developed and well-nourished  No distress  HENT:   Head: Normocephalic and atraumatic  Right Ear: External ear normal    Left Ear: External ear normal    Nose: Nose normal    Eyes: Conjunctivae and EOM are normal  Right eye exhibits no discharge  Left eye exhibits no discharge  No scleral icterus  Neck: Normal range of motion  Neck supple  Cardiovascular: Normal rate, regular rhythm and normal heart sounds  Exam reveals no gallop and no friction rub  No murmur heard  Pulmonary/Chest: Effort normal and breath sounds normal  No stridor  No respiratory distress  He has no wheezes  He has no rales  Abdominal: Soft  Bowel sounds are normal    BMI 30 4    Musculoskeletal: He exhibits no edema or tenderness  Neurological: He is alert and oriented to person, place, and time  Skin: Skin is warm  He is not diaphoretic  Psychiatric: He has a normal mood and affect  His behavior is normal  Judgment and thought content normal    Vitals reviewed  BMI Counseling: Body mass index is 30 4 kg/m²  Discussed the patient's BMI with him  The BMI is above average  BMI counseling and education was provided to the patient   Nutrition recommendations include reducing portion sizes, decreasing overall calorie intake, consuming healthier snacks and moderation in carbohydrate intake  Exercise recommendations include exercising 3-5 times per week

## 2019-08-12 ENCOUNTER — OFFICE VISIT (OUTPATIENT)
Dept: PAIN MEDICINE | Facility: CLINIC | Age: 59
End: 2019-08-12
Payer: OTHER MISCELLANEOUS

## 2019-08-12 VITALS
WEIGHT: 217.8 LBS | SYSTOLIC BLOOD PRESSURE: 116 MMHG | DIASTOLIC BLOOD PRESSURE: 60 MMHG | HEIGHT: 71 IN | BODY MASS INDEX: 30.49 KG/M2 | HEART RATE: 88 BPM | RESPIRATION RATE: 14 BRPM

## 2019-08-12 DIAGNOSIS — G89.29 CHRONIC LOW BACK PAIN, UNSPECIFIED BACK PAIN LATERALITY, WITH SCIATICA PRESENCE UNSPECIFIED: ICD-10-CM

## 2019-08-12 DIAGNOSIS — M54.16 LUMBAR RADICULOPATHY: ICD-10-CM

## 2019-08-12 DIAGNOSIS — M47.816 LUMBAR SPONDYLOSIS: ICD-10-CM

## 2019-08-12 DIAGNOSIS — M96.1 POSTLAMINECTOMY SYNDROME, LUMBAR: ICD-10-CM

## 2019-08-12 DIAGNOSIS — M54.5 CHRONIC LOW BACK PAIN, UNSPECIFIED BACK PAIN LATERALITY, WITH SCIATICA PRESENCE UNSPECIFIED: ICD-10-CM

## 2019-08-12 DIAGNOSIS — G89.4 CHRONIC PAIN SYNDROME: Primary | ICD-10-CM

## 2019-08-12 PROCEDURE — 99214 OFFICE O/P EST MOD 30 MIN: CPT | Performed by: ANESTHESIOLOGY

## 2019-08-12 NOTE — H&P (VIEW-ONLY)
Assessment:  1  Chronic pain syndrome    2  Chronic low back pain, unspecified back pain laterality, with sciatica presence unspecified    3  Lumbar spondylosis    4  Lumbar radiculopathy    5  Postlaminectomy syndrome, lumbar        Plan:  My impressions and treatment recommendations were discussed in detail with the patient, who verbalized understanding and had no further questions  The patient reports that he is having pain in his low back and right lower extremity what appears to be the right L5 distribution  He is relocating to [place] in 2 weeks time and is wondering if he can get an injection before he leaves  As such, I felt a reasonable to offer him a repeat right L5 and S1 transforaminal epidural steroid injection since this could be potentially therapeutic  The procedures, its risks, and benefits were explained in detail to the patient  Risks include but are not limited to bleeding, infection, hematoma formation, abscess formation, weakness, headache, failure the pain to improve, nerve irritation or damage, and potential worsening of the pain  The patient verbalized understanding and wished to proceed with the procedure  He is currently doing well on oxycodone/acetaminophen 5/325 mg 1 tablet up to twice daily as needed for pain, etodolac 300 mg 3 times daily as needed for pain, and a Lidoderm 5% patch 2 patches applied topically 12 hours on and 12 hours off  He does not need any refills on his medications at today's visit  Follow-up is planned in 2 months time or sooner as warranted  Discharge instructions were provided  I personally saw and examined the patient and I agree with the above discussed plan of care  History of Present Illness:    Kareem Matta is a 61 y o  male who presents to [facility] for initial evaluation of the above stated pain complaints  The patient has a past medical and chronic pain history as outlined in the assessment section   He was last seen on July 2, 2019  At today's office visit, the patient's pain score is 4 to 7/10 on the verbal numerical pain rating scale  The patient states that his pain is primarily in his low back and right lower extremity  He states that his pain is worse in the morning, evening, and night  His pain is constant in nature  He reports the quality of his pain as numbness    He is currently doing very well with oxycodone/acetaminophen 5/325 mg 1 tablet twice daily as needed for pain, lidocaine 5% patch 2 patches applied topically 12 hours on 12 hours off, and etodolac 300 mg 3 times daily as needed for pain, with food  He is requesting an injection before he leaves for [place]  He is currently relocating to [place] in 2 weeks time  Other than as stated above, the patient denies any interval changes in medications, medical condition, mental condition, symptoms, or allergies since the last office visit  Review of Systems:    Review of Systems   Respiratory: Negative for shortness of breath  Cardiovascular: Negative for chest pain  Gastrointestinal: Negative for constipation, diarrhea, nausea and vomiting  Musculoskeletal: Positive for back pain (lumbar midline)  Negative for arthralgias, gait problem, joint swelling and myalgias  Skin: Negative for rash  Neurological: Negative for dizziness, seizures and weakness  All other systems reviewed and are negative          Patient Active Problem List   Diagnosis    Heartburn    Erectile dysfunction of non-organic origin    DM type 2 (diabetes mellitus, type 2) ([facility])    Chronic low back pain    BPH with urinary obstruction    Blood pressure elevated without history of HTN    Sciatica    Myofascial pain syndrome    Lumbar spondylosis    Herniation of lumbar intervertebral disc with radiculopathy    Acute post-operative pain    Insomnia    Chronic pain syndrome    Low back pain    Class 1 obesity due to excess calories with serious comorbidity and body mass index (BMI) of 30 0 to 30 9 in adult    Smoker    Seasonal allergic rhinitis    Diabetic mononeuropathy associated with type 2 diabetes mellitus (HCC)    Postlaminectomy syndrome, lumbar region    Numbness and tingling in both hands    Intervertebral disc disorder with radiculopathy of lumbosacral region    Acute bronchitis    Mixed hyperlipidemia    Proteinuria       Past Medical History:   Diagnosis Date    Arthritis     BPH (benign prostatic hypertrophy) with urinary obstruction     Chronic pain disorder     Ear infection     Herniation of lumbar intervertebral disc with radiculopathy     Myofascial pain syndrome     Sciatica        Past Surgical History:   Procedure Laterality Date    ABSCESS DRAINAGE      groin    BACK SURGERY      implant - resolved 2004    CAUDAL BLOCK N/A 10/26/2018    Procedure: Caudal Epidural Steroid Injection (64292); Surgeon: Sabiha Shirley MD;  Location: [facility] MAIN OR;  Service: Pain Management     CAUDAL BLOCK N/A 11/30/2018    Procedure: Caudal Epidural Steroid Injection (15587); Surgeon: Sabiha Shirley MD;  Location: [facility] MAIN OR;  Service: Pain Management     COLONOSCOPY      EPIDURAL BLOCK INJECTION Right 5/10/2019    Procedure: L5 S1 transforaminal Epidural Steroid Injection ([id number];  Surgeon: Sabiha Shirley MD;  Location: [facility] MAIN OR;  Service: Pain Management     NERVE BLOCK Bilateral 4/26/2018    Procedure: B/L L3 L4 L5 S1 MBB #1 ([id number]); Surgeon: Sabiha Shirley MD;  Location: [facility] MAIN OR;  Service: Pain Management     NERVE BLOCK Bilateral 5/11/2018    Procedure: B/L L3 L4 L5 S1 Medial Branch Block #2;  Surgeon: Sabiha Shirley MD;  Location: [facility] MAIN OR;  Service: Pain Management     NOSE SURGERY      TN COLONOSCOPY FLX DX W/COLLJ SPEC WHEN PFRMD N/A 3/6/2017    Procedure: COLONOSCOPY;  Surgeon: Jamal Do MD;  [facility];   Service: Gastroenterology    TN SURG IMPLNT NEUROELECT,EPIDURAL Left 10/3/2017    Procedure: PLACEMENT THORACIC FOR INSERTION DORSAL COLUMN SPINAL CORD STIMULATOR (DCS) WITH BUTTOCK IMPLANTABLE PULSE GENERATOR(IMPULSE); Surgeon: Livier Bonilla MD;  Location:  MAIN OR;  Service: Neurosurgery    [address] [place] [address] Right 5/18/2018    Procedure: Rt L3 L4 L5 S1 Radio Frequency Ablation ([id number]); Surgeon: Zoë Gleason MD;  Location: [facility] MAIN OR;  Service: Pain Management     RADIOFREQUENCY ABLATION Left 6/1/2018    Procedure: Lt L3 L4 L5 S1 Radio Frequency Ablation ([id number]);   Surgeon: Zoë Gleason MD;  Location: [facility] MAIN OR;  Service: Pain Management        Family History   Problem Relation Age of Onset    Diabetes Mother     Diabetes Father         mellitus     Heart attack Father 58    Hypertension Father        Social History     Occupational History    Occupation:  for [employer]    Tobacco Use    Smoking status: Current Every Day Smoker     Packs/day: 0 75     Years: 35 00     Pack years: 26 25    Smokeless tobacco: Never Used    Tobacco comment: encouraged smoking cessation - history of smoking 30 or more pack years    Substance and Sexual Activity    Alcohol use: No     Comment: rare    Drug use: No    Sexual activity: Not on file         Current Outpatient Medications:     atorvastatin (LIPITOR) 20 mg tablet, Take 1 tablet (20 mg total) by mouth daily, Disp: 90 tablet, Rfl: 0    etodolac (LODINE) 300 MG capsule, Take 1 capsule (300 mg total) by mouth 3 (three) times a day as needed (pain (with food)), Disp: 90 capsule, Rfl: 1    fluticasone (FLONASE) 50 mcg/act nasal spray, 2 sprays into each nostril daily, Disp: 32 g, Rfl: 2    gabapentin (NEURONTIN) 100 mg capsule, Use 200mg in the AM and 200mg in the PM, Disp: 360 capsule, Rfl: 0    glipiZIDE (GLUCOTROL XL) 10 mg 24 hr tablet, Take 1 tablet (10 mg total) by mouth daily, Disp: 90 tablet, Rfl: 0    lidocaine (LIDODERM) 5 %, Apply 2 patches topically daily Remove & Discard patch within 12 hours or as directed by MD, Disp: 60 patch, Rfl: 1    lisinopril (ZESTRIL) 2 5 mg tablet, Take 1 tablet (2 5 mg total) by mouth daily, Disp: 90 tablet, Rfl: 0    metFORMIN (GLUCOPHAGE) 1000 MG tablet, TAKE 1 TABLET TWICE DAILY WITH MEALS, Disp: 180 tablet, Rfl: 0    omeprazole (PriLOSEC) 20 mg delayed release capsule, TAKE 1 CAPSULE EVERY DAY, Disp: 90 capsule, Rfl: 0    oxyCODONE-acetaminophen (PERCOCET) 5-325 mg per tablet, Take 1 tablet by mouth 2 (two) times a day as needed for severe pain for up to 30 daysMax Daily Amount: 2 tablets, Disp: 60 tablet, Rfl: 0    tamsulosin (FLOMAX) 0 4 mg, Take 2 capsules (0 8 mg total) by mouth daily with dinner, Disp: 180 capsule, Rfl: 0    traZODone (DESYREL) 50 mg tablet, Take 1 tablet (50 mg total) by mouth daily at bedtime, Disp: 90 tablet, Rfl: 0    Allergies   Allergen Reactions    Penicillins Swelling           Oxycodone last taken 8/11 PM            Physical Exam:    /60   Pulse 88   Resp 14   Ht 5' 11" (1 803 m)   Wt 98 8 kg (217 lb 12 8 oz)   BMI 30 38 kg/m²     Constitutional: obese  Eyes: anicteric  HEENT: grossly intact  Neck: supple, symmetric, trachea midline and no masses   Pulmonary:even and unlabored  Cardiovascular:No edema or pitting edema present  Skin:Normal without rashes or lesions and well hydrated  Psychiatric:Mood and affect appropriate  Neurologic:Cranial Nerves II-XII grossly intact  Musculoskeletal:normal

## 2019-08-12 NOTE — PROGRESS NOTES
Assessment:  1  Chronic pain syndrome    2  Chronic low back pain, unspecified back pain laterality, with sciatica presence unspecified    3  Lumbar spondylosis    4  Lumbar radiculopathy    5  Postlaminectomy syndrome, lumbar        Plan:  My impressions and treatment recommendations were discussed in detail with the patient, who verbalized understanding and had no further questions  The patient reports that he is having pain in his low back and right lower extremity what appears to be the right L5 distribution  He is relocating to [place] in 2 weeks time and is wondering if he can get an injection before he leaves  As such, I felt a reasonable to offer him a repeat right L5 and S1 transforaminal epidural steroid injection since this could be potentially therapeutic  The procedures, its risks, and benefits were explained in detail to the patient  Risks include but are not limited to bleeding, infection, hematoma formation, abscess formation, weakness, headache, failure the pain to improve, nerve irritation or damage, and potential worsening of the pain  The patient verbalized understanding and wished to proceed with the procedure  He is currently doing well on oxycodone/acetaminophen 5/325 mg 1 tablet up to twice daily as needed for pain, etodolac 300 mg 3 times daily as needed for pain, and a Lidoderm 5% patch 2 patches applied topically 12 hours on and 12 hours off  He does not need any refills on his medications at today's visit  Follow-up is planned in 2 months time or sooner as warranted  Discharge instructions were provided  I personally saw and examined the patient and I agree with the above discussed plan of care  History of Present Illness:    Alfornia Rinne is a 61 y o  male who presents to [facility] for initial evaluation of the above stated pain complaints  The patient has a past medical and chronic pain history as outlined in the assessment section   He was last seen on July 2, 2019  At today's office visit, the patient's pain score is 4 to 7/10 on the verbal numerical pain rating scale  The patient states that his pain is primarily in his low back and right lower extremity  He states that his pain is worse in the morning, evening, and night  His pain is constant in nature  He reports the quality of his pain as numbness    He is currently doing very well with oxycodone/acetaminophen 5/325 mg 1 tablet twice daily as needed for pain, lidocaine 5% patch 2 patches applied topically 12 hours on 12 hours off, and etodolac 300 mg 3 times daily as needed for pain, with food  He is requesting an injection before he leaves for [place]  He is currently relocating to [place] in 2 weeks time  Other than as stated above, the patient denies any interval changes in medications, medical condition, mental condition, symptoms, or allergies since the last office visit  Review of Systems:    Review of Systems   Respiratory: Negative for shortness of breath  Cardiovascular: Negative for chest pain  Gastrointestinal: Negative for constipation, diarrhea, nausea and vomiting  Musculoskeletal: Positive for back pain (lumbar midline)  Negative for arthralgias, gait problem, joint swelling and myalgias  Skin: Negative for rash  Neurological: Negative for dizziness, seizures and weakness  All other systems reviewed and are negative          Patient Active Problem List   Diagnosis    Heartburn    Erectile dysfunction of non-organic origin    DM type 2 (diabetes mellitus, type 2) ([facility]    Chronic low back pain    BPH with urinary obstruction    Blood pressure elevated without history of HTN    Sciatica    Myofascial pain syndrome    Lumbar spondylosis    Herniation of lumbar intervertebral disc with radiculopathy    Acute post-operative pain    Insomnia    Chronic pain syndrome    Low back pain    Class 1 obesity due to excess calories with serious comorbidity and body mass index (BMI) of 30 0 to 30 9 in adult    Smoker    Seasonal allergic rhinitis    Diabetic mononeuropathy associated with type 2 diabetes mellitus (HCC)    Postlaminectomy syndrome, lumbar region    Numbness and tingling in both hands    Intervertebral disc disorder with radiculopathy of lumbosacral region    Acute bronchitis    Mixed hyperlipidemia    Proteinuria       Past Medical History:   Diagnosis Date    Arthritis     BPH (benign prostatic hypertrophy) with urinary obstruction     Chronic pain disorder     Ear infection     Herniation of lumbar intervertebral disc with radiculopathy     Myofascial pain syndrome     Sciatica        Past Surgical History:   Procedure Laterality Date    ABSCESS DRAINAGE      groin    BACK SURGERY      implant - resolved 2004    CAUDAL BLOCK N/A 10/26/2018    Procedure: Caudal Epidural Steroid Injection ([id number]); Surgeon: Robby Diez MD;  Location: [facility] MAIN OR;  Service: Pain Management     CAUDAL BLOCK N/A 11/30/2018    Procedure: Caudal Epidural Steroid Injection (61988); Surgeon: Robby Diez MD;  Location: [facility] MAIN OR;  Service: Pain Management     COLONOSCOPY      EPIDURAL BLOCK INJECTION Right 5/10/2019    Procedure: L5 S1 transforaminal Epidural Steroid Injection ([id number];  Surgeon: Robby Diez MD;  Location: [place] [address] MAIN OR;  Service: Pain Management     NERVE BLOCK Bilateral 4/26/2018    Procedure: B/L L3 L4 L5 S1 MBB #1 ([id number]); Surgeon: Robby Diez MD;  Location: [facility] MAIN OR;  Service: Pain Management     NERVE BLOCK Bilateral 5/11/2018    Procedure: B/L L3 L4 L5 S1 Medial Branch Block #2;  Surgeon: Robby Diez MD;  Location: [place] [address] MAIN OR;  Service: Pain Management     NOSE SURGERY      MA COLONOSCOPY FLX DX W/COLLJ SPEC WHEN PFRMD N/A 3/6/2017    Procedure: COLONOSCOPY;  Surgeon: Wilton Maya MD;  [facility];   Service: Gastroenterology    MA SURG IMPLNT NEUROELECT,EPIDURAL Left 10/3/2017    Procedure: PLACEMENT THORACIC FOR INSERTION DORSAL COLUMN SPINAL CORD STIMULATOR (DCS) WITH BUTTOCK IMPLANTABLE PULSE GENERATOR(IMPULSE); Surgeon: Lonnie Hamilton MD;  Location:  MAIN OR;  Service: Neurosurgery    [address] [place] [address] Right 5/18/2018    Procedure: Rt L3 L4 L5 S1 Radio Frequency Ablation ([id number]); Surgeon: Kay Ludwig MD;  Location: [facility] MAIN OR;  Service: Pain Management     RADIOFREQUENCY ABLATION Left 6/1/2018    Procedure: Lt L3 L4 L5 S1 Radio Frequency Ablation ([id number]);   Surgeon: Kay Ludwig MD;  Location: [facility] MAIN OR;  Service: Pain Management        Family History   Problem Relation Age of Onset    Diabetes Mother     Diabetes Father         mellitus     Heart attack Father 58    Hypertension Father        Social History     Occupational History    Occupation:  for [employer]    Tobacco Use    Smoking status: Current Every Day Smoker     Packs/day: 0 75     Years: 35 00     Pack years: 26 25    Smokeless tobacco: Never Used    Tobacco comment: encouraged smoking cessation - history of smoking 30 or more pack years    Substance and Sexual Activity    Alcohol use: No     Comment: rare    Drug use: No    Sexual activity: Not on file         Current Outpatient Medications:     atorvastatin (LIPITOR) 20 mg tablet, Take 1 tablet (20 mg total) by mouth daily, Disp: 90 tablet, Rfl: 0    etodolac (LODINE) 300 MG capsule, Take 1 capsule (300 mg total) by mouth 3 (three) times a day as needed (pain (with food)), Disp: 90 capsule, Rfl: 1    fluticasone (FLONASE) 50 mcg/act nasal spray, 2 sprays into each nostril daily, Disp: 32 g, Rfl: 2    gabapentin (NEURONTIN) 100 mg capsule, Use 200mg in the AM and 200mg in the PM, Disp: 360 capsule, Rfl: 0    glipiZIDE (GLUCOTROL XL) 10 mg 24 hr tablet, Take 1 tablet (10 mg total) by mouth daily, Disp: 90 tablet, Rfl: 0    lidocaine (LIDODERM) 5 %, Apply 2 patches topically daily Remove & Discard patch within 12 hours or as directed by MD, Disp: 60 patch, Rfl: 1    lisinopril (ZESTRIL) 2 5 mg tablet, Take 1 tablet (2 5 mg total) by mouth daily, Disp: 90 tablet, Rfl: 0    metFORMIN (GLUCOPHAGE) 1000 MG tablet, TAKE 1 TABLET TWICE DAILY WITH MEALS, Disp: 180 tablet, Rfl: 0    omeprazole (PriLOSEC) 20 mg delayed release capsule, TAKE 1 CAPSULE EVERY DAY, Disp: 90 capsule, Rfl: 0    oxyCODONE-acetaminophen (PERCOCET) 5-325 mg per tablet, Take 1 tablet by mouth 2 (two) times a day as needed for severe pain for up to 30 daysMax Daily Amount: 2 tablets, Disp: 60 tablet, Rfl: 0    tamsulosin (FLOMAX) 0 4 mg, Take 2 capsules (0 8 mg total) by mouth daily with dinner, Disp: 180 capsule, Rfl: 0    traZODone (DESYREL) 50 mg tablet, Take 1 tablet (50 mg total) by mouth daily at bedtime, Disp: 90 tablet, Rfl: 0    Allergies   Allergen Reactions    Penicillins Swelling           Oxycodone last taken 8/11 PM            Physical Exam:    /60   Pulse 88   Resp 14   Ht 5' 11" (1 803 m)   Wt 98 8 kg (217 lb 12 8 oz)   BMI 30 38 kg/m²     Constitutional: obese  Eyes: anicteric  HEENT: grossly intact  Neck: supple, symmetric, trachea midline and no masses   Pulmonary:even and unlabored  Cardiovascular:No edema or pitting edema present  Skin:Normal without rashes or lesions and well hydrated  Psychiatric:Mood and affect appropriate  Neurologic:Cranial Nerves II-XII grossly intact  Musculoskeletal:normal

## 2019-08-16 ENCOUNTER — HOSPITAL ENCOUNTER (OUTPATIENT)
Facility: AMBULARY SURGERY CENTER | Age: 59
Setting detail: OUTPATIENT SURGERY
Discharge: HOME | End: 2019-08-16
Attending: ANESTHESIOLOGY | Admitting: ANESTHESIOLOGY
Payer: OTHER MISCELLANEOUS

## 2019-08-16 ENCOUNTER — APPOINTMENT (OUTPATIENT)
Dept: RADIOLOGY | Facility: HOSPITAL | Age: 59
End: 2019-08-16
Payer: OTHER MISCELLANEOUS

## 2019-08-16 VITALS
HEART RATE: 79 BPM | DIASTOLIC BLOOD PRESSURE: 70 MMHG | OXYGEN SATURATION: 99 % | TEMPERATURE: 98.6 F | RESPIRATION RATE: 18 BRPM | SYSTOLIC BLOOD PRESSURE: 131 MMHG

## 2019-08-16 PROCEDURE — 64484 NJX AA&/STRD TFRM EPI L/S EA: CPT | Performed by: ANESTHESIOLOGY

## 2019-08-16 PROCEDURE — 64483 NJX AA&/STRD TFRM EPI L/S 1: CPT | Performed by: ANESTHESIOLOGY

## 2019-08-16 PROCEDURE — 72020 X-RAY EXAM OF SPINE 1 VIEW: CPT

## 2019-08-16 RX ORDER — LIDOCAINE WITH 8.4% SOD BICARB 0.9%(10ML)
SYRINGE (ML) INJECTION AS NEEDED
Status: DISCONTINUED | OUTPATIENT
Start: 2019-08-16 | End: 2019-08-16 | Stop reason: HOSPADM

## 2019-08-16 RX ORDER — BUPIVACAINE HYDROCHLORIDE 2.5 MG/ML
INJECTION, SOLUTION EPIDURAL; INFILTRATION; INTRACAUDAL AS NEEDED
Status: DISCONTINUED | OUTPATIENT
Start: 2019-08-16 | End: 2019-08-16 | Stop reason: HOSPADM

## 2019-08-16 RX ORDER — METHYLPREDNISOLONE ACETATE 80 MG/ML
INJECTION, SUSPENSION INTRA-ARTICULAR; INTRALESIONAL; INTRAMUSCULAR; SOFT TISSUE AS NEEDED
Status: DISCONTINUED | OUTPATIENT
Start: 2019-08-16 | End: 2019-08-16 | Stop reason: HOSPADM

## 2019-08-16 NOTE — INTERVAL H&P NOTE
H&P reviewed  After examining the patient I find no changes in the patients condition since the H&P had been written      Vitals:    08/16/19 1034   BP: 131/64   Pulse: 76   Resp: 18   Temp: 98 6 °F (37 °C)   SpO2: 99%

## 2019-08-16 NOTE — DISCHARGE INSTRUCTIONS
Epidural Steroid Injection   WHAT YOU NEED TO KNOW:   An epidural steroid injection (VIDHYA) is a procedure to inject steroid medicine into the epidural space  The epidural space is between your spinal cord and vertebrae  Steroids reduce inflammation and fluid buildup in your spine that may be causing pain  You may be given pain medicine along with the steroids  ACTIVITY  · Do not drive or operate machinery today  · No strenuous activity today - bending, lifting, etc   · You may resume normal activites starting tomorrow - start slowly and as tolerated  · You may shower today, but no tub baths or hot tubs  · You may have numbness for several hours from the local anesthetic  Please use caution and common sense, especially with weight-bearing activities  CARE OF THE INJECTION SITE  · If you have soreness or pain, apply ice to the area today (20 minutes on/20 minutes off)  · Starting tomorrow, you may use warm, moist heat or ice if needed  · You may have an increase or change in your discomfort for 36-48 hours after your treatment  · Apply ice and continue with any pain medication you have been prescribed  · Notify [facility] if you have any of the following: redness, drainage, swelling, headache, stiff neck or fever above 100°F     SPECIAL INSTRUCTIONS  · Our office will contact you in approximately 7 days for a progress report  MEDICATIONS  · Continue to take all routine medications  · Our office may have instructed you to hold some medications  If you have a problem specifically related to your procedure, please call our office at [phone]  Problems not related to your procedure should be directed to your primary care physician

## 2019-08-16 NOTE — OP NOTE
ATTENDING PHYSICIAN:  Christy Blanton MD     PROCEDURE:  1  Right L5 transforaminal epidural steroid injection under fluoroscopic guidance  2  Right S1 transforaminal epidural steroid injection under fluoroscopic guidance  PRE-PROCEDURE DIAGNOSIS:  Low back pain and right lower extremity radiculopathy  POST-PROCEDURE DIAGNOSIS:  Low back pain and right lower radiculopathy  ANESTHESIA:  Local     ESTIMATED BLOOD LOSS:  Minimal     COMPLICATIONS:  None  LOCATION:  [address] [place] [address]  CONSENT:  Today's procedure, its potential benefits as well as its risks and potential side effects were reviewed  Discussed risks of the procedure including bleeding, infection, nerve irritation or damage, reactions to the medications, weakness, headache, failure of the pain to improve, and potential worsening of the pain were explained to the patient who verbalized understanding and who wished to proceed  Written informed consent was thereby obtained  DESCRIPTION OF THE PROCEDURE:  After written informed consent was obtained, the patient was taken to the fluoroscopy suite and placed in the prone position  Anatomical landmarks were identified by way of fluoroscopy in multiple views  The skin of the lumbar region was prepped using antiseptic and draped in the usual sterile fashion  Strict aseptic technique was utilized  The skin and subcutaneous tissues at the needle entry site were infiltrated with a total of 5 mL of 1% preservative-free lidocaine using a 25-gauge 1-1/2-inch needle  22-gauge needles were then incrementally advanced under fluoroscopic guidance in the oblique view into the neural foramina as mentioned above  Proper placement into each of the neural foramen was confirmed with fluoroscopy in both the lateral and AP views  After negative aspiration for CSF or heme, contrast was injected, which delineated the nerve roots and the epidural space under fluoroscopy in the AP view   There was only a transient pressure paresthesia that resolved immediately upon injection  After negative aspiration, a 2 mL of a 4 mL injectate consisting of 3 mL of preservative-free 0 25% bupivacaine and 1 mL of Depo-Medrol 80 mg/mL was slowly injected into each of the needles as delineated above  The patient tolerated the procedure well and all needles were removed intact  Hemostasis was maintained  There were no apparent paresthesias or complications  The skin was wiped clean and a Band-Aid was placed as appropriate  The patient was monitored for an appropriate period of time and remained hemodynamically stable following the procedure  The patient was ultimately discharged to home with supervision in good condition and instructed to call the office in approximately 7-10 days with an update or sooner as warranted  Discharge instructions were provided  I was present for and participated in all key and critical portions of this procedure      Robby Diez MD  8/16/2019  11:19 AM

## 2019-08-23 ENCOUNTER — TELEPHONE (OUTPATIENT)
Dept: PAIN MEDICINE | Facility: CLINIC | Age: 59
End: 2019-08-23

## 2019-09-24 ENCOUNTER — TELEPHONE (OUTPATIENT)
Dept: PAIN MEDICINE | Facility: CLINIC | Age: 59
End: 2019-09-24

## 2019-10-14 DIAGNOSIS — E11.41 DIABETIC MONONEUROPATHY ASSOCIATED WITH TYPE 2 DIABETES MELLITUS (HCC): ICD-10-CM

## 2019-10-14 DIAGNOSIS — R80.9 PROTEINURIA, UNSPECIFIED TYPE: ICD-10-CM

## 2019-10-14 DIAGNOSIS — E78.2 MIXED HYPERLIPIDEMIA: ICD-10-CM

## 2019-10-14 DIAGNOSIS — E11.9 TYPE 2 DIABETES MELLITUS WITHOUT COMPLICATION, WITHOUT LONG-TERM CURRENT USE OF INSULIN (HCC): ICD-10-CM

## 2019-10-17 NOTE — TELEPHONE ENCOUNTER
Yes patient moved  However he is out of medication and his appt is not till next month  Patient will like refill until he can see provider

## 2019-10-18 RX ORDER — ATORVASTATIN CALCIUM 20 MG/1
TABLET, FILM COATED ORAL
Qty: 90 TABLET | Refills: 0 | Status: SHIPPED | OUTPATIENT
Start: 2019-10-18 | End: 2021-02-09 | Stop reason: SDUPTHER

## 2019-10-18 RX ORDER — GABAPENTIN 100 MG/1
CAPSULE ORAL
Qty: 540 CAPSULE | Refills: 0 | Status: SHIPPED | OUTPATIENT
Start: 2019-10-18 | End: 2021-01-25 | Stop reason: SDUPTHER

## 2019-10-18 RX ORDER — LISINOPRIL 2.5 MG/1
TABLET ORAL
Qty: 90 TABLET | Refills: 0 | Status: SHIPPED | OUTPATIENT
Start: 2019-10-18 | End: 2021-01-25 | Stop reason: SDUPTHER

## 2019-10-28 ENCOUNTER — TELEPHONE (OUTPATIENT)
Dept: PAIN MEDICINE | Facility: CLINIC | Age: 59
End: 2019-10-28

## 2019-10-28 NOTE — TELEPHONE ENCOUNTER
Patient: Buzz Fernandez Date: 2018   : 1934 Attending: Lorenzo Page MD   84 year old male      GI CONSULT    Consult Diagnosis:  Clogged G/J tube, abdominal pain    HPI: 84 year old male, known to our service, with history of FFT, GERD, GOO due to pyloric stenosis, esophageal strictures s/p dilations, +H. Pylori, AVM's of the duodenum, EtOH abuse, CKD, PUD, PE and chronic anemia. Pt underwent PEG-J tube placement 2018. Pt brought to ED from [facility] due to clogged G/J tube for 3 days and abdominal pain. Pt reports generalized abd pain. Had one episode of vomiting yesterday. Reports constipation and has not had a BM for several days. CT A/P showing the cavitary lesion of the left upper lung lobe, concern for malignancy, stomach is distended with ingested material with the GJ tube appearing to have migrated to the stomach. Also noted subcutaneous stranding adjacent to the G-tube site consistent with local inflammation and possible buried bumper. Marked colonic stool burden noted as well as a stable cystic pancreatic lesion.      Past Medical History:    Failure to thrive in adult                                      Comment: Weight Loss Noted    GERD (gastroesophageal reflux disease)                        Vitamin D deficiency                                          Pyloric stenosis                                              Hyperkalemia                                                  Hypercalcemia                                                 Anemia                                                        Past Surgical History:   Procedure Laterality Date   • Cataract extraction w/ anterior vitrectomy Left    • Esophageal dilation     • Hb endoscopy surgery  16    Esophageal/Pyloric Strictures   • Hernia repair     • Pancreas biopsy     • Soft tissue biopsy         No family history on file.    ALLERGIES:  No Known Allergies    Social History:  Social History   Substance Use  Faxed records to [address] / [facility] Top[facility]   • Smoking status: Current Every Day Smoker     Packs/day: 0.10     Types: Cigarettes   • Smokeless tobacco: Never Used   • Alcohol use No       Scheduled Inpatient Meds      (Not in a hospital admission)      Vital 24 Hour Range Last Value   Temperature Temp  Min: 98.6 °F (37 °C)  Max: 98.6 °F (37 °C) 98.6 °F (37 °C) (04/08/18 1220)   Pulse Pulse  Min: 65  Max: 78 67 (04/08/18 1500)   Respiratory Resp  Min: 15  Max: 20 16 (04/08/18 1500)   Non-Invasive  Blood Pressure BP  Min: 122/63  Max: 136/61 130/60 (04/08/18 1500)   Arterial   Blood Pressure No Data Recorded       Vital First Value Last Value   Weight Weight: 49 kg (04/08/18 1220) 49 kg (04/08/18 1220)   Height N/A 5' 7\" (170.2 cm) (04/08/18 1220)   BMI N/A 16.95 (04/08/18 1220)      Intake/Output:  Last stool occurrence:      Review of Systems:  General-  Denies chills, sweats  HEENT-  Denies headache  Respiratory- Denies SOB, productive cough  Cardiac- Denies chest pains  GI- as per HPI  Genitourinary-  Denies dysuria  Skin- Denies rash, pruritis  Psych-  Hx of EtOH abuse  Neuro-  Denies dizziness, focal weakness, paresthesias  Hematologic- +chronic anemia, hx of GI bleeds  Musculoskeletal- Denies joint pains    Physical Exam:   General appearance: awake, alert, appears uncomfortable, cachetic  HEENT: Normocephalic, atraumatic. No scleral icterus.   Neck: no adenopathy and supple  Lungs: decreased BS throughout  Heart: regular rate and rhythm, S1, S2 normal  Abdomen: thin, mildly distended, rare BS, semi-firm, diffuse tenderness, +guarding, no rebound,  no masses, G/J tube in LUQ, clamped, no erythema noted at insertion site, very tender with any movement of the tube.   Rectal:  Deferred  Genitourinary:  Deferred  Extremities: no edema  Skin: No rashes  Neurologic: Grossly normal, no focal deficits     Labs:    Recent Labs  Lab 04/08/18  1320   WBC 6.5   HCT 30.0*   HGB 9.5*   MCV 81.1      SODIUM 142   POTASSIUM 4.5   GLUCOSE 84   BUN 36*    CREATININE 1.09   AST 29   GPT 19   ALKPT 93   BILIRUBIN 0.2   ALBUMIN 2.9*   LIPA 256       Radiology Findings:  CT abd pelvis 4/8:  1.  Redemonstration of a cavitary lesion within the inferior segment of the  left upper lobe measuring approximately 2.4 x 2.1 cm in axial dimension,  similar compared to the prior study. Note is made of increased FDG uptake  within this area on prior PET scan, concerning for malignancy. There is  evidence of increased adjacent opacities which may be inflammatory in  etiology versus progression of disease.  2.  The stomach is distended with ingested material and the GJ tube now  appears to terminate within the pyloric canal. Repositioning may be  considered.  3.  There is subcutaneous stranding adjacent to the G-tube site which  obliterates the fat planes normally interposed between the peritoneum and  stomach. This limits evaluation for possible erosion of the tube components  through the stomach wall. Findings consistent with local inflammation.  4.  Marked colonic stool burden consistent with constipation.  5.  Unchanged cystic mass within the pancreatic tail measuring  approximately 3.7 cm      Pathology Findings:  N/A    Assessment:  1. 84 year old male with history of failure to thrive, significant wt loss, status post PEG-J tube placement 2/26/2018. Pt brought to ED from [facility] due to clogged G/J tube for the last 3 days and abdominal pain. CT A/P showing distended stomach with ingested material, the tip of the G/J tube had migrated into the stomach. Also noted subcutaneous stranding adjacent to the G-tube. Concern for buried bumper.  2. Constipation. CT noting marked colonic stool burden.   3. Cavitary left upper lobe lung lesion similar to prior imaging. Concern for malignancy.   4. PE 1/2018 on Eliquis  5. Hx of pyloric stenosis, esophageal strictures s/p multiple dilatations.   6. Pancreatic cyst s/p EUS with FNA 2013. Negative for malignancy.         Plans/Recommendations:  1. EGD tomorrow.   2. Hold Eliquis  3. NPO, IVF's, pain management.   4. Bowel regimen once able to take po.     Thank You,   Teagan Abreu PA-C/ Dr. Conner Stubbs    Patient seen and examined. Agree with above. The patient developed excruciating pain around his PEG site.  He also reports worsening reflux during this period of time.  He was transferred mostly due to no nonfunctioning tube. On exam he has exquisite tenderness around his gastrostomy site with no palpable induration or hematoma. Difficulty in rotating tube due to excruciating pain. CT scan was performed in emergency room personally reviewed and discussed radiology. There is stranding around the PEG site but given the loss of fat planes difficult to discern as to whether or not this is a buried bumper or eroded tube. There is no free air to suggest althea perforation.    Given the fat stranding would recommend IV antibiotics for now. Patient received his dose of Eliquis this morning. We'll tentatively plan for EGD tomorrow to evaluate PEG site and then decide about management. If PEG site can be salvaged Eliquis should not be an issue. Oral intake as tolerated.

## 2019-12-27 DIAGNOSIS — N40.1 BPH WITH URINARY OBSTRUCTION: ICD-10-CM

## 2019-12-27 DIAGNOSIS — G47.00 INSOMNIA, UNSPECIFIED TYPE: ICD-10-CM

## 2019-12-27 DIAGNOSIS — E11.9 TYPE 2 DIABETES MELLITUS WITHOUT COMPLICATION, WITHOUT LONG-TERM CURRENT USE OF INSULIN (HCC): ICD-10-CM

## 2019-12-27 DIAGNOSIS — E11.69 TYPE 2 DIABETES MELLITUS WITH OTHER SPECIFIED COMPLICATION, WITHOUT LONG-TERM CURRENT USE OF INSULIN (HCC): ICD-10-CM

## 2019-12-27 DIAGNOSIS — J30.2 SEASONAL ALLERGIC RHINITIS, UNSPECIFIED TRIGGER: ICD-10-CM

## 2019-12-27 DIAGNOSIS — N13.8 BPH WITH URINARY OBSTRUCTION: ICD-10-CM

## 2019-12-27 DIAGNOSIS — E78.2 MIXED HYPERLIPIDEMIA: ICD-10-CM

## 2019-12-27 DIAGNOSIS — R80.9 PROTEINURIA, UNSPECIFIED TYPE: ICD-10-CM

## 2019-12-27 RX ORDER — GLIPIZIDE 10 MG/1
TABLET, FILM COATED, EXTENDED RELEASE ORAL
Qty: 14 TABLET | Refills: 0 | Status: SHIPPED | OUTPATIENT
Start: 2019-12-27 | End: 2021-02-09

## 2019-12-27 RX ORDER — TAMSULOSIN HYDROCHLORIDE 0.4 MG/1
CAPSULE ORAL
Qty: 28 CAPSULE | Refills: 0 | Status: SHIPPED | OUTPATIENT
Start: 2019-12-27 | End: 2021-05-17 | Stop reason: SDUPTHER

## 2019-12-27 RX ORDER — TRAZODONE HYDROCHLORIDE 50 MG/1
TABLET ORAL
Qty: 90 TABLET | Refills: 0 | OUTPATIENT
Start: 2019-12-27

## 2019-12-27 NOTE — TELEPHONE ENCOUNTER
14 day supply of Flomax and Glipizide sent to pharmacy  Patient has moved to [place] and was told to establish care with a provider down there  This was 2 months ago!

## 2020-01-03 RX ORDER — FLUTICASONE PROPIONATE 50 MCG
SPRAY, SUSPENSION (ML) NASAL
Qty: 48 G | Refills: 0 | OUTPATIENT
Start: 2020-01-03

## 2020-01-03 RX ORDER — ATORVASTATIN CALCIUM 20 MG/1
TABLET, FILM COATED ORAL
Qty: 90 TABLET | Refills: 0 | OUTPATIENT
Start: 2020-01-03

## 2020-01-03 RX ORDER — LISINOPRIL 2.5 MG/1
TABLET ORAL
Qty: 90 TABLET | Refills: 0 | OUTPATIENT
Start: 2020-01-03

## 2020-12-01 ENCOUNTER — TELEPHONE (OUTPATIENT)
Dept: PAIN MEDICINE | Facility: CLINIC | Age: 60
End: 2020-12-01

## 2020-12-01 NOTE — TELEPHONE ENCOUNTER
Pt has not been seen for a yr  Pt moved away and would like to speak with Kulwant Hannon # [phone]

## 2020-12-21 NOTE — TELEPHONE ENCOUNTER
Patient called back asking if he can still be seen by Dr Bri Angelo  Pt moved back to [place] & wants to be seen again

## 2020-12-21 NOTE — TELEPHONE ENCOUNTER
Patient called requesting to speak with Mónica Jay  Patient did not give any additional information   Please advise, dori    Call back# [phone]

## 2021-01-11 ENCOUNTER — TELEPHONE (OUTPATIENT)
Dept: PAIN MEDICINE | Facility: CLINIC | Age: 61
End: 2021-01-11

## 2021-01-11 ENCOUNTER — OFFICE VISIT (OUTPATIENT)
Dept: PAIN MEDICINE | Facility: CLINIC | Age: 61
End: 2021-01-11
Payer: COMMERCIAL

## 2021-01-11 VITALS — HEIGHT: 71 IN | BODY MASS INDEX: 30.8 KG/M2 | WEIGHT: 220 LBS

## 2021-01-11 DIAGNOSIS — G89.29 CHRONIC LOW BACK PAIN: ICD-10-CM

## 2021-01-11 DIAGNOSIS — M54.16 LUMBAR RADICULOPATHY: ICD-10-CM

## 2021-01-11 DIAGNOSIS — F11.20 UNCOMPLICATED OPIOID DEPENDENCE (HCC): ICD-10-CM

## 2021-01-11 DIAGNOSIS — M54.50 CHRONIC LOW BACK PAIN: ICD-10-CM

## 2021-01-11 DIAGNOSIS — M51.17 INTERVERTEBRAL DISC DISORDER WITH RADICULOPATHY OF LUMBOSACRAL REGION: ICD-10-CM

## 2021-01-11 DIAGNOSIS — M54.41 CHRONIC BILATERAL LOW BACK PAIN WITH RIGHT-SIDED SCIATICA: ICD-10-CM

## 2021-01-11 DIAGNOSIS — G89.4 CHRONIC PAIN SYNDROME: Primary | ICD-10-CM

## 2021-01-11 DIAGNOSIS — G89.4 CHRONIC PAIN SYNDROME: ICD-10-CM

## 2021-01-11 DIAGNOSIS — M96.1 POSTLAMINECTOMY SYNDROME, LUMBAR REGION: ICD-10-CM

## 2021-01-11 DIAGNOSIS — M47.816 LUMBAR SPONDYLOSIS: ICD-10-CM

## 2021-01-11 DIAGNOSIS — M96.1 POSTLAMINECTOMY SYNDROME, LUMBAR: ICD-10-CM

## 2021-01-11 DIAGNOSIS — G89.29 CHRONIC BILATERAL LOW BACK PAIN WITH RIGHT-SIDED SCIATICA: ICD-10-CM

## 2021-01-11 DIAGNOSIS — Z79.891 LONG-TERM CURRENT USE OF OPIATE ANALGESIC: ICD-10-CM

## 2021-01-11 PROCEDURE — 80305 DRUG TEST PRSMV DIR OPT OBS: CPT | Performed by: ANESTHESIOLOGY

## 2021-01-11 PROCEDURE — 99214 OFFICE O/P EST MOD 30 MIN: CPT | Performed by: ANESTHESIOLOGY

## 2021-01-11 RX ORDER — LIDOCAINE 50 MG/G
2 PATCH TOPICAL DAILY
Qty: 60 PATCH | Refills: 0 | Status: SHIPPED | OUTPATIENT
Start: 2021-01-11

## 2021-01-11 RX ORDER — OXYCODONE AND ACETAMINOPHEN 7.5; 325 MG/1; MG/1
1 TABLET ORAL 3 TIMES DAILY PRN
Qty: 90 TABLET | Refills: 0 | Status: SHIPPED | OUTPATIENT
Start: 2021-01-16 | End: 2021-01-12 | Stop reason: SDUPTHER

## 2021-01-11 RX ORDER — OXYCODONE AND ACETAMINOPHEN 7.5; 325 MG/1; MG/1
1 TABLET ORAL EVERY 4 HOURS PRN
COMMUNITY
End: 2021-01-11 | Stop reason: SDUPTHER

## 2021-01-11 RX ORDER — OXYCODONE AND ACETAMINOPHEN 7.5; 325 MG/1; MG/1
1 TABLET ORAL 3 TIMES DAILY PRN
Qty: 30 TABLET | Refills: 0 | Status: SHIPPED | OUTPATIENT
Start: 2021-01-16 | End: 2021-01-11 | Stop reason: SDUPTHER

## 2021-01-11 RX ORDER — OXYCODONE AND ACETAMINOPHEN 7.5; 325 MG/1; MG/1
1 TABLET ORAL 3 TIMES DAILY PRN
Qty: 90 TABLET | Refills: 0 | Status: SHIPPED | OUTPATIENT
Start: 2021-01-16 | End: 2021-01-11 | Stop reason: SDUPTHER

## 2021-01-11 NOTE — PROGRESS NOTES
Pain Medicine Follow-Up Note    Assessment:  1  Chronic pain syndrome    2  Chronic bilateral low back pain with right-sided sciatica    3  Intervertebral disc disorder with radiculopathy of lumbosacral region    4  Postlaminectomy syndrome, lumbar region    5  Chronic low back pain    6  Postlaminectomy syndrome, lumbar    7  Lumbar spondylosis    8  Lumbar radiculopathy        Plan:  No orders of the defined types were placed in this encounter  New Medications Ordered This Visit   Medications    QUETIAPINE FUMARATE PO     Sig: Take by mouth    oxyCODONE-acetaminophen (PERCOCET) 7 5-325 MG per tablet     Sig: Take 1 tablet by mouth 3 (three) times a day as needed for severe painMax Daily Amount: 3 tablets     Dispense:  90 tablet     Refill:  0    etodolac (LODINE) 300 MG capsule     Sig: Take 1 capsule (300 mg total) by mouth 3 (three) times a day as needed (pain (with food))     Dispense:  90 capsule     Refill:  0    lidocaine (LIDODERM) 5 %     Sig: Apply 2 patches topically daily Remove & Discard patch within 12 hours or as directed by MD     Dispense:  60 patch     Refill:  0       My impressions and treatment recommendations were discussed in detail with the patient who verbalized understanding and had no further questions  The patient reports that he has moved back to the [facility] permanently and would like to reestablish care with us at [facility]  At this point, he is maintained on etodolac 300 mg up to 3 times daily as needed for pain, with food, oxycodone/acetaminophen 7 5/325 mg 1 tablet up to 3 times daily as needed for pain, and lidocaine 5% patch 2 patches applied topically daily  I did have the patient sign an opioid treatment agreement at today's visit  The risks and side effects of chronic opioid treatment were discussed in detail with the patient   Side effects include but are not limited to nausea, vomiting, GI intolerance, sedation, constipation, mental clouding, opioid-induced hyperalgesia, endocrine dysfunction, addiction, dependence, and tolerance  The patient was asked to take his medications only as prescribed and directed, never in excess, and never for any other reason other than for pain control  The patient was also asked to keep his medications out of the reach of others and away from children, preferably in a locked drawer  The patient verbalized understanding and wished to use these opioid medications  A urine drug test was collected at today's office visit as part of our medication management protocol  The point of care testing results were appropriate for what was being prescribed  The specimen will be sent for confirmatory testing  The drug screen is medically necessary because the patient is either dependent on opioid medication or is being considered for opioid medication therapy and the results could impact ongoing or future treatment  The drug screen is to evaluate for the presence or absence of prescribed, non-prescribed, and/or illicit drugs and substances  [place] Prescription Drug Monitoring Program report was reviewed and was appropriate     Follow-up is planned in 4 weeks time or sooner as warranted  Discharge instructions were provided  I personally saw and examined the patient and I agree with the above discussed plan of care  History of Present Illness:    Frank Newman is a 61 y o  male who presents to [facility] for interval re-evaluation of the above stated pain complaints  The patient has a past medical and chronic pain history as outlined in the assessment section  He was last seen on August 16, 2019  At today's office visit, the patient's pain score is 5/10 on the verbal numerical pain rating scale  The patient states that his pain is primarily in his low back and right lower extremity  He describes his pain as worse in the morning, evening, and night  His pain is constant in nature  He reports the quality of his pain as dull/aching, throbbing, and numbness  He is reporting pain in his right low back and right lower extremity  He would like to continue his medications as prescribed  He is currently using etodolac 300 mg up to 3 times daily as needed for pain, oxycodone/acetaminophen 7 5/325 mg 1 tablet up to 3 times daily as needed for pain, and lidocaine 5% patch 2 patches applied daily  Other than as stated above, the patient denies any interval changes in medications, medical condition, mental condition, symptoms, or allergies since the last office visit  Review of Systems:    Review of Systems   Respiratory: Negative for shortness of breath  Cardiovascular: Negative for chest pain  Gastrointestinal: Negative for constipation, diarrhea, nausea and vomiting  Musculoskeletal: Positive for back pain and gait problem  Negative for arthralgias, joint swelling and myalgias  Skin: Negative for rash  Neurological: Negative for dizziness, seizures and weakness  All other systems reviewed and are negative          Patient Active Problem List   Diagnosis    Heartburn    Erectile dysfunction of non-organic origin    DM type 2 (diabetes mellitus, type 2) (HCC)    Chronic low back pain    BPH with urinary obstruction    Blood pressure elevated without history of HTN    Sciatica    Myofascial pain syndrome    Lumbar spondylosis    Herniation of lumbar intervertebral disc with radiculopathy    Acute post-operative pain    Insomnia    Chronic pain syndrome    Low back pain    Class 1 obesity due to excess calories with serious comorbidity and body mass index (BMI) of 30 0 to 30 9 in adult    Smoker    Seasonal allergic rhinitis    Diabetic mononeuropathy associated with type 2 diabetes mellitus (HCC)    Postlaminectomy syndrome, lumbar region    Numbness and tingling in both hands    Intervertebral disc disorder with radiculopathy of lumbosacral region    Acute bronchitis    Mixed hyperlipidemia    Proteinuria       Past Medical History:   Diagnosis Date    Arthritis     BPH (benign prostatic hypertrophy) with urinary obstruction     Chronic pain disorder     Ear infection     Herniation of lumbar intervertebral disc with radiculopathy     Myofascial pain syndrome     Sciatica        Past Surgical History:   Procedure Laterality Date    ABSCESS DRAINAGE      groin    BACK SURGERY      implant - resolved 2004    CAUDAL BLOCK N/A 10/26/2018    Procedure: Caudal Epidural Steroid Injection (40646); Surgeon: Phuong Ellis MD;  Location: [facility] MAIN OR;  Service: Pain Management     CAUDAL BLOCK N/A 11/30/2018    Procedure: Caudal Epidural Steroid Injection (20098); Surgeon: Phuong Ellis MD;  Location: [facility] OR;  Service: Pain Management     COLONOSCOPY      EPIDURAL BLOCK INJECTION Right 5/10/2019    Procedure: L5 S1 transforaminal Epidural Steroid Injection ([id number];  Surgeon: Phuong Ellis MD;  Location: [facility] MAIN OR;  Service: Pain Management     EPIDURAL BLOCK INJECTION Right 8/16/2019    Procedure: BLOCK / INJECTION EPIDURAL STEROID TRANSFORAMINAL;  Surgeon: Phuong Ellis MD;  Location: [facility] MAIN OR;  Service: Pain Management     NERVE BLOCK Bilateral 4/26/2018    Procedure: B/L L3 L4 L5 S1 MBB #1 (06856,53696,[id number]); Surgeon: Phuong Ellis MD;  Location: [facility] OR;  Service: Pain Management     NERVE BLOCK Bilateral 5/11/2018    Procedure: B/L L3 L4 L5 S1 Medial Branch Block #2;  Surgeon: Phuong Ellis MD;  Location: [facility] OR;  Service: Pain Management     NOSE SURGERY      RI COLONOSCOPY FLX DX W/COLLJ SPEC WHEN PFRMD N/A 3/6/2017    Procedure: COLONOSCOPY;  Surgeon: Yovany Serrano MD;  [facility];   Service: Gastroenterology    RI SURG IMPLNT NEUROELECT,EPIDURAL Left 10/3/2017    Procedure: PLACEMENT THORACIC FOR INSERTION DORSAL COLUMN SPINAL CORD STIMULATOR (DCS) WITH BUTTOCK IMPLANTABLE PULSE GENERATOR(IMPULSE); Surgeon: Aryan Jaeger MD;  Location:  MAIN OR;  Service: Neurosurgery    [address] Right 5/18/2018    Procedure: Rt L3 L4 L5 S1 Radio Frequency Ablation (63299,72842); Surgeon: Tiny Romberg, MD;  Location: [facility] MAIN OR;  Service: Pain Management     RADIOFREQUENCY ABLATION Left 6/1/2018    Procedure: Lt L3 L4 L5 S1 Radio Frequency Ablation ([id number]);   Surgeon: Tiny Romberg, MD;  Location: [facility] MAIN OR;  Service: Pain Management        Family History   Problem Relation Age of Onset    Diabetes Mother     Diabetes Father         mellitus     Heart attack Father 58    Hypertension Father        Social History     Occupational History    Occupation:  for [employer]    Tobacco Use    Smoking status: Current Every Day Smoker     Packs/day: 0 75     Years: 35 00     Pack years: 26 25    Smokeless tobacco: Never Used    Tobacco comment: encouraged smoking cessation - history of smoking 30 or more pack years    Substance and Sexual Activity    Alcohol use: No     Comment: rare    Drug use: No    Sexual activity: Not on file         Current Outpatient Medications:     atorvastatin (LIPITOR) 20 mg tablet, TAKE 1 TABLET EVERY DAY, Disp: 90 tablet, Rfl: 0    etodolac (LODINE) 300 MG capsule, Take 1 capsule (300 mg total) by mouth 3 (three) times a day as needed (pain (with food)), Disp: 90 capsule, Rfl: 0    fluticasone (FLONASE) 50 mcg/act nasal spray, 2 sprays into each nostril daily, Disp: 32 g, Rfl: 2    gabapentin (NEURONTIN) 100 mg capsule, TAKE 2 CAPSULES THREE TIMES DAILY (Patient taking differently: 300 mg TAKE 2 CAPSULES THREE TIMES DAILY), Disp: 540 capsule, Rfl: 0    glipiZIDE (GLUCOTROL XL) 10 mg 24 hr tablet, TAKE 1 TABLET EVERY DAY, Disp: 14 tablet, Rfl: 0    lidocaine (LIDODERM) 5 %, Apply 2 patches topically daily Remove & Discard patch within 12 hours or as directed by MD, Disp: 60 patch, Rfl: 0    lisinopril (ZESTRIL) 2 5 mg tablet, TAKE 1 TABLET EVERY DAY, Disp: 90 tablet, Rfl: 0    metFORMIN (GLUCOPHAGE) 1000 MG tablet, TAKE 1 TABLET TWICE DAILY WITH MEALS, Disp: 180 tablet, Rfl: 0    omeprazole (PriLOSEC) 20 mg delayed release capsule, TAKE 1 CAPSULE EVERY DAY, Disp: 90 capsule, Rfl: 0    [START ON 1/16/2021] oxyCODONE-acetaminophen (PERCOCET) 7 5-325 MG per tablet, Take 1 tablet by mouth 3 (three) times a day as needed for severe painMax Daily Amount: 3 tablets, Disp: 90 tablet, Rfl: 0    QUETIAPINE FUMARATE PO, Take by mouth, Disp: , Rfl:     tamsulosin (FLOMAX) 0 4 mg, TAKE 2 CAPSULES EVERY DAY WITH DINNER, Disp: 28 capsule, Rfl: 0    traZODone (DESYREL) 50 mg tablet, Take 1 tablet (50 mg total) by mouth daily at bedtime, Disp: 90 tablet, Rfl: 0    Allergies   Allergen Reactions    Penicillins Swelling       Physical Exam:    Ht 5' 11" (1 803 m)   Wt 99 8 kg (220 lb)   BMI 30 68 kg/m²     Constitutional:obese  Eyes:anicteric  HEENT:grossly intact  Neck:supple, symmetric, trachea midline and no masses   Pulmonary:even and unlabored  Cardiovascular:No edema or pitting edema present  Skin:Normal without rashes or lesions and well hydrated  Psychiatric:Mood and affect appropriate  Neurologic:Cranial Nerves II-XII grossly intact  Musculoskeletal:antalgic

## 2021-01-12 RX ORDER — OXYCODONE AND ACETAMINOPHEN 7.5; 325 MG/1; MG/1
1 TABLET ORAL 3 TIMES DAILY PRN
Qty: 90 TABLET | Refills: 0 | Status: SHIPPED | OUTPATIENT
Start: 2021-01-16 | End: 2021-01-12 | Stop reason: SDUPTHER

## 2021-01-12 RX ORDER — OXYCODONE AND ACETAMINOPHEN 7.5; 325 MG/1; MG/1
1 TABLET ORAL 3 TIMES DAILY PRN
Qty: 90 TABLET | Refills: 0 | Status: SHIPPED | OUTPATIENT
Start: 2021-01-15 | End: 2021-02-11 | Stop reason: SDUPTHER

## 2021-01-12 NOTE — TELEPHONE ENCOUNTER
1/15/21 date changed with new script sent to pharmacy 
I cannot e-prescribe through Epic to this pharmacy  I receive an error saying that the pharmacy is not accepting e-scripts at this time 
Is it okay for RN to call in the script if pharmacy accepts? Please advise  Thank you 
It is a problem with Epic -- please see screenshot below:
Olimpia Curiel   [phone]  Julio Estrada is requesting electronic E-Sript fax for oxyCODONE-acetaminophen (PERCOCET) [phone]  They can not accept and hard fax   Thank you
Pharmacy states they have been receiving e-scripts all day  Just keep trying  Call pharmacy back when you can      [place] # [phone]
S/W [facility]  There were 2 different [facility] in [facility], one was specifically for WC      Confirmed with pharmacy that pt is WC    Please try sending again under new [address] attached
S/w [place] at [employer] [facility]  Henry Sanchez states that Percocet cannot be called in because it is a Class 2 medication  Henry Sanchez was advised of the error message Dr Reji Tenorio was receiving when trying to e-scribe  Henry Sanchez states that she has received less e-scripts this afternoon than usual, will check w/ the pharmacist to see if there is an issue w/ the e-prescribing system currently and will c/b  Awaiting call back from Henry Sanchez at this time      -Forwarding to Dr Reji Tenorio just as an FYI update on current situation  -
Sent to [facility] stating Percocet medication came through with a do not fill date until 1/16/21  She would like to know where this do not fill date came from   Please clarify, thx    Call back# [phone] (ask for Sharon Barboza)
TEGAN:  See below  S/W Kristel from [facility]  States they are only open M-F and the 16th is a Saturday  Enquired if pt can  a day early, Shruthi stated she could not do this and date on RX would need to be changed  Can new RX be sent with fill date of 1/15/21?
That is fine with me 
poor balance/with UE support

## 2021-01-13 ENCOUNTER — TELEPHONE (OUTPATIENT)
Dept: PAIN MEDICINE | Facility: CLINIC | Age: 61
End: 2021-01-13

## 2021-01-13 NOTE — TELEPHONE ENCOUNTER
S/W pt regarding paperwork  Advised pt I sent message to Clerical regarding this  Kip Oz who advised I forward to Paulina and/or Anitra  Please address below

## 2021-01-13 NOTE — TELEPHONE ENCOUNTER
Patient called requesting to speak to a nurse regarding his handicap paperwork   Please advise, dori    Call back# [phone]

## 2021-01-13 NOTE — TELEPHONE ENCOUNTER
Patient called asking if his handicap paper work was filled out by Dr Hue Bosch  He states he needs it today to take to the [facility]  If its filled out, he would like it emailed to his email address listed below if possible  Please advise asap, thx    Email address: [email or web address]    Call back# [phone]

## 2021-01-13 NOTE — TELEPHONE ENCOUNTER
See below  S[facility]W pt who [facility] appt on 1/11/21  Can this paperwork be located and if filled out, scanned into the chart? If not filled out, please put on AS desk to address ASA

## 2021-01-13 NOTE — TELEPHONE ENCOUNTER
The paperwork has been put on the desk of Mavis Ormond (Fawn PARKS)  They will not be back in the office until 1/14/21    I will call and inform the patient

## 2021-01-14 LAB
6MAM UR QL CFM: NEGATIVE NG/ML
7AMINOCLONAZEPAM UR QL CFM: NEGATIVE NG/ML
A-OH ALPRAZ UR QL CFM: NEGATIVE NG/ML
AMPHET UR QL CFM: NEGATIVE NG/ML
AMPHET UR QL CFM: NEGATIVE NG/ML
BUPRENORPHINE UR QL CFM: NEGATIVE NG/ML
BUTALBITAL UR QL CFM: NEGATIVE NG/ML
BZE UR QL CFM: NEGATIVE NG/ML
CODEINE UR QL CFM: NEGATIVE NG/ML
DESIPRAMINE UR QL CFM: NEGATIVE NG/ML
DESIPRAMINE UR QL CFM: NEGATIVE NG/ML
EDDP UR QL CFM: NEGATIVE NG/ML
ETHYL GLUCURONIDE UR QL CFM: NEGATIVE NG/ML
ETHYL SULFATE UR QL SCN: NEGATIVE NG/ML
FENTANYL UR QL CFM: NEGATIVE NG/ML
GLIADIN IGG SER IA-ACNC: NEGATIVE NG/ML
GLUCOSE 30M P 50 G LAC PO SERPL-MCNC: NEGATIVE NG/ML
HYDROCODONE UR QL CFM: NEGATIVE NG/ML
HYDROCODONE UR QL CFM: NEGATIVE NG/ML
HYDROMORPHONE UR QL CFM: NEGATIVE NG/ML
IMIPRAMINE UR QL CFM: NEGATIVE NG/ML
LORAZEPAM UR QL CFM: NEGATIVE NG/ML
MDMA UR QL CFM: NEGATIVE NG/ML
ME-PHENIDATE UR QL CFM: NEGATIVE NG/ML
MEPERIDINE UR QL CFM: NEGATIVE NG/ML
MEPHEDRONE UR QL CFM: NEGATIVE NG/ML
METHADONE UR QL CFM: NEGATIVE NG/ML
METHAMPHET UR QL CFM: NEGATIVE NG/ML
MORPHINE UR QL CFM: NEGATIVE NG/ML
MORPHINE UR QL CFM: NEGATIVE NG/ML
NORBUPRENORPHINE UR QL CFM: NEGATIVE NG/ML
NORDIAZEPAM UR QL CFM: NEGATIVE NG/ML
NORFENTANYL UR QL CFM: NEGATIVE NG/ML
NORHYDROCODONE UR QL CFM: NEGATIVE NG/ML
NORHYDROCODONE UR QL CFM: NEGATIVE NG/ML
NORMEPERIDINE UR QL CFM: NEGATIVE NG/ML
NOROXYCODONE UR QL CFM: ABNORMAL NG/ML
OPC-3373 QUANTIFICATION: NEGATIVE
OXAZEPAM UR QL CFM: NEGATIVE NG/ML
OXYCODONE UR QL CFM: ABNORMAL NG/ML
OXYMORPHONE UR QL CFM: NEGATIVE NG/ML
OXYMORPHONE UR QL CFM: NEGATIVE NG/ML
PCP UR QL CFM: NEGATIVE NG/ML
PHENOBARB UR QL CFM: NEGATIVE NG/ML
RESULT ALL_PRESCRIBED MEDS AND SPECIAL INSTRUCTIONS: NORMAL
SECOBARBITAL UR QL CFM: NEGATIVE NG/ML
SL AMB 3-METHYL-FENTANYL QUANTIFICATION: NORMAL NG/ML
SL AMB 4-ANPP QUANTIFICATION: NORMAL NG/ML
SL AMB 4-FIBF QUANTIFICATION: NORMAL NG/ML
SL AMB 5F-ADB-M7 METABOLITE QUANTIFICATION: NEGATIVE NG/ML
SL AMB 7-OH-MITRAGYNINE (KRATOM ALKALOID) QUANTIFICATION: NEGATIVE NG/ML
SL AMB AB-FUBINACA-M3 METABOLITE QUANTIFICATION: NEGATIVE NG/ML
SL AMB ACETYL FENTANYL QUANTIFICATION: NORMAL NG/ML
SL AMB ACETYL NORFENTANYL QUANTIFICATION: NORMAL NG/ML
SL AMB ACRYL FENTANYL QUANTIFICATION: NORMAL NG/ML
SL AMB BATH SALTS: NEGATIVE NG/ML
SL AMB BUTRYL FENTANYL QUANTIFICATION: NORMAL NG/ML
SL AMB CARFENTANIL QUANTIFICATION: NORMAL NG/ML
SL AMB CLOZAPINE QUANTIFICATION: NEGATIVE NG/ML
SL AMB CTHC (MARIJUANA METABOLITE) QUANTIFICATION: NEGATIVE NG/ML
SL AMB CYCLOPROPYL FENTANYL QUANTIFICATION: NORMAL NG/ML
SL AMB DEXTROMETHORPHAN QUANTIFICATION: NEGATIVE NG/ML
SL AMB DEXTRORPHAN (DEXTROMETHORPHAN METABOLITE) QUANT: NEGATIVE NG/ML
SL AMB DEXTRORPHAN (DEXTROMETHORPHAN METABOLITE) QUANT: NEGATIVE NG/ML
SL AMB FURANYL FENTANYL QUANTIFICATION: NORMAL NG/ML
SL AMB HALOPERIDOL  QUANTIFICATION: NEGATIVE NG/ML
SL AMB HALOPERIDOL METABOLITE QUANTIFICATION: NEGATIVE NG/ML
SL AMB JWH018 METABOLITE QUANTIFICATION: NEGATIVE NG/ML
SL AMB JWH073 METABOLITE QUANTIFICATION: NEGATIVE NG/ML
SL AMB MDMB-FUBINACA-M1 METABOLITE QUANTIFICATION: NEGATIVE NG/ML
SL AMB METHOXYACETYL FENTANYL QUANTIFICATION: NORMAL NG/ML
SL AMB METHYLONE QUANTIFICATION: NEGATIVE NG/ML
SL AMB N-DESMETHYL U-47700 QUANTIFICATION: NORMAL NG/ML
SL AMB N-DESMETHYL-TRAMADOL QUANTIFICATION: NEGATIVE NG/ML
SL AMB N-DESMETHYLCLOZAPINE QUANTIFICATION: NEGATIVE NG/ML
SL AMB PHENTERMINE QUANTIFICATION: NEGATIVE NG/ML
SL AMB RCS4 METABOLITE QUANTIFICATION: NEGATIVE NG/ML
SL AMB RITALINIC ACID QUANTIFICATION: NEGATIVE NG/ML
SL AMB U-47700 QUANTIFICATION: NORMAL NG/ML
SPECIMEN DRAWN SERPL: NEGATIVE NG/ML
TAPENTADOL UR QL CFM: NEGATIVE NG/ML
TEMAZEPAM UR QL CFM: NEGATIVE NG/ML
TEMAZEPAM UR QL CFM: NEGATIVE NG/ML
TRAMADOL UR QL CFM: NEGATIVE NG/ML
URATE/CREAT 24H UR: NEGATIVE NG/ML

## 2021-01-14 NOTE — TELEPHONE ENCOUNTER
Called pt and advised that he needs to contact pcp to have paperwork filled out  Pt verbalized understanding and said he will come  the paperwork from the office

## 2021-01-20 ENCOUNTER — OFFICE VISIT (OUTPATIENT)
Dept: FAMILY MEDICINE CLINIC | Facility: CLINIC | Age: 61
End: 2021-01-20
Payer: OTHER MISCELLANEOUS

## 2021-01-20 ENCOUNTER — OFFICE VISIT (OUTPATIENT)
Dept: FAMILY MEDICINE CLINIC | Facility: CLINIC | Age: 61
End: 2021-01-20
Payer: COMMERCIAL

## 2021-01-20 VITALS — HEIGHT: 71 IN | BODY MASS INDEX: 30.8 KG/M2 | WEIGHT: 220 LBS

## 2021-01-20 VITALS
SYSTOLIC BLOOD PRESSURE: 124 MMHG | RESPIRATION RATE: 16 BRPM | BODY MASS INDEX: 30.8 KG/M2 | DIASTOLIC BLOOD PRESSURE: 66 MMHG | OXYGEN SATURATION: 98 % | HEART RATE: 79 BPM | HEIGHT: 71 IN | WEIGHT: 220 LBS

## 2021-01-20 DIAGNOSIS — G47.00 INSOMNIA, UNSPECIFIED TYPE: Primary | ICD-10-CM

## 2021-01-20 DIAGNOSIS — Z12.2 ENCOUNTER FOR SCREENING FOR LUNG CANCER: ICD-10-CM

## 2021-01-20 DIAGNOSIS — Z11.59 NEED FOR HEPATITIS C SCREENING TEST: ICD-10-CM

## 2021-01-20 DIAGNOSIS — E11.41 DIABETIC MONONEUROPATHY ASSOCIATED WITH TYPE 2 DIABETES MELLITUS (HCC): ICD-10-CM

## 2021-01-20 DIAGNOSIS — F17.200 SMOKER: ICD-10-CM

## 2021-01-20 DIAGNOSIS — N28.1 RENAL CYST, RIGHT: ICD-10-CM

## 2021-01-20 DIAGNOSIS — M54.16 LUMBAR RADICULOPATHY: ICD-10-CM

## 2021-01-20 DIAGNOSIS — M96.1 POSTLAMINECTOMY SYNDROME, LUMBAR REGION: ICD-10-CM

## 2021-01-20 DIAGNOSIS — R80.9 PROTEINURIA, UNSPECIFIED TYPE: ICD-10-CM

## 2021-01-20 DIAGNOSIS — M51.17 INTERVERTEBRAL DISC DISORDER WITH RADICULOPATHY OF LUMBOSACRAL REGION: ICD-10-CM

## 2021-01-20 DIAGNOSIS — Z76.89 ENCOUNTER TO ESTABLISH CARE: Primary | ICD-10-CM

## 2021-01-20 DIAGNOSIS — E78.2 MIXED HYPERLIPIDEMIA: ICD-10-CM

## 2021-01-20 DIAGNOSIS — G89.4 CHRONIC PAIN SYNDROME: ICD-10-CM

## 2021-01-20 LAB — SL AMB POCT HEMOGLOBIN AIC: 6 (ref ?–6.5)

## 2021-01-20 PROCEDURE — 83036 HEMOGLOBIN GLYCOSYLATED A1C: CPT | Performed by: FAMILY MEDICINE

## 2021-01-20 PROCEDURE — 3066F NEPHROPATHY DOC TX: CPT | Performed by: FAMILY MEDICINE

## 2021-01-20 PROCEDURE — 99213 OFFICE O/P EST LOW 20 MIN: CPT | Performed by: FAMILY MEDICINE

## 2021-01-20 PROCEDURE — 3008F BODY MASS INDEX DOCD: CPT | Performed by: FAMILY MEDICINE

## 2021-01-20 PROCEDURE — 99214 OFFICE O/P EST MOD 30 MIN: CPT | Performed by: FAMILY MEDICINE

## 2021-01-20 PROCEDURE — 3044F HG A1C LEVEL LT 7.0%: CPT | Performed by: FAMILY MEDICINE

## 2021-01-20 PROCEDURE — 4004F PT TOBACCO SCREEN RCVD TLK: CPT | Performed by: FAMILY MEDICINE

## 2021-01-20 RX ORDER — QUETIAPINE FUMARATE 100 MG/1
100 TABLET, FILM COATED ORAL
Qty: 90 TABLET | Refills: 0 | Status: SHIPPED | OUTPATIENT
Start: 2021-01-20 | End: 2021-05-05

## 2021-01-20 NOTE — PROGRESS NOTES
Assessment/Plan:   Diagnoses and all orders for this visit:    Encounter to establish care  - reviewed PMHx, PSHx, meds, allergies, FHx, Soc Hx  - advised to f/u in 3months for AWV - pt aware and agreeable     Diabetic mononeuropathy associated with type 2 diabetes mellitus ([facility] [address] )  -     POCT hemoglobin A1c  -     Comprehensive metabolic panel; Future  -     Microalbumin / creatinine urine ratio; Future  -     HEMOGLOBIN A1C W/ EAG ESTIMATION; Future  - A1c in the office 6 0 today   - cont Metformin 1g BID, Glipizide 10mg QD  - cont ACEI 2 5mg QD for gualberto-protection   - cont Gabapentin 300mg TID for neuropathic pain   - advised to get copy of DM eye exam from Optho in [place]   - cont current regimen for now and RTO in 3months for f/u and AWV - pt aware and agreeable   Proteinuria, unspecified type  -     Microalbumin / creatinine urine ratio; Future    Mixed hyperlipidemia  -     Lipid panel; Future  - cont Lipitor 20mg QHS  - The ASCVD Risk score (Sheeba Jacinto et al , 2013) failed to calculate for the following reasons: The valid total cholesterol range is 130 to 320 mg/dL    Smoker  -     CBC and differential; Future  - smokes 1PPD/38yrs   - not yet ready to quit   Encounter for screening for lung cancer  -     CT lung screening program; Future    Need for hepatitis C screening test  -     Hepatitis C antibody; Future    Renal cyst, right  -     US abdomen complete; Future  - MRI 8/2020 - 1 2cm ovoid cyst on R-kidney   - US ordered for further eval         Subjective:    Patient ID: Maria Rm is a 61 y o  male    65yo M presents to the office to re-establish care and for f/u of multiple medical issues  - was in [place] - moved back to [place]   - used to follow with Dr Misa Hong (PM) and Dr Clementine Stoner (PCP)   - has re-established with PM - Dr Maile Poe - last Remy Phillips was 1/11/2021  1) DM2/Neuropathy/Microalbuminuria   - currently on Metformin 1000mg BID, Glipizide 10mg QD, Gabapentin 300mg TID and Lisinopril 2 5mg QD    - A1c (1/20/2021) = 6 0   - (+) microalbuminuria for which ACEI was started on 3/27/2019   - had labs done in [facility] but does not have records with him   - did follow up with Optho for diabetic eye exam in [place] - advised to get records   - neuropathic pain well controlled on Gabapentin 300mg TID   - smokes 1PPD/38yrs   - BP initially elevated in the office but 124/66 on my repeat   - denies F/C/N/V/change in vision/CP/palpitations/SOB/wheezing/abd pain  2) Insomnia   - well controlled on Trazodone 50mg, Seroquel 100mg QHS and Oxy        The following portions of the patient's history were reviewed and updated as appropriate: allergies, current medications, past family history, past medical history, past social history, past surgical history and problem list     Review of Systems  as per HPI    Objective:  /66 (BP Location: Left arm, Patient Position: Sitting, Cuff Size: Large)   Pulse 79   Resp 16   Ht 5' 11" (1 803 m)   Wt 99 8 kg (220 lb)   SpO2 98%   BMI 30 68 kg/m²    Physical Exam  Vitals signs reviewed  Constitutional:       General: He is not in acute distress  Appearance: He is obese  He is not ill-appearing, toxic-appearing or diaphoretic  HENT:      Head: Normocephalic and atraumatic  Right Ear: External ear normal       Left Ear: External ear normal    Eyes:      General: No scleral icterus  Right eye: No discharge  Left eye: No discharge  Extraocular Movements: Extraocular movements intact  Conjunctiva/sclera: Conjunctivae normal    Neck:      Musculoskeletal: Normal range of motion  Cardiovascular:      Rate and Rhythm: Normal rate and regular rhythm  Heart sounds: Normal heart sounds  No murmur  No friction rub  No gallop  Pulmonary:      Effort: Pulmonary effort is normal  No respiratory distress  Breath sounds: Normal breath sounds  No stridor  No wheezing, rhonchi or rales     Abdominal:      General: Bowel sounds are normal  There is no distension  Palpations: Abdomen is soft  There is no mass  Tenderness: There is no abdominal tenderness  There is no guarding or rebound  Musculoskeletal:      Right lower leg: No edema  Left lower leg: No edema  Skin:     General: Skin is warm  Neurological:      General: No focal deficit present  Mental Status: He is alert and oriented to person, place, and time  Psychiatric:         Mood and Affect: Mood normal          Behavior: Behavior normal          BMI Counseling: Body mass index is 30 68 kg/m²  The BMI is above normal  Nutrition recommendations include reducing portion sizes and decreasing overall calorie intake  Exercise recommendations include moderate aerobic physical activity for 150 minutes/week, exercising 3-5 times per week, joining a gym and strength training exercises

## 2021-01-20 NOTE — PROGRESS NOTES
Assessment/Plan:   Diagnoses and all orders for this visit:    Insomnia, unspecified type  -     QUEtiapine (SEROquel) 100 mg tablet; Take 1 tablet (100 mg total) by mouth daily at bedtime  - just moved back to [place] from [facility]   - re-established with PM - Dr Monica Montano and had an OV on 1/11/2021  - was prescribed Seroquel 100mg QHS by PM in [place] - PM in [place] will not fill medication   - eRx sent - f/u in 3months   - forms for disability placard filled out     Chronic pain syndrome  Intervertebral disc disorder with radiculopathy of lumbosacral region  Postlaminectomy syndrome, lumbar region  Lumbar radiculopathy    Other orders  -     Cancel: Hepatitis C antibody; Future  -     Cancel: Diabetic foot exam; Future  -     Cancel: Ambulatory referral for colonoscopy; Future  -     Cancel: influenza vaccine, quadrivalent, recombinant, PF, 0 5 mL, for patients 18 yr+ (FLUBLOK)          Subjective:    Patient ID: Hortencia Thompson is a 61 y o  male  HPI  65yo M presents to the office to re-establish care and for medication RF via [employer]  - just moved back to [place] from [facility]   - re-established with PM - Dr Monica Montano and had an OV on 1/11/2021  - was prescribed Seroquel 100mg QHS by PM in [place] - PM in [place] will not fill medication and advised that pt follow with his PCP   - needs forms for disability placard filled out       The following portions of the patient's history were reviewed and updated as appropriate: allergies, current medications, past family history, past medical history, past social history, past surgical history and problem list     Review of Systems  as per HPI    Objective:  Ht 5' 11" (1 803 m)   Wt 99 8 kg (220 lb)   BMI 30 68 kg/m²    Physical Exam  Constitutional:       General: He is not in acute distress  Appearance: He is obese  He is not ill-appearing, toxic-appearing or diaphoretic  HENT:      Head: Normocephalic and atraumatic        Right Ear: External ear normal       Left Ear: External ear normal  Eyes:      General: No scleral icterus  Right eye: No discharge  Left eye: No discharge  Extraocular Movements: Extraocular movements intact  Conjunctiva/sclera: Conjunctivae normal    Neck:      Musculoskeletal: Normal range of motion  Cardiovascular:      Rate and Rhythm: Normal rate and regular rhythm  Heart sounds: Normal heart sounds  No murmur  No friction rub  No gallop  Pulmonary:      Effort: Pulmonary effort is normal  No respiratory distress  Breath sounds: Normal breath sounds  No stridor  No wheezing, rhonchi or rales  Abdominal:      General: Bowel sounds are normal  There is no distension  Palpations: Abdomen is soft  There is no mass  Tenderness: There is no abdominal tenderness  There is no guarding or rebound  Musculoskeletal:      Right lower leg: No edema  Left lower leg: No edema  Neurological:      General: No focal deficit present  Mental Status: He is alert and oriented to person, place, and time     Psychiatric:         Mood and Affect: Mood normal          Behavior: Behavior normal

## 2021-01-25 DIAGNOSIS — R80.9 PROTEINURIA, UNSPECIFIED TYPE: ICD-10-CM

## 2021-01-25 DIAGNOSIS — E11.41 DIABETIC MONONEUROPATHY ASSOCIATED WITH TYPE 2 DIABETES MELLITUS (HCC): ICD-10-CM

## 2021-01-25 DIAGNOSIS — E11.9 TYPE 2 DIABETES MELLITUS WITHOUT COMPLICATION, WITHOUT LONG-TERM CURRENT USE OF INSULIN (HCC): ICD-10-CM

## 2021-01-27 DIAGNOSIS — E11.41 DIABETIC MONONEUROPATHY ASSOCIATED WITH TYPE 2 DIABETES MELLITUS (HCC): ICD-10-CM

## 2021-01-27 PROCEDURE — 4010F ACE/ARB THERAPY RXD/TAKEN: CPT | Performed by: FAMILY MEDICINE

## 2021-01-27 RX ORDER — GABAPENTIN 300 MG/1
CAPSULE ORAL
Qty: 180 CAPSULE | Refills: 0 | Status: SHIPPED | OUTPATIENT
Start: 2021-01-27 | End: 2021-07-22

## 2021-01-27 RX ORDER — GABAPENTIN 100 MG/1
CAPSULE ORAL
Qty: 540 CAPSULE | Refills: 0 | Status: SHIPPED | OUTPATIENT
Start: 2021-01-27 | End: 2021-01-27

## 2021-01-27 RX ORDER — LISINOPRIL 2.5 MG/1
2.5 TABLET ORAL DAILY
Qty: 90 TABLET | Refills: 0 | Status: SHIPPED | OUTPATIENT
Start: 2021-01-27 | End: 2021-05-17 | Stop reason: SDUPTHER

## 2021-02-05 DIAGNOSIS — G47.00 INSOMNIA, UNSPECIFIED TYPE: ICD-10-CM

## 2021-02-05 DIAGNOSIS — K21.9 GASTROESOPHAGEAL REFLUX DISEASE WITHOUT ESOPHAGITIS: ICD-10-CM

## 2021-02-05 DIAGNOSIS — E11.69 TYPE 2 DIABETES MELLITUS WITH OTHER SPECIFIED COMPLICATION, WITHOUT LONG-TERM CURRENT USE OF INSULIN (HCC): ICD-10-CM

## 2021-02-05 DIAGNOSIS — J30.2 SEASONAL ALLERGIC RHINITIS, UNSPECIFIED TRIGGER: ICD-10-CM

## 2021-02-05 RX ORDER — TRAZODONE HYDROCHLORIDE 50 MG/1
TABLET ORAL
Qty: 90 TABLET | Refills: 0 | Status: SHIPPED | OUTPATIENT
Start: 2021-02-05 | End: 2021-04-21 | Stop reason: SDUPTHER

## 2021-02-05 RX ORDER — FLUTICASONE PROPIONATE 50 MCG
SPRAY, SUSPENSION (ML) NASAL
Qty: 48 G | Refills: 2 | Status: SHIPPED | OUTPATIENT
Start: 2021-02-05 | End: 2021-04-21 | Stop reason: SDUPTHER

## 2021-02-05 RX ORDER — OMEPRAZOLE 20 MG/1
CAPSULE, DELAYED RELEASE ORAL
Qty: 90 CAPSULE | Refills: 0 | Status: SHIPPED | OUTPATIENT
Start: 2021-02-05 | End: 2021-05-17 | Stop reason: SDUPTHER

## 2021-02-09 DIAGNOSIS — E78.2 MIXED HYPERLIPIDEMIA: ICD-10-CM

## 2021-02-09 DIAGNOSIS — G89.29 CHRONIC BILATERAL LOW BACK PAIN WITH RIGHT-SIDED SCIATICA: ICD-10-CM

## 2021-02-09 DIAGNOSIS — G89.4 CHRONIC PAIN SYNDROME: ICD-10-CM

## 2021-02-09 DIAGNOSIS — M51.17 INTERVERTEBRAL DISC DISORDER WITH RADICULOPATHY OF LUMBOSACRAL REGION: ICD-10-CM

## 2021-02-09 DIAGNOSIS — M54.41 CHRONIC BILATERAL LOW BACK PAIN WITH RIGHT-SIDED SCIATICA: ICD-10-CM

## 2021-02-09 DIAGNOSIS — M96.1 POSTLAMINECTOMY SYNDROME, LUMBAR REGION: ICD-10-CM

## 2021-02-09 RX ORDER — ATORVASTATIN CALCIUM 20 MG/1
20 TABLET, FILM COATED ORAL DAILY
Qty: 90 TABLET | Refills: 0 | Status: SHIPPED | OUTPATIENT
Start: 2021-02-09 | End: 2021-04-09

## 2021-02-09 RX ORDER — GLIPIZIDE 10 MG/1
TABLET, FILM COATED, EXTENDED RELEASE ORAL
Qty: 90 TABLET | Refills: 0 | Status: SHIPPED | OUTPATIENT
Start: 2021-02-09 | End: 2021-05-17 | Stop reason: SDUPTHER

## 2021-02-10 RX ORDER — OXYCODONE AND ACETAMINOPHEN 7.5; 325 MG/1; MG/1
TABLET ORAL
Qty: 90 TABLET | OUTPATIENT
Start: 2021-02-10

## 2021-02-11 ENCOUNTER — OFFICE VISIT (OUTPATIENT)
Dept: PAIN MEDICINE | Facility: CLINIC | Age: 61
End: 2021-02-11
Payer: COMMERCIAL

## 2021-02-11 VITALS
TEMPERATURE: 96.9 F | SYSTOLIC BLOOD PRESSURE: 133 MMHG | DIASTOLIC BLOOD PRESSURE: 71 MMHG | HEART RATE: 84 BPM | BODY MASS INDEX: 30.8 KG/M2 | WEIGHT: 220 LBS | HEIGHT: 71 IN

## 2021-02-11 DIAGNOSIS — M96.1 POSTLAMINECTOMY SYNDROME, LUMBAR REGION: ICD-10-CM

## 2021-02-11 DIAGNOSIS — G89.29 CHRONIC BILATERAL LOW BACK PAIN WITH RIGHT-SIDED SCIATICA: ICD-10-CM

## 2021-02-11 DIAGNOSIS — M54.41 CHRONIC BILATERAL LOW BACK PAIN WITH RIGHT-SIDED SCIATICA: ICD-10-CM

## 2021-02-11 DIAGNOSIS — G89.4 CHRONIC PAIN SYNDROME: Primary | ICD-10-CM

## 2021-02-11 DIAGNOSIS — M51.17 INTERVERTEBRAL DISC DISORDER WITH RADICULOPATHY OF LUMBOSACRAL REGION: ICD-10-CM

## 2021-02-11 PROCEDURE — 99214 OFFICE O/P EST MOD 30 MIN: CPT | Performed by: ANESTHESIOLOGY

## 2021-02-11 PROCEDURE — 4004F PT TOBACCO SCREEN RCVD TLK: CPT | Performed by: ANESTHESIOLOGY

## 2021-02-11 RX ORDER — GABAPENTIN 100 MG/1
CAPSULE ORAL
COMMUNITY
Start: 2021-01-27 | End: 2021-04-19 | Stop reason: ALTCHOICE

## 2021-02-11 RX ORDER — OXYCODONE AND ACETAMINOPHEN 7.5; 325 MG/1; MG/1
1 TABLET ORAL 3 TIMES DAILY PRN
Qty: 90 TABLET | Refills: 0 | Status: SHIPPED | OUTPATIENT
Start: 2021-02-14 | End: 2021-03-16

## 2021-02-11 RX ORDER — OXYCODONE AND ACETAMINOPHEN 7.5; 325 MG/1; MG/1
1 TABLET ORAL 3 TIMES DAILY PRN
Qty: 90 TABLET | Refills: 0 | Status: SHIPPED | OUTPATIENT
Start: 2021-03-16 | End: 2021-04-01 | Stop reason: SDUPTHER

## 2021-02-11 NOTE — PROGRESS NOTES
Pain Medicine Follow-Up Note    Assessment:  1  Chronic pain syndrome    2  Chronic bilateral low back pain with right-sided sciatica    3  Intervertebral disc disorder with radiculopathy of lumbosacral region    4  Postlaminectomy syndrome, lumbar region        Plan:  New Medications Ordered This Visit   Medications    gabapentin (NEURONTIN) 100 mg capsule    oxyCODONE-acetaminophen (PERCOCET) 7 5-325 MG per tablet     Sig: Take 1 tablet by mouth 3 (three) times a day as needed for severe painMax Daily Amount: 3 tablets     Dispense:  90 tablet     Refill:  0    oxyCODONE-acetaminophen (PERCOCET) 7 5-325 MG per tablet     Sig: Take 1 tablet by mouth 3 (three) times a day as needed for severe painMax Daily Amount: 3 tablets     Dispense:  90 tablet     Refill:  0     My impressions and treatment recommendations were discussed in detail with the patient who verbalized understanding and had no further questions  Given that the patient reports overall reduced pain and improved level functioning without significant side effects, I felt a reasonable to continue the patient on oxycodone/acetaminophen 7 5/325 mg 1 tablet up to 3 times daily as needed for pain  I sent E prescriptions to the pharmacy dated February 14, 2021 and March 16, 2021  The patient will also continue etodolac 300 mg up to 3 times daily as needed for pain as well as lidocaine 5% patches 2 patches applied topically daily  He does not require the etodolac or lidocaine at today's visit  The risks and side effects of chronic opioid treatment were discussed in detail with the patient  Side effects include but are not limited to nausea, vomiting, GI intolerance, sedation, constipation, mental clouding, opioid-induced hyperalgesia, endocrine dysfunction, addiction, dependence, and tolerance   The patient was asked to take his medications only as prescribed and directed, never in excess, and never for any other reason other than for pain control  The patient was also asked to keep his medications out of the reach of others and away from children, preferably in a locked drawer  The patient verbalized understanding and wished to use these opioid medications  [place] Prescription Drug Monitoring Program report was reviewed and was appropriate     Follow-up is planned in 2 months time or sooner as warranted  Discharge instructions were provided  I personally saw and examined the patient and I agree with the above discussed plan of care  History of Present Illness:    Matteo Arreaga is a 61 y o  male who presents to [facility] for interval re-evaluation of the above stated pain complaints  The patient has a past medical and chronic pain history as outlined in the assessment section  He was last seen on  January 11, 2021 at which time he was maintained on oxycodone /acetaminophen 7 5/325 mg 1 tablet up to 3 times daily as needed for pain , etodolac 300 mg up to 3 times daily as needed for pain, with food, and lidocaine 5% patch 2 patches applied topically daily  At today's office visit, the patient's pain score is 4/10 on the verbal numerical pain rating scale  The patient states that his pain is primarily in his low back and right lower extremity  He describes pain as worse in the morning, evening, and night  His pain is constant in nature  He reports the quality of his pain as throbbing, cramping, shooting, numbness  He is reporting 25% relief of symptoms with the combination of his medications  He denies opioid induced constipation currently  Other than as stated above, the patient denies any interval changes in medications, medical condition, mental condition, symptoms, or allergies since the last office visit  Review of Systems:    Review of Systems   Respiratory: Negative for shortness of breath  Cardiovascular: Negative for chest pain     Gastrointestinal: Negative for constipation, diarrhea, nausea and vomiting  Musculoskeletal: Positive for back pain and gait problem  Negative for arthralgias, joint swelling and myalgias  Decreased ROM  Joint stiffness   Skin: Negative for rash  Neurological: Negative for dizziness, seizures and weakness  All other systems reviewed and are negative  Patient Active Problem List   Diagnosis    Heartburn    Erectile dysfunction of non-organic origin    DM type 2 (diabetes mellitus, type 2) (HCC)    Chronic low back pain    BPH with urinary obstruction    Blood pressure elevated without history of HTN    Sciatica    Myofascial pain syndrome    Lumbar spondylosis    Herniation of lumbar intervertebral disc with radiculopathy    Acute post-operative pain    Insomnia    Chronic pain syndrome    Low back pain    Class 1 obesity due to excess calories with serious comorbidity and body mass index (BMI) of 30 0 to 30 9 in adult    Smoker    Seasonal allergic rhinitis    Diabetic mononeuropathy associated with type 2 diabetes mellitus (HCC)    Postlaminectomy syndrome, lumbar region    Numbness and tingling in both hands    Intervertebral disc disorder with radiculopathy of lumbosacral region    Acute bronchitis    Mixed hyperlipidemia    Proteinuria       Past Medical History:   Diagnosis Date    Arthritis     BPH (benign prostatic hypertrophy) with urinary obstruction     Chronic pain disorder     Ear infection     Herniation of lumbar intervertebral disc with radiculopathy     Myofascial pain syndrome     Obesity     Sciatica        Past Surgical History:   Procedure Laterality Date    ABSCESS DRAINAGE      groin    BACK SURGERY      implant - resolved 2004    CAUDAL BLOCK N/A 10/26/2018    Procedure: Caudal Epidural Steroid Injection (32402);   Surgeon: Grace Whitley MD;  Location: [facility] MAIN OR;  Service: Pain Management     CAUDAL BLOCK N/A 11/30/2018    Procedure: Caudal Epidural Steroid Injection (10118); Surgeon: Marijean Lesches, MD;  Location: [facility] MAIN OR;  Service: Pain Management     COLONOSCOPY      EPIDURAL BLOCK INJECTION Right 5/10/2019    Procedure: L5 S1 transforaminal Epidural Steroid Injection ([id number];  Surgeon: Marijean Lesches, MD;  Location: [facility] MAIN OR;  Service: Pain Management     EPIDURAL BLOCK INJECTION Right 8/16/2019    Procedure: BLOCK / INJECTION EPIDURAL STEROID TRANSFORAMINAL;  Surgeon: Marijean Lesches, MD;  Location: [facility] MAIN OR;  Service: Pain Management     NERVE BLOCK Bilateral 4/26/2018    Procedure: B/L L3 L4 L5 S1 MBB #1 ([id number]); Surgeon: Marijean Lesches, MD;  Location: [facility] MAIN OR;  Service: Pain Management     NERVE BLOCK Bilateral 5/11/2018    Procedure: B/L L3 L4 L5 S1 Medial Branch Block #2;  Surgeon: Marijean Lesches, MD;  Location: [facility] MAIN OR;  Service: Pain Management     NOSE SURGERY      AK COLONOSCOPY FLX DX W/COLLJ SPEC WHEN PFRMD N/A 3/6/2017    Procedure: COLONOSCOPY;  Surgeon: Travis Owusu MD;  [facility]; Service: Gastroenterology    AK SURG IMPLNT NEUROELECT,EPIDURAL Left 10/3/2017    Procedure: PLACEMENT THORACIC FOR INSERTION DORSAL COLUMN SPINAL CORD STIMULATOR (DCS) WITH BUTTOCK IMPLANTABLE PULSE GENERATOR(IMPULSE); Surgeon: Vivian Linda MD;  Location:  MAIN OR;  Service: Neurosurgery    [facility] Right 5/18/2018    Procedure: Rt L3 L4 L5 S1 Radio Frequency Ablation ([id number],[id number]); Surgeon: Marijean Lesches, MD;  Location: [facility] MAIN OR;  Service: Pain Management     RADIOFREQUENCY ABLATION Left 6/1/2018    Procedure: Lt L3 L4 L5 S1 Radio Frequency Ablation ([id number]);   Surgeon: Marijean Lesches, MD;  Location: [facility] MAIN OR;  Service: Pain Management        Family History   Problem Relation Age of Onset    Diabetes Mother     Diabetes Father         mellitus     Heart attack Father 58    Hypertension Father        Social History     Occupational History    Occupation:  for distribution center    Tobacco Use    Smoking status: Current Every Day Smoker     Packs/day: 1 00     Years: 38 00     Pack years: 38 00    Smokeless tobacco: Never Used    Tobacco comment: encouraged smoking cessation - history of smoking 30 or more pack years    Substance and Sexual Activity    Alcohol use: No     Comment: rare    Drug use: No    Sexual activity: Not on file         Current Outpatient Medications:     atorvastatin (LIPITOR) 20 mg tablet, Take 1 tablet (20 mg total) by mouth daily, Disp: 90 tablet, Rfl: 0    etodolac (LODINE) 300 MG capsule, Take 1 capsule (300 mg total) by mouth 3 (three) times a day as needed (pain (with food)), Disp: 90 capsule, Rfl: 0    fluticasone (FLONASE) 50 mcg/act nasal spray, USE 2 SPRAYS INTO EACH NOSTRIL DAILY, Disp: 48 g, Rfl: 2    gabapentin (NEURONTIN) 100 mg capsule, , Disp: , Rfl:     gabapentin (NEURONTIN) 300 mg capsule, TAKE 1 CAPSULES THREE TIMES DAILY, Disp: 180 capsule, Rfl: 0    glipiZIDE (GLUCOTROL XL) 10 mg 24 hr tablet, TAKE 1 TABLET EVERY DAY, Disp: 90 tablet, Rfl: 0    lidocaine (LIDODERM) 5 %, Apply 2 patches topically daily Remove & Discard patch within 12 hours or as directed by MD, Disp: 60 patch, Rfl: 0    lisinopril (ZESTRIL) 2 5 mg tablet, Take 1 tablet (2 5 mg total) by mouth daily, Disp: 90 tablet, Rfl: 0    metFORMIN (GLUCOPHAGE) 1000 MG tablet, Take 1 tablet (1,000 mg total) by mouth 2 (two) times a day with meals, Disp: 180 tablet, Rfl: 0    omeprazole (PriLOSEC) 20 mg delayed release capsule, TAKE 1 CAPSULE EVERY DAY, Disp: 90 capsule, Rfl: 0    [START ON 2/14/2021] oxyCODONE-acetaminophen (PERCOCET) 7 5-325 MG per tablet, Take 1 tablet by mouth 3 (three) times a day as needed for severe painMax Daily Amount: 3 tablets, Disp: 90 tablet, Rfl: 0    [START ON 3/16/2021] oxyCODONE-acetaminophen (PERCOCET) 7 5-325 MG per tablet, Take 1 tablet by mouth 3 (three) times a day as needed for severe painMax Daily Amount: 3 tablets, Disp: 90 tablet, Rfl: 0    QUEtiapine (SEROquel) 100 mg tablet, Take 1 tablet (100 mg total) by mouth daily at bedtime, Disp: 90 tablet, Rfl: 0    tamsulosin (FLOMAX) 0 4 mg, TAKE 2 CAPSULES EVERY DAY WITH DINNER, Disp: 28 capsule, Rfl: 0    traZODone (DESYREL) 50 mg tablet, TAKE 1 TABLET EVERY DAY AT BEDTIME, Disp: 90 tablet, Rfl: 0    Allergies   Allergen Reactions    Penicillins Swelling       Physical Exam:    /71   Pulse 84   Temp (!) 96 9 °F (36 1 °C)   Ht 5' 11" (1 803 m)   Wt 99 8 kg (220 lb)   BMI 30 68 kg/m²     Constitutional:obese  Eyes:anicteric  HEENT:grossly intact  Neck:supple, symmetric, trachea midline and no masses   Pulmonary:even and unlabored  Cardiovascular:No edema or pitting edema present  Skin:Normal without rashes or lesions and well hydrated  Psychiatric:Mood and affect appropriate  Neurologic:Cranial Nerves II-XII grossly intact  Musculoskeletal:normal

## 2021-03-10 DIAGNOSIS — Z23 ENCOUNTER FOR IMMUNIZATION: ICD-10-CM

## 2021-03-16 ENCOUNTER — IMMUNIZATIONS (OUTPATIENT)
Dept: FAMILY MEDICINE CLINIC | Facility: HOSPITAL | Age: 61
End: 2021-03-16

## 2021-03-16 DIAGNOSIS — Z23 ENCOUNTER FOR IMMUNIZATION: Primary | ICD-10-CM

## 2021-03-16 PROCEDURE — 0011A SARS-COV-2 / COVID-19 MRNA VACCINE (MODERNA) 100 MCG: CPT

## 2021-03-16 PROCEDURE — 91301 SARS-COV-2 / COVID-19 MRNA VACCINE (MODERNA) 100 MCG: CPT

## 2021-04-01 ENCOUNTER — OFFICE VISIT (OUTPATIENT)
Dept: PAIN MEDICINE | Facility: CLINIC | Age: 61
End: 2021-04-01
Payer: OTHER MISCELLANEOUS

## 2021-04-01 VITALS
SYSTOLIC BLOOD PRESSURE: 145 MMHG | BODY MASS INDEX: 31.16 KG/M2 | WEIGHT: 222.6 LBS | DIASTOLIC BLOOD PRESSURE: 72 MMHG | HEIGHT: 71 IN | HEART RATE: 79 BPM

## 2021-04-01 DIAGNOSIS — M54.41 CHRONIC BILATERAL LOW BACK PAIN WITH RIGHT-SIDED SCIATICA: ICD-10-CM

## 2021-04-01 DIAGNOSIS — G89.4 CHRONIC PAIN SYNDROME: Primary | ICD-10-CM

## 2021-04-01 DIAGNOSIS — F11.20 UNCOMPLICATED OPIOID DEPENDENCE (HCC): ICD-10-CM

## 2021-04-01 DIAGNOSIS — G89.29 CHRONIC BILATERAL LOW BACK PAIN WITH RIGHT-SIDED SCIATICA: ICD-10-CM

## 2021-04-01 DIAGNOSIS — M96.1 POSTLAMINECTOMY SYNDROME, LUMBAR REGION: ICD-10-CM

## 2021-04-01 DIAGNOSIS — Z79.891 LONG-TERM CURRENT USE OF OPIATE ANALGESIC: ICD-10-CM

## 2021-04-01 DIAGNOSIS — M51.17 INTERVERTEBRAL DISC DISORDER WITH RADICULOPATHY OF LUMBOSACRAL REGION: ICD-10-CM

## 2021-04-01 PROCEDURE — 99214 OFFICE O/P EST MOD 30 MIN: CPT | Performed by: ANESTHESIOLOGY

## 2021-04-01 RX ORDER — OXYCODONE AND ACETAMINOPHEN 7.5; 325 MG/1; MG/1
1 TABLET ORAL 3 TIMES DAILY PRN
Qty: 90 TABLET | Refills: 0 | Status: SHIPPED | OUTPATIENT
Start: 2021-04-15 | End: 2021-05-15

## 2021-04-01 RX ORDER — OXYCODONE AND ACETAMINOPHEN 7.5; 325 MG/1; MG/1
1 TABLET ORAL 3 TIMES DAILY PRN
Qty: 90 TABLET | Refills: 0 | Status: SHIPPED | OUTPATIENT
Start: 2021-05-15 | End: 2021-04-21 | Stop reason: SDUPTHER

## 2021-04-01 NOTE — PROGRESS NOTES
Pain Medicine Follow-Up Note  Scribe Attestation    I,:  CORAZON Bentley am acting as a scribe while in the presence of the attending physician :       I,:  Marvel Ahn MD personally performed the services described in this documentation    as scribed in my presence :             Assessment:  1  Chronic pain syndrome    2  Long-term current use of opiate analgesic    3  Uncomplicated opioid dependence ([address] )    4  Chronic bilateral low back pain with right-sided sciatica    5  Intervertebral disc disorder with radiculopathy of lumbosacral region    6   Postlaminectomy syndrome, lumbar region        Plan:  Orders Placed This Encounter   Procedures    MM ALL_Prescribed Meds and Special Instructions    MM DT_Alprazolam Definitive Test    MM DT_Amphetamine Definitive Test    MM DT_Aripiprazole Definitive Test    MM DT_Bath Salts Definitive Test    MM DT_Buprenorphine Definitive Test    MM DT_Butalbital Definitive Test    MM DT_Clonazepam Definitive Test    MM DT_Clozapine Definitive Test    MM DT_Cocaine Definitive Test    MM DT_Codeine Definitive Test    MM DT_Desipramine Definitive Test    MM DT_Dextromethorphan Definitive Test    MM Diazepam Definitive Test    MM DT_Ethyl Glucuronide/Ethyl Sulfate Definitive Test    MM DT_Fentanyl Definitive Test    MM DT_Haloperidol Definitive Test    MM DT_Heroin Definitive Test    MM DT_Hydrocodone Definitive Test    MM DT_Hydromorphone Definitive Test    MM DT_Imipramine Definitive Test    MM DT_Kratom Definitive Test    MM DT_Levorphanol Definitive Test    MM DT_MDMA Definitive Test    MM Lorazepam Definitive Test    MM DT_Meperidine Definitive Test    MM DT_Methadone Definitive Test    MM DT_Methamphetamine Definitive Test    MM DT_Methylphenidate Definitive Test    MM DT_Morphine Definitive Test    MM DT_Oxazepam Definitive Test    MM DT_Oxycodone Definitive Test    MM DT_Oxymorphone Definitive Test    MM DT_Phencyclidine Definitive Test  MM DT_Phenobarbital Definitive Test    MM DT_Phentermine Definitive Test    MM DT_Secobarbital Definitive Test    MM DT_Spice Definitive Test    MM DT_Tapentadol Definitive Test    MM DT_Temazapam Definitive Test    MM DT_THC Definitive Test    MM DT_Tramadol Definitive Test    MM DT_Validity Oxidant    MM DT_Validity Creatinine    MM DT_Validity pH    MM DT_Validity Specific       New Medications Ordered This Visit   Medications    oxyCODONE-acetaminophen (PERCOCET) 7 5-325 MG per tablet     Sig: Take 1 tablet by mouth 3 (three) times a day as needed for severe painMax Daily Amount: 3 tablets     Dispense:  90 tablet     Refill:  0    oxyCODONE-acetaminophen (PERCOCET) 7 5-325 MG per tablet     Sig: Take 1 tablet by mouth 3 (three) times a day as needed for severe painMax Daily Amount: 3 tablets     Dispense:  90 tablet     Refill:  0       My impressions and treatment recommendations were discussed in detail with the patient who verbalized understanding and had no further questions  the patient reports overall reduced pain and improved level of functioning without significant side effects, therefore I feel it is reasonable to continue him on oxycodone /acetaminophen 7 5/325 mg 1 tablet up to 3 times daily as needed for pain  Prescriptions were sent to the pharmacy on file with fill dates of 04/15/2021 and 05/15/2021  He will follow up in the office by 06/14/2021  Patient also reports that lately he has been having stomach cramps however he denies constipation  He was told to see GI if symptoms persist   He states that he already has an appointment with his primary care provider and will start there  It was also suggested that he try smooth move tea or MiraLax to help keep him regular  Patient also reported that he has new left hand pain from an old crushing injury about 8 years ago   I offered to order an x-ray for him and to refer him to Orthopedics, however he decided to hold off for now and see if the pain worsens  [place] Prescription Drug Monitoring Program report was reviewed and was appropriate     There are risks associated with opioid medications, including dependence, addiction and tolerance  The patient understands and agrees to use these medications only as prescribed  Potential side effects of the medications include, but are not limited to, constipation, drowsiness, addiction, impaired judgment and risk of fatal overdose if not taken as prescribed  The patient was warned against driving while taking sedation medications  Sharing medications is a felony  At this point in time, the patient is showing no signs of addiction, abuse, diversion or suicidal ideation  Follow-up is planned in   Eight weeks time or sooner as warranted  Discharge instructions were provided  I personally saw and examined the patient and I agree with the above discussed plan of care  History of Present Illness:    Jeffery Mills is a 61 y o  male who presents to [facility] for interval re-evaluation of the above stated pain complaints  The patient has a past medical and chronic pain history as outlined in the assessment section  He was last seen on  02/11/2021  patient reports a pain score today of 5/10  He states the pain is constant but the severity varies  Sometimes it is a 7-8/10 at its worse  The quality of his pain is dull aching, sharp and shooting  He states that the pain is always the same and that his medication only helps him sleep  However when we discuss this in more detail he did admit to trying to not take the opioids at 1 point and his pain came back severely  He reports that lately he has been getting stomach cramps however he denies constipation  It was suggested that he sees GI, and he stated that he was going to start with his primary care doctor because he already had an appointment with them   Patient also reports new onset left hand pain that he feels as a result of an old crushing injury from about 8 years ago  He wants to hold off seeing any new doctors about the issue at this point  He will also hold off getting an x-ray, he wants to see if it gets better  Pain Contract Signed:    01/11/2021  Last Urine Drug Screen:   01/11/2021    Other than as stated above, the patient denies any interval changes in medications, medical condition, mental condition, symptoms, or allergies since the last office visit  Review of Systems:    Review of Systems   Respiratory: Negative for shortness of breath  Cardiovascular: Negative for chest pain  Gastrointestinal: Negative for constipation, diarrhea, nausea and vomiting  Musculoskeletal: Positive for back pain and gait problem  Negative for arthralgias, joint swelling and myalgias  Decreased ROM  Joint stiffness   Skin: Negative for rash  Neurological: Negative for dizziness, seizures and weakness  All other systems reviewed and are negative          Patient Active Problem List   Diagnosis    Heartburn    Erectile dysfunction of non-organic origin    DM type 2 (diabetes mellitus, type 2) (HCC)    Chronic low back pain    BPH with urinary obstruction    Blood pressure elevated without history of HTN    Sciatica    Myofascial pain syndrome    Lumbar spondylosis    Herniation of lumbar intervertebral disc with radiculopathy    Acute post-operative pain    Insomnia    Chronic pain syndrome    Low back pain    Class 1 obesity due to excess calories with serious comorbidity and body mass index (BMI) of 30 0 to 30 9 in adult    Smoker    Seasonal allergic rhinitis    Diabetic mononeuropathy associated with type 2 diabetes mellitus (HCC)    Postlaminectomy syndrome, lumbar region    Numbness and tingling in both hands    Intervertebral disc disorder with radiculopathy of lumbosacral region    Acute bronchitis    Mixed hyperlipidemia    Proteinuria       Past Medical History:   Diagnosis Date    Arthritis     BPH (benign prostatic hypertrophy) with urinary obstruction     Chronic pain disorder     Ear infection     Herniation of lumbar intervertebral disc with radiculopathy     Myofascial pain syndrome     Obesity     Sciatica        Past Surgical History:   Procedure Laterality Date    ABSCESS DRAINAGE      groin    BACK SURGERY      implant - resolved 2004    CAUDAL BLOCK N/A 10/26/2018    Procedure: Caudal Epidural Steroid Injection (21949); Surgeon: Raphael Brown MD;  Location: [facility] MAIN OR;  Service: Pain Management     CAUDAL BLOCK N/A 11/30/2018    Procedure: Caudal Epidural Steroid Injection (68317); Surgeon: Raphael Brown MD;  Location: [facility] MAIN OR;  Service: Pain Management     COLONOSCOPY      EPIDURAL BLOCK INJECTION Right 5/10/2019    Procedure: L5 S1 transforaminal Epidural Steroid Injection ([id number];  Surgeon: Raphael Brown MD;  Location: [place] [address] MAIN OR;  Service: Pain Management     EPIDURAL BLOCK INJECTION Right 8/16/2019    Procedure: BLOCK / INJECTION EPIDURAL STEROID TRANSFORAMINAL;  Surgeon: Raphael Brown MD;  Location: [place] [address] MAIN OR;  Service: Pain Management     NERVE BLOCK Bilateral 4/26/2018    Procedure: B/L L3 L4 L5 S1 MBB #1 ([id number]); Surgeon: Raphael Brown MD;  Location: [facility] MAIN OR;  Service: Pain Management     NERVE BLOCK Bilateral 5/11/2018    Procedure: B/L L3 L4 L5 S1 Medial Branch Block #2;  Surgeon: Raphael Brown MD;  Location: [place] [address] MAIN OR;  Service: Pain Management     NOSE SURGERY      MN COLONOSCOPY FLX DX W/COLLJ SPEC WHEN PFRMD N/A 3/6/2017    Procedure: COLONOSCOPY;  Surgeon: Adiel Simmons MD;  [facility]: [facility]; Service: Gastroenterology    MN SURG IMPLNT NEUROELECT,EPIDURAL Left 10/3/2017    Procedure: PLACEMENT THORACIC FOR INSERTION DORSAL COLUMN SPINAL CORD STIMULATOR (DCS) WITH BUTTOCK IMPLANTABLE PULSE GENERATOR(IMPULSE);   Surgeon: Yi Vicente MD; Location:  MAIN OR;  Service: Neurosurgery    RADIOFREQUENCY ABLATION Right 5/18/2018    Procedure: Rt L3 L4 L5 S1 Radio Frequency Ablation ([id number],[id number]); Surgeon: Gissel Pulido MD;  Location: [facility] MAIN OR;  Service: Pain Management     RADIOFREQUENCY ABLATION Left 6/1/2018    Procedure: Lt L3 L4 L5 S1 Radio Frequency Ablation ([id number]);   Surgeon: Gissel Pulido MD;  Location: [facility] MAIN OR;  Service: Pain Management        Family History   Problem Relation Age of Onset    Diabetes Mother     Diabetes Father         mellitus     Heart attack Father 58    Hypertension Father        Social History     Occupational History    Occupation:  for [employer]    Tobacco Use    Smoking status: Current Every Day Smoker     Packs/day: 1 00     Years: 38 00     Pack years: 38 00    Smokeless tobacco: Never Used    Tobacco comment: encouraged smoking cessation - history of smoking 30 or more pack years    Substance and Sexual Activity    Alcohol use: No     Comment: rare    Drug use: No    Sexual activity: Not on file         Current Outpatient Medications:     atorvastatin (LIPITOR) 20 mg tablet, Take 1 tablet (20 mg total) by mouth daily, Disp: 90 tablet, Rfl: 0    etodolac (LODINE) 300 MG capsule, Take 1 capsule (300 mg total) by mouth 3 (three) times a day as needed (pain (with food)), Disp: 90 capsule, Rfl: 0    fluticasone (FLONASE) 50 mcg/act nasal spray, USE 2 SPRAYS INTO EACH NOSTRIL DAILY, Disp: 48 g, Rfl: 2    gabapentin (NEURONTIN) 100 mg capsule, , Disp: , Rfl:     gabapentin (NEURONTIN) 300 mg capsule, TAKE 1 CAPSULES THREE TIMES DAILY, Disp: 180 capsule, Rfl: 0    glipiZIDE (GLUCOTROL XL) 10 mg 24 hr tablet, TAKE 1 TABLET EVERY DAY, Disp: 90 tablet, Rfl: 0    lidocaine (LIDODERM) 5 %, Apply 2 patches topically daily Remove & Discard patch within 12 hours or as directed by MD, Disp: 60 patch, Rfl: 0    lisinopril (ZESTRIL) 2 5 mg tablet, Take 1 tablet (2 5 mg total) by mouth daily, Disp: 90 tablet, Rfl: 0    metFORMIN (GLUCOPHAGE) 1000 MG tablet, Take 1 tablet (1,000 mg total) by mouth 2 (two) times a day with meals, Disp: 180 tablet, Rfl: 0    omeprazole (PriLOSEC) 20 mg delayed release capsule, TAKE 1 CAPSULE EVERY DAY, Disp: 90 capsule, Rfl: 0    [START ON 4/15/2021] oxyCODONE-acetaminophen (PERCOCET) 7 5-325 MG per tablet, Take 1 tablet by mouth 3 (three) times a day as needed for severe painMax Daily Amount: 3 tablets, Disp: 90 tablet, Rfl: 0    [START ON 5/15/2021] oxyCODONE-acetaminophen (PERCOCET) 7 5-325 MG per tablet, Take 1 tablet by mouth 3 (three) times a day as needed for severe painMax Daily Amount: 3 tablets, Disp: 90 tablet, Rfl: 0    QUEtiapine (SEROquel) 100 mg tablet, Take 1 tablet (100 mg total) by mouth daily at bedtime, Disp: 90 tablet, Rfl: 0    tamsulosin (FLOMAX) 0 4 mg, TAKE 2 CAPSULES EVERY DAY WITH DINNER, Disp: 28 capsule, Rfl: 0    traZODone (DESYREL) 50 mg tablet, TAKE 1 TABLET EVERY DAY AT BEDTIME, Disp: 90 tablet, Rfl: 0    Allergies   Allergen Reactions    Penicillins Swelling       Physical Exam:    /72   Pulse 79   Ht 5' 11" (1 803 m)   Wt 101 kg (222 lb 9 6 oz)   BMI 31 05 kg/m²     Constitutional:normal, well developed, well nourished, alert, in no distress and non-toxic and no overt pain behavior    Eyes:anicteric  HEENT:grossly intact  Neck:supple, symmetric, trachea midline and no masses   Pulmonary:even and unlabored  Cardiovascular:No edema or pitting edema present  Skin:Normal without rashes or lesions and well hydrated  Psychiatric:Mood and affect appropriate  Neurologic:Cranial Nerves II-XII grossly intact  Musculoskeletal:normal      Orders Placed This Encounter   Procedures    MM ALL_Prescribed Meds and Special Instructions    MM DT_Alprazolam Definitive Test    MM DT_Amphetamine Definitive Test    MM DT_Aripiprazole Definitive Test    MM DT_Bath Salts Definitive Test    MM DT_Buprenorphine Definitive Test  MM DT_Butalbital Definitive Test    MM DT_Clonazepam Definitive Test    MM DT_Clozapine Definitive Test    MM DT_Cocaine Definitive Test    MM DT_Codeine Definitive Test    MM DT_Desipramine Definitive Test    MM DT_Dextromethorphan Definitive Test    MM Diazepam Definitive Test    MM DT_Ethyl Glucuronide/Ethyl Sulfate Definitive Test    MM DT_Fentanyl Definitive Test    MM DT_Haloperidol Definitive Test    MM DT_Heroin Definitive Test    MM DT_Hydrocodone Definitive Test    MM DT_Hydromorphone Definitive Test    MM DT_Imipramine Definitive Test    MM DT_Kratom Definitive Test    MM DT_Levorphanol Definitive Test    MM DT_MDMA Definitive Test    MM Lorazepam Definitive Test    MM DT_Meperidine Definitive Test    MM DT_Methadone Definitive Test    MM DT_Methamphetamine Definitive Test    MM DT_Methylphenidate Definitive Test    MM DT_Morphine Definitive Test    MM DT_Oxazepam Definitive Test    MM DT_Oxycodone Definitive Test    MM DT_Oxymorphone Definitive Test    MM DT_Phencyclidine Definitive Test    MM DT_Phenobarbital Definitive Test    MM DT_Phentermine Definitive Test    MM DT_Secobarbital Definitive Test    MM DT_Spice Definitive Test    MM DT_Tapentadol Definitive Test    MM DT_Temazapam Definitive Test    MM DT_THC Definitive Test    MM DT_Tramadol Definitive Test    MM DT_Validity Oxidant    MM DT_Validity Creatinine    MM DT_Validity pH    MM DT_Validity Specific

## 2021-04-03 LAB
6MAM UR QL CFM: NEGATIVE NG/ML
7AMINOCLONAZEPAM UR QL CFM: NEGATIVE NG/ML
A-OH ALPRAZ UR QL CFM: NEGATIVE NG/ML
ACCEPTABLE CREAT UR QL: NORMAL MG/DL
ACCEPTIBLE SP GR UR QL: NORMAL
AMPHET UR QL CFM: NEGATIVE NG/ML
AMPHET UR QL CFM: NEGATIVE NG/ML
BUPRENORPHINE UR QL CFM: NEGATIVE NG/ML
BUTALBITAL UR QL CFM: NEGATIVE NG/ML
BZE UR QL CFM: NEGATIVE NG/ML
CODEINE UR QL CFM: NEGATIVE NG/ML
DESIPRAMINE UR QL CFM: NEGATIVE NG/ML
DESIPRAMINE UR QL CFM: NEGATIVE NG/ML
EDDP UR QL CFM: NEGATIVE NG/ML
ETHYL GLUCURONIDE UR QL CFM: NEGATIVE NG/ML
ETHYL SULFATE UR QL SCN: NEGATIVE NG/ML
FENTANYL UR QL CFM: NEGATIVE NG/ML
GLIADIN IGG SER IA-ACNC: NEGATIVE NG/ML
GLUCOSE 30M P 50 G LAC PO SERPL-MCNC: NEGATIVE NG/ML
HYDROCODONE UR QL CFM: NEGATIVE NG/ML
HYDROCODONE UR QL CFM: NEGATIVE NG/ML
HYDROMORPHONE UR QL CFM: NEGATIVE NG/ML
IMIPRAMINE UR QL CFM: NEGATIVE NG/ML
LORAZEPAM UR QL CFM: NEGATIVE NG/ML
MDMA UR QL CFM: NEGATIVE NG/ML
ME-PHENIDATE UR QL CFM: NEGATIVE NG/ML
MEPERIDINE UR QL CFM: NEGATIVE NG/ML
MEPHEDRONE UR QL CFM: NEGATIVE NG/ML
METHADONE UR QL CFM: NEGATIVE NG/ML
METHAMPHET UR QL CFM: NEGATIVE NG/ML
MORPHINE UR QL CFM: NEGATIVE NG/ML
MORPHINE UR QL CFM: NEGATIVE NG/ML
NITRITE UR QL: NORMAL UG/ML
NORBUPRENORPHINE UR QL CFM: NEGATIVE NG/ML
NORDIAZEPAM UR QL CFM: NEGATIVE NG/ML
NORFENTANYL UR QL CFM: NEGATIVE NG/ML
NORHYDROCODONE UR QL CFM: NEGATIVE NG/ML
NORHYDROCODONE UR QL CFM: NEGATIVE NG/ML
NORMEPERIDINE UR QL CFM: NEGATIVE NG/ML
NOROXYCODONE UR QL CFM: NORMAL NG/ML
OPC-3373 QUANTIFICATION: NEGATIVE
OXAZEPAM UR QL CFM: NEGATIVE NG/ML
OXYCODONE UR QL CFM: NORMAL NG/ML
OXYMORPHONE UR QL CFM: NEGATIVE NG/ML
OXYMORPHONE UR QL CFM: NEGATIVE NG/ML
PCP UR QL CFM: NEGATIVE NG/ML
PHENOBARB UR QL CFM: NEGATIVE NG/ML
RESULT ALL_PRESCRIBED MEDS AND SPECIAL INSTRUCTIONS: NORMAL
SECOBARBITAL UR QL CFM: NEGATIVE NG/ML
SL AMB 3-METHYL-FENTANYL QUANTIFICATION: NORMAL NG/ML
SL AMB 4-ANPP QUANTIFICATION: NORMAL NG/ML
SL AMB 4-FIBF QUANTIFICATION: NORMAL NG/ML
SL AMB 5F-ADB-M7 METABOLITE QUANTIFICATION: NEGATIVE NG/ML
SL AMB 7-OH-MITRAGYNINE (KRATOM ALKALOID) QUANTIFICATION: NEGATIVE NG/ML
SL AMB AB-FUBINACA-M3 METABOLITE QUANTIFICATION: NEGATIVE NG/ML
SL AMB ACETYL FENTANYL QUANTIFICATION: NORMAL NG/ML
SL AMB ACETYL NORFENTANYL QUANTIFICATION: NORMAL NG/ML
SL AMB ACRYL FENTANYL QUANTIFICATION: NORMAL NG/ML
SL AMB BATH SALTS: NEGATIVE NG/ML
SL AMB BUTRYL FENTANYL QUANTIFICATION: NORMAL NG/ML
SL AMB CARFENTANIL QUANTIFICATION: NORMAL NG/ML
SL AMB CLOZAPINE QUANTIFICATION: NEGATIVE NG/ML
SL AMB CTHC (MARIJUANA METABOLITE) QUANTIFICATION: NEGATIVE NG/ML
SL AMB CYCLOPROPYL FENTANYL QUANTIFICATION: NORMAL NG/ML
SL AMB DEXTROMETHORPHAN QUANTIFICATION: NEGATIVE NG/ML
SL AMB DEXTRORPHAN (DEXTROMETHORPHAN METABOLITE) QUANT: NEGATIVE NG/ML
SL AMB DEXTRORPHAN (DEXTROMETHORPHAN METABOLITE) QUANT: NEGATIVE NG/ML
SL AMB FURANYL FENTANYL QUANTIFICATION: NORMAL NG/ML
SL AMB HALOPERIDOL  QUANTIFICATION: NEGATIVE NG/ML
SL AMB HALOPERIDOL METABOLITE QUANTIFICATION: NEGATIVE NG/ML
SL AMB JWH018 METABOLITE QUANTIFICATION: NEGATIVE NG/ML
SL AMB JWH073 METABOLITE QUANTIFICATION: NEGATIVE NG/ML
SL AMB MDMB-FUBINACA-M1 METABOLITE QUANTIFICATION: NEGATIVE NG/ML
SL AMB METHOXYACETYL FENTANYL QUANTIFICATION: NORMAL NG/ML
SL AMB METHYLONE QUANTIFICATION: NEGATIVE NG/ML
SL AMB N-DESMETHYL U-47700 QUANTIFICATION: NORMAL NG/ML
SL AMB N-DESMETHYL-TRAMADOL QUANTIFICATION: NEGATIVE NG/ML
SL AMB N-DESMETHYLCLOZAPINE QUANTIFICATION: NEGATIVE NG/ML
SL AMB PHENTERMINE QUANTIFICATION: NEGATIVE NG/ML
SL AMB RCS4 METABOLITE QUANTIFICATION: NEGATIVE NG/ML
SL AMB RITALINIC ACID QUANTIFICATION: NEGATIVE NG/ML
SL AMB U-47700 QUANTIFICATION: NORMAL NG/ML
SPECIMEN DRAWN SERPL: NEGATIVE NG/ML
SPECIMEN PH ACCEPTABLE UR: NORMAL
TAPENTADOL UR QL CFM: NEGATIVE NG/ML
TEMAZEPAM UR QL CFM: NEGATIVE NG/ML
TEMAZEPAM UR QL CFM: NEGATIVE NG/ML
TRAMADOL UR QL CFM: NEGATIVE NG/ML
URATE/CREAT 24H UR: NEGATIVE NG/ML

## 2021-04-08 DIAGNOSIS — E78.2 MIXED HYPERLIPIDEMIA: ICD-10-CM

## 2021-04-09 RX ORDER — ATORVASTATIN CALCIUM 20 MG/1
20 TABLET, FILM COATED ORAL DAILY
Qty: 90 TABLET | Refills: 0 | Status: SHIPPED | OUTPATIENT
Start: 2021-04-09 | End: 2021-07-22

## 2021-04-12 ENCOUNTER — IMMUNIZATIONS (OUTPATIENT)
Dept: FAMILY MEDICINE CLINIC | Facility: HOSPITAL | Age: 61
End: 2021-04-12
Payer: COMMERCIAL

## 2021-04-12 ENCOUNTER — APPOINTMENT (OUTPATIENT)
Dept: LAB | Facility: HOSPITAL | Age: 61
End: 2021-04-12
Payer: COMMERCIAL

## 2021-04-12 ENCOUNTER — APPOINTMENT (OUTPATIENT)
Dept: ULTRASOUND IMAGING | Facility: HOSPITAL | Age: 61
End: 2021-04-12
Payer: COMMERCIAL

## 2021-04-12 DIAGNOSIS — Z23 ENCOUNTER FOR IMMUNIZATION: Primary | ICD-10-CM

## 2021-04-12 DIAGNOSIS — F17.200 SMOKER: ICD-10-CM

## 2021-04-12 DIAGNOSIS — R80.9 PROTEINURIA, UNSPECIFIED TYPE: ICD-10-CM

## 2021-04-12 DIAGNOSIS — E78.2 MIXED HYPERLIPIDEMIA: ICD-10-CM

## 2021-04-12 DIAGNOSIS — E11.41 DIABETIC MONONEUROPATHY ASSOCIATED WITH TYPE 2 DIABETES MELLITUS (HCC): ICD-10-CM

## 2021-04-12 DIAGNOSIS — Z11.59 NEED FOR HEPATITIS C SCREENING TEST: ICD-10-CM

## 2021-04-12 LAB
ALBUMIN SERPL BCP-MCNC: 4.7 G/DL (ref 3.4–4.8)
ALP SERPL-CCNC: 69.8 U/L (ref 10–129)
ALT SERPL W P-5'-P-CCNC: 40 U/L (ref 5–63)
ANION GAP SERPL CALCULATED.3IONS-SCNC: 7 MMOL/L (ref 4–13)
AST SERPL W P-5'-P-CCNC: 22 U/L (ref 15–41)
BASOPHILS # BLD AUTO: 0.05 THOUSANDS/ΜL (ref 0–0.1)
BASOPHILS NFR BLD AUTO: 1 % (ref 0–1)
BILIRUB SERPL-MCNC: 0.43 MG/DL (ref 0.3–1.2)
BUN SERPL-MCNC: 13 MG/DL (ref 6–20)
CALCIUM SERPL-MCNC: 9.8 MG/DL (ref 8.4–10.2)
CHLORIDE SERPL-SCNC: 102 MMOL/L (ref 96–108)
CHOLEST SERPL-MCNC: 119 MG/DL
CO2 SERPL-SCNC: 26 MMOL/L (ref 22–33)
CREAT SERPL-MCNC: 0.76 MG/DL (ref 0.5–1.2)
CREAT UR-MCNC: 51.3 MG/DL
EOSINOPHIL # BLD AUTO: 0.21 THOUSAND/ΜL (ref 0–0.61)
EOSINOPHIL NFR BLD AUTO: 2 % (ref 0–6)
ERYTHROCYTE [DISTWIDTH] IN BLOOD BY AUTOMATED COUNT: 13.6 % (ref 11.6–15.1)
EST. AVERAGE GLUCOSE BLD GHB EST-MCNC: 126 MG/DL
GFR SERPL CREATININE-BSD FRML MDRD: 99 ML/MIN/1.73SQ M
GLUCOSE P FAST SERPL-MCNC: 147 MG/DL (ref 70–105)
HBA1C MFR BLD: 6 %
HCT VFR BLD AUTO: 47.8 % (ref 36.5–49.3)
HCV AB SER QL: NORMAL
HDLC SERPL-MCNC: 39 MG/DL
HGB BLD-MCNC: 15.8 G/DL (ref 12–17)
IMM GRANULOCYTES # BLD AUTO: 0.02 THOUSAND/UL (ref 0–0.2)
IMM GRANULOCYTES NFR BLD AUTO: 0 % (ref 0–2)
LDLC SERPL CALC-MCNC: 68 MG/DL (ref 0–100)
LYMPHOCYTES # BLD AUTO: 2.98 THOUSANDS/ΜL (ref 0.6–4.47)
LYMPHOCYTES NFR BLD AUTO: 28 % (ref 14–44)
MCH RBC QN AUTO: 29 PG (ref 26.8–34.3)
MCHC RBC AUTO-ENTMCNC: 33.1 G/DL (ref 31.4–37.4)
MCV RBC AUTO: 88 FL (ref 82–98)
MICROALBUMIN UR-MCNC: 5.8 MG/L (ref 0–20)
MICROALBUMIN/CREAT 24H UR: 11 MG/G CREATININE (ref 0–30)
MONOCYTES # BLD AUTO: 0.87 THOUSAND/ΜL (ref 0.17–1.22)
MONOCYTES NFR BLD AUTO: 8 % (ref 4–12)
NEUTROPHILS # BLD AUTO: 6.7 THOUSANDS/ΜL (ref 1.85–7.62)
NEUTS SEG NFR BLD AUTO: 61 % (ref 43–75)
NONHDLC SERPL-MCNC: 80 MG/DL
PLATELET # BLD AUTO: 216 THOUSANDS/UL (ref 149–390)
PMV BLD AUTO: 10 FL (ref 8.9–12.7)
POTASSIUM SERPL-SCNC: 4.6 MMOL/L (ref 3.5–5)
PROT SERPL-MCNC: 7 G/DL (ref 6.4–8.3)
RBC # BLD AUTO: 5.45 MILLION/UL (ref 3.88–5.62)
SODIUM SERPL-SCNC: 135 MMOL/L (ref 133–145)
TRIGL SERPL-MCNC: 60.8 MG/DL
WBC # BLD AUTO: 10.83 THOUSAND/UL (ref 4.31–10.16)

## 2021-04-12 PROCEDURE — 91301 SARS-COV-2 / COVID-19 MRNA VACCINE (MODERNA) 100 MCG: CPT

## 2021-04-12 PROCEDURE — 80053 COMPREHEN METABOLIC PANEL: CPT

## 2021-04-12 PROCEDURE — 83036 HEMOGLOBIN GLYCOSYLATED A1C: CPT

## 2021-04-12 PROCEDURE — 3066F NEPHROPATHY DOC TX: CPT | Performed by: FAMILY MEDICINE

## 2021-04-12 PROCEDURE — 3044F HG A1C LEVEL LT 7.0%: CPT | Performed by: FAMILY MEDICINE

## 2021-04-12 PROCEDURE — 3061F NEG MICROALBUMINURIA REV: CPT | Performed by: FAMILY MEDICINE

## 2021-04-12 PROCEDURE — 36415 COLL VENOUS BLD VENIPUNCTURE: CPT

## 2021-04-12 PROCEDURE — 85025 COMPLETE CBC W/AUTO DIFF WBC: CPT

## 2021-04-12 PROCEDURE — 80061 LIPID PANEL: CPT

## 2021-04-12 PROCEDURE — 82570 ASSAY OF URINE CREATININE: CPT

## 2021-04-12 PROCEDURE — 82043 UR ALBUMIN QUANTITATIVE: CPT

## 2021-04-12 PROCEDURE — 0012A SARS-COV-2 / COVID-19 MRNA VACCINE (MODERNA) 100 MCG: CPT

## 2021-04-12 PROCEDURE — 86803 HEPATITIS C AB TEST: CPT

## 2021-04-13 ENCOUNTER — HOSPITAL ENCOUNTER (OUTPATIENT)
Dept: ULTRASOUND IMAGING | Facility: HOSPITAL | Age: 61
Discharge: HOME | End: 2021-04-13
Payer: COMMERCIAL

## 2021-04-13 DIAGNOSIS — N28.1 RENAL CYST, RIGHT: ICD-10-CM

## 2021-04-13 PROCEDURE — 76700 US EXAM ABDOM COMPLETE: CPT

## 2021-04-19 RX ORDER — CLINDAMYCIN HYDROCHLORIDE 300 MG/1
CAPSULE ORAL
COMMUNITY
Start: 2021-04-06 | End: 2021-06-18 | Stop reason: ALTCHOICE

## 2021-04-21 ENCOUNTER — OFFICE VISIT (OUTPATIENT)
Dept: FAMILY MEDICINE CLINIC | Facility: CLINIC | Age: 61
End: 2021-04-21
Payer: COMMERCIAL

## 2021-04-21 VITALS
OXYGEN SATURATION: 98 % | DIASTOLIC BLOOD PRESSURE: 78 MMHG | HEIGHT: 71 IN | BODY MASS INDEX: 30.24 KG/M2 | SYSTOLIC BLOOD PRESSURE: 136 MMHG | WEIGHT: 216 LBS | RESPIRATION RATE: 16 BRPM | HEART RATE: 82 BPM

## 2021-04-21 DIAGNOSIS — G47.00 INSOMNIA, UNSPECIFIED TYPE: ICD-10-CM

## 2021-04-21 DIAGNOSIS — K63.5 POLYP OF COLON, UNSPECIFIED PART OF COLON, UNSPECIFIED TYPE: ICD-10-CM

## 2021-04-21 DIAGNOSIS — E11.41 DIABETIC MONONEUROPATHY ASSOCIATED WITH TYPE 2 DIABETES MELLITUS (HCC): ICD-10-CM

## 2021-04-21 DIAGNOSIS — Z12.2 ENCOUNTER FOR SCREENING FOR LUNG CANCER: ICD-10-CM

## 2021-04-21 DIAGNOSIS — N28.1 RENAL CYST, RIGHT: ICD-10-CM

## 2021-04-21 DIAGNOSIS — J30.2 SEASONAL ALLERGIC RHINITIS, UNSPECIFIED TRIGGER: ICD-10-CM

## 2021-04-21 DIAGNOSIS — F17.200 SMOKER: ICD-10-CM

## 2021-04-21 DIAGNOSIS — Z00.00 MEDICARE ANNUAL WELLNESS VISIT, INITIAL: Primary | ICD-10-CM

## 2021-04-21 DIAGNOSIS — K21.9 GASTROESOPHAGEAL REFLUX DISEASE WITHOUT ESOPHAGITIS: ICD-10-CM

## 2021-04-21 DIAGNOSIS — Z12.5 PROSTATE CANCER SCREENING: ICD-10-CM

## 2021-04-21 DIAGNOSIS — E78.5 HYPERLIPIDEMIA LDL GOAL <70: ICD-10-CM

## 2021-04-21 DIAGNOSIS — E11.69 TYPE 2 DIABETES MELLITUS WITH OTHER SPECIFIED COMPLICATION, WITHOUT LONG-TERM CURRENT USE OF INSULIN (HCC): ICD-10-CM

## 2021-04-21 PROCEDURE — G0439 PPPS, SUBSEQ VISIT: HCPCS | Performed by: FAMILY MEDICINE

## 2021-04-21 PROCEDURE — 4004F PT TOBACCO SCREEN RCVD TLK: CPT | Performed by: FAMILY MEDICINE

## 2021-04-21 PROCEDURE — 3725F SCREEN DEPRESSION PERFORMED: CPT | Performed by: FAMILY MEDICINE

## 2021-04-21 PROCEDURE — 3008F BODY MASS INDEX DOCD: CPT | Performed by: FAMILY MEDICINE

## 2021-04-21 RX ORDER — FLUTICASONE PROPIONATE 50 MCG
2 SPRAY, SUSPENSION (ML) NASAL DAILY
Qty: 48 G | Refills: 2 | Status: SHIPPED | OUTPATIENT
Start: 2021-04-21 | End: 2021-12-09

## 2021-04-21 RX ORDER — TRAZODONE HYDROCHLORIDE 50 MG/1
100 TABLET ORAL
Qty: 180 TABLET | Refills: 0 | Status: SHIPPED | OUTPATIENT
Start: 2021-04-21 | End: 2021-06-23

## 2021-04-21 NOTE — PATIENT INSTRUCTIONS
Medicare Preventive Visit Patient Instructions  Thank you for completing your Welcome to Medicare Visit or Medicare Annual Wellness Visit today  Your next wellness visit will be due in one year (4/22/2022)  The screening/preventive services that you may require over the next 5-10 years are detailed below  Some tests may not apply to you based off risk factors and/or age  Screening tests ordered at today's visit but not completed yet may show as past due  Also, please note that scanned in results may not display below  Preventive Screenings:  Service Recommendations Previous Testing/Comments   Colorectal Cancer Screening  · Colonoscopy    · Fecal Occult Blood Test (FOBT)/Fecal Immunochemical Test (FIT)  · Fecal DNA/Cologuard Test  · Flexible Sigmoidoscopy Age: 54-65 years old   Colonoscopy: every 10 years (May be performed more frequently if at higher risk)  OR  FOBT/FIT: every 1 year  OR  Cologuard: every 3 years  OR  Sigmoidoscopy: every 5 years  Screening may be recommended earlier than age 48 if at higher risk for colorectal cancer  Also, an individualized decision between you and your healthcare provider will decide whether screening between the ages of 74-80 would be appropriate   Colonoscopy: 03/06/2017  FOBT/FIT: Not on file  Cologuard: Not on file  Sigmoidoscopy: Not on file    Screening Current     Prostate Cancer Screening Individualized decision between patient and health care provider in men between ages of 53-78   Medicare will cover every 12 months beginning on the day after your 50th birthday PSA: 0 2 ng/mL           Hepatitis C Screening Once for adults born between 1945 and 1965  More frequently in patients at high risk for Hepatitis C Hep C Antibody: 04/12/2021    Screening Current   Diabetes Screening 1-2 times per year if you're at risk for diabetes or have pre-diabetes Fasting glucose: 147 mg/dL   A1C: 6 0 %    Screening Not Indicated  History Diabetes   Cholesterol Screening Once every 5 years if you don't have a lipid disorder  May order more often based on risk factors  Lipid panel: 04/12/2021    Screening Not Indicated  History Lipid Disorder      Other Preventive Screenings Covered by Medicare:  1  Abdominal Aortic Aneurysm (AAA) Screening: covered once if your at risk  You're considered to be at risk if you have a family history of AAA or a male between the age of 73-68 who smoking at least 100 cigarettes in your lifetime  2  Lung Cancer Screening: covers low dose CT scan once per year if you meet all of the following conditions: (1) Age 50-69; (2) No signs or symptoms of lung cancer; (3) Current smoker or have quit smoking within the last 15 years; (4) You have a tobacco smoking history of at least 30 pack years (packs per day x number of years you smoked); (5) You get a written order from a healthcare provider  3  Glaucoma Screening: covered annually if you're considered high risk: (1) You have diabetes OR (2) Family history of glaucoma OR (3)  aged 48 and older OR (3)  American aged 72 and older  3  Osteoporosis Screening: covered every 2 years if you meet one of the following conditions: (1) Have a vertebral abnormality; (2) On glucocorticoid therapy for more than 3 months; (3) Have primary hyperparathyroidism; (4) On osteoporosis medications and need to assess response to drug therapy  5  HIV Screening: covered annually if you're between the age of 12-76  Also covered annually if you are younger than 13 and older than 72 with risk factors for HIV infection  For pregnant patients, it is covered up to 3 times per pregnancy      Immunizations:  Immunization Recommendations   Influenza Vaccine Annual influenza vaccination during flu season is recommended for all persons aged >= 6 months who do not have contraindications   Pneumococcal Vaccine (Prevnar and Pneumovax)  * Prevnar = PCV13  * Pneumovax = PPSV23 Adults 25-60 years old: 1-3 doses may be recommended based on certain risk factors  Adults 72 years old: Prevnar (PCV13) vaccine recommended followed by Pneumovax (PPSV23) vaccine  If already received PPSV23 since turning 65, then PCV13 recommended at least one year after PPSV23 dose  Hepatitis B Vaccine 3 dose series if at intermediate or high risk (ex: diabetes, end stage renal disease, liver disease)   Tetanus (Td) Vaccine - COST NOT COVERED BY MEDICARE PART B Following completion of primary series, a booster dose should be given every 10 years to maintain immunity against tetanus  Td may also be given as tetanus wound prophylaxis  Tdap Vaccine - COST NOT COVERED BY MEDICARE PART B Recommended at least once for all adults  For pregnant patients, recommended with each pregnancy  Shingles Vaccine (Shingrix) - COST NOT COVERED BY MEDICARE PART B  2 shot series recommended in those aged 48 and above     Health Maintenance Due:      Topic Date Due    Lung Cancer Screening  11/27/2019    Colonoscopy Surveillance  03/06/2020    HIV Screening  01/20/2023 (Originally 6/15/1975)    Colorectal Cancer Screening  03/06/2027    Hepatitis C Screening  Completed     Immunizations Due:  There are no preventive care reminders to display for this patient  Advance Directives   What are advance directives? Advance directives are legal documents that state your wishes and plans for medical care  These plans are made ahead of time in case you lose your ability to make decisions for yourself  Advance directives can apply to any medical decision, such as the treatments you want, and if you want to donate organs  What are the types of advance directives? There are many types of advance directives, and each state has rules about how to use them  You may choose a combination of any of the following:  · Living will: This is a written record of the treatment you want  You can also choose which treatments you do not want, which to limit, and which to stop at a certain time   This includes surgery, medicine, IV fluid, and tube feedings  · Durable power of  for healthcare [place] [facility]): This is a written record that states who you want to make healthcare choices for you when you are unable to make them for yourself  This person, called a proxy, is usually a family member or a friend  You may choose more than 1 proxy  · Do not resuscitate (DNR) order:  A DNR order is used in case your heart stops beating or you stop breathing  It is a request not to have certain forms of treatment, such as CPR  A DNR order may be included in other types of advance directives  · Medical directive: This covers the care that you want if you are in a coma, near death, or unable to make decisions for yourself  You can list the treatments you want for each condition  Treatment may include pain medicine, surgery, blood transfusions, dialysis, IV or tube feedings, and a ventilator (breathing machine)  · Values history: This document has questions about your views, beliefs, and how you feel and think about life  This information can help others choose the care that you would choose  Why are advance directives important? An advance directive helps you control your care  Although spoken wishes may be used, it is better to have your wishes written down  Spoken wishes can be misunderstood, or not followed  Treatments may be given even if you do not want them  An advance directive may make it easier for your family to make difficult choices about your care  Cigarette Smoking and Your Health   Risks to your health if you smoke:  Nicotine and other chemicals found in tobacco damage every cell in your body  Even if you are a light smoker, you have an increased risk for cancer, heart disease, and lung disease  If you are pregnant or have diabetes, smoking increases your risk for complications     Benefits to your health if you stop smoking:   · You decrease respiratory symptoms such as coughing, wheezing, and shortness of breath  · You reduce your risk for cancers of the lung, mouth, throat, kidney, bladder, pancreas, stomach, and cervix  If you already have cancer, you increase the benefits of chemotherapy  You also reduce your risk for cancer returning or a second cancer from developing  · You reduce your risk for heart disease, blood clots, heart attack, and stroke  · You reduce your risk for lung infections, and diseases such as pneumonia, asthma, chronic bronchitis, and emphysema  · Your circulation improves  More oxygen can be delivered to your body  If you have diabetes, you lower your risk for complications, such as kidney, artery, and eye diseases  You also lower your risk for nerve damage  Nerve damage can lead to amputations, poor vision, and blindness  · You improve your body's ability to heal and to fight infections  For more information and support to stop smoking:   · [place]  Phone: 3- 838 - [phone]  Web Address: [employer]  Weight Management   Why it is important to manage your weight:  Being overweight increases your risk of health conditions such as heart disease, high blood pressure, type 2 diabetes, and certain types of cancer  It can also increase your risk for osteoarthritis, sleep apnea, and other respiratory problems  Aim for a slow, steady weight loss  Even a small amount of weight loss can lower your risk of health problems  How to lose weight safely:  A safe and healthy way to lose weight is to eat fewer calories and get regular exercise  You can lose up about 1 pound a week by decreasing the number of calories you eat by 500 calories each day  Healthy meal plan for weight management:  A healthy meal plan includes a variety of foods, contains fewer calories, and helps you stay healthy  A healthy meal plan includes the following:  · Eat whole-grain foods more often  A healthy meal plan should contain fiber   Fiber is the part of grains, fruits, and vegetables that is not broken down by your body  Whole-grain foods are healthy and provide extra fiber in your diet  Some examples of whole-grain foods are whole-wheat breads and pastas, oatmeal, brown rice, and bulgur  · Eat a variety of vegetables every day  Include dark, leafy greens such as spinach, kale, yumiko greens, and mustard greens  Eat yellow and orange vegetables such as carrots, sweet potatoes, and winter squash  · Eat a variety of fruits every day  Choose fresh or canned fruit (canned in its own juice or light syrup) instead of juice  Fruit juice has very little or no fiber  · Eat low-fat dairy foods  Drink fat-free (skim) milk or 1% milk  Eat fat-free yogurt and low-fat cottage cheese  Try low-fat cheeses such as mozzarella and other reduced-fat cheeses  · Choose meat and other protein foods that are low in fat  Choose beans or other legumes such as split peas or lentils  Choose fish, skinless poultry (chicken or turkey), or lean cuts of red meat (beef or pork)  Before you cook meat or poultry, cut off any visible fat  · Use less fat and oil  Try baking foods instead of frying them  Add less fat, such as margarine, sour cream, regular salad dressing and mayonnaise to foods  Eat fewer high-fat foods  Some examples of high-fat foods include french fries, doughnuts, ice cream, and cakes  · Eat fewer sweets  Limit foods and drinks that are high in sugar  This includes candy, cookies, regular soda, and sweetened drinks  Exercise:  Exercise at least 30 minutes per day on most days of the week  Some examples of exercise include walking, biking, dancing, and swimming  You can also fit in more physical activity by taking the stairs instead of the elevator or parking farther away from stores  Ask your healthcare provider about the best exercise plan for you     Narcotic (Opioid) Safety    Use narcotics safely:  · Take prescribed narcotics exactly as directed  · Do not give narcotics to others or take narcotics that belong to someone else  · Do not mix narcotics without medicines or alcohol  · Do not drive or operate heavy machinery after you take the narcotic  · Monitor for side effects and notify your healthcare provider if you experienced side effects such as nausea, sleepiness, itching, or trouble thinking clearly  Manage constipation:    Constipation is the most common side effect of narcotic medicine  Constipation is when you have hard, dry bowel movements, or you go longer than usual between bowel movements  Tell your healthcare provider about all changes in your bowel movements while you are taking narcotics  He or she may recommend laxative medicine to help you have a bowel movement  He or she may also change the kind of narcotic you are taking, or change when you take it  The following are more ways you can prevent or relieve constipation:    · Drink liquids as directed  You may need to drink extra liquids to help soften and move your bowels  Ask how much liquid to drink each day and which liquids are best for you  · Eat high-fiber foods  This may help decrease constipation by adding bulk to your bowel movements  High-fiber foods include fruits, vegetables, whole-grain breads and cereals, and beans  Your healthcare provider or dietitian can help you create a high-fiber meal plan  Your provider may also recommend a fiber supplement if you cannot get enough fiber from food  · Exercise regularly  Regular physical activity can help stimulate your intestines  Walking is a good exercise to prevent or relieve constipation  Ask which exercises are best for you  · Schedule a time each day to have a bowel movement  This may help train your body to have regular bowel movements  Bend forward while you are on the toilet to help move the bowel movement out  Sit on the toilet for at least 10 minutes, even if you do not have a bowel movement      Store narcotics safely:   · Store narcotics where others cannot easily get them   Keep them in a locked cabinet or secure area  Do not  keep them in a purse or other bag you carry with you  A person may be looking for something else and find the narcotics  · Make sure narcotics are stored out of the reach of children  A child can easily overdose on narcotics  Narcotics may look like candy to a small child  The best way to dispose of narcotics: The laws vary by country and area  In the [facility], the best way is to return the narcotics through a take-back program  This program is offered by the [place])  The following are options for using the program:  · Take the narcotics to a [employer] collection site  The site is often a law enforcement center  Call your local law enforcement center for scheduled take-back days in your area  You will be given information on where to go if the collection site is in a different location  · Take the narcotics to an approved pharmacy or hospital   A pharmacy or hospital may be set up as a collection site  You will need to ask if it is a [employer] collection site if you were not directed there  A pharmacy or doctor's office may not be able to take back narcotics unless it is a [employer] site  · Use a mail-back system  This means you are given containers to put the narcotics into  You will then mail them in the containers  · Use a take-back drop box  This is a place to leave the narcotics at any time  People and animals will not be able to get into the box  Your local law enforcement agency can tell you where to find a drop box in your area  Other ways to manage pain:   · Ask your healthcare provider about non-narcotic medicines to control pain  Nonprescription medicines include NSAIDs (such as ibuprofen) and acetaminophen  Prescription medicines include muscle relaxers, antidepressants, and steroids  · Pain may be managed without any medicines  Some ways to relieve pain include massage, aromatherapy, or meditation   Physical or occupational therapy may also help  For more information:   · Drug Enforcement Administration  [address] [facility] [address]  Phone: 3- 470 - 386-2061  Web Address: [facility]/drug_disposal/    · [address] Drug Administration  [place]aina [place]zon , [address]  Phone: 3- 012 - 016-0827  Web Address: http://[employer]/     © Copyright [employer] 2018 Information is for End User's use only and may not be sold, redistributed or otherwise used for commercial purposes   All illustrations and images included in CareNotes® are the copyrighted property of [employer] D A M [employer]  or Sandor Vivar

## 2021-04-21 NOTE — PROGRESS NOTES
Assessment and Plan:     Problem List Items Addressed This Visit        Endocrine    DM type 2 (diabetes mellitus, type 2) ([facility] 75 )    Relevant Orders    Ambulatory referral to Ophthalmology    HEMOGLOBIN A1C W/ EAG ESTIMATION  - A1c (4/12/2021): 6 0   - cont Metformin 1000mg BID and Glipizide 10mg QD   - cont ACEI 2 5mg QD for gualberto-protection   - no microalbuminuria   - referred to Optho for annual DM eye exam   - RTO in 3months with repeat A1c for f/u - pt aware and agreeable       Diabetic mononeuropathy associated with type 2 diabetes mellitus ([facility] )    Relevant Orders    Diabetic foot exam - loss of sensation at bottom of b/l feet  - cont Gabapentin        Respiratory    Seasonal allergic rhinitis    Relevant Medications    fluticasone (FLONASE) 50 mcg/act nasal spray       Other    Insomnia    Relevant Medications    traZODone (DESYREL) 50 mg tablet  - cont Seroquel 100mg QHS and will increase Trazodone from 50mg QHS to 100mg QHS       Smoker  - current smoker - 1PPD/42yrs       Other Visit Diagnoses     Medicare annual wellness visit, initial    -  Primary  - reviewed labs  - due for prostate ca screening - script given   - due for lung ca screening - script given   - UTD with Colon Ca screening - h/o polyps - repeat in 2022 - pt aware      Prostate cancer screening        Relevant Orders    PSA, Total Screen    Polyp of colon, unspecified part of colon, unspecified type        Relevant Orders    Ambulatory referral to Gastroenterology  - re-referred to GI given new onset h/o abdominal cramping       Gastroesophageal reflux disease without esophagitis          Renal cyst, right      - stable     Hyperlipidemia LDL goal <70      - LDL 68  - cont Statin 20mg QHS  - The ASCVD Risk score (Kristen Barahona et al , 2013) failed to calculate for the following reasons:     The valid total cholesterol range is 130 to 320 mg/dL      Encounter for screening for lung cancer        Relevant Orders    CT lung screening program Preventive health issues were discussed with patient, and age appropriate screening tests were ordered as noted in patient's After Visit Summary  Personalized health advice and appropriate referrals for health education or preventive services given if needed, as noted in patient's After Visit Summary       History of Present Illness:     Patient presents for Medicare Annual Wellness visit    Patient Care Team:  Chantal Ott DO as PCP - General (Family Medicine)  José Koo MD (Pain Medicine)  MD Arabella Alfonso MD Wes Soho, MD Vinita Ion, MD as Endoscopist     Problem List:     Patient Active Problem List   Diagnosis    Heartburn    Erectile dysfunction of non-organic origin    DM type 2 (diabetes mellitus, type 2) ([facility] )    Chronic low back pain    BPH with urinary obstruction    Blood pressure elevated without history of HTN    Sciatica    Myofascial pain syndrome    Lumbar spondylosis    Herniation of lumbar intervertebral disc with radiculopathy    Acute post-operative pain    Insomnia    Chronic pain syndrome    Low back pain    Class 1 obesity due to excess calories with serious comorbidity and body mass index (BMI) of 30 0 to 30 9 in adult    Smoker    Seasonal allergic rhinitis    Diabetic mononeuropathy associated with type 2 diabetes mellitus ([facility] )    Postlaminectomy syndrome, lumbar region    Numbness and tingling in both hands    Intervertebral disc disorder with radiculopathy of lumbosacral region    Acute bronchitis    Mixed hyperlipidemia    Proteinuria      Past Medical and Surgical History:     Past Medical History:   Diagnosis Date    Arthritis     BPH (benign prostatic hypertrophy) with urinary obstruction     Chronic pain disorder     Ear infection     Herniation of lumbar intervertebral disc with radiculopathy     Myofascial pain syndrome     Obesity     Sciatica      Past Surgical History:   Procedure Laterality Date    ABSCESS DRAINAGE      groin    BACK SURGERY      implant - resolved 2004    CAUDAL BLOCK N/A 10/26/2018    Procedure: Caudal Epidural Steroid Injection (47454); Surgeon: Dallin Chavez MD;  Location: [facility] MAIN OR;  Service: Pain Management     CAUDAL BLOCK N/A 11/30/2018    Procedure: Caudal Epidural Steroid Injection (43377); Surgeon: Dallin Chavez MD;  Location: [facility] MAIN OR;  Service: Pain Management     COLONOSCOPY      EPIDURAL BLOCK INJECTION Right 5/10/2019    Procedure: L5 S1 transforaminal Epidural Steroid Injection ([id number];  Surgeon: Dallin Chavez MD;  Location: [facility] MAIN OR;  Service: Pain Management     EPIDURAL BLOCK INJECTION Right 8/16/2019    Procedure: BLOCK / INJECTION EPIDURAL STEROID TRANSFORAMINAL;  Surgeon: Dallin Chavez MD;  Location: [facility] MAIN OR;  Service: Pain Management     NERVE BLOCK Bilateral 4/26/2018    Procedure: B/L L3 L4 L5 S1 MBB #1 ([id number]); Surgeon: Dallin Chavez MD;  Location: [facility] MAIN OR;  Service: Pain Management     NERVE BLOCK Bilateral 5/11/2018    Procedure: B/L L3 L4 L5 S1 Medial Branch Block #2;  Surgeon: Dallin Chavez MD;  Location: [facility] MAIN OR;  Service: Pain Management     NOSE SURGERY      NC COLONOSCOPY FLX DX W/COLLJ SPEC WHEN PFRMD N/A 3/6/2017    Procedure: COLONOSCOPY;  Surgeon: Jacky Clemente MD;  [facility]: [facility]; Service: Gastroenterology    NC SURG IMPLNT NEUROELECT,EPIDURAL Left 10/3/2017    Procedure: PLACEMENT THORACIC FOR INSERTION DORSAL COLUMN SPINAL CORD STIMULATOR (DCS) WITH BUTTOCK IMPLANTABLE PULSE GENERATOR(IMPULSE); Surgeon: Dillan Escobar MD;  Location:  MAIN OR;  Service: Neurosurgery    [address] Right 5/18/2018    Procedure: Rt L3 L4 L5 S1 Radio Frequency Ablation ([id number]);   Surgeon: Dallin Chavez MD;  Location: [facility] MAIN OR;  Service: Pain Management     RADIOFREQUENCY ABLATION Left 6/1/2018    Procedure: Lt L3 L4 L5 S1 Radio Frequency Ablation ([id number]);   Surgeon: Oscar Beebe MD;  Location[facility] MAIN OR;  Service: Pain Management       Family History:     Family History   Problem Relation Age of Onset    Diabetes Mother     Diabetes Father         mellitus     Heart attack Father 58    Hypertension Father       Social History:     E-Cigarette/Vaping    E-Cigarette Use Never User      E-Cigarette/Vaping Substances    Nicotine No     THC No     CBD No     Flavoring No     Other No     Unknown No      Social History     Socioeconomic History    Marital status: Single     Spouse name: None    Number of children: None    Years of education: None    Highest education level: None   Occupational History    Occupation: [employer]    Social Needs    Financial resource strain: None    Food insecurity     Worry: None     Inability: None    Transportation needs     Medical: None     Non-medical: None   Tobacco Use    Smoking status: Current Every Day Smoker     Packs/day: 1 00     Years: 42 00     Pack years: 42 00    Smokeless tobacco: Never Used    Tobacco comment: encouraged smoking cessation - history of smoking 30 or more pack years    Substance and Sexual Activity    Alcohol use: No     Comment: rare    Drug use: No    Sexual activity: None   Lifestyle    Physical activity     Days per week: None     Minutes per session: None    Stress: None   Relationships    Social connections     Talks on phone: None     Gets together: None     Attends Congregational service: None     Active member of club or organization: None     Attends meetings of clubs or organizations: None     Relationship status: None    Intimate partner violence     Fear of current or ex partner: None     Emotionally abused: None     Physically abused: None     Forced sexual activity: None   Other Topics Concern    None   Social History Narrative    Caffeine use - coffee       Medications and Allergies:     Current Outpatient Medications   Medication Sig Dispense Refill    atorvastatin (LIPITOR) 20 mg tablet TAKE 1 TABLET (20 MG TOTAL) BY MOUTH DAILY 90 tablet 0    clindamycin (CLEOCIN) 300 MG capsule       etodolac (LODINE) 300 MG capsule Take 1 capsule (300 mg total) by mouth 3 (three) times a day as needed (pain (with food)) 90 capsule 0    fluticasone (FLONASE) 50 mcg/act nasal spray 2 sprays into each nostril daily 48 g 2    gabapentin (NEURONTIN) 300 mg capsule TAKE 1 CAPSULES THREE TIMES DAILY 180 capsule 0    glipiZIDE (GLUCOTROL XL) 10 mg 24 hr tablet TAKE 1 TABLET EVERY DAY 90 tablet 0    lidocaine (LIDODERM) 5 % Apply 2 patches topically daily Remove & Discard patch within 12 hours or as directed by MD 60 patch 0    lisinopril (ZESTRIL) 2 5 mg tablet Take 1 tablet (2 5 mg total) by mouth daily 90 tablet 0    metFORMIN (GLUCOPHAGE) 1000 MG tablet Take 1 tablet (1,000 mg total) by mouth 2 (two) times a day with meals 180 tablet 0    omeprazole (PriLOSEC) 20 mg delayed release capsule TAKE 1 CAPSULE EVERY DAY 90 capsule 0    oxyCODONE-acetaminophen (PERCOCET) 7 5-325 MG per tablet Take 1 tablet by mouth 3 (three) times a day as needed for severe painMax Daily Amount: 3 tablets 90 tablet 0    QUEtiapine (SEROquel) 100 mg tablet Take 1 tablet (100 mg total) by mouth daily at bedtime 90 tablet 0    tamsulosin (FLOMAX) 0 4 mg TAKE 2 CAPSULES EVERY DAY WITH DINNER 28 capsule 0    traZODone (DESYREL) 50 mg tablet Take 2 tablets (100 mg total) by mouth daily at bedtime 180 tablet 0    [START ON 5/15/2021] oxyCODONE-acetaminophen (PERCOCET) 7 5-325 MG per tablet Take 1 tablet by mouth 3 (three) times a day as needed for severe painMax Daily Amount: 3 tablets (Patient not taking: Reported on 4/21/2021) 90 tablet 0     No current facility-administered medications for this visit        Allergies   Allergen Reactions    Penicillins Swelling      Immunizations:     Immunization History   Administered Date(s) Administered    INFLUENZA 09/12/2018    Influenza Injectable, MDCK, Preservative Free, Quadrivalent, 0 5 mL 09/24/2020    Influenza, recombinant, quadrivalent,injectable, preservative free 09/12/2018    Pneumococcal Polysaccharide PPV23 09/12/2018    SARS-CoV-2 / COVID-19 mRNA IM (Moderna) 03/16/2021, 04/12/2021    Tdap 09/12/2018      Health Maintenance:         Topic Date Due    Lung Cancer Screening  11/27/2019    Colonoscopy Surveillance  03/06/2020    HIV Screening  01/20/2023 (Originally 6/15/1975)    Colorectal Cancer Screening  03/06/2027    Hepatitis C Screening  Completed     There are no preventive care reminders to display for this patient  Medicare Health Risk Assessment:     /78 (BP Location: Left arm, Patient Position: Sitting, Cuff Size: Large)   Pulse 82   Resp 16   Ht 5' 11" (1 803 m)   Wt 98 kg (216 lb)   SpO2 98%   BMI 30 13 kg/m²      Katelyn Llanos is here for his Initial Wellness visit  Health Risk Assessment:   Patient rates overall health as fair  Patient feels that their physical health rating is slightly worse  Patient is satisfied with their life  Eyesight was rated as same  Hearing was rated as same  Patient feels that their emotional and mental health rating is same  Patients states they are never, rarely angry  Patient states they are sometimes unusually tired/fatigued  Pain experienced in the last 7 days has been a lot  Patient's pain rating has been 5/10  Patient states that he has experienced no weight loss or gain in last 6 months  Depression Screening:   PHQ-2 Score: 0      Fall Risk Screening: In the past year, patient has experienced: no history of falling in past year      Home Safety:  Patient does not have trouble with stairs inside or outside of their home  Patient has working smoke alarms and has working carbon monoxide detector  Home safety hazards include: none  Nutrition:   Current diet is Regular       Medications:   Patient is currently taking over-the-counter supplements  OTC medications include: see medication list  Patient is able to manage medications  Activities of Daily Living (ADLs)/Instrumental Activities of Daily Living (IADLs):   Walk and transfer into and out of bed and chair?: Yes  Dress and groom yourself?: Yes    Bathe or shower yourself?: Yes    Feed yourself? Yes  Do your laundry/housekeeping?: Yes  Manage your money, pay your bills and track your expenses?: Yes  Make your own meals?: Yes    Do your own shopping?: Yes    Previous Hospitalizations:   Any hospitalizations or ED visits within the last 12 months?: No      Advance Care Planning:   Living will: Yes    Durable POA for healthcare:  Yes    Advanced directive: Yes      PREVENTIVE SCREENINGS      Cardiovascular Screening:    General: Screening Not Indicated, History Lipid Disorder, Risks and Benefits Discussed and Screening Current      Diabetes Screening:     General: Screening Not Indicated, History Diabetes, Risks and Benefits Discussed and Screening Current      Colorectal Cancer Screening:     General: Screening Current      Prostate Cancer Screening:    General: Risks and Benefits Discussed    Due for: PSA      Osteoporosis Screening:    General: Risks and Benefits Discussed and Screening Not Indicated      Abdominal Aortic Aneurysm (AAA) Screening:    Risk factors include: tobacco use        General: Screening Current and Risks and Benefits Discussed      Lung Cancer Screening:     General: Risks and Benefits Discussed    Due for: Low Dose CT (LDCT)      Hepatitis C Screening:    General: Screening Current and Risks and Benefits Discussed      Preventive Screening Comments: 63yo M presents to the office for AWV, initial and eval of insomnia  - reviewed labs done 4/12/2021  - UTD with Bola - 3/2017 - h/o colon polyps - due for repeat in 2022  - overdue for Prostate Ca screening   - overdue for Lung Ca screening - currently smokes 1PPD/42yrs   - (+) abdominal cramping and insomnia      Screening, Brief Intervention, and Referral to Treatment (SBIRT)    Screening  Typical number of drinks in a day: 0  Typical number of drinks in a week: 0  Interpretation: Low risk drinking behavior  Single Item Drug Screening:  How often have you used an illegal drug (including marijuana) or a prescription medication for non-medical reasons in the past year? never    Single Item Drug Screen Score: 0  Interpretation: Negative screen for possible drug use disorder    Review of Current Opioid Use    Opioid Risk Tool (ORT) Interpretation: Complete Opioid Risk Tool (ORT)      Right Foot/Ankle   Right Foot Inspection  Skin Exam: skin normal and skin intact no dry skin, no warmth, no callus, no erythema, no maceration, no abnormal color, no pre-ulcer, no ulcer and no callus                          Toe Exam: ROM and strength within normal limits  Sensory       Monofilament testing: absent  Vascular    The right DP pulse is 2+  Left Foot/Ankle  Left Foot Inspection  Skin Exam: skin normal and skin intactno dry skin, no warmth, no erythema, no maceration, normal color, no pre-ulcer, no ulcer and no callus                         Toe Exam: ROM and strength within normal limits                   Sensory       Monofilament: absent  Vascular    The left DP pulse is 2+  Assign Risk Category:  Deformity present; Loss of protective sensation; No weak pulses       Risk: 1      BMI Counseling: Body mass index is 30 13 kg/m²  The BMI is above normal  Nutrition recommendations include reducing portion sizes and decreasing overall calorie intake  Exercise recommendations include moderate aerobic physical activity for 150 minutes/week  Depression Screening Follow-up Plan: Patient's depression screening was positive with a PHQ-2 score of 0  Their PHQ-9 score was   Patient assessed for underlying major depression  They have no active suicidal ideations   Brief counseling provided and recommend additional follow-up/re-evaluation next office visit      Jenny Bradley DO

## 2021-05-03 ENCOUNTER — TELEPHONE (OUTPATIENT)
Dept: GASTROENTEROLOGY | Facility: AMBULARY SURGERY CENTER | Age: 61
End: 2021-05-03

## 2021-05-05 ENCOUNTER — HOSPITAL ENCOUNTER (OUTPATIENT)
Dept: RADIOLOGY | Facility: IMAGING CENTER | Age: 61
Discharge: HOME | End: 2021-05-05
Payer: COMMERCIAL

## 2021-05-05 DIAGNOSIS — G47.00 INSOMNIA, UNSPECIFIED TYPE: ICD-10-CM

## 2021-05-05 DIAGNOSIS — Z12.2 ENCOUNTER FOR SCREENING FOR LUNG CANCER: ICD-10-CM

## 2021-05-05 PROCEDURE — 71271 CT THORAX LUNG CANCER SCR C-: CPT

## 2021-05-05 RX ORDER — QUETIAPINE FUMARATE 100 MG/1
TABLET, FILM COATED ORAL
Qty: 90 TABLET | Refills: 0 | Status: SHIPPED | OUTPATIENT
Start: 2021-05-05 | End: 2021-11-03

## 2021-05-11 ENCOUNTER — TELEPHONE (OUTPATIENT)
Dept: PAIN MEDICINE | Facility: CLINIC | Age: 61
End: 2021-05-11

## 2021-05-11 NOTE — TELEPHONE ENCOUNTER
S/W [place], they do have RX to be filled 5/15/21, however that is a Saturday so it will not go out til Monday  S/W pt who states they told him they did not have a refill  Advised pt that they do and will send on Monday 5/17  Pt states that is fine

## 2021-05-11 NOTE — TELEPHONE ENCOUNTER
Refill request:  Percocet 7 5-325mg  Take 1 tablet by mouth 3 (three) times a day as needed  Remaining: has enough  [place] [facility]  Call back# [phone]

## 2021-05-12 ENCOUNTER — TELEPHONE (OUTPATIENT)
Dept: FAMILY MEDICINE CLINIC | Facility: CLINIC | Age: 61
End: 2021-05-12

## 2021-05-12 NOTE — TELEPHONE ENCOUNTER
----- Message from Keanu Newman DO sent at 5/12/2021  8:56 AM EDT -----  Stable lung nodules and slight fatty liver - repeat in 1yr

## 2021-05-17 DIAGNOSIS — N40.1 BPH WITH URINARY OBSTRUCTION: ICD-10-CM

## 2021-05-17 DIAGNOSIS — R80.9 PROTEINURIA, UNSPECIFIED TYPE: ICD-10-CM

## 2021-05-17 DIAGNOSIS — K21.9 GASTROESOPHAGEAL REFLUX DISEASE WITHOUT ESOPHAGITIS: ICD-10-CM

## 2021-05-17 DIAGNOSIS — E11.69 TYPE 2 DIABETES MELLITUS WITH OTHER SPECIFIED COMPLICATION, WITHOUT LONG-TERM CURRENT USE OF INSULIN (HCC): ICD-10-CM

## 2021-05-17 DIAGNOSIS — N13.8 BPH WITH URINARY OBSTRUCTION: ICD-10-CM

## 2021-05-17 PROCEDURE — 3066F NEPHROPATHY DOC TX: CPT | Performed by: ANESTHESIOLOGY

## 2021-05-19 PROCEDURE — 4010F ACE/ARB THERAPY RXD/TAKEN: CPT | Performed by: ANESTHESIOLOGY

## 2021-05-19 RX ORDER — LISINOPRIL 2.5 MG/1
2.5 TABLET ORAL DAILY
Qty: 90 TABLET | Refills: 0 | Status: SHIPPED | OUTPATIENT
Start: 2021-05-19 | End: 2021-07-22

## 2021-05-19 RX ORDER — TAMSULOSIN HYDROCHLORIDE 0.4 MG/1
0.8 CAPSULE ORAL
Qty: 180 CAPSULE | Refills: 0 | Status: SHIPPED | OUTPATIENT
Start: 2021-05-19 | End: 2021-08-19

## 2021-05-19 RX ORDER — OMEPRAZOLE 20 MG/1
20 CAPSULE, DELAYED RELEASE ORAL DAILY
Qty: 90 CAPSULE | Refills: 0 | Status: SHIPPED | OUTPATIENT
Start: 2021-05-19 | End: 2021-07-22

## 2021-05-19 RX ORDER — GLIPIZIDE 10 MG/1
10 TABLET, FILM COATED, EXTENDED RELEASE ORAL DAILY
Qty: 90 TABLET | Refills: 0 | Status: SHIPPED | OUTPATIENT
Start: 2021-05-19 | End: 2021-08-19

## 2021-05-24 DIAGNOSIS — E11.9 TYPE 2 DIABETES MELLITUS WITHOUT COMPLICATION, WITHOUT LONG-TERM CURRENT USE OF INSULIN (HCC): ICD-10-CM

## 2021-05-24 NOTE — TELEPHONE ENCOUNTER
Patient requesting refill on Metformin  Please send to [facility] and call when completed      Patient last seen in April has follow up scheduled for July

## 2021-06-03 ENCOUNTER — TELEPHONE (OUTPATIENT)
Dept: PAIN MEDICINE | Facility: CLINIC | Age: 61
End: 2021-06-03

## 2021-06-03 ENCOUNTER — OFFICE VISIT (OUTPATIENT)
Dept: PAIN MEDICINE | Facility: CLINIC | Age: 61
End: 2021-06-03
Payer: OTHER MISCELLANEOUS

## 2021-06-03 VITALS
BODY MASS INDEX: 30.55 KG/M2 | HEART RATE: 76 BPM | WEIGHT: 218.2 LBS | DIASTOLIC BLOOD PRESSURE: 68 MMHG | SYSTOLIC BLOOD PRESSURE: 128 MMHG | HEIGHT: 71 IN

## 2021-06-03 DIAGNOSIS — G89.29 CHRONIC LOW BACK PAIN: ICD-10-CM

## 2021-06-03 DIAGNOSIS — M54.50 CHRONIC LOW BACK PAIN: ICD-10-CM

## 2021-06-03 DIAGNOSIS — M96.1 POSTLAMINECTOMY SYNDROME, LUMBAR: ICD-10-CM

## 2021-06-03 DIAGNOSIS — M54.16 LUMBAR RADICULOPATHY: ICD-10-CM

## 2021-06-03 DIAGNOSIS — M47.816 LUMBAR SPONDYLOSIS: ICD-10-CM

## 2021-06-03 DIAGNOSIS — G89.4 CHRONIC PAIN SYNDROME: Primary | ICD-10-CM

## 2021-06-03 PROCEDURE — 80305 DRUG TEST PRSMV DIR OPT OBS: CPT | Performed by: ANESTHESIOLOGY

## 2021-06-03 PROCEDURE — 4004F PT TOBACCO SCREEN RCVD TLK: CPT | Performed by: ANESTHESIOLOGY

## 2021-06-03 PROCEDURE — 99214 OFFICE O/P EST MOD 30 MIN: CPT | Performed by: ANESTHESIOLOGY

## 2021-06-03 RX ORDER — OXYCODONE AND ACETAMINOPHEN 7.5; 325 MG/1; MG/1
1 TABLET ORAL 3 TIMES DAILY PRN
Qty: 90 TABLET | Refills: 0 | Status: SHIPPED | OUTPATIENT
Start: 2021-06-16 | End: 2021-06-30 | Stop reason: HOSPADM

## 2021-06-03 RX ORDER — OXYCODONE AND ACETAMINOPHEN 7.5; 325 MG/1; MG/1
1 TABLET ORAL 3 TIMES DAILY PRN
Qty: 90 TABLET | Refills: 0 | Status: SHIPPED | OUTPATIENT
Start: 2021-07-16 | End: 2021-08-15

## 2021-06-03 NOTE — H&P (VIEW-ONLY)
Pain Medicine Follow-Up Note    Assessment:  1  Chronic pain syndrome    2  Chronic low back pain    3  Postlaminectomy syndrome, lumbar    4  Lumbar spondylosis    5  Lumbar radiculopathy        Plan:  New Medications Ordered This Visit   Medications    etodolac (LODINE) 300 MG capsule     Sig: Take 1 capsule (300 mg total) by mouth 3 (three) times a day as needed (pain (with food))     Dispense:  90 capsule     Refill:  1    oxyCODONE-acetaminophen (PERCOCET) 7 5-325 MG per tablet     Sig: Take 1 tablet by mouth 3 (three) times a day as needed for severe painMax Daily Amount: 3 tablets     Dispense:  90 tablet     Refill:  0    oxyCODONE-acetaminophen (PERCOCET) 7 5-325 MG per tablet     Sig: Take 1 tablet by mouth 3 (three) times a day as needed for severe painMax Daily Amount: 3 tablets     Dispense:  90 tablet     Refill:  0     My impressions and treatment recommendations were discussed in detail with the patient who verbalized understanding and had no further questions  Given that the patient reports overall reduced pain and improved level functioning without significant side effects, I felt a reasonable to continue the patient on oxycodone/acetaminophen 7 5/325 mg 1 tablet up to 3 times daily as needed for pain along with etodolac 300 mg 1 tablet 3 times daily as needed for pain, with food  The risks and side effects of chronic opioid treatment were discussed in detail with the patient  Side effects include but are not limited to nausea, vomiting, GI intolerance, sedation, constipation, mental clouding, opioid-induced hyperalgesia, endocrine dysfunction, addiction, dependence, and tolerance  The patient was asked to take his medications only as prescribed and directed, never in excess, and never for any other reason other than for pain control  The patient was also asked to keep his medications out of the reach of others and away from children, preferably in a locked drawer   The patient verbalized understanding and wished to use these opioid medications  A urine drug test was collected at today's office visit as part of our medication management protocol  The point of care testing results were appropriate for what was being prescribed  The specimen will be sent for confirmatory testing  The drug screen is medically necessary because the patient is either dependent on opioid medication or is being considered for opioid medication therapy and the results could impact ongoing or future treatment  The drug screen is to evaluate for the presence or absence of prescribed, non-prescribed, and/or illicit drugs and substances  The patient also reports that he is having significant pain in his low back and left lower extremity what appears to be the left L5 and S1 distribution as well as the right L5 and S1 distributions  He states that he would like to proceed with left L5 and S1 transforaminal epidural steroid injections at this time and possibly right L5 and S1 transforaminal epidural steroid injections in the future  I felt it reasonable to schedule these procedures for him  The procedures, its risks, and benefits were explained in detail to the patient  Risks include but are not limited to bleeding, infection, hematoma formation, abscess formation, weakness, headache, failure the pain to improve, nerve irritation or damage, and potential worsening of the pain  The patient verbalized understanding and wished to proceed with the procedure  [place] Prescription Drug Monitoring Program report was reviewed and was appropriate     Follow-up is planned in 2 months time or sooner as warranted  Discharge instructions were provided  I personally saw and examined the patient and I agree with the above discussed plan of care  History of Present Illness:    Jacqui Enriquez is a 61 y o  male who presents to [facility] for interval re-evaluation of the above stated pain complaints  The patient has a past medical and chronic pain history as outlined in the assessment section  He was last seen on  April 1, 2021 at which time he was maintained on oxycodone /acetaminophen 7 5/325 mg 1 tablet 3 times daily as needed for pain  A t today's office visit, the patient's pain score is 6/10 on the verbal numerical pain rating scale  The patient states that his pain is primarily on the right side of his low back  He describes his pain as worse in the morning, evening, and night  His pain is constant in nature  He reports the quality of his pain as dull/aching, throbbing, and shooting  He is reporting excellent pain relief since undergoing the procedures , but reports that his pain is worsening at this point and would like to undergo repeat procedures currently  He denies opioid  Induced constipation currently  Pain Contract Signed:  01/11/2021  Last Urine Drug Screen: 04/01/2021    Other than as stated above, the patient denies any interval changes in medications, medical condition, mental condition, symptoms, or allergies since the last office visit  Review of Systems:    Review of Systems   Respiratory: Negative for shortness of breath  Cardiovascular: Negative for chest pain  Gastrointestinal: Negative for constipation, diarrhea, nausea and vomiting  Musculoskeletal: Positive for gait problem  Negative for arthralgias, joint swelling and myalgias  Decreased ROM, Joint Stiffness   Skin: Negative for rash  Neurological: Negative for dizziness, seizures and weakness  All other systems reviewed and are negative          Patient Active Problem List   Diagnosis    Heartburn    Erectile dysfunction of non-organic origin    DM type 2 (diabetes mellitus, type 2) ([facility])    Chronic low back pain    BPH with urinary obstruction    Blood pressure elevated without history of HTN    Sciatica    Myofascial pain syndrome    Lumbar spondylosis    Herniation of lumbar intervertebral disc with radiculopathy    Acute post-operative pain    Insomnia    Chronic pain syndrome    Low back pain    Class 1 obesity due to excess calories with serious comorbidity and body mass index (BMI) of 30 0 to 30 9 in adult    Smoker    Seasonal allergic rhinitis    Diabetic mononeuropathy associated with type 2 diabetes mellitus (HCC)    Postlaminectomy syndrome, lumbar region    Numbness and tingling in both hands    Intervertebral disc disorder with radiculopathy of lumbosacral region    Acute bronchitis    Mixed hyperlipidemia    Proteinuria       Past Medical History:   Diagnosis Date    Arthritis     BPH (benign prostatic hypertrophy) with urinary obstruction     Chronic pain disorder     Ear infection     Herniation of lumbar intervertebral disc with radiculopathy     Myofascial pain syndrome     Obesity     Sciatica        Past Surgical History:   Procedure Laterality Date    ABSCESS DRAINAGE      groin    BACK SURGERY      implant - resolved 2004    CAUDAL BLOCK N/A 10/26/2018    Procedure: Caudal Epidural Steroid Injection (38133); Surgeon: Shanta Bashir MD;  Location: [facility] MAIN OR;  Service: Pain Management     CAUDAL BLOCK N/A 11/30/2018    Procedure: Caudal Epidural Steroid Injection (52963); Surgeon: Shanta Bashir MD;  Location: [facility] MAIN OR;  Service: Pain Management     COLONOSCOPY      EPIDURAL BLOCK INJECTION Right 5/10/2019    Procedure: L5 S1 transforaminal Epidural Steroid Injection ([id number];  Surgeon: Shanta Bashir MD;  Location: [facility] MAIN OR;  Service: Pain Management     EPIDURAL BLOCK INJECTION Right 8/16/2019    Procedure: BLOCK / INJECTION EPIDURAL STEROID TRANSFORAMINAL;  Surgeon: Shanta Bashir MD;  Location: [facility] MAIN OR;  Service: Pain Management     NERVE BLOCK Bilateral 4/26/2018    Procedure: B/L L3 L4 L5 S1 MBB #1 ([id number]);   Surgeon: Shanta Bashir MD;  Location: [facility] OR;  Service: Pain Management     NERVE BLOCK Bilateral 5/11/2018    Procedure: B/L L3 L4 L5 S1 Medial Branch Block #2;  Surgeon: Perla Elkins MD;  Location: [facility] MAIN OR;  Service: Pain Management     NOSE SURGERY      GA COLONOSCOPY FLX DX W/COLLJ SPEC WHEN PFRMD N/A 3/6/2017    Procedure: COLONOSCOPY;  Surgeon: Escobar Cardoza MD;  Location:  GI LAB; Service: Gastroenterology    GA SURG IMPLNT NEUROELECT,EPIDURAL Left 10/3/2017    Procedure: PLACEMENT THORACIC FOR INSERTION DORSAL COLUMN SPINAL CORD STIMULATOR (DCS) WITH BUTTOCK IMPLANTABLE PULSE GENERATOR(IMPULSE); Surgeon: Rikki Runner, MD;  Location:  MAIN OR;  Service: Neurosurgery    [address] Right 5/18/2018    Procedure: Rt L3 L4 L5 S1 Radio Frequency Ablation ([id number]); Surgeon: Perla Elkins MD;  Location: [facility] MAIN OR;  Service: Pain Management     RADIOFREQUENCY ABLATION Left 6/1/2018    Procedure: Lt L3 L4 L5 S1 Radio Frequency Ablation ([id number]);   Surgeon: Perla Elkins MD;  Location: [facility] MAIN OR;  Service: Pain Management        Family History   Problem Relation Age of Onset    Diabetes Mother     Diabetes Father         mellitus     Heart attack Father 58    Hypertension Father        Social History     Occupational History    Occupation:  for distribution center    Tobacco Use    Smoking status: Current Every Day Smoker     Packs/day: 1 00     Years: 42 00     Pack years: 42 00    Smokeless tobacco: Never Used    Tobacco comment: encouraged smoking cessation - history of smoking 30 or more pack years    Substance and Sexual Activity    Alcohol use: No     Comment: rare    Drug use: No    Sexual activity: Not on file         Current Outpatient Medications:     atorvastatin (LIPITOR) 20 mg tablet, TAKE 1 TABLET (20 MG TOTAL) BY MOUTH DAILY, Disp: 90 tablet, Rfl: 0    clindamycin (CLEOCIN) 300 MG capsule, , Disp: , Rfl:     etodolac (LODINE) 300 MG capsule, Take 1 capsule (300 mg total) by mouth 3 (three) times a day as needed (pain (with food)), Disp: 90 capsule, Rfl: 1    fluticasone (FLONASE) 50 mcg/act nasal spray, 2 sprays into each nostril daily, Disp: 48 g, Rfl: 2    gabapentin (NEURONTIN) 300 mg capsule, TAKE 1 CAPSULES THREE TIMES DAILY, Disp: 180 capsule, Rfl: 0    glipiZIDE (GLUCOTROL XL) 10 mg 24 hr tablet, Take 1 tablet (10 mg total) by mouth daily, Disp: 90 tablet, Rfl: 0    lidocaine (LIDODERM) 5 %, Apply 2 patches topically daily Remove & Discard patch within 12 hours or as directed by MD, Disp: 60 patch, Rfl: 0    lisinopril (ZESTRIL) 2 5 mg tablet, Take 1 tablet (2 5 mg total) by mouth daily, Disp: 90 tablet, Rfl: 0    metFORMIN (GLUCOPHAGE) 1000 MG tablet, Take 1 tablet (1,000 mg total) by mouth 2 (two) times a day with meals, Disp: 180 tablet, Rfl: 0    omeprazole (PriLOSEC) 20 mg delayed release capsule, Take 1 capsule (20 mg total) by mouth daily, Disp: 90 capsule, Rfl: 0    QUEtiapine (SEROquel) 100 mg tablet, Take one tablet by mouth daily at bedtime, Disp: 90 tablet, Rfl: 0    tamsulosin (FLOMAX) 0 4 mg, Take 2 capsules (0 8 mg total) by mouth daily with dinner, Disp: 180 capsule, Rfl: 0    traZODone (DESYREL) 50 mg tablet, Take 2 tablets (100 mg total) by mouth daily at bedtime, Disp: 180 tablet, Rfl: 0    [START ON 6/16/2021] oxyCODONE-acetaminophen (PERCOCET) 7 5-325 MG per tablet, Take 1 tablet by mouth 3 (three) times a day as needed for severe painMax Daily Amount: 3 tablets, Disp: 90 tablet, Rfl: 0    [START ON 7/16/2021] oxyCODONE-acetaminophen (PERCOCET) 7 5-325 MG per tablet, Take 1 tablet by mouth 3 (three) times a day as needed for severe painMax Daily Amount: 3 tablets, Disp: 90 tablet, Rfl: 0    Allergies   Allergen Reactions    Penicillins Swelling       Physical Exam:    /68   Pulse 76   Ht 5' 11" (1 803 m)   Wt 99 kg (218 lb 3 2 oz)   BMI 30 43 kg/m²     Constitutional:obese  Eyes:anicteric  HEENT:grossly intact  Neck:supple, symmetric, trachea midline and no masses   Pulmonary:even and unlabored  Cardiovascular:No edema or pitting edema present  Skin:Normal without rashes or lesions and well hydrated  Psychiatric:Mood and affect appropriate  Neurologic:Cranial Nerves II-XII grossly intact  Musculoskeletal:normal

## 2021-06-03 NOTE — PROGRESS NOTES
Pain Medicine Follow-Up Note    Assessment:  1  Chronic pain syndrome    2  Chronic low back pain    3  Postlaminectomy syndrome, lumbar    4  Lumbar spondylosis    5  Lumbar radiculopathy        Plan:  New Medications Ordered This Visit   Medications    etodolac (LODINE) 300 MG capsule     Sig: Take 1 capsule (300 mg total) by mouth 3 (three) times a day as needed (pain (with food))     Dispense:  90 capsule     Refill:  1    oxyCODONE-acetaminophen (PERCOCET) 7 5-325 MG per tablet     Sig: Take 1 tablet by mouth 3 (three) times a day as needed for severe painMax Daily Amount: 3 tablets     Dispense:  90 tablet     Refill:  0    oxyCODONE-acetaminophen (PERCOCET) 7 5-325 MG per tablet     Sig: Take 1 tablet by mouth 3 (three) times a day as needed for severe painMax Daily Amount: 3 tablets     Dispense:  90 tablet     Refill:  0     My impressions and treatment recommendations were discussed in detail with the patient who verbalized understanding and had no further questions  Given that the patient reports overall reduced pain and improved level functioning without significant side effects, I felt a reasonable to continue the patient on oxycodone/acetaminophen 7 5/325 mg 1 tablet up to 3 times daily as needed for pain along with etodolac 300 mg 1 tablet 3 times daily as needed for pain, with food  The risks and side effects of chronic opioid treatment were discussed in detail with the patient  Side effects include but are not limited to nausea, vomiting, GI intolerance, sedation, constipation, mental clouding, opioid-induced hyperalgesia, endocrine dysfunction, addiction, dependence, and tolerance  The patient was asked to take his medications only as prescribed and directed, never in excess, and never for any other reason other than for pain control  The patient was also asked to keep his medications out of the reach of others and away from children, preferably in a locked drawer   The patient verbalized understanding and wished to use these opioid medications  A urine drug test was collected at today's office visit as part of our medication management protocol  The point of care testing results were appropriate for what was being prescribed  The specimen will be sent for confirmatory testing  The drug screen is medically necessary because the patient is either dependent on opioid medication or is being considered for opioid medication therapy and the results could impact ongoing or future treatment  The drug screen is to evaluate for the presence or absence of prescribed, non-prescribed, and/or illicit drugs and substances  The patient also reports that he is having significant pain in his low back and left lower extremity what appears to be the left L5 and S1 distribution as well as the right L5 and S1 distributions  He states that he would like to proceed with left L5 and S1 transforaminal epidural steroid injections at this time and possibly right L5 and S1 transforaminal epidural steroid injections in the future  I felt it reasonable to schedule these procedures for him  The procedures, its risks, and benefits were explained in detail to the patient  Risks include but are not limited to bleeding, infection, hematoma formation, abscess formation, weakness, headache, failure the pain to improve, nerve irritation or damage, and potential worsening of the pain  The patient verbalized understanding and wished to proceed with the procedure  [place] Prescription Drug Monitoring Program report was reviewed and was appropriate     Follow-up is planned in 2 months time or sooner as warranted  Discharge instructions were provided  I personally saw and examined the patient and I agree with the above discussed plan of care  History of Present Illness:    Elie Pelayo is a 61 y o  male who presents to [facility] for interval re-evaluation of the above stated pain complaints  The patient has a past medical and chronic pain history as outlined in the assessment section  He was last seen on  April 1, 2021 at which time he was maintained on oxycodone /acetaminophen 7 5/325 mg 1 tablet 3 times daily as needed for pain  A t today's office visit, the patient's pain score is 6/10 on the verbal numerical pain rating scale  The patient states that his pain is primarily on the right side of his low back  He describes his pain as worse in the morning, evening, and night  His pain is constant in nature  He reports the quality of his pain as dull/aching, throbbing, and shooting  He is reporting excellent pain relief since undergoing the procedures , but reports that his pain is worsening at this point and would like to undergo repeat procedures currently  He denies opioid  Induced constipation currently  Pain Contract Signed:  01/11/2021  Last Urine Drug Screen: 04/01/2021    Other than as stated above, the patient denies any interval changes in medications, medical condition, mental condition, symptoms, or allergies since the last office visit  Review of Systems:    Review of Systems   Respiratory: Negative for shortness of breath  Cardiovascular: Negative for chest pain  Gastrointestinal: Negative for constipation, diarrhea, nausea and vomiting  Musculoskeletal: Positive for gait problem  Negative for arthralgias, joint swelling and myalgias  Decreased ROM, Joint Stiffness   Skin: Negative for rash  Neurological: Negative for dizziness, seizures and weakness  All other systems reviewed and are negative          Patient Active Problem List   Diagnosis    Heartburn    Erectile dysfunction of non-organic origin    DM type 2 (diabetes mellitus, type 2) ([facility])    Chronic low back pain    BPH with urinary obstruction    Blood pressure elevated without history of HTN    Sciatica    Myofascial pain syndrome    Lumbar spondylosis    Herniation of lumbar intervertebral disc with radiculopathy    Acute post-operative pain    Insomnia    Chronic pain syndrome    Low back pain    Class 1 obesity due to excess calories with serious comorbidity and body mass index (BMI) of 30 0 to 30 9 in adult    Smoker    Seasonal allergic rhinitis    Diabetic mononeuropathy associated with type 2 diabetes mellitus (HCC)    Postlaminectomy syndrome, lumbar region    Numbness and tingling in both hands    Intervertebral disc disorder with radiculopathy of lumbosacral region    Acute bronchitis    Mixed hyperlipidemia    Proteinuria       Past Medical History:   Diagnosis Date    Arthritis     BPH (benign prostatic hypertrophy) with urinary obstruction     Chronic pain disorder     Ear infection     Herniation of lumbar intervertebral disc with radiculopathy     Myofascial pain syndrome     Obesity     Sciatica        Past Surgical History:   Procedure Laterality Date    ABSCESS DRAINAGE      groin    BACK SURGERY      implant - resolved 2004    CAUDAL BLOCK N/A 10/26/2018    Procedure: Caudal Epidural Steroid Injection (77966); Surgeon: Jonatan Turner MD;  Location: [facility] MAIN OR;  Service: Pain Management     CAUDAL BLOCK N/A 11/30/2018    Procedure: Caudal Epidural Steroid Injection (42807); Surgeon: Jonatan Turner MD;  Location: [facility] MAIN OR;  Service: Pain Management     COLONOSCOPY      EPIDURAL BLOCK INJECTION Right 5/10/2019    Procedure: L5 S1 transforaminal Epidural Steroid Injection ([id number];  Surgeon: Jonatan Turner MD;  Location: [place] [address] MAIN OR;  Service: Pain Management     EPIDURAL BLOCK INJECTION Right 8/16/2019    Procedure: BLOCK / INJECTION EPIDURAL STEROID TRANSFORAMINAL;  Surgeon: Jonatan Turner MD;  Location: [place] [address] MAIN OR;  Service: Pain Management     NERVE BLOCK Bilateral 4/26/2018    Procedure: B/L L3 L4 L5 S1 MBB #1 ([id number]);   Surgeon: Jonatan Turner MD;  Location: [facility] MAIN OR;  Service: Pain Management     NERVE BLOCK Bilateral 5/11/2018    Procedure: B/L L3 L4 L5 S1 Medial Branch Block #2;  Surgeon: Alex Carrasco MD;  Location: [place] [address] MAIN OR;  Service: Pain Management     NOSE SURGERY      MS COLONOSCOPY FLX DX W/COLLJ SPEC WHEN PFRMD N/A 3/6/2017    Procedure: COLONOSCOPY;  Surgeon: Althae Biggs MD;  [facility]; Service: Gastroenterology    MS SURG IMPLNT NEUROELECT,EPIDURAL Left 10/3/2017    Procedure: PLACEMENT THORACIC FOR INSERTION DORSAL COLUMN SPINAL CORD STIMULATOR (DCS) WITH BUTTOCK IMPLANTABLE PULSE GENERATOR(IMPULSE); Surgeon: Efrain Quintero MD;  Location:  MAIN OR;  Service: Neurosurgery    [address] Right 5/18/2018    Procedure: Rt L3 L4 L5 S1 Radio Frequency Ablation ([id number]); Surgeon: Alex Carrasco MD;  Location[facility] MAIN OR;  Service: Pain Management     RADIOFREQUENCY ABLATION Left 6/1/2018    Procedure: Lt L3 L4 L5 S1 Radio Frequency Ablation ([id number]);   Surgeon: Alex Carrasco MD;  Location: [facility] MAIN OR;  Service: Pain Management        Family History   Problem Relation Age of Onset    Diabetes Mother     Diabetes Father         mellitus     Heart attack Father 58    Hypertension Father        Social History     Occupational History    Occupation:  for distribution center    Tobacco Use    Smoking status: Current Every Day Smoker     Packs/day: 1 00     Years: 42 00     Pack years: 42 00    Smokeless tobacco: Never Used    Tobacco comment: encouraged smoking cessation - history of smoking 30 or more pack years    Substance and Sexual Activity    Alcohol use: No     Comment: rare    Drug use: No    Sexual activity: Not on file         Current Outpatient Medications:     atorvastatin (LIPITOR) 20 mg tablet, TAKE 1 TABLET (20 MG TOTAL) BY MOUTH DAILY, Disp: 90 tablet, Rfl: 0    clindamycin (CLEOCIN) 300 MG capsule, , Disp: , Rfl:     etodolac (LODINE) 300 MG capsule, Take 1 capsule (300 mg total) by mouth 3 (three) times a day as needed (pain (with food)), Disp: 90 capsule, Rfl: 1    fluticasone (FLONASE) 50 mcg/act nasal spray, 2 sprays into each nostril daily, Disp: 48 g, Rfl: 2    gabapentin (NEURONTIN) 300 mg capsule, TAKE 1 CAPSULES THREE TIMES DAILY, Disp: 180 capsule, Rfl: 0    glipiZIDE (GLUCOTROL XL) 10 mg 24 hr tablet, Take 1 tablet (10 mg total) by mouth daily, Disp: 90 tablet, Rfl: 0    lidocaine (LIDODERM) 5 %, Apply 2 patches topically daily Remove & Discard patch within 12 hours or as directed by MD, Disp: 60 patch, Rfl: 0    lisinopril (ZESTRIL) 2 5 mg tablet, Take 1 tablet (2 5 mg total) by mouth daily, Disp: 90 tablet, Rfl: 0    metFORMIN (GLUCOPHAGE) 1000 MG tablet, Take 1 tablet (1,000 mg total) by mouth 2 (two) times a day with meals, Disp: 180 tablet, Rfl: 0    omeprazole (PriLOSEC) 20 mg delayed release capsule, Take 1 capsule (20 mg total) by mouth daily, Disp: 90 capsule, Rfl: 0    QUEtiapine (SEROquel) 100 mg tablet, Take one tablet by mouth daily at bedtime, Disp: 90 tablet, Rfl: 0    tamsulosin (FLOMAX) 0 4 mg, Take 2 capsules (0 8 mg total) by mouth daily with dinner, Disp: 180 capsule, Rfl: 0    traZODone (DESYREL) 50 mg tablet, Take 2 tablets (100 mg total) by mouth daily at bedtime, Disp: 180 tablet, Rfl: 0    [START ON 6/16/2021] oxyCODONE-acetaminophen (PERCOCET) 7 5-325 MG per tablet, Take 1 tablet by mouth 3 (three) times a day as needed for severe painMax Daily Amount: 3 tablets, Disp: 90 tablet, Rfl: 0    [START ON 7/16/2021] oxyCODONE-acetaminophen (PERCOCET) 7 5-325 MG per tablet, Take 1 tablet by mouth 3 (three) times a day as needed for severe painMax Daily Amount: 3 tablets, Disp: 90 tablet, Rfl: 0    Allergies   Allergen Reactions    Penicillins Swelling       Physical Exam:    /68   Pulse 76   Ht 5' 11" (1 803 m)   Wt 99 kg (218 lb 3 2 oz)   BMI 30 43 kg/m²     Constitutional:obese  Eyes:anicteric  HEENT:grossly intact  Neck:supple, symmetric, trachea midline and no masses   Pulmonary:even and unlabored  Cardiovascular:No edema or pitting edema present  Skin:Normal without rashes or lesions and well hydrated  Psychiatric:Mood and affect appropriate  Neurologic:Cranial Nerves II-XII grossly intact  Musculoskeletal:normal

## 2021-06-03 NOTE — TELEPHONE ENCOUNTER
Scheduled patient for 7/1/21 & 7/15/21  Patient denies RX blood thinners/ NSAIDS  Nothing to eat or drink 1 hour prior to procedure  Needs to arrange transportation  Proper clothing for procedure  No vaccines 2 weeks prior or after procedure  If ill or place on antibiotics, please call to reschedule    COVID test be to done on 6/26/21 & 7/9/21 at [facility]

## 2021-06-10 ENCOUNTER — OFFICE VISIT (OUTPATIENT)
Dept: NEUROSURGERY | Facility: CLINIC | Age: 61
End: 2021-06-10
Payer: OTHER MISCELLANEOUS

## 2021-06-10 VITALS
SYSTOLIC BLOOD PRESSURE: 122 MMHG | BODY MASS INDEX: 30.52 KG/M2 | TEMPERATURE: 96.7 F | RESPIRATION RATE: 16 BRPM | HEART RATE: 76 BPM | DIASTOLIC BLOOD PRESSURE: 60 MMHG | WEIGHT: 218 LBS | HEIGHT: 71 IN

## 2021-06-10 DIAGNOSIS — M96.1 POSTLAMINECTOMY SYNDROME: ICD-10-CM

## 2021-06-10 DIAGNOSIS — G89.4 CHRONIC PAIN SYNDROME: Primary | ICD-10-CM

## 2021-06-10 PROCEDURE — 99215 OFFICE O/P EST HI 40 MIN: CPT | Performed by: NEUROLOGICAL SURGERY

## 2021-06-10 RX ORDER — VANCOMYCIN HYDROCHLORIDE 1 G/200ML
1000 INJECTION, SOLUTION INTRAVENOUS ONCE
Status: CANCELLED | OUTPATIENT
Start: 2021-06-29 | End: 2021-06-10

## 2021-06-10 RX ORDER — CHLORHEXIDINE GLUCONATE 0.12 MG/ML
15 RINSE ORAL ONCE
Status: CANCELLED | OUTPATIENT
Start: 2021-06-29 | End: 2021-06-10

## 2021-06-10 NOTE — H&P (VIEW-ONLY)
Assessment/Plan:    No problem-specific Assessment & Plan notes found for this encounter  Patient is stable  Symptoms, as detailed in HPI, continue to significantly impact of patient's quality of life in daily activities  After carefully considering presentation, investigations, functional status and co-morbidities, the risk/benefit profile of surgical intervention is favorable  History, physical examination and diagnostic tests were reviewed and questions answered  Diagnosis, care plan and treatment options were discussed  The patient understand instructions and will follow up as directed  Patient with chronic low back and right leg pain who has good relief with abbott stimulator system  His generator is end of life and requires a generator replacement  OR for replacement of generator on the left he does want a large generator  Expected postoperative course, including activity restrictions, expected pain and postoperative medication were reviewed  Patient provided verbal consent to surgical procedure and signed consent form: Yes    We also discussed the risks and benefits of the procedure the risks being including but not limited too: infection (~2%), hardware issues  The benefits including relief of pain  The patient stated understanding of the risks and benefits and agreed to proceed          Diagnoses and all orders for this visit:    Chronic pain syndrome  -     Case request operating room: REPLACEMENT IMPLANTABLE PULSE GENERATOR DORSAL SPINAL COLUMN STIMULATOR, LEFT; Standing  -     Case request operating room: REPLACEMENT IMPLANTABLE PULSE GENERATOR DORSAL SPINAL COLUMN STIMULATOR, LEFT    Postlaminectomy syndrome  -     Case request operating room: REPLACEMENT IMPLANTABLE PULSE GENERATOR DORSAL SPINAL COLUMN STIMULATOR, LEFT; Standing  -     Case request operating room: REPLACEMENT IMPLANTABLE PULSE GENERATOR DORSAL SPINAL COLUMN STIMULATOR, LEFT    Other orders  -     Diet NPO; Sips with meds; Standing  -     Nursing Communication [facility] Interventions Implemented; Standing  -     Nursing Communication CHG bath, have staff wash entire body (neck down) per pre-op bathing protocol  Routine, evening prior to, and day of surgery ; Standing  -     Nursing Communication Swab both nares with Povidone-Iodine solution, EXCLUDE if patient has shellfish/Iodine allergy  Routine, day of surgery, on call to OR; Standing  -     chlorhexidine (PERIDEX) 0 12 % oral rinse 15 mL  -     Void on call to OR; Standing  -     Insert peripheral IV; Standing  -     vancomycin (VANCOCIN) IVPB (premix in dextrose) 1,000 mg 200 mL        I have spent 40 minutes with Patient  today in which greater than 50% of this time was spent in counseling/coordination of care regarding Diagnostic results, Prognosis, Risks and benefits of tx options, Intructions for management, Patient and family education, Importance of tx compliance, Risk factor reductions and Impressions  Subjective:      Patient ID: Rivka Oakley is a 61 y o  male  Patient is a 61year old male with symptoms of chronic low back and right leg pain  He had an abbott stimulator with good relief  He is end of generator life and does require a replacement generator  He is a patient of Dr Olmedo  He does note that he has high energy demand from his stimulator  The following portions of the patient's history were reviewed and updated as appropriate:   He  has a past medical history of Arthritis, BPH (benign prostatic hypertrophy) with urinary obstruction, Chronic pain disorder, Ear infection, Herniation of lumbar intervertebral disc with radiculopathy, Myofascial pain syndrome, Obesity, and Sciatica    He   Patient Active Problem List    Diagnosis Date Noted    Mixed hyperlipidemia 07/31/2019    Proteinuria 07/31/2019    Acute bronchitis 06/03/2019    Intervertebral disc disorder with radiculopathy of lumbosacral region 05/02/2019    Numbness and tingling in both hands 04/10/2019    Postlaminectomy syndrome, lumbar region 10/23/2018    Diabetic mononeuropathy associated with type 2 diabetes mellitus ([facility] [address] ) 10/12/2018    Smoker 09/12/2018    Seasonal allergic rhinitis 09/12/2018    Class 1 obesity due to excess calories with serious comorbidity and body mass index (BMI) of 30 0 to 30 9 in adult 05/31/2018    Chronic pain syndrome 04/17/2018    Low back pain 04/17/2018    Insomnia 03/13/2018    Acute post-operative pain 10/02/2017    Blood pressure elevated without history of HTN 09/12/2017    Heartburn 02/07/2017    DM type 2 (diabetes mellitus, type 2) ([facility] [address] ) 01/03/2017    BPH with urinary obstruction 01/03/2017    Lumbar spondylosis 09/26/2016    Myofascial pain syndrome 10/21/2013    Herniation of lumbar intervertebral disc with radiculopathy 10/21/2013    Erectile dysfunction of non-organic origin 09/03/2013    Chronic low back pain 09/03/2013    Sciatica 09/03/2013     He  has a past surgical history that includes Nose surgery; Abscess drainage; pr colonoscopy flx dx w/collj spec when pfrmd (N/A, 3/6/2017); Colonoscopy; pr surg implnt neuroelect,epidural (Left, 10/3/2017); NERVE BLOCK (Bilateral, 4/26/2018); NERVE BLOCK (Bilateral, 5/11/2018); Back surgery; Radiofrequency ablation (Right, 5/18/2018); Radiofrequency ablation (Left, 6/1/2018); Caudal block (N/A, 10/26/2018); Caudal block (N/A, 11/30/2018); Epidural block injection (Right, 5/10/2019); and Epidural block injection (Right, 8/16/2019)  His family history includes Diabetes in his father and mother; Heart attack (age of onset: 58) in his father; Hypertension in his father  He  reports that he has been smoking  He has a 42 00 pack-year smoking history  He has never used smokeless tobacco  He reports that he does not drink alcohol or use drugs    Current Outpatient Medications   Medication Sig Dispense Refill    atorvastatin (LIPITOR) 20 mg tablet TAKE 1 TABLET (20 MG TOTAL) BY MOUTH DAILY 90 tablet 0    etodolac (LODINE) 300 MG capsule Take 1 capsule (300 mg total) by mouth 3 (three) times a day as needed (pain (with food)) 90 capsule 1    fluticasone (FLONASE) 50 mcg/act nasal spray 2 sprays into each nostril daily 48 g 2    gabapentin (NEURONTIN) 300 mg capsule TAKE 1 CAPSULES THREE TIMES DAILY 180 capsule 0    glipiZIDE (GLUCOTROL XL) 10 mg 24 hr tablet Take 1 tablet (10 mg total) by mouth daily 90 tablet 0    lidocaine (LIDODERM) 5 % Apply 2 patches topically daily Remove & Discard patch within 12 hours or as directed by MD 60 patch 0    lisinopril (ZESTRIL) 2 5 mg tablet Take 1 tablet (2 5 mg total) by mouth daily 90 tablet 0    metFORMIN (GLUCOPHAGE) 1000 MG tablet Take 1 tablet (1,000 mg total) by mouth 2 (two) times a day with meals 180 tablet 0    omeprazole (PriLOSEC) 20 mg delayed release capsule Take 1 capsule (20 mg total) by mouth daily 90 capsule 0    [START ON 7/16/2021] oxyCODONE-acetaminophen (PERCOCET) 7 5-325 MG per tablet Take 1 tablet by mouth 3 (three) times a day as needed for severe painMax Daily Amount: 3 tablets 90 tablet 0    QUEtiapine (SEROquel) 100 mg tablet Take one tablet by mouth daily at bedtime 90 tablet 0    tamsulosin (FLOMAX) 0 4 mg Take 2 capsules (0 8 mg total) by mouth daily with dinner 180 capsule 0    traZODone (DESYREL) 50 mg tablet Take 2 tablets (100 mg total) by mouth daily at bedtime 180 tablet 0    clindamycin (CLEOCIN) 300 MG capsule       [START ON 6/16/2021] oxyCODONE-acetaminophen (PERCOCET) 7 5-325 MG per tablet Take 1 tablet by mouth 3 (three) times a day as needed for severe painMax Daily Amount: 3 tablets (Patient not taking: Reported on 6/10/2021) 90 tablet 0     No current facility-administered medications for this visit        Current Outpatient Medications on File Prior to Visit   Medication Sig    atorvastatin (LIPITOR) 20 mg tablet TAKE 1 TABLET (20 MG TOTAL) BY MOUTH DAILY    etodolac (LODINE) 300 MG capsule Take 1 capsule (300 mg total) by mouth 3 (three) times a day as needed (pain (with food))    fluticasone (FLONASE) 50 mcg/act nasal spray 2 sprays into each nostril daily    gabapentin (NEURONTIN) 300 mg capsule TAKE 1 CAPSULES THREE TIMES DAILY    glipiZIDE (GLUCOTROL XL) 10 mg 24 hr tablet Take 1 tablet (10 mg total) by mouth daily    lidocaine (LIDODERM) 5 % Apply 2 patches topically daily Remove & Discard patch within 12 hours or as directed by MD    lisinopril (ZESTRIL) 2 5 mg tablet Take 1 tablet (2 5 mg total) by mouth daily    metFORMIN (GLUCOPHAGE) 1000 MG tablet Take 1 tablet (1,000 mg total) by mouth 2 (two) times a day with meals    omeprazole (PriLOSEC) 20 mg delayed release capsule Take 1 capsule (20 mg total) by mouth daily    [START ON 7/16/2021] oxyCODONE-acetaminophen (PERCOCET) 7 5-325 MG per tablet Take 1 tablet by mouth 3 (three) times a day as needed for severe painMax Daily Amount: 3 tablets    QUEtiapine (SEROquel) 100 mg tablet Take one tablet by mouth daily at bedtime    tamsulosin (FLOMAX) 0 4 mg Take 2 capsules (0 8 mg total) by mouth daily with dinner    traZODone (DESYREL) 50 mg tablet Take 2 tablets (100 mg total) by mouth daily at bedtime    clindamycin (CLEOCIN) 300 MG capsule     [START ON 6/16/2021] oxyCODONE-acetaminophen (PERCOCET) 7 5-325 MG per tablet Take 1 tablet by mouth 3 (three) times a day as needed for severe painMax Daily Amount: 3 tablets (Patient not taking: Reported on 6/10/2021)     No current facility-administered medications on file prior to visit  He is allergic to penicillins       Review of Systems   Constitutional: Negative  HENT: Negative  Eyes: Negative  Respiratory: Negative  Cardiovascular: Negative  Gastrointestinal: Negative  Endocrine: Negative  Genitourinary: Negative  Musculoskeletal: Positive for back pain (lbp into right leg) and myalgias (cramping in legs)          SCS placed for LBP, right leg pain   Skin: Negative  Allergic/Immunologic: Negative  Neurological: Positive for numbness (right toe and outer shin)  Hematological: Negative  Psychiatric/Behavioral: Negative  I have personally reviewed all aspects of the review of systems as documented    Objective:      /60 (BP Location: Right arm)   Pulse 76   Temp (!) 96 7 °F (35 9 °C) (Tympanic)   Resp 16   Ht 5' 11" (1 803 m)   Wt 98 9 kg (218 lb)   BMI 30 40 kg/m²          Physical Exam  Constitutional:       General: He is not in acute distress  Appearance: Normal appearance  He is normal weight  He is not ill-appearing  HENT:      Head: Normocephalic and atraumatic  Eyes:      General:         Right eye: No discharge  Left eye: No discharge  Extraocular Movements: Extraocular movements intact  Conjunctiva/sclera: Conjunctivae normal       Pupils: Pupils are equal, round, and reactive to light  Neck:      Musculoskeletal: Normal range of motion  Cardiovascular:      Rate and Rhythm: Normal rate and regular rhythm  Pulmonary:      Effort: Pulmonary effort is normal  No respiratory distress  Abdominal:      General: Abdomen is flat  Palpations: Abdomen is soft  Musculoskeletal: Normal range of motion  Skin:     General: Skin is warm and dry  Neurological:      General: No focal deficit present  Mental Status: He is alert and oriented to person, place, and time  Mental status is at baseline  Psychiatric:         Mood and Affect: Mood normal          Thought Content:  Thought content normal

## 2021-06-10 NOTE — PROGRESS NOTES
Assessment/Plan:    No problem-specific Assessment & Plan notes found for this encounter  Patient is stable  Symptoms, as detailed in HPI, continue to significantly impact of patient's quality of life in daily activities  After carefully considering presentation, investigations, functional status and co-morbidities, the risk/benefit profile of surgical intervention is favorable  History, physical examination and diagnostic tests were reviewed and questions answered  Diagnosis, care plan and treatment options were discussed  The patient understand instructions and will follow up as directed  Patient with chronic low back and right leg pain who has good relief with abbott stimulator system  His generator is end of life and requires a generator replacement  OR for replacement of generator on the left he does want a large generator  Expected postoperative course, including activity restrictions, expected pain and postoperative medication were reviewed  Patient provided verbal consent to surgical procedure and signed consent form: Yes    We also discussed the risks and benefits of the procedure the risks being including but not limited too: infection (~2%), hardware issues  The benefits including relief of pain  The patient stated understanding of the risks and benefits and agreed to proceed          Diagnoses and all orders for this visit:    Chronic pain syndrome  -     Case request operating room: REPLACEMENT IMPLANTABLE PULSE GENERATOR DORSAL SPINAL COLUMN STIMULATOR, LEFT; Standing  -     Case request operating room: REPLACEMENT IMPLANTABLE PULSE GENERATOR DORSAL SPINAL COLUMN STIMULATOR, LEFT    Postlaminectomy syndrome  -     Case request operating room: REPLACEMENT IMPLANTABLE PULSE GENERATOR DORSAL SPINAL COLUMN STIMULATOR, LEFT; Standing  -     Case request operating room: REPLACEMENT IMPLANTABLE PULSE GENERATOR DORSAL SPINAL COLUMN STIMULATOR, LEFT    Other orders  -     Diet NPO; Sips with meds; Standing  -     Nursing Communication [facility] Interventions Implemented; Standing  -     Nursing Communication CHG bath, have staff wash entire body (neck down) per pre-op bathing protocol  Routine, evening prior to, and day of surgery ; Standing  -     Nursing Communication Swab both nares with Povidone-Iodine solution, EXCLUDE if patient has shellfish/Iodine allergy  Routine, day of surgery, on call to OR; Standing  -     chlorhexidine (PERIDEX) 0 12 % oral rinse 15 mL  -     Void on call to OR; Standing  -     Insert peripheral IV; Standing  -     vancomycin (VANCOCIN) IVPB (premix in dextrose) 1,000 mg 200 mL        I have spent 40 minutes with Patient  today in which greater than 50% of this time was spent in counseling/coordination of care regarding Diagnostic results, Prognosis, Risks and benefits of tx options, Intructions for management, Patient and family education, Importance of tx compliance, Risk factor reductions and Impressions  Subjective:      Patient ID: Manjit Silva is a 61 y o  male  Patient is a 61year old male with symptoms of chronic low back and right leg pain  He had an abbott stimulator with good relief  He is end of generator life and does require a replacement generator  He is a patient of Dr Olmedo  He does note that he has high energy demand from his stimulator  The following portions of the patient's history were reviewed and updated as appropriate:   He  has a past medical history of Arthritis, BPH (benign prostatic hypertrophy) with urinary obstruction, Chronic pain disorder, Ear infection, Herniation of lumbar intervertebral disc with radiculopathy, Myofascial pain syndrome, Obesity, and Sciatica    He   Patient Active Problem List    Diagnosis Date Noted    Mixed hyperlipidemia 07/31/2019    Proteinuria 07/31/2019    Acute bronchitis 06/03/2019    Intervertebral disc disorder with radiculopathy of lumbosacral region 05/02/2019    Numbness and tingling in both hands 04/10/2019    Postlaminectomy syndrome, lumbar region 10/23/2018    Diabetic mononeuropathy associated with type 2 diabetes mellitus ([facility] [address] ) 10/12/2018    Smoker 09/12/2018    Seasonal allergic rhinitis 09/12/2018    Class 1 obesity due to excess calories with serious comorbidity and body mass index (BMI) of 30 0 to 30 9 in adult 05/31/2018    Chronic pain syndrome 04/17/2018    Low back pain 04/17/2018    Insomnia 03/13/2018    Acute post-operative pain 10/02/2017    Blood pressure elevated without history of HTN 09/12/2017    Heartburn 02/07/2017    DM type 2 (diabetes mellitus, type 2) ([facility] [address] ) 01/03/2017    BPH with urinary obstruction 01/03/2017    Lumbar spondylosis 09/26/2016    Myofascial pain syndrome 10/21/2013    Herniation of lumbar intervertebral disc with radiculopathy 10/21/2013    Erectile dysfunction of non-organic origin 09/03/2013    Chronic low back pain 09/03/2013    Sciatica 09/03/2013     He  has a past surgical history that includes Nose surgery; Abscess drainage; pr colonoscopy flx dx w/collj spec when pfrmd (N/A, 3/6/2017); Colonoscopy; pr surg implnt neuroelect,epidural (Left, 10/3/2017); NERVE BLOCK (Bilateral, 4/26/2018); NERVE BLOCK (Bilateral, 5/11/2018); Back surgery; Radiofrequency ablation (Right, 5/18/2018); Radiofrequency ablation (Left, 6/1/2018); Caudal block (N/A, 10/26/2018); Caudal block (N/A, 11/30/2018); Epidural block injection (Right, 5/10/2019); and Epidural block injection (Right, 8/16/2019)  His family history includes Diabetes in his father and mother; Heart attack (age of onset: 58) in his father; Hypertension in his father  He  reports that he has been smoking  He has a 42 00 pack-year smoking history  He has never used smokeless tobacco  He reports that he does not drink alcohol or use drugs    Current Outpatient Medications   Medication Sig Dispense Refill    atorvastatin (LIPITOR) 20 mg tablet TAKE 1 TABLET (20 MG TOTAL) BY MOUTH DAILY 90 tablet 0    etodolac (LODINE) 300 MG capsule Take 1 capsule (300 mg total) by mouth 3 (three) times a day as needed (pain (with food)) 90 capsule 1    fluticasone (FLONASE) 50 mcg/act nasal spray 2 sprays into each nostril daily 48 g 2    gabapentin (NEURONTIN) 300 mg capsule TAKE 1 CAPSULES THREE TIMES DAILY 180 capsule 0    glipiZIDE (GLUCOTROL XL) 10 mg 24 hr tablet Take 1 tablet (10 mg total) by mouth daily 90 tablet 0    lidocaine (LIDODERM) 5 % Apply 2 patches topically daily Remove & Discard patch within 12 hours or as directed by MD 60 patch 0    lisinopril (ZESTRIL) 2 5 mg tablet Take 1 tablet (2 5 mg total) by mouth daily 90 tablet 0    metFORMIN (GLUCOPHAGE) 1000 MG tablet Take 1 tablet (1,000 mg total) by mouth 2 (two) times a day with meals 180 tablet 0    omeprazole (PriLOSEC) 20 mg delayed release capsule Take 1 capsule (20 mg total) by mouth daily 90 capsule 0    [START ON 7/16/2021] oxyCODONE-acetaminophen (PERCOCET) 7 5-325 MG per tablet Take 1 tablet by mouth 3 (three) times a day as needed for severe painMax Daily Amount: 3 tablets 90 tablet 0    QUEtiapine (SEROquel) 100 mg tablet Take one tablet by mouth daily at bedtime 90 tablet 0    tamsulosin (FLOMAX) 0 4 mg Take 2 capsules (0 8 mg total) by mouth daily with dinner 180 capsule 0    traZODone (DESYREL) 50 mg tablet Take 2 tablets (100 mg total) by mouth daily at bedtime 180 tablet 0    clindamycin (CLEOCIN) 300 MG capsule       [START ON 6/16/2021] oxyCODONE-acetaminophen (PERCOCET) 7 5-325 MG per tablet Take 1 tablet by mouth 3 (three) times a day as needed for severe painMax Daily Amount: 3 tablets (Patient not taking: Reported on 6/10/2021) 90 tablet 0     No current facility-administered medications for this visit        Current Outpatient Medications on File Prior to Visit   Medication Sig    atorvastatin (LIPITOR) 20 mg tablet TAKE 1 TABLET (20 MG TOTAL) BY MOUTH DAILY    etodolac (LODINE) 300 MG capsule Take 1 capsule (300 mg total) by mouth 3 (three) times a day as needed (pain (with food))    fluticasone (FLONASE) 50 mcg/act nasal spray 2 sprays into each nostril daily    gabapentin (NEURONTIN) 300 mg capsule TAKE 1 CAPSULES THREE TIMES DAILY    glipiZIDE (GLUCOTROL XL) 10 mg 24 hr tablet Take 1 tablet (10 mg total) by mouth daily    lidocaine (LIDODERM) 5 % Apply 2 patches topically daily Remove & Discard patch within 12 hours or as directed by MD    lisinopril (ZESTRIL) 2 5 mg tablet Take 1 tablet (2 5 mg total) by mouth daily    metFORMIN (GLUCOPHAGE) 1000 MG tablet Take 1 tablet (1,000 mg total) by mouth 2 (two) times a day with meals    omeprazole (PriLOSEC) 20 mg delayed release capsule Take 1 capsule (20 mg total) by mouth daily    [START ON 7/16/2021] oxyCODONE-acetaminophen (PERCOCET) 7 5-325 MG per tablet Take 1 tablet by mouth 3 (three) times a day as needed for severe painMax Daily Amount: 3 tablets    QUEtiapine (SEROquel) 100 mg tablet Take one tablet by mouth daily at bedtime    tamsulosin (FLOMAX) 0 4 mg Take 2 capsules (0 8 mg total) by mouth daily with dinner    traZODone (DESYREL) 50 mg tablet Take 2 tablets (100 mg total) by mouth daily at bedtime    clindamycin (CLEOCIN) 300 MG capsule     [START ON 6/16/2021] oxyCODONE-acetaminophen (PERCOCET) 7 5-325 MG per tablet Take 1 tablet by mouth 3 (three) times a day as needed for severe painMax Daily Amount: 3 tablets (Patient not taking: Reported on 6/10/2021)     No current facility-administered medications on file prior to visit  He is allergic to penicillins       Review of Systems   Constitutional: Negative  HENT: Negative  Eyes: Negative  Respiratory: Negative  Cardiovascular: Negative  Gastrointestinal: Negative  Endocrine: Negative  Genitourinary: Negative  Musculoskeletal: Positive for back pain (lbp into right leg) and myalgias (cramping in legs)          SCS placed for LBP, right leg pain   Skin: Negative  Allergic/Immunologic: Negative  Neurological: Positive for numbness (right toe and outer shin)  Hematological: Negative  Psychiatric/Behavioral: Negative  I have personally reviewed all aspects of the review of systems as documented    Objective:      /60 (BP Location: Right arm)   Pulse 76   Temp (!) 96 7 °F (35 9 °C) (Tympanic)   Resp 16   Ht 5' 11" (1 803 m)   Wt 98 9 kg (218 lb)   BMI 30 40 kg/m²          Physical Exam  Constitutional:       General: He is not in acute distress  Appearance: Normal appearance  He is normal weight  He is not ill-appearing  HENT:      Head: Normocephalic and atraumatic  Eyes:      General:         Right eye: No discharge  Left eye: No discharge  Extraocular Movements: Extraocular movements intact  Conjunctiva/sclera: Conjunctivae normal       Pupils: Pupils are equal, round, and reactive to light  Neck:      Musculoskeletal: Normal range of motion  Cardiovascular:      Rate and Rhythm: Normal rate and regular rhythm  Pulmonary:      Effort: Pulmonary effort is normal  No respiratory distress  Abdominal:      General: Abdomen is flat  Palpations: Abdomen is soft  Musculoskeletal: Normal range of motion  Skin:     General: Skin is warm and dry  Neurological:      General: No focal deficit present  Mental Status: He is alert and oriented to person, place, and time  Mental status is at baseline  Psychiatric:         Mood and Affect: Mood normal          Thought Content:  Thought content normal

## 2021-06-12 ENCOUNTER — VBI (OUTPATIENT)
Dept: ADMINISTRATIVE | Facility: OTHER | Age: 61
End: 2021-06-12

## 2021-06-14 ENCOUNTER — TRANSCRIBE ORDERS (OUTPATIENT)
Dept: ADMINISTRATIVE | Age: 61
End: 2021-06-14

## 2021-06-14 ENCOUNTER — LAB (OUTPATIENT)
Dept: LAB | Age: 61
End: 2021-06-14
Payer: OTHER MISCELLANEOUS

## 2021-06-14 DIAGNOSIS — G89.4 CHRONIC PAIN SYNDROME: ICD-10-CM

## 2021-06-14 DIAGNOSIS — M96.1 POSTLAMINECTOMY SYNDROME: ICD-10-CM

## 2021-06-14 DIAGNOSIS — Z12.5 PROSTATE CANCER SCREENING: ICD-10-CM

## 2021-06-14 DIAGNOSIS — K63.5 POLYP OF COLON, UNSPECIFIED PART OF COLON, UNSPECIFIED TYPE: ICD-10-CM

## 2021-06-14 LAB
ALBUMIN SERPL BCP-MCNC: 4.2 G/DL (ref 3.5–5)
ALP SERPL-CCNC: 77 U/L (ref 46–116)
ALT SERPL W P-5'-P-CCNC: 61 U/L (ref 12–78)
ANION GAP SERPL CALCULATED.3IONS-SCNC: 7 MMOL/L (ref 4–13)
APTT PPP: 31 SECONDS (ref 23–37)
AST SERPL W P-5'-P-CCNC: 25 U/L (ref 5–45)
BASOPHILS # BLD AUTO: 0.09 THOUSANDS/ΜL (ref 0–0.1)
BASOPHILS NFR BLD AUTO: 1 % (ref 0–1)
BILIRUB SERPL-MCNC: 0.3 MG/DL (ref 0.2–1)
BILIRUB UR QL STRIP: ABNORMAL
BUN SERPL-MCNC: 16 MG/DL (ref 5–25)
CALCIUM SERPL-MCNC: 9.1 MG/DL (ref 8.3–10.1)
CHLORIDE SERPL-SCNC: 106 MMOL/L (ref 100–108)
CLARITY UR: CLEAR
CO2 SERPL-SCNC: 25 MMOL/L (ref 21–32)
COLOR UR: ABNORMAL
CREAT SERPL-MCNC: 0.73 MG/DL (ref 0.6–1.3)
EOSINOPHIL # BLD AUTO: 0.27 THOUSAND/ΜL (ref 0–0.61)
EOSINOPHIL NFR BLD AUTO: 2 % (ref 0–6)
ERYTHROCYTE [DISTWIDTH] IN BLOOD BY AUTOMATED COUNT: 13.4 % (ref 11.6–15.1)
EST. AVERAGE GLUCOSE BLD GHB EST-MCNC: 128 MG/DL
GFR SERPL CREATININE-BSD FRML MDRD: 101 ML/MIN/1.73SQ M
GLUCOSE SERPL-MCNC: 101 MG/DL (ref 65–140)
GLUCOSE UR STRIP-MCNC: NEGATIVE MG/DL
HBA1C MFR BLD: 6.1 %
HCT VFR BLD AUTO: 45.5 % (ref 36.5–49.3)
HGB BLD-MCNC: 15.1 G/DL (ref 12–17)
HGB UR QL STRIP.AUTO: NEGATIVE
IMM GRANULOCYTES # BLD AUTO: 0.05 THOUSAND/UL (ref 0–0.2)
IMM GRANULOCYTES NFR BLD AUTO: 0 % (ref 0–2)
INR PPP: 1.01 (ref 0.84–1.19)
KETONES UR STRIP-MCNC: ABNORMAL MG/DL
LEUKOCYTE ESTERASE UR QL STRIP: NEGATIVE
LYMPHOCYTES # BLD AUTO: 2.54 THOUSANDS/ΜL (ref 0.6–4.47)
LYMPHOCYTES NFR BLD AUTO: 23 % (ref 14–44)
MCH RBC QN AUTO: 30.8 PG (ref 26.8–34.3)
MCHC RBC AUTO-ENTMCNC: 33.2 G/DL (ref 31.4–37.4)
MCV RBC AUTO: 93 FL (ref 82–98)
MONOCYTES # BLD AUTO: 0.69 THOUSAND/ΜL (ref 0.17–1.22)
MONOCYTES NFR BLD AUTO: 6 % (ref 4–12)
NEUTROPHILS # BLD AUTO: 7.5 THOUSANDS/ΜL (ref 1.85–7.62)
NEUTS SEG NFR BLD AUTO: 68 % (ref 43–75)
NITRITE UR QL STRIP: NEGATIVE
NRBC BLD AUTO-RTO: 0 /100 WBCS
PH UR STRIP.AUTO: 6 [PH]
PLATELET # BLD AUTO: 209 THOUSANDS/UL (ref 149–390)
PMV BLD AUTO: 10.4 FL (ref 8.9–12.7)
POTASSIUM SERPL-SCNC: 4.2 MMOL/L (ref 3.5–5.3)
PROT SERPL-MCNC: 7.2 G/DL (ref 6.4–8.2)
PROT UR STRIP-MCNC: NEGATIVE MG/DL
PROTHROMBIN TIME: 13.3 SECONDS (ref 11.6–14.5)
PSA SERPL-MCNC: 0.3 NG/ML (ref 0–4)
RBC # BLD AUTO: 4.91 MILLION/UL (ref 3.88–5.62)
SODIUM SERPL-SCNC: 138 MMOL/L (ref 136–145)
SP GR UR STRIP.AUTO: 1.02 (ref 1–1.03)
UROBILINOGEN UR QL STRIP.AUTO: 0.2 E.U./DL
WBC # BLD AUTO: 11.14 THOUSAND/UL (ref 4.31–10.16)

## 2021-06-14 PROCEDURE — 85610 PROTHROMBIN TIME: CPT

## 2021-06-14 PROCEDURE — 85025 COMPLETE CBC W/AUTO DIFF WBC: CPT

## 2021-06-14 PROCEDURE — 80053 COMPREHEN METABOLIC PANEL: CPT

## 2021-06-14 PROCEDURE — 83036 HEMOGLOBIN GLYCOSYLATED A1C: CPT

## 2021-06-14 PROCEDURE — 93005 ELECTROCARDIOGRAM TRACING: CPT

## 2021-06-14 PROCEDURE — G0103 PSA SCREENING: HCPCS

## 2021-06-14 PROCEDURE — 3044F HG A1C LEVEL LT 7.0%: CPT | Performed by: ANESTHESIOLOGY

## 2021-06-14 PROCEDURE — 36415 COLL VENOUS BLD VENIPUNCTURE: CPT

## 2021-06-14 PROCEDURE — 85730 THROMBOPLASTIN TIME PARTIAL: CPT

## 2021-06-14 PROCEDURE — 81003 URINALYSIS AUTO W/O SCOPE: CPT | Performed by: NEUROLOGICAL SURGERY

## 2021-06-15 LAB
ATRIAL RATE: 77 BPM
P AXIS: 79 DEGREES
PR INTERVAL: 186 MS
QRS AXIS: 50 DEGREES
QRSD INTERVAL: 82 MS
QT INTERVAL: 368 MS
QTC INTERVAL: 416 MS
T WAVE AXIS: 72 DEGREES
VENTRICULAR RATE: 77 BPM

## 2021-06-15 PROCEDURE — 93010 ELECTROCARDIOGRAM REPORT: CPT | Performed by: INTERNAL MEDICINE

## 2021-06-16 ENCOUNTER — OFFICE VISIT (OUTPATIENT)
Dept: FAMILY MEDICINE CLINIC | Facility: CLINIC | Age: 61
End: 2021-06-16
Payer: OTHER MISCELLANEOUS

## 2021-06-16 VITALS
SYSTOLIC BLOOD PRESSURE: 132 MMHG | OXYGEN SATURATION: 97 % | HEART RATE: 76 BPM | WEIGHT: 218 LBS | BODY MASS INDEX: 30.52 KG/M2 | HEIGHT: 71 IN | DIASTOLIC BLOOD PRESSURE: 60 MMHG

## 2021-06-16 DIAGNOSIS — N13.8 BPH WITH URINARY OBSTRUCTION: ICD-10-CM

## 2021-06-16 DIAGNOSIS — N40.1 BPH WITH URINARY OBSTRUCTION: ICD-10-CM

## 2021-06-16 DIAGNOSIS — F17.200 SMOKER: ICD-10-CM

## 2021-06-16 DIAGNOSIS — G89.4 CHRONIC PAIN SYNDROME: ICD-10-CM

## 2021-06-16 DIAGNOSIS — M96.1 POSTLAMINECTOMY SYNDROME: ICD-10-CM

## 2021-06-16 DIAGNOSIS — E78.5 HYPERLIPIDEMIA LDL GOAL <70: ICD-10-CM

## 2021-06-16 DIAGNOSIS — Z01.818 PRE-OP EXAMINATION: Primary | ICD-10-CM

## 2021-06-16 DIAGNOSIS — E11.9 TYPE 2 DIABETES MELLITUS WITHOUT COMPLICATION, WITHOUT LONG-TERM CURRENT USE OF INSULIN (HCC): ICD-10-CM

## 2021-06-16 DIAGNOSIS — K21.9 GASTROESOPHAGEAL REFLUX DISEASE WITHOUT ESOPHAGITIS: ICD-10-CM

## 2021-06-16 PROCEDURE — 99214 OFFICE O/P EST MOD 30 MIN: CPT | Performed by: FAMILY MEDICINE

## 2021-06-16 NOTE — PROGRESS NOTES
Assessment/Plan:   Diagnoses and all orders for this visit:    Pre-op examination  Chronic pain syndrome  Postlaminectomy syndrome  - pt scheduled for REPLACEMENT IMPLANTABLE PULSE GENERATOR DORSAL SPINAL COLUMN STIMULATOR, LEFT (Left Buttocks) on 6/29/2021 with Dr Benji Billingsley for treatment of Chronic pain syndrome and Postlaminectomy syndrome   - reviewed labs and EKG   - reviewed PMHx, PSHx, meds, allergies, Soc Hx   - advised to STOP smoking, statin, ASA, NSAIDs, Fish Oil and MVI 7days prior to procedure - pt aware and agreeable    PT IS AT ACCEPTABLE MEDICAL RISK FOR SURGICAL PROCEDURE     Type 2 diabetes mellitus without complication, without long-term current use of insulin (HCC)  Smoker  Hyperlipidemia LDL goal <70  Gastroesophageal reflux disease without esophagitis  BPH with urinary obstruction          Subjective:    Patient ID: Errol Lane is a 64 y o  male    Errol Lane is a 64 y o  male who presents to the office for pre-op eval   - scheduled for REPLACEMENT IMPLANTABLE PULSE GENERATOR DORSAL SPINAL COLUMN STIMULATOR, LEFT (Left Buttocks) on 6/29/2021 with Dr Benji Billingsley for treatment of Chronic pain syndrome and Postlaminectomy syndrome   - PMHx: DM2, DM neuropathy, HL, seasonal allergies, Sciatica, herniation of lumbar intervertebral disc with radiculopathy, lumbar spondylosis, BPH, GERD, insomnia, current smoker   - allergies: PCN - swelling   - Meds: Lipitor, Flonase, Gabapentin, Glipizide, ACEI, Metformin, Prilosec, Seroquel, Trazodone, Flomax   - PSHx: nose surgery, back surgery, abscess drainage   - Immunizations: UTD with Tdap and COVID IMMs   - social: current smoker (1PPD/42yrs), denies EtOH/illicits   - ROS: today in the office pt denies F/C/N/V/HA/visual changes/CP/palpitations/SOB/wheezing/abd pain/D/LE edema or calf tenderness      The following portions of the patient's history were reviewed and updated as appropriate: allergies, current medications, past family history, past medical history, past social history, past surgical history and problem list     Review of Systems  as per HPI    Objective:  /60 (BP Location: Left arm, Patient Position: Sitting, Cuff Size: Large)   Pulse 76   Ht 5' 11" (1 803 m)   Wt 98 9 kg (218 lb)   SpO2 97%   BMI 30 40 kg/m²    Physical Exam  Vitals reviewed  Constitutional:       General: He is not in acute distress  Appearance: Normal appearance  He is obese  He is not ill-appearing, toxic-appearing or diaphoretic  HENT:      Head: Normocephalic and atraumatic  Right Ear: External ear normal       Left Ear: External ear normal    Eyes:      General: No scleral icterus  Right eye: No discharge  Left eye: No discharge  Extraocular Movements: Extraocular movements intact  Conjunctiva/sclera: Conjunctivae normal    Cardiovascular:      Rate and Rhythm: Normal rate and regular rhythm  Heart sounds: Normal heart sounds  No murmur heard  No friction rub  No gallop  Pulmonary:      Effort: Pulmonary effort is normal  No respiratory distress  Breath sounds: Normal breath sounds  No stridor  No wheezing, rhonchi or rales  Abdominal:      General: Bowel sounds are normal       Palpations: Abdomen is soft  Musculoskeletal:         General: Normal range of motion  Cervical back: Normal range of motion  Right lower leg: No edema  Left lower leg: No edema  Skin:     General: Skin is warm and dry  Neurological:      General: No focal deficit present  Mental Status: He is alert and oriented to person, place, and time  Psychiatric:         Mood and Affect: Mood normal          Behavior: Behavior normal        BMI Counseling: Body mass index is 30 4 kg/m²  The BMI is above normal  Nutrition recommendations include reducing portion sizes and decreasing overall calorie intake   Exercise recommendations include moderate aerobic physical activity for 150 minutes/week, exercising 3-5 times per week, joining a gym and strength training exercises

## 2021-06-18 ENCOUNTER — TELEPHONE (OUTPATIENT)
Dept: FAMILY MEDICINE CLINIC | Facility: CLINIC | Age: 61
End: 2021-06-18

## 2021-06-18 DIAGNOSIS — L40.9 PSORIASIS: Primary | ICD-10-CM

## 2021-06-18 RX ORDER — CLOBETASOL PROPIONATE 0.5 MG/G
OINTMENT TOPICAL 2 TIMES DAILY
Qty: 30 G | Refills: 0 | Status: SHIPPED | OUTPATIENT
Start: 2021-06-18 | End: 2021-07-14

## 2021-06-18 NOTE — PRE-PROCEDURE INSTRUCTIONS
Pre-Surgery Instructions:   Medication Instructions    atorvastatin (LIPITOR) 20 mg tablet Instructed patient per Anesthesia Guidelines  takes in pm    fluticasone (FLONASE) 50 mcg/act nasal spray Instructed patient per Anesthesia Guidelines  may use     gabapentin (NEURONTIN) 300 mg capsule Instructed patient per Anesthesia Guidelines  take am of sx    glipiZIDE (GLUCOTROL XL) 10 mg 24 hr tablet Instructed patient per Anesthesia Guidelines  hold am of sx    lidocaine (LIDODERM) 5 % Instructed patient per Anesthesia Guidelines  prn    lisinopril (ZESTRIL) 2 5 mg tablet Instructed patient per Anesthesia Guidelines  hold am of sx    metFORMIN (GLUCOPHAGE) 1000 MG tablet Instructed patient per Anesthesia Guidelines  hold am of sx    omeprazole (PriLOSEC) 20 mg delayed release capsule Instructed patient per Anesthesia Guidelines  takes in pm    oxyCODONE-acetaminophen (PERCOCET) 7 5-325 MG per tablet Instructed patient per Anesthesia Guidelines  may take    QUEtiapine (SEROquel) 100 mg tablet Instructed patient per Anesthesia Guidelines  takes in pm    tamsulosin (FLOMAX) 0 4 mg Instructed patient per Anesthesia Guidelines  takes in pm    traZODone (DESYREL) 50 mg tablet Instructed patient per Anesthesia Guidelines  takes in pm    You will receive a phone call from hospital for arrival time  Please call surgeons office if any changes in your condition  Wear easy on/off clothing; consider type of surgery;  Valuables, jewelry, piercing's please keep at home  **COVID-19  education/surgical guidelines      Please: No contact lenses or eye make up, artificial eyelashes    Please secure transportation     Follow pre surgery showering or cleaning instructions as  Reviewed by nurse or surgeons office      Questions answered and concerns addressed

## 2021-06-18 NOTE — TELEPHONE ENCOUNTER
PT STATES YOUS DISCUSS HIS SKIN ISSUE ON HIS KNEES WHEN HE WAS HERE 2 DAYS AGO    HE FOUND THE MED AND IS ASKING IF YOU COULD FILL IT FOR 90DAYS  CLOBETASOL PROP  0 5%

## 2021-06-19 DIAGNOSIS — G47.00 INSOMNIA, UNSPECIFIED TYPE: ICD-10-CM

## 2021-06-23 ENCOUNTER — TELEPHONE (OUTPATIENT)
Dept: PAIN MEDICINE | Facility: CLINIC | Age: 61
End: 2021-06-23

## 2021-06-23 RX ORDER — TRAZODONE HYDROCHLORIDE 50 MG/1
TABLET ORAL
Qty: 180 TABLET | Refills: 0 | Status: SHIPPED | OUTPATIENT
Start: 2021-06-23 | End: 2021-08-24

## 2021-06-23 NOTE — TELEPHONE ENCOUNTER
Patient missed a call, I did not see a message on his chart  He placed me on hold to listen to message  He received call from  new policy no need to do Covid Test      He was good to go

## 2021-06-28 ENCOUNTER — TELEPHONE (OUTPATIENT)
Dept: NEUROSURGERY | Facility: CLINIC | Age: 61
End: 2021-06-28

## 2021-06-28 ENCOUNTER — DOCUMENTATION (OUTPATIENT)
Dept: NEUROSURGERY | Facility: CLINIC | Age: 61
End: 2021-06-28

## 2021-06-28 DIAGNOSIS — Z98.890 POSTOPERATIVE STATE: Primary | ICD-10-CM

## 2021-06-28 DIAGNOSIS — Z79.2 PROPHYLACTIC ANTIBIOTIC: ICD-10-CM

## 2021-06-28 RX ORDER — CLINDAMYCIN HYDROCHLORIDE 300 MG/1
600 CAPSULE ORAL EVERY 8 HOURS SCHEDULED
Qty: 18 CAPSULE | Refills: 0 | Status: SHIPPED | OUTPATIENT
Start: 2021-06-28 | End: 2021-07-01

## 2021-06-28 NOTE — TELEPHONE ENCOUNTER
Pre operative call day prior surgery scheduled in the AM w/ Dr Kirk Hanks, LEFT (Left Buttocks)    Discussion/Review    Allergies ---Reviewed   Hold medications --- Reviewed ETODOLAC NSAID  NPO after MN, night prior surgery ---Reviewed as instructed by ASU nurse  Medication (s) instructed by healthcare provider to take the morning of surgery w/ sip of water 4 OZ discussed: as instructed by ASU nurse  Post operative antibiotic electronic transmission to pharmacy: clindamycin     PDMP site reviewed accessed and reviewed scheduled drug list printed and scanned into record    Pain management script:opiate dependence , Dr Benedicto John percocet  Last fill 6/16 percocet 7 5/325 90 pills for 30 days ---use for postoperative pain   Pre- operative shower protocol reviewed; Clarify instructions as per protocol, third chlorhexidine shower tonight before surgery, then use WANAD wipes as per packaging instructions, Use a clean towel and wash cloth starting tonight and continue nightly until seen 2 weeks post operative visit for incision check removal  Change bed linens tonight and continue at least 1-2 times weekly  --    Informed will receive a telephone call tonight from a hospital representative with time to report on surgery day: Pending     Informed will receive a f/u call within in 24 -48 hours post-op to assess recovery reinforce instructions, and to answer any questions  Wednesday      Follow-up appointments reviewed: documented in discharge paper work 2 week   7/13/2021 1:30 PM (Arrive by 1:15 PM) Boy Martines, [id number] [facility]          6 week    Patient verbalized understanding information provided /discussed

## 2021-06-29 ENCOUNTER — HOSPITAL ENCOUNTER (OUTPATIENT)
Facility: HOSPITAL | Age: 61
Setting detail: OUTPATIENT SURGERY
Discharge: HOME | End: 2021-06-29
Attending: NEUROLOGICAL SURGERY | Admitting: NEUROLOGICAL SURGERY
Payer: OTHER MISCELLANEOUS

## 2021-06-29 ENCOUNTER — ANESTHESIA EVENT (OUTPATIENT)
Dept: PERIOP | Facility: HOSPITAL | Age: 61
End: 2021-06-29
Payer: OTHER MISCELLANEOUS

## 2021-06-29 ENCOUNTER — ANESTHESIA (OUTPATIENT)
Dept: PERIOP | Facility: HOSPITAL | Age: 61
End: 2021-06-29
Payer: OTHER MISCELLANEOUS

## 2021-06-29 VITALS
WEIGHT: 214.44 LBS | HEART RATE: 72 BPM | TEMPERATURE: 98 F | DIASTOLIC BLOOD PRESSURE: 56 MMHG | HEIGHT: 71 IN | BODY MASS INDEX: 30.02 KG/M2 | SYSTOLIC BLOOD PRESSURE: 111 MMHG | RESPIRATION RATE: 12 BRPM | OXYGEN SATURATION: 99 %

## 2021-06-29 PROCEDURE — C1767 GENERATOR, NEURO NON-RECHARG: HCPCS | Performed by: NEUROLOGICAL SURGERY

## 2021-06-29 PROCEDURE — 82948 REAGENT STRIP/BLOOD GLUCOSE: CPT

## 2021-06-29 PROCEDURE — 63685 INS/RPLC SPI NPG/RCVR POCKET: CPT | Performed by: NEUROLOGICAL SURGERY

## 2021-06-29 PROCEDURE — 95972 ALYS CPLX SP/PN NPGT W/PRGRM: CPT | Performed by: NEUROLOGICAL SURGERY

## 2021-06-29 PROCEDURE — 63685 INS/RPLC SPI NPG/RCVR POCKET: CPT | Performed by: PHYSICIAN ASSISTANT

## 2021-06-29 PROCEDURE — C1787 PATIENT PROGR, NEUROSTIM: HCPCS | Performed by: NEUROLOGICAL SURGERY

## 2021-06-29 DEVICE — IPG PROCLAIM ELITE 7: Type: IMPLANTABLE DEVICE | Site: BUTTOCKS | Status: FUNCTIONAL

## 2021-06-29 RX ORDER — PROPOFOL 10 MG/ML
INJECTION, EMULSION INTRAVENOUS AS NEEDED
Status: DISCONTINUED | OUTPATIENT
Start: 2021-06-29 | End: 2021-06-29

## 2021-06-29 RX ORDER — LIDOCAINE HYDROCHLORIDE AND EPINEPHRINE 10; 10 MG/ML; UG/ML
INJECTION, SOLUTION INFILTRATION; PERINEURAL AS NEEDED
Status: DISCONTINUED | OUTPATIENT
Start: 2021-06-29 | End: 2021-06-29 | Stop reason: HOSPADM

## 2021-06-29 RX ORDER — FENTANYL CITRATE/PF 50 MCG/ML
50 SYRINGE (ML) INJECTION
Status: DISCONTINUED | OUTPATIENT
Start: 2021-06-29 | End: 2021-06-29 | Stop reason: HOSPADM

## 2021-06-29 RX ORDER — LIDOCAINE HYDROCHLORIDE 20 MG/ML
INJECTION, SOLUTION EPIDURAL; INFILTRATION; INTRACAUDAL; PERINEURAL AS NEEDED
Status: DISCONTINUED | OUTPATIENT
Start: 2021-06-29 | End: 2021-06-29

## 2021-06-29 RX ORDER — KETAMINE HCL IN NACL, ISO-OSM 100MG/10ML
SYRINGE (ML) INJECTION AS NEEDED
Status: DISCONTINUED | OUTPATIENT
Start: 2021-06-29 | End: 2021-06-29

## 2021-06-29 RX ORDER — OXYCODONE HYDROCHLORIDE 5 MG/1
5 TABLET ORAL EVERY 4 HOURS PRN
Status: DISCONTINUED | OUTPATIENT
Start: 2021-06-29 | End: 2021-06-30 | Stop reason: HOSPADM

## 2021-06-29 RX ORDER — SODIUM CHLORIDE, SODIUM LACTATE, POTASSIUM CHLORIDE, CALCIUM CHLORIDE 600; 310; 30; 20 MG/100ML; MG/100ML; MG/100ML; MG/100ML
INJECTION, SOLUTION INTRAVENOUS CONTINUOUS PRN
Status: DISCONTINUED | OUTPATIENT
Start: 2021-06-29 | End: 2021-06-29

## 2021-06-29 RX ORDER — DIPHENHYDRAMINE HYDROCHLORIDE 50 MG/ML
12.5 INJECTION INTRAMUSCULAR; INTRAVENOUS ONCE AS NEEDED
Status: DISCONTINUED | OUTPATIENT
Start: 2021-06-29 | End: 2021-06-29 | Stop reason: HOSPADM

## 2021-06-29 RX ORDER — PROPOFOL 10 MG/ML
INJECTION, EMULSION INTRAVENOUS CONTINUOUS PRN
Status: DISCONTINUED | OUTPATIENT
Start: 2021-06-29 | End: 2021-06-29

## 2021-06-29 RX ORDER — MIDAZOLAM HYDROCHLORIDE 2 MG/2ML
INJECTION, SOLUTION INTRAMUSCULAR; INTRAVENOUS AS NEEDED
Status: DISCONTINUED | OUTPATIENT
Start: 2021-06-29 | End: 2021-06-29

## 2021-06-29 RX ORDER — CHLORHEXIDINE GLUCONATE 0.12 MG/ML
15 RINSE ORAL ONCE
Status: COMPLETED | OUTPATIENT
Start: 2021-06-29 | End: 2021-06-29

## 2021-06-29 RX ORDER — ONDANSETRON 2 MG/ML
INJECTION INTRAMUSCULAR; INTRAVENOUS AS NEEDED
Status: DISCONTINUED | OUTPATIENT
Start: 2021-06-29 | End: 2021-06-29

## 2021-06-29 RX ORDER — GLYCOPYRROLATE 0.2 MG/ML
INJECTION INTRAMUSCULAR; INTRAVENOUS AS NEEDED
Status: DISCONTINUED | OUTPATIENT
Start: 2021-06-29 | End: 2021-06-29

## 2021-06-29 RX ORDER — FENTANYL CITRATE 50 UG/ML
INJECTION, SOLUTION INTRAMUSCULAR; INTRAVENOUS AS NEEDED
Status: DISCONTINUED | OUTPATIENT
Start: 2021-06-29 | End: 2021-06-29

## 2021-06-29 RX ORDER — VANCOMYCIN HYDROCHLORIDE 1 G/200ML
1000 INJECTION, SOLUTION INTRAVENOUS ONCE
Status: COMPLETED | OUTPATIENT
Start: 2021-06-29 | End: 2021-06-29

## 2021-06-29 RX ADMIN — Medication 20 MG: at 09:36

## 2021-06-29 RX ADMIN — PROPOFOL 20 MG: 10 INJECTION, EMULSION INTRAVENOUS at 09:40

## 2021-06-29 RX ADMIN — FENTANYL CITRATE 50 MCG: 50 INJECTION, SOLUTION INTRAMUSCULAR; INTRAVENOUS at 09:36

## 2021-06-29 RX ADMIN — PROPOFOL 50 MCG/KG/MIN: 10 INJECTION, EMULSION INTRAVENOUS at 09:36

## 2021-06-29 RX ADMIN — MIDAZOLAM 1 MG: 1 INJECTION INTRAMUSCULAR; INTRAVENOUS at 09:31

## 2021-06-29 RX ADMIN — GLYCOPYRROLATE 0.1 MG: 0.2 INJECTION, SOLUTION INTRAMUSCULAR; INTRAVENOUS at 09:31

## 2021-06-29 RX ADMIN — ONDANSETRON 4 MG: 2 INJECTION INTRAMUSCULAR; INTRAVENOUS at 09:49

## 2021-06-29 RX ADMIN — LIDOCAINE HYDROCHLORIDE 100 MG: 20 INJECTION, SOLUTION EPIDURAL; INFILTRATION; INTRACAUDAL; PERINEURAL at 09:36

## 2021-06-29 RX ADMIN — PROPOFOL 30 MG: 10 INJECTION, EMULSION INTRAVENOUS at 09:38

## 2021-06-29 RX ADMIN — SODIUM CHLORIDE, SODIUM LACTATE, POTASSIUM CHLORIDE, AND CALCIUM CHLORIDE: .6; .31; .03; .02 INJECTION, SOLUTION INTRAVENOUS at 09:27

## 2021-06-29 RX ADMIN — Medication 20 MG: at 09:46

## 2021-06-29 RX ADMIN — Medication 10 MG: at 09:41

## 2021-06-29 RX ADMIN — MIDAZOLAM 1 MG: 1 INJECTION INTRAMUSCULAR; INTRAVENOUS at 09:35

## 2021-06-29 RX ADMIN — CHLORHEXIDINE GLUCONATE 0.12% ORAL RINSE 15 ML: 1.2 LIQUID ORAL at 08:48

## 2021-06-29 RX ADMIN — VANCOMYCIN HYDROCHLORIDE 1000 MG: 1 INJECTION, SOLUTION INTRAVENOUS at 09:25

## 2021-06-29 RX ADMIN — FENTANYL CITRATE 50 MCG: 50 INJECTION, SOLUTION INTRAMUSCULAR; INTRAVENOUS at 09:42

## 2021-06-29 NOTE — OP NOTE
OPERATIVE REPORT  PATIENT NAME: Cece Bush    :  1960  MRN: [id number]  Pt Location: [facility] OR ROOM 01    SURGERY DATE: 2021    Surgeon(s) and Role:     * Kaleb Xiong MD - Primary     * Melinda Haro PA-C - Assisting    Preop Diagnosis:  Chronic pain syndrome [G89 4]  Postlaminectomy syndrome [M96 1]    Post-Op Diagnosis Codes:     * Chronic pain syndrome [G89 4]     * Postlaminectomy syndrome [M96 1]    Procedure(s) (LRB):  REPLACEMENT IMPLANTABLE PULSE GENERATOR DORSAL SPINAL COLUMN STIMULATOR, LEFT (Left)    Specimen(s):  * No specimens in log *    Estimated Blood Loss:   1 mL    Drains:  * No LDAs found *    Anesthesia Type:   IV Sedation with Anesthesia  Operative Indications:  Chronic pain syndrome [G89 4]  Postlaminectomy syndrome [M96 1]      Operative Findings:  See dictated note  Winston Proclaim [id number]  [id number]    Complications:   None    Procedure and Technique:  The patient was taken to the operative theater and successfully induced under sedation  The patient was positioned lateral left side up  The patients prior incision was marked on the left buttock  Then the patient was prepped and draped in sterile fashion, a timeout was performed  We began by making an incision along the old scar with a #10 Blade  We then used monopolar cautery to dissect down to the generator       We then removed the old generator and this was disconnected from the electrode using the proprietary screwdriver  Then the new generator was brought into the field  The new generator was reconnected with the screwdriver and then the impedances were tested and they were found to be within normal limits  Then we placed the new generator into the pocket and irrigated the wound  We then proceeded to close in layers with 2-0 Vicryl for the deeper tissues and 4-0 running monocryl for the skin       The patient was then was allowed to awaken from sedation and taken to the recovery area in stable condition   All needle and sponge counts were correct at the end of the procedure      I also performed intraoperative programing with the following settings: 5+,7-, 40Hz, 500Hz intraburst, pw 1000us, 1 4mA    I was present for the entire procedure, A qualified resident physician was not available and A physician assistant was required during the procedure for retraction tissue handling,dissection and suturing    Patient Disposition:  PACU     SIGNATURE: Kristi Bazzi MD  DATE: June 29, 2021  TIME: 10:37 AM

## 2021-06-29 NOTE — ANESTHESIA POSTPROCEDURE EVALUATION
Post-Op Assessment Note    CV Status:  Stable  Pain Score: 0    Pain management: adequate     Mental Status:  Alert and awake   Hydration Status:  Euvolemic   PONV Controlled:  Controlled   Airway Patency:  Patent      Post Op Vitals Reviewed: Yes      Staff: Anesthesiologist, CRNA         No complications documented      BP   111/56   Temp   97 8   Pulse  66   Resp 12   SpO2 99 on 4L Facemask

## 2021-06-29 NOTE — ANESTHESIA PREPROCEDURE EVALUATION
Procedure:  REPLACEMENT IMPLANTABLE PULSE GENERATOR DORSAL SPINAL COLUMN STIMULATOR, LEFT (Left Buttocks)    Relevant Problems   CARDIO   (+) Hyperlipidemia LDL goal <70      ENDO   (+) DM type 2 (diabetes mellitus, type 2) (HCC)      /RENAL   (+) BPH with urinary obstruction      MUSCULOSKELETAL   (+) Chronic low back pain   (+) Low back pain   (+) Lumbar spondylosis   (+) Myofascial pain syndrome   (+) Sciatica      NEURO/PSYCH   (+) Chronic pain syndrome   (+) Diabetic mononeuropathy associated with type 2 diabetes mellitus (HCC)   (+) Myofascial pain syndrome   (+) Numbness and tingling in both hands      PULMONARY   (+) Smoker      Other   (+) Intervertebral disc disorder with radiculopathy of lumbosacral region   (+) Postlaminectomy syndrome, lumbar region        Physical Exam    Airway    Mallampati score: II  TM Distance: >3 FB  Neck ROM: full     Dental   No notable dental hx     Cardiovascular  Cardiovascular exam normal    Pulmonary  Pulmonary exam normal     Other Findings        Anesthesia Plan  ASA Score- 3     Anesthesia Type- IV sedation with anesthesia with ASA Monitors  Additional Monitors:   Airway Plan:     Comment: I discussed risks (reviewed with patient on the anesthesia consent form), benefits and alternatives of monitored sedation including the possibility under sedation to have recall or mild discomfort          Plan Factors-    Chart reviewed  EKG reviewed  Patient summary reviewed  Induction- intravenous  Postoperative Plan- Plan for postoperative opioid use  Informed Consent- Anesthetic plan and risks discussed with patient  I personally reviewed this patient with the CRNA  Discussed and agreed on the Anesthesia Plan with the CRNA  Rudy Wake

## 2021-06-29 NOTE — DISCHARGE INSTRUCTIONS
Discharge Instructions  Battery (IPG) replacement  Activity:   Do not lift more than 10 pounds for 6 weeks   Avoid bending, lifting and twisting for 6 weeks   No strenuous activities  No driving for 2 weeks   When able to shower, continue to use clean towel and washcloth for 2 weeks post-op   Continue to change bed linens and pajamas more frequently  Wear clean clothes daily  Surgical incision care:   Keep dressing in place for 3 days   Keep incision dry for 3 days   May shower with mild antimicrobial soap after 3 days   After 3 days, incisions may be left open to air, but must remain clean   Do not immerse the incisions in water for 6 weeks   Do not apply any creams or ointments to the incision for 6 weeks, unless otherwise instructed by [facility] \[facility] [place] [facility]\"" - [facility]   Contact office if increasing redness, drainage, pain or swelling around the incisions  Postoperative medication:   Complete course of antibiotic as directed  Ada Nascimento  [facility] will provide pain medication as coordinated with your pain specialist  All prescriptions must come from a single provider  o Take all medications as prescribed  Call office with any questions/concerns   Please contact office for questions regarding dosage and modifications   No antiplatelet, anticoagulation or Nonsteroidal anti-inflammatory (NSAIDs) medication until cleared by [employer], unless otherwise instructed   Do not operate heavy machinery or vehicles while taking sedating medications   Use a bowel regimen while on opioids as they induce constipation  Ie  Senokot-S, Miralax, Colace, etc  Increase fiber and water intake

## 2021-06-30 ENCOUNTER — TELEPHONE (OUTPATIENT)
Dept: NEUROSURGERY | Facility: CLINIC | Age: 61
End: 2021-06-30

## 2021-06-30 LAB — GLUCOSE SERPL-MCNC: 133 MG/DL (ref 65–140)

## 2021-06-30 NOTE — TELEPHONE ENCOUNTER
Called patient to see how he is doing after surgery  Patient reports he is doing well overall and denies any incisional issues or fevers  Patient is able to ambulate around the house and complete ADLs  Educated the patient about the importance of preventing blood clots and provided measures how to prevent them  Patient has not moved his bowels since the surgery  Encouraged patient to take an over the counter stool softener, if he is taking narcotic pain medication  Encouraged fiber intake and fluids  Reviewed incision care with the patient  Advised that after three days he may take a shower and gently wash the surgical site with soap and water  Use clean wash cloth, towels, and clothing  Do not submerge in water until cleared by the surgeon  Do not apply any creams, ointments, or lotions to the site  Patient is aware to call the office if any redness, swelling, drainage, dehiscence of incision, or fever >100 F occurs  Patient is aware to call the office if any concerns or questions may arise  Reminded patient of his upcoming appointments with the date/time/location  Patient was appreciative for the call

## 2021-07-01 ENCOUNTER — APPOINTMENT (OUTPATIENT)
Dept: RADIOLOGY | Facility: HOSPITAL | Age: 61
End: 2021-07-01
Payer: OTHER MISCELLANEOUS

## 2021-07-01 ENCOUNTER — HOSPITAL ENCOUNTER (OUTPATIENT)
Facility: AMBULARY SURGERY CENTER | Age: 61
Setting detail: OUTPATIENT SURGERY
Discharge: HOME | End: 2021-07-01
Attending: ANESTHESIOLOGY | Admitting: ANESTHESIOLOGY
Payer: OTHER MISCELLANEOUS

## 2021-07-01 VITALS
SYSTOLIC BLOOD PRESSURE: 129 MMHG | RESPIRATION RATE: 18 BRPM | WEIGHT: 218 LBS | BODY MASS INDEX: 30.52 KG/M2 | HEIGHT: 71 IN | DIASTOLIC BLOOD PRESSURE: 75 MMHG | OXYGEN SATURATION: 98 % | HEART RATE: 72 BPM | TEMPERATURE: 95.5 F

## 2021-07-01 PROCEDURE — 64484 NJX AA&/STRD TFRM EPI L/S EA: CPT | Performed by: ANESTHESIOLOGY

## 2021-07-01 PROCEDURE — 64483 NJX AA&/STRD TFRM EPI L/S 1: CPT | Performed by: ANESTHESIOLOGY

## 2021-07-01 PROCEDURE — 72020 X-RAY EXAM OF SPINE 1 VIEW: CPT

## 2021-07-01 RX ORDER — METHYLPREDNISOLONE ACETATE 80 MG/ML
INJECTION, SUSPENSION INTRA-ARTICULAR; INTRALESIONAL; INTRAMUSCULAR; SOFT TISSUE AS NEEDED
Status: DISCONTINUED | OUTPATIENT
Start: 2021-07-01 | End: 2021-07-01 | Stop reason: HOSPADM

## 2021-07-01 RX ORDER — BUPIVACAINE HYDROCHLORIDE 2.5 MG/ML
INJECTION, SOLUTION EPIDURAL; INFILTRATION; INTRACAUDAL AS NEEDED
Status: DISCONTINUED | OUTPATIENT
Start: 2021-07-01 | End: 2021-07-01 | Stop reason: HOSPADM

## 2021-07-01 RX ORDER — LIDOCAINE WITH 8.4% SOD BICARB 0.9%(10ML)
SYRINGE (ML) INJECTION AS NEEDED
Status: DISCONTINUED | OUTPATIENT
Start: 2021-07-01 | End: 2021-07-01 | Stop reason: HOSPADM

## 2021-07-01 NOTE — DISCHARGE INSTRUCTIONS
Epidural Steroid Injection   WHAT YOU NEED TO KNOW:   An epidural steroid injection (VIDHYA) is a procedure to inject steroid medicine into the epidural space  The epidural space is between your spinal cord and vertebrae  Steroids reduce inflammation and fluid buildup in your spine that may be causing pain  You may be given pain medicine along with the steroids  ACTIVITY  · Do not drive or operate machinery today  · No strenuous activity today - bending, lifting, etc   · You may resume normal activites starting tomorrow - start slowly and as tolerated  · You may shower today, but no tub baths or hot tubs  · You may have numbness for several hours from the local anesthetic  Please use caution and common sense, especially with weight-bearing activities  CARE OF THE INJECTION SITE  · If you have soreness or pain, apply ice to the area today (20 minutes on/20 minutes off)  · Starting tomorrow, you may use warm, moist heat or ice if needed  · You may have an increase or change in your discomfort for 36-48 hours after your treatment  · Apply ice and continue with any pain medication you have been prescribed  · Notify [facility] if you have any of the following: redness, drainage, swelling, headache, stiff neck or fever above 100°F     SPECIAL INSTRUCTIONS  · Our office will contact you in approximately 7 days for a progress report  MEDICATIONS  · Continue to take all routine medications  · Our office may have instructed you to hold some medications  As no general anesthesia was used in today's procedure, you should not experience any side effects related to anesthesia  If you have a problem specifically related to your procedure, please call our office at [phone]  Problems not related to your procedure should be directed to your primary care physician

## 2021-07-01 NOTE — OP NOTE
ATTENDING PHYSICIAN:  Lori Douglas MD     PROCEDURE:  1  Left L5 transforaminal epidural steroid injection under fluoroscopic guidance  2  Left S1 transforaminal epidural steroid injection under fluoroscopic guidance  PRE-PROCEDURE DIAGNOSIS:  Low back pain and left lower extremity radiculopathy  POST-PROCEDURE DIAGNOSIS:  Low back pain and left lower extremity radiculopathy  ANESTHESIA:  Local     ESTIMATED BLOOD LOSS:  Minimal     COMPLICATIONS:  None  LOCATION:  [address]  CONSENT:  Today's procedure, its potential benefits as well as its risks and potential side effects were reviewed  Discussed risks of the procedure including bleeding, infection, nerve irritation or damage, reactions to the medications, weakness, headache, failure of the pain to improve, and potential worsening of the pain were explained to the patient who verbalized understanding and who wished to proceed  Written informed consent was thereby obtained  DESCRIPTION OF THE PROCEDURE:  After written informed consent was obtained, the patient was taken to the fluoroscopy suite and placed in the prone position  Anatomical landmarks were identified by way of fluoroscopy in multiple views  The skin of the lumbar region was prepped using antiseptic and draped in the usual sterile fashion  Strict aseptic technique was utilized  The skin and subcutaneous tissues at the needle entry site were infiltrated with a total of 5 mL of 1% preservative-free lidocaine using a 25-gauge 1-1/2-inch needle  22-gauge needles were then incrementally advanced under fluoroscopic guidance in the oblique view into the neural foramina as mentioned above  Proper placement into each of the neural foramen was confirmed with fluoroscopy in both the lateral and AP views  After negative aspiration for CSF or heme, contrast was injected, which delineated the nerve roots and the epidural space under fluoroscopy in the AP view  There was only a transient pressure paresthesia that resolved immediately upon injection  After negative aspiration, a 2 mL of a 4 mL injectate consisting of 3 mL of preservative-free 0 25% bupivacaine and 1 mL of Depo-Medrol 80 mg/mL was slowly injected into each of the needles as delineated above  The patient tolerated the procedure well and all needles were removed intact  Hemostasis was maintained  There were no apparent paresthesias or complications  The skin was wiped clean and a Band-Aid was placed as appropriate  The patient was monitored for an appropriate period of time and remained hemodynamically stable following the procedure  The patient was ultimately discharged to home with supervision in good condition and instructed to call the office in approximately 7-10 days with an update or sooner as warranted  Discharge instructions were provided  I was present for and participated in all key and critical portions of this procedure      Cruz Edgar MD  7/1/2021  1:49 PM

## 2021-07-01 NOTE — INTERVAL H&P NOTE
H&P reviewed  After examining the patient I find no changes in the patients condition since the H&P had been written      Vitals:    07/01/21 1315   BP: 130/75   Pulse: 75   Resp: 18   Temp: (!) 95 5 °F (35 3 °C)   SpO2: 98%

## 2021-07-08 ENCOUNTER — TELEPHONE (OUTPATIENT)
Dept: PAIN MEDICINE | Facility: CLINIC | Age: 61
End: 2021-07-08

## 2021-07-12 DIAGNOSIS — L40.9 PSORIASIS: ICD-10-CM

## 2021-07-13 ENCOUNTER — OFFICE VISIT (OUTPATIENT)
Dept: NEUROSURGERY | Facility: CLINIC | Age: 61
End: 2021-07-13

## 2021-07-13 VITALS
WEIGHT: 215 LBS | HEART RATE: 88 BPM | DIASTOLIC BLOOD PRESSURE: 70 MMHG | BODY MASS INDEX: 30.1 KG/M2 | RESPIRATION RATE: 16 BRPM | TEMPERATURE: 97.7 F | HEIGHT: 71 IN | SYSTOLIC BLOOD PRESSURE: 142 MMHG

## 2021-07-13 DIAGNOSIS — Z45.42 BATTERY END OF LIFE OF SPINAL CORD STIMULATOR: ICD-10-CM

## 2021-07-13 DIAGNOSIS — G89.4 CHRONIC PAIN SYNDROME: Primary | ICD-10-CM

## 2021-07-13 PROCEDURE — 3008F BODY MASS INDEX DOCD: CPT | Performed by: ANESTHESIOLOGY

## 2021-07-13 PROCEDURE — 99024 POSTOP FOLLOW-UP VISIT: CPT | Performed by: NURSE PRACTITIONER

## 2021-07-13 NOTE — PATIENT INSTRUCTIONS
The following instructions are reviewed in detail and continue thru next 4 weeks (6 week post op)    · At 2 weeks postop can resume restricted medications such as ASA, products containing ASA, NSAID, fish oils, and OTC products or as previously directed  · Resume driving 2 week postoperatively  · Continue to observe incisions for redness, drainage, swelling dehiscence, increased pain, fever >/= 101, warmth to touch incision or skin surrounding, if occur call or report to office immediately for reassessment  · Explained monocryl reasonable sutures can take up to 90 days to reabsorb  · Do not remove glue adherent to incision lines covering scabs will eventually disappear  · Continue showers using clean towel and wash cloth with OTC antibacterial body wash, pat dry after showering continue protocol for an additional 2 weeks  · Do not apply lotions, creams, powder, or ointments to surgical incisions  · Activity as tolerated, no bending, lifting  greater than 10 lbs, turning, stretching, no pulling, pushing  ambulation as tolerated  · Refrain from strenuous activity, bending, and  twisting back  · No Immersion in water such as swimming, hot tub, or tub bath  · If  there is any significant change condition  call and/or return to the office immediately for reassessment  · Met with product rep for device programing and teaching, refer to attached session report  · Explained chronic pain relief may not be immediate after reprogramming can take  Up to 72 hours to feel effect   · Contact Rep immediately if there is any change in efficacy or concern over SCS system     · Contact office if unable to reach Rep or if the reps   intervention does  not resolve issue

## 2021-07-13 NOTE — PROGRESS NOTES
Assessment/Plan:    Battery end of life of spinal cord stimulator  · As addressed in HPI  · 2 weeks postop presents for aftercare, has a left buttock surgical incision, skin closure with 4 0 running Monocryl suture (barely visible), incision is clean, dry and intact, without erythema, dehiscence, drainage or s/s of infection, healing well with approximated wound edges, scabbing along incision line, and some residual surgical glue  · Reports no change in SCS efficacy since surgery, has same preoperative settings , overall relief at approximately 90 %  He remarked "if I did not have this stimulator I would not be walking , that is how severe the pain was in the right buttock and leg"  · Reports the spinal cord stimulator does not help his low back pain, continues care with Dr Orly Haines, pain management  · Reports on  7/1 , 2 days after surgery underwent and injection with Dr Orly Haines left transforaminal, and another is scheduled for 7/15/21 the right  I expressed concern he would undergo injection so close after surgery  · Communicated with / Minh Griffith he is ok for patient to proceed with second injection on 7/15/2021, patient updated  · Reports injection help a lot with chronic left sided back pain  · Patient reports does not need program changes , JULIO notified  him they will plan to meet with him on an alternate date to add setting changes  JULIO REP sent 7/1/21 programing information which remains the same       TONIC FEEL 2   Frequency: 40 Hz R L  Pulse Width: 200 ?s  Perception: 9 60 mA  Comfort: 14 20 mA  Maximum: 20 00 mA  Step Size: 0 40 mA  Total Steps: 40    BURST DR COY  Frequency: 40 Hz R L  Intra-burst Rate: 500 Hz  Pulse Width: 1000 ?s  Target: 1 40 mA  Maximum: 4 05 mA  Step Size: 0 05 mA  Total Steps: 81     TONIC FEEL   Frequency: 40 Hz R L  Pulse Width: 200 ?s  Perception: 8 40 mA  Comfort: 12 30 mA  Maximum: 19 00 mA  Step Size: 0 30 mA  Total Steps: 50    PLAN  · Instructions for continued care documented in AVS, reviewed in dettil and copy provided  Chronic pain syndrome  · As addressed in hPI  · As addressed in e[employer] SCS battery  PLAN  As addressed in AVS,        Subjective: 2 week postoperative visit , stimulator working as it reuben before I got this new generator  Patient ID: Wyvonna Severe is a 64 y o  male     HPI   Longstanding history of chronic pain affecting his bilateral low back and sciatica pain into right buttock and right lower extremity  Is status post lumbar surgery L5 S1 hemilaminectomy/decompression in remote past    Failed conservative treatments  Injections and therapy  Underwent an ABBOTT spinal cord stimulator trial with overall relief of symptoms by >   50 %  He underwent surgery with Dr Zabala Later 10/13/2017 for PLACEMENT THORACIC FOR INSERTION DORSAL COLUMN SPINAL CORD STIMULATOR (DCS) WITH BUTTOCK IMPLANTABLE PULSE GENERATOR(IMPULSE)  He consulted with Dr Jessica Brunner 6/10/2021 for ABBOTT generator nearing end of service , after assessment ans review surgery was scheduled form 6/29/2021 REPLACEMENT IMPLANTABLE PULSE GENERATOR DORSAL SPINAL COLUMN STIMULATOR, LEFT (Left Buttocks)  He is now 2 weeks after surgery  Impressions and treatment recommendations were discussed in detail with the patient who verbalized understanding, all questions were answered and contact information was given in the event future questions arise  REVIEW OF SYSTEMS  Review of Systems   Constitutional: Negative  HENT: Negative  Eyes: Negative  Respiratory: Negative  Cardiovascular: Negative  Gastrointestinal: Negative  Endocrine: Negative  Genitourinary: Negative  Musculoskeletal: Positive for back pain (lbp into right leg > left leg) and myalgias (cramping in legs  )  SCS placed for LBP, right leg pain   Skin: Positive for wound (surgical incision)  Allergic/Immunologic: Negative  Neurological: Positive for numbness (right toe and outer shin  )  Hematological: Negative  Psychiatric/Behavioral: Negative  Meds/Allergies     Current Outpatient Medications   Medication Sig Dispense Refill    atorvastatin (LIPITOR) 20 mg tablet TAKE 1 TABLET (20 MG TOTAL) BY MOUTH DAILY 90 tablet 0    etodolac (LODINE) 300 MG capsule Take 1 capsule (300 mg total) by mouth 3 (three) times a day as needed (pain (with food)) 90 capsule 1    fluticasone (FLONASE) 50 mcg/act nasal spray 2 sprays into each nostril daily 48 g 2    gabapentin (NEURONTIN) 300 mg capsule TAKE 1 CAPSULES THREE TIMES DAILY 180 capsule 0    glipiZIDE (GLUCOTROL XL) 10 mg 24 hr tablet Take 1 tablet (10 mg total) by mouth daily 90 tablet 0    lidocaine (LIDODERM) 5 % Apply 2 patches topically daily Remove & Discard patch within 12 hours or as directed by MD (Patient taking differently: Apply 2 patches topically daily as needed Remove & Discard patch within 12 hours or as directed by MD) 60 patch 0    lisinopril (ZESTRIL) 2 5 mg tablet Take 1 tablet (2 5 mg total) by mouth daily 90 tablet 0    metFORMIN (GLUCOPHAGE) 1000 MG tablet Take 1 tablet (1,000 mg total) by mouth 2 (two) times a day with meals 180 tablet 0    omeprazole (PriLOSEC) 20 mg delayed release capsule Take 1 capsule (20 mg total) by mouth daily 90 capsule 0    oxyCODONE-acetaminophen (PERCOCET) 7 5-325 MG per tablet Take 1 tablet by mouth 3 (three) times a day as needed for severe painMax Daily Amount: 3 tablets 90 tablet 0    QUEtiapine (SEROquel) 100 mg tablet Take one tablet by mouth daily at bedtime 90 tablet 0    tamsulosin (FLOMAX) 0 4 mg Take 2 capsules (0 8 mg total) by mouth daily with dinner 180 capsule 0    traZODone (DESYREL) 50 mg tablet TAKE 2 TABLETS DAILY AT BEDTIME 180 tablet 0    clobetasol (TEMOVATE) 0 05 % ointment APPLY TOPICALLY 2 (TWO) TIMES A DAY 30 g 1     No current facility-administered medications for this visit         Allergies   Allergen Reactions    Penicillins Swelling       PAST HISTORY    Past Medical History:   Diagnosis Date    Arthritis     BPH (benign prostatic hypertrophy) with urinary obstruction     Chronic pain disorder     Ear infection     Herniation of lumbar intervertebral disc with radiculopathy     Myofascial pain syndrome     Obesity     Sciatica     Tinnitus        Past Surgical History:   Procedure Laterality Date    ABSCESS DRAINAGE      groin    BACK SURGERY      implant - resolved 2004    CAUDAL BLOCK N/A 10/26/2018    Procedure: Caudal Epidural Steroid Injection (77220); Surgeon: Katie Colindres MD;  Location: [facility] MAIN OR;  Service: Pain Management     CAUDAL BLOCK N/A 11/30/2018    Procedure: Caudal Epidural Steroid Injection ([id number]); Surgeon: Katie Colindres MD;  Location: [facility] MAIN OR;  Service: Pain Management     COLONOSCOPY      EPIDURAL BLOCK INJECTION Right 5/10/2019    Procedure: L5 S1 transforaminal Epidural Steroid Injection ([id number];  Surgeon: Katie Colindres MD;  Location: [facility] MAIN OR;  Service: Pain Management     EPIDURAL BLOCK INJECTION Right 8/16/2019    Procedure: BLOCK / INJECTION EPIDURAL STEROID TRANSFORAMINAL;  Surgeon: Katie Colindres MD;  Location: [facility] MAIN OR;  Service: Pain Management     EPIDURAL BLOCK INJECTION Left 7/1/2021    Procedure: L5 S1 transforaminal epidural steroid injection ( [id number]); Surgeon: Katie Colindres MD;  Location: [facility] MAIN OR;  Service: Pain Management     EPIDURAL BLOCK INJECTION Right 7/15/2021    Procedure: L5 S1 transforaminal epidural steroid injection ( [id number]); Surgeon: Katie Colindres MD;  Location: [facility] MAIN OR;  Service: Pain Management     NERVE BLOCK Bilateral 4/26/2018    Procedure: B/L L3 L4 L5 S1 MBB #1 ([id number]);   Surgeon: Katie Colindres MD;  Location: [facility] MAIN OR;  Service: Pain Management     NERVE BLOCK Bilateral 5/11/2018    Procedure: B/L L3 L4 L5 S1 Medial Branch Block #2;  Surgeon: Katie Colindres MD;  Location: [facility] MAIN OR;  Service: Pain Management     NOSE SURGERY      NE COLONOSCOPY FLX DX W/COLLJ SPEC WHEN PFRMD N/A 3/6/2017    Procedure: COLONOSCOPY;  Surgeon: Ruy Stewart MD;  [facility]; Service: Gastroenterology    NE IMPLANT SPINAL NEUROSTIM/ Left 6/29/2021    Procedure: REPLACEMENT IMPLANTABLE PULSE GENERATOR DORSAL SPINAL COLUMN STIMULATOR, LEFT;  Surgeon: Kaleb Xiong MD;  Location:  MAIN OR;  Service: Neurosurgery    NE SURG IMPLNT [address] Left 10/3/2017    Procedure: PLACEMENT THORACIC FOR INSERTION DORSAL COLUMN SPINAL CORD STIMULATOR (DCS) WITH BUTTOCK IMPLANTABLE PULSE GENERATOR(IMPULSE); Surgeon: Domenic Combs MD;  Location:  MAIN OR;  Service: Neurosurgery    [address] Right 5/18/2018    Procedure: Rt L3 L4 L5 S1 Radio Frequency Ablation ([id number]); Surgeon: Liana Barton MD;  Location: [facility] MAIN OR;  Service: Pain Management     RADIOFREQUENCY ABLATION Left 6/1/2018    Procedure: Lt L3 L4 L5 S1 Radio Frequency Ablation ([id number]);   Surgeon: Liana Barton MD;  Location: [facility] MAIN OR;  Service: Pain Management        Social History     Tobacco Use    Smoking status: Current Every Day Smoker     Packs/day: 1 00    Smokeless tobacco: Never Used    Tobacco comment: encouraged smoking cessation - history of smoking 30 or more pack years    Vaping Use    Vaping Use: Never used   Substance Use Topics    Alcohol use: No     Comment: rare    Drug use: No       Family History   Problem Relation Age of Onset    Diabetes Mother     Diabetes Father         mellitus     Heart attack Father 58    Hypertension Father        The following portions of the patient's history were reviewed and updated as appropriate: allergies, current medications, past family history, past medical history, past social history, past surgical history and problem list       EXAM    Vitals:Blood pressure 142/70, pulse 88, temperature 97 7 °F (36 5 °C), temperature source Tympanic, resp  rate 16, height 5' 11" (1 803 m), weight 97 5 kg (215 lb)  ,Body mass index is 29 99 kg/m²  Physical Exam  Eyes:      General: No scleral icterus  Right eye: No discharge  Left eye: No discharge  Musculoskeletal:      Right lower leg: No edema  Left lower leg: No edema  Comments: No swelling, redness or palpable cords in bilateral lower legs  Skin:     General: Skin is warm and dry  Neurological:      General: No focal deficit present  Mental Status: He is alert and oriented to person, place, and time  Gait: Gait is intact  Psychiatric:         Mood and Affect: Mood normal          Behavior: Behavior normal          Neurologic Exam     Mental Status   Oriented to person, place, and time  Level of consciousness: alert    Motor Exam     Strength   Right quadriceps: 5/5  Left quadriceps: 5/5  Right hamstrin/5  Left hamstrin/5  Right anterior tibial: 5/5  Left anterior tibial: 5/5  Right gastroc: 5/5  Left gastroc: 5/5    Gait, Coordination, and Reflexes     Gait  Gait: normal      Imaging Studies  No results found

## 2021-07-13 NOTE — ASSESSMENT & PLAN NOTE
· As addressed in HPI  · 2 weeks postop presents for aftercare, has a left buttock surgical incision, skin closure with 4 0 running Monocryl suture (barely visible), incision is clean, dry and intact, without erythema, dehiscence, drainage or s/s of infection, healing well with approximated wound edges, scabbing along incision line, and some residual surgical glue  · Reports no change in SCS efficacy since surgery, has same preoperative settings , overall relief at approximately 90 %  He remarked "if I did not have this stimulator I would not be walking , that is how severe the pain was in the right buttock and leg"  · Reports the spinal cord stimulator does not help his low back pain, continues care with Dr Kishore Doe, pain management  · Reports on  7/1 , 2 days after surgery underwent and injection with Dr Kishore Doe left transforaminal, and another is scheduled for 7/15/21 the right  I expressed concern he would undergo injection so close after surgery  · Communicated with / Jeanette Kelly he is ok for patient to proceed with second injection on 7/15/2021, patient updated  · Reports injection help a lot with chronic left sided back pain  · Patient reports does not need program changes , KIM notified  him they will plan to meet with him on an alternate date to add setting changes  KIM REP sent 7/1/21 programing information which remains the same       TONIC FEEL 2   Frequency: 40 Hz R L  Pulse Width: 200 ?s  Perception: 9 60 mA  Comfort: 14 20 mA  Maximum: 20 00 mA  Step Size: 0 40 mA  Total Steps: 40    BURST DR COY  Frequency: 40 Hz R L  Intra-burst Rate: 500 Hz  Pulse Width: 1000 ?s  Target: 1 40 mA  Maximum: 4 05 mA  Step Size: 0 05 mA  Total Steps: 81     TONIC FEEL   Frequency: 40 Hz R L  Pulse Width: 200 ?s  Perception: 8 40 mA  Comfort: 12 30 mA  Maximum: 19 00 mA  Step Size: 0 30 mA  Total Steps: 50    PLAN  · Instructions for continued care documented in AVS, reviewed in dettil and copy provided

## 2021-07-14 RX ORDER — CLOBETASOL PROPIONATE 0.5 MG/G
OINTMENT TOPICAL 2 TIMES DAILY
Qty: 30 G | Refills: 1 | Status: SHIPPED | OUTPATIENT
Start: 2021-07-14

## 2021-07-15 ENCOUNTER — HOSPITAL ENCOUNTER (OUTPATIENT)
Facility: AMBULARY SURGERY CENTER | Age: 61
Setting detail: OUTPATIENT SURGERY
Discharge: HOME | End: 2021-07-15
Attending: ANESTHESIOLOGY | Admitting: ANESTHESIOLOGY
Payer: OTHER MISCELLANEOUS

## 2021-07-15 ENCOUNTER — APPOINTMENT (OUTPATIENT)
Dept: RADIOLOGY | Facility: HOSPITAL | Age: 61
End: 2021-07-15
Payer: OTHER MISCELLANEOUS

## 2021-07-15 VITALS
RESPIRATION RATE: 18 BRPM | SYSTOLIC BLOOD PRESSURE: 149 MMHG | DIASTOLIC BLOOD PRESSURE: 79 MMHG | HEART RATE: 76 BPM | TEMPERATURE: 98.4 F | OXYGEN SATURATION: 98 %

## 2021-07-15 LAB — GLUCOSE SERPL-MCNC: 85 MG/DL (ref 65–140)

## 2021-07-15 PROCEDURE — 64484 NJX AA&/STRD TFRM EPI L/S EA: CPT | Performed by: ANESTHESIOLOGY

## 2021-07-15 PROCEDURE — 64483 NJX AA&/STRD TFRM EPI L/S 1: CPT | Performed by: ANESTHESIOLOGY

## 2021-07-15 PROCEDURE — 76000 FLUOROSCOPY <1 HR PHYS/QHP: CPT

## 2021-07-15 PROCEDURE — 82948 REAGENT STRIP/BLOOD GLUCOSE: CPT

## 2021-07-15 RX ORDER — LIDOCAINE WITH 8.4% SOD BICARB 0.9%(10ML)
SYRINGE (ML) INJECTION AS NEEDED
Status: DISCONTINUED | OUTPATIENT
Start: 2021-07-15 | End: 2021-07-15 | Stop reason: HOSPADM

## 2021-07-15 RX ORDER — BUPIVACAINE HYDROCHLORIDE 2.5 MG/ML
INJECTION, SOLUTION EPIDURAL; INFILTRATION; INTRACAUDAL AS NEEDED
Status: DISCONTINUED | OUTPATIENT
Start: 2021-07-15 | End: 2021-07-15 | Stop reason: HOSPADM

## 2021-07-15 RX ORDER — METHYLPREDNISOLONE ACETATE 80 MG/ML
INJECTION, SUSPENSION INTRA-ARTICULAR; INTRALESIONAL; INTRAMUSCULAR; SOFT TISSUE AS NEEDED
Status: DISCONTINUED | OUTPATIENT
Start: 2021-07-15 | End: 2021-07-15 | Stop reason: HOSPADM

## 2021-07-15 NOTE — H&P
History of Present Illness: The patient is a 64 y o  male who presents with complaints of low back pain and lower extremity radicular symptoms    Patient Active Problem List   Diagnosis    Heartburn    Erectile dysfunction of non-organic origin    DM type 2 (diabetes mellitus, type 2) (HCC)    Chronic low back pain    BPH with urinary obstruction    Blood pressure elevated without history of HTN    Sciatica    Myofascial pain syndrome    Lumbar spondylosis    Herniation of lumbar intervertebral disc with radiculopathy    Acute post-operative pain    Insomnia    Chronic pain syndrome    Low back pain    Class 1 obesity due to excess calories with serious comorbidity and body mass index (BMI) of 30 0 to 30 9 in adult    Smoker    Seasonal allergic rhinitis    Diabetic mononeuropathy associated with type 2 diabetes mellitus (HCC)    Postlaminectomy syndrome, lumbar region    Numbness and tingling in both hands    Intervertebral disc disorder with radiculopathy of lumbosacral region    Acute bronchitis    Hyperlipidemia LDL goal <70    Proteinuria    Battery end of life of spinal cord stimulator       Past Medical History:   Diagnosis Date    Arthritis     BPH (benign prostatic hypertrophy) with urinary obstruction     Chronic pain disorder     Ear infection     Herniation of lumbar intervertebral disc with radiculopathy     Myofascial pain syndrome     Obesity     Sciatica     Tinnitus        Past Surgical History:   Procedure Laterality Date    ABSCESS DRAINAGE      groin    BACK SURGERY      implant - resolved 2004    CAUDAL BLOCK N/A 10/26/2018    Procedure: Caudal Epidural Steroid Injection (85881); Surgeon: Magui Bill MD;  Location: [facility] MAIN OR;  Service: Pain Management     CAUDAL BLOCK N/A 11/30/2018    Procedure: Caudal Epidural Steroid Injection (02491);   Surgeon: Magui Bill MD;  Location: [facility] MAIN OR;  Service: Pain Management     COLONOSCOPY      EPIDURAL BLOCK INJECTION Right 5/10/2019    Procedure: L5 S1 transforaminal Epidural Steroid Injection ([id number];  Surgeon: Antonella Tavares MD;  Location: [facility] MAIN OR;  Service: Pain Management     EPIDURAL BLOCK INJECTION Right 8/16/2019    Procedure: BLOCK / INJECTION EPIDURAL STEROID TRANSFORAMINAL;  Surgeon: Antonella Tavares MD;  Location: [facility] MAIN OR;  Service: Pain Management     EPIDURAL BLOCK INJECTION Left 7/1/2021    Procedure: L5 S1 transforaminal epidural steroid injection ( [id number]); Surgeon: Antonella Tavares MD;  Location: [facility] MAIN OR;  Service: Pain Management     NERVE BLOCK Bilateral 4/26/2018    Procedure: B/L L3 L4 L5 S1 MBB #1 ([id number]); Surgeon: Antonella Tavares MD;  Location: [facility] MAIN OR;  Service: Pain Management     NERVE BLOCK Bilateral 5/11/2018    Procedure: B/L L3 L4 L5 S1 Medial Branch Block #2;  Surgeon: Antonella Tavares MD;  Location: [facility] MAIN OR;  Service: Pain Management     NOSE SURGERY      CA COLONOSCOPY FLX DX W/COLLJ SPEC WHEN PFRMD N/A 3/6/2017    Procedure: COLONOSCOPY;  Surgeon: Sejal Kaiser MD;  [facility]; Service: Gastroenterology    CA IMPLANT SPINAL NEUROSTIM/ Left 6/29/2021    Procedure: REPLACEMENT IMPLANTABLE PULSE GENERATOR DORSAL SPINAL COLUMN STIMULATOR, LEFT;  Surgeon: Fazal Caba MD;  Location:  MAIN OR;  Service: Neurosurgery    CA SURG IMPLNT Roselie Purpura Left 10/3/2017    Procedure: PLACEMENT THORACIC FOR INSERTION DORSAL COLUMN SPINAL CORD STIMULATOR (DCS) WITH BUTTOCK IMPLANTABLE PULSE GENERATOR(IMPULSE); Surgeon: Lolita Su MD;  Location:  MAIN OR;  Service: Neurosurgery    [address] Right 5/18/2018    Procedure: Rt L3 L4 L5 S1 Radio Frequency Ablation ([id number]); Surgeon: Antonella Tavares MD;  Location: [facility] MAIN OR;  Service: Pain Management     RADIOFREQUENCY ABLATION Left 6/1/2018    Procedure: Lt L3 L4 L5 S1 Radio Frequency Ablation ([id number]);   Surgeon: Kristel Rice MD;  Location: [place] [address] MAIN OR;  Service: Pain Management        No current facility-administered medications for this encounter  Allergies   Allergen Reactions    Penicillins Swelling       Physical Exam: There were no vitals filed for this visit    General: Awake, Alert, Oriented x 3, Mood and affect appropriate  Respiratory: Respirations even and unlabored  Cardiovascular: Peripheral pulses intact; no edema  Musculoskeletal Exam:  Tenderness in lumbar spine region    ASA Score: 2         Assessment:  Low back pain and lower extremity radicular symptoms    Plan:  Proceed with transforaminal epidural steroid injection

## 2021-07-15 NOTE — OP NOTE
ATTENDING PHYSICIAN:  Antonella Tavares MD     PROCEDURE:  1  Right L5 transforaminal epidural steroid injection under fluoroscopic guidance  2  Right S1 transforaminal epidural steroid injection under fluoroscopic guidance  PRE-PROCEDURE DIAGNOSIS:  Low back pain and right lower extremity radiculopathy  POST-PROCEDURE DIAGNOSIS:  Low back pain and right lower extremity radiculopathy  ANESTHESIA:  Local     ESTIMATED BLOOD LOSS:  Minimal     COMPLICATIONS:  None  LOCATION:  [address]  CONSENT:  Today's procedure, its potential benefits as well as its risks and potential side effects were reviewed  Discussed risks of the procedure including bleeding, infection, nerve irritation or damage, reactions to the medications, weakness, headache, failure of the pain to improve, and potential worsening of the pain were explained to the patient who verbalized understanding and who wished to proceed  Written informed consent was thereby obtained  DESCRIPTION OF THE PROCEDURE:  After written informed consent was obtained, the patient was taken to the fluoroscopy suite and placed in the prone position  Anatomical landmarks were identified by way of fluoroscopy in multiple views  The skin of the lumbar region was prepped using antiseptic and draped in the usual sterile fashion  Strict aseptic technique was utilized  The skin and subcutaneous tissues at the needle entry site were infiltrated with a total of 5 mL of 1% preservative-free lidocaine using a 25-gauge 1-1/2-inch needle  22-gauge needles were then incrementally advanced under fluoroscopic guidance in the oblique view into the neural foramina as mentioned above  Proper placement into each of the neural foramen was confirmed with fluoroscopy in both the lateral and AP views   After negative aspiration for CSF or heme, contrast was injected, which delineated the nerve roots and the epidural space under fluoroscopy in the AP view  There was only a transient pressure paresthesia that resolved immediately upon injection  After negative aspiration, a 2 mL of a 4 mL injectate consisting of 3 mL of preservative-free 0 25% bupivacaine and 1 mL of Depo-Medrol 80 mg/mL was slowly injected into each of the needles as delineated above  The patient tolerated the procedure well and all needles were removed intact  Hemostasis was maintained  There were no apparent paresthesias or complications  The skin was wiped clean and a Band-Aid was placed as appropriate  The patient was monitored for an appropriate period of time and remained hemodynamically stable following the procedure  The patient was ultimately discharged to home with supervision in good condition and instructed to call the office in approximately 7-10 days with an update or sooner as warranted  Discharge instructions were provided  I was present for and participated in all key and critical portions of this procedure      Antonella Tavares MD  7/15/2021  1:25 PM

## 2021-07-15 NOTE — DISCHARGE INSTRUCTIONS
Epidural Steroid Injection   WHAT YOU NEED TO KNOW:   An epidural steroid injection (VIDHYA) is a procedure to inject steroid medicine into the epidural space  The epidural space is between your spinal cord and vertebrae  Steroids reduce inflammation and fluid buildup in your spine that may be causing pain  You may be given pain medicine along with the steroids  ACTIVITY  · Do not drive or operate machinery today  · No strenuous activity today - bending, lifting, etc   · You may resume normal activites starting tomorrow - start slowly and as tolerated  · You may shower today, but no tub baths or hot tubs  · You may have numbness for several hours from the local anesthetic  Please use caution and common sense, especially with weight-bearing activities  CARE OF THE INJECTION SITE  · If you have soreness or pain, apply ice to the area today (20 minutes on/20 minutes off)  · Starting tomorrow, you may use warm, moist heat or ice if needed  · You may have an increase or change in your discomfort for 36-48 hours after your treatment  · Apply ice and continue with any pain medication you have been prescribed  · Notify [facility] if you have any of the following: redness, drainage, swelling, headache, stiff neck or fever above 100°F     SPECIAL INSTRUCTIONS  · Our office will contact you in approximately 7 days for a progress report  MEDICATIONS  · Continue to take all routine medications  · Our office may have instructed you to hold some medications  As no general anesthesia was used in today's procedure, you should not experience any side effects related to anesthesia  If you have a problem specifically related to your procedure, please call our office at [phone]  Problems not related to your procedure should be directed to your primary care physician  Epidural Steroid Injection   WHAT YOU NEED TO KNOW:   An epidural steroid injection (VIDHYA) is a procedure to inject steroid medicine into the epidural space  The epidural space is between your spinal cord and vertebrae  Steroids reduce inflammation and fluid buildup in your spine that may be causing pain  You may be given pain medicine along with the steroids  ACTIVITY  · Do not drive or operate machinery today  · No strenuous activity today - bending, lifting, etc   · You may resume normal activites starting tomorrow - start slowly and as tolerated  · You may shower today, but no tub baths or hot tubs  · You may have numbness for several hours from the local anesthetic  Please use caution and common sense, especially with weight-bearing activities  CARE OF THE INJECTION SITE  · If you have soreness or pain, apply ice to the area today (20 minutes on/20 minutes off)  · Starting tomorrow, you may use warm, moist heat or ice if needed  · You may have an increase or change in your discomfort for 36-48 hours after your treatment  · Apply ice and continue with any pain medication you have been prescribed  · Notify the [facility] if you have any of the following: redness, drainage, swelling, headache, stiff neck or fever above 100°F     SPECIAL INSTRUCTIONS  · Our office will contact you in approximately 7 days for a progress report  MEDICATIONS  · Continue to take all routine medications  · Our office may have instructed you to hold some medications  As no general anesthesia was used in today's procedure, you should not experience any side effects related to anesthesia  If you have a problem specifically related to your procedure, please call our office at [phone]  Problems not related to your procedure should be directed to your primary care physician

## 2021-07-22 ENCOUNTER — TELEPHONE (OUTPATIENT)
Dept: PAIN MEDICINE | Facility: CLINIC | Age: 61
End: 2021-07-22

## 2021-07-22 PROCEDURE — 4010F ACE/ARB THERAPY RXD/TAKEN: CPT | Performed by: ANESTHESIOLOGY

## 2021-07-22 PROCEDURE — 3066F NEPHROPATHY DOC TX: CPT | Performed by: ANESTHESIOLOGY

## 2021-08-20 ENCOUNTER — TELEMEDICINE (OUTPATIENT)
Dept: FAMILY MEDICINE CLINIC | Facility: CLINIC | Age: 61
End: 2021-08-20
Payer: COMMERCIAL

## 2021-08-20 VITALS — HEIGHT: 71 IN | BODY MASS INDEX: 30.1 KG/M2 | WEIGHT: 215 LBS

## 2021-08-20 DIAGNOSIS — K59.00 CONSTIPATION, UNSPECIFIED CONSTIPATION TYPE: ICD-10-CM

## 2021-08-20 DIAGNOSIS — R10.9 ABDOMINAL CRAMPING: ICD-10-CM

## 2021-08-20 DIAGNOSIS — R42 ORTHOSTATIC LIGHTHEADEDNESS: Primary | ICD-10-CM

## 2021-08-20 PROCEDURE — 99213 OFFICE O/P EST LOW 20 MIN: CPT | Performed by: FAMILY MEDICINE

## 2021-08-20 PROCEDURE — 4004F PT TOBACCO SCREEN RCVD TLK: CPT | Performed by: FAMILY MEDICINE

## 2021-08-30 ENCOUNTER — OFFICE VISIT (OUTPATIENT)
Dept: PAIN MEDICINE | Facility: CLINIC | Age: 61
End: 2021-08-30
Payer: OTHER MISCELLANEOUS

## 2021-08-30 VITALS
HEART RATE: 79 BPM | DIASTOLIC BLOOD PRESSURE: 75 MMHG | SYSTOLIC BLOOD PRESSURE: 126 MMHG | HEIGHT: 71 IN | WEIGHT: 215 LBS | BODY MASS INDEX: 30.1 KG/M2

## 2021-08-30 DIAGNOSIS — G89.4 CHRONIC PAIN SYNDROME: ICD-10-CM

## 2021-08-30 DIAGNOSIS — M96.1 POSTLAMINECTOMY SYNDROME, LUMBAR: ICD-10-CM

## 2021-08-30 DIAGNOSIS — M54.16 LUMBAR RADICULOPATHY: ICD-10-CM

## 2021-08-30 DIAGNOSIS — G89.29 CHRONIC LOW BACK PAIN: ICD-10-CM

## 2021-08-30 DIAGNOSIS — M47.816 LUMBAR SPONDYLOSIS: ICD-10-CM

## 2021-08-30 DIAGNOSIS — M54.50 CHRONIC LOW BACK PAIN: ICD-10-CM

## 2021-08-30 PROCEDURE — 3008F BODY MASS INDEX DOCD: CPT | Performed by: FAMILY MEDICINE

## 2021-08-30 PROCEDURE — 99214 OFFICE O/P EST MOD 30 MIN: CPT | Performed by: ANESTHESIOLOGY

## 2021-08-30 NOTE — PROGRESS NOTES
Pain Medicine Follow-Up Note    Assessment:  1  Chronic pain syndrome    2  Chronic low back pain    3  Postlaminectomy syndrome, lumbar    4  Lumbar spondylosis    5  Lumbar radiculopathy        Plan:  My impressions and treatment recommendations were discussed in detail with the patient who verbalized understanding and had no further questions  The patient reports excellent pain relief following the right L5 and S1 transforaminal epidural steroid injections on July 15, 2021 as well as the left L5 and S1 transforaminal epidural steroid injection on July 1, 2021  He states that his pain is well controlled currently and he is happy with his level of pain relief with both the procedures as well as his current medications  He does not require refill of his medications  He is currently using etodolac 300 mg up to 3 times daily as needed for pain and oxycodone/acetaminophen 7 5/325 mg 1 tablet up to 3 times daily as needed for pain  He denies opioid induced constipation  The risks and side effects of chronic opioid treatment were discussed in detail with the patient  Side effects include but are not limited to nausea, vomiting, GI intolerance, sedation, constipation, mental clouding, opioid-induced hyperalgesia, endocrine dysfunction, addiction, dependence, and tolerance  The patient was asked to take his medications only as prescribed and directed, never in excess, and never for any other reason other than for pain control  The patient was also asked to keep his medications out of the reach of others and away from children, preferably in a locked drawer  The patient verbalized understanding and wished to use these opioid medications  [place] Prescription Drug Monitoring Program report was reviewed and was appropriate     Follow-up is planned in 2 months time or sooner as warranted  Discharge instructions were provided   I personally saw and examined the patient and I agree with the above discussed plan of care  History of Present Illness:    Horacio Baum is a 64 y o  male who presents to [facility] for interval re-evaluation of the above stated pain complaints  The patient has a past medical and chronic pain history as outlined in the assessment section  He was last seen on July 15, 2021 at which time he underwent a right L5 and S1 transforaminal epidural steroid injection  At today's office visit, the patient's pain score is 4/10 on the verbal numerical pain rating scale  The patient states that his pain is primarily in the low back and right lower extremity  He describes his pain as worse in the morning, evening, and night  His pain is constant in nature  He reports the quality of his pain as throbbing, shooting, and numbness  He is reporting excellent pain relief since undergoing the transforaminal epidural steroid injections performed in July  Other than as stated above, the patient denies any interval changes in medications, medical condition, mental condition, symptoms, or allergies since the last office visit  Review of Systems:    Review of Systems   Respiratory: Negative for shortness of breath  Cardiovascular: Negative for chest pain  Gastrointestinal: Negative for constipation, diarrhea, nausea and vomiting  Musculoskeletal: Positive for gait problem  Negative for arthralgias, joint swelling and myalgias  Decreased ROM  Joint stiffness   Skin: Negative for rash  Neurological: Negative for dizziness, seizures and weakness  All other systems reviewed and are negative          Patient Active Problem List   Diagnosis    Heartburn    Erectile dysfunction of non-organic origin    DM type 2 (diabetes mellitus, type 2) ([facility])    Chronic low back pain    BPH with urinary obstruction    Blood pressure elevated without history of HTN    Sciatica    Myofascial pain syndrome    Lumbar spondylosis    Herniation of lumbar intervertebral disc with radiculopathy    Acute post-operative pain    Insomnia    Chronic pain syndrome    Low back pain    Class 1 obesity due to excess calories with serious comorbidity and body mass index (BMI) of 30 0 to 30 9 in adult    Smoker    Seasonal allergic rhinitis    Diabetic mononeuropathy associated with type 2 diabetes mellitus (HCC)    Postlaminectomy syndrome, lumbar region    Numbness and tingling in both hands    Intervertebral disc disorder with radiculopathy of lumbosacral region    Acute bronchitis    Hyperlipidemia LDL goal <70    Proteinuria    Battery end of life of spinal cord stimulator       Past Medical History:   Diagnosis Date    Arthritis     BPH (benign prostatic hypertrophy) with urinary obstruction     Chronic pain disorder     Ear infection     Herniation of lumbar intervertebral disc with radiculopathy     Myofascial pain syndrome     Obesity     Sciatica     Tinnitus        Past Surgical History:   Procedure Laterality Date    ABSCESS DRAINAGE      groin    BACK SURGERY      implant - resolved 2004    CAUDAL BLOCK N/A 10/26/2018    Procedure: Caudal Epidural Steroid Injection ([id number]); Surgeon: Tash Gilmore MD;  Location: [facility] MAIN OR;  Service: Pain Management     CAUDAL BLOCK N/A 11/30/2018    Procedure: Caudal Epidural Steroid Injection ([id number]); Surgeon: Tash Gilmore MD;  Location: [facility] MAIN OR;  Service: Pain Management     COLONOSCOPY      EPIDURAL BLOCK INJECTION Right 5/10/2019    Procedure: L5 S1 transforaminal Epidural Steroid Injection ([id number];  Surgeon: Tash Gilmore MD;  Location: [place] [address] MAIN OR;  Service: Pain Management     EPIDURAL BLOCK INJECTION Right 8/16/2019    Procedure: BLOCK / INJECTION EPIDURAL STEROID TRANSFORAMINAL;  Surgeon: Tash Gilmore MD;  Location: [place] [address] MAIN OR;  Service: Pain Management     EPIDURAL BLOCK INJECTION Left 7/1/2021    Procedure: L5 S1 transforaminal epidural steroid injection ( [id number]); Surgeon: Juan C Poole MD;  Location: [facility] MAIN OR;  Service: Pain Management     EPIDURAL BLOCK INJECTION Right 7/15/2021    Procedure: L5 S1 transforaminal epidural steroid injection ( [id number]); Surgeon: Juan C Poole MD;  Location: [facility] MAIN OR;  Service: Pain Management     NERVE BLOCK Bilateral 4/26/2018    Procedure: B/L L3 L4 L5 S1 MBB #1 ([id number]); Surgeon: Juan C Poole MD;  Location: [facility] MAIN OR;  Service: Pain Management     NERVE BLOCK Bilateral 5/11/2018    Procedure: B/L L3 L4 L5 S1 Medial Branch Block #2;  Surgeon: Juan C Poole MD;  Location: [facility] MAIN OR;  Service: Pain Management     NOSE SURGERY      IA COLONOSCOPY FLX DX W/COLLJ SPEC WHEN PFRMD N/A 3/6/2017    Procedure: COLONOSCOPY;  Surgeon: Candi Brown MD;  Location:  GI LAB; Service: Gastroenterology    IA IMPLANT SPINAL NEUROSTIM/ Left 6/29/2021    Procedure: REPLACEMENT IMPLANTABLE PULSE GENERATOR DORSAL SPINAL COLUMN STIMULATOR, LEFT;  Surgeon: Michael Kelley MD;  Location:  MAIN OR;  Service: Neurosurgery    IA SURG IMPLNT [address] Left 10/3/2017    Procedure: PLACEMENT THORACIC FOR INSERTION DORSAL COLUMN SPINAL CORD STIMULATOR (DCS) WITH BUTTOCK IMPLANTABLE PULSE GENERATOR(IMPULSE); Surgeon: Bernadette Spear MD;  Location:  MAIN OR;  Service: Neurosurgery    [address] [place] [address] Right 5/18/2018    Procedure: Rt L3 L4 L5 S1 Radio Frequency Ablation ([id number]); Surgeon: Juan C Poole MD;  Location: [facility] MAIN OR;  Service: Pain Management     RADIOFREQUENCY ABLATION Left 6/1/2018    Procedure: Lt L3 L4 L5 S1 Radio Frequency Ablation ([id number]);   Surgeon: Juan C Poole MD;  Location: [facility] MAIN OR;  Service: Pain Management        Family History   Problem Relation Age of Onset    Diabetes Mother     Diabetes Father         mellitus     Heart attack Father 58    Hypertension Father        Social History     Occupational History    Occupation:  for distribution center    Tobacco Use    Smoking status: Current Every Day Smoker     Packs/day: 1 00    Smokeless tobacco: Never Used    Tobacco comment: encouraged smoking cessation - history of smoking 30 or more pack years    Vaping Use    Vaping Use: Never used   Substance and Sexual Activity    Alcohol use: No     Comment: rare    Drug use: No    Sexual activity: Not on file         Current Outpatient Medications:     atorvastatin (LIPITOR) 20 mg tablet, TAKE 1 TABLET EVERY DAY, Disp: 90 tablet, Rfl: 0    clobetasol (TEMOVATE) 0 05 % ointment, APPLY TOPICALLY 2 (TWO) TIMES A DAY, Disp: 30 g, Rfl: 1    etodolac (LODINE) 300 MG capsule, Take 1 capsule (300 mg total) by mouth 3 (three) times a day as needed (pain (with food)), Disp: 90 capsule, Rfl: 1    fluticasone (FLONASE) 50 mcg/act nasal spray, 2 sprays into each nostril daily, Disp: 48 g, Rfl: 2    gabapentin (NEURONTIN) 300 mg capsule, TAKE 1 CAPSULE THREE TIMES DAILY, Disp: 180 capsule, Rfl: 0    glipiZIDE (GLUCOTROL XL) 10 mg 24 hr tablet, TAKE 1 TABLET (10 MG TOTAL) BY MOUTH DAILY, Disp: 30 tablet, Rfl: 0    lidocaine (LIDODERM) 5 %, Apply 2 patches topically daily Remove & Discard patch within 12 hours or as directed by MD (Patient taking differently: Apply 2 patches topically daily as needed Remove & Discard patch within 12 hours or as directed by MD), Disp: 60 patch, Rfl: 0    lisinopril (ZESTRIL) 2 5 mg tablet, TAKE 1 TABLET (2 5 MG TOTAL) BY MOUTH DAILY, Disp: 90 tablet, Rfl: 0    metFORMIN (GLUCOPHAGE) 1000 MG tablet, TAKE 1 TABLET TWICE DAILY WITH MEALS, Disp: 90 tablet, Rfl: 0    omeprazole (PriLOSEC) 20 mg delayed release capsule, TAKE 1 CAPSULE EVERY DAY, Disp: 90 capsule, Rfl: 0    QUEtiapine (SEROquel) 100 mg tablet, Take one tablet by mouth daily at bedtime, Disp: 90 tablet, Rfl: 0    tamsulosin (FLOMAX) 0 4 mg, TAKE 2 CAPSULES (0 8 MG TOTAL) BY MOUTH DAILY WITH DINNER, Disp: 60 capsule, Rfl: 0    traZODone (DESYREL) 50 mg tablet, TAKE 2 TABLETS DAILY AT BEDTIME, Disp: 60 tablet, Rfl: 0    Allergies   Allergen Reactions    Penicillins Swelling       Physical Exam:    /75   Pulse 79   Ht 5' 11" (1 803 m)   Wt 97 5 kg (215 lb)   BMI 29 99 kg/m²     Constitutional:normal, well developed, well nourished, alert, in no distress and non-toxic and no overt pain behavior    Eyes:anicteric  HEENT:grossly intact  Neck:supple, symmetric, trachea midline and no masses   Pulmonary:even and unlabored  Cardiovascular:No edema or pitting edema present  Skin:Normal without rashes or lesions and well hydrated  Psychiatric:Mood and affect appropriate  Neurologic:Cranial Nerves II-XII grossly intact  Musculoskeletal:normal

## 2021-09-15 ENCOUNTER — OFFICE VISIT (OUTPATIENT)
Dept: FAMILY MEDICINE CLINIC | Facility: CLINIC | Age: 61
End: 2021-09-15
Payer: COMMERCIAL

## 2021-09-15 VITALS
HEIGHT: 71 IN | DIASTOLIC BLOOD PRESSURE: 76 MMHG | SYSTOLIC BLOOD PRESSURE: 136 MMHG | WEIGHT: 216 LBS | HEART RATE: 64 BPM | BODY MASS INDEX: 30.24 KG/M2 | RESPIRATION RATE: 16 BRPM | OXYGEN SATURATION: 96 %

## 2021-09-15 DIAGNOSIS — K59.00 CONSTIPATION, UNSPECIFIED CONSTIPATION TYPE: Primary | ICD-10-CM

## 2021-09-15 DIAGNOSIS — R10.9 ABDOMINAL CRAMPING: ICD-10-CM

## 2021-09-15 DIAGNOSIS — E11.9 TYPE 2 DIABETES MELLITUS WITHOUT COMPLICATION, WITHOUT LONG-TERM CURRENT USE OF INSULIN (HCC): ICD-10-CM

## 2021-09-15 DIAGNOSIS — Z23 NEED FOR INFLUENZA VACCINATION: ICD-10-CM

## 2021-09-15 PROCEDURE — 3008F BODY MASS INDEX DOCD: CPT | Performed by: FAMILY MEDICINE

## 2021-09-15 PROCEDURE — 4004F PT TOBACCO SCREEN RCVD TLK: CPT | Performed by: FAMILY MEDICINE

## 2021-09-15 PROCEDURE — 99214 OFFICE O/P EST MOD 30 MIN: CPT | Performed by: FAMILY MEDICINE

## 2021-09-15 PROCEDURE — 90682 RIV4 VACC RECOMBINANT DNA IM: CPT | Performed by: FAMILY MEDICINE

## 2021-09-15 PROCEDURE — G0008 ADMIN INFLUENZA VIRUS VAC: HCPCS | Performed by: FAMILY MEDICINE

## 2021-09-15 NOTE — PROGRESS NOTES
Assessment/Plan:   Diagnoses and all orders for this visit:    Constipation, unspecified constipation type  Abdominal cramping  - advised to increase water intake (minimum 64oz/water/day)   - increase fiber in diet - cont FiberOne gummies and increase intake of fruits/veggies   - cont Miralax PRN - constipation, decreased appetite, bloating   - pt has been referred to GI for further eval   - due for repeat Cscope - h/o polyps - in 2022    Type 2 diabetes mellitus without complication, without long-term current use of insulin (HCC)  -     HEMOGLOBIN A1C W/ EAG ESTIMATION; Future  - A1c 6 1 (6/14/2021) <-- 6 0 (4/12/2021)  - cont Metformin 1000mg BID and Glipizide 10mg QD   - cont ACEI 2 5mg QD for gualberto-protection - no microalbuminuria   - referred to Optho for annual DM eye exam   - RTO in 3months with repeat A1c for f/u - pt aware and agreeable   - UTD with COVID IMMs  - UTD with Pneumovax - needs booster in 2023    Need for influenza vaccination  -     influenza vaccine, quadrivalent, recombinant, PF, 0 5 mL, for patients 18 yr+ (FLUBLOK)          Subjective:    Patient ID: Ayad Alford is a 64 y o  male    HPI   61yo M presents to to the office with his wife for f/u   - was extremely constipated - did take Miralax and it helped  - has been taking FiberOne gummies QD   - has tried to increase his intake of veggies  - does not like to drink water - drinks sugar free-Gatorade  - due for repeat Cscope - h/o polyps - in 2022  - interested in getting Flu vaccine in the office today   - UTD with COVID IMMs  - due for Pneumovax booster in 2023   - today in the office pt denies F/C/N/V/CP/palpitations/SOB/wheezing/LE edema       The following portions of the patient's history were reviewed and updated as appropriate: allergies, current medications, past family history, past medical history, past social history, past surgical history and problem list     Review of Systems  as per HPI    Objective:  /76 (BP Location: Left arm, Patient Position: Sitting, Cuff Size: Large)   Pulse 64   Resp 16   Ht 5' 11" (1 803 m)   Wt 98 kg (216 lb)   SpO2 96%   BMI 30 13 kg/m²    Physical Exam  Vitals reviewed  Constitutional:       General: He is not in acute distress  Appearance: Normal appearance  He is obese  He is not ill-appearing, toxic-appearing or diaphoretic  HENT:      Head: Normocephalic and atraumatic  Right Ear: External ear normal       Left Ear: External ear normal    Eyes:      General: No scleral icterus  Right eye: No discharge  Left eye: No discharge  Extraocular Movements: Extraocular movements intact  Conjunctiva/sclera: Conjunctivae normal    Cardiovascular:      Rate and Rhythm: Normal rate and regular rhythm  Heart sounds: Normal heart sounds  No murmur heard  No friction rub  No gallop  Pulmonary:      Effort: Pulmonary effort is normal  No respiratory distress  Breath sounds: Normal breath sounds  No stridor  No wheezing, rhonchi or rales  Abdominal:      General: There is distension  Palpations: Abdomen is soft  Musculoskeletal:         General: Normal range of motion  Cervical back: Normal range of motion and neck supple  Right lower leg: No edema  Left lower leg: No edema  Skin:     General: Skin is warm  Neurological:      General: No focal deficit present  Mental Status: He is alert and oriented to person, place, and time  Psychiatric:         Mood and Affect: Mood normal          Behavior: Behavior normal        BMI Counseling: Body mass index is 30 13 kg/m²  The BMI is above normal  Nutrition recommendations include reducing portion sizes and decreasing overall calorie intake  Exercise recommendations include moderate aerobic physical activity for 150 minutes/week, exercising 3-5 times per week, joining a gym and strength training exercises

## 2021-09-17 DIAGNOSIS — E78.2 MIXED HYPERLIPIDEMIA: ICD-10-CM

## 2021-09-20 DIAGNOSIS — K21.9 GASTROESOPHAGEAL REFLUX DISEASE WITHOUT ESOPHAGITIS: ICD-10-CM

## 2021-09-20 DIAGNOSIS — R80.9 PROTEINURIA, UNSPECIFIED TYPE: ICD-10-CM

## 2021-09-20 PROCEDURE — 3066F NEPHROPATHY DOC TX: CPT | Performed by: FAMILY MEDICINE

## 2021-09-20 RX ORDER — ATORVASTATIN CALCIUM 20 MG/1
TABLET, FILM COATED ORAL
Qty: 90 TABLET | Refills: 0 | Status: SHIPPED | OUTPATIENT
Start: 2021-09-20 | End: 2021-11-30

## 2021-09-21 RX ORDER — OMEPRAZOLE 20 MG/1
CAPSULE, DELAYED RELEASE ORAL
Qty: 90 CAPSULE | Refills: 0 | Status: SHIPPED | OUTPATIENT
Start: 2021-09-21 | End: 2021-11-30

## 2021-09-21 RX ORDER — LISINOPRIL 2.5 MG/1
TABLET ORAL
Qty: 90 TABLET | Refills: 0 | Status: SHIPPED | OUTPATIENT
Start: 2021-09-21 | End: 2021-09-29

## 2021-09-24 DIAGNOSIS — E11.9 TYPE 2 DIABETES MELLITUS WITHOUT COMPLICATION, WITHOUT LONG-TERM CURRENT USE OF INSULIN (HCC): ICD-10-CM

## 2021-09-29 DIAGNOSIS — R80.9 PROTEINURIA, UNSPECIFIED TYPE: ICD-10-CM

## 2021-09-29 PROCEDURE — 4010F ACE/ARB THERAPY RXD/TAKEN: CPT | Performed by: FAMILY MEDICINE

## 2021-09-29 RX ORDER — LISINOPRIL 2.5 MG/1
TABLET ORAL
Qty: 90 TABLET | Refills: 0 | Status: SHIPPED | OUTPATIENT
Start: 2021-09-29 | End: 2021-11-30

## 2021-10-04 DIAGNOSIS — E11.9 TYPE 2 DIABETES MELLITUS WITHOUT COMPLICATION, WITHOUT LONG-TERM CURRENT USE OF INSULIN (HCC): ICD-10-CM

## 2021-10-04 DIAGNOSIS — E11.41 DIABETIC MONONEUROPATHY ASSOCIATED WITH TYPE 2 DIABETES MELLITUS (HCC): ICD-10-CM

## 2021-10-04 RX ORDER — GABAPENTIN 300 MG/1
CAPSULE ORAL
Qty: 270 CAPSULE | Refills: 1 | Status: SHIPPED | OUTPATIENT
Start: 2021-10-04 | End: 2022-08-08 | Stop reason: ALTCHOICE

## 2021-10-28 ENCOUNTER — OFFICE VISIT (OUTPATIENT)
Dept: PAIN MEDICINE | Facility: CLINIC | Age: 61
End: 2021-10-28
Payer: OTHER MISCELLANEOUS

## 2021-10-28 VITALS
WEIGHT: 225 LBS | SYSTOLIC BLOOD PRESSURE: 125 MMHG | HEART RATE: 87 BPM | BODY MASS INDEX: 31.5 KG/M2 | DIASTOLIC BLOOD PRESSURE: 74 MMHG | HEIGHT: 71 IN

## 2021-10-28 DIAGNOSIS — M96.1 POSTLAMINECTOMY SYNDROME, LUMBAR: ICD-10-CM

## 2021-10-28 DIAGNOSIS — Z79.891 LONG-TERM CURRENT USE OF OPIATE ANALGESIC: ICD-10-CM

## 2021-10-28 DIAGNOSIS — M54.16 LUMBAR RADICULOPATHY: ICD-10-CM

## 2021-10-28 DIAGNOSIS — M54.50 CHRONIC LOW BACK PAIN: ICD-10-CM

## 2021-10-28 DIAGNOSIS — M47.816 LUMBAR SPONDYLOSIS: ICD-10-CM

## 2021-10-28 DIAGNOSIS — G89.29 CHRONIC LOW BACK PAIN: ICD-10-CM

## 2021-10-28 DIAGNOSIS — G89.4 CHRONIC PAIN SYNDROME: Primary | ICD-10-CM

## 2021-10-28 DIAGNOSIS — F11.20 UNCOMPLICATED OPIOID DEPENDENCE (HCC): ICD-10-CM

## 2021-10-28 PROCEDURE — 80305 DRUG TEST PRSMV DIR OPT OBS: CPT | Performed by: ANESTHESIOLOGY

## 2021-10-28 PROCEDURE — 99214 OFFICE O/P EST MOD 30 MIN: CPT | Performed by: ANESTHESIOLOGY

## 2021-10-28 PROCEDURE — 3008F BODY MASS INDEX DOCD: CPT | Performed by: FAMILY MEDICINE

## 2021-10-28 RX ORDER — OXYCODONE AND ACETAMINOPHEN 7.5; 325 MG/1; MG/1
1 TABLET ORAL 3 TIMES DAILY PRN
Qty: 90 TABLET | Refills: 0 | Status: SHIPPED | OUTPATIENT
Start: 2021-10-29 | End: 2021-11-28

## 2021-10-28 RX ORDER — OXYCODONE AND ACETAMINOPHEN 7.5; 325 MG/1; MG/1
1 TABLET ORAL 3 TIMES DAILY PRN
Qty: 90 TABLET | Refills: 0 | Status: SHIPPED | OUTPATIENT
Start: 2021-11-28 | End: 2021-12-27 | Stop reason: SDUPTHER

## 2021-11-01 DIAGNOSIS — G47.00 INSOMNIA, UNSPECIFIED TYPE: ICD-10-CM

## 2021-11-03 RX ORDER — QUETIAPINE FUMARATE 100 MG/1
TABLET, FILM COATED ORAL
Qty: 90 TABLET | Refills: 0 | Status: SHIPPED | OUTPATIENT
Start: 2021-11-03 | End: 2022-04-27

## 2021-11-08 DIAGNOSIS — G47.00 INSOMNIA, UNSPECIFIED TYPE: ICD-10-CM

## 2021-11-08 DIAGNOSIS — E11.69 TYPE 2 DIABETES MELLITUS WITH OTHER SPECIFIED COMPLICATION, WITHOUT LONG-TERM CURRENT USE OF INSULIN (HCC): ICD-10-CM

## 2021-11-08 DIAGNOSIS — N40.1 BPH WITH URINARY OBSTRUCTION: ICD-10-CM

## 2021-11-08 DIAGNOSIS — N13.8 BPH WITH URINARY OBSTRUCTION: ICD-10-CM

## 2021-11-10 RX ORDER — TRAZODONE HYDROCHLORIDE 50 MG/1
TABLET ORAL
Qty: 180 TABLET | Refills: 0 | Status: SHIPPED | OUTPATIENT
Start: 2021-11-10 | End: 2022-01-21

## 2021-11-10 RX ORDER — GLIPIZIDE 10 MG/1
TABLET, FILM COATED, EXTENDED RELEASE ORAL
Qty: 90 TABLET | Refills: 0 | Status: SHIPPED | OUTPATIENT
Start: 2021-11-10 | End: 2021-12-22

## 2021-11-10 RX ORDER — TAMSULOSIN HYDROCHLORIDE 0.4 MG/1
CAPSULE ORAL
Qty: 180 CAPSULE | Refills: 0 | Status: SHIPPED | OUTPATIENT
Start: 2021-11-10 | End: 2022-01-21

## 2021-11-29 DIAGNOSIS — K21.9 GASTROESOPHAGEAL REFLUX DISEASE WITHOUT ESOPHAGITIS: ICD-10-CM

## 2021-11-29 DIAGNOSIS — E78.2 MIXED HYPERLIPIDEMIA: ICD-10-CM

## 2021-11-29 DIAGNOSIS — R80.9 PROTEINURIA, UNSPECIFIED TYPE: ICD-10-CM

## 2021-11-30 RX ORDER — ATORVASTATIN CALCIUM 20 MG/1
TABLET, FILM COATED ORAL
Qty: 90 TABLET | Refills: 0 | Status: SHIPPED | OUTPATIENT
Start: 2021-11-30 | End: 2022-02-15

## 2021-11-30 RX ORDER — OMEPRAZOLE 20 MG/1
CAPSULE, DELAYED RELEASE ORAL
Qty: 90 CAPSULE | Refills: 0 | Status: SHIPPED | OUTPATIENT
Start: 2021-11-30 | End: 2022-02-15

## 2021-11-30 RX ORDER — LISINOPRIL 2.5 MG/1
TABLET ORAL
Qty: 90 TABLET | Refills: 0 | Status: SHIPPED | OUTPATIENT
Start: 2021-11-30 | End: 2022-02-15

## 2021-12-07 ENCOUNTER — RA CDI HCC (OUTPATIENT)
Dept: OTHER | Facility: HOSPITAL | Age: 61
End: 2021-12-07

## 2021-12-22 ENCOUNTER — OFFICE VISIT (OUTPATIENT)
Dept: FAMILY MEDICINE CLINIC | Facility: CLINIC | Age: 61
End: 2021-12-22
Payer: COMMERCIAL

## 2021-12-22 VITALS
OXYGEN SATURATION: 97 % | DIASTOLIC BLOOD PRESSURE: 64 MMHG | HEIGHT: 71 IN | WEIGHT: 225 LBS | HEART RATE: 94 BPM | BODY MASS INDEX: 31.5 KG/M2 | SYSTOLIC BLOOD PRESSURE: 122 MMHG

## 2021-12-22 DIAGNOSIS — N52.9 ERECTILE DYSFUNCTION, UNSPECIFIED ERECTILE DYSFUNCTION TYPE: ICD-10-CM

## 2021-12-22 DIAGNOSIS — K63.5 POLYP OF COLON, UNSPECIFIED PART OF COLON, UNSPECIFIED TYPE: ICD-10-CM

## 2021-12-22 DIAGNOSIS — E11.69 TYPE 2 DIABETES MELLITUS WITH OTHER SPECIFIED COMPLICATION, WITHOUT LONG-TERM CURRENT USE OF INSULIN (HCC): Primary | ICD-10-CM

## 2021-12-22 DIAGNOSIS — R20.0 NUMBNESS AND TINGLING OF BOTH FEET: ICD-10-CM

## 2021-12-22 DIAGNOSIS — E11.42 DIABETIC POLYNEUROPATHY ASSOCIATED WITH TYPE 2 DIABETES MELLITUS (HCC): ICD-10-CM

## 2021-12-22 DIAGNOSIS — G89.4 CHRONIC PAIN SYNDROME: ICD-10-CM

## 2021-12-22 DIAGNOSIS — R20.2 NUMBNESS AND TINGLING OF BOTH FEET: ICD-10-CM

## 2021-12-22 LAB — SL AMB POCT HEMOGLOBIN AIC: 6.1 (ref ?–6.5)

## 2021-12-22 PROCEDURE — 3008F BODY MASS INDEX DOCD: CPT | Performed by: FAMILY MEDICINE

## 2021-12-22 PROCEDURE — 83036 HEMOGLOBIN GLYCOSYLATED A1C: CPT | Performed by: FAMILY MEDICINE

## 2021-12-22 PROCEDURE — 4004F PT TOBACCO SCREEN RCVD TLK: CPT | Performed by: FAMILY MEDICINE

## 2021-12-22 PROCEDURE — 3044F HG A1C LEVEL LT 7.0%: CPT | Performed by: FAMILY MEDICINE

## 2021-12-22 PROCEDURE — 99214 OFFICE O/P EST MOD 30 MIN: CPT | Performed by: FAMILY MEDICINE

## 2021-12-22 PROCEDURE — 3725F SCREEN DEPRESSION PERFORMED: CPT | Performed by: FAMILY MEDICINE

## 2021-12-22 NOTE — H&P (VIEW-ONLY)
Assessment/Plan:   Diagnoses and all orders for this visit:    Type 2 diabetes mellitus with other specified complication, without long-term current use of insulin ([facility]  -     POCT hemoglobin A1c  -     HEMOGLOBIN A1C W/ EAG ESTIMATION; Future  - A1c stable at 6 1  - advised to stop Glipizide for now and cont Metformin 1000mg BID   - cont ACEI 2 5mg QD for gualberto-protection - no microalbuminuria   - has been referred to Optho for annual DM eye exam   - UTD with COVID IMMs and Booster  - UTD with annual Flu vaccine   - UTD with Pneumovax - needs booster in 2023  - RTO in 3months with repeat A1c for f/u - pt aware and agreeable   Diabetic polyneuropathy associated with type 2 diabetes mellitus ([facility] )  -     Ambulatory referral to Podiatry; Future  Numbness and tingling of both feet  -     Ambulatory referral to Podiatry; Future    Polyp of colon, unspecified part of colon, unspecified type  -     Ambulatory referral to Gastroenterology; Future  - due for repeat Cscope - h/o polyps - in 2022    Chronic pain syndrome  -     Ambulatory referral to Neurosurgery; Future    Erectile dysfunction, unspecified erectile dysfunction type  -     Ambulatory referral to Urology; Future  - declined eRx for Viagra or Cialis   - would like referral to Uro           Subjective:    Patient ID: Esmer Robins is a 64 y o  male    HPI   63yo M presents to the office for f/u   - UTD with COVID IMMs and Booster  - UTD with annual Flu vaccine   - A1c in the office 6 1   - (+) h/o colon polyps - due for repeat Cscope in 2022   - no longer constipated - still taking FiberOne gummies  - does not like to drink water - drinks sugar free-Gatorade  - feet still with numbness and intermittent tingling - does take Gabapentin 300mg QHS   - decreased libido and urinary flow issues  - nml PSA in 6/2021    - h/o chronic pain syndrome - follows with Dr Batsheva Avila - still in pain despite current medication regimen- has an appt scheduled 12/27/2021   - has an implant which does help - "I wouldn't be walking if I didn't have it"   - denies F/C/N/V/CP/palpitations/SOB/wheezing/cough/abd pain/D/C/LE edema       The following portions of the patient's history were reviewed and updated as appropriate: allergies, current medications, past family history, past medical history, past social history, past surgical history and problem list     Review of Systems  as per HPI    Objective:  /64 (BP Location: Left arm, Patient Position: Sitting, Cuff Size: Standard)   Pulse 94   Ht 5' 11" (1 803 m)   Wt 102 kg (225 lb)   SpO2 97%   BMI 31 38 kg/m²    Physical Exam  Vitals reviewed  Constitutional:       General: He is not in acute distress  Appearance: He is obese  He is not ill-appearing, toxic-appearing or diaphoretic  HENT:      Head: Normocephalic and atraumatic  Right Ear: External ear normal       Left Ear: External ear normal    Eyes:      General: No scleral icterus  Right eye: No discharge  Left eye: No discharge  Extraocular Movements: Extraocular movements intact  Conjunctiva/sclera: Conjunctivae normal    Cardiovascular:      Rate and Rhythm: Normal rate and regular rhythm  Heart sounds: Normal heart sounds  No murmur heard  No friction rub  No gallop  Pulmonary:      Effort: Pulmonary effort is normal       Breath sounds: Normal breath sounds  Abdominal:      General: Bowel sounds are normal       Palpations: Abdomen is soft  Musculoskeletal:         General: Normal range of motion  Cervical back: Normal range of motion and neck supple  Right lower leg: No edema  Left lower leg: No edema  Skin:     General: Skin is warm  Neurological:      General: No focal deficit present  Mental Status: He is alert and oriented to person, place, and time  Psychiatric:         Mood and Affect: Mood normal          Behavior: Behavior normal        BMI Counseling: Body mass index is 31 38 kg/m²   The BMI is above normal  Nutrition recommendations include reducing portion sizes and decreasing overall calorie intake  Exercise recommendations include moderate aerobic physical activity for 150 minutes/week, exercising 3-5 times per week, joining a gym and strength training exercises  Depression Screening Follow-up Plan: Patient's depression screening was positive with a PHQ-2 score of 0  Their PHQ-9 score was   Clinically patient does not have depression  No treatment is required

## 2021-12-27 ENCOUNTER — TELEPHONE (OUTPATIENT)
Dept: PAIN MEDICINE | Facility: CLINIC | Age: 61
End: 2021-12-27

## 2021-12-27 ENCOUNTER — TELEPHONE (OUTPATIENT)
Dept: GASTROENTEROLOGY | Facility: CLINIC | Age: 61
End: 2021-12-27

## 2021-12-27 ENCOUNTER — TELEPHONE (OUTPATIENT)
Dept: NEUROSURGERY | Facility: CLINIC | Age: 61
End: 2021-12-27

## 2021-12-27 ENCOUNTER — PREP FOR PROCEDURE (OUTPATIENT)
Dept: GASTROENTEROLOGY | Facility: CLINIC | Age: 61
End: 2021-12-27

## 2021-12-27 DIAGNOSIS — Z86.010 HISTORY OF COLON POLYPS: Primary | ICD-10-CM

## 2021-12-27 DIAGNOSIS — M47.816 LUMBAR SPONDYLOSIS: ICD-10-CM

## 2021-12-27 DIAGNOSIS — M54.16 LUMBAR RADICULOPATHY: ICD-10-CM

## 2021-12-27 DIAGNOSIS — M54.50 CHRONIC LOW BACK PAIN: ICD-10-CM

## 2021-12-27 DIAGNOSIS — M96.1 POSTLAMINECTOMY SYNDROME, LUMBAR: ICD-10-CM

## 2021-12-27 DIAGNOSIS — G89.4 CHRONIC PAIN SYNDROME: ICD-10-CM

## 2021-12-27 DIAGNOSIS — G89.29 CHRONIC LOW BACK PAIN: ICD-10-CM

## 2021-12-27 RX ORDER — OXYCODONE AND ACETAMINOPHEN 7.5; 325 MG/1; MG/1
1 TABLET ORAL 3 TIMES DAILY PRN
Qty: 90 TABLET | Refills: 0 | Status: SHIPPED | OUTPATIENT
Start: 2021-12-29 | End: 2022-01-28

## 2022-01-07 ENCOUNTER — HOSPITAL ENCOUNTER (OUTPATIENT)
Facility: AMBULARY SURGERY CENTER | Age: 62
Setting detail: OUTPATIENT SURGERY
Discharge: HOME | End: 2022-01-07
Attending: ANESTHESIOLOGY | Admitting: ANESTHESIOLOGY
Payer: OTHER MISCELLANEOUS

## 2022-01-07 ENCOUNTER — APPOINTMENT (OUTPATIENT)
Dept: RADIOLOGY | Facility: HOSPITAL | Age: 62
End: 2022-01-07
Payer: OTHER MISCELLANEOUS

## 2022-01-07 VITALS
DIASTOLIC BLOOD PRESSURE: 90 MMHG | OXYGEN SATURATION: 99 % | HEART RATE: 75 BPM | TEMPERATURE: 97.8 F | SYSTOLIC BLOOD PRESSURE: 157 MMHG | RESPIRATION RATE: 18 BRPM

## 2022-01-07 LAB — GLUCOSE SERPL-MCNC: 154 MG/DL (ref 65–140)

## 2022-01-07 PROCEDURE — 82948 REAGENT STRIP/BLOOD GLUCOSE: CPT

## 2022-01-07 PROCEDURE — 72020 X-RAY EXAM OF SPINE 1 VIEW: CPT

## 2022-01-07 PROCEDURE — 64483 NJX AA&/STRD TFRM EPI L/S 1: CPT | Performed by: ANESTHESIOLOGY

## 2022-01-07 PROCEDURE — 64484 NJX AA&/STRD TFRM EPI L/S EA: CPT | Performed by: ANESTHESIOLOGY

## 2022-01-07 RX ORDER — BUPIVACAINE HYDROCHLORIDE 2.5 MG/ML
INJECTION, SOLUTION EPIDURAL; INFILTRATION; INTRACAUDAL AS NEEDED
Status: DISCONTINUED | OUTPATIENT
Start: 2022-01-07 | End: 2022-01-07 | Stop reason: HOSPADM

## 2022-01-07 RX ORDER — METHYLPREDNISOLONE ACETATE 80 MG/ML
INJECTION, SUSPENSION INTRA-ARTICULAR; INTRALESIONAL; INTRAMUSCULAR; SOFT TISSUE AS NEEDED
Status: DISCONTINUED | OUTPATIENT
Start: 2022-01-07 | End: 2022-01-07 | Stop reason: HOSPADM

## 2022-01-07 RX ORDER — LIDOCAINE WITH 8.4% SOD BICARB 0.9%(10ML)
SYRINGE (ML) INJECTION AS NEEDED
Status: DISCONTINUED | OUTPATIENT
Start: 2022-01-07 | End: 2022-01-07 | Stop reason: HOSPADM

## 2022-01-07 NOTE — OP NOTE
ATTENDING PHYSICIAN:  Lesley Rodas MD     PROCEDURE:  1  Right L5 transforaminal epidural steroid injection under fluoroscopic guidance  2  Right S1 transforaminal epidural steroid injection under fluoroscopic guidance  PRE-PROCEDURE DIAGNOSIS:  Low back pain and right lower extremity radiculopathy  POST-PROCEDURE DIAGNOSIS:  Low back pain and right lower extremity radiculopathy  ANESTHESIA:  Local     ESTIMATED BLOOD LOSS:  Minimal     COMPLICATIONS:  None  LOCATION:  [address]  CONSENT:  Today's procedure, its potential benefits as well as its risks and potential side effects were reviewed  Discussed risks of the procedure including bleeding, infection, nerve irritation or damage, reactions to the medications, weakness, headache, failure of the pain to improve, and potential worsening of the pain were explained to the patient who verbalized understanding and who wished to proceed  Written informed consent was thereby obtained  DESCRIPTION OF THE PROCEDURE:  After written informed consent was obtained, the patient was taken to the fluoroscopy suite and placed in the prone position  Anatomical landmarks were identified by way of fluoroscopy in multiple views  The skin of the lumbar region was prepped using antiseptic and draped in the usual sterile fashion  Strict aseptic technique was utilized  The skin and subcutaneous tissues at the needle entry site were infiltrated with a total of 5 mL of 1% preservative-free lidocaine using a 25-gauge 1-1/2-inch needle  22-gauge needles were then incrementally advanced under fluoroscopic guidance in the oblique view into the neural foramina as mentioned above  Proper placement into each of the neural foramen was confirmed with fluoroscopy in both the lateral and AP views   After negative aspiration for CSF or heme, contrast was injected, which delineated the nerve roots and the epidural space under fluoroscopy in the AP view  There was only a transient pressure paresthesia that resolved immediately upon injection  After negative aspiration, a 2 mL of a 4 mL injectate consisting of 3 mL of preservative-free 0 25% bupivacaine and 1 mL of Depo-Medrol 80 mg/mL was slowly injected into each of the needles as delineated above  The patient tolerated the procedure well and all needles were removed intact  Hemostasis was maintained  There were no apparent paresthesias or complications  The skin was wiped clean and a Band-Aid was placed as appropriate  The patient was monitored for an appropriate period of time and remained hemodynamically stable following the procedure  The patient was ultimately discharged to home with supervision in good condition and instructed to call the office in approximately 7-10 days with an update or sooner as warranted  Discharge instructions were provided  I was present for and participated in all key and critical portions of this procedure      Elroy Elliott MD  1/7/2022  12:58 PM

## 2022-01-07 NOTE — DISCHARGE INSTRUCTIONS
Epidural Steroid Injection   WHAT YOU NEED TO KNOW:   An epidural steroid injection (VIDHYA) is a procedure to inject steroid medicine into the epidural space  The epidural space is between your spinal cord and vertebrae  Steroids reduce inflammation and fluid buildup in your spine that may be causing pain  You may be given pain medicine along with the steroids  ACTIVITY  · Do not drive or operate machinery today  · No strenuous activity today - bending, lifting, etc   · You may resume normal activites starting tomorrow - start slowly and as tolerated  · You may shower today, but no tub baths or hot tubs  · You may have numbness for several hours from the local anesthetic  Please use caution and common sense, especially with weight-bearing activities  CARE OF THE INJECTION SITE  · If you have soreness or pain, apply ice to the area today (20 minutes on/20 minutes off)  · Starting tomorrow, you may use warm, moist heat or ice if needed  · You may have an increase or change in your discomfort for 36-48 hours after your treatment  · Apply ice and continue with any pain medication you have been prescribed  · Notify the [facility] if you have any of the following: redness, drainage, swelling, headache, stiff neck or fever above 100°F     SPECIAL INSTRUCTIONS  · Our office will contact you in approximately 7 days for a progress report  MEDICATIONS  · Continue to take all routine medications  · Our office may have instructed you to hold some medications  As no general anesthesia was used in today's procedure, you should not experience any side effects related to anesthesia  If you have a problem specifically related to your procedure, please call our office at [phone]  Problems not related to your procedure should be directed to your primary care physician

## 2022-01-14 ENCOUNTER — TELEPHONE (OUTPATIENT)
Dept: PAIN MEDICINE | Facility: CLINIC | Age: 62
End: 2022-01-14

## 2022-01-20 DIAGNOSIS — G47.00 INSOMNIA, UNSPECIFIED TYPE: ICD-10-CM

## 2022-01-20 DIAGNOSIS — N13.8 BPH WITH URINARY OBSTRUCTION: ICD-10-CM

## 2022-01-20 DIAGNOSIS — N40.1 BPH WITH URINARY OBSTRUCTION: ICD-10-CM

## 2022-01-21 RX ORDER — TRAZODONE HYDROCHLORIDE 50 MG/1
TABLET ORAL
Qty: 180 TABLET | Refills: 0 | Status: SHIPPED | OUTPATIENT
Start: 2022-01-21 | End: 2022-03-31

## 2022-01-21 RX ORDER — TAMSULOSIN HYDROCHLORIDE 0.4 MG/1
CAPSULE ORAL
Qty: 180 CAPSULE | Refills: 0 | Status: SHIPPED | OUTPATIENT
Start: 2022-01-21 | End: 2022-03-31

## 2022-01-24 ENCOUNTER — OFFICE VISIT (OUTPATIENT)
Dept: PODIATRY | Facility: CLINIC | Age: 62
End: 2022-01-24
Payer: COMMERCIAL

## 2022-01-24 VITALS — HEIGHT: 71 IN | WEIGHT: 229 LBS | BODY MASS INDEX: 32.06 KG/M2

## 2022-01-24 DIAGNOSIS — R20.2 NUMBNESS AND TINGLING OF BOTH FEET: ICD-10-CM

## 2022-01-24 DIAGNOSIS — E11.42 DIABETIC POLYNEUROPATHY ASSOCIATED WITH TYPE 2 DIABETES MELLITUS (HCC): ICD-10-CM

## 2022-01-24 DIAGNOSIS — R20.0 NUMBNESS AND TINGLING OF BOTH FEET: ICD-10-CM

## 2022-01-24 PROCEDURE — 3008F BODY MASS INDEX DOCD: CPT | Performed by: PODIATRIST

## 2022-01-24 PROCEDURE — 99203 OFFICE O/P NEW LOW 30 MIN: CPT | Performed by: PODIATRIST

## 2022-01-24 PROCEDURE — 4004F PT TOBACCO SCREEN RCVD TLK: CPT | Performed by: PODIATRIST

## 2022-01-24 NOTE — PROGRESS NOTES
Assessment/Plan:         Diagnoses and all orders for this visit:    Numbness and tingling of both feet  -     Ambulatory referral to Podiatry    Diabetic polyneuropathy associated with type 2 diabetes mellitus ([facility] [address] )  -     Ambulatory referral to [address] on DM risk to lower extremities, proper shoe gear, and daily monitoring of feet    -Educated on A1C and the risks of poorly controlled Diabetes   -Discussed weight loss and suitable exercise regiment    -patient has moderate neuropathy in his lower extremities as combination from diabetes as well as back injury  He is on gabapentin and pain stimulator as well as chronic pain medication  There is not much further I can offer  We did discuss topical capsaicin cream   He is high risk for complications in his feet so he should call immediately if he ever has any traumatic injury infection or wound to his feet  He prefers to be seen once a year unless new concerns arise  Subjective:      Patient ID: Savannah Valentin is a 64 y o  male  Patient arrives for DM foot check  His A1C is 6 1 but he has a lot of numbness in his feet  He has had 2 numb toes from a back injury but since he became diabetic the numbness has spread to both feet  He feet feel tingly all the time  He is on gabapentin but it doesn't do much  He has chronic pain from his back injury with a spinal stimulator  He is on disability due to this issue  He smokes 1ppd  The following portions of the patient's history were reviewed and updated as appropriate:   He  has a past medical history of Arthritis, BPH (benign prostatic hypertrophy) with urinary obstruction, Chronic pain disorder, Ear infection, Herniation of lumbar intervertebral disc with radiculopathy, Myofascial pain syndrome, Obesity, Sciatica, and Tinnitus    He   Patient Active Problem List    Diagnosis Date Noted    Battery end of life of spinal cord stimulator 07/13/2021    Hyperlipidemia LDL goal <70 07/31/2019    Proteinuria 07/31/2019    Acute bronchitis 06/03/2019    Intervertebral disc disorder with radiculopathy of lumbosacral region 05/02/2019    Numbness and tingling in both hands 04/10/2019    Postlaminectomy syndrome, lumbar region 10/23/2018    Diabetic mononeuropathy associated with type 2 diabetes mellitus ([facility] ) 10/12/2018    Smoker 09/12/2018    Seasonal allergic rhinitis 09/12/2018    Class 1 obesity due to excess calories with serious comorbidity and body mass index (BMI) of 30 0 to 30 9 in adult 05/31/2018    Chronic pain syndrome 04/17/2018    Low back pain 04/17/2018    Insomnia 03/13/2018    Acute post-operative pain 10/02/2017    Blood pressure elevated without history of HTN 09/12/2017    Heartburn 02/07/2017    DM type 2 (diabetes mellitus, type 2) ([facility] ) 01/03/2017    BPH with urinary obstruction 01/03/2017    Lumbar spondylosis 09/26/2016    Myofascial pain syndrome 10/21/2013    Herniation of lumbar intervertebral disc with radiculopathy 10/21/2013    Erectile dysfunction of non-organic origin 09/03/2013    Chronic low back pain 09/03/2013    Sciatica 09/03/2013     He  has a past surgical history that includes Nose surgery; Abscess drainage; pr colonoscopy flx dx w/collj spec when pfrmd (N/A, 3/6/2017); Colonoscopy; pr surg implnt neuroelect,epidural (Left, 10/3/2017); NERVE BLOCK (Bilateral, 4/26/2018); NERVE BLOCK (Bilateral, 5/11/2018); Back surgery; Radiofrequency ablation (Right, 5/18/2018); Radiofrequency ablation (Left, 6/1/2018); Caudal block (N/A, 10/26/2018); Caudal block (N/A, 11/30/2018); Epidural block injection (Right, 5/10/2019); Epidural block injection (Right, 8/16/2019); pr implant spinal neurostim/ (Left, 6/29/2021); Epidural block injection (Left, 7/1/2021); Epidural block injection (Right, 7/15/2021); and Epidural block injection (Right, 1/7/2022)    His family history includes Diabetes in his father and mother; Heart attack (age of onset: 58) in his father; Hypertension in his father  He  reports that he has been smoking  He has been smoking about 1 00 pack per day  He has never used smokeless tobacco  He reports that he does not drink alcohol and does not use drugs  Current Outpatient Medications   Medication Sig Dispense Refill    atorvastatin (LIPITOR) 20 mg tablet TAKE 1 TABLET EVERY DAY 90 tablet 0    clobetasol (TEMOVATE) 0 05 % ointment APPLY TOPICALLY 2 (TWO) TIMES A DAY 30 g 1    etodolac (LODINE) 300 MG capsule Take 1 capsule (300 mg total) by mouth 3 (three) times a day as needed (pain (with food)) 90 capsule 0    fluticasone (FLONASE) 50 mcg/act nasal spray USE 2 SPRAYS INTO EACH NOSTRIL DAILY 48 g 0    gabapentin (NEURONTIN) 300 mg capsule TAKE 1 CAPSULE THREE TIMES DAILY 270 capsule 1    lidocaine (LIDODERM) 5 % Apply 2 patches topically daily Remove & Discard patch within 12 hours or as directed by MD (Patient taking differently: Apply 2 patches topically daily as needed Remove & Discard patch within 12 hours or as directed by MD) 60 patch 0    lisinopril (ZESTRIL) 2 5 mg tablet TAKE 1 TABLET (2 5 MG TOTAL) BY MOUTH DAILY 90 tablet 0    metFORMIN (GLUCOPHAGE) 1000 MG tablet Take 1 tablet (1,000 mg total) by mouth 2 (two) times a day with meals 180 tablet 1    omeprazole (PriLOSEC) 20 mg delayed release capsule TAKE 1 CAPSULE EVERY DAY 90 capsule 0    oxyCODONE-acetaminophen (Percocet) 7 5-325 MG per tablet Take 1 tablet by mouth 3 (three) times a day as needed for severe pain Max Daily Amount: 3 tablets 90 tablet 0    QUEtiapine (SEROquel) 100 mg tablet Take one tablet by mouth daily at bedtime 90 tablet 0    tamsulosin (FLOMAX) 0 4 mg TAKE 2 CAPSULES EVERY DAY WITH DINNER 180 capsule 0    traZODone (DESYREL) 50 mg tablet TAKE 2 TABLETS  DAILY AT BEDTIME 180 tablet 0     No current facility-administered medications for this visit       Current Outpatient Medications on File Prior to Visit   Medication Sig    atorvastatin (LIPITOR) 20 mg tablet TAKE 1 TABLET EVERY DAY    clobetasol (TEMOVATE) 0 05 % ointment APPLY TOPICALLY 2 (TWO) TIMES A DAY    etodolac (LODINE) 300 MG capsule Take 1 capsule (300 mg total) by mouth 3 (three) times a day as needed (pain (with food))    fluticasone (FLONASE) 50 mcg/act nasal spray USE 2 SPRAYS INTO EACH NOSTRIL DAILY    gabapentin (NEURONTIN) 300 mg capsule TAKE 1 CAPSULE THREE TIMES DAILY    lidocaine (LIDODERM) 5 % Apply 2 patches topically daily Remove & Discard patch within 12 hours or as directed by MD (Patient taking differently: Apply 2 patches topically daily as needed Remove & Discard patch within 12 hours or as directed by MD)    lisinopril (ZESTRIL) 2 5 mg tablet TAKE 1 TABLET (2 5 MG TOTAL) BY MOUTH DAILY    metFORMIN (GLUCOPHAGE) 1000 MG tablet Take 1 tablet (1,000 mg total) by mouth 2 (two) times a day with meals    omeprazole (PriLOSEC) 20 mg delayed release capsule TAKE 1 CAPSULE EVERY DAY    oxyCODONE-acetaminophen (Percocet) 7 5-325 MG per tablet Take 1 tablet by mouth 3 (three) times a day as needed for severe pain Max Daily Amount: 3 tablets    QUEtiapine (SEROquel) 100 mg tablet Take one tablet by mouth daily at bedtime    tamsulosin (FLOMAX) 0 4 mg TAKE 2 CAPSULES EVERY DAY WITH DINNER    traZODone (DESYREL) 50 mg tablet TAKE 2 TABLETS  DAILY AT BEDTIME     No current facility-administered medications on file prior to visit  He is allergic to penicillins       Review of Systems   Constitutional: Negative  HENT: Negative  Respiratory: Negative  Cardiovascular: Positive for leg swelling  Gastrointestinal: Negative  Musculoskeletal: Positive for arthralgias  Skin: Negative for color change, rash and wound  Neurological: Positive for numbness  Negative for weakness  Psychiatric/Behavioral: The patient is not nervous/anxious            Objective:      Ht 5' 11" (1 803 m) Comment: verbal  Wt 104 kg (229 lb)   BMI 31 94 kg/m²            Physical Exam  Vitals reviewed  Constitutional:       Appearance: He is obese  He is not ill-appearing or diaphoretic  HENT:      Nose: No congestion or rhinorrhea  Cardiovascular:      Rate and Rhythm: Normal rate  Pulses: Normal pulses  no weak pulses          Dorsalis pedis pulses are 2+ on the right side and 2+ on the left side  Posterior tibial pulses are 2+ on the right side and 2+ on the left side  Pulmonary:      Effort: Pulmonary effort is normal  No respiratory distress  Abdominal:      General: There is no distension  Tenderness: There is no abdominal tenderness  Musculoskeletal:      Right lower leg: Edema present  Left lower leg: Edema present  Right foot: Normal range of motion  No deformity or prominent metatarsal heads  Left foot: Normal range of motion  No deformity or prominent metatarsal heads  Feet:      Right foot:      Protective Sensation: 10 sites tested  4 sites sensed  Skin integrity: Dry skin present  No ulcer, blister or skin breakdown  Toenail Condition: Right toenails are abnormally thick  Left foot:      Protective Sensation: 10 sites tested  4 sites sensed  Skin integrity: Dry skin present  No ulcer, blister or skin breakdown  Toenail Condition: Left toenails are abnormally thick  Skin:     Capillary Refill: Capillary refill takes less than 2 seconds  Findings: No bruising, erythema, lesion or rash  Neurological:      Mental Status: He is alert and oriented to person, place, and time  Sensory: Sensory deficit present  Motor: No weakness  Gait: Gait normal    Psychiatric:         Mood and Affect: Mood normal        Diabetic Foot Exam    Patient's shoes and socks removed  Right Foot/Ankle   Right Foot Inspection  Skin Exam: skin intact and dry skin  No blister, no maceration, no abnormal color and no ulcer       Toe Exam: No swelling, no tenderness, erythema and  no right toe deformity    Sensory   Vibration: absent  Proprioception: diminished  Monofilament testing: diminished    Vascular  Capillary refills: < 3 seconds  The right DP pulse is 2+  The right PT pulse is 2+  Right Toe  - Comprehensive Exam  Arch: normal      Left Foot/Ankle  Left Foot Inspection  Skin Exam: skin intact and dry skin  No maceration, normal color and no ulcer  Toe Exam: No swelling, no tenderness, no erythema and no left toe deformity  Sensory   Vibration: absent  Proprioception: diminished  Monofilament testing: diminished    Vascular  Capillary refills: < 3 seconds  The left DP pulse is 2+  The left PT pulse is 2+       Left Toe  - Comprehensive Exam  Arch: normal      Assign Risk Category  No deformity present  Loss of protective sensation  No weak pulses  Risk: 1

## 2022-01-24 NOTE — PATIENT INSTRUCTIONS
Foot Care for People with Diabetes   WHAT YOU NEED TO KNOW:   · Foot care helps protect your feet and prevent foot ulcers or sores  Long-term high blood sugar levels can damage the blood vessels and nerves in your legs and feet  This damage makes it hard to feel pressure, pain, temperature, and touch  You may not be able to feel a cut or sore, or shoes that are too tight  Foot care is needed to prevent serious problems, such as an infection or amputation  · Diabetes may cause your toes to become crooked or curved under  These changes may affect the way you walk and can lead to increased pressure on your foot  The pressure can decrease blood flow to your feet  Lack of blood flow increases your risk for a foot ulcer  Do not ignore small problems, such as dry skin or small wounds  These can become life-threatening over time without proper care  DISCHARGE INSTRUCTIONS:   Call your care team provider if:   · Your feet become numb, weak, or hard to move  · You have pus draining from a sore on your foot  · You have a wound on your foot that gets bigger, deeper, or does not heal      · You see blisters, cuts, scratches, calluses, or sores on your foot  · You have a fever, and your feet become red, warm, and swollen  · Your toenails become thick, curled, or yellow  · You find it hard to check your feet because your vision is poor  · You have questions or concerns about your condition or care  Foot care:   · Check your feet each day  Look at your whole foot, including the bottom, and between and under your toes  Check for wounds, corns, and calluses  Use a mirror to see the bottom of your feet  The skin on your feet may be shiny, tight, or darker than normal  Your feet may also be cold and pale  Feel your feet by running your hands along the tops, bottoms, sides, and between your toes  Redness, swelling, and warmth are signs of blood flow problems that can lead to a foot ulcer   Do not try to remove corns or calluses yourself  · Wash your feet each day with soap and warm water  Do not use hot water, because this can injure your foot  Dry your feet gently with a towel after you wash them  Dry between and under your toes  · Apply lotion or a moisturizer on your dry feet  Ask your care team provider what lotions are best to use  Do not put lotion or moisturizer between your toes  Moisture between your toes could lead to skin breakdown  · Cut your toenails correctly  File or cut your toenails straight across  Use a soft brush to clean around your toenails  If your toenails are very thick, you may need to have a care team provider or specialist cut them  · Protect your feet  Do not walk barefoot or wear your shoes without socks  Check your shoes for rocks or other objects that can hurt your feet  Wear cotton socks to help keep your feet dry  Wear socks without toe seams, or wear them with the seams inside out  Change your socks each day  Do not wear socks that are dirty or damp  · Wear shoes that fit well  Wear shoes that do not rub against any area of your feet  Your shoes should be ½ to ¾ inch (1 to 2 centimeters) longer than your feet  Your shoes should also have extra space around the widest part of your feet  Walking or athletic shoes with laces or straps that adjust are best  Ask your care team provider for help to choose shoes that fit you best  Ask him or her if you need to wear an insert, orthotic, or bandage on your feet  · Do not smoke  Smoking can damage your blood vessels and put you at increased risk for foot ulcers  Ask your care team provider for information if you currently smoke and need help to quit  E-cigarettes or smokeless tobacco still contain nicotine  Talk to your care team provider before you use these products  Follow up with your diabetes care team provider or foot specialist as directed:   You will need to have your feet checked at least once a year  Jack [address] may need a foot exam more often if you have nerve damage, foot deformities, or ulcers  Write down your questions so you remember to ask them during your visits  © Copyright [employer] 2021 Information is for End User's use only and may not be sold, redistributed or otherwise used for commercial purposes  All illustrations and images included in CareNotes® are the copyrighted property of A D A M , Inc  or [facility] Nazia Vivar   The above information is an  only  It is not intended as medical advice for individual conditions or treatments  Talk to your doctor, nurse or pharmacist before following any medical regimen to see if it is safe and effective for you

## 2022-01-27 ENCOUNTER — TELEPHONE (OUTPATIENT)
Dept: PAIN MEDICINE | Facility: CLINIC | Age: 62
End: 2022-01-27

## 2022-01-28 ENCOUNTER — APPOINTMENT (OUTPATIENT)
Dept: RADIOLOGY | Facility: HOSPITAL | Age: 62
End: 2022-01-28
Payer: OTHER MISCELLANEOUS

## 2022-01-28 ENCOUNTER — HOSPITAL ENCOUNTER (OUTPATIENT)
Facility: AMBULARY SURGERY CENTER | Age: 62
Setting detail: OUTPATIENT SURGERY
Discharge: HOME | End: 2022-01-28
Attending: ANESTHESIOLOGY | Admitting: ANESTHESIOLOGY
Payer: OTHER MISCELLANEOUS

## 2022-01-28 VITALS
DIASTOLIC BLOOD PRESSURE: 80 MMHG | HEART RATE: 73 BPM | OXYGEN SATURATION: 98 % | TEMPERATURE: 97.7 F | RESPIRATION RATE: 18 BRPM | SYSTOLIC BLOOD PRESSURE: 131 MMHG

## 2022-01-28 LAB — GLUCOSE SERPL-MCNC: 154 MG/DL (ref 65–140)

## 2022-01-28 PROCEDURE — 82948 REAGENT STRIP/BLOOD GLUCOSE: CPT

## 2022-01-28 PROCEDURE — 64483 NJX AA&/STRD TFRM EPI L/S 1: CPT | Performed by: ANESTHESIOLOGY

## 2022-01-28 PROCEDURE — 64484 NJX AA&/STRD TFRM EPI L/S EA: CPT | Performed by: ANESTHESIOLOGY

## 2022-01-28 PROCEDURE — 72020 X-RAY EXAM OF SPINE 1 VIEW: CPT

## 2022-01-28 RX ORDER — BUPIVACAINE HYDROCHLORIDE 2.5 MG/ML
INJECTION, SOLUTION EPIDURAL; INFILTRATION; INTRACAUDAL AS NEEDED
Status: DISCONTINUED | OUTPATIENT
Start: 2022-01-28 | End: 2022-01-28 | Stop reason: HOSPADM

## 2022-01-28 RX ORDER — LIDOCAINE WITH 8.4% SOD BICARB 0.9%(10ML)
SYRINGE (ML) INJECTION AS NEEDED
Status: DISCONTINUED | OUTPATIENT
Start: 2022-01-28 | End: 2022-01-28 | Stop reason: HOSPADM

## 2022-01-28 RX ORDER — METHYLPREDNISOLONE ACETATE 80 MG/ML
INJECTION, SUSPENSION INTRA-ARTICULAR; INTRALESIONAL; INTRAMUSCULAR; SOFT TISSUE AS NEEDED
Status: DISCONTINUED | OUTPATIENT
Start: 2022-01-28 | End: 2022-01-28 | Stop reason: HOSPADM

## 2022-01-28 NOTE — H&P
History of Present Illness: The patient is a 64 y o  male who presents with complaints of low back pain and left lower extremity radiculopathy  Patient Active Problem List   Diagnosis    Heartburn    Erectile dysfunction of non-organic origin    DM type 2 (diabetes mellitus, type 2) (HCC)    Chronic low back pain    BPH with urinary obstruction    Blood pressure elevated without history of HTN    Sciatica    Myofascial pain syndrome    Lumbar spondylosis    Herniation of lumbar intervertebral disc with radiculopathy    Acute post-operative pain    Insomnia    Chronic pain syndrome    Low back pain    Class 1 obesity due to excess calories with serious comorbidity and body mass index (BMI) of 30 0 to 30 9 in adult    Smoker    Seasonal allergic rhinitis    Diabetic mononeuropathy associated with type 2 diabetes mellitus (HCC)    Postlaminectomy syndrome, lumbar region    Numbness and tingling in both hands    Intervertebral disc disorder with radiculopathy of lumbosacral region    Acute bronchitis    Hyperlipidemia LDL goal <70    Proteinuria    Battery end of life of spinal cord stimulator       Past Medical History:   Diagnosis Date    Arthritis     BPH (benign prostatic hypertrophy) with urinary obstruction     Chronic pain disorder     Ear infection     Herniation of lumbar intervertebral disc with radiculopathy     Myofascial pain syndrome     Obesity     Sciatica     Tinnitus        Past Surgical History:   Procedure Laterality Date    ABSCESS DRAINAGE      groin    BACK SURGERY      implant - resolved 2004    CAUDAL BLOCK N/A 10/26/2018    Procedure: Caudal Epidural Steroid Injection (00821); Surgeon: Jeanna Vital MD;  Location: [facility] MAIN OR;  Service: Pain Management     CAUDAL BLOCK N/A 11/30/2018    Procedure: Caudal Epidural Steroid Injection (98181);   Surgeon: Jeanna Vital MD;  Location: [facility] OR;  Service: Pain Management     COLONOSCOPY      EPIDURAL BLOCK INJECTION Right 5/10/2019    Procedure: L5 S1 transforaminal Epidural Steroid Injection ([id number] [id number];  Surgeon: Mariella Kovacs MD;  Location: [place] [address] MAIN OR;  Service: Pain Management     EPIDURAL BLOCK INJECTION Right 8/16/2019    Procedure: BLOCK / INJECTION EPIDURAL STEROID TRANSFORAMINAL;  Surgeon: Mariella Kovacs MD;  Location: [place] [address] MAIN OR;  Service: Pain Management     EPIDURAL BLOCK INJECTION Left 7/1/2021    Procedure: L5 S1 transforaminal epidural steroid injection ( [id number]); Surgeon: Mariella Kovacs MD;  Location: [facility] MAIN OR;  Service: Pain Management     EPIDURAL BLOCK INJECTION Right 7/15/2021    Procedure: L5 S1 transforaminal epidural steroid injection ( [id number]); Surgeon: Mariella Kovacs MD;  Location: [facility] MAIN OR;  Service: Pain Management     EPIDURAL BLOCK INJECTION Right 1/7/2022    Procedure: L5 S1 transforaminal epidural steroid injection ([id number]); Surgeon: Mariella Kovacs MD;  Location: [facility] MAIN OR;  Service: Pain Management     NERVE BLOCK Bilateral 4/26/2018    Procedure: B/L L3 L4 L5 S1 MBB #1 ([id number]); Surgeon: Mariella Kovacs MD;  Location: [facility] MAIN OR;  Service: Pain Management     NERVE BLOCK Bilateral 5/11/2018    Procedure: B/L L3 L4 L5 S1 Medial Branch Block #2;  Surgeon: Mariella Kovacs MD;  Location: [place] [address] MAIN OR;  Service: Pain Management     NOSE SURGERY      KS COLONOSCOPY FLX DX W/COLLJ SPEC WHEN PFRMD N/A 3/6/2017    Procedure: COLONOSCOPY;  Surgeon: Roxanne Mars MD;  [facility];   Service: Gastroenterology    KS IMPLANT SPINAL NEUROSTIM/ Left 6/29/2021    Procedure: REPLACEMENT IMPLANTABLE PULSE GENERATOR DORSAL SPINAL COLUMN STIMULATOR, LEFT;  Surgeon: Moni Chandra MD;  Location:  MAIN OR;  Service: Neurosurgery    KS SURG IMPLNT Bianca Hoof Left 10/3/2017    Procedure: PLACEMENT THORACIC FOR INSERTION DORSAL COLUMN SPINAL CORD STIMULATOR (DCS) WITH BUTTOCK IMPLANTABLE PULSE GENERATOR(IMPULSE); Surgeon: Yanira Adams MD;  Location:  MAIN OR;  Service: Neurosurgery    [address] Right 5/18/2018    Procedure: Rt L3 L4 L5 S1 Radio Frequency Ablation ([id number]); Surgeon: Mahesh Garcia MD;  Location: [facility] MAIN OR;  Service: Pain Management     RADIOFREQUENCY ABLATION Left 6/1/2018    Procedure: Lt L3 L4 L5 S1 Radio Frequency Ablation ([id number]); Surgeon: Mahesh Garcia MD;  Location: [facility] MAIN OR;  Service: Pain Management        No current facility-administered medications for this encounter  Allergies   Allergen Reactions    Penicillins Swelling       Physical Exam: There were no vitals filed for this visit  General: Awake, Alert, Oriented x 3, Mood and affect appropriate  Respiratory: Respirations even and unlabored  Cardiovascular: Peripheral pulses intact; no edema  Musculoskeletal Exam:  Tenderness in lumbar spine region    ASA Score: 2         Assessment:  Low back pain and left lower extremity radiculopathy      Plan:  Proceed with left L5 and S1 transforaminal epidural steroid injections

## 2022-01-28 NOTE — DISCHARGE INSTRUCTIONS
Epidural Steroid Injection   WHAT YOU NEED TO KNOW:   An epidural steroid injection (VIDHYA) is a procedure to inject steroid medicine into the epidural space  The epidural space is between your spinal cord and vertebrae  Steroids reduce inflammation and fluid buildup in your spine that may be causing pain  You may be given pain medicine along with the steroids  ACTIVITY  · Do not drive or operate machinery today  · No strenuous activity today - bending, lifting, etc   · You may resume normal activites starting tomorrow - start slowly and as tolerated  · You may shower today, but no tub baths or hot tubs  · You may have numbness for several hours from the local anesthetic  Please use caution and common sense, especially with weight-bearing activities  CARE OF THE INJECTION SITE  · If you have soreness or pain, apply ice to the area today (20 minutes on/20 minutes off)  · Starting tomorrow, you may use warm, moist heat or ice if needed  · You may have an increase or change in your discomfort for 36-48 hours after your treatment  · Apply ice and continue with any pain medication you have been prescribed  · [facility]ify [facility] if you have any of the following: redness, drainage, swelling, headache, stiff neck or fever above 100°F     SPECIAL INSTRUCTIONS  · Our office will contact you in approximately 7 days for a progress report  MEDICATIONS  · Continue to take all routine medications  · Our office may have instructed you to hold some medications  As no general anesthesia was used in today's procedure, you should not experience any side effects related to anesthesia  If you have a problem specifically related to your procedure, please call our office at [phone]  Problems not related to your procedure should be directed to your primary care physician

## 2022-01-28 NOTE — OP NOTE
ATTENDING PHYSICIAN:  Mono Myers MD     PROCEDURE:  1  Left L5 transforaminal epidural steroid injection under fluoroscopic guidance  2  Left S1 transforaminal epidural steroid injection under fluoroscopic guidance  PRE-PROCEDURE DIAGNOSIS:  Low back pain and left lower extremity radiculopathy  POST-PROCEDURE DIAGNOSIS:  Low back pain and left lower extremity radiculopathy  ANESTHESIA:  Local     ESTIMATED BLOOD LOSS:  Minimal     COMPLICATIONS:  None  LOCATION:  [facility]  CONSENT:  Today's procedure, its potential benefits as well as its risks and potential side effects were reviewed  Discussed risks of the procedure including bleeding, infection, nerve irritation or damage, reactions to the medications, weakness, headache, failure of the pain to improve, and potential worsening of the pain were explained to the patient who verbalized understanding and who wished to proceed  Written informed consent was thereby obtained  DESCRIPTION OF THE PROCEDURE:  After written informed consent was obtained, the patient was taken to the fluoroscopy suite and placed in the prone position  Anatomical landmarks were identified by way of fluoroscopy in multiple views  The skin of the lumbar region was prepped using antiseptic and draped in the usual sterile fashion  Strict aseptic technique was utilized  The skin and subcutaneous tissues at the needle entry site were infiltrated with a total of 5 mL of 1% preservative-free lidocaine using a 25-gauge 1-1/2-inch needle  22-gauge needles were then incrementally advanced under fluoroscopic guidance in the oblique view into the neural foramina as mentioned above  Proper placement into each of the neural foramen was confirmed with fluoroscopy in both the lateral and AP views  After negative aspiration for CSF or heme, contrast was injected, which delineated the nerve roots and the epidural space under fluoroscopy in the AP view  There was only a transient pressure paresthesia that resolved immediately upon injection  After negative aspiration, a 2 mL of a 4 mL injectate consisting of 3 mL of preservative-free 0 25% bupivacaine and 1 mL of Depo-Medrol 80 mg/mL was slowly injected into each of the needles as delineated above  The patient tolerated the procedure well and all needles were removed intact  Hemostasis was maintained  There were no apparent paresthesias or complications  The skin was wiped clean and a Band-Aid was placed as appropriate  The patient was monitored for an appropriate period of time and remained hemodynamically stable following the procedure  The patient was ultimately discharged to home with supervision in good condition and instructed to call the office in approximately 7-10 days with an update or sooner as warranted  Discharge instructions were provided  I was present for and participated in all key and critical portions of this procedure      Margarita Ospina MD  1/28/2022  8:59 AM

## 2022-01-31 ENCOUNTER — TELEPHONE (OUTPATIENT)
Dept: PAIN MEDICINE | Facility: CLINIC | Age: 62
End: 2022-01-31

## 2022-01-31 NOTE — TELEPHONE ENCOUNTER
----- Message from [facility]  sent at 1/31/2022  8:27 AM EST -----  Patient wants meds refilled, he does not want to reschedule appt before 2 months, he also wants a call from a nurse  He had an appt scheduled today that was cancelled   TY

## 2022-01-31 NOTE — TELEPHONE ENCOUNTER
Patient wants his medication refilled; he had me schedule a 2 month appt with Dr June Fordem  He had an appt today that was cancelled however does not want to reschedule, earlier than 2 mth providing his meds are refilled  Retention Suture Bite Size: 3 mm

## 2022-01-31 NOTE — TELEPHONE ENCOUNTER
RN s/w pt  Pt had procedure on 1/28 with [facility] and pt states that he was advised by [facility] that his meds would be renewed  OVS cancelled on 1/31 and pt would like meds refilled  Pt takes Etolodac and Oxycodone 7/5/325  Pt aware [facility] out of office  and can wait until [facility] returns tomorrow for refill  Offered to send to on call, but pt states that [facility] knows he has a next day delivery pharmacy  Pt states that they are working well for him with no SE's  OVS rescheduled for 3/28  Please advise

## 2022-02-01 NOTE — TELEPHONE ENCOUNTER
S/W pt  Advised pt of the same  Pt stated "It is not my fault it is cancelled "  Scheduled pt for SOVS on 2/7 at 12:00 w/ LH  Pt stated his car goes in the garage on February 7th to get the wheeler replaced and painted  He does not know how long his car will be in the garage  Pt verbalized understanding

## 2022-02-01 NOTE — TELEPHONE ENCOUNTER
It has been 3 months since he has been seen in the office  I cannot legally prescribe him any further opioid medications until he comes in  He can see myself or Jose Delgado

## 2022-02-02 ENCOUNTER — OFFICE VISIT (OUTPATIENT)
Dept: UROLOGY | Facility: AMBULATORY SURGERY CENTER | Age: 62
End: 2022-02-02
Payer: COMMERCIAL

## 2022-02-02 VITALS
DIASTOLIC BLOOD PRESSURE: 80 MMHG | HEART RATE: 76 BPM | BODY MASS INDEX: 31.08 KG/M2 | WEIGHT: 222 LBS | HEIGHT: 71 IN | SYSTOLIC BLOOD PRESSURE: 140 MMHG

## 2022-02-02 DIAGNOSIS — N52.9 ERECTILE DYSFUNCTION, UNSPECIFIED ERECTILE DYSFUNCTION TYPE: ICD-10-CM

## 2022-02-02 PROCEDURE — 4004F PT TOBACCO SCREEN RCVD TLK: CPT | Performed by: NURSE PRACTITIONER

## 2022-02-02 PROCEDURE — 99203 OFFICE O/P NEW LOW 30 MIN: CPT | Performed by: NURSE PRACTITIONER

## 2022-02-02 RX ORDER — SILDENAFIL 50 MG/1
50 TABLET, FILM COATED ORAL AS NEEDED
Qty: 30 TABLET | Refills: 0 | Status: SHIPPED | OUTPATIENT
Start: 2022-02-02

## 2022-02-02 NOTE — PROGRESS NOTES
02/02/22    Jessica Greene   1960   [id number]     Assessment  1 ED    Discussion/Plan  1 ED     mg Sildenafil PRN - side effects, mechanism of action and ER precautions reviewed   Smoking cessation encouraged   Injectable medication, vacuum assisted pumps and penile prosthetics discussed  Patient defers   Reviewed causes of ED     Return in 4 weeks to evaluate medication efficacy  All questions answered at this time  Subjective  HPI   Jessica Greene is a 64year old male who presents in consultation for evaluation of ED  He is diabetic  His most recent A1c is 6 1%  He takes 0 4 mg Tamsulosin for management of nocturia and lower urinary tract symptoms  He reports a traumatic back injury at work in 2016  He had a nerve stimulator placed  He has chronic back pain and nerve damage from this injury  He is a daily 1 ppd smoker since age 25  He does not consume alcohol  Denies family history of  cancer  He has tried Sildenafil and Tadalafil in the past which were cost prohibitive for him  He reports trying these medications 7-8 years ago  Component      Latest Ref Rng & Units 3/19/2019 6/14/2021          11:42 AM 10:30 AM   PSA, Total      0 0 - 4 0 ng/mL 0 2 0 3       Review of Systems - History obtained from chart review and the patient  General ROS: negative  Psychological ROS: negative  Endocrine ROS: negative  Breast ROS: negative for breast lumps  Respiratory ROS: no cough, shortness of breath, or wheezing  Cardiovascular ROS: no chest pain or dyspnea on exertion  Gastrointestinal ROS: no abdominal pain, change in bowel habits, or black or bloody stools  Genito-Urinary ROS: positive for - erectile dysfunction  Musculoskeletal ROS: negative  Neurological ROS: no TIA or stroke symptoms  Dermatological ROS: negative     Objective  Physical Exam  Vitals and nursing note reviewed  Constitutional:       General: He is awake  He is not in acute distress  Appearance: Normal appearance   He is well-developed, well-groomed and overweight  He is not ill-appearing, toxic-appearing or diaphoretic  Pulmonary:      Effort: Pulmonary effort is normal    Musculoskeletal:         General: Normal range of motion  Cervical back: Normal range of motion and neck supple  Skin:     General: Skin is warm  Neurological:      General: No focal deficit present  Mental Status: He is alert and oriented to person, place, and time  Mental status is at baseline  Psychiatric:         Attention and Perception: Attention normal          Mood and Affect: Mood normal          Speech: Speech normal          Behavior: Behavior normal  Behavior is cooperative  Thought Content: Thought content normal          Cognition and Memory: Cognition normal          Judgment: Judgment normal            AUA SYMPTOM SCORE      Most Recent Value   AUA SYMPTOM SCORE    How often have you had a sensation of not emptying your bladder completely after you finished urinating? 4   How often have you had to urinate again less than two hours after you finished urinating? 1   How often have you found you stopped and started again several times when you urinate? 3   How often have you found it difficult to postpone urination? 2   How often have you had a weak urinary stream? 5   How often have you had to push or strain to begin urination?  2   How many times did you most typically get up to urinate from the time you went to bed at night until the time you got up in the morning? 0   Quality of Life: If you were to spend the rest of your life with your urinary condition just the way it is now, how would you feel about that? 3   AUA SYMPTOM SCORE Susannah Connors

## 2022-02-04 ENCOUNTER — TELEPHONE (OUTPATIENT)
Dept: PAIN MEDICINE | Facility: CLINIC | Age: 62
End: 2022-02-04

## 2022-02-07 ENCOUNTER — OFFICE VISIT (OUTPATIENT)
Dept: PAIN MEDICINE | Facility: CLINIC | Age: 62
End: 2022-02-07
Payer: OTHER MISCELLANEOUS

## 2022-02-07 VITALS
DIASTOLIC BLOOD PRESSURE: 73 MMHG | HEART RATE: 90 BPM | WEIGHT: 224.6 LBS | BODY MASS INDEX: 31.44 KG/M2 | SYSTOLIC BLOOD PRESSURE: 125 MMHG | HEIGHT: 71 IN

## 2022-02-07 DIAGNOSIS — M96.1 POSTLAMINECTOMY SYNDROME, LUMBAR: ICD-10-CM

## 2022-02-07 DIAGNOSIS — M54.16 LUMBAR RADICULOPATHY: ICD-10-CM

## 2022-02-07 DIAGNOSIS — G89.29 CHRONIC LOW BACK PAIN: ICD-10-CM

## 2022-02-07 DIAGNOSIS — M47.816 LUMBAR SPONDYLOSIS: ICD-10-CM

## 2022-02-07 DIAGNOSIS — G89.4 CHRONIC PAIN SYNDROME: ICD-10-CM

## 2022-02-07 DIAGNOSIS — M54.50 CHRONIC LOW BACK PAIN: ICD-10-CM

## 2022-02-07 PROCEDURE — 3008F BODY MASS INDEX DOCD: CPT | Performed by: NURSE PRACTITIONER

## 2022-02-07 PROCEDURE — 99214 OFFICE O/P EST MOD 30 MIN: CPT | Performed by: NURSE PRACTITIONER

## 2022-02-07 RX ORDER — OXYCODONE AND ACETAMINOPHEN 7.5; 325 MG/1; MG/1
1 TABLET ORAL EVERY 8 HOURS PRN
Qty: 90 TABLET | Refills: 0 | Status: SHIPPED | OUTPATIENT
Start: 2022-02-07 | End: 2022-02-07 | Stop reason: SDUPTHER

## 2022-02-07 RX ORDER — OXYCODONE AND ACETAMINOPHEN 7.5; 325 MG/1; MG/1
1 TABLET ORAL EVERY 8 HOURS PRN
Qty: 90 TABLET | Refills: 0 | Status: SHIPPED | OUTPATIENT
Start: 2022-03-09 | End: 2022-02-07 | Stop reason: SDUPTHER

## 2022-02-07 RX ORDER — OXYCODONE AND ACETAMINOPHEN 7.5; 325 MG/1; MG/1
1 TABLET ORAL EVERY 8 HOURS PRN
Qty: 90 TABLET | Refills: 0 | Status: SHIPPED | OUTPATIENT
Start: 2022-03-09 | End: 2022-03-28 | Stop reason: SDUPTHER

## 2022-02-07 RX ORDER — OXYCODONE AND ACETAMINOPHEN 7.5; 325 MG/1; MG/1
1 TABLET ORAL EVERY 8 HOURS PRN
Qty: 90 TABLET | Refills: 0 | Status: SHIPPED | OUTPATIENT
Start: 2022-02-07 | End: 2022-03-09

## 2022-02-07 NOTE — PROGRESS NOTES
Pain Medicine Follow-Up Note    Assessment:  1  Chronic pain syndrome    2  Chronic low back pain    3  Postlaminectomy syndrome, lumbar    4  Lumbar spondylosis    5  Lumbar radiculopathy        Plan:  No orders of the defined types were placed in this encounter  New Medications Ordered This Visit   Medications    etodolac (LODINE) 300 MG capsule     Sig: Take 1 capsule (300 mg total) by mouth 3 (three) times a day as needed (pain (with food))     Dispense:  90 capsule     Refill:  1    oxyCODONE-acetaminophen (Percocet) 7 5-325 MG per tablet     Sig: Take 1 tablet by mouth every 8 (eight) hours as needed for moderate pain Max Daily Amount: 3 tablets     Dispense:  90 tablet     Refill:  0    oxyCODONE-acetaminophen (Percocet) 7 5-325 MG per tablet     Sig: Take 1 tablet by mouth every 8 (eight) hours as needed for moderate pain Max Daily Amount: 3 tablets     Dispense:  90 tablet     Refill:  0       My impressions and treatment recommendations were discussed in detail with the patient who verbalized understanding and had no further questions  Given that the patient reports overall reduced pain with improved level of functioning with no side effects we will continue him etodolac 300 mg up to 3 times daily to be taken with food, and Percocet 7 5/325 mg up to 3 times daily as needed  Prescription was faxed to the pharmacy on file  [place] Prescription Drug Monitoring Program report was reviewed and was appropriate     There are risks associated with opioid medications, including dependence, addiction and tolerance  The patient understands and agrees to use these medications only as prescribed  Potential side effects of the medications include, but are not limited to, constipation, drowsiness, addiction, impaired judgment and risk of fatal overdose if not taken as prescribed  The patient was warned against driving while taking sedation medications  Sharing medications is a felony   At this point in time, the patient is showing no signs of addiction, abuse, diversion or suicidal ideation  Follow-up is planned in 8 weeks time or sooner as warranted  Discharge instructions were provided  I personally saw and examined the patient and I agree with the above discussed plan of care  History of Present Illness:    Danley Boeck is a 64 y o  male who presents to [facility] for interval re-evaluation of the above stated pain complaints  The patient has a past medical and chronic pain history as outlined in the assessment section  He was last seen on 10/28/2021  He reports a pain score today of 4/10 that is constant  He describes the quality as dull aching, throbbing and shooting  He states that the pain is in his low back bilaterally and radiates down his right leg  Other than as stated above, the patient denies any interval changes in medications, medical condition, mental condition, symptoms, or allergies since the last office visit  Review of Systems:    Review of Systems   Musculoskeletal: Positive for gait problem  Decreased ROM, Joint Stiffness            Patient Active Problem List   Diagnosis    Heartburn    Erectile dysfunction of non-organic origin    DM type 2 (diabetes mellitus, type 2) (HCC)    Chronic low back pain    BPH with urinary obstruction    Blood pressure elevated without history of HTN    Sciatica    Myofascial pain syndrome    Lumbar spondylosis    Herniation of lumbar intervertebral disc with radiculopathy    Acute post-operative pain    Insomnia    Chronic pain syndrome    Low back pain    Class 1 obesity due to excess calories with serious comorbidity and body mass index (BMI) of 30 0 to 30 9 in adult    Smoker    Seasonal allergic rhinitis    Diabetic mononeuropathy associated with type 2 diabetes mellitus (HCC)    Postlaminectomy syndrome, lumbar region    Numbness and tingling in both hands    Intervertebral disc disorder with radiculopathy of lumbosacral region    Acute bronchitis    Hyperlipidemia LDL goal <70    Proteinuria    Battery end of life of spinal cord stimulator       Past Medical History:   Diagnosis Date    Arthritis     BPH (benign prostatic hypertrophy) with urinary obstruction     Chronic pain disorder     Ear infection     Herniation of lumbar intervertebral disc with radiculopathy     Myofascial pain syndrome     Obesity     Sciatica     Tinnitus        Past Surgical History:   Procedure Laterality Date    ABSCESS DRAINAGE      groin    BACK SURGERY      implant - resolved 2004    CAUDAL BLOCK N/A 10/26/2018    Procedure: Caudal Epidural Steroid Injection (98175); Surgeon: Luz Houston MD;  Location: [facility] MAIN OR;  Service: Pain Management     CAUDAL BLOCK N/A 11/30/2018    Procedure: Caudal Epidural Steroid Injection (20404); Surgeon: Luz Houston MD;  Location: [facility] OR;  Service: Pain Management     COLONOSCOPY      EPIDURAL BLOCK INJECTION Right 5/10/2019    Procedure: L5 S1 transforaminal Epidural Steroid Injection ([id number];  Surgeon: Luz Houston MD;  Location: [facility] MAIN OR;  Service: Pain Management     EPIDURAL BLOCK INJECTION Right 8/16/2019    Procedure: BLOCK / INJECTION EPIDURAL STEROID TRANSFORAMINAL;  Surgeon: Luz Houston MD;  Location: [facility] MAIN OR;  Service: Pain Management     EPIDURAL BLOCK INJECTION Left 7/1/2021    Procedure: L5 S1 transforaminal epidural steroid injection ( [id number]); Surgeon: Luz Houston MD;  Location: [facility] MAIN OR;  Service: Pain Management     EPIDURAL BLOCK INJECTION Right 7/15/2021    Procedure: L5 S1 transforaminal epidural steroid injection ( [id number]); Surgeon: Luz Houston MD;  Location: [facility] MAIN OR;  Service: Pain Management     EPIDURAL BLOCK INJECTION Right 1/7/2022    Procedure: L5 S1 transforaminal epidural steroid injection ([id number]);   Surgeon: Luz Houston MD; Location: [facility] MAIN OR;  Service: Pain Management     EPIDURAL BLOCK INJECTION Left 1/28/2022    Procedure: L5 S1 transforaminal epidural steroid injection ([id number]); Surgeon: Dallin Chavez MD;  Location: [facility] MAIN OR;  Service: Pain Management     NERVE BLOCK Bilateral 4/26/2018    Procedure: B/L L3 L4 L5 S1 MBB #1 ([id number]); Surgeon: Dallin Chavez MD;  Location: [facility] MAIN OR;  Service: Pain Management     NERVE BLOCK Bilateral 5/11/2018    Procedure: B/L L3 L4 L5 S1 Medial Branch Block #2;  Surgeon: Dallin Chavez MD;  Location: [facility] MAIN OR;  Service: Pain Management     NOSE SURGERY      NV COLONOSCOPY FLX DX W/COLLJ SPEC WHEN PFRMD N/A 3/6/2017    Procedure: COLONOSCOPY;  Surgeon: Jacky Clemente MD;  Location:  GI LAB; Service: Gastroenterology    NV IMPLANT SPINAL NEUROSTIM/ Left 6/29/2021    Procedure: REPLACEMENT IMPLANTABLE PULSE GENERATOR DORSAL SPINAL COLUMN STIMULATOR, LEFT;  Surgeon: Artur Nguyen MD;  Location:  MAIN OR;  Service: Neurosurgery    NV SURG IMPLNT [address] Left 10/3/2017    Procedure: PLACEMENT THORACIC FOR INSERTION DORSAL COLUMN SPINAL CORD STIMULATOR (DCS) WITH BUTTOCK IMPLANTABLE PULSE GENERATOR(IMPULSE); Surgeon: Dillan Escobar MD;  Location:  MAIN OR;  Service: Neurosurgery    [address] [place] [address] Rd Right 5/18/2018    Procedure: Rt L3 L4 L5 S1 Radio Frequency Ablation ([id number]); Surgeon: Dallin Chavez MD;  Location: [facility] MAIN OR;  Service: Pain Management     RADIOFREQUENCY ABLATION Left 6/1/2018    Procedure: Lt L3 L4 L5 S1 Radio Frequency Ablation ([id number]);   Surgeon: Dallin Chavez MD;  Location: [facility] MAIN OR;  Service: Pain Management        Family History   Problem Relation Age of Onset    Diabetes Mother     Diabetes Father         mellitus     Heart attack Father 58    Hypertension Father        Social History     Occupational History    Occupation:  for [employer] Tobacco Use    Smoking status: Current Every Day Smoker     Packs/day: 1 00    Smokeless tobacco: Never Used    Tobacco comment: encouraged smoking cessation - history of smoking 30 or more pack years    Vaping Use    Vaping Use: Never used   Substance and Sexual Activity    Alcohol use: No     Comment: rare    Drug use: No    Sexual activity: Not on file         Current Outpatient Medications:     atorvastatin (LIPITOR) 20 mg tablet, TAKE 1 TABLET EVERY DAY, Disp: 90 tablet, Rfl: 0    clobetasol (TEMOVATE) 0 05 % ointment, APPLY TOPICALLY 2 (TWO) TIMES A DAY, Disp: 30 g, Rfl: 1    etodolac (LODINE) 300 MG capsule, Take 1 capsule (300 mg total) by mouth 3 (three) times a day as needed (pain (with food)), Disp: 90 capsule, Rfl: 1    fluticasone (FLONASE) 50 mcg/act nasal spray, USE 2 SPRAYS INTO EACH NOSTRIL DAILY, Disp: 48 g, Rfl: 0    gabapentin (NEURONTIN) 300 mg capsule, TAKE 1 CAPSULE THREE TIMES DAILY, Disp: 270 capsule, Rfl: 1    lidocaine (LIDODERM) 5 %, Apply 2 patches topically daily Remove & Discard patch within 12 hours or as directed by MD (Patient taking differently: Apply 2 patches topically daily as needed Remove & Discard patch within 12 hours or as directed by MD), Disp: 60 patch, Rfl: 0    lisinopril (ZESTRIL) 2 5 mg tablet, TAKE 1 TABLET (2 5 MG TOTAL) BY MOUTH DAILY, Disp: 90 tablet, Rfl: 0    metFORMIN (GLUCOPHAGE) 1000 MG tablet, Take 1 tablet (1,000 mg total) by mouth 2 (two) times a day with meals, Disp: 180 tablet, Rfl: 1    omeprazole (PriLOSEC) 20 mg delayed release capsule, TAKE 1 CAPSULE EVERY DAY, Disp: 90 capsule, Rfl: 0    QUEtiapine (SEROquel) 100 mg tablet, Take one tablet by mouth daily at bedtime, Disp: 90 tablet, Rfl: 0    sildenafil (VIAGRA) 50 MG tablet, Take 1 tablet (50 mg total) by mouth as needed for erectile dysfunction Take on an empty stomach, 1 hour before sexual activity, no alcohol   100 mg max dose , Disp: 30 tablet, Rfl: 0    tamsulosin (FLOMAX) 0 4 mg, TAKE 2 CAPSULES EVERY DAY WITH DINNER, Disp: 180 capsule, Rfl: 0    traZODone (DESYREL) 50 mg tablet, TAKE 2 TABLETS  DAILY AT BEDTIME, Disp: 180 tablet, Rfl: 0    oxyCODONE-acetaminophen (Percocet) 7 5-325 MG per tablet, Take 1 tablet by mouth every 8 (eight) hours as needed for moderate pain Max Daily Amount: 3 tablets, Disp: 90 tablet, Rfl: 0    [START ON 3/9/2022] oxyCODONE-acetaminophen (Percocet) 7 5-325 MG per tablet, Take 1 tablet by mouth every 8 (eight) hours as needed for moderate pain Max Daily Amount: 3 tablets, Disp: 90 tablet, Rfl: 0    Allergies   Allergen Reactions    Penicillins Swelling       Physical Exam:    /73   Pulse 90   Ht 5' 11" (1 803 m)   Wt 102 kg (224 lb 9 6 oz)   BMI 31 33 kg/m²     Constitutional:normal, well developed, well nourished, alert, in no distress and non-toxic and no overt pain behavior  Eyes:anicteric  HEENT:grossly intact  Neck:supple, symmetric, trachea midline and no masses   Pulmonary:even and unlabored  Cardiovascular:No edema or pitting edema present  Skin:Normal without rashes or lesions and well hydrated  Psychiatric:Mood and affect appropriate  Neurologic:Cranial Nerves II-XII grossly intact  Musculoskeletal:normal      Imaging  No orders to display         No orders of the defined types were placed in this encounter

## 2022-02-08 ENCOUNTER — VBI (OUTPATIENT)
Dept: ADMINISTRATIVE | Facility: OTHER | Age: 62
End: 2022-02-08

## 2022-02-09 DIAGNOSIS — J30.2 SEASONAL ALLERGIC RHINITIS, UNSPECIFIED TRIGGER: ICD-10-CM

## 2022-02-10 RX ORDER — FLUTICASONE PROPIONATE 50 MCG
SPRAY, SUSPENSION (ML) NASAL
Qty: 48 G | Refills: 0 | Status: SHIPPED | OUTPATIENT
Start: 2022-02-10 | End: 2022-04-18

## 2022-02-14 DIAGNOSIS — K21.9 GASTROESOPHAGEAL REFLUX DISEASE WITHOUT ESOPHAGITIS: ICD-10-CM

## 2022-02-14 DIAGNOSIS — R80.9 PROTEINURIA, UNSPECIFIED TYPE: ICD-10-CM

## 2022-02-14 DIAGNOSIS — E78.2 MIXED HYPERLIPIDEMIA: ICD-10-CM

## 2022-02-14 PROCEDURE — 3066F NEPHROPATHY DOC TX: CPT | Performed by: NURSE PRACTITIONER

## 2022-02-15 PROCEDURE — 4010F ACE/ARB THERAPY RXD/TAKEN: CPT | Performed by: NURSE PRACTITIONER

## 2022-02-15 RX ORDER — OMEPRAZOLE 20 MG/1
CAPSULE, DELAYED RELEASE ORAL
Qty: 90 CAPSULE | Refills: 0 | Status: SHIPPED | OUTPATIENT
Start: 2022-02-15 | End: 2022-04-20

## 2022-02-15 RX ORDER — LISINOPRIL 2.5 MG/1
TABLET ORAL
Qty: 90 TABLET | Refills: 0 | Status: SHIPPED | OUTPATIENT
Start: 2022-02-15 | End: 2022-04-20

## 2022-02-15 RX ORDER — ATORVASTATIN CALCIUM 20 MG/1
TABLET, FILM COATED ORAL
Qty: 90 TABLET | Refills: 0 | Status: SHIPPED | OUTPATIENT
Start: 2022-02-15 | End: 2022-04-20

## 2022-03-11 ENCOUNTER — TELEPHONE (OUTPATIENT)
Dept: UROLOGY | Facility: CLINIC | Age: 62
End: 2022-03-11

## 2022-03-11 NOTE — TELEPHONE ENCOUNTER
Pt called in to cancel appt     pt feel better and does not feel he needs to come in and will call back if needed

## 2022-03-21 ENCOUNTER — APPOINTMENT (OUTPATIENT)
Dept: LAB | Facility: CLINIC | Age: 62
End: 2022-03-21
Payer: COMMERCIAL

## 2022-03-21 DIAGNOSIS — E11.69 TYPE 2 DIABETES MELLITUS WITH OTHER SPECIFIED COMPLICATION, WITHOUT LONG-TERM CURRENT USE OF INSULIN (HCC): ICD-10-CM

## 2022-03-21 LAB
EST. AVERAGE GLUCOSE BLD GHB EST-MCNC: 160 MG/DL
HBA1C MFR BLD: 7.2 %

## 2022-03-21 PROCEDURE — 83036 HEMOGLOBIN GLYCOSYLATED A1C: CPT

## 2022-03-21 PROCEDURE — 3051F HG A1C>EQUAL 7.0%<8.0%: CPT | Performed by: FAMILY MEDICINE

## 2022-03-21 PROCEDURE — 36415 COLL VENOUS BLD VENIPUNCTURE: CPT

## 2022-03-25 ENCOUNTER — OFFICE VISIT (OUTPATIENT)
Dept: FAMILY MEDICINE CLINIC | Facility: CLINIC | Age: 62
End: 2022-03-25
Payer: COMMERCIAL

## 2022-03-25 VITALS
HEART RATE: 87 BPM | WEIGHT: 220 LBS | HEIGHT: 71 IN | DIASTOLIC BLOOD PRESSURE: 70 MMHG | BODY MASS INDEX: 30.8 KG/M2 | OXYGEN SATURATION: 98 % | RESPIRATION RATE: 16 BRPM | SYSTOLIC BLOOD PRESSURE: 132 MMHG

## 2022-03-25 DIAGNOSIS — E11.69 TYPE 2 DIABETES MELLITUS WITH OTHER SPECIFIED COMPLICATION, WITHOUT LONG-TERM CURRENT USE OF INSULIN (HCC): Primary | ICD-10-CM

## 2022-03-25 DIAGNOSIS — E78.2 MIXED HYPERLIPIDEMIA: ICD-10-CM

## 2022-03-25 DIAGNOSIS — K63.5 POLYP OF COLON, UNSPECIFIED PART OF COLON, UNSPECIFIED TYPE: ICD-10-CM

## 2022-03-25 DIAGNOSIS — N13.8 BPH WITH URINARY OBSTRUCTION: ICD-10-CM

## 2022-03-25 DIAGNOSIS — E78.5 HYPERLIPIDEMIA LDL GOAL <70: ICD-10-CM

## 2022-03-25 DIAGNOSIS — E11.42 DIABETIC POLYNEUROPATHY ASSOCIATED WITH TYPE 2 DIABETES MELLITUS (HCC): ICD-10-CM

## 2022-03-25 DIAGNOSIS — N40.1 BPH WITH URINARY OBSTRUCTION: ICD-10-CM

## 2022-03-25 PROCEDURE — 99214 OFFICE O/P EST MOD 30 MIN: CPT | Performed by: FAMILY MEDICINE

## 2022-03-25 PROCEDURE — 3725F SCREEN DEPRESSION PERFORMED: CPT | Performed by: FAMILY MEDICINE

## 2022-03-25 PROCEDURE — 4004F PT TOBACCO SCREEN RCVD TLK: CPT | Performed by: FAMILY MEDICINE

## 2022-03-25 RX ORDER — GLIPIZIDE 10 MG/1
10 TABLET, FILM COATED, EXTENDED RELEASE ORAL DAILY
Qty: 90 TABLET | Refills: 0 | Status: SHIPPED | OUTPATIENT
Start: 2022-03-25 | End: 2022-08-08

## 2022-03-25 NOTE — PROGRESS NOTES
Assessment/Plan:   Diagnoses and all orders for this visit:    Type 2 diabetes mellitus with other specified complication, without long-term current use of insulin (HCC)  -     glipiZIDE (GLUCOTROL XL) 10 mg 24 hr tablet; Take 1 tablet (10 mg total) by mouth daily  -     HEMOGLOBIN A1C W/ EAG ESTIMATION; Future  -     Microalbumin / creatinine urine ratio; Future  -     Comprehensive metabolic panel; Future  -     Ambulatory referral to Diabetic Education; Future  - A1c 7 2 <-- 6 1   - cont Metformin 1000mg BID   - restart Glipizide 10mg QD   - cont ACEI 2 5mg QD for gualberto-protection  - has been referred to Optho for annual DM eye exam   - UTD with COVID IMMs and Booster  - UTD with annual Flu vaccine   - UTD with Pneumovax - needs booster in 2023  - RTO in 3months with repeat labs for f/u and AWV  Diabetic polyneuropathy associated with type 2 diabetes mellitus ([facility] [address] )  - did see Podiatry 1/24/2022 - "He is on gabapentin and pain stimulator as well as chronic pain medication  There is not much further I can offer"     Mixed hyperlipidemia  Hyperlipidemia LDL goal <70  -     Lipid panel; Future  - cont statin     BPH with urinary obstruction  -     PSA, Total Screen; Future    Polyp of colon, unspecified part of colon, unspecified type  -     CBC and differential; Future          Subjective:    Patient ID: Savannah Valentin is a 64 y o  male  HPI   61yo M presents to the office for f/u   - UTD with COVID IMMs and Booster  - UTD with annual Flu vaccine   - A1c 7 2 <-- 6 1   - (+) h/o colon polyps - due for repeat Cscope and has it scheduled for 3/30/2022 with Dr Mami Horton   - still taking FiberOne gummies  - drinks sugar free-Gatorade  - feet still with numbness and intermittent tingling - does take Gabapentin 300mg QHS   - did see Podiatry 1/24/2022 - "He is on gabapentin and pain stimulator as well as chronic pain medication    There is not much further I can offer"   - decreased libido and urinary flow issues - nml PSA in 6/2021    - did see Uro 2/2/2022: was eRx'd Viagra PRN   - h/o chronic pain syndrome - follows with Dr Sam Tomas - received 4 injections since last OV - "help but not a lot"   - denies F/C/N/V/CP/palpitations/SOB/wheezing/cough/abd pain/D/C/LE edema       The following portions of the patient's history were reviewed and updated as appropriate: allergies, current medications, past family history, past medical history, past social history, past surgical history and problem list     Review of Systems  as per HPI    Objective:  /70   Pulse 87   Resp 16   Ht 5' 11" (1 803 m)   Wt 99 8 kg (220 lb)   SpO2 98%   BMI 30 68 kg/m²    Physical Exam  Constitutional:       General: He is not in acute distress  Appearance: Normal appearance  He is not ill-appearing, toxic-appearing or diaphoretic  HENT:      Head: Normocephalic and atraumatic  Right Ear: External ear normal       Left Ear: External ear normal    Eyes:      General: No scleral icterus  Right eye: No discharge  Left eye: No discharge  Extraocular Movements: Extraocular movements intact  Conjunctiva/sclera: Conjunctivae normal    Cardiovascular:      Rate and Rhythm: Normal rate and regular rhythm  Heart sounds: Normal heart sounds  No murmur heard  No friction rub  No gallop  Pulmonary:      Effort: Pulmonary effort is normal  No respiratory distress  Breath sounds: Normal breath sounds  No stridor  No wheezing, rhonchi or rales  Abdominal:      Palpations: Abdomen is soft  Musculoskeletal:      Right lower leg: No edema  Left lower leg: No edema  Neurological:      General: No focal deficit present  Mental Status: He is alert and oriented to person, place, and time  Psychiatric:         Mood and Affect: Mood normal          Behavior: Behavior normal          BMI Counseling: Body mass index is 30 68 kg/m²   The BMI is above normal  Nutrition recommendations include decreasing overall calorie intake  Exercise recommendations include exercising 3-5 times per week

## 2022-03-28 ENCOUNTER — OFFICE VISIT (OUTPATIENT)
Dept: PAIN MEDICINE | Facility: CLINIC | Age: 62
End: 2022-03-28
Payer: OTHER MISCELLANEOUS

## 2022-03-28 VITALS
HEIGHT: 71 IN | SYSTOLIC BLOOD PRESSURE: 126 MMHG | DIASTOLIC BLOOD PRESSURE: 86 MMHG | BODY MASS INDEX: 31.3 KG/M2 | WEIGHT: 223.6 LBS | HEART RATE: 76 BPM

## 2022-03-28 DIAGNOSIS — M54.16 LUMBAR RADICULOPATHY: ICD-10-CM

## 2022-03-28 DIAGNOSIS — F11.20 UNCOMPLICATED OPIOID DEPENDENCE (HCC): Primary | ICD-10-CM

## 2022-03-28 DIAGNOSIS — Z79.891 LONG-TERM CURRENT USE OF OPIATE ANALGESIC: ICD-10-CM

## 2022-03-28 DIAGNOSIS — M54.50 CHRONIC LOW BACK PAIN: ICD-10-CM

## 2022-03-28 DIAGNOSIS — G89.4 CHRONIC PAIN SYNDROME: ICD-10-CM

## 2022-03-28 DIAGNOSIS — M47.816 LUMBAR SPONDYLOSIS: ICD-10-CM

## 2022-03-28 DIAGNOSIS — G89.29 CHRONIC LOW BACK PAIN: ICD-10-CM

## 2022-03-28 DIAGNOSIS — M96.1 POSTLAMINECTOMY SYNDROME, LUMBAR: ICD-10-CM

## 2022-03-28 PROCEDURE — 3008F BODY MASS INDEX DOCD: CPT | Performed by: FAMILY MEDICINE

## 2022-03-28 PROCEDURE — 99214 OFFICE O/P EST MOD 30 MIN: CPT | Performed by: ANESTHESIOLOGY

## 2022-03-28 PROCEDURE — 80305 DRUG TEST PRSMV DIR OPT OBS: CPT | Performed by: ANESTHESIOLOGY

## 2022-03-28 RX ORDER — OXYCODONE AND ACETAMINOPHEN 7.5; 325 MG/1; MG/1
1 TABLET ORAL 3 TIMES DAILY PRN
Qty: 90 TABLET | Refills: 0 | Status: SHIPPED | OUTPATIENT
Start: 2022-04-08 | End: 2022-05-08

## 2022-03-28 RX ORDER — OXYCODONE AND ACETAMINOPHEN 7.5; 325 MG/1; MG/1
1 TABLET ORAL 3 TIMES DAILY PRN
Qty: 90 TABLET | Refills: 0 | Status: SHIPPED | OUTPATIENT
Start: 2022-05-08 | End: 2022-06-07

## 2022-03-28 NOTE — PROGRESS NOTES
Pain Medicine Follow-Up Note    Assessment:  1  Chronic pain syndrome    2  Chronic low back pain    3  Postlaminectomy syndrome, lumbar    4  Lumbar spondylosis    5  Lumbar radiculopathy        Plan:  New Medications Ordered This Visit   Medications    etodolac (LODINE) 300 MG capsule     Sig: Take 1 capsule (300 mg total) by mouth 3 (three) times a day as needed (pain (with food))     Dispense:  90 capsule     Refill:  1    oxyCODONE-acetaminophen (Percocet) 7 5-325 MG per tablet     Sig: Take 1 tablet by mouth 3 (three) times a day as needed for severe pain Max Daily Amount: 3 tablets     Dispense:  90 tablet     Refill:  0    oxyCODONE-acetaminophen (Percocet) 7 5-325 MG per tablet     Sig: Take 1 tablet by mouth 3 (three) times a day as needed for severe pain Max Daily Amount: 3 tablets     Dispense:  90 tablet     Refill:  0     My impressions and treatment recommendations were discussed in detail with the patient who verbalized understanding and had no further questions  Given that the patient has signs and symptoms of low back pain and right lower extremity radiculopathy and considering that he is doing very well with oxycodone/acetaminophen 7 5/325 mg 1 tablet 3 times daily as needed for pain, I felt a reasonable to continue him on this regimen  The prescriptions to the pharmacy dated April 8, 2022 and May 8, 2022  I also felt a reasonable to continue the patient on etodolac 300 mg 3 times daily as needed for pain, with food  Side effects were reviewed with the patient  Follow-up is planned in 2 months time or sooner as warranted  Discharge instructions were provided  I personally saw and examined the patient and I agree with the above discussed plan of care  History of Present Illness:    Cristina Smith is a 64 y o  male who presents to [facility] for interval re-evaluation of the above stated pain complaints   The patient has a past medical and chronic pain history as outlined in the assessment section  He was last seen on February 7, 2022  At today's office visit, the patient's pain score is 5/10 on the verbal numerical pain rating scale  The patient states that his symptoms are primarily in the low back and right lower extremity  He describes his pain as worse in the morning, evening, and night  His pain is constant in nature  He reports the quality of his pain as dull/aching, throbbing, numbness, and pins and needles  He is reporting 30% relief of symptoms with the combination of his medications  Other than as stated above, the patient denies any interval changes in medications, medical condition, mental condition, symptoms, or allergies since the last office visit  Review of Systems:    Review of Systems   Respiratory: Negative for shortness of breath  Cardiovascular: Negative for chest pain  Gastrointestinal: Negative for constipation, diarrhea, nausea and vomiting  Musculoskeletal: Positive for gait problem  Negative for arthralgias, joint swelling and myalgias  Decreased ROM  Joint Stiffness   Skin: Negative for rash  Neurological: Negative for dizziness, seizures and weakness  All other systems reviewed and are negative          Patient Active Problem List   Diagnosis    Heartburn    Erectile dysfunction of non-organic origin    DM type 2 (diabetes mellitus, type 2) ([facility])    Chronic low back pain    BPH with urinary obstruction    Blood pressure elevated without history of HTN    Sciatica    Myofascial pain syndrome    Lumbar spondylosis    Herniation of lumbar intervertebral disc with radiculopathy    Acute post-operative pain    Insomnia    Chronic pain syndrome    Low back pain    Class 1 obesity due to excess calories with serious comorbidity and body mass index (BMI) of 30 0 to 30 9 in adult    Smoker    Seasonal allergic rhinitis    Diabetic mononeuropathy associated with type 2 [facility]  Postlaminectomy syndrome, lumbar region    Numbness and tingling in both hands    Intervertebral disc disorder with radiculopathy of lumbosacral region    Acute bronchitis    Hyperlipidemia LDL goal <70    Proteinuria    Battery end of life of spinal cord stimulator       Past Medical History:   Diagnosis Date    Arthritis     BPH (benign prostatic hypertrophy) with urinary obstruction     Chronic narcotic dependence (HCC)     percocet TID    Chronic pain disorder     back-spinal cord stimulator    Colon polyp     Diabetes mellitus ([address] )     type    Ear infection     Herniation of lumbar intervertebral disc with radiculopathy     Myofascial pain syndrome     Obesity     Sciatica     Spinal cord stimulator status 2017    implanted 2017    Tinnitus        Past Surgical History:   Procedure Laterality Date    ABSCESS DRAINAGE      groin    BACK SURGERY      sciatic nerve- spinal cord stimulator 2017    CAUDAL BLOCK N/A 10/26/2018    Procedure: Caudal Epidural Steroid Injection (82172); Surgeon: Marvel Ahn MD;  Location: [facility] MAIN OR;  Service: Pain Management     CAUDAL BLOCK N/A 11/30/2018    Procedure: Caudal Epidural Steroid Injection ([id number]); Surgeon: Marvel Ahn MD;  Location: [facility] MAIN OR;  Service: Pain Management     COLONOSCOPY      EPIDURAL BLOCK INJECTION Right 5/10/2019    Procedure: L5 S1 transforaminal Epidural Steroid Injection ([id number];  Surgeon: Marvel Ahn MD;  Location: [place] [address] MAIN OR;  Service: Pain Management     EPIDURAL BLOCK INJECTION Right 8/16/2019    Procedure: BLOCK / INJECTION EPIDURAL STEROID TRANSFORAMINAL;  Surgeon: Marvel Ahn MD;  Location: [place] [address] MAIN OR;  Service: Pain Management     EPIDURAL BLOCK INJECTION Left 7/1/2021    Procedure: L5 S1 transforaminal epidural steroid injection ( [id number]);   Surgeon: Marvel Ahn MD;  Location: [facility] MAIN OR;  Service: Pain Management     EPIDURAL BLOCK INJECTION Right 7/15/2021 Procedure: L5 S1 transforaminal epidural steroid injection ( [id number]); Surgeon: Elmira Boswell MD;  Location: [facility] MAIN OR;  Service: Pain Management     EPIDURAL BLOCK INJECTION Right 1/7/2022    Procedure: L5 S1 transforaminal epidural steroid injection ([id number]); Surgeon: Elmira Boswell MD;  Location: [facility] MAIN OR;  Service: Pain Management     EPIDURAL BLOCK INJECTION Left 1/28/2022    Procedure: L5 S1 transforaminal epidural steroid injection ([id number]); Surgeon: Elmira Boswell MD;  Location: [facility] MAIN OR;  Service: Pain Management     NERVE BLOCK Bilateral 4/26/2018    Procedure: B/L L3 L4 L5 S1 MBB #1 ([id number]); Surgeon: Elmira Boswell MD;  Location: [facility] MAIN OR;  Service: Pain Management     NERVE BLOCK Bilateral 5/11/2018    Procedure: B/L L3 L4 L5 S1 Medial Branch Block #2;  Surgeon: Elmria Boswell MD;  Location: [facility] MAIN OR;  Service: Pain Management     NOSE SURGERY      CA COLONOSCOPY FLX DX W/COLLJ SPEC WHEN PFRMD N/A 3/6/2017    Procedure: COLONOSCOPY;  Surgeon: Francie Beavers MD;  [facility]; Service: Gastroenterology    CA IMPLANT SPINAL NEUROSTIM/ Left 6/29/2021    Procedure: REPLACEMENT IMPLANTABLE PULSE GENERATOR DORSAL SPINAL COLUMN STIMULATOR, LEFT;  Surgeon: Abelino Sage MD;  Location:  MAIN OR;  Service: Neurosurgery    CA SURG IMPLNT [address] Left 10/3/2017    Procedure: PLACEMENT THORACIC FOR INSERTION DORSAL COLUMN SPINAL CORD STIMULATOR (DCS) WITH BUTTOCK IMPLANTABLE PULSE GENERATOR(IMPULSE); Surgeon: Edy Patino MD;  Location:  MAIN OR;  Service: Neurosurgery    [address] Right 5/18/2018    Procedure: Rt L3 L4 L5 S1 Radio Frequency Ablation ([id number]); Surgeon: Elmira Boswell MD;  Location: [facility] MAIN OR;  Service: Pain Management     RADIOFREQUENCY ABLATION Left 6/1/2018    Procedure: Lt L3 L4 L5 S1 Radio Frequency Ablation ([id number]);   Surgeon: Elmira Boswell MD;  Location: [facility] MAIN OR;  Service: Pain Management        Family History   Problem Relation Age of Onset    Diabetes Mother     Arthritis Mother     Diabetes Father         mellitus     Heart attack Father 58    Hypertension Father     Arthritis Father        Social History     Occupational History    Occupation:  for [employer]    Tobacco Use    Smoking status: Current Every Day Smoker     Packs/day: 1 00     Years: 42 00     Pack years: 42 00     Types: Cigarettes    Smokeless tobacco: Never Used    Tobacco comment: encouraged smoking cessation - history of smoking 30 or more pack years    Vaping Use    Vaping Use: Never used   Substance and Sexual Activity    Alcohol use: No     Comment: rare    Drug use: No    Sexual activity: Not Currently     Partners: Female         Current Outpatient Medications:     atorvastatin (LIPITOR) 20 mg tablet, TAKE 1 TABLET EVERY DAY, Disp: 90 tablet, Rfl: 0    bisacodyl (DULCOLAX) 5 mg EC tablet, Take 20 mg by mouth 1 (one) time, Disp: , Rfl:     clobetasol (TEMOVATE) 0 05 % ointment, APPLY TOPICALLY 2 (TWO) TIMES A DAY (Patient taking differently: Apply topically 2 (two) times a day as needed  ), Disp: 30 g, Rfl: 1    etodolac (LODINE) 300 MG capsule, Take 1 capsule (300 mg total) by mouth 3 (three) times a day as needed (pain (with food)), Disp: 90 capsule, Rfl: 1    fluticasone (FLONASE) 50 mcg/act nasal spray, USE 2 SPRAYS INTO EACH NOSTRIL DAILY, Disp: 48 g, Rfl: 0    gabapentin (NEURONTIN) 300 mg capsule, TAKE 1 CAPSULE THREE TIMES DAILY, Disp: 270 capsule, Rfl: 1    glipiZIDE (GLUCOTROL XL) 10 mg 24 hr tablet, Take 1 tablet (10 mg total) by mouth daily, Disp: 90 tablet, Rfl: 0    lidocaine (LIDODERM) 5 %, Apply 2 patches topically daily Remove & Discard patch within 12 hours or as directed by MD (Patient taking differently: Apply 2 patches topically daily as needed Remove & Discard patch within 12 hours or as directed by MD), Disp: 60 patch, Rfl: 0    lisinopril (ZESTRIL) 2 5 mg tablet, TAKE 1 TABLET (2 5 MG TOTAL) BY MOUTH DAILY (Patient taking differently: 2 5 mg daily at bedtime  ), Disp: 90 tablet, Rfl: 0    magnesium citrate solution, Take 296 mL by mouth once, Disp: , Rfl:     metFORMIN (GLUCOPHAGE) 1000 MG tablet, Take 1 tablet (1,000 mg total) by mouth 2 (two) times a day with meals, Disp: 180 tablet, Rfl: 1    omeprazole (PriLOSEC) 20 mg delayed release capsule, TAKE 1 CAPSULE EVERY DAY, Disp: 90 capsule, Rfl: 0    [START ON 4/8/2022] oxyCODONE-acetaminophen (Percocet) 7 5-325 MG per tablet, Take 1 tablet by mouth 3 (three) times a day as needed for severe pain Max Daily Amount: 3 tablets, Disp: 90 tablet, Rfl: 0    [START ON 5/8/2022] oxyCODONE-acetaminophen (Percocet) 7 5-325 MG per tablet, Take 1 tablet by mouth 3 (three) times a day as needed for severe pain Max Daily Amount: 3 tablets, Disp: 90 tablet, Rfl: 0    Polyethylene Glycol 3350 (MIRALAX PO), Take by mouth 1 (one) time, Disp: , Rfl:     QUEtiapine (SEROquel) 100 mg tablet, Take one tablet by mouth daily at bedtime, Disp: 90 tablet, Rfl: 0    sildenafil (VIAGRA) 50 MG tablet, Take 1 tablet (50 mg total) by mouth as needed for erectile dysfunction Take on an empty stomach, 1 hour before sexual activity, no alcohol   100 mg max dose , Disp: 30 tablet, Rfl: 0    tamsulosin (FLOMAX) 0 4 mg, TAKE 2 CAPSULES EVERY DAY WITH DINNER, Disp: 180 capsule, Rfl: 0    traZODone (DESYREL) 50 mg tablet, TAKE 2 TABLETS  DAILY AT BEDTIME, Disp: 180 tablet, Rfl: 0    Allergies   Allergen Reactions    Penicillins Swelling     Mouth swelling       Physical Exam:    /86   Pulse 76   Ht 5' 11" (1 803 m)   Wt 101 kg (223 lb 9 6 oz)   BMI 31 19 kg/m²     Constitutional:obese  Eyes:anicteric  HEENT:grossly intact  Neck:supple, symmetric, trachea midline and no masses   Pulmonary:even and unlabored  Cardiovascular:No edema or pitting edema present  Skin:Normal without rashes or lesions and well hydrated  Psychiatric:Mood and affect appropriate  Neurologic:Cranial Nerves II-XII grossly intact  Musculoskeletal:normal

## 2022-03-30 ENCOUNTER — ANESTHESIA (OUTPATIENT)
Dept: GASTROENTEROLOGY | Facility: AMBULARY SURGERY CENTER | Age: 62
End: 2022-03-30

## 2022-03-30 ENCOUNTER — HOSPITAL ENCOUNTER (OUTPATIENT)
Dept: GASTROENTEROLOGY | Facility: AMBULARY SURGERY CENTER | Age: 62
Setting detail: OUTPATIENT SURGERY
Discharge: HOME | End: 2022-03-30
Attending: INTERNAL MEDICINE | Admitting: INTERNAL MEDICINE
Payer: COMMERCIAL

## 2022-03-30 ENCOUNTER — ANESTHESIA EVENT (OUTPATIENT)
Dept: GASTROENTEROLOGY | Facility: AMBULARY SURGERY CENTER | Age: 62
End: 2022-03-30

## 2022-03-30 VITALS
OXYGEN SATURATION: 99 % | RESPIRATION RATE: 22 BRPM | HEART RATE: 76 BPM | SYSTOLIC BLOOD PRESSURE: 138 MMHG | DIASTOLIC BLOOD PRESSURE: 63 MMHG | TEMPERATURE: 97.8 F

## 2022-03-30 DIAGNOSIS — N40.1 BPH WITH URINARY OBSTRUCTION: ICD-10-CM

## 2022-03-30 DIAGNOSIS — Z86.010 HISTORY OF COLON POLYPS: ICD-10-CM

## 2022-03-30 DIAGNOSIS — G47.00 INSOMNIA, UNSPECIFIED TYPE: ICD-10-CM

## 2022-03-30 DIAGNOSIS — N13.8 BPH WITH URINARY OBSTRUCTION: ICD-10-CM

## 2022-03-30 PROBLEM — I10 HTN (HYPERTENSION): Status: ACTIVE | Noted: 2022-03-30

## 2022-03-30 PROBLEM — Z96.89 SPINAL CORD STIMULATOR STATUS: Status: ACTIVE | Noted: 2017-01-01

## 2022-03-30 PROBLEM — K21.9 GASTROESOPHAGEAL REFLUX DISEASE: Status: ACTIVE | Noted: 2022-03-30

## 2022-03-30 LAB
6MAM UR QL CFM: NEGATIVE NG/ML
7AMINOCLONAZEPAM UR QL CFM: NEGATIVE NG/ML
A-OH ALPRAZ UR QL CFM: NEGATIVE NG/ML
AMPHET UR QL CFM: NEGATIVE NG/ML
AMPHET UR QL CFM: NEGATIVE NG/ML
BUPRENORPHINE UR QL CFM: NEGATIVE NG/ML
BUTALBITAL UR QL CFM: NEGATIVE NG/ML
BZE UR QL CFM: NEGATIVE NG/ML
CODEINE UR QL CFM: NEGATIVE NG/ML
DESIPRAMINE UR QL CFM: NEGATIVE NG/ML
DESIPRAMINE UR QL CFM: NEGATIVE NG/ML
EDDP UR QL CFM: NEGATIVE NG/ML
ETHYL GLUCURONIDE UR QL CFM: NEGATIVE NG/ML
ETHYL SULFATE UR QL SCN: NEGATIVE NG/ML
EUTYLONE UR QL: NEGATIVE NG/ML
FENTANYL UR QL CFM: NEGATIVE NG/ML
GLIADIN IGG SER IA-ACNC: NEGATIVE NG/ML
GLUCOSE 30M P 50 G LAC PO SERPL-MCNC: NEGATIVE NG/ML
GLUCOSE SERPL-MCNC: 182 MG/DL (ref 65–140)
HYDROCODONE UR QL CFM: NEGATIVE NG/ML
HYDROCODONE UR QL CFM: NEGATIVE NG/ML
HYDROMORPHONE UR QL CFM: NEGATIVE NG/ML
IMIPRAMINE UR QL CFM: NEGATIVE NG/ML
LORAZEPAM UR QL CFM: NEGATIVE NG/ML
MDMA UR QL CFM: NEGATIVE NG/ML
ME-PHENIDATE UR QL CFM: NEGATIVE NG/ML
MEPERIDINE UR QL CFM: NEGATIVE NG/ML
METHADONE UR QL CFM: NEGATIVE NG/ML
METHAMPHET UR QL CFM: NEGATIVE NG/ML
MORPHINE UR QL CFM: NEGATIVE NG/ML
MORPHINE UR QL CFM: NEGATIVE NG/ML
NORBUPRENORPHINE UR QL CFM: NEGATIVE NG/ML
NORDIAZEPAM UR QL CFM: NEGATIVE NG/ML
NORFENTANYL UR QL CFM: NEGATIVE NG/ML
NORHYDROCODONE UR QL CFM: NEGATIVE NG/ML
NORHYDROCODONE UR QL CFM: NEGATIVE NG/ML
NORMEPERIDINE UR QL CFM: NEGATIVE NG/ML
NOROXYCODONE UR QL CFM: ABNORMAL NG/ML
OPC-3373 QUANTIFICATION: NEGATIVE
OXAZEPAM UR QL CFM: NEGATIVE NG/ML
OXYCODONE UR QL CFM: ABNORMAL NG/ML
OXYMORPHONE UR QL CFM: NEGATIVE NG/ML
OXYMORPHONE UR QL CFM: NEGATIVE NG/ML
PCP UR QL CFM: NEGATIVE NG/ML
PHENOBARB UR QL CFM: NEGATIVE NG/ML
RESULT ALL_PRESCRIBED MEDS AND SPECIAL INSTRUCTIONS: NORMAL
SECOBARBITAL UR QL CFM: NEGATIVE NG/ML
SL AMB 3-METHYL-FENTANYL QUANTIFICATION: NORMAL NG/ML
SL AMB 4-ANPP QUANTIFICATION: NORMAL NG/ML
SL AMB 4-FIBF QUANTIFICATION: NORMAL NG/ML
SL AMB 5F-ADB-M7 METABOLITE QUANTIFICATION: NEGATIVE NG/ML
SL AMB 7-OH-MITRAGYNINE (KRATOM ALKALOID) QUANTIFICATION: NEGATIVE NG/ML
SL AMB AB-FUBINACA-M3 METABOLITE QUANTIFICATION: NEGATIVE NG/ML
SL AMB ACETYL FENTANYL QUANTIFICATION: NORMAL NG/ML
SL AMB ACETYL NORFENTANYL QUANTIFICATION: NORMAL NG/ML
SL AMB ACRYL FENTANYL QUANTIFICATION: NORMAL NG/ML
SL AMB BUTRYL FENTANYL QUANTIFICATION: NORMAL NG/ML
SL AMB CARFENTANIL QUANTIFICATION: NORMAL NG/ML
SL AMB CLOZAPINE QUANTIFICATION: NEGATIVE NG/ML
SL AMB CTHC (MARIJUANA METABOLITE) QUANTIFICATION: NEGATIVE NG/ML
SL AMB CYCLOPROPYL FENTANYL QUANTIFICATION: NORMAL NG/ML
SL AMB DEXTROMETHORPHAN QUANTIFICATION: NEGATIVE NG/ML
SL AMB DEXTRORPHAN (DEXTROMETHORPHAN METABOLITE) QUANT: NEGATIVE NG/ML
SL AMB DEXTRORPHAN (DEXTROMETHORPHAN METABOLITE) QUANT: NEGATIVE NG/ML
SL AMB FURANYL FENTANYL QUANTIFICATION: NORMAL NG/ML
SL AMB HALOPERIDOL  QUANTIFICATION: NEGATIVE NG/ML
SL AMB HALOPERIDOL METABOLITE QUANTIFICATION: NEGATIVE NG/ML
SL AMB JWH018 METABOLITE QUANTIFICATION: NEGATIVE NG/ML
SL AMB JWH073 METABOLITE QUANTIFICATION: NEGATIVE NG/ML
SL AMB MDMB-FUBINACA-M1 METABOLITE QUANTIFICATION: NEGATIVE NG/ML
SL AMB METHOXYACETYL FENTANYL QUANTIFICATION: NORMAL NG/ML
SL AMB METHYLONE QUANTIFICATION: NORMAL NG/ML
SL AMB N-DESMETHYL U-47700 QUANTIFICATION: NORMAL NG/ML
SL AMB N-DESMETHYL-TRAMADOL QUANTIFICATION: NEGATIVE NG/ML
SL AMB N-DESMETHYLCLOZAPINE QUANTIFICATION: NEGATIVE NG/ML
SL AMB PHENTERMINE QUANTIFICATION: NEGATIVE NG/ML
SL AMB RCS4 METABOLITE QUANTIFICATION: NEGATIVE NG/ML
SL AMB RITALINIC ACID QUANTIFICATION: NEGATIVE NG/ML
SL AMB U-47700 QUANTIFICATION: NORMAL NG/ML
SPECIMEN DRAWN SERPL: NEGATIVE NG/ML
TAPENTADOL UR QL CFM: NEGATIVE NG/ML
TEMAZEPAM UR QL CFM: NEGATIVE NG/ML
TEMAZEPAM UR QL CFM: NEGATIVE NG/ML
TRAMADOL UR QL CFM: NEGATIVE NG/ML
URATE/CREAT 24H UR: NEGATIVE NG/ML

## 2022-03-30 PROCEDURE — 82948 REAGENT STRIP/BLOOD GLUCOSE: CPT

## 2022-03-30 PROCEDURE — 88305 TISSUE EXAM BY PATHOLOGIST: CPT | Performed by: PATHOLOGY

## 2022-03-30 PROCEDURE — 45385 COLONOSCOPY W/LESION REMOVAL: CPT | Performed by: INTERNAL MEDICINE

## 2022-03-30 PROCEDURE — 45380 COLONOSCOPY AND BIOPSY: CPT | Performed by: INTERNAL MEDICINE

## 2022-03-30 RX ORDER — SODIUM CHLORIDE, SODIUM LACTATE, POTASSIUM CHLORIDE, CALCIUM CHLORIDE 600; 310; 30; 20 MG/100ML; MG/100ML; MG/100ML; MG/100ML
75 INJECTION, SOLUTION INTRAVENOUS CONTINUOUS
Status: DISCONTINUED | OUTPATIENT
Start: 2022-03-30 | End: 2022-04-03 | Stop reason: HOSPADM

## 2022-03-30 RX ORDER — PROPOFOL 10 MG/ML
INJECTION, EMULSION INTRAVENOUS AS NEEDED
Status: DISCONTINUED | OUTPATIENT
Start: 2022-03-30 | End: 2022-03-30

## 2022-03-30 RX ORDER — LIDOCAINE HYDROCHLORIDE 10 MG/ML
INJECTION, SOLUTION EPIDURAL; INFILTRATION; INTRACAUDAL; PERINEURAL AS NEEDED
Status: DISCONTINUED | OUTPATIENT
Start: 2022-03-30 | End: 2022-03-30

## 2022-03-30 RX ORDER — SODIUM CHLORIDE, SODIUM LACTATE, POTASSIUM CHLORIDE, CALCIUM CHLORIDE 600; 310; 30; 20 MG/100ML; MG/100ML; MG/100ML; MG/100ML
INJECTION, SOLUTION INTRAVENOUS CONTINUOUS PRN
Status: DISCONTINUED | OUTPATIENT
Start: 2022-03-30 | End: 2022-03-30

## 2022-03-30 RX ADMIN — PROPOFOL 20 MG: 10 INJECTION, EMULSION INTRAVENOUS at 08:55

## 2022-03-30 RX ADMIN — PROPOFOL 20 MG: 10 INJECTION, EMULSION INTRAVENOUS at 08:53

## 2022-03-30 RX ADMIN — PROPOFOL 150 MG: 10 INJECTION, EMULSION INTRAVENOUS at 08:48

## 2022-03-30 RX ADMIN — SODIUM CHLORIDE, SODIUM LACTATE, POTASSIUM CHLORIDE, AND CALCIUM CHLORIDE 75 ML/HR: .6; .31; .03; .02 INJECTION, SOLUTION INTRAVENOUS at 07:52

## 2022-03-30 RX ADMIN — PROPOFOL 50 MG: 10 INJECTION, EMULSION INTRAVENOUS at 08:51

## 2022-03-30 RX ADMIN — PROPOFOL 20 MG: 10 INJECTION, EMULSION INTRAVENOUS at 08:59

## 2022-03-30 RX ADMIN — SODIUM CHLORIDE, SODIUM LACTATE, POTASSIUM CHLORIDE, AND CALCIUM CHLORIDE: .6; .31; .03; .02 INJECTION, SOLUTION INTRAVENOUS at 08:28

## 2022-03-30 RX ADMIN — LIDOCAINE HYDROCHLORIDE 50 MG: 10 INJECTION, SOLUTION EPIDURAL; INFILTRATION; INTRACAUDAL; PERINEURAL at 08:48

## 2022-03-30 RX ADMIN — PROPOFOL 20 MG: 10 INJECTION, EMULSION INTRAVENOUS at 08:57

## 2022-03-30 NOTE — ANESTHESIA PREPROCEDURE EVALUATION
Procedure:  COLONOSCOPY    Relevant Problems   CARDIO   (+) HTN (hypertension)   (+) Hyperlipidemia LDL goal <70      ENDO   (+) DM type 2 (diabetes mellitus, type 2) (HCC)      GI/HEPATIC   (+) Gastroesophageal reflux disease      /RENAL   (+) BPH with urinary obstruction      MUSCULOSKELETAL   (+) Chronic low back pain   (+) Low back pain   (+) Lumbar spondylosis   (+) Myofascial pain syndrome   (+) Sciatica      NEURO/PSYCH   (+) Chronic low back pain   (+) Chronic pain syndrome   (+) Diabetic mononeuropathy associated with type 2 diabetes mellitus (HCC)   (+) Myofascial pain syndrome   (+) Numbness and tingling in both hands      PULMONARY   (+) Smoker      Other   (+) Spinal cord stimulator status        Physical Exam    Airway    Mallampati score: II  TM Distance: >3 FB  Neck ROM: full     Dental       Cardiovascular  Rhythm: regular, Rate: normal,     Pulmonary  Breath sounds clear to auscultation,     Other Findings        Anesthesia Plan  ASA Score- 2     Anesthesia Type- IV sedation with anesthesia with ASA Monitors  Additional Monitors:   Airway Plan:           Plan Factors-    Chart reviewed  Patient is a current smoker  Patient instructed to abstain from smoking on day of procedure  Patient smoked on day of surgery  Induction- intravenous  Postoperative Plan-     Informed Consent- Anesthetic plan and risks discussed with patient  I personally reviewed this patient with the CRNA  Discussed and agreed on the Anesthesia Plan with the CRNA  Radha Sanchez

## 2022-03-30 NOTE — H&P
History and Physical - [facility]  Melanie Shah 64 y o  male MRN: [id number]                  HPI: Melanie Shah is a 64y o  year old male who presents for screening colonoscopy  Patient has history of adenomatous colon polyp  Last colonoscopy was five years ago done elsewhere  REVIEW OF SYSTEMS: Per the HPI, and otherwise unremarkable  Historical Information   Past Medical History:   Diagnosis Date    Arthritis     BPH (benign prostatic hypertrophy) with urinary obstruction     Chronic narcotic dependence (HCC)     percocet TID    Chronic pain disorder     back-spinal cord stimulator    Colon polyp     Diabetes mellitus ([address] )     type    Ear infection     Herniation of lumbar intervertebral disc with radiculopathy     Myofascial pain syndrome     Obesity     Sciatica     Spinal cord stimulator status 2017    implanted 2017    Tinnitus      Past Surgical History:   Procedure Laterality Date    ABSCESS DRAINAGE      groin    BACK SURGERY      sciatic nerve- spinal cord stimulator 2017    CAUDAL BLOCK N/A 10/26/2018    Procedure: Caudal Epidural Steroid Injection ([id number]); Surgeon: Jt Belcher MD;  Location: [facility] MAIN OR;  Service: Pain Management     CAUDAL BLOCK N/A 11/30/2018    Procedure: Caudal Epidural Steroid Injection ([id number]); Surgeon: Jt Belcher MD;  Location: [facility] MAIN OR;  Service: Pain Management     COLONOSCOPY      EPIDURAL BLOCK INJECTION Right 5/10/2019    Procedure: L5 S1 transforaminal Epidural Steroid Injection ([id number];  Surgeon: Jt Belcher MD;  Location: [facility] MAIN OR;  Service: Pain Management     EPIDURAL BLOCK INJECTION Right 8/16/2019    Procedure: BLOCK / INJECTION EPIDURAL STEROID TRANSFORAMINAL;  Surgeon: Jt Belcher MD;  Location: [facility] MAIN OR;  Service: Pain Management     EPIDURAL BLOCK INJECTION Left 7/1/2021    Procedure: L5 S1 transforaminal epidural steroid injection ( [id number]);   Surgeon: Adolph Romero Tad Felix MD;  Location: [place] [address] MAIN OR;  Service: Pain Management     EPIDURAL BLOCK INJECTION Right 7/15/2021    Procedure: L5 S1 transforaminal epidural steroid injection ( [id number]); Surgeon: Keyur Enciso MD;  Location: [facility] MAIN OR;  Service: Pain Management     EPIDURAL BLOCK INJECTION Right 1/7/2022    Procedure: L5 S1 transforaminal epidural steroid injection ([id number]); Surgeon: Keyur Enciso MD;  Location: [facility] MAIN OR;  Service: Pain Management     EPIDURAL BLOCK INJECTION Left 1/28/2022    Procedure: L5 S1 transforaminal epidural steroid injection ([id number]); Surgeon: Keyur Enciso MD;  Location: [facility] MAIN OR;  Service: Pain Management     NERVE BLOCK Bilateral 4/26/2018    Procedure: B/L L3 L4 L5 S1 MBB #1 ([id number]); Surgeon: Keyur Enciso MD;  Location: [facility] MAIN OR;  Service: Pain Management     NERVE BLOCK Bilateral 5/11/2018    Procedure: B/L L3 L4 L5 S1 Medial Branch Block #2;  Surgeon: Keyur Enciso MD;  Location: [place] [address] MAIN OR;  Service: Pain Management     NOSE SURGERY      AZ COLONOSCOPY FLX DX W/COLLJ SPEC WHEN PFRMD N/A 3/6/2017    Procedure: COLONOSCOPY;  Surgeon: Nate Walker MD;  [facility]; Service: Gastroenterology    AZ IMPLANT SPINAL NEUROSTIM/ Left 6/29/2021    Procedure: REPLACEMENT IMPLANTABLE PULSE GENERATOR DORSAL SPINAL COLUMN STIMULATOR, LEFT;  Surgeon: Shalini Hilario MD;  Location:  MAIN OR;  Service: Neurosurgery    [place] SURG IMPLNT [address] Left 10/3/2017    Procedure: PLACEMENT THORACIC FOR INSERTION DORSAL COLUMN SPINAL CORD STIMULATOR (DCS) WITH BUTTOCK IMPLANTABLE PULSE GENERATOR(IMPULSE); Surgeon: Zell Lanes, MD;  Location:  MAIN OR;  Service: Neurosurgery    [address] Right 5/18/2018    Procedure: Rt L3 L4 L5 S1 Radio Frequency Ablation ([id number]);   Surgeon: Keyur Enciso MD;  Location: [facility] MAIN OR;  Service: Pain Management     RADIOFREQUENCY ABLATION Left 6/1/2018    Procedure: Lt L3 L4 L5 S1 Radio Frequency Ablation ([id number]);   Surgeon: Shraddha Chu MD;  Location[facility] MAIN OR;  Service: Pain Management      Social History   Social History     Substance and Sexual Activity   Alcohol Use No    Comment: rare     Social History     Substance and Sexual Activity   Drug Use No     Social History     Tobacco Use   Smoking Status Current Every Day Smoker    Packs/day: 1 00    Years: 42 00    Pack years: 42 00    Types: Cigarettes   Smokeless Tobacco Never Used   Tobacco Comment    encouraged smoking cessation - history of smoking 30 or more pack years      Family History   Problem Relation Age of Onset    Diabetes Mother     Arthritis Mother     Diabetes Father         mellitus     Heart attack Father 58    Hypertension Father     Arthritis Father        Meds/Allergies       Current Outpatient Medications:     atorvastatin (LIPITOR) 20 mg tablet    bisacodyl (DULCOLAX) 5 mg EC tablet    etodolac (LODINE) 300 MG capsule    fluticasone (FLONASE) 50 mcg/act nasal spray    gabapentin (NEURONTIN) 300 mg capsule    glipiZIDE (GLUCOTROL XL) 10 mg 24 hr tablet    magnesium citrate solution    metFORMIN (GLUCOPHAGE) 1000 MG tablet    omeprazole (PriLOSEC) 20 mg delayed release capsule    [START ON 4/8/2022] oxyCODONE-acetaminophen (Percocet) 7 5-325 MG per tablet    [START ON 5/8/2022] oxyCODONE-acetaminophen (Percocet) 7 5-325 MG per tablet    Polyethylene Glycol 3350 (MIRALAX PO)    QUEtiapine (SEROquel) 100 mg tablet    tamsulosin (FLOMAX) 0 4 mg    traZODone (DESYREL) 50 mg tablet    clobetasol (TEMOVATE) 0 05 % ointment    lidocaine (LIDODERM) 5 %    lisinopril (ZESTRIL) 2 5 mg tablet    sildenafil (VIAGRA) 50 MG tablet    Current Facility-Administered Medications:     lactated ringers infusion, 75 mL/hr, Intravenous, Continuous, 75 mL/hr at 03/30/22 0752    Allergies   Allergen Reactions    Penicillins Swelling     Mouth swelling Objective     /77   Pulse 75   Temp 97 8 °F (36 6 °C) (Temporal)   Resp 19   SpO2 98%       PHYSICAL EXAM    Gen: NAD  Head: NCAT  CV: RRR  CHEST: Clear  ABD: soft, NT/ND  EXT: no edema      ASSESSMENT/PLAN:  This is a 64y o  year old male here for colonoscopy, and he is stable and optimized for his procedure

## 2022-03-30 NOTE — DISCHARGE SUMMARY
Discharge Summary - [facility] 64 y o  male MRN: [id number]    Unit/Bed#:  Encounter: [id number]    Admission Date:  03/30/2022    Admitting Diagnosis: History of colon polyps [Z86 010]    HPI:  Colonoscopy and polypectomy done  History of colon polyp  Procedures Performed: No orders of the defined types were placed in this encounter  Summary of Hospital Course: Tolerated procedure well    Significant Findings, Care, Treatment and Services Provided:  Two polyps removed  Internal hemorrhoid noted  Complications:  None    Discharge Diagnosis:  See above    Medical Problems             Resolved Problems  Date Reviewed: 3/28/2022    None                Condition at Discharge: good         Discharge instructions/Information to patient and family:   See after visit summary for information provided to patient and family  Provisions for Follow-Up Care:  See after visit summary for information related to follow-up care and any pertinent home health orders        PCP: Monica Cruz DO    Disposition: Home

## 2022-03-31 RX ORDER — TAMSULOSIN HYDROCHLORIDE 0.4 MG/1
CAPSULE ORAL
Qty: 180 CAPSULE | Refills: 0 | Status: SHIPPED | OUTPATIENT
Start: 2022-03-31 | End: 2022-06-13

## 2022-03-31 RX ORDER — TRAZODONE HYDROCHLORIDE 50 MG/1
TABLET ORAL
Qty: 180 TABLET | Refills: 0 | Status: SHIPPED | OUTPATIENT
Start: 2022-03-31 | End: 2022-06-13

## 2022-04-18 DIAGNOSIS — J30.2 SEASONAL ALLERGIC RHINITIS, UNSPECIFIED TRIGGER: ICD-10-CM

## 2022-04-18 RX ORDER — FLUTICASONE PROPIONATE 50 MCG
SPRAY, SUSPENSION (ML) NASAL
Qty: 48 G | Refills: 0 | Status: SHIPPED | OUTPATIENT
Start: 2022-04-18 | End: 2022-04-25 | Stop reason: SDUPTHER

## 2022-04-20 DIAGNOSIS — E78.2 MIXED HYPERLIPIDEMIA: ICD-10-CM

## 2022-04-20 DIAGNOSIS — K21.9 GASTROESOPHAGEAL REFLUX DISEASE WITHOUT ESOPHAGITIS: ICD-10-CM

## 2022-04-20 DIAGNOSIS — R80.9 PROTEINURIA, UNSPECIFIED TYPE: ICD-10-CM

## 2022-04-20 PROCEDURE — 3066F NEPHROPATHY DOC TX: CPT | Performed by: FAMILY MEDICINE

## 2022-04-20 PROCEDURE — 4010F ACE/ARB THERAPY RXD/TAKEN: CPT | Performed by: FAMILY MEDICINE

## 2022-04-20 RX ORDER — OMEPRAZOLE 20 MG/1
CAPSULE, DELAYED RELEASE ORAL
Qty: 90 CAPSULE | Refills: 0 | Status: SHIPPED | OUTPATIENT
Start: 2022-04-20 | End: 2022-06-28

## 2022-04-20 RX ORDER — LISINOPRIL 2.5 MG/1
2.5 TABLET ORAL
Qty: 90 TABLET | Refills: 0 | Status: SHIPPED | OUTPATIENT
Start: 2022-04-20 | End: 2022-06-28

## 2022-04-20 RX ORDER — ATORVASTATIN CALCIUM 20 MG/1
TABLET, FILM COATED ORAL
Qty: 90 TABLET | Refills: 0 | Status: SHIPPED | OUTPATIENT
Start: 2022-04-20 | End: 2022-06-28

## 2022-04-25 DIAGNOSIS — J30.2 SEASONAL ALLERGIC RHINITIS, UNSPECIFIED TRIGGER: ICD-10-CM

## 2022-04-25 RX ORDER — FLUTICASONE PROPIONATE 50 MCG
2 SPRAY, SUSPENSION (ML) NASAL DAILY
Qty: 48 G | Refills: 0 | Status: SHIPPED | OUTPATIENT
Start: 2022-04-25 | End: 2022-06-28

## 2022-04-27 DIAGNOSIS — G47.00 INSOMNIA, UNSPECIFIED TYPE: ICD-10-CM

## 2022-04-27 RX ORDER — QUETIAPINE FUMARATE 100 MG/1
TABLET, FILM COATED ORAL
Qty: 90 TABLET | Refills: 0 | Status: SHIPPED | OUTPATIENT
Start: 2022-04-27

## 2022-05-05 DIAGNOSIS — M54.16 LUMBAR RADICULOPATHY: ICD-10-CM

## 2022-05-05 DIAGNOSIS — M47.816 LUMBAR SPONDYLOSIS: ICD-10-CM

## 2022-05-05 DIAGNOSIS — G89.29 CHRONIC LOW BACK PAIN: ICD-10-CM

## 2022-05-05 DIAGNOSIS — M96.1 POSTLAMINECTOMY SYNDROME, LUMBAR: ICD-10-CM

## 2022-05-05 DIAGNOSIS — M54.50 CHRONIC LOW BACK PAIN: ICD-10-CM

## 2022-05-05 DIAGNOSIS — G89.4 CHRONIC PAIN SYNDROME: ICD-10-CM

## 2022-05-26 ENCOUNTER — VBI (OUTPATIENT)
Dept: ADMINISTRATIVE | Facility: OTHER | Age: 62
End: 2022-05-26

## 2022-05-26 NOTE — TELEPHONE ENCOUNTER
05/26/22 9:59 AM     See documentation in the [employer] Smart[employer]       Cleven Lush
61m w no PMH reports upper abdominal pain starting this morning after eating [email or web address] last night. Pain is cramping, moderate, constant, radiating to RUQ, no exacerbating/alleviating. No similar prior    Review of Systems  Constitutional:  Subjective fever/chills.   Eyes:  Negative.  ENMT:  No nasal congestion, discharge, or throat pain.   Cardiac:  No chest pain, syncope, or edema.  Respiratory:  No dyspnea, wheezing, or cough. No hemoptysis.  GI:  No diarrhea, melena, or hematochezia. +vomiting (no blood/bile)  :  No dysuria or hematuria.   Musculoskeletal:  No joint swelling, joint pain, or back pain.  Skin:  No skin rash, jaundice, or lesions.  Neuro:  No headache, loss of sensation, or focal weakness.  No change in mental status.     Physical Exam  General: Awake, alert, NAD, WDWN, non-toxic appearing, NCAT  Eyes: PERRL, EOMI, no icterus, lids and conjunctivae are normal  ENT: External inspection normal, pink/moist membranes, pharynx normal  CV: S1S2, regular rate and rhythm, no murmur/gallops/rubs, no JVD, 2+ pulses b/l, no edema/cords/homans, warm/well-perfused  Respiratory: Normal respiratory rate/effort, no respiratory distress, normal voice, speaking full sentences, lungs clear to auscultation b/l, no wheezing/rales/rhonchi, no retractions, no stridor  Abdomen: Soft abdomen, mild RUQ tender, no distended/guarding/rebound, no CVA tender, +[place]peza.  Musculoskeletal: FROM all 4 extremities, N/V intact  Neck: FROM neck, supple, no meningismus, trachea midline, no JVD  Integumentary: Color normal for race, warm and dry, no rash  Neuro: Oriented x3, CN 2-12 grossly intact, normal motor, normal sensory  Psych: Oriented x3, mood normal, affect normal     61m w RUQ tender and vomiting. nontoxic appearing, n/v intact. --CBC, CMP, lipase, EKG, US r/o gallstones. --Analgesia/antiemetics as needed, observe/re-assess.

## 2022-06-08 ENCOUNTER — TELEPHONE (OUTPATIENT)
Dept: PAIN MEDICINE | Facility: CLINIC | Age: 62
End: 2022-06-08

## 2022-06-08 NOTE — TELEPHONE ENCOUNTER
Patient   [phone]  Dr Itz Toscano    Patient is calling in stating that he just go back from vacation  He said that the people that he went with now have Covid  He said that he did take a test and he is negative  He is now a little congested, what would Dr Itz Toscano like him to do? pt does have a cough that  [address] while I was speaking to him  He also said that he went to 3 different countries   Please follow up asa    Appt: 6/9/22 @  10:45 AM

## 2022-06-08 NOTE — TELEPHONE ENCOUNTER
Please review previous notation  Pt is scheduled for f/u OV on 6/9, per pt he has tested negative for COVID at this time  Would you like him to reschedule OV or keep appt?

## 2022-06-08 NOTE — TELEPHONE ENCOUNTER
S/w pt, advised of the same  Pt verbalized understanding and will contact PCP then reach out to [address] with an update

## 2022-06-09 ENCOUNTER — OFFICE VISIT (OUTPATIENT)
Dept: PAIN MEDICINE | Facility: CLINIC | Age: 62
End: 2022-06-09
Payer: OTHER MISCELLANEOUS

## 2022-06-09 VITALS
WEIGHT: 221.2 LBS | DIASTOLIC BLOOD PRESSURE: 75 MMHG | SYSTOLIC BLOOD PRESSURE: 122 MMHG | BODY MASS INDEX: 30.97 KG/M2 | HEIGHT: 71 IN | HEART RATE: 84 BPM

## 2022-06-09 DIAGNOSIS — M54.50 CHRONIC LOW BACK PAIN: ICD-10-CM

## 2022-06-09 DIAGNOSIS — G89.29 CHRONIC LOW BACK PAIN: ICD-10-CM

## 2022-06-09 DIAGNOSIS — M47.816 LUMBAR SPONDYLOSIS: ICD-10-CM

## 2022-06-09 DIAGNOSIS — M96.1 POSTLAMINECTOMY SYNDROME, LUMBAR: ICD-10-CM

## 2022-06-09 DIAGNOSIS — G89.4 CHRONIC PAIN SYNDROME: Primary | ICD-10-CM

## 2022-06-09 DIAGNOSIS — M54.16 LUMBAR RADICULOPATHY: ICD-10-CM

## 2022-06-09 PROCEDURE — 80305 DRUG TEST PRSMV DIR OPT OBS: CPT | Performed by: ANESTHESIOLOGY

## 2022-06-09 PROCEDURE — 99214 OFFICE O/P EST MOD 30 MIN: CPT | Performed by: ANESTHESIOLOGY

## 2022-06-09 NOTE — PROGRESS NOTES
Pain Medicine Follow-Up Note    Assessment:  1  Chronic pain syndrome    2  Chronic low back pain    3  Postlaminectomy syndrome, lumbar    4  Lumbar spondylosis    5  Lumbar radiculopathy        Plan:  New Medications Ordered This Visit   Medications    oxyCODONE-acetaminophen (Percocet) 7 5-325 MG per tablet     Sig: Take 1 tablet by mouth 3 (three) times a day as needed for severe pain Max Daily Amount: 3 tablets     Dispense:  90 tablet     Refill:  0    oxyCODONE-acetaminophen (Percocet) 7 5-325 MG per tablet     Sig: Take 1 tablet by mouth 3 (three) times a day as needed for severe pain Max Daily Amount: 3 tablets     Dispense:  90 tablet     Refill:  0       My impressions and treatment recommendations were discussed in detail with the patient who verbalized understanding and had no further questions  Given that the patient reports overall reduced pain and improved level functioning without significant side effects, I felt a reasonable to continue the patient on oxycodone/acetaminophen 7 5/325 mg 1 tablet up to 3 times daily as needed for pain as well as etodolac 300 mg 3 times daily as needed for pain, with food  I sent E prescriptions for the oxycodone/acetaminophen dated Sarah 10, 2022 and July 10, 2022  The risks and side effects of chronic opioid treatment were discussed in detail with the patient  Side effects include but are not limited to nausea, vomiting, GI intolerance, sedation, constipation, mental clouding, opioid-induced hyperalgesia, endocrine dysfunction, addiction, dependence, and tolerance  The patient was asked to take his medications only as prescribed and directed, never in excess, and never for any other reason other than for pain control  The patient was also asked to keep his medications out of the reach of others and away from children, preferably in a locked drawer  The patient verbalized understanding and wished to use these opioid medications      A urine drug test was collected at today's office visit as part of our medication management protocol  The point of care testing results were appropriate for what was being prescribed  The specimen will be sent for confirmatory testing  The drug screen is medically necessary because the patient is either dependent on opioid medication or is being considered for opioid medication therapy and the results could impact ongoing or future treatment  The drug screen is to evaluate for the presence or absence of prescribed, non-prescribed, and/or illicit drugs and substances  [place] Prescription Drug Monitoring Program report was reviewed and was appropriate     Follow-up is planned in 2 months time or sooner as warranted  Discharge instructions were provided  I personally saw and examined the patient and I agree with the above discussed plan of care  History of Present Illness:    Vanesa Mendes is a 64 y o  male who presents to [facility] for interval re-evaluation of the above stated pain complaints  The patient has a past medical and chronic pain history as outlined in the assessment section  He was last seen on March 28, 2022  At today's office visit, the patient's pain score is 5/10 on the verbal numerical pain rating scale  The patient continues to complain of low back and right lower extremity pain  He describes his pain as worse in the morning, evening, and night  His pain is constant in nature  He reports the quality of his pain as dull/aching, throbbing, cramping, shooting, numbness, and pins and needles  He is reporting 40% relief of symptoms with the combination of his medications  He does admit to controlled opioid induced constipation  Other than as stated above, the patient denies any interval changes in medications, medical condition, mental condition, symptoms, or allergies since the last office visit       Review of Systems:    Review of Systems   Respiratory: Negative for shortness of breath  Cardiovascular: Negative for chest pain  Gastrointestinal: Negative for constipation, diarrhea, nausea and vomiting  Musculoskeletal: Positive for gait problem  Negative for arthralgias, joint swelling and myalgias  Decreased ROM  Joint stiffness   Skin: Negative for rash  Neurological: Negative for dizziness, seizures and weakness  All other systems reviewed and are negative          Patient Active Problem List   Diagnosis    Heartburn    Erectile dysfunction of non-organic origin    DM type 2 (diabetes mellitus, type 2) (HCC)    Chronic low back pain    BPH with urinary obstruction    Blood pressure elevated without history of HTN    Sciatica    Myofascial pain syndrome    Lumbar spondylosis    Herniation of lumbar intervertebral disc with radiculopathy    Acute post-operative pain    Insomnia    Chronic pain syndrome    Low back pain    Class 1 obesity due to excess calories with serious comorbidity and body mass index (BMI) of 30 0 to 30 9 in adult    Smoker    Seasonal allergic rhinitis    Diabetic mononeuropathy associated with type 2 diabetes mellitus (HCC)    Postlaminectomy syndrome, lumbar region    Numbness and tingling in both hands    Intervertebral disc disorder with radiculopathy of lumbosacral region    Acute bronchitis    Hyperlipidemia LDL goal <70    Proteinuria    Battery end of life of spinal cord stimulator    HTN (hypertension)    Gastroesophageal reflux disease    Spinal cord stimulator status       Past Medical History:   Diagnosis Date    Arthritis     BPH (benign prostatic hypertrophy) with urinary obstruction     Chronic narcotic dependence ([facility])     percocet TID    Chronic pain disorder     back-spinal cord stimulator    Colon polyp     Diabetes mellitus ([address] )     type    Ear infection     Herniation of lumbar intervertebral disc with radiculopathy     Myofascial pain syndrome     Obesity     Sciatica     Spinal cord stimulator status 2017    implanted 2017    Tinnitus        Past Surgical History:   Procedure Laterality Date    ABSCESS DRAINAGE      groin    BACK SURGERY      sciatic nerve- spinal cord stimulator 2017    CAUDAL BLOCK N/A 10/26/2018    Procedure: Caudal Epidural Steroid Injection (10159); Surgeon: Starla Bhatti MD;  Location: [facility] OR;  Service: Pain Management     CAUDAL BLOCK N/A 11/30/2018    Procedure: Caudal Epidural Steroid Injection (40519); Surgeon: Starla Bhatti MD;  Location: [facility] OR;  Service: Pain Management     COLONOSCOPY      EPIDURAL BLOCK INJECTION Right 5/10/2019    Procedure: L5 S1 transforaminal Epidural Steroid Injection ([id number];  Surgeon: Starla Bhatti MD;  Location: [facility] MAIN OR;  Service: Pain Management     EPIDURAL BLOCK INJECTION Right 8/16/2019    Procedure: BLOCK / INJECTION EPIDURAL STEROID TRANSFORAMINAL;  Surgeon: Starla Bhatti MD;  Location: [facility] MAIN OR;  Service: Pain Management     EPIDURAL BLOCK INJECTION Left 7/1/2021    Procedure: L5 S1 transforaminal epidural steroid injection ( [id number]); Surgeon: Starla Bhatti MD;  Location: [facility] OR;  Service: Pain Management     EPIDURAL BLOCK INJECTION Right 7/15/2021    Procedure: L5 S1 transforaminal epidural steroid injection ( [id number]); Surgeon: Starla Bhatti MD;  Location: [facility] OR;  Service: Pain Management     EPIDURAL BLOCK INJECTION Right 1/7/2022    Procedure: L5 S1 transforaminal epidural steroid injection ([id number]); Surgeon: Starla Bhatti MD;  Location: [facility] OR;  Service: Pain Management     EPIDURAL BLOCK INJECTION Left 1/28/2022    Procedure: L5 S1 transforaminal epidural steroid injection ([id number]); Surgeon: Starla Bhatti MD;  Location: [facility] OR;  Service: Pain Management     NERVE BLOCK Bilateral 4/26/2018    Procedure: B/L L3 L4 L5 S1 MBB #1 ([id number]);   Surgeon: Starla Bhatti MD;  Location: [facility] OR; Service: Pain Management     NERVE BLOCK Bilateral 5/11/2018    Procedure: B/L L3 L4 L5 S1 Medial Branch Block #2;  Surgeon: Renea Whatley MD;  Location: [facility] MAIN OR;  Service: Pain Management     NOSE SURGERY      MA COLONOSCOPY FLX DX W/COLLJ SPEC WHEN PFRMD N/A 3/6/2017    Procedure: COLONOSCOPY;  Surgeon: Anthony Peraza MD;  Location:  GI LAB; Service: Gastroenterology    MA IMPLANT SPINAL NEUROSTIM/ Left 6/29/2021    Procedure: REPLACEMENT IMPLANTABLE PULSE GENERATOR DORSAL SPINAL COLUMN STIMULATOR, LEFT;  Surgeon: Nakia Mujica MD;  Location:  MAIN OR;  Service: Neurosurgery    MA SURG IMPLNT Ana Bombard Left 10/3/2017    Procedure: PLACEMENT THORACIC FOR INSERTION DORSAL COLUMN SPINAL CORD STIMULATOR (DCS) WITH BUTTOCK IMPLANTABLE PULSE GENERATOR(IMPULSE); Surgeon: Roni Cedeno MD;  Location:  MAIN OR;  Service: Neurosurgery    [address] Right 5/18/2018    Procedure: Rt L3 L4 L5 S1 Radio Frequency Ablation ([id number]); Surgeon: Renea Whatley MD;  Location: [facility] MAIN OR;  Service: Pain Management     RADIOFREQUENCY ABLATION Left 6/1/2018    Procedure: Lt L3 L4 L5 S1 Radio Frequency Ablation ([id number]);   Surgeon: Renea Whatley MD;  Location: [facility] MAIN OR;  Service: Pain Management        Family History   Problem Relation Age of Onset    Diabetes Mother     Arthritis Mother     Diabetes Father         mellitus     Heart attack Father 58    Hypertension Father     Arthritis Father        Social History     Occupational History    Occupation:  for distribution center    Tobacco Use    Smoking status: Current Every Day Smoker     Packs/day: 1 00     Years: 42 00     Pack years: 42 00     Types: Cigarettes    Smokeless tobacco: Never Used    Tobacco comment: encouraged smoking cessation - history of smoking 30 or more pack years    Vaping Use    Vaping Use: Never used   Substance and Sexual Activity    Alcohol use: No     Comment: rare    Drug use: No    Sexual activity: Not Currently     Partners: Female         Current Outpatient Medications:     oxyCODONE-acetaminophen (Percocet) 7 5-325 MG per tablet, Take 1 tablet by mouth 3 (three) times a day as needed for severe pain Max Daily Amount: 3 tablets, Disp: 90 tablet, Rfl: 0    [START ON 7/10/2022] oxyCODONE-acetaminophen (Percocet) 7 5-325 MG per tablet, Take 1 tablet by mouth 3 (three) times a day as needed for severe pain Max Daily Amount: 3 tablets, Disp: 90 tablet, Rfl: 0    atorvastatin (LIPITOR) 20 mg tablet, TAKE 1 TABLET EVERY DAY, Disp: 90 tablet, Rfl: 0    bisacodyl (DULCOLAX) 5 mg EC tablet, Take 20 mg by mouth 1 (one) time, Disp: , Rfl:     clobetasol (TEMOVATE) 0 05 % ointment, APPLY TOPICALLY 2 (TWO) TIMES A DAY (Patient taking differently: Apply topically 2 (two) times a day as needed  ), Disp: 30 g, Rfl: 1    etodolac (LODINE) 300 MG capsule, Take 1 capsule (300 mg total) by mouth 3 (three) times a day as needed (pain (with food)), Disp: 90 capsule, Rfl: 5    fluticasone (FLONASE) 50 mcg/act nasal spray, 2 sprays into each nostril daily, Disp: 48 g, Rfl: 0    gabapentin (NEURONTIN) 300 mg capsule, TAKE 1 CAPSULE THREE TIMES DAILY, Disp: 270 capsule, Rfl: 1    glipiZIDE (GLUCOTROL XL) 10 mg 24 hr tablet, Take 1 tablet (10 mg total) by mouth daily, Disp: 90 tablet, Rfl: 0    lidocaine (LIDODERM) 5 %, Apply 2 patches topically daily Remove & Discard patch within 12 hours or as directed by MD (Patient taking differently: Apply 2 patches topically daily as needed Remove & Discard patch within 12 hours or as directed by MD), Disp: 60 patch, Rfl: 0    lisinopril (ZESTRIL) 2 5 mg tablet, Take 1 tablet (2 5 mg total) by mouth daily at bedtime, Disp: 90 tablet, Rfl: 0    magnesium citrate solution, Take 296 mL by mouth once, Disp: , Rfl:     metFORMIN (GLUCOPHAGE) 1000 MG tablet, Take 1 tablet (1,000 mg total) by mouth 2 (two) times a day with meals, Disp: 180 tablet, Rfl: 1    omeprazole (PriLOSEC) 20 mg delayed release capsule, TAKE 1 CAPSULE EVERY DAY, Disp: 90 capsule, Rfl: 0    Polyethylene Glycol 3350 (MIRALAX PO), Take by mouth 1 (one) time, Disp: , Rfl:     QUEtiapine (SEROquel) 100 mg tablet, Take one tablet by mouth daily at bedtime, Disp: 90 tablet, Rfl: 0    sildenafil (VIAGRA) 50 MG tablet, Take 1 tablet (50 mg total) by mouth as needed for erectile dysfunction Take on an empty stomach, 1 hour before sexual activity, no alcohol   100 mg max dose , Disp: 30 tablet, Rfl: 0    tamsulosin (FLOMAX) 0 4 mg, TAKE 2 CAPSULES EVERY DAY WITH DINNER, Disp: 180 capsule, Rfl: 0    traZODone (DESYREL) 50 mg tablet, TAKE 2 TABLETS DAILY AT BEDTIME, Disp: 180 tablet, Rfl: 0    Allergies   Allergen Reactions    Penicillins Swelling     Mouth swelling       Physical Exam:    /75   Pulse 84   Ht 5' 11" (1 803 m)   Wt 100 kg (221 lb 3 2 oz)   BMI 30 85 kg/m²     Constitutional:obese  Eyes:anicteric  HEENT:grossly intact  Neck:supple, symmetric, trachea midline and no masses   Pulmonary:even and unlabored  Cardiovascular:No edema or pitting edema present  Skin:Normal without rashes or lesions and well hydrated  Psychiatric:Mood and affect appropriate  Neurologic:Cranial Nerves II-XII grossly intact  Musculoskeletal:normal

## 2022-06-10 RX ORDER — OXYCODONE AND ACETAMINOPHEN 7.5; 325 MG/1; MG/1
1 TABLET ORAL 3 TIMES DAILY PRN
Qty: 90 TABLET | Refills: 0 | Status: SHIPPED | OUTPATIENT
Start: 2022-06-10 | End: 2022-07-10

## 2022-06-10 RX ORDER — OXYCODONE AND ACETAMINOPHEN 7.5; 325 MG/1; MG/1
1 TABLET ORAL 3 TIMES DAILY PRN
Qty: 90 TABLET | Refills: 0 | Status: SHIPPED | OUTPATIENT
Start: 2022-07-10 | End: 2022-08-08 | Stop reason: SDUPTHER

## 2022-06-11 DIAGNOSIS — N13.8 BPH WITH URINARY OBSTRUCTION: ICD-10-CM

## 2022-06-11 DIAGNOSIS — N40.1 BPH WITH URINARY OBSTRUCTION: ICD-10-CM

## 2022-06-11 DIAGNOSIS — G47.00 INSOMNIA, UNSPECIFIED TYPE: ICD-10-CM

## 2022-06-13 RX ORDER — TAMSULOSIN HYDROCHLORIDE 0.4 MG/1
CAPSULE ORAL
Qty: 180 CAPSULE | Refills: 0 | Status: SHIPPED | OUTPATIENT
Start: 2022-06-13

## 2022-06-13 RX ORDER — TRAZODONE HYDROCHLORIDE 50 MG/1
TABLET ORAL
Qty: 180 TABLET | Refills: 0 | Status: SHIPPED | OUTPATIENT
Start: 2022-06-13

## 2022-06-24 ENCOUNTER — RA CDI HCC (OUTPATIENT)
Dept: OTHER | Facility: HOSPITAL | Age: 62
End: 2022-06-24

## 2022-06-24 NOTE — PROGRESS NOTES
[facility]  coding opportunities       Chart reviewed, no opportunity found:   [address]        Patients Insurance     Medicare Insurance: [facility]

## 2022-06-27 ENCOUNTER — APPOINTMENT (OUTPATIENT)
Dept: LAB | Facility: CLINIC | Age: 62
End: 2022-06-27
Payer: COMMERCIAL

## 2022-06-27 DIAGNOSIS — E11.9 TYPE 2 DIABETES MELLITUS WITHOUT COMPLICATION, WITHOUT LONG-TERM CURRENT USE OF INSULIN (HCC): ICD-10-CM

## 2022-06-27 DIAGNOSIS — N13.8 BPH WITH URINARY OBSTRUCTION: ICD-10-CM

## 2022-06-27 DIAGNOSIS — E78.2 MIXED HYPERLIPIDEMIA: ICD-10-CM

## 2022-06-27 DIAGNOSIS — E78.5 HYPERLIPIDEMIA LDL GOAL <70: ICD-10-CM

## 2022-06-27 DIAGNOSIS — R80.9 PROTEINURIA, UNSPECIFIED TYPE: ICD-10-CM

## 2022-06-27 DIAGNOSIS — E11.69 TYPE 2 DIABETES MELLITUS WITH OTHER SPECIFIED COMPLICATION, WITHOUT LONG-TERM CURRENT USE OF INSULIN (HCC): ICD-10-CM

## 2022-06-27 DIAGNOSIS — N40.1 BPH WITH URINARY OBSTRUCTION: ICD-10-CM

## 2022-06-27 DIAGNOSIS — J30.2 SEASONAL ALLERGIC RHINITIS, UNSPECIFIED TRIGGER: ICD-10-CM

## 2022-06-27 DIAGNOSIS — K63.5 POLYP OF COLON, UNSPECIFIED PART OF COLON, UNSPECIFIED TYPE: ICD-10-CM

## 2022-06-27 DIAGNOSIS — K21.9 GASTROESOPHAGEAL REFLUX DISEASE WITHOUT ESOPHAGITIS: ICD-10-CM

## 2022-06-27 LAB
ALBUMIN SERPL BCP-MCNC: 4.4 G/DL (ref 3.5–5)
ALP SERPL-CCNC: 79 U/L (ref 46–116)
ALT SERPL W P-5'-P-CCNC: 70 U/L (ref 12–78)
ANION GAP SERPL CALCULATED.3IONS-SCNC: 4 MMOL/L (ref 4–13)
AST SERPL W P-5'-P-CCNC: 36 U/L (ref 5–45)
BASOPHILS # BLD AUTO: 0.06 THOUSANDS/ΜL (ref 0–0.1)
BASOPHILS NFR BLD AUTO: 1 % (ref 0–1)
BILIRUB SERPL-MCNC: 0.52 MG/DL (ref 0.2–1)
BUN SERPL-MCNC: 22 MG/DL (ref 5–25)
CALCIUM SERPL-MCNC: 9.8 MG/DL (ref 8.3–10.1)
CHLORIDE SERPL-SCNC: 104 MMOL/L (ref 100–108)
CHOLEST SERPL-MCNC: 124 MG/DL
CO2 SERPL-SCNC: 29 MMOL/L (ref 21–32)
CREAT SERPL-MCNC: 0.92 MG/DL (ref 0.6–1.3)
CREAT UR-MCNC: 132 MG/DL
EOSINOPHIL # BLD AUTO: 0.26 THOUSAND/ΜL (ref 0–0.61)
EOSINOPHIL NFR BLD AUTO: 2 % (ref 0–6)
ERYTHROCYTE [DISTWIDTH] IN BLOOD BY AUTOMATED COUNT: 13.2 % (ref 11.6–15.1)
EST. AVERAGE GLUCOSE BLD GHB EST-MCNC: 120 MG/DL
GFR SERPL CREATININE-BSD FRML MDRD: 88 ML/MIN/1.73SQ M
GLUCOSE P FAST SERPL-MCNC: 155 MG/DL (ref 65–99)
HBA1C MFR BLD: 5.8 %
HCT VFR BLD AUTO: 46.8 % (ref 36.5–49.3)
HDLC SERPL-MCNC: 36 MG/DL
HGB BLD-MCNC: 15.8 G/DL (ref 12–17)
IMM GRANULOCYTES # BLD AUTO: 0.04 THOUSAND/UL (ref 0–0.2)
IMM GRANULOCYTES NFR BLD AUTO: 0 % (ref 0–2)
LDLC SERPL CALC-MCNC: 78 MG/DL (ref 0–100)
LYMPHOCYTES # BLD AUTO: 2.55 THOUSANDS/ΜL (ref 0.6–4.47)
LYMPHOCYTES NFR BLD AUTO: 24 % (ref 14–44)
MCH RBC QN AUTO: 30.4 PG (ref 26.8–34.3)
MCHC RBC AUTO-ENTMCNC: 33.8 G/DL (ref 31.4–37.4)
MCV RBC AUTO: 90 FL (ref 82–98)
MICROALBUMIN UR-MCNC: 17.5 MG/L (ref 0–20)
MICROALBUMIN/CREAT 24H UR: 13 MG/G CREATININE (ref 0–30)
MONOCYTES # BLD AUTO: 1.09 THOUSAND/ΜL (ref 0.17–1.22)
MONOCYTES NFR BLD AUTO: 10 % (ref 4–12)
NEUTROPHILS # BLD AUTO: 6.78 THOUSANDS/ΜL (ref 1.85–7.62)
NEUTS SEG NFR BLD AUTO: 63 % (ref 43–75)
NONHDLC SERPL-MCNC: 88 MG/DL
NRBC BLD AUTO-RTO: 0 /100 WBCS
PLATELET # BLD AUTO: 217 THOUSANDS/UL (ref 149–390)
PMV BLD AUTO: 10.1 FL (ref 8.9–12.7)
POTASSIUM SERPL-SCNC: 5 MMOL/L (ref 3.5–5.3)
PROT SERPL-MCNC: 7.9 G/DL (ref 6.4–8.2)
PSA SERPL-MCNC: 0.2 NG/ML (ref 0–4)
RBC # BLD AUTO: 5.19 MILLION/UL (ref 3.88–5.62)
SODIUM SERPL-SCNC: 137 MMOL/L (ref 136–145)
TRIGL SERPL-MCNC: 51 MG/DL
WBC # BLD AUTO: 10.78 THOUSAND/UL (ref 4.31–10.16)

## 2022-06-27 PROCEDURE — G0103 PSA SCREENING: HCPCS

## 2022-06-27 PROCEDURE — 85025 COMPLETE CBC W/AUTO DIFF WBC: CPT

## 2022-06-27 PROCEDURE — 80053 COMPREHEN METABOLIC PANEL: CPT

## 2022-06-27 PROCEDURE — 83036 HEMOGLOBIN GLYCOSYLATED A1C: CPT

## 2022-06-27 PROCEDURE — 80061 LIPID PANEL: CPT

## 2022-06-27 PROCEDURE — 82570 ASSAY OF URINE CREATININE: CPT

## 2022-06-27 PROCEDURE — 82043 UR ALBUMIN QUANTITATIVE: CPT

## 2022-06-28 RX ORDER — LISINOPRIL 2.5 MG/1
TABLET ORAL
Qty: 90 TABLET | Refills: 0 | Status: SHIPPED | OUTPATIENT
Start: 2022-06-28

## 2022-06-28 RX ORDER — OMEPRAZOLE 20 MG/1
CAPSULE, DELAYED RELEASE ORAL
Qty: 90 CAPSULE | Refills: 0 | Status: SHIPPED | OUTPATIENT
Start: 2022-06-28

## 2022-06-28 RX ORDER — FLUTICASONE PROPIONATE 50 MCG
SPRAY, SUSPENSION (ML) NASAL
Qty: 48 G | Refills: 0 | Status: SHIPPED | OUTPATIENT
Start: 2022-06-28

## 2022-06-28 RX ORDER — ATORVASTATIN CALCIUM 20 MG/1
TABLET, FILM COATED ORAL
Qty: 90 TABLET | Refills: 0 | Status: SHIPPED | OUTPATIENT
Start: 2022-06-28

## 2022-07-01 ENCOUNTER — OFFICE VISIT (OUTPATIENT)
Dept: FAMILY MEDICINE CLINIC | Facility: CLINIC | Age: 62
End: 2022-07-01
Payer: COMMERCIAL

## 2022-07-01 VITALS
DIASTOLIC BLOOD PRESSURE: 78 MMHG | BODY MASS INDEX: 30.38 KG/M2 | HEIGHT: 71 IN | HEART RATE: 82 BPM | SYSTOLIC BLOOD PRESSURE: 122 MMHG | WEIGHT: 217 LBS | RESPIRATION RATE: 16 BRPM | OXYGEN SATURATION: 97 %

## 2022-07-01 DIAGNOSIS — E78.5 HYPERLIPIDEMIA LDL GOAL <70: ICD-10-CM

## 2022-07-01 DIAGNOSIS — Z13.6 SCREENING FOR AAA (ABDOMINAL AORTIC ANEURYSM): ICD-10-CM

## 2022-07-01 DIAGNOSIS — Z12.2 ENCOUNTER FOR SCREENING FOR LUNG CANCER: ICD-10-CM

## 2022-07-01 DIAGNOSIS — Z00.00 ENCOUNTER FOR SUBSEQUENT ANNUAL WELLNESS VISIT (AWV) IN MEDICARE PATIENT: Primary | ICD-10-CM

## 2022-07-01 DIAGNOSIS — E11.9 TYPE 2 DIABETES MELLITUS WITHOUT COMPLICATION, WITHOUT LONG-TERM CURRENT USE OF INSULIN (HCC): ICD-10-CM

## 2022-07-01 DIAGNOSIS — Z23 NEED FOR PNEUMOCOCCAL VACCINATION: ICD-10-CM

## 2022-07-01 PROCEDURE — 90677 PCV20 VACCINE IM: CPT | Performed by: FAMILY MEDICINE

## 2022-07-01 PROCEDURE — 3725F SCREEN DEPRESSION PERFORMED: CPT | Performed by: FAMILY MEDICINE

## 2022-07-01 PROCEDURE — G0009 ADMIN PNEUMOCOCCAL VACCINE: HCPCS | Performed by: FAMILY MEDICINE

## 2022-07-01 PROCEDURE — G0439 PPPS, SUBSEQ VISIT: HCPCS | Performed by: FAMILY MEDICINE

## 2022-07-01 NOTE — PROGRESS NOTES
Assessment and Plan:     Problem List Items Addressed This Visit        Endocrine    DM type 2 (diabetes mellitus, type 2) ([facility] [address] )    Relevant Orders    HEMOGLOBIN A1C W/ EAG ESTIMATION    Basic metabolic panel    Microalbumin / creatinine urine ratio  - A1c = 5 8   - cont current regimen   - RTO in 6months with labs for routine f/u = pt aware and agreeable        Other    Hyperlipidemia LDL goal <70    Relevant Orders    Lipid panel  - LDL 78   - cont current regimen and RTO in 6months with repeat labs for f/u - pt aware and agreeable       Other Visit Diagnoses     Encounter for subsequent annual wellness visit (AWV) in Medicare patient    -  Primary  - reviewed labs done 6/27/2022   - due for Lung Ca screening - script given   - due for AAA screening - script given  - UTD with Colon Ca screening (3/2022) - h/o polyps - repeat in 2027 - pt aware  - due for PCV20 and will get in the office today       Encounter for screening for lung cancer        Relevant Orders    CT lung screening program    Screening for AAA (abdominal aortic aneurysm)        Relevant Orders    US abdominal aorta screening aaa    Need for pneumococcal vaccination        Relevant Orders    Pneumococcal Conjugate Vaccine 20-valent (Pcv20) (Completed)           Preventive health issues were discussed with patient, and age appropriate screening tests were ordered as noted in patient's After Visit Summary  Personalized health advice and appropriate referrals for health education or preventive services given if needed, as noted in patient's After Visit Summary       History of Present Illness:     Patient presents for a Medicare Wellness Visit    HPI   Patient Care Team:  Alissa Mckinney DO as PCP - General (Family Medicine)  Lesley Rodas MD (Pain Medicine)  MD Meagan Marte MD Ledell Donalds, MD Verta Loose, MD as Endoscopist     Review of Systems:     Review of Systems     Problem List:     Patient Active Problem List   Diagnosis    Heartburn    Erectile dysfunction of non-organic origin    DM type 2 (diabetes mellitus, type 2) (HCC)    Chronic low back pain    BPH with urinary obstruction    Blood pressure elevated without history of HTN    Sciatica    Myofascial pain syndrome    Lumbar spondylosis    Herniation of lumbar intervertebral disc with radiculopathy    Acute post-operative pain    Insomnia    Chronic pain syndrome    Low back pain    Class 1 obesity due to excess calories with serious comorbidity and body mass index (BMI) of 30 0 to 30 9 in adult    Smoker    Seasonal allergic rhinitis    Diabetic mononeuropathy associated with type 2 diabetes mellitus (HCC)    Postlaminectomy syndrome, lumbar region    Numbness and tingling in both hands    Intervertebral disc disorder with radiculopathy of lumbosacral region    Acute bronchitis    Hyperlipidemia LDL goal <70    Proteinuria    Battery end of life of spinal cord stimulator    HTN (hypertension)    Gastroesophageal reflux disease    Spinal cord stimulator status      Past Medical and Surgical History:     Past Medical History:   Diagnosis Date    Arthritis     BPH (benign prostatic hypertrophy) with urinary obstruction     Chronic narcotic dependence (HCC)     percocet TID    Chronic pain disorder     back-spinal cord stimulator    Colon polyp     Diabetes mellitus ([address] )     type    Ear infection     Herniation of lumbar intervertebral disc with radiculopathy     Myofascial pain syndrome     Obesity     Sciatica     Spinal cord stimulator status 2017    implanted 2017    Tinnitus      Past Surgical History:   Procedure Laterality Date    ABSCESS DRAINAGE      groin    BACK SURGERY      sciatic nerve- spinal cord stimulator 2017    CAUDAL BLOCK N/A 10/26/2018    Procedure: Caudal Epidural Steroid Injection (03290);   Surgeon: Dee Kenney MD;  Location: [facility] MAIN OR;  Service: Pain Management     CAUDAL BLOCK N/A 11/30/2018    Procedure: Caudal Epidural Steroid Injection (94540); Surgeon: Bharat Moy MD;  Location: [facility] MAIN OR;  Service: Pain Management     COLONOSCOPY      EPIDURAL BLOCK INJECTION Right 5/10/2019    Procedure: L5 S1 transforaminal Epidural Steroid Injection ([id number];  Surgeon: Bharat Moy MD;  Location: [facility] MAIN OR;  Service: Pain Management     EPIDURAL BLOCK INJECTION Right 8/16/2019    Procedure: BLOCK / INJECTION EPIDURAL STEROID TRANSFORAMINAL;  Surgeon: Bharat Moy MD;  Location: [facility] MAIN OR;  Service: Pain Management     EPIDURAL BLOCK INJECTION Left 7/1/2021    Procedure: L5 S1 transforaminal epidural steroid injection ( [id number]); Surgeon: Bharat Moy MD;  Location: [facility] OR;  Service: Pain Management     EPIDURAL BLOCK INJECTION Right 7/15/2021    Procedure: L5 S1 transforaminal epidural steroid injection ( [id number]); Surgeon: Bharat Moy MD;  Location: [facility] MAIN OR;  Service: Pain Management     EPIDURAL BLOCK INJECTION Right 1/7/2022    Procedure: L5 S1 transforaminal epidural steroid injection ([id number]); Surgeon: Bharat Moy MD;  Location: [facility] OR;  Service: Pain Management     EPIDURAL BLOCK INJECTION Left 1/28/2022    Procedure: L5 S1 transforaminal epidural steroid injection ([id number]); Surgeon: Bharat Moy MD;  Location: [facility] OR;  Service: Pain Management     NERVE BLOCK Bilateral 4/26/2018    Procedure: B/L L3 L4 L5 S1 MBB #1 ([id number]); Surgeon: Bharat Moy MD;  Location: [facility] MAIN OR;  Service: Pain Management     NERVE BLOCK Bilateral 5/11/2018    Procedure: B/L L3 L4 L5 S1 Medial Branch Block #2;  Surgeon: Bharat Moy MD;  Location[facility] MAIN OR;  Service: Pain Management     NOSE SURGERY      AZ COLONOSCOPY FLX DX W/COLLJ SPEC WHEN PFRMD N/A 3/6/2017    Procedure: COLONOSCOPY;  Surgeon: Bird Nayak MD;  [facility];   Service: Gastroenterology    AZ IMPLANT SPINAL NEUROSTIM/ Left 6/29/2021    Procedure: REPLACEMENT IMPLANTABLE PULSE GENERATOR DORSAL SPINAL COLUMN STIMULATOR, LEFT;  Surgeon: Naomi Lugo MD;  Location:  MAIN OR;  Service: Neurosurgery    AK SURG IMPLNT [address] Left 10/3/2017    Procedure: PLACEMENT THORACIC FOR INSERTION DORSAL COLUMN SPINAL CORD STIMULATOR (DCS) WITH BUTTOCK IMPLANTABLE PULSE GENERATOR(IMPULSE); Surgeon: Rex Morley MD;  Location:  MAIN OR;  Service: Neurosurgery    [address] [facility] Right 5/18/2018    Procedure: Rt L3 L4 L5 S1 Radio Frequency Ablation ([id number]); Surgeon: Bridgette Godfrey MD;  Location: [facility] MAIN OR;  Service: Pain Management     RADIOFREQUENCY ABLATION Left 6/1/2018    Procedure: Lt L3 L4 L5 S1 Radio Frequency Ablation ([id number]);   Surgeon: Bridgette Godfrey MD;  Location: [facility] MAIN OR;  Service: Pain Management       Family History:     Family History   Problem Relation Age of Onset    Diabetes Mother     Arthritis Mother     Diabetes Father         mellitus     Heart attack Father 58    Hypertension Father     Arthritis Father       Social History:     Social History     Socioeconomic History    Marital status: Single     Spouse name: None    Number of children: None    Years of education: None    Highest education level: None   Occupational History    Occupation:  for distribution center    Tobacco Use    Smoking status: Current Every Day Smoker     Packs/day: 1 00     Years: 42 00     Pack years: 42 00     Types: Cigarettes    Smokeless tobacco: Never Used    Tobacco comment: encouraged smoking cessation - history of smoking 30 or more pack years    Vaping Use    Vaping Use: Never used   Substance and Sexual Activity    Alcohol use: No     Comment: rare    Drug use: No    Sexual activity: Not Currently     Partners: Female   Other Topics Concern    None   Social History Narrative    Caffeine use - coffee      Social Determinants of Health Financial Resource Strain: Not on file   Food Insecurity: Not on file   Transportation Needs: Not on file   Physical Activity: Not on file   Stress: Not on file   Social Connections: Not on file   Intimate Partner Violence: Not on file   Housing Stability: Not on file      Medications and Allergies:     Current Outpatient Medications   Medication Sig Dispense Refill    atorvastatin (LIPITOR) 20 mg tablet TAKE 1 TABLET EVERY DAY 90 tablet 0    bisacodyl (DULCOLAX) 5 mg EC tablet Take 20 mg by mouth 1 (one) time      clobetasol (TEMOVATE) 0 05 % ointment APPLY TOPICALLY 2 (TWO) TIMES A DAY (Patient taking differently: Apply topically 2 (two) times a day as needed) 30 g 1    etodolac (LODINE) 300 MG capsule Take 1 capsule (300 mg total) by mouth 3 (three) times a day as needed (pain (with food)) 90 capsule 5    fluticasone (FLONASE) 50 mcg/act nasal spray USE 2 SPRAYS IN EACH NOSTRIL EVERY DAY 48 g 0    gabapentin (NEURONTIN) 300 mg capsule TAKE 1 CAPSULE THREE TIMES DAILY 270 capsule 1    glipiZIDE (GLUCOTROL XL) 10 mg 24 hr tablet Take 1 tablet (10 mg total) by mouth daily 90 tablet 0    lidocaine (LIDODERM) 5 % Apply 2 patches topically daily Remove & Discard patch within 12 hours or as directed by MD (Patient taking differently: Apply 2 patches topically daily as needed Remove & Discard patch within 12 hours or as directed by MD) 60 patch 0    lisinopril (ZESTRIL) 2 5 mg tablet TAKE 1 TABLET EVERY DAY AT BEDTIME 90 tablet 0    magnesium citrate solution Take 296 mL by mouth once      metFORMIN (GLUCOPHAGE) 1000 MG tablet Take 1 tablet (1,000 mg total) by mouth 2 (two) times a day with meals 180 tablet 1    omeprazole (PriLOSEC) 20 mg delayed release capsule TAKE 1 CAPSULE EVERY DAY 90 capsule 0    oxyCODONE-acetaminophen (Percocet) 7 5-325 MG per tablet Take 1 tablet by mouth 3 (three) times a day as needed for severe pain Max Daily Amount: 3 tablets 90 tablet 0    [START ON 7/10/2022] oxyCODONE-acetaminophen (Percocet) 7 5-325 MG per tablet Take 1 tablet by mouth 3 (three) times a day as needed for severe pain Max Daily Amount: 3 tablets 90 tablet 0    Polyethylene Glycol 3350 (MIRALAX PO) Take by mouth 1 (one) time      QUEtiapine (SEROquel) 100 mg tablet Take one tablet by mouth daily at bedtime 90 tablet 0    sildenafil (VIAGRA) 50 MG tablet Take 1 tablet (50 mg total) by mouth as needed for erectile dysfunction Take on an empty stomach, 1 hour before sexual activity, no alcohol  100 mg max dose  30 tablet 0    tamsulosin (FLOMAX) 0 4 mg TAKE 2 CAPSULES EVERY DAY WITH DINNER 180 capsule 0    traZODone (DESYREL) 50 mg tablet TAKE 2 TABLETS EVERY DAY AT BEDTIME 180 tablet 0     No current facility-administered medications for this visit  Allergies   Allergen Reactions    Penicillins Swelling     Mouth swelling      Immunizations:     Immunization History   Administered Date(s) Administered    COVID-19 MODERNA VACC 0 25 ML IM BOOSTER 12/02/2021    COVID-19 MODERNA VACC 0 5 ML IM 03/16/2021, 04/12/2021    INFLUENZA 09/12/2018    Influenza Injectable, MDCK, Preservative Free, Quadrivalent, 0 5 mL 09/24/2020    Influenza, recombinant, quadrivalent,injectable, preservative free 09/12/2018, 09/15/2021    Pneumococcal Conjugate Vaccine 20-valent (Pcv20), Polysace 07/01/2022    Pneumococcal Polysaccharide PPV23 09/12/2018    Tdap 09/12/2018      Health Maintenance:         Topic Date Due    Lung Cancer Screening  05/05/2022    HIV Screening  01/20/2023 (Originally 6/15/1975)    Colorectal Cancer Screening  03/29/2027    Hepatitis C Screening  Completed         Topic Date Due    COVID-19 Vaccine (4 - Booster for Ashley Graham series) 04/02/2022    Influenza Vaccine (1) 09/01/2022      Medicare Screening Tests and Risk Assessments:     Merline [place] is here for his Subsequent Wellness visit   Last Medicare Wellness visit information reviewed, patient interviewed and updates made to the record today       Health Risk Assessment:   Patient rates overall health as fair  Patient feels that their physical health rating is same  Patient is very satisfied with their life  Eyesight was rated as same  Hearing was rated as same  Patient feels that their emotional and mental health rating is same  Patients states they are never, rarely angry  Patient states they are never, rarely unusually tired/fatigued  Pain experienced in the last 7 days has been none  Patient states that he has experienced no weight loss or gain in last 6 months  Depression Screening:   PHQ-2 Score: 0      Fall Risk Screening: In the past year, patient has experienced: no history of falling in past year      Home Safety:  Patient does not have trouble with stairs inside or outside of their home  Patient has working smoke alarms and has working carbon monoxide detector  Home safety hazards include: none  Nutrition:   Current diet is Regular  Medications:   Patient is currently taking over-the-counter supplements  OTC medications include: see medication list  Patient is able to manage medications  Activities of Daily Living (ADLs)/Instrumental Activities of Daily Living (IADLs):   Walk and transfer into and out of bed and chair?: Yes  Dress and groom yourself?: Yes    Bathe or shower yourself?: Yes    Feed yourself? Yes  Do your laundry/housekeeping?: Yes  Manage your money, pay your bills and track your expenses?: Yes  Make your own meals?: Yes    Do your own shopping?: Yes    Previous Hospitalizations:   Any hospitalizations or ED visits within the last 12 months?: Yes    How many hospitalizations have you had in the last year?: more than 4    Advance Care Planning:   Living will: Yes    Durable POA for healthcare:  Yes    Advanced directive: Yes      Cognitive Screening:   Provider or family/friend/caregiver concerned regarding cognition?: No    PREVENTIVE SCREENINGS      Cardiovascular Screening:    General: History Lipid Disorder, Risks and Benefits Discussed and Screening Current      Diabetes Screening:     General: History Diabetes, Risks and Benefits Discussed and Screening Current      Colorectal Cancer Screening:     General: Screening Current      Prostate Cancer Screening:    General: Screening Current      Osteoporosis Screening:    General: Screening Current      Abdominal Aortic Aneurysm (AAA) Screening:    Risk factors include: tobacco use      Due for: Screening AAA Ultrasound      Lung Cancer Screening:     General: Risks and Benefits Discussed    Due for: Low Dose CT (LDCT)      Hepatitis C Screening:    General: Screening Current      Preventive Screening Comments: 65yo M presents to the office for sub AWV  - reviewed labs done 6/27/2022  - UTD with Colon Ca screening (3/2022) - h/o polyps - 5yr recall (due again in 2027)   - nml PSA   - due for Lung Ca screening   - due for AAA screening   - due for PCV20 and will get in the office today   - denies F/C/N/V/CP/palpitations/SOB/abd pain     Screening, Brief Intervention, and Referral to Treatment (SBIRT)    Screening  Typical number of drinks in a day: 0  Typical number of drinks in a week: 0  Interpretation: Low risk drinking behavior  Single Item Drug Screening:  How often have you used an illegal drug (including marijuana) or a prescription medication for non-medical reasons in the past year? never    Single Item Drug Screen Score: 0  Interpretation: Negative screen for possible drug use disorder    Review of Current Opioid Use    Opioid Risk Tool (ORT) Interpretation: Complete Opioid Risk Tool (ORT)    No exam data present     Physical Exam:     /78 (BP Location: Right arm, Patient Position: Sitting, Cuff Size: Large)   Pulse 82   Resp 16   Ht 5' 11" (1 803 m)   Wt 98 4 kg (217 lb)   SpO2 97%   BMI 30 27 kg/m²     Physical Exam  Vitals reviewed  Constitutional:       General: He is not in acute distress       Appearance: He is not ill-appearing, toxic-appearing or diaphoretic  HENT:      Head: Normocephalic and atraumatic  Right Ear: External ear normal       Left Ear: External ear normal    Eyes:      General: No scleral icterus  Right eye: No discharge  Left eye: No discharge  Extraocular Movements: Extraocular movements intact  Conjunctiva/sclera: Conjunctivae normal    Pulmonary:      Effort: Pulmonary effort is normal    Neurological:      General: No focal deficit present  Mental Status: He is alert and oriented to person, place, and time  Psychiatric:         Mood and Affect: Mood normal          Behavior: Behavior normal         BMI Counseling: Body mass index is 30 27 kg/m²  The BMI is above normal  Nutrition recommendations include 3-5 servings of fruits/vegetables daily  Exercise recommendations include exercising 3-5 times per week  Depression Screening Follow-up Plan: Patient's depression screening was positive with a PHQ-2 score of 0  Their PHQ-9 score was   Clinically patient does not have depression  No treatment is required          Severiano Needy, DO

## 2022-07-01 NOTE — PATIENT INSTRUCTIONS
Medicare Preventive Visit Patient Instructions  Thank you for completing your Welcome to Medicare Visit or Medicare Annual Wellness Visit today  Your next wellness visit will be due in one year (7/2/2023)  The screening/preventive services that you may require over the next 5-10 years are detailed below  Some tests may not apply to you based off risk factors and/or age  Screening tests ordered at today's visit but not completed yet may show as past due  Also, please note that scanned in results may not display below  Preventive Screenings:  Service Recommendations Previous Testing/Comments   Colorectal Cancer Screening  · Colonoscopy    · Fecal Occult Blood Test (FOBT)/Fecal Immunochemical Test (FIT)  · Fecal DNA/Cologuard Test  · Flexible Sigmoidoscopy Age: 54-65 years old   Colonoscopy: every 10 years (May be performed more frequently if at higher risk)  OR  FOBT/FIT: every 1 year  OR  Cologuard: every 3 years  OR  Sigmoidoscopy: every 5 years  Screening may be recommended earlier than age 48 if at higher risk for colorectal cancer  Also, an individualized decision between you and your healthcare provider will decide whether screening between the ages of 74-80 would be appropriate   Colonoscopy: 03/30/2022  FOBT/FIT: Not on file  Cologuard: Not on file  Sigmoidoscopy: Not on file    Screening Current     Prostate Cancer Screening Individualized decision between patient and health care provider in men between ages of 53-78   Medicare will cover every 12 months beginning on the day after your 50th birthday PSA: 0 2 ng/mL     Screening Current     Hepatitis C Screening Once for adults born between 1945 and 1965  More frequently in patients at high risk for Hepatitis C Hep C Antibody: 04/12/2021    Screening Current   Diabetes Screening 1-2 times per year if you're at risk for diabetes or have pre-diabetes Fasting glucose: 155 mg/dL   A1C: 5 8 %    Screening Not Indicated  History Diabetes   Cholesterol Screening Once every 5 years if you don't have a lipid disorder  May order more often based on risk factors  Lipid panel: 06/27/2022    Screening Not Indicated  History Lipid Disorder      Other Preventive Screenings Covered by Medicare:  1  Abdominal Aortic Aneurysm (AAA) Screening: covered once if your at risk  You're considered to be at risk if you have a family history of AAA or a male between the age of 73-68 who smoking at least 100 cigarettes in your lifetime  2  Lung Cancer Screening: covers low dose CT scan once per year if you meet all of the following conditions: (1) Age 50-69; (2) No signs or symptoms of lung cancer; (3) Current smoker or have quit smoking within the last 15 years; (4) You have a tobacco smoking history of at least 30 pack years (packs per day x number of years you smoked); (5) You get a written order from a healthcare provider  3  Glaucoma Screening: covered annually if you're considered high risk: (1) You have diabetes OR (2) Family history of glaucoma OR (3)  aged 48 and older OR (3)  American aged 72 and older  3  Osteoporosis Screening: covered every 2 years if you meet one of the following conditions: (1) Have a vertebral abnormality; (2) On glucocorticoid therapy for more than 3 months; (3) Have primary hyperparathyroidism; (4) On osteoporosis medications and need to assess response to drug therapy  5  HIV Screening: covered annually if you're between the age of 12-76  Also covered annually if you are younger than 13 and older than 72 with risk factors for HIV infection  For pregnant patients, it is covered up to 3 times per pregnancy      Immunizations:  Immunization Recommendations   Influenza Vaccine Annual influenza vaccination during flu season is recommended for all persons aged >= 6 months who do not have contraindications   Pneumococcal Vaccine (Prevnar and Pneumovax)  * Prevnar = PCV13  * Pneumovax = PPSV23 Adults 25-60 years old: 1-3 doses may be recommended based on certain risk factors  Adults 72 years old: Prevnar (PCV13) vaccine recommended followed by Pneumovax (PPSV23) vaccine  If already received PPSV23 since turning 65, then PCV13 recommended at least one year after PPSV23 dose  Hepatitis B Vaccine 3 dose series if at intermediate or high risk (ex: diabetes, end stage renal disease, liver disease)   Tetanus (Td) Vaccine - COST NOT COVERED BY MEDICARE PART B Following completion of primary series, a booster dose should be given every 10 years to maintain immunity against tetanus  Td may also be given as tetanus wound prophylaxis  Tdap Vaccine - COST NOT COVERED BY MEDICARE PART B Recommended at least once for all adults  For pregnant patients, recommended with each pregnancy  Shingles Vaccine (Shingrix) - COST NOT COVERED BY MEDICARE PART B  2 shot series recommended in those aged 48 and above     Health Maintenance Due:      Topic Date Due    Lung Cancer Screening  05/05/2022    HIV Screening  01/20/2023 (Originally 6/15/1975)    Colorectal Cancer Screening  03/29/2027    Hepatitis C Screening  Completed     Immunizations Due:      Topic Date Due    Pneumococcal Vaccine: Pediatrics (0 to 5 Years) and At-Risk Patients (6 to 59 Years) (2 - PCV) 09/12/2019    COVID-19 Vaccine (4 - Booster for Moderna series) 04/02/2022    Influenza Vaccine (1) 09/01/2022     Advance Directives   What are advance directives? Advance directives are legal documents that state your wishes and plans for medical care  These plans are made ahead of time in case you lose your ability to make decisions for yourself  Advance directives can apply to any medical decision, such as the treatments you want, and if you want to donate organs  What are the types of advance directives? There are many types of advance directives, and each state has rules about how to use them  You may choose a combination of any of the following:  · Living will:   This is a written record of the treatment you want  You can also choose which treatments you do not want, which to limit, and which to stop at a certain time  This includes surgery, medicine, IV fluid, and tube feedings  · Durable power of  for healthcare [facility]): This is a written record that states who you want to make healthcare choices for you when you are unable to make them for yourself  This person, called a proxy, is usually a family member or a friend  You may choose more than 1 proxy  · Do not resuscitate (DNR) order:  A DNR order is used in case your heart stops beating or you stop breathing  It is a request not to have certain forms of treatment, such as CPR  A DNR order may be included in other types of advance directives  · Medical directive: This covers the care that you want if you are in a coma, near death, or unable to make decisions for yourself  You can list the treatments you want for each condition  Treatment may include pain medicine, surgery, blood transfusions, dialysis, IV or tube feedings, and a ventilator (breathing machine)  · Values history: This document has questions about your views, beliefs, and how you feel and think about life  This information can help others choose the care that you would choose  Why are advance directives important? An advance directive helps you control your care  Although spoken wishes may be used, it is better to have your wishes written down  Spoken wishes can be misunderstood, or not followed  Treatments may be given even if you do not want them  An advance directive may make it easier for your family to make difficult choices about your care  Cigarette Smoking and Your Health   Risks to your health if you smoke:  Nicotine and other chemicals found in tobacco damage every cell in your body  Even if you are a light smoker, you have an increased risk for cancer, heart disease, and lung disease   If you are pregnant or have diabetes, smoking increases your risk for complications  Benefits to your health if you stop smoking:   · You decrease respiratory symptoms such as coughing, wheezing, and shortness of breath  · You reduce your risk for cancers of the lung, mouth, throat, kidney, bladder, pancreas, stomach, and cervix  If you already have cancer, you increase the benefits of chemotherapy  You also reduce your risk for cancer returning or a second cancer from developing  · You reduce your risk for heart disease, blood clots, heart attack, and stroke  · You reduce your risk for lung infections, and diseases such as pneumonia, asthma, chronic bronchitis, and emphysema  · Your circulation improves  More oxygen can be delivered to your body  If you have diabetes, you lower your risk for complications, such as kidney, artery, and eye diseases  You also lower your risk for nerve damage  Nerve damage can lead to amputations, poor vision, and blindness  · You improve your body's ability to heal and to fight infections  For more information and support to stop smoking:   · [employer]  Phone: 2- 636 - 167-9883  Web Address: [employer]  Weight Management   Why it is important to manage your weight:  Being overweight increases your risk of health conditions such as heart disease, high blood pressure, type 2 diabetes, and certain types of cancer  It can also increase your risk for osteoarthritis, sleep apnea, and other respiratory problems  Aim for a slow, steady weight loss  Even a small amount of weight loss can lower your risk of health problems  How to lose weight safely:  A safe and healthy way to lose weight is to eat fewer calories and get regular exercise  You can lose up about 1 pound a week by decreasing the number of calories you eat by 500 calories each day  Healthy meal plan for weight management:  A healthy meal plan includes a variety of foods, contains fewer calories, and helps you stay healthy   A healthy meal plan includes the following:  · Eat whole-grain foods more often  A healthy meal plan should contain fiber  Fiber is the part of grains, fruits, and vegetables that is not broken down by your body  Whole-grain foods are healthy and provide extra fiber in your diet  Some examples of whole-grain foods are whole-wheat breads and pastas, oatmeal, brown rice, and bulgur  · Eat a variety of vegetables every day  Include dark, leafy greens such as spinach, kale, yumiko greens, and mustard greens  Eat yellow and orange vegetables such as carrots, sweet potatoes, and winter squash  · Eat a variety of fruits every day  Choose fresh or canned fruit (canned in its own juice or light syrup) instead of juice  Fruit juice has very little or no fiber  · Eat low-fat dairy foods  Drink fat-free (skim) milk or 1% milk  Eat fat-free yogurt and low-fat cottage cheese  Try low-fat cheeses such as mozzarella and other reduced-fat cheeses  · Choose meat and other protein foods that are low in fat  Choose beans or other legumes such as split peas or lentils  Choose fish, skinless poultry (chicken or turkey), or lean cuts of red meat (beef or pork)  Before you cook meat or poultry, cut off any visible fat  · Use less fat and oil  Try baking foods instead of frying them  Add less fat, such as margarine, sour cream, regular salad dressing and mayonnaise to foods  Eat fewer high-fat foods  Some examples of high-fat foods include french fries, doughnuts, ice cream, and cakes  · Eat fewer sweets  Limit foods and drinks that are high in sugar  This includes candy, cookies, regular soda, and sweetened drinks  Exercise:  Exercise at least 30 minutes per day on most days of the week  Some examples of exercise include walking, biking, dancing, and swimming  You can also fit in more physical activity by taking the stairs instead of the elevator or parking farther away from stores  Ask your healthcare provider about the best exercise plan for you     Narcotic (Opioid) Safety    Use narcotics safely:  · Take prescribed narcotics exactly as directed  · Do not give narcotics to others or take narcotics that belong to someone else  · Do not mix narcotics without medicines or alcohol  · Do not drive or operate heavy machinery after you take the narcotic  · Monitor for side effects and notify your healthcare provider if you experienced side effects such as nausea, sleepiness, itching, or trouble thinking clearly  Manage constipation:    Constipation is the most common side effect of narcotic medicine  Constipation is when you have hard, dry bowel movements, or you go longer than usual between bowel movements  Tell your healthcare provider about all changes in your bowel movements while you are taking narcotics  He or she may recommend laxative medicine to help you have a bowel movement  He or she may also change the kind of narcotic you are taking, or change when you take it  The following are more ways you can prevent or relieve constipation:    · Drink liquids as directed  You may need to drink extra liquids to help soften and move your bowels  Ask how much liquid to drink each day and which liquids are best for you  · Eat high-fiber foods  This may help decrease constipation by adding bulk to your bowel movements  High-fiber foods include fruits, vegetables, whole-grain breads and cereals, and beans  Your healthcare provider or dietitian can help you create a high-fiber meal plan  Your provider may also recommend a fiber supplement if you cannot get enough fiber from food  · Exercise regularly  Regular physical activity can help stimulate your intestines  Walking is a good exercise to prevent or relieve constipation  Ask which exercises are best for you  · Schedule a time each day to have a bowel movement  This may help train your body to have regular bowel movements  Bend forward while you are on the toilet to help move the bowel movement out   Sit on the toilet for at least 10 minutes, even if you do not have a bowel movement  Store narcotics safely:   · Store narcotics where others cannot easily get them  Keep them in a locked cabinet or secure area  Do not  keep them in a purse or other bag you carry with you  A person may be looking for something else and find the narcotics  · Make sure narcotics are stored out of the reach of children  A child can easily overdose on narcotics  Narcotics may look like candy to a small child  The best way to dispose of narcotics: The laws vary by country and area  In the [place], the best way is to return the narcotics through a take-back program  This program is offered by the [employer] ([employer])  The following are options for using the program:  · Take the narcotics to a [employer] collection site  The site is often a law enforcement center  Call your local law enforcement center for scheduled take-back days in your area  You will be given information on where to go if the collection site is in a different location  · Take the narcotics to an approved pharmacy or hospital   A pharmacy or hospital may be set up as a collection site  You will need to ask if it is a [employer] collection site if you were not directed there  A pharmacy or doctor's office may not be able to take back narcotics unless it is a MAL[employer] site  · Use a mail-back system  This means you are given containers to put the narcotics into  You will then mail them in the containers  · Use a take-back drop box  This is a place to leave the narcotics at any time  People and animals will not be able to get into the box  Your local law enforcement agency can tell you where to find a drop box in your area  Other ways to manage pain:   · Ask your healthcare provider about non-narcotic medicines to control pain  Nonprescription medicines include NSAIDs (such as ibuprofen) and acetaminophen   Prescription medicines include muscle relaxers, antidepressants, and steroids  · Pain may be managed without any medicines  Some ways to relieve pain include massage, aromatherapy, or meditation  Physical or occupational therapy may also help  For more information:   · Drug Enforcement Administration  [address] [facility] [address]  Phone: 1- 156 - 609-2023  Web Address: [facility]/drug_disposal/    · [address]  and Drug Administration  [facility] , [address] [place] [address]  Phone: 2- 927 - 995-0713  Web Address: http://[employer].com/     © Copyright [employer] 2018 Information is for End User's use only and may not be sold, redistributed or otherwise used for commercial purposes   All illustrations and images included in CareNotes® are the copyrighted property of A D A M , Inc  or [address] [place] [address]

## 2022-08-08 ENCOUNTER — OFFICE VISIT (OUTPATIENT)
Dept: PAIN MEDICINE | Facility: CLINIC | Age: 62
End: 2022-08-08
Payer: OTHER MISCELLANEOUS

## 2022-08-08 VITALS
HEART RATE: 78 BPM | RESPIRATION RATE: 19 BRPM | HEIGHT: 71 IN | SYSTOLIC BLOOD PRESSURE: 133 MMHG | WEIGHT: 220 LBS | BODY MASS INDEX: 30.8 KG/M2 | TEMPERATURE: 98.2 F | DIASTOLIC BLOOD PRESSURE: 78 MMHG

## 2022-08-08 DIAGNOSIS — M54.16 LUMBAR RADICULOPATHY: ICD-10-CM

## 2022-08-08 DIAGNOSIS — F11.20 UNCOMPLICATED OPIOID DEPENDENCE (HCC): ICD-10-CM

## 2022-08-08 DIAGNOSIS — M47.816 LUMBAR SPONDYLOSIS: ICD-10-CM

## 2022-08-08 DIAGNOSIS — M54.50 CHRONIC LOW BACK PAIN: ICD-10-CM

## 2022-08-08 DIAGNOSIS — G89.4 CHRONIC PAIN SYNDROME: Primary | ICD-10-CM

## 2022-08-08 DIAGNOSIS — E11.69 TYPE 2 DIABETES MELLITUS WITH OTHER SPECIFIED COMPLICATION, WITHOUT LONG-TERM CURRENT USE OF INSULIN (HCC): ICD-10-CM

## 2022-08-08 DIAGNOSIS — G89.29 CHRONIC LOW BACK PAIN: ICD-10-CM

## 2022-08-08 DIAGNOSIS — Z79.891 LONG-TERM CURRENT USE OF OPIATE ANALGESIC: ICD-10-CM

## 2022-08-08 DIAGNOSIS — M96.1 POSTLAMINECTOMY SYNDROME, LUMBAR: ICD-10-CM

## 2022-08-08 PROCEDURE — 99214 OFFICE O/P EST MOD 30 MIN: CPT | Performed by: ANESTHESIOLOGY

## 2022-08-08 PROCEDURE — 80305 DRUG TEST PRSMV DIR OPT OBS: CPT | Performed by: ANESTHESIOLOGY

## 2022-08-08 RX ORDER — OXYCODONE AND ACETAMINOPHEN 7.5; 325 MG/1; MG/1
1 TABLET ORAL 3 TIMES DAILY PRN
Qty: 90 TABLET | Refills: 0 | Status: SHIPPED | OUTPATIENT
Start: 2022-09-11 | End: 2022-10-10 | Stop reason: SDUPTHER

## 2022-08-08 RX ORDER — GABAPENTIN 600 MG/1
600 TABLET ORAL 3 TIMES DAILY
Qty: 270 TABLET | Refills: 0 | Status: SHIPPED | OUTPATIENT
Start: 2022-08-08 | End: 2022-10-10 | Stop reason: SDUPTHER

## 2022-08-08 RX ORDER — GLIPIZIDE 10 MG/1
TABLET, FILM COATED, EXTENDED RELEASE ORAL
Qty: 90 TABLET | Refills: 0 | Status: SHIPPED | OUTPATIENT
Start: 2022-08-08

## 2022-08-08 RX ORDER — OXYCODONE AND ACETAMINOPHEN 7.5; 325 MG/1; MG/1
1 TABLET ORAL 3 TIMES DAILY PRN
Qty: 90 TABLET | Refills: 0 | Status: SHIPPED | OUTPATIENT
Start: 2022-08-12 | End: 2022-09-11

## 2022-08-08 NOTE — PROGRESS NOTES
Pain Medicine Follow-Up Note    Assessment:  1  Chronic pain syndrome    2  Chronic low back pain    3  Postlaminectomy syndrome, lumbar    4  Lumbar spondylosis    5  Lumbar radiculopathy        Plan:  New Medications Ordered This Visit   Medications    oxyCODONE-acetaminophen (Percocet) 7 5-325 MG per tablet     Sig: Take 1 tablet by mouth 3 (three) times a day as needed for severe pain Max Daily Amount: 3 tablets     Dispense:  90 tablet     Refill:  0    oxyCODONE-acetaminophen (Percocet) 7 5-325 MG per tablet     Sig: Take 1 tablet by mouth 3 (three) times a day as needed for severe pain Max Daily Amount: 3 tablets     Dispense:  90 tablet     Refill:  0    gabapentin (Neurontin) 600 MG tablet     Sig: Take 1 tablet (600 mg total) by mouth 3 (three) times a day     Dispense:  270 tablet     Refill:  0     My impressions and treatment recommendations were discussed in detail with the patient who verbalized understanding and had no further questions  Given that the patient reports overall reduced pain and improved level functioning without significant side effects, I felt a reasonable to continue the patient on etodolac 300 mg 3 times daily as needed for pain, with food as well as oxycodone/acetaminophen 7 5/325 mg 1 tablet 3 times daily as needed for pain  I sent E prescriptions to the pharmacy dated August 12, 2022 and September 11, 2022  The risks and side effects of chronic opioid treatment were discussed in detail with the patient  Side effects include but are not limited to nausea, vomiting, GI intolerance, sedation, constipation, mental clouding, opioid-induced hyperalgesia, endocrine dysfunction, addiction, dependence, and tolerance  The patient was asked to take his medications only as prescribed and directed, never in excess, and never for any other reason other than for pain control   The patient was also asked to keep his medications out of the reach of others and away from children, preferably in a locked drawer  The patient verbalized understanding and wished to use these opioid medications  A urine drug test was collected at today's office visit as part of our medication management protocol  The point of care testing results were appropriate for what was being prescribed  The specimen will be sent for confirmatory testing  The drug screen is medically necessary because the patient is either dependent on opioid medication or is being considered for opioid medication therapy and the results could impact ongoing or future treatment  The drug screen is to evaluate for the presence or absence of prescribed, non-prescribed, and/or illicit drugs and substances  I also felt a reasonable to increase the patient's gabapentin on a titration schedule to a goal dosage of gabapentin 600 mg 3 times daily  Side effects were reviewed with the patient  Follow-up is planned in 2 months time or sooner as warranted  Discharge instructions were provided  I personally saw and examined the patient and I agree with the above discussed plan of care  History of Present Illness:    Av Salazar is a 58 y o  male who presents to [facility] for interval re-evaluation of the above stated pain complaints  The patient has a past medical and chronic pain history as outlined in the assessment section  He was last seen on June 9, 2022  At today's office visit, the patient's pain score is 6 to 7/10 on the verbal numerical pain rating scale  The patient states that his pain is worse in the morning, evening, and night  His pain is constant in nature  He reports the quality of his pain as sharp, throbbing, shooting, numbness, and pins and needles  He is reporting symptoms primarily in the low back and right lower extremity  He states that his pain is bothering him considerably and he would like to trial more medications to help him    He denies opioid induced constipation at this time   He currently uses 3 tablets of oxycodone/acetaminophen per day  Pain Contract Signed:  3/28/2022  Last Urine Drug Screen: 3/28/22    Other than as stated above, the patient denies any interval changes in medications, medical condition, mental condition, symptoms, or allergies since the last office visit  Review of Systems:    Review of Systems   Constitutional: Negative for unexpected weight change  HENT: Negative for ear pain  Eyes: Negative for visual disturbance  Respiratory: Negative for shortness of breath and wheezing  Gastrointestinal: Negative for abdominal pain  Musculoskeletal: Positive for back pain and gait problem  Decreased rom, joint stiffness   Neurological: Negative for weakness and numbness  Psychiatric/Behavioral: Positive for sleep disturbance  Negative for decreased concentration           Patient Active Problem List   Diagnosis    Heartburn    Erectile dysfunction of non-organic origin    DM type 2 (diabetes mellitus, type 2) (HCC)    Chronic low back pain    BPH with urinary obstruction    Blood pressure elevated without history of HTN    Sciatica    Myofascial pain syndrome    Lumbar spondylosis    Herniation of lumbar intervertebral disc with radiculopathy    Acute post-operative pain    Insomnia    Chronic pain syndrome    Low back pain    Class 1 obesity due to excess calories with serious comorbidity and body mass index (BMI) of 30 0 to 30 9 in adult    Smoker    Seasonal allergic rhinitis    Diabetic mononeuropathy associated with type 2 diabetes mellitus (HCC)    Postlaminectomy syndrome, lumbar region    Numbness and tingling in both hands    Intervertebral disc disorder with radiculopathy of lumbosacral region    Acute bronchitis    Hyperlipidemia LDL goal <70    Proteinuria    Battery end of life of spinal cord stimulator    HTN (hypertension)    Gastroesophageal reflux disease    Spinal cord stimulator status Past Medical History:   Diagnosis Date    Arthritis     BPH (benign prostatic hypertrophy) with urinary obstruction     Chronic narcotic dependence (HCC)     percocet TID    Chronic pain disorder     back-spinal cord stimulator    Colon polyp     Diabetes mellitus ([facility] )     type    Ear infection     Herniation of lumbar intervertebral disc with radiculopathy     Myofascial pain syndrome     Obesity     Sciatica     Spinal cord stimulator status 2017    implanted 2017    Tinnitus        Past Surgical History:   Procedure Laterality Date    ABSCESS DRAINAGE      groin    BACK SURGERY      sciatic nerve- spinal cord stimulator 2017    CAUDAL BLOCK N/A 10/26/2018    Procedure: Caudal Epidural Steroid Injection (04307); Surgeon: Oscar Beebe MD;  Location: [facility] MAIN OR;  Service: Pain Management     CAUDAL BLOCK N/A 11/30/2018    Procedure: Caudal Epidural Steroid Injection (93589); Surgeon: Oscar Beebe MD;  Location: [facility] MAIN OR;  Service: Pain Management     COLONOSCOPY      EPIDURAL BLOCK INJECTION Right 5/10/2019    Procedure: L5 S1 transforaminal Epidural Steroid Injection ([id number];  Surgeon: Oscar Beebe MD;  Location: [facility] MAIN OR;  Service: Pain Management     EPIDURAL BLOCK INJECTION Right 8/16/2019    Procedure: BLOCK / INJECTION EPIDURAL STEROID TRANSFORAMINAL;  Surgeon: Oscar Beebe MD;  Location: [facility] MAIN OR;  Service: Pain Management     EPIDURAL BLOCK INJECTION Left 7/1/2021    Procedure: L5 S1 transforaminal epidural steroid injection ( [id number]); Surgeon: Oscar Beebe MD;  Location: [facility] MAIN OR;  Service: Pain Management     EPIDURAL BLOCK INJECTION Right 7/15/2021    Procedure: L5 S1 transforaminal epidural steroid injection ( [id number]); Surgeon: Oscar Beebe MD;  Location: [facility] MAIN OR;  Service: Pain Management     EPIDURAL BLOCK INJECTION Right 1/7/2022    Procedure: L5 S1 transforaminal epidural steroid injection ([id number]); Surgeon: Gissel Pulido MD;  Location: [facility] MAIN OR;  Service: Pain Management     EPIDURAL BLOCK INJECTION Left 1/28/2022    Procedure: L5 S1 transforaminal epidural steroid injection ([id number]); Surgeon: Gissel Pulido MD;  Location: [facility] MAIN OR;  Service: Pain Management     NERVE BLOCK Bilateral 4/26/2018    Procedure: B/L L3 L4 L5 S1 MBB #1 ([id number]); Surgeon: Gissel Pulido MD;  Location: [facility] MAIN OR;  Service: Pain Management     NERVE BLOCK Bilateral 5/11/2018    Procedure: B/L L3 L4 L5 S1 Medial Branch Block #2;  Surgeon: Gissel Pulido MD;  Location: [facility] MAIN OR;  Service: Pain Management     NOSE SURGERY      AZ COLONOSCOPY FLX DX W/COLLJ SPEC WHEN PFRMD N/A 3/6/2017    Procedure: COLONOSCOPY;  Surgeon: Bertha Ling MD;  [facility]: [facility]; Service: Gastroenterology    AZ IMPLANT SPINAL NEUROSTIM/ Left 6/29/2021    Procedure: REPLACEMENT IMPLANTABLE PULSE GENERATOR DORSAL SPINAL COLUMN STIMULATOR, LEFT;  Surgeon: Chrystal Rashid MD;  Location:  MAIN OR;  Service: Neurosurgery    AZ SURG IMPLNT Delford Low Left 10/3/2017    Procedure: PLACEMENT THORACIC FOR INSERTION DORSAL COLUMN SPINAL CORD STIMULATOR (DCS) WITH BUTTOCK IMPLANTABLE PULSE GENERATOR(IMPULSE); Surgeon: Dinesh Ellis MD;  Location:  MAIN OR;  Service: Neurosurgery    [address] [place] [address] Right 5/18/2018    Procedure: Rt L3 L4 L5 S1 Radio Frequency Ablation ([id number]); Surgeon: Gissel Pulido MD;  Location: [facility] MAIN OR;  Service: Pain Management     RADIOFREQUENCY ABLATION Left 6/1/2018    Procedure: Lt L3 L4 L5 S1 Radio Frequency Ablation ([id number]);   Surgeon: Gissel Pulido MD;  Location: [facility] MAIN OR;  Service: Pain Management        Family History   Problem Relation Age of Onset    Diabetes Mother     Arthritis Mother     Diabetes Father         mellitus     Heart attack Father 58    Hypertension Father     Arthritis Father        Social History Occupational History    Occupation:  for [employer]    Tobacco Use    Smoking status: Current Every Day Smoker     Packs/day: 1 00     Years: 42 00     Pack years: 42 00     Types: Cigarettes    Smokeless tobacco: Never Used    Tobacco comment: encouraged smoking cessation - history of smoking 30 or more pack years    Vaping Use    Vaping Use: Never used   Substance and Sexual Activity    Alcohol use: No     Comment: rare    Drug use: No    Sexual activity: Not Currently     Partners: Female         Current Outpatient Medications:     atorvastatin (LIPITOR) 20 mg tablet, TAKE 1 TABLET EVERY DAY, Disp: 90 tablet, Rfl: 0    bisacodyl (DULCOLAX) 5 mg EC tablet, Take 20 mg by mouth 1 (one) time, Disp: , Rfl:     clobetasol (TEMOVATE) 0 05 % ointment, APPLY TOPICALLY 2 (TWO) TIMES A DAY (Patient taking differently: Apply topically 2 (two) times a day as needed), Disp: 30 g, Rfl: 1    etodolac (LODINE) 300 MG capsule, Take 1 capsule (300 mg total) by mouth 3 (three) times a day as needed (pain (with food)), Disp: 90 capsule, Rfl: 5    fluticasone (FLONASE) 50 mcg/act nasal spray, USE 2 SPRAYS IN EACH NOSTRIL EVERY DAY, Disp: 48 g, Rfl: 0    gabapentin (Neurontin) 600 MG tablet, Take 1 tablet (600 mg total) by mouth 3 (three) times a day, Disp: 270 tablet, Rfl: 0    glipiZIDE (GLUCOTROL XL) 10 mg 24 hr tablet, Take 1 tablet (10 mg total) by mouth daily, Disp: 90 tablet, Rfl: 0    lidocaine (LIDODERM) 5 %, Apply 2 patches topically daily Remove & Discard patch within 12 hours or as directed by MD (Patient taking differently: Apply 2 patches topically daily as needed Remove & Discard patch within 12 hours or as directed by MD), Disp: 60 patch, Rfl: 0    lisinopril (ZESTRIL) 2 5 mg tablet, TAKE 1 TABLET EVERY DAY AT BEDTIME, Disp: 90 tablet, Rfl: 0    magnesium citrate solution, Take 296 mL by mouth once, Disp: , Rfl:     metFORMIN (GLUCOPHAGE) 1000 MG tablet, Take 1 tablet (1,000 mg total) by mouth 2 (two) times a day with meals, Disp: 180 tablet, Rfl: 1    omeprazole (PriLOSEC) 20 mg delayed release capsule, TAKE 1 CAPSULE EVERY DAY, Disp: 90 capsule, Rfl: 0    [START ON 8/12/2022] oxyCODONE-acetaminophen (Percocet) 7 5-325 MG per tablet, Take 1 tablet by mouth 3 (three) times a day as needed for severe pain Max Daily Amount: 3 tablets, Disp: 90 tablet, Rfl: 0    [START ON 9/11/2022] oxyCODONE-acetaminophen (Percocet) 7 5-325 MG per tablet, Take 1 tablet by mouth 3 (three) times a day as needed for severe pain Max Daily Amount: 3 tablets, Disp: 90 tablet, Rfl: 0    Polyethylene Glycol 3350 (MIRALAX PO), Take by mouth 1 (one) time, Disp: , Rfl:     QUEtiapine (SEROquel) 100 mg tablet, Take one tablet by mouth daily at bedtime, Disp: 90 tablet, Rfl: 0    sildenafil (VIAGRA) 50 MG tablet, Take 1 tablet (50 mg total) by mouth as needed for erectile dysfunction Take on an empty stomach, 1 hour before sexual activity, no alcohol   100 mg max dose , Disp: 30 tablet, Rfl: 0    tamsulosin (FLOMAX) 0 4 mg, TAKE 2 CAPSULES EVERY DAY WITH DINNER, Disp: 180 capsule, Rfl: 0    traZODone (DESYREL) 50 mg tablet, TAKE 2 TABLETS EVERY DAY AT BEDTIME, Disp: 180 tablet, Rfl: 0    Allergies   Allergen Reactions    Penicillins Swelling     Mouth swelling       Physical Exam:    /78   Pulse 78   Temp 98 2 °F (36 8 °C)   Resp 19   Ht 5' 11" (1 803 m)   Wt 99 8 kg (220 lb)   BMI 30 68 kg/m²     Constitutional:obese  Eyes:anicteric  HEENT:grossly intact  Neck:supple, symmetric, trachea midline and no masses   Pulmonary:even and unlabored  Cardiovascular:No edema or pitting edema present  Skin:Normal without rashes or lesions and well hydrated  Psychiatric:Mood and affect appropriate  Neurologic:Cranial Nerves II-XII grossly intact  Musculoskeletal:normal

## 2022-08-12 LAB
6MAM UR QL CFM: NEGATIVE NG/ML
7AMINOCLONAZEPAM UR QL CFM: NEGATIVE NG/ML
A-OH ALPRAZ UR QL CFM: NEGATIVE NG/ML
ACCEPTABLE CREAT UR QL: NORMAL MG/DL
ACCEPTIBLE SP GR UR QL: NORMAL
AMPHET UR QL CFM: NEGATIVE NG/ML
AMPHET UR QL CFM: NEGATIVE NG/ML
BUPRENORPHINE UR QL CFM: NEGATIVE NG/ML
BZE UR QL CFM: NEGATIVE NG/ML
CODEINE UR QL CFM: NEGATIVE NG/ML
EDDP UR QL CFM: NEGATIVE NG/ML
ETHYL GLUCURONIDE UR QL CFM: NEGATIVE NG/ML
ETHYL SULFATE UR QL SCN: NEGATIVE NG/ML
FENTANYL UR QL CFM: NEGATIVE NG/ML
HYDROCODONE UR QL CFM: NEGATIVE NG/ML
HYDROCODONE UR QL CFM: NEGATIVE NG/ML
HYDROMORPHONE UR QL CFM: NEGATIVE NG/ML
LORAZEPAM UR QL CFM: NEGATIVE NG/ML
MDMA UR QL CFM: NEGATIVE NG/ML
ME-PHENIDATE UR QL CFM: NEGATIVE NG/ML
METHADONE UR QL CFM: NEGATIVE NG/ML
METHAMPHET UR QL CFM: NEGATIVE NG/ML
MORPHINE UR QL CFM: NEGATIVE NG/ML
MORPHINE UR QL CFM: NEGATIVE NG/ML
NITRITE UR QL: NORMAL UG/ML
NORBUPRENORPHINE UR QL CFM: NEGATIVE NG/ML
NORDIAZEPAM UR QL CFM: NEGATIVE NG/ML
NORFENTANYL UR QL CFM: NEGATIVE NG/ML
NORHYDROCODONE UR QL CFM: NEGATIVE NG/ML
NORHYDROCODONE UR QL CFM: NEGATIVE NG/ML
NOROXYCODONE UR QL CFM: NORMAL NG/ML
OXAZEPAM UR QL CFM: NEGATIVE NG/ML
OXYCODONE UR QL CFM: NORMAL NG/ML
OXYMORPHONE UR QL CFM: NEGATIVE NG/ML
OXYMORPHONE UR QL CFM: NEGATIVE NG/ML
RESULT ALL_PRESCRIBED MEDS AND SPECIAL INSTRUCTIONS: NORMAL
SL AMB 3-METHYL-FENTANYL QUANTIFICATION: NORMAL NG/ML
SL AMB 4-ANPP QUANTIFICATION: NORMAL NG/ML
SL AMB 4-FIBF QUANTIFICATION: NORMAL NG/ML
SL AMB ACETYL FENTANYL QUANTIFICATION: NORMAL NG/ML
SL AMB ACETYL NORFENTANYL QUANTIFICATION: NORMAL NG/ML
SL AMB ACRYL FENTANYL QUANTIFICATION: NORMAL NG/ML
SL AMB BUTRYL FENTANYL QUANTIFICATION: NORMAL NG/ML
SL AMB CARFENTANIL QUANTIFICATION: NORMAL NG/ML
SL AMB CITALOPRAM/ESCITALOPRAM QUANTIFICATION: NEGATIVE NG/ML
SL AMB CITALOPRAM/ESCITALOPRAM QUANTIFICATION: NEGATIVE NG/ML
SL AMB CTHC (MARIJUANA METABOLITE) QUANTIFICATION: NEGATIVE NG/ML
SL AMB CYCLOPROPYL FENTANYL QUANTIFICATION: NORMAL NG/ML
SL AMB FURANYL FENTANYL QUANTIFICATION: NORMAL NG/ML
SL AMB METHOXYACETYL FENTANYL QUANTIFICATION: NORMAL NG/ML
SL AMB N-DESMETHYL U-47700 QUANTIFICATION: NORMAL NG/ML
SL AMB N-DESMETHYL-TRAMADOL QUANTIFICATION: NEGATIVE NG/ML
SL AMB PHENTERMINE QUANTIFICATION: NEGATIVE NG/ML
SL AMB PREGABALIN QUANTIFICATION: NEGATIVE
SL AMB RITALINIC ACID QUANTIFICATION: NEGATIVE NG/ML
SL AMB U-47700 QUANTIFICATION: NORMAL NG/ML
SPECIMEN PH ACCEPTABLE UR: NORMAL
TAPENTADOL UR QL CFM: NEGATIVE NG/ML
TEMAZEPAM UR QL CFM: NEGATIVE NG/ML
TEMAZEPAM UR QL CFM: NEGATIVE NG/ML
TRAMADOL UR QL CFM: NEGATIVE NG/ML
URATE/CREAT 24H UR: NEGATIVE NG/ML

## 2022-09-08 ENCOUNTER — VBI (OUTPATIENT)
Dept: ADMINISTRATIVE | Facility: OTHER | Age: 62
End: 2022-09-08

## 2022-09-09 ENCOUNTER — HOSPITAL ENCOUNTER (OUTPATIENT)
Dept: RADIOLOGY | Facility: IMAGING CENTER | Age: 62
End: 2022-09-09
Payer: COMMERCIAL

## 2022-09-09 DIAGNOSIS — Z13.6 SCREENING FOR AAA (ABDOMINAL AORTIC ANEURYSM): ICD-10-CM

## 2022-09-09 DIAGNOSIS — Z12.2 ENCOUNTER FOR SCREENING FOR LUNG CANCER: ICD-10-CM

## 2022-09-09 DIAGNOSIS — Z12.2 SCREENING FOR MALIGNANT NEOPLASM OF RESPIRATORY ORGAN: ICD-10-CM

## 2022-09-09 PROCEDURE — 71271 CT THORAX LUNG CANCER SCR C-: CPT

## 2022-09-09 PROCEDURE — 76706 US ABDL AORTA SCREEN AAA: CPT

## 2022-10-10 ENCOUNTER — OFFICE VISIT (OUTPATIENT)
Dept: PAIN MEDICINE | Facility: CLINIC | Age: 62
End: 2022-10-10
Payer: OTHER MISCELLANEOUS

## 2022-10-10 ENCOUNTER — TELEPHONE (OUTPATIENT)
Dept: UROLOGY | Facility: MEDICAL CENTER | Age: 62
End: 2022-10-10

## 2022-10-10 ENCOUNTER — APPOINTMENT (OUTPATIENT)
Dept: ULTRASOUND IMAGING | Facility: HOSPITAL | Age: 62
End: 2022-10-10
Payer: COMMERCIAL

## 2022-10-10 ENCOUNTER — HOSPITAL ENCOUNTER (EMERGENCY)
Facility: HOSPITAL | Age: 62
Discharge: HOME | End: 2022-10-10
Attending: EMERGENCY MEDICINE
Payer: COMMERCIAL

## 2022-10-10 ENCOUNTER — TELEPHONE (OUTPATIENT)
Dept: FAMILY MEDICINE CLINIC | Facility: CLINIC | Age: 62
End: 2022-10-10

## 2022-10-10 VITALS
TEMPERATURE: 98 F | HEART RATE: 72 BPM | DIASTOLIC BLOOD PRESSURE: 67 MMHG | SYSTOLIC BLOOD PRESSURE: 158 MMHG | OXYGEN SATURATION: 99 % | RESPIRATION RATE: 16 BRPM

## 2022-10-10 VITALS
TEMPERATURE: 98.2 F | BODY MASS INDEX: 31.22 KG/M2 | HEART RATE: 78 BPM | SYSTOLIC BLOOD PRESSURE: 135 MMHG | DIASTOLIC BLOOD PRESSURE: 68 MMHG | HEIGHT: 71 IN | WEIGHT: 223 LBS | RESPIRATION RATE: 19 BRPM

## 2022-10-10 DIAGNOSIS — M54.50 CHRONIC LOW BACK PAIN: ICD-10-CM

## 2022-10-10 DIAGNOSIS — G89.29 CHRONIC LOW BACK PAIN: ICD-10-CM

## 2022-10-10 DIAGNOSIS — L03.315 CELLULITIS OF PERINEUM: Primary | ICD-10-CM

## 2022-10-10 DIAGNOSIS — M54.16 LUMBAR RADICULOPATHY: ICD-10-CM

## 2022-10-10 DIAGNOSIS — G89.4 CHRONIC PAIN SYNDROME: ICD-10-CM

## 2022-10-10 DIAGNOSIS — M96.1 POSTLAMINECTOMY SYNDROME, LUMBAR: ICD-10-CM

## 2022-10-10 DIAGNOSIS — M47.816 LUMBAR SPONDYLOSIS: ICD-10-CM

## 2022-10-10 LAB
ALBUMIN SERPL BCP-MCNC: 4.5 G/DL (ref 3.5–5)
ALP SERPL-CCNC: 61 U/L (ref 34–104)
ALT SERPL W P-5'-P-CCNC: 47 U/L (ref 7–52)
ANION GAP SERPL CALCULATED.3IONS-SCNC: 7 MMOL/L (ref 4–13)
AST SERPL W P-5'-P-CCNC: 26 U/L (ref 13–39)
BASOPHILS # BLD AUTO: 0.08 THOUSANDS/ΜL (ref 0–0.1)
BASOPHILS NFR BLD AUTO: 1 % (ref 0–1)
BILIRUB SERPL-MCNC: 0.53 MG/DL (ref 0.2–1)
BILIRUB UR QL STRIP: ABNORMAL
BUN SERPL-MCNC: 12 MG/DL (ref 5–25)
CALCIUM SERPL-MCNC: 9.4 MG/DL (ref 8.4–10.2)
CHLORIDE SERPL-SCNC: 105 MMOL/L (ref 96–108)
CLARITY UR: CLEAR
CO2 SERPL-SCNC: 27 MMOL/L (ref 21–32)
COLOR UR: ABNORMAL
CREAT SERPL-MCNC: 0.72 MG/DL (ref 0.6–1.3)
EOSINOPHIL # BLD AUTO: 0.19 THOUSAND/ΜL (ref 0–0.61)
EOSINOPHIL NFR BLD AUTO: 2 % (ref 0–6)
ERYTHROCYTE [DISTWIDTH] IN BLOOD BY AUTOMATED COUNT: 13.2 % (ref 11.6–15.1)
GFR SERPL CREATININE-BSD FRML MDRD: 99 ML/MIN/1.73SQ M
GLUCOSE SERPL-MCNC: 88 MG/DL (ref 65–140)
GLUCOSE UR STRIP-MCNC: NEGATIVE MG/DL
HCT VFR BLD AUTO: 43.7 % (ref 36.5–49.3)
HGB BLD-MCNC: 14.7 G/DL (ref 12–17)
HGB UR QL STRIP.AUTO: NEGATIVE
IMM GRANULOCYTES # BLD AUTO: 0.06 THOUSAND/UL (ref 0–0.2)
IMM GRANULOCYTES NFR BLD AUTO: 1 % (ref 0–2)
KETONES UR STRIP-MCNC: NEGATIVE MG/DL
LEUKOCYTE ESTERASE UR QL STRIP: NEGATIVE
LYMPHOCYTES # BLD AUTO: 2.63 THOUSANDS/ΜL (ref 0.6–4.47)
LYMPHOCYTES NFR BLD AUTO: 25 % (ref 14–44)
MCH RBC QN AUTO: 31 PG (ref 26.8–34.3)
MCHC RBC AUTO-ENTMCNC: 33.6 G/DL (ref 31.4–37.4)
MCV RBC AUTO: 92 FL (ref 82–98)
MONOCYTES # BLD AUTO: 0.88 THOUSAND/ΜL (ref 0.17–1.22)
MONOCYTES NFR BLD AUTO: 9 % (ref 4–12)
NEUTROPHILS # BLD AUTO: 6.56 THOUSANDS/ΜL (ref 1.85–7.62)
NEUTS SEG NFR BLD AUTO: 62 % (ref 43–75)
NITRITE UR QL STRIP: NEGATIVE
NRBC BLD AUTO-RTO: 0 /100 WBCS
PH UR STRIP.AUTO: 6.5 [PH]
PLATELET # BLD AUTO: 207 THOUSANDS/UL (ref 149–390)
PMV BLD AUTO: 9.5 FL (ref 8.9–12.7)
POTASSIUM SERPL-SCNC: 4.2 MMOL/L (ref 3.5–5.3)
PROT SERPL-MCNC: 6.9 G/DL (ref 6.4–8.4)
PROT UR STRIP-MCNC: NEGATIVE MG/DL
RBC # BLD AUTO: 4.74 MILLION/UL (ref 3.88–5.62)
SODIUM SERPL-SCNC: 139 MMOL/L (ref 135–147)
SP GR UR STRIP.AUTO: 1.02 (ref 1–1.03)
UROBILINOGEN UR STRIP-ACNC: <2 MG/DL
WBC # BLD AUTO: 10.4 THOUSAND/UL (ref 4.31–10.16)

## 2022-10-10 PROCEDURE — 80053 COMPREHEN METABOLIC PANEL: CPT

## 2022-10-10 PROCEDURE — 85025 COMPLETE CBC W/AUTO DIFF WBC: CPT

## 2022-10-10 PROCEDURE — 99214 OFFICE O/P EST MOD 30 MIN: CPT | Performed by: ANESTHESIOLOGY

## 2022-10-10 PROCEDURE — 36415 COLL VENOUS BLD VENIPUNCTURE: CPT

## 2022-10-10 PROCEDURE — 99284 EMERGENCY DEPT VISIT MOD MDM: CPT | Performed by: EMERGENCY MEDICINE

## 2022-10-10 PROCEDURE — 76870 US EXAM SCROTUM: CPT

## 2022-10-10 PROCEDURE — 99284 EMERGENCY DEPT VISIT MOD MDM: CPT

## 2022-10-10 PROCEDURE — 81003 URINALYSIS AUTO W/O SCOPE: CPT

## 2022-10-10 RX ORDER — OXYCODONE AND ACETAMINOPHEN 7.5; 325 MG/1; MG/1
1 TABLET ORAL 3 TIMES DAILY PRN
Qty: 90 TABLET | Refills: 0 | Status: SHIPPED | OUTPATIENT
Start: 2022-11-10 | End: 2022-12-10

## 2022-10-10 RX ORDER — GABAPENTIN 600 MG/1
1200 TABLET ORAL 2 TIMES DAILY
Qty: 360 TABLET | Refills: 0 | Status: SHIPPED | OUTPATIENT
Start: 2022-10-10

## 2022-10-10 RX ORDER — CLINDAMYCIN HYDROCHLORIDE 150 MG/1
450 CAPSULE ORAL 3 TIMES DAILY
Qty: 63 CAPSULE | Refills: 0 | Status: SHIPPED | OUTPATIENT
Start: 2022-10-10 | End: 2022-10-17

## 2022-10-10 RX ORDER — CLINDAMYCIN HYDROCHLORIDE 150 MG/1
450 CAPSULE ORAL ONCE
Status: COMPLETED | OUTPATIENT
Start: 2022-10-10 | End: 2022-10-10

## 2022-10-10 RX ADMIN — CLINDAMYCIN HYDROCHLORIDE 450 MG: 150 CAPSULE ORAL at 19:09

## 2022-10-10 NOTE — DISCHARGE INSTRUCTIONS
Follow up with Urology in 1-2 weeks for reassessment  Thank you for allowing us to take part in your care! EDT

## 2022-10-10 NOTE — ED PROVIDER NOTES
History  Chief Complaint   Patient presents with   • Penis / Scrotum Problem     Pt has a large bulge on the R side of scrotum that he noticed Friday  States the area hurts sporadically and very little      Patient is a 41-year-old male presenting with bulge in his right scrotum with slight pain  Patient states that Friday he noticed a bulge in his right scrotum that is there constantly, does not go way and sporadically has minimal pain  Patient denies fever, chills, nausea, vomiting, abdominal pain, constipation, no diarrhea, chest pain, shortness of breath, pain or difficulty urinating, penile discharge  Patient's states he had a similar episode on left side about 10 years ago he had to get 2 surgeries to remove an abscess  Pt states this episode seems very similar to the previous symptoms and came in early so it hopefully doesn't get as bad  Prior to Admission Medications   Prescriptions Last Dose Informant Patient Reported? Taking?    Polyethylene Glycol 3350 (MIRALAX PO)   Yes No   Sig: Take by mouth 1 (one) time   QUEtiapine (SEROquel) 100 mg tablet   No No   Sig: Take one tablet by mouth daily at bedtime   atorvastatin (LIPITOR) 20 mg tablet   No No   Sig: TAKE 1 TABLET EVERY DAY   bisacodyl (DULCOLAX) 5 mg EC tablet   Yes No   Sig: Take 20 mg by mouth 1 (one) time   clobetasol (TEMOVATE) 0 05 % ointment   No No   Sig: APPLY TOPICALLY 2 (TWO) TIMES A DAY   Patient taking differently: Apply topically 2 (two) times a day as needed   etodolac (LODINE) 300 MG capsule   No No   Sig: Take 1 capsule (300 mg total) by mouth 3 (three) times a day as needed (pain (with food))   fluticasone (FLONASE) 50 mcg/act nasal spray   No No   Sig: USE 2 SPRAYS IN EACH NOSTRIL EVERY DAY   gabapentin (Neurontin) 600 MG tablet   No No   Sig: Take 2 tablets (1,200 mg total) by mouth 2 (two) times a day   glipiZIDE (GLUCOTROL XL) 10 mg 24 hr tablet   No No   Sig: TAKE 1 TABLET EVERY DAY   lidocaine (LIDODERM) 5 %  Self No No   Sig: Apply 2 patches topically daily Remove & Discard patch within 12 hours or as directed by MD   Patient taking differently: Apply 2 patches topically daily as needed Remove & Discard patch within 12 hours or as directed by MD   lisinopril (ZESTRIL) 2 5 mg tablet   No No   Sig: TAKE 1 TABLET EVERY DAY AT BEDTIME   magnesium citrate solution   Yes No   Sig: Take 296 mL by mouth once   metFORMIN (GLUCOPHAGE) 1000 MG tablet   No No   Sig: Take 1 tablet (1,000 mg total) by mouth 2 (two) times a day with meals   omeprazole (PriLOSEC) 20 mg delayed release capsule   No No   Sig: TAKE 1 CAPSULE EVERY DAY   oxyCODONE-acetaminophen (Percocet) 7 5-325 MG per tablet   No No   Sig: Take 1 tablet by mouth 3 (three) times a day as needed for severe pain Max Daily Amount: 3 tablets Do not start before November 10, 2022  sildenafil (VIAGRA) 50 MG tablet   No No   Sig: Take 1 tablet (50 mg total) by mouth as needed for erectile dysfunction Take on an empty stomach, 1 hour before sexual activity, no alcohol  100 mg max dose     tamsulosin (FLOMAX) 0 4 mg   No No   Sig: TAKE 2 CAPSULES EVERY DAY WITH DINNER   traZODone (DESYREL) 50 mg tablet   No No   Sig: TAKE 2 TABLETS EVERY DAY AT BEDTIME      Facility-Administered Medications: None       Past Medical History:   Diagnosis Date   • Arthritis    • BPH (benign prostatic hypertrophy) with urinary obstruction    • Chronic narcotic dependence (HCC)     percocet TID   • Chronic pain disorder     back-spinal cord stimulator   • Colon polyp    • Diabetes mellitus ([facility] [address] )     type   • Ear infection    • Herniation of lumbar intervertebral disc with radiculopathy    • Myofascial pain syndrome    • Obesity    • Sciatica    • Spinal cord stimulator status 2017    implanted 2017   • Tinnitus        Past Surgical History:   Procedure Laterality Date   • ABSCESS DRAINAGE      groin   • BACK SURGERY      sciatic nerve- spinal cord stimulator 2017   • CAUDAL BLOCK N/A 10/26/2018    Procedure: Caudal Epidural Steroid Injection (34764); Surgeon: Dallin Chavez MD;  Location: [facility] MAIN OR;  Service: Pain Management    • CAUDAL BLOCK N/A 11/30/2018    Procedure: Caudal Epidural Steroid Injection (04256); Surgeon: Dallin Chavez MD;  Location: [facility] OR;  Service: Pain Management    • COLONOSCOPY     • EPIDURAL BLOCK INJECTION Right 5/10/2019    Procedure: L5 S1 transforaminal Epidural Steroid Injection ([id number];  Surgeon: Dallin Chavez MD;  Location: [facility] MAIN OR;  Service: Pain Management    • EPIDURAL BLOCK INJECTION Right 8/16/2019    Procedure: BLOCK / INJECTION EPIDURAL STEROID TRANSFORAMINAL;  Surgeon: Dallin Chavez MD;  Location: [facility] MAIN OR;  Service: Pain Management    • EPIDURAL BLOCK INJECTION Left 7/1/2021    Procedure: L5 S1 transforaminal epidural steroid injection ( [id number]); Surgeon: Dallin Chavez MD;  Location: [facility] OR;  Service: Pain Management    • EPIDURAL BLOCK INJECTION Right 7/15/2021    Procedure: L5 S1 transforaminal epidural steroid injection ( [id number]); Surgeon: Dallin Chavez MD;  Location: [facility] OR;  Service: Pain Management    • EPIDURAL BLOCK INJECTION Right 1/7/2022    Procedure: L5 S1 transforaminal epidural steroid injection ([id number]); Surgeon: Dallin Chavez MD;  Location: [facility] OR;  Service: Pain Management    • EPIDURAL BLOCK INJECTION Left 1/28/2022    Procedure: L5 S1 transforaminal epidural steroid injection ([id number]); Surgeon: Dallin Chavez MD;  Location: [facility] OR;  Service: Pain Management    • NERVE BLOCK Bilateral 4/26/2018    Procedure: B/L L3 L4 L5 S1 MBB #1 ([id number]);   Surgeon: Dallin Chavez MD;  Location: [facility] OR;  Service: Pain Management    • NERVE BLOCK Bilateral 5/11/2018    Procedure: B/L L3 L4 L5 S1 Medial Branch Block #2;  Surgeon: Dallin Chavez MD;  Location: [facility] MAIN OR;  Service: Pain Management    • NOSE SURGERY     • NH COLONOSCOPY FLX DX W/COLLJ SPEC WHEN PFRMD N/A 3/6/2017    Procedure: COLONOSCOPY;  Surgeon: Genesis Anderson MD;  Location: [facility]; Service: Gastroenterology   • MT IMPLANT SPINAL NEUROSTIM/ Left 6/29/2021    Procedure: REPLACEMENT IMPLANTABLE PULSE GENERATOR DORSAL SPINAL COLUMN STIMULATOR, LEFT;  Surgeon: Mindi Pfeiffer MD;  Location:  MAIN OR;  Service: Neurosurgery   • MT SURG IMPLNT NEUROELECT,EPIDURAL Left 10/3/2017    Procedure: PLACEMENT THORACIC FOR INSERTION DORSAL COLUMN SPINAL CORD STIMULATOR (DCS) WITH BUTTOCK IMPLANTABLE PULSE GENERATOR(IMPULSE); Surgeon: Spencer Ferris MD;  Location:  MAIN OR;  Service: Neurosurgery   • RADIOFREQUENCY ABLATION Right 5/18/2018    Procedure: Rt L3 L4 L5 S1 Radio Frequency Ablation ([id number]); Surgeon: Deonna Marcano MD;  Location: [facility] MAIN OR;  Service: Pain Management    • RADIOFREQUENCY ABLATION Left 6/1/2018    Procedure: Lt L3 L4 L5 S1 Radio Frequency Ablation ([id number]); Surgeon: Deonna Marcano MD;  Location: [facility] MAIN OR;  Service: Pain Management        Family History   Problem Relation Age of Onset   • Diabetes Mother    • Arthritis Mother    • Diabetes Father         mellitus    • Heart attack Father 58   • Hypertension Father    • Arthritis Father      I have reviewed and agree with the history as documented  E-Cigarette/Vaping   • E-Cigarette Use Never User      E-Cigarette/Vaping Substances   • Nicotine No    • THC No    • CBD No    • Flavoring No    • Other No    • Unknown No      Social History     Tobacco Use   • Smoking status: Current Every Day Smoker     Packs/day: 1 00     Years: 42 00     Pack years: 42 00     Types: Cigarettes   • Smokeless tobacco: Never Used   • Tobacco comment: encouraged smoking cessation - history of smoking 30 or more pack years    Vaping Use   • Vaping Use: Never used   Substance Use Topics   • Alcohol use: No     Comment: rare   • Drug use: No        Review of Systems   Constitutional: Negative for chills and fever     HENT: Negative for ear pain and sore throat  Eyes: Negative for pain and visual disturbance  Respiratory: Negative for cough and shortness of breath  Cardiovascular: Negative for chest pain and palpitations  Gastrointestinal: Negative for abdominal pain, blood in stool, constipation, diarrhea, nausea, rectal pain and vomiting  Genitourinary: Positive for scrotal swelling (right sided perineal/scrotal mass) and testicular pain  Negative for difficulty urinating, dysuria, flank pain, frequency and hematuria  Musculoskeletal: Negative for arthralgias, back pain, neck pain and neck stiffness  Skin: Negative for color change, rash and wound  Neurological: Negative for dizziness, seizures, syncope, weakness, light-headedness, numbness and headaches  All other systems reviewed and are negative  Physical Exam  ED Triage Vitals   Temperature Pulse Respirations Blood Pressure SpO2   10/10/22 1248 10/10/22 1248 10/10/22 1248 10/10/22 1248 10/10/22 1248   98 °F (36 7 °C) 90 16 128/85 98 %      Temp src Heart Rate Source Patient Position - Orthostatic VS BP Location FiO2 (%)   -- 10/10/22 1700 10/10/22 1800 10/10/22 1800 --    Monitor Sitting Right arm       Pain Score       --                    Orthostatic Vital Signs  Vitals:    10/10/22 1248 10/10/22 1700 10/10/22 1730 10/10/22 1800   BP: 128/85 133/71 145/64 158/67   Pulse: 90 85 67 72   Patient Position - Orthostatic VS:    Sitting       Physical Exam  Vitals and nursing note reviewed  Constitutional:       General: He is not in acute distress  Appearance: He is well-developed  HENT:      Head: Normocephalic and atraumatic  Nose: Nose normal  No congestion  Mouth/Throat:      Pharynx: Oropharynx is clear  Eyes:      Extraocular Movements: Extraocular movements intact  Conjunctiva/sclera: Conjunctivae normal    Cardiovascular:      Rate and Rhythm: Normal rate and regular rhythm  Pulses: Normal pulses        Heart sounds: Normal heart sounds  No murmur heard  Pulmonary:      Effort: Pulmonary effort is normal  No respiratory distress  Breath sounds: Normal breath sounds  Chest:      Chest wall: No tenderness  Abdominal:      General: Abdomen is flat  Bowel sounds are normal  There is no distension  Palpations: Abdomen is soft  Tenderness: There is no abdominal tenderness  There is no right CVA tenderness or left CVA tenderness  Genitourinary:     Penis: Normal        Testes: Normal       Comments: Firm, non fluctuant elongated mass in right perineal area just posterior to scrotum  No rash, erythema or skin lesions appreciated  Tenderness to palpation  Scrotum, testes and penis appear normal   Musculoskeletal:         General: No deformity or signs of injury  Normal range of motion  Cervical back: Normal range of motion and neck supple  No rigidity or tenderness  Skin:     General: Skin is warm and dry  Findings: No bruising, erythema, lesion or rash  Neurological:      Mental Status: He is alert           ED Medications  Medications   clindamycin (CLEOCIN) capsule 450 mg (450 mg Oral Given 10/10/22 1909)       Diagnostic Studies  Results Reviewed     Procedure Component Value Units Date/Time    UA w Reflex to Microscopic w Reflex to Culture [[id number]]  (Abnormal) Collected: 10/10/22 1810    Lab Status: Final result Specimen: Urine, Clean Catch Updated: 10/10/22 1827     Color, UA Light Yellow     Clarity, UA Clear     Specific [place], UA 1 020     pH, UA 6 5     Leukocytes, UA Negative     Nitrite, UA Negative     Protein, UA Negative mg/dl      Glucose, UA Negative mg/dl      Ketones, UA Negative mg/dl      Urobilinogen, UA <2 0 mg/dl      Bilirubin, UA Moderate     Occult Blood, UA Negative    Comprehensive metabolic panel [[id number]] Collected: 10/10/22 1657    Lab Status: Final result Specimen: Blood from Arm, Right Updated: 10/10/22 1729     Sodium 139 mmol/L      Potassium 4 2 mmol/L      Chloride 105 mmol/L      CO2 27 mmol/L      ANION GAP 7 mmol/L      BUN 12 mg/dL      Creatinine 0 72 mg/dL      Glucose 88 mg/dL      Calcium 9 4 mg/dL      AST 26 U/L      ALT 47 U/L      Alkaline Phosphatase 61 U/L      Total Protein 6 9 g/dL      Albumin 4 5 g/dL      Total Bilirubin 0 53 mg/dL      eGFR 99 ml/min/1 73sq m     Narrative:      National Kidney Disease Foundation guidelines for Chronic Kidney Disease (CKD):   •  Stage 1 with normal or high GFR (GFR > 90 mL/min/1 73 square meters)  •  Stage 2 Mild CKD (GFR = 60-89 mL/min/1 73 square meters)  •  Stage 3A Moderate CKD (GFR = 45-59 mL/min/1 73 square meters)  •  Stage 3B Moderate CKD (GFR = 30-44 mL/min/1 73 square meters)  •  Stage 4 Severe CKD (GFR = 15-29 mL/min/1 73 square meters)  •  Stage 5 End Stage CKD (GFR <15 mL/min/1 73 square meters)  Note: GFR calculation is accurate only with a steady state creatinine    CBC and differential [[id number]]  (Abnormal) Collected: 10/10/22 1657    Lab Status: Final result Specimen: Blood from Arm, Right Updated: 10/10/22 1711     WBC 10 40 Thousand/uL      RBC 4 74 Million/uL      Hemoglobin 14 7 g/dL      Hematocrit 43 7 %      MCV 92 fL      MCH 31 0 pg      MCHC 33 6 g/dL      RDW 13 2 %      MPV 9 5 fL      Platelets 773 Thousands/uL      nRBC 0 /100 WBCs      Neutrophils Relative 62 %      Immat GRANS % 1 %      Lymphocytes Relative 25 %      Monocytes Relative 9 %      Eosinophils Relative 2 %      Basophils Relative 1 %      Neutrophils Absolute 6 56 Thousands/µL      Immature Grans Absolute 0 06 Thousand/uL      Lymphocytes Absolute 2 63 Thousands/µL      Monocytes Absolute 0 88 Thousand/µL      Eosinophils Absolute 0 19 Thousand/µL      Basophils Absolute 0 08 Thousands/µL                  US scrotum and testicles   Final Result by Tonya Watkins MD (10/10 1818)       In the right scrotal area of concern, there is soft tissue thickening suggesting cellulitis   There is a ill-defined 7 mm phlegmon/developing fluid collection/microabscess in the area of concern  Workstation performed: [id number]               Procedures  Procedures      ED Course  ED Course as of 10/10/22 1943   Mon Oct 10, 2022   1801 WBC(!): 10 40   1801 Mild leukocytosis at baseline                             SBIRT 20yo+    Flowsheet Row Most Recent Value   SBIRT (23 yo +)    In order to provide better care to our patients, we are screening all of our patients for alcohol and drug use  Would it be okay to ask you these screening questions? Unable to answer at this time Filed at: 10/10/2022 1811                MDM  Number of Diagnoses or Management Options  Cellulitis of perineum  Diagnosis management comments: Patient is a 60-year-old presenting with right sided peroneal firm mass  Due to patient's history, symptoms concerning for perineal abscess  Normal scrotal, testicular and penile exam     Scrotal/perineal ultrasound- In the right scrotal area of concern, there is soft tissue thickening suggesting cellulitis  There is a ill-defined 7 mm phlegmon/developing fluid collection/microabscess in the area of concern  Urology contacted and is agreeable to discharge on Clindamycin and close follow-up with Urology outpatient  Patient given strict return precautions for increased swelling, redness, fever, chills, nausea, vomiting  Patient is agreeable and appropriate for discharge  Disposition  Final diagnoses:   Cellulitis of perineum     Time reflects when diagnosis was documented in both MDM as applicable and the Disposition within this note     Time User Action Codes Description Comment    10/10/2022  6:57 PM Michelle Garay Add [[id number]] Cellulitis of perineum       ED Disposition     ED Disposition   Discharge    Condition   Stable    Date/Time   Mon Oct 10, 2022  6:55 PM    Comment   Ronnie Angelo discharge to home/self care                 Follow-up Information     Follow up With Specialties Details Why Contact Info Additional [address] [facility] Emergency Medicine  Please return to the Emergency Department for increased redness, swelling, fever, chills, nausea, vomiting   [address] [facility] [address] [facility], [address], [place], [place], [address] [facility]   [address]  [phone]  [phone] [facility] [place], [address] [place], [place], [address] [facility]          Discharge Medication List as of 10/10/2022  7:04 PM      START taking these medications    Details   clindamycin (CLEOCIN) 150 mg capsule Take 3 capsules (450 mg total) by mouth 3 (three) times a day for 7 days, Starting Mon 10/10/2022, Until Mon 10/17/2022, Normal         CONTINUE these medications which have NOT CHANGED    Details   atorvastatin (LIPITOR) 20 mg tablet TAKE 1 TABLET EVERY DAY, Normal      bisacodyl (DULCOLAX) 5 mg EC tablet Take 20 mg by mouth 1 (one) time, Historical Med      clobetasol (TEMOVATE) 0 05 % ointment APPLY TOPICALLY 2 (TWO) TIMES A DAY, Starting Wed 7/14/2021, Normal      etodolac (LODINE) 300 MG capsule Take 1 capsule (300 mg total) by mouth 3 (three) times a day as needed (pain (with food)), Starting Mon 10/10/2022, Normal      fluticasone (FLONASE) 50 mcg/act nasal spray USE 2 SPRAYS IN EACH NOSTRIL EVERY DAY, Normal      gabapentin (Neurontin) 600 MG tablet Take 2 tablets (1,200 mg total) by mouth 2 (two) times a day, Starting Mon 10/10/2022, Normal      glipiZIDE (GLUCOTROL XL) 10 mg 24 hr tablet TAKE 1 TABLET EVERY DAY, Normal      lidocaine (LIDODERM) 5 % Apply 2 patches topically daily Remove & Discard patch within 12 hours or as directed by MD, Starting Mon 1/11/2021, Print      lisinopril (ZESTRIL) 2 5 mg tablet TAKE 1 TABLET EVERY DAY AT BEDTIME, Normal      magnesium citrate solution Take 296 mL by mouth once, Historical Med      metFORMIN (GLUCOPHAGE) 1000 MG tablet Take 1 tablet (1,000 mg total) by mouth 2 (two) times a day with meals, Starting Mon 6/27/2022, Normal      omeprazole (PriLOSEC) 20 mg delayed release capsule TAKE 1 CAPSULE EVERY DAY, Normal      oxyCODONE-acetaminophen (Percocet) 7 5-325 MG per tablet Take 1 tablet by mouth 3 (three) times a day as needed for severe pain Max Daily Amount: 3 tablets Do not start before November 10, 2022 , Starting Thu 11/10/2022, Until Sat 12/10/2022 at 2359, Normal      Polyethylene Glycol 3350 (MIRALAX PO) Take by mouth 1 (one) time, Historical Med      QUEtiapine (SEROquel) 100 mg tablet Take one tablet by mouth daily at bedtime, Normal      sildenafil (VIAGRA) 50 MG tablet Take 1 tablet (50 mg total) by mouth as needed for erectile dysfunction Take on an empty stomach, 1 hour before sexual activity, no alcohol  100 mg max dose , Starting Wed 2/2/2022, Normal      tamsulosin (FLOMAX) 0 4 mg TAKE 2 CAPSULES EVERY DAY WITH DINNER, Normal      traZODone (DESYREL) 50 mg tablet TAKE 2 TABLETS EVERY DAY AT BEDTIME, Normal               PDMP Review       Value Time User    PDMP Reviewed  Yes 10/10/2022 10:27 AM Andres Zapien MD           ED Provider  Attending physically available and evaluated Julien Barnes  [facility] managed the patient along with the ED Attending      Electronically Signed by         Kitty Peñaloza MD  10/10/22 1709

## 2022-10-10 NOTE — ED ATTENDING ATTESTATION
10/10/2022  I, Leighton Fonseca MD, saw and evaluated the patient  I have discussed the patient with the resident/non-physician practitioner and agree with the resident's/non-physician practitioner's findings, Plan of Care, and MDM as documented in the resident's/non-physician practitioner's note, except where noted  All available labs and Radiology studies were reviewed  I was present for key portions of any procedure(s) performed by the resident/non-physician practitioner and I was immediately available to provide assistance  At this point I agree with the current assessment done in the Emergency Department  I have conducted an independent evaluation of this patient a history and physical is as follows:    57 y/o male with history of prior perineal abscess requiring drainage by an outside urologist numerous years ago presenting for evaluation of an area of swelling to the perineum behind the right scrotum  He 1st noticed 5 days ago  Reports intermittent “very mild pain” in the area, denies pain currently  No drainage  No trauma to the area  Denies pain in the penis or testicles themselves  No dysuria, frequency, fever, chills, nausea, vomiting, or abdominal pain  Physical Exam  Vitals and nursing note reviewed  Constitutional:       General: He is not in acute distress  Appearance: He is well-developed  He is not toxic-appearing or diaphoretic  HENT:      Head: Normocephalic and atraumatic  Right Ear: External ear normal       Left Ear: External ear normal       Nose: Nose normal    Eyes:      Conjunctiva/sclera: Conjunctivae normal    Cardiovascular:      Rate and Rhythm: Normal rate and regular rhythm  Heart sounds: Normal heart sounds  No murmur heard  No friction rub  No gallop  Pulmonary:      Effort: Pulmonary effort is normal  No respiratory distress  Breath sounds: Normal breath sounds  No wheezing or rales     Abdominal:      General: Bowel sounds are normal  There is no distension  Palpations: Abdomen is soft  Tenderness: There is no abdominal tenderness  There is no guarding  Hernia: No hernia is present  Comments: Abdomen benign to deep palpation   Genitourinary:     Comments: Normal circumcised penis  No scrotal edema or erythema  Testicles normal in size and position, nontender to palpation  There is a 3 cm x 1 cm area of induration with mild overlying erythema to the perineum posterior to the R scrotum  No cretpitus or fluctuance  Non-tender to palpation  Shoddy R inguinal lymphadenopathy, minimally tender, sub-centimeter in size  No palpable inguinal hernia  Musculoskeletal:         General: No deformity  Normal range of motion  Cervical back: Normal range of motion and neck supple  Skin:     General: Skin is warm and dry  Neurological:      Mental Status: He is alert and oriented to person, place, and time  Motor: No abnormal muscle tone  Psychiatric:         Mood and Affect: Mood normal            ED Course  ED Course as of 10/11/22 0021   Mon Oct 10, 2022   1717 Exam with area of induration to the perineum posterior to the right testicle without fluctuance  There is minimal overlying erythema  No associated pain or crepitus  Exam not consistent with necrotizing infection  Will obtain ultrasound to rule out abscess  Patient denies any systemic symptoms including fever, chills, nausea, vomiting  1803 Mild leukocytosis at baseline   1822 US reveals soft tissue thickening suggesting cellulitis in the area of concern with an ill-defined 7 mm phlegmon/developing fluid collection/microabscess in the area of concern  Based on exam and US findings this does not appear drainable on exam    1900 Case discussed with Urology provider on-call  Agrees with plan for oral antibiotics and close follow-up with Urology in clinic  They will send a note to their  to expedite follow-up and ambulatory referral has been placed  Strict return precautions discussed with the patient for any increased swelling, redness, fever, or pain  First dose of clindamycin given in the emergency department           Critical Care Time  Procedures

## 2022-10-10 NOTE — TELEPHONE ENCOUNTER
Left a message for patient to return phone call  Patient was put in Dr Thurston's schedule for this afternoon  Dr Derick Germain would like patient to go to the ER to be evaluated and not wait until this afternoon    [address] [facility]

## 2022-10-10 NOTE — PROGRESS NOTES
Pain Medicine Follow-Up Note    Assessment:  1  Chronic pain syndrome    2  Chronic low back pain    3  Postlaminectomy syndrome, lumbar    4  Lumbar spondylosis    5  Lumbar radiculopathy        Plan:  New Medications Ordered This Visit   Medications   • gabapentin (Neurontin) 600 MG tablet     Sig: Take 2 tablets (1,200 mg total) by mouth 2 (two) times a day     Dispense:  360 tablet     Refill:  0   • etodolac (LODINE) 300 MG capsule     Sig: Take 1 capsule (300 mg total) by mouth 3 (three) times a day as needed (pain (with food))     Dispense:  270 capsule     Refill:  0   • oxyCODONE-acetaminophen (Percocet) 7 5-325 MG per tablet     Sig: Take 1 tablet by mouth 3 (three) times a day as needed for severe pain Max Daily Amount: 3 tablets Do not start before November 10, 2022  Dispense:  90 tablet     Refill:  0     My impressions and treatment recommendations were discussed in detail with the patient who verbalized understanding and had no further questions  Given that the patient reports overall reduced pain and improved level functioning without significant side effects, I felt a reasonable to continue the patient on oxycodone/acetaminophen 7 5/325 mg 1 tablet 3 times daily as needed for pain, etodolac 300 mg 3 times daily as needed for pain, with food  He would like to trial a higher dose of gabapentin is currently taking 600 mg in the morning and 1200 mg at night  I suggested trialing 1200 mg twice a day and he was appreciative of this  Side effects were reviewed with the patient  The risks and side effects of chronic opioid treatment were discussed in detail with the patient  Side effects include but are not limited to nausea, vomiting, GI intolerance, sedation, constipation, mental clouding, opioid-induced hyperalgesia, endocrine dysfunction, addiction, dependence, and tolerance   The patient was asked to take his medications only as prescribed and directed, never in excess, and never for any other reason other than for pain control  The patient was also asked to keep his medications out of the reach of others and away from children, preferably in a locked drawer  The patient verbalized understanding and wished to use these opioid medications  [place] Prescription Drug Monitoring Program report was reviewed and was appropriate     Follow-up is planned in 2 months time or sooner as warranted  Discharge instructions were provided  I personally saw and examined the patient and I agree with the above discussed plan of care  History of Present Illness:    Leticia Alvarenga is a 58 y o  male who presents to [facility] for interval re-evaluation of the above stated pain complaints  The patient has a past medical and chronic pain history as outlined in the assessment section  He was last seen on August 8, 2022  At today's office visit, the patient's pain score is 5/10 on the verbal numerical pain rating scale  The patient states that his symptoms are primarily in his low back with radiation into the right lower extremity  He describes his pain as worse in the morning, evening, and night  His pain is mostly constant in nature  He reports the quality of his pain as dull/aching  Is using the oxycodone/acetaminophen, gabapentin, and etodolac as prescribed  He denies any side effects of these medications  He denies opioid induced constipation  Other than as stated above, the patient denies any interval changes in medications, medical condition, mental condition, symptoms, or allergies since the last office visit  Review of Systems:    Review of Systems   Constitutional: Negative for unexpected weight change  HENT: Negative for ear pain  Eyes: Negative for visual disturbance  Respiratory: Negative for shortness of breath and wheezing  Gastrointestinal: Negative for abdominal pain  Musculoskeletal: Positive for back pain and gait problem  Decreased ROM, Joint stiffness in all baack and down right leg   Neurological: Positive for weakness and numbness  Psychiatric/Behavioral: Positive for sleep disturbance  Negative for decreased concentration           Patient Active Problem List   Diagnosis   • Heartburn   • Erectile dysfunction of non-organic origin   • DM type 2 (diabetes mellitus, type 2) ([facility])   • Chronic low back pain   • BPH with urinary obstruction   • Blood pressure elevated without history of HTN   • Sciatica   • Myofascial pain syndrome   • Lumbar spondylosis   • Herniation of lumbar intervertebral disc with radiculopathy   • Acute post-operative pain   • Insomnia   • Chronic pain syndrome   • Low back pain   • Class 1 obesity due to excess calories with serious comorbidity and body mass index (BMI) of 30 0 to 30 9 in adult   • Smoker   • Seasonal allergic rhinitis   • Diabetic mononeuropathy associated with type 2 diabetes mellitus ([facility])   • Postlaminectomy syndrome, lumbar region   • Numbness and tingling in both hands   • Intervertebral disc disorder with radiculopathy of lumbosacral region   • Acute bronchitis   • Hyperlipidemia LDL goal <70   • Proteinuria   • Battery end of life of spinal cord stimulator   • HTN (hypertension)   • Gastroesophageal reflux disease   • Spinal cord stimulator status       Past Medical History:   Diagnosis Date   • Arthritis    • BPH (benign prostatic hypertrophy) with urinary obstruction    • Chronic narcotic dependence ([facility])     percocet TID   • Chronic pain disorder     back-spinal cord stimulator   • Colon polyp    • [facility]     type   • Ear infection    • Herniation of lumbar intervertebral disc with radiculopathy    • Myofascial pain syndrome    • Obesity    • Sciatica    • Spinal cord stimulator status 2017    implanted 2017   • Tinnitus        Past Surgical History:   Procedure Laterality Date   • ABSCESS DRAINAGE      groin   • BACK SURGERY      sciatic nerve- spinal cord stimulator 2017   • CAUDAL BLOCK N/A 10/26/2018    Procedure: Caudal Epidural Steroid Injection (80548); Surgeon: Elroy Elliott MD;  Location: [facility] MAIN OR;  Service: Pain Management    • CAUDAL BLOCK N/A 11/30/2018    Procedure: Caudal Epidural Steroid Injection (82882); Surgeon: Elroy Elliott MD;  Location: [facility] OR;  Service: Pain Management    • COLONOSCOPY     • EPIDURAL BLOCK INJECTION Right 5/10/2019    Procedure: L5 S1 transforaminal Epidural Steroid Injection ([id number];  Surgeon: Elroy Elliott MD;  Location: [facility] MAIN OR;  Service: Pain Management    • EPIDURAL BLOCK INJECTION Right 8/16/2019    Procedure: BLOCK / INJECTION EPIDURAL STEROID TRANSFORAMINAL;  Surgeon: Elroy Elliott MD;  Location: [facility] MAIN OR;  Service: Pain Management    • EPIDURAL BLOCK INJECTION Left 7/1/2021    Procedure: L5 S1 transforaminal epidural steroid injection ( [id number]); Surgeon: Elroy Elliott MD;  Location: [facility] MAIN OR;  Service: Pain Management    • EPIDURAL BLOCK INJECTION Right 7/15/2021    Procedure: L5 S1 transforaminal epidural steroid injection ( [id number]); Surgeon: Elroy Elliott MD;  Location: [facility] OR;  Service: Pain Management    • EPIDURAL BLOCK INJECTION Right 1/7/2022    Procedure: L5 S1 transforaminal epidural steroid injection ([id number]); Surgeon: Elroy Elliott MD;  Location: [facility] OR;  Service: Pain Management    • EPIDURAL BLOCK INJECTION Left 1/28/2022    Procedure: L5 S1 transforaminal epidural steroid injection ([id number]); Surgeon: Elroy Elliott MD;  Location: [facility] MAIN OR;  Service: Pain Management    • NERVE BLOCK Bilateral 4/26/2018    Procedure: B/L L3 L4 L5 S1 MBB #1 ([id number]);   Surgeon: Elroy Elliott MD;  Location: [facility] OR;  Service: Pain Management    • NERVE BLOCK Bilateral 5/11/2018    Procedure: B/L L3 L4 L5 S1 Medial Branch Block #2;  Surgeon: Elroy Elliott MD;  Location: [facility] MAIN OR;  Service: Pain Management    • NOSE SURGERY     • NH COLONOSCOPY FLX DX W/COLLJ SPEC WHEN PFRMD N/A 3/6/2017    Procedure: COLONOSCOPY;  Surgeon: Francie Beavers MD;  Location: [facility]; Service: Gastroenterology   • NH IMPLANT SPINAL NEUROSTIM/ Left 6/29/2021    Procedure: REPLACEMENT IMPLANTABLE PULSE GENERATOR DORSAL SPINAL COLUMN STIMULATOR, LEFT;  Surgeon: Abelino Sage MD;  Location:  MAIN OR;  Service: Neurosurgery   • NH SURG IMPLNT NEUROELECT,EPIDURAL Left 10/3/2017    Procedure: PLACEMENT THORACIC FOR INSERTION DORSAL COLUMN SPINAL CORD STIMULATOR (DCS) WITH BUTTOCK IMPLANTABLE PULSE GENERATOR(IMPULSE); Surgeon: Edy Patino MD;  Location:  MAIN OR;  Service: Neurosurgery   • RADIOFREQUENCY ABLATION Right 5/18/2018    Procedure: Rt L3 L4 L5 S1 Radio Frequency Ablation ([id number]); Surgeon: Elmira Boswell MD;  Location: [facility] MAIN OR;  Service: Pain Management    • RADIOFREQUENCY ABLATION Left 6/1/2018    Procedure: Lt L3 L4 L5 S1 Radio Frequency Ablation ([id number]);   Surgeon: Elmira Boswell MD;  Location: [facility] MAIN OR;  Service: Pain Management        Family History   Problem Relation Age of Onset   • Diabetes Mother    • Arthritis Mother    • Diabetes Father         mellitus    • Heart attack Father 58   • Hypertension Father    • Arthritis Father        Social History     Occupational History   • Occupation:  for [employer]    Tobacco Use   • Smoking status: Current Every Day Smoker     Packs/day: 1 00     Years: 42 00     Pack years: 42 00     Types: Cigarettes   • Smokeless tobacco: Never Used   • Tobacco comment: encouraged smoking cessation - history of smoking 30 or more pack years    Vaping Use   • Vaping Use: Never used   Substance and Sexual Activity   • Alcohol use: No     Comment: rare   • Drug use: No   • Sexual activity: Not Currently     Partners: Female         Current Outpatient Medications:   •  atorvastatin (LIPITOR) 20 mg tablet, TAKE 1 TABLET EVERY DAY, Disp: 90 tablet, Rfl: 0  •  bisacodyl (DULCOLAX) 5 mg EC tablet, Take 20 mg by mouth 1 (one) time, Disp: , Rfl:   •  clobetasol (TEMOVATE) 0 05 % ointment, APPLY TOPICALLY 2 (TWO) TIMES A DAY (Patient taking differently: Apply topically 2 (two) times a day as needed), Disp: 30 g, Rfl: 1  •  etodolac (LODINE) 300 MG capsule, Take 1 capsule (300 mg total) by mouth 3 (three) times a day as needed (pain (with food)), Disp: 270 capsule, Rfl: 0  •  fluticasone (FLONASE) 50 mcg/act nasal spray, USE 2 SPRAYS IN EACH NOSTRIL EVERY DAY, Disp: 48 g, Rfl: 0  •  gabapentin (Neurontin) 600 MG tablet, Take 2 tablets (1,200 mg total) by mouth 2 (two) times a day, Disp: 360 tablet, Rfl: 0  •  glipiZIDE (GLUCOTROL XL) 10 mg 24 hr tablet, TAKE 1 TABLET EVERY DAY, Disp: 90 tablet, Rfl: 0  •  lidocaine (LIDODERM) 5 %, Apply 2 patches topically daily Remove & Discard patch within 12 hours or as directed by MD (Patient taking differently: Apply 2 patches topically daily as needed Remove & Discard patch within 12 hours or as directed by MD), Disp: 60 patch, Rfl: 0  •  lisinopril (ZESTRIL) 2 5 mg tablet, TAKE 1 TABLET EVERY DAY AT BEDTIME, Disp: 90 tablet, Rfl: 0  •  magnesium citrate solution, Take 296 mL by mouth once, Disp: , Rfl:   •  metFORMIN (GLUCOPHAGE) 1000 MG tablet, Take 1 tablet (1,000 mg total) by mouth 2 (two) times a day with meals, Disp: 180 tablet, Rfl: 1  •  omeprazole (PriLOSEC) 20 mg delayed release capsule, TAKE 1 CAPSULE EVERY DAY, Disp: 90 capsule, Rfl: 0  •  [START ON 11/10/2022] oxyCODONE-acetaminophen (Percocet) 7 5-325 MG per tablet, Take 1 tablet by mouth 3 (three) times a day as needed for severe pain Max Daily Amount: 3 tablets Do not start before November 10, 2022 , Disp: 90 tablet, Rfl: 0  •  Polyethylene Glycol 3350 (MIRALAX PO), Take by mouth 1 (one) time, Disp: , Rfl:   •  QUEtiapine (SEROquel) 100 mg tablet, Take one tablet by mouth daily at bedtime, Disp: 90 tablet, Rfl: 0  •  sildenafil (VIAGRA) 50 MG tablet, Take 1 tablet (50 mg total) by mouth as needed for erectile dysfunction Take on an empty stomach, 1 hour before sexual activity, no alcohol   100 mg max dose , Disp: 30 tablet, Rfl: 0  •  tamsulosin (FLOMAX) 0 4 mg, TAKE 2 CAPSULES EVERY DAY WITH DINNER, Disp: 180 capsule, Rfl: 0  •  traZODone (DESYREL) 50 mg tablet, TAKE 2 TABLETS EVERY DAY AT BEDTIME, Disp: 180 tablet, Rfl: 0    Allergies   Allergen Reactions   • Penicillins Swelling     Mouth swelling       Physical Exam:    /68   Pulse 78   Temp 98 2 °F (36 8 °C)   Resp 19   Ht 5' 11" (1 803 m)   Wt 101 kg (223 lb)   BMI 31 10 kg/m²     Constitutional:obese  Eyes:anicteric  HEENT:grossly intact  Neck:supple, symmetric, trachea midline and no masses   Pulmonary:even and unlabored  Cardiovascular:No edema or pitting edema present  Skin:Normal without rashes or lesions and well hydrated  Psychiatric:Mood and affect appropriate  Neurologic:Cranial Nerves II-XII grossly intact  Musculoskeletal:normal

## 2022-10-10 NOTE — ED ATTENDING ATTESTATION
10/10/2022  ILacy MD, saw and evaluated the patient  I have discussed the patient with the resident/non-physician practitioner and agree with the resident's/non-physician practitioner's findings, Plan of Care, and MDM as documented in the resident's/non-physician practitioner's note, except where noted  All available labs and Radiology studies were reviewed  I was present for key portions of any procedure(s) performed by the resident/non-physician practitioner and I was immediately available to provide assistance  At this point I agree with the current assessment done in the Emergency Department  I have conducted an independent evaluation of this patient a history and physical is as follows:    61-year-old male presenting for evaluation of an area of swelling with mild intermittent tenderness posterior to the right scrotum  Reports that he had an abscess to the perineum several years ago that had to be drained by Urology  Denies dysuria or frequency  No fevers, chills, nausea, vomiting, or abdominal pain  Physical Exam  Vitals and nursing note reviewed  Constitutional:       General: He is not in acute distress  Appearance: Normal appearance  He is well-developed  He is not ill-appearing, toxic-appearing or diaphoretic  HENT:      Head: Normocephalic and atraumatic  Right Ear: External ear normal       Left Ear: External ear normal       Nose: Nose normal    Eyes:      Conjunctiva/sclera: Conjunctivae normal    Cardiovascular:      Rate and Rhythm: Normal rate and regular rhythm  Pulses: Normal pulses  Heart sounds: Normal heart sounds  No murmur heard  No friction rub  No gallop  Pulmonary:      Effort: Pulmonary effort is normal  No respiratory distress  Breath sounds: Normal breath sounds  No wheezing or rales  Abdominal:      General: Bowel sounds are normal  There is no distension  Palpations: Abdomen is soft  Tenderness:  There is no abdominal tenderness  There is no guarding  Genitourinary:     Comments: Normal circumcised penis  Testicles normal in size and position without scrotal edema or erythema  No TTP to the testicles themselves  There is a 3 cm area of edema with induration and mild overlying erythema just posterior to the R side of the scrotum within the perineum  No crepitus, fluctuance, or tenderness to palpation  Musculoskeletal:         General: No deformity  Normal range of motion  Cervical back: Normal range of motion and neck supple  Skin:     General: Skin is warm and dry  Neurological:      Mental Status: He is alert and oriented to person, place, and time  Motor: No abnormal muscle tone  Psychiatric:         Mood and Affect: Mood normal            ED Course  ED Course as of 10/11/22 0038   Mon Oct 10, 2022   1717 Exam with area of induration to the perineum posterior to the right testicle without fluctuance  There is minimal overlying erythema  No associated pain or crepitus  Exam not consistent with necrotizing infection  Will obtain ultrasound to rule out abscess  Patient denies any systemic symptoms including fever, chills, nausea, vomiting  1803 Mild leukocytosis at baseline   1822 US reveals soft tissue thickening suggesting cellulitis in the area of concern with an ill-defined 7 mm phlegmon/developing fluid collection/microabscess in the area of concern  Based on exam and US findings this does not appear drainable on exam    1900 Case discussed with Urology provider on-call  Agrees with plan for oral antibiotics and close follow-up with Urology in clinic  They will send a note to their scheduled to expedite follow-up and ambulatory referral has been placed  Strict return precautions discussed with the patient for any increased swelling, redness, fever, or pain  First dose of clindamycin given in the emergency department           Critical Care Time  Procedures

## 2022-10-19 DIAGNOSIS — N40.1 BPH WITH URINARY OBSTRUCTION: ICD-10-CM

## 2022-10-19 DIAGNOSIS — J30.2 SEASONAL ALLERGIC RHINITIS, UNSPECIFIED TRIGGER: ICD-10-CM

## 2022-10-19 DIAGNOSIS — N13.8 BPH WITH URINARY OBSTRUCTION: ICD-10-CM

## 2022-10-19 DIAGNOSIS — E11.9 TYPE 2 DIABETES MELLITUS WITHOUT COMPLICATION, WITHOUT LONG-TERM CURRENT USE OF INSULIN (HCC): ICD-10-CM

## 2022-10-19 DIAGNOSIS — E78.2 MIXED HYPERLIPIDEMIA: ICD-10-CM

## 2022-10-19 DIAGNOSIS — R80.9 PROTEINURIA, UNSPECIFIED TYPE: ICD-10-CM

## 2022-10-19 DIAGNOSIS — G47.00 INSOMNIA, UNSPECIFIED TYPE: ICD-10-CM

## 2022-10-19 DIAGNOSIS — K21.9 GASTROESOPHAGEAL REFLUX DISEASE WITHOUT ESOPHAGITIS: ICD-10-CM

## 2022-10-19 RX ORDER — TRAZODONE HYDROCHLORIDE 50 MG/1
TABLET ORAL
Qty: 180 TABLET | Refills: 0 | Status: SHIPPED | OUTPATIENT
Start: 2022-10-19

## 2022-10-19 RX ORDER — OMEPRAZOLE 20 MG/1
CAPSULE, DELAYED RELEASE ORAL
Qty: 90 CAPSULE | Refills: 0 | Status: SHIPPED | OUTPATIENT
Start: 2022-10-19

## 2022-10-19 RX ORDER — TAMSULOSIN HYDROCHLORIDE 0.4 MG/1
CAPSULE ORAL
Qty: 180 CAPSULE | Refills: 0 | Status: SHIPPED | OUTPATIENT
Start: 2022-10-19

## 2022-10-19 RX ORDER — ATORVASTATIN CALCIUM 20 MG/1
TABLET, FILM COATED ORAL
Qty: 90 TABLET | Refills: 0 | Status: SHIPPED | OUTPATIENT
Start: 2022-10-19

## 2022-10-19 RX ORDER — LISINOPRIL 2.5 MG/1
TABLET ORAL
Qty: 90 TABLET | Refills: 0 | Status: SHIPPED | OUTPATIENT
Start: 2022-10-19

## 2022-10-19 RX ORDER — FLUTICASONE PROPIONATE 50 MCG
SPRAY, SUSPENSION (ML) NASAL
Qty: 48 G | Refills: 0 | Status: SHIPPED | OUTPATIENT
Start: 2022-10-19

## 2022-10-26 ENCOUNTER — OFFICE VISIT (OUTPATIENT)
Dept: UROLOGY | Facility: AMBULATORY SURGERY CENTER | Age: 62
End: 2022-10-26

## 2022-10-26 VITALS
WEIGHT: 223 LBS | DIASTOLIC BLOOD PRESSURE: 62 MMHG | HEIGHT: 71 IN | BODY MASS INDEX: 31.22 KG/M2 | HEART RATE: 76 BPM | OXYGEN SATURATION: 96 % | RESPIRATION RATE: 18 BRPM | SYSTOLIC BLOOD PRESSURE: 120 MMHG

## 2022-10-26 DIAGNOSIS — L03.315 CELLULITIS OF PERINEUM: ICD-10-CM

## 2022-10-26 NOTE — PROGRESS NOTES
10/26/2022      Chief Complaint   Patient presents with   • Follow-up     Assessment and Plan    58 y o  male managed by our office    1  Erectile dysfunction  · Continue with sildenafil    2  Benign prostatic hyperplasia  · Continue tamsulosin    3  Prostate cancer screening  · PSA and LOGAN due 06/2023    4  Soft tissue cellulitis to groin  · Continue p o  clindamycin as previously ordered  · CT pelvis ordered  · BMP, CBC ordered  · Will call patient with results of CT scan      History of Present Illness  Melly Maria is a 58 y o  male here for follow up evaluation of  erectile dysfunction  He was initially seen in consultation for erectile dysfunction  Patient with a history of diabetes  He also has an underlying history of benign prostatic hyperplasia and has been managed on tamsulosin  At his last office evaluation he was started on sildenafil secondary to erectile dysfunction  He presents to the office today for discussion/evaluation of medication efficacy  Since patient's last office evaluation he has noticed an area of induration to his right groin and presented to the emergency department for evaluation  He was given antibiotics, p o  clindamycin, for abscess  Patient reports no significant change to the size of his abscess since initiation of clindamycin  He currently denies fever and chills  He denies increased redness swelling or pain and discomfort  Of note, patient with a history of Collin's in the past and concerned this is how his initial symptoms presented  Review of Systems   Constitutional: Negative for chills and fever  Respiratory: Negative for cough and shortness of breath  Cardiovascular: Negative for chest pain  Gastrointestinal: Negative for abdominal distention, abdominal pain, blood in stool, nausea and vomiting  Genitourinary: Negative for difficulty urinating, dysuria, enuresis, flank pain, frequency, hematuria and urgency  Skin: Negative for rash  Past Medical History  Past Medical History:   Diagnosis Date   • Arthritis    • BPH (benign prostatic hypertrophy) with urinary obstruction    • Chronic narcotic dependence (HCC)     percocet TID   • Chronic pain disorder     back-spinal cord stimulator   • Colon polyp    • Diabetes mellitus ([address] )     type   • Ear infection    • Herniation of lumbar intervertebral disc with radiculopathy    • Myofascial pain syndrome    • Obesity    • Sciatica    • Spinal cord stimulator status 2017    implanted 2017   • Tinnitus        Past Social History  Past Surgical History:   Procedure Laterality Date   • ABSCESS DRAINAGE      groin   • BACK SURGERY      sciatic nerve- spinal cord stimulator 2017   • CAUDAL BLOCK N/A 10/26/2018    Procedure: Caudal Epidural Steroid Injection (18651); Surgeon: Alina Richmond MD;  Location: [facility] MAIN OR;  Service: Pain Management    • CAUDAL BLOCK N/A 11/30/2018    Procedure: Caudal Epidural Steroid Injection (35546); Surgeon: Alina Richmond MD;  Location: [facility] OR;  Service: Pain Management    • COLONOSCOPY     • EPIDURAL BLOCK INJECTION Right 5/10/2019    Procedure: L5 S1 transforaminal Epidural Steroid Injection ([id number];  Surgeon: Alina Richmond MD;  Location: [facility] MAIN OR;  Service: Pain Management    • EPIDURAL BLOCK INJECTION Right 8/16/2019    Procedure: BLOCK / INJECTION EPIDURAL STEROID TRANSFORAMINAL;  Surgeon: Alina Richmond MD;  Location: [facility] MAIN OR;  Service: Pain Management    • EPIDURAL BLOCK INJECTION Left 7/1/2021    Procedure: L5 S1 transforaminal epidural steroid injection ( [id number]); Surgeon: Alina Richmond MD;  Location: [facility] MAIN OR;  Service: Pain Management    • EPIDURAL BLOCK INJECTION Right 7/15/2021    Procedure: L5 S1 transforaminal epidural steroid injection ( [id number]);   Surgeon: Alina Richmond MD;  Location: [facility] MAIN OR;  Service: Pain Management    • EPIDURAL BLOCK INJECTION Right 1/7/2022    Procedure: L5 S1 transforaminal epidural steroid injection ([id number]); Surgeon: Zhou Parra MD;  Location: [facility] MAIN OR;  Service: Pain Management    • EPIDURAL BLOCK INJECTION Left 1/28/2022    Procedure: L5 S1 transforaminal epidural steroid injection ([id number]); Surgeon: Zhou Parra MD;  Location: [facility] MAIN OR;  Service: Pain Management    • NERVE BLOCK Bilateral 4/26/2018    Procedure: B/L L3 L4 L5 S1 MBB #1 ([id number]); Surgeon: Zhou Parra MD;  Location: [facility] MAIN OR;  Service: Pain Management    • NERVE BLOCK Bilateral 5/11/2018    Procedure: B/L L3 L4 L5 S1 Medial Branch Block #2;  Surgeon: Zhou Parra MD;  Location: [place] [address] MAIN OR;  Service: Pain Management    • NOSE SURGERY     • NY COLONOSCOPY FLX DX W/COLLJ SPEC WHEN PFRMD N/A 3/6/2017    Procedure: COLONOSCOPY;  Surgeon: Kamryn Ragsdale MD;  Location: [facility]; Service: Gastroenterology   • NY IMPLANT SPINAL NEUROSTIM/ Left 6/29/2021    Procedure: REPLACEMENT IMPLANTABLE PULSE GENERATOR DORSAL SPINAL COLUMN STIMULATOR, LEFT;  Surgeon: Mars Mcmillan MD;  Location:  MAIN OR;  Service: Neurosurgery   • [place] SURG IMPLNT NEUROELECT,EPIDURAL Left 10/3/2017    Procedure: PLACEMENT THORACIC FOR INSERTION DORSAL COLUMN SPINAL CORD STIMULATOR (DCS) WITH BUTTOCK IMPLANTABLE PULSE GENERATOR(IMPULSE); Surgeon: Amara Gray MD;  Location:  MAIN OR;  Service: Neurosurgery   • RADIOFREQUENCY ABLATION Right 5/18/2018    Procedure: Rt L3 L4 L5 S1 Radio Frequency Ablation ([id number]); Surgeon: Zhou Parra MD;  Location: [facility] MAIN OR;  Service: Pain Management    • RADIOFREQUENCY ABLATION Left 6/1/2018    Procedure: Lt L3 L4 L5 S1 Radio Frequency Ablation ([id number]);   Surgeon: Zhou Parra MD;  Location: [facility] MAIN OR;  Service: Pain Management      Social History     Tobacco Use   Smoking Status Current Every Day Smoker   • Packs/day: 1 00   • Years: 42 00   • Pack years: 42 00   • Types: Cigarettes   Smokeless Tobacco Never Used Tobacco Comment    encouraged smoking cessation - history of smoking 30 or more pack years        Past Family History  Family History   Problem Relation Age of Onset   • Diabetes Mother    • Arthritis Mother    • Diabetes Father         mellitus    • Heart attack Father 58   • Hypertension Father    • Arthritis Father        Past Social history  Social History     Socioeconomic History   • Marital status: Single     Spouse name: Not on file   • Number of children: Not on file   • Years of education: Not on file   • Highest education level: Not on file   Occupational History   • Occupation:  for [employer]    Tobacco Use   • Smoking status: Current Every Day Smoker     Packs/day: 1 00     Years: 42 00     Pack years: 42 00     Types: Cigarettes   • Smokeless tobacco: Never Used   • Tobacco comment: encouraged smoking cessation - history of smoking 30 or more pack years    Vaping Use   • Vaping Use: Never used   Substance and Sexual Activity   • Alcohol use: No     Comment: rare   • Drug use: No   • Sexual activity: Not Currently     Partners: Female   Other Topics Concern   • Not on file   Social History Narrative    Caffeine use - coffee      Social Determinants of Health     Financial Resource Strain: Not on file   Food Insecurity: Not on file   Transportation Needs: Not on file   Physical Activity: Not on file   Stress: Not on file   Social Connections: Not on file   Intimate Partner Violence: Not on file   Housing Stability: Not on file       Current Medications  Current Outpatient Medications   Medication Sig Dispense Refill   • atorvastatin (LIPITOR) 20 mg tablet TAKE 1 TABLET EVERY DAY 90 tablet 0   • bisacodyl (DULCOLAX) 5 mg EC tablet Take 20 mg by mouth 1 (one) time     • clobetasol (TEMOVATE) 0 05 % ointment APPLY TOPICALLY 2 (TWO) TIMES A DAY (Patient taking differently: Apply topically 2 (two) times a day as needed) 30 g 1   • etodolac (LODINE) 300 MG capsule Take 1 capsule (300 mg total) by mouth 3 (three) times a day as needed (pain (with food)) 270 capsule 0   • fluticasone (FLONASE) 50 mcg/act nasal spray USE 2 SPRAYS IN EACH NOSTRIL EVERY DAY 48 g 0   • gabapentin (Neurontin) 600 MG tablet Take 2 tablets (1,200 mg total) by mouth 2 (two) times a day 360 tablet 0   • glipiZIDE (GLUCOTROL XL) 10 mg 24 hr tablet TAKE 1 TABLET EVERY DAY 90 tablet 0   • lidocaine (LIDODERM) 5 % Apply 2 patches topically daily Remove & Discard patch within 12 hours or as directed by MD (Patient taking differently: Apply 2 patches topically daily as needed Remove & Discard patch within 12 hours or as directed by MD) 60 patch 0   • lisinopril (ZESTRIL) 2 5 mg tablet TAKE 1 TABLET AT BEDTIME 90 tablet 0   • magnesium citrate solution Take 296 mL by mouth once     • metFORMIN (GLUCOPHAGE) 1000 MG tablet TAKE 1 TABLET TWICE DAILY WITH MEALS 180 tablet 1   • omeprazole (PriLOSEC) 20 mg delayed release capsule TAKE 1 CAPSULE EVERY DAY 90 capsule 0   • [START ON 11/10/2022] oxyCODONE-acetaminophen (Percocet) 7 5-325 MG per tablet Take 1 tablet by mouth 3 (three) times a day as needed for severe pain Max Daily Amount: 3 tablets Do not start before November 10, 2022  90 tablet 0   • Polyethylene Glycol 3350 (MIRALAX PO) Take by mouth 1 (one) time     • QUEtiapine (SEROquel) 100 mg tablet Take one tablet by mouth daily at bedtime 90 tablet 0   • sildenafil (VIAGRA) 50 MG tablet Take 1 tablet (50 mg total) by mouth as needed for erectile dysfunction Take on an empty stomach, 1 hour before sexual activity, no alcohol  100 mg max dose  30 tablet 0   • tamsulosin (FLOMAX) 0 4 mg TAKE 2 CAPSULES EVERY DAY WITH DINNER 180 capsule 0   • traZODone (DESYREL) 50 mg tablet TAKE 2 TABLETS EVERY DAY AT BEDTIME 180 tablet 0     No current facility-administered medications for this visit         Allergies  Allergies   Allergen Reactions   • Penicillins Swelling     Mouth swelling         The following portions of the patient's history were reviewed and updated as appropriate: allergies, current medications, past medical history, past social history, past surgical history and problem list       Vitals  Vitals:    10/26/22 1059   BP: 120/62   BP Location: Right arm   Patient Position: Sitting   Cuff Size: Standard   Pulse: 76   Resp: 18   SpO2: 96%   Weight: 101 kg (223 lb)   Height: 5' 11" (1 803 m)     Physical Exam  Physical Exam  Vitals reviewed  Constitutional:       General: He is not in acute distress  Appearance: Normal appearance  He is normal weight  HENT:      Head: Normocephalic  Pulmonary:      Effort: No respiratory distress  Breath sounds: Normal breath sounds  Genitourinary:     Comments: Small area of induration noted to right groin  No redness or drainage noted  Bilateral testicles unremarkable  Scrotum unremarkable  Skin:     General: Skin is warm and dry  Neurological:      General: No focal deficit present  Mental Status: He is alert and oriented to person, place, and time  Psychiatric:         Mood and Affect: Mood normal          Behavior: Behavior normal        Results  No results found for this or any previous visit (from the past 1 hour(s)) ]  Lab Results   Component Value Date    PSA 0 2 06/27/2022    PSA 0 3 06/14/2021    PSA 0 2 03/19/2019     Lab Results   Component Value Date    CALCIUM 9 4 10/10/2022    K 4 2 10/10/2022    CO2 27 10/10/2022     10/10/2022    BUN 12 10/10/2022    CREATININE 0 72 10/10/2022     Lab Results   Component Value Date    WBC 10 40 (H) 10/10/2022    HGB 14 7 10/10/2022    HCT 43 7 10/10/2022    MCV 92 10/10/2022     10/10/2022     Orders  Orders Placed This Encounter   Procedures   • CT pelvis w contrast     Standing Status:   Future     Standing Expiration Date:   10/26/2026     Scheduling Instructions:       For procedures with both oral (PO) and IV contrast:            The patient will need to drink barium for this test  Barium needs to be picked up in the registration area at least one day prior to your study  For out of network (non-[facility]) orders please bring your prescription when picking up oral contrast  For AM Appointments: Drink one bottle of barium before bed time the evening before your scheduled test   Drink 1/2 of the second bottle one hour prior to your test  Please bring other 1/2 bottle with you to drink at the time of your study  For PM Appointments: Drink one bottle of barium before 9:00am on the day of your test  Drink 1/2 of the second bottle one hour prior to your test  Please bring other 1/2 bottle with you to drink at the time of your study  Nothing to eat 3 hours prior to your test  In addition to the barium, clear liquids are also permitted up until the time of the scan  Clear liquids includes water, black coffee or tea, apple juice or clear broth  If possible wear clothing without any metal in the abdomen area  Sweat suit,       sports bra or bra without underwire may eliminate the need to change  Please bring your insurance cards, a form of photo ID and a list of your medications with you  Arrive 15 minutes prior to your appointment time in order to register  On the day of your test, please bring any prior CT or MRI studies of this area with you that were not performed at a [facility] facility  For procedures with ONLY IV contrast:            Nothing to eat 3 hours prior to your test  Clear liquids are permitted up until the time of the scan  Clear liquids includes water, black coffee or tea, apple juice or clear broth  If possible wear clothing without any metal in the abdomen area  Sweat suit, sports bra or bra without underwire may eliminate the need to change  Please bring your insurance cards, a form of photo ID and a list of your medications with you  Arrive 15 minutes prior to your appointment time in order to register   On the day of your test, please bring any prior CT or MRI studies of this area with you that were not performed at a [address]  To schedule this appointment, please contact Central Scheduling at [phone]  Order Specific Question:   What is the patient's sedation requirement? Answer:   No Sedation     Order Specific Question:   Contrast information:     Answer:   IV     Order Specific Question:   Did the patient ever have a reaction to x-ray dye? If yes, please verify the type of allergy and order the contrast allergy prep  Answer:   No     Order Specific Question:   Release to patient through Mychart     Answer:   Immediate     Order Specific Question:   Reason for Exam (FREE TEXT)     Answer:   PERINEUM ABSCESS   • Basic metabolic panel     This is a patient instruction: Patient fasting for 8 hours or longer recommended       Standing Status:   Future     Standing Expiration Date:   10/26/2023   • CBC     Standing Status:   Future     Standing Expiration Date:   10/26/2023       CORAZON Castellanos

## 2022-10-27 DIAGNOSIS — G47.00 INSOMNIA, UNSPECIFIED TYPE: ICD-10-CM

## 2022-10-27 RX ORDER — QUETIAPINE FUMARATE 100 MG/1
TABLET, FILM COATED ORAL
Qty: 90 TABLET | Refills: 0 | Status: SHIPPED | OUTPATIENT
Start: 2022-10-27

## 2022-11-03 ENCOUNTER — APPOINTMENT (OUTPATIENT)
Dept: LAB | Facility: IMAGING CENTER | Age: 62
End: 2022-11-03

## 2022-11-03 DIAGNOSIS — L03.315 CELLULITIS OF PERINEUM: ICD-10-CM

## 2022-11-03 LAB
ANION GAP SERPL CALCULATED.3IONS-SCNC: 3 MMOL/L (ref 4–13)
BUN SERPL-MCNC: 17 MG/DL (ref 5–25)
CALCIUM SERPL-MCNC: 9.3 MG/DL (ref 8.3–10.1)
CHLORIDE SERPL-SCNC: 108 MMOL/L (ref 96–108)
CO2 SERPL-SCNC: 26 MMOL/L (ref 21–32)
CREAT SERPL-MCNC: 0.86 MG/DL (ref 0.6–1.3)
ERYTHROCYTE [DISTWIDTH] IN BLOOD BY AUTOMATED COUNT: 13.6 % (ref 11.6–15.1)
GFR SERPL CREATININE-BSD FRML MDRD: 92 ML/MIN/1.73SQ M
GLUCOSE P FAST SERPL-MCNC: 170 MG/DL (ref 65–99)
HCT VFR BLD AUTO: 43.8 % (ref 36.5–49.3)
HGB BLD-MCNC: 14.1 G/DL (ref 12–17)
MCH RBC QN AUTO: 30.4 PG (ref 26.8–34.3)
MCHC RBC AUTO-ENTMCNC: 32.2 G/DL (ref 31.4–37.4)
MCV RBC AUTO: 94 FL (ref 82–98)
PLATELET # BLD AUTO: 220 THOUSANDS/UL (ref 149–390)
PMV BLD AUTO: 10.5 FL (ref 8.9–12.7)
POTASSIUM SERPL-SCNC: 5 MMOL/L (ref 3.5–5.3)
RBC # BLD AUTO: 4.64 MILLION/UL (ref 3.88–5.62)
SODIUM SERPL-SCNC: 137 MMOL/L (ref 135–147)
WBC # BLD AUTO: 10.24 THOUSAND/UL (ref 4.31–10.16)

## 2022-11-18 ENCOUNTER — HOSPITAL ENCOUNTER (OUTPATIENT)
Dept: RADIOLOGY | Facility: IMAGING CENTER | Age: 62
End: 2022-11-18

## 2022-11-18 DIAGNOSIS — L03.315 CELLULITIS OF PERINEUM: ICD-10-CM

## 2022-11-18 RX ADMIN — IOHEXOL 100 ML: 350 INJECTION, SOLUTION INTRAVENOUS at 07:36

## 2022-11-23 ENCOUNTER — TELEPHONE (OUTPATIENT)
Dept: UROLOGY | Facility: AMBULATORY SURGERY CENTER | Age: 62
End: 2022-11-23

## 2022-11-23 NOTE — TELEPHONE ENCOUNTER
----- Message from CORAZON Mari sent at 11/23/2022  9:11 AM EST -----  Okay to notify patient of CT scan of pelvis with no evidence of scrotal or perineum abscess

## 2022-11-23 NOTE — TELEPHONE ENCOUNTER
Called and spoke with patient  Advised on AP's note  Advised to call if anything changes  Patient said this is good news

## 2022-12-05 ENCOUNTER — OFFICE VISIT (OUTPATIENT)
Dept: PAIN MEDICINE | Facility: CLINIC | Age: 62
End: 2022-12-05

## 2022-12-05 VITALS
SYSTOLIC BLOOD PRESSURE: 123 MMHG | TEMPERATURE: 98.2 F | WEIGHT: 234 LBS | HEART RATE: 78 BPM | RESPIRATION RATE: 19 BRPM | HEIGHT: 71 IN | BODY MASS INDEX: 32.76 KG/M2 | DIASTOLIC BLOOD PRESSURE: 75 MMHG

## 2022-12-05 DIAGNOSIS — F11.20 UNCOMPLICATED OPIOID DEPENDENCE (HCC): ICD-10-CM

## 2022-12-05 DIAGNOSIS — M96.1 POSTLAMINECTOMY SYNDROME, LUMBAR: ICD-10-CM

## 2022-12-05 DIAGNOSIS — Z79.891 LONG-TERM CURRENT USE OF OPIATE ANALGESIC: ICD-10-CM

## 2022-12-05 DIAGNOSIS — M54.16 LUMBAR RADICULOPATHY: ICD-10-CM

## 2022-12-05 DIAGNOSIS — G89.29 CHRONIC LOW BACK PAIN: ICD-10-CM

## 2022-12-05 DIAGNOSIS — M54.50 CHRONIC LOW BACK PAIN: ICD-10-CM

## 2022-12-05 DIAGNOSIS — G89.4 CHRONIC PAIN SYNDROME: Primary | ICD-10-CM

## 2022-12-05 DIAGNOSIS — M47.816 LUMBAR SPONDYLOSIS: ICD-10-CM

## 2022-12-05 RX ORDER — GABAPENTIN 600 MG/1
1200 TABLET ORAL 2 TIMES DAILY
Qty: 360 TABLET | Refills: 0 | Status: SHIPPED | OUTPATIENT
Start: 2022-12-05 | End: 2023-03-05

## 2022-12-05 RX ORDER — OXYCODONE AND ACETAMINOPHEN 7.5; 325 MG/1; MG/1
1 TABLET ORAL 3 TIMES DAILY PRN
Qty: 90 TABLET | Refills: 0 | Status: SHIPPED | OUTPATIENT
Start: 2023-01-09 | End: 2023-02-08

## 2022-12-05 RX ORDER — OXYCODONE AND ACETAMINOPHEN 7.5; 325 MG/1; MG/1
1 TABLET ORAL 3 TIMES DAILY PRN
Qty: 90 TABLET | Refills: 0 | Status: SHIPPED | OUTPATIENT
Start: 2023-02-08 | End: 2023-03-10

## 2022-12-05 RX ORDER — OXYCODONE AND ACETAMINOPHEN 7.5; 325 MG/1; MG/1
1 TABLET ORAL 3 TIMES DAILY PRN
Qty: 90 TABLET | Refills: 0 | Status: SHIPPED | OUTPATIENT
Start: 2022-12-10 | End: 2023-01-09

## 2022-12-05 NOTE — PROGRESS NOTES
Pain Medicine Follow-Up Note    Assessment:  1  Chronic pain syndrome    2  Long-term current use of opiate analgesic    3  Uncomplicated opioid dependence ([address] )    4  Chronic low back pain    5  Postlaminectomy syndrome, lumbar    6  Lumbar spondylosis    7  Lumbar radiculopathy        Plan:  Orders Placed This Encounter   Procedures   • MM ALL_Prescribed Meds and Special Instructions     Order Specific Question:   [facility] Is ATORVASTATIN prescribed? Answer:   Yes     Order Specific Question:   [facility] Is GABAPENTIN prescribed? Answer:   Yes     Order Specific Question:   [place] Is LIDODERM prescribed? Answer:   Yes     Order Specific Question:   [place] Is METFORMIN prescribed? Answer:   Yes     Order Specific Question:   [place] Is OMEPRAZOLE prescribed? Answer:   Yes     Order Specific Question:   [facility] Is OXYCODONE/APAP prescribed? Answer:   Yes     Order Specific Question:   [place] Is TRAZODONE prescribed? Answer:    Yes   • MM DT_Alprazolam Definitive Test   • MM DT_Amphetamine Definitive Test   • MM DT_Buprenorphine Definitive Test   • MM DT_Citalopram/Escitalopram Definitive Test   • MM DT_Clonazepam Definitive Test   • MM DT_Cocaine Definitive Test   • MM DT_Codeine Definitive Test   • MM Diazepam Definitive Test   • MM DT_Ethyl Glucuronide/Ethyl Sulfate Definitive Test   • MM DT_Fentanyl Definitive Test   • MM DT_Heroin Definitive Test   • MM DT_Hydrocodone Definitive Test   • MM DT_Hydromorphone Definitive Test   • MM Lorazepam Definitive Test   • MM DT_MDMA Definitive Test   • MM DT_Methadone Definitive Test   • MM DT_Methamphetamine Definitive Test   • MM DT_Methylphenidate Definitive Test   • MM DT_Morphine Definitive Test   • MM DT_Oxazepam Definitive Test   • MM DT_Oxycodone Definitive Test   • MM DT_Oxymorphone Definitive Test   • MM DT_Phentermine Definitive Test   • MM DT_Pregablin Definitive   • MM DT_Tapentadol Definitive Test   • MM DT_Temazapam Definitive Test   • MM DT_THC Definitive Test   • MM DT_Tramadol Definitive Test   • MM DT_Validity Creatinine   • MM DT_Validity Oxidant   • MM DT_Validity pH   • MM DT_Validity Specific       New Medications Ordered This Visit   Medications   • oxyCODONE-acetaminophen (Percocet) 7 5-325 MG per tablet     Sig: Take 1 tablet by mouth 3 (three) times a day as needed for severe pain Max Daily Amount: 3 tablets Do not start before December 10, 2022  Dispense:  90 tablet     Refill:  0   • gabapentin (Neurontin) 600 MG tablet     Sig: Take 2 tablets (1,200 mg total) by mouth 2 (two) times a day     Dispense:  360 tablet     Refill:  0   • etodolac (LODINE) 300 MG capsule     Sig: Take 1 capsule (300 mg total) by mouth 3 (three) times a day as needed (pain (with food))     Dispense:  270 capsule     Refill:  0   • oxyCODONE-acetaminophen (Percocet) 7 5-325 MG per tablet     Sig: Take 1 tablet by mouth 3 (three) times a day as needed for severe pain Max Daily Amount: 3 tablets Do not start before January 9, 2023  Dispense:  90 tablet     Refill:  0   • oxyCODONE-acetaminophen (Percocet) 7 5-325 MG per tablet     Sig: Take 1 tablet by mouth 3 (three) times a day as needed for severe pain Max Daily Amount: 3 tablets Do not start before February 8, 2023  Dispense:  90 tablet     Refill:  0     My impressions and treatment recommendations were discussed in detail with the patient who verbalized understanding and had no further questions  Given that the patient reports overall reduced pain and improved level functioning without significant side effects, I felt a reasonable to continue the patient on oxycodone/acetaminophen 7 5/325 mg 1 tablet 3 times daily as needed for pain, etodolac 300 mg 3 times daily as needed for pain, with food, as well as gabapentin 600 mg 2 tablets 3 times daily  I sent E prescriptions to the pharmacy dated December 10, 2022, January 9, 2022, and February 8, 2022      The risks and side effects of chronic opioid treatment were discussed in detail with the patient  Side effects include but are not limited to nausea, vomiting, GI intolerance, sedation, constipation, mental clouding, opioid-induced hyperalgesia, endocrine dysfunction, addiction, dependence, and tolerance  The patient was asked to take his medications only as prescribed and directed, never in excess, and never for any other reason other than for pain control  The patient was also asked to keep his medications out of the reach of others and away from children, preferably in a locked drawer  The patient verbalized understanding and wished to use these opioid medications  A urine drug test was collected at today's office visit as part of our medication management protocol  The point of care testing results were appropriate for what was being prescribed  The specimen will be sent for confirmatory testing  The drug screen is medically necessary because the patient is either dependent on opioid medication or is being considered for opioid medication therapy and the results could impact ongoing or future treatment  The drug screen is to evaluate for the presence or absence of prescribed, non-prescribed, and/or illicit drugs and substances  [place] Prescription Drug Monitoring Program report was reviewed and was appropriate     Follow-up is planned in 3 months time or sooner as warranted  Discharge instructions were provided  I personally saw and examined the patient and I agree with the above discussed plan of care  History of Present Illness:    Daxa Vila is a 58 y o  male who presents to [facility] for interval re-evaluation of the above stated pain complaints  The patient has a past medical and chronic pain history as outlined in the assessment section  He was last seen on October 10, 2022      At today's office visit, the patient's pain score is 7/10 on the verbal numerical pain rating scale  The patient states that his pain is worse in the morning, evening, and night  His pain is constant in nature  He reports the quality of his pain as dull/aching, throbbing, and numbness  He is reporting symptoms primarily in his low back and right lower extremity  He does report that his symptoms have been well controlled currently with a combination of oxycodone/acetaminophen, etodolac, gabapentin  He does use 2-3 tablets of the oxycodone/acetaminophen per day  He denies opioid induced constipation  Other than as stated above, the patient denies any interval changes in medications, medical condition, mental condition, symptoms, or allergies since the last office visit  Review of Systems:    Review of Systems   Constitutional: Negative for unexpected weight change  HENT: Negative for ear pain  Eyes: Negative for visual disturbance  Respiratory: Negative for shortness of breath and wheezing  Gastrointestinal: Negative for abdominal pain  Musculoskeletal: Positive for back pain and gait problem  Decreased ROM, joint stiffness lower back   Neurological: Positive for weakness and numbness  Psychiatric/Behavioral: Positive for dysphoric mood and sleep disturbance  Negative for decreased concentration  The patient is nervous/anxious            Past Medical History:   Diagnosis Date   • Arthritis    • BPH (benign prostatic hypertrophy) with urinary obstruction    • Chronic narcotic dependence (HCC)     percocet TID   • Chronic pain disorder     back-spinal cord stimulator   • Colon polyp    • Diabetes mellitus ([facility] [address] )     type   • Ear infection    • Herniation of lumbar intervertebral disc with radiculopathy    • Myofascial pain syndrome    • Obesity    • Sciatica    • Spinal cord stimulator status 2017    implanted 2017   • Tinnitus        Past Surgical History:   Procedure Laterality Date   • ABSCESS DRAINAGE      groin   • BACK SURGERY      sciatic nerve- spinal cord stimulator 2017   • CAUDAL BLOCK N/A 10/26/2018    Procedure: Caudal Epidural Steroid Injection (65815); Surgeon: Bridgette Godfrey MD;  Location: [facility] MAIN OR;  Service: Pain Management    • CAUDAL BLOCK N/A 11/30/2018    Procedure: Caudal Epidural Steroid Injection (66864); Surgeon: Bridgette Godfrey MD;  Location: [facility] MAIN OR;  Service: Pain Management    • COLONOSCOPY     • EPIDURAL BLOCK INJECTION Right 5/10/2019    Procedure: L5 S1 transforaminal Epidural Steroid Injection ([id number];  Surgeon: Bridgette Godfrey MD;  Location: [facility] MAIN OR;  Service: Pain Management    • EPIDURAL BLOCK INJECTION Right 8/16/2019    Procedure: BLOCK / INJECTION EPIDURAL STEROID TRANSFORAMINAL;  Surgeon: Bridgette Godfrey MD;  Location: [facility] MAIN OR;  Service: Pain Management    • EPIDURAL BLOCK INJECTION Left 7/1/2021    Procedure: L5 S1 transforaminal epidural steroid injection ( [id number]); Surgeon: Bridgette Godfrey MD;  Location: [facility] OR;  Service: Pain Management    • EPIDURAL BLOCK INJECTION Right 7/15/2021    Procedure: L5 S1 transforaminal epidural steroid injection ( [id number]); Surgeon: Bridgette Godfrey MD;  Location: [facility] OR;  Service: Pain Management    • EPIDURAL BLOCK INJECTION Right 1/7/2022    Procedure: L5 S1 transforaminal epidural steroid injection ([id number]); Surgeon: Bridgette Godfrey MD;  Location: [facility] OR;  Service: Pain Management    • EPIDURAL BLOCK INJECTION Left 1/28/2022    Procedure: L5 S1 transforaminal epidural steroid injection ([id number]); Surgeon: Bridgette Godfrey MD;  Location: [facility] OR;  Service: Pain Management    • NERVE BLOCK Bilateral 4/26/2018    Procedure: B/L L3 L4 L5 S1 MBB #1 ([id number]);   Surgeon: Bridgette Godfrey MD;  Location: [facility] OR;  Service: Pain Management    • NERVE BLOCK Bilateral 5/11/2018    Procedure: B/L L3 L4 L5 S1 Medial Branch Block #2;  Surgeon: Bridgette Godfrey MD;  Location: [facility] MAIN OR;  Service: Pain Management    • NOSE SURGERY     • WA COLONOSCOPY FLX DX W/COLLJ SPEC WHEN PFRMD N/A 3/6/2017    Procedure: COLONOSCOPY;  Surgeon: Mariana Jaeger MD;  Location: [facility]; Service: Gastroenterology   • WA IMPLANT SPINAL NEUROSTIM/ Left 6/29/2021    Procedure: REPLACEMENT IMPLANTABLE PULSE GENERATOR DORSAL SPINAL COLUMN STIMULATOR, LEFT;  Surgeon: Ajit Munson MD;  Location:  MAIN OR;  Service: Neurosurgery   • WA SURG IMPLNT NEUROELECT,EPIDURAL Left 10/3/2017    Procedure: PLACEMENT THORACIC FOR INSERTION DORSAL COLUMN SPINAL CORD STIMULATOR (DCS) WITH BUTTOCK IMPLANTABLE PULSE GENERATOR(IMPULSE); Surgeon: Kassandra Porter MD;  Location:  MAIN OR;  Service: Neurosurgery   • RADIOFREQUENCY ABLATION Right 5/18/2018    Procedure: Rt L3 L4 L5 S1 Radio Frequency Ablation ([id number]); Surgeon: Margarita Ospina MD;  Location: [facility] MAIN OR;  Service: Pain Management    • RADIOFREQUENCY ABLATION Left 6/1/2018    Procedure: Lt L3 L4 L5 S1 Radio Frequency Ablation ([id number]);   Surgeon: Margarita Ospina MD;  Location: [facility] MAIN OR;  Service: Pain Management        Family History   Problem Relation Age of Onset   • Diabetes Mother    • Arthritis Mother    • Diabetes Father         mellitus    • Heart attack Father 58   • Hypertension Father    • Arthritis Father        Social History     Occupational History   • Occupation:  for [employer]    Tobacco Use   • Smoking status: Every Day     Packs/day: 1 00     Years: 42 00     Pack years: 42 00     Types: Cigarettes   • Smokeless tobacco: Never   • Tobacco comments:     encouraged smoking cessation - history of smoking 30 or more pack years    Vaping Use   • Vaping Use: Never used   Substance and Sexual Activity   • Alcohol use: No     Comment: rare   • Drug use: No   • Sexual activity: Not Currently     Partners: Female         Current Outpatient Medications:   •  atorvastatin (LIPITOR) 20 mg tablet, TAKE 1 TABLET EVERY DAY, Disp: 90 tablet, Rfl: 0  • bisacodyl (DULCOLAX) 5 mg EC tablet, Take 20 mg by mouth 1 (one) time, Disp: , Rfl:   •  clobetasol (TEMOVATE) 0 05 % ointment, APPLY TOPICALLY 2 (TWO) TIMES A DAY (Patient taking differently: Apply topically 2 (two) times a day as needed), Disp: 30 g, Rfl: 1  •  etodolac (LODINE) 300 MG capsule, Take 1 capsule (300 mg total) by mouth 3 (three) times a day as needed (pain (with food)), Disp: 270 capsule, Rfl: 0  •  fluticasone (FLONASE) 50 mcg/act nasal spray, USE 2 SPRAYS IN EACH NOSTRIL EVERY DAY, Disp: 48 g, Rfl: 0  •  gabapentin (Neurontin) 600 MG tablet, Take 2 tablets (1,200 mg total) by mouth 2 (two) times a day, Disp: 360 tablet, Rfl: 0  •  glipiZIDE (GLUCOTROL XL) 10 mg 24 hr tablet, TAKE 1 TABLET EVERY DAY, Disp: 90 tablet, Rfl: 0  •  lidocaine (LIDODERM) 5 %, Apply 2 patches topically daily Remove & Discard patch within 12 hours or as directed by MD (Patient taking differently: Apply 2 patches topically daily as needed Remove & Discard patch within 12 hours or as directed by MD), Disp: 60 patch, Rfl: 0  •  lisinopril (ZESTRIL) 2 5 mg tablet, TAKE 1 TABLET AT BEDTIME, Disp: 90 tablet, Rfl: 0  •  magnesium citrate solution, Take 296 mL by mouth once, Disp: , Rfl:   •  metFORMIN (GLUCOPHAGE) 1000 MG tablet, TAKE 1 TABLET TWICE DAILY WITH MEALS, Disp: 180 tablet, Rfl: 1  •  omeprazole (PriLOSEC) 20 mg delayed release capsule, TAKE 1 CAPSULE EVERY DAY, Disp: 90 capsule, Rfl: 0  •  [START ON 12/10/2022] oxyCODONE-acetaminophen (Percocet) 7 5-325 MG per tablet, Take 1 tablet by mouth 3 (three) times a day as needed for severe pain Max Daily Amount: 3 tablets Do not start before December 10, 2022 , Disp: 90 tablet, Rfl: 0  •  [START ON 1/9/2023] oxyCODONE-acetaminophen (Percocet) 7 5-325 MG per tablet, Take 1 tablet by mouth 3 (three) times a day as needed for severe pain Max Daily Amount: 3 tablets Do not start before January 9, 2023 , Disp: 90 tablet, Rfl: 0  •  [START ON 2/8/2023] oxyCODONE-acetaminophen (Percocet) 7 5-325 MG per tablet, Take 1 tablet by mouth 3 (three) times a day as needed for severe pain Max Daily Amount: 3 tablets Do not start before February 8, 2023 , Disp: 90 tablet, Rfl: 0  •  Polyethylene Glycol 3350 (MIRALAX PO), Take by mouth 1 (one) time, Disp: , Rfl:   •  QUEtiapine (SEROquel) 100 mg tablet, Take one tablet by mouth daily at bedtime, Disp: 90 tablet, Rfl: 0  •  sildenafil (VIAGRA) 50 MG tablet, Take 1 tablet (50 mg total) by mouth as needed for erectile dysfunction Take on an empty stomach, 1 hour before sexual activity, no alcohol   100 mg max dose , Disp: 30 tablet, Rfl: 0  •  tamsulosin (FLOMAX) 0 4 mg, TAKE 2 CAPSULES EVERY DAY WITH DINNER, Disp: 180 capsule, Rfl: 0  •  traZODone (DESYREL) 50 mg tablet, TAKE 2 TABLETS EVERY DAY AT BEDTIME, Disp: 180 tablet, Rfl: 0    Allergies   Allergen Reactions   • Penicillins Swelling     Mouth swelling       Physical Exam:    /75   Pulse 78   Temp 98 2 °F (36 8 °C)   Resp 19   Ht 5' 11" (1 803 m)   Wt 106 kg (234 lb)   BMI 32 64 kg/m²     Constitutional:obese  Eyes:anicteric  HEENT:grossly intact  Neck:supple, symmetric, trachea midline and no masses   Pulmonary:even and unlabored  Cardiovascular:No edema or pitting edema present  Skin:Normal without rashes or lesions and well hydrated  Psychiatric:Mood and affect appropriate  Neurologic:Cranial Nerves II-XII grossly intact  Musculoskeletal:normal     Orders Placed This Encounter   Procedures   • MM ALL_Prescribed Meds and Special Instructions   • MM DT_Alprazolam Definitive Test   • MM DT_Amphetamine Definitive Test   • MM DT_Buprenorphine Definitive Test   • MM DT_Citalopram/Escitalopram Definitive Test   • MM DT_Clonazepam Definitive Test   • MM DT_Cocaine Definitive Test   • MM DT_Codeine Definitive Test   • MM Diazepam Definitive Test   • MM DT_Ethyl Glucuronide/Ethyl Sulfate Definitive Test   • MM DT_Fentanyl Definitive Test   • MM DT_Heroin Definitive Test   • MM DT_Hydrocodone Definitive Test   • MM DT_Hydromorphone Definitive Test   • MM Lorazepam Definitive Test   • MM DT_MDMA Definitive Test   • MM DT_Methadone Definitive Test   • MM DT_Methamphetamine Definitive Test   • MM DT_Methylphenidate Definitive Test   • MM DT_Morphine Definitive Test   • MM DT_Oxazepam Definitive Test   • MM DT_Oxycodone Definitive Test   • MM DT_Oxymorphone Definitive Test   • MM DT_Phentermine Definitive Test   • MM DT_Pregablin Definitive   • MM DT_Tapentadol Definitive Test   • MM DT_Temazapam Definitive Test   • MM DT_THC Definitive Test   • MM DT_Tramadol Definitive Test   • MM DT_Validity Creatinine   • MM DT_Validity Oxidant   • MM DT_Validity pH   • MM DT_Validity Specific

## 2022-12-07 LAB
6MAM UR QL CFM: NEGATIVE NG/ML
7AMINOCLONAZEPAM UR QL CFM: NEGATIVE NG/ML
A-OH ALPRAZ UR QL CFM: NEGATIVE NG/ML
ACCEPTABLE CREAT UR QL: NORMAL MG/DL
ACCEPTIBLE SP GR UR QL: NORMAL
AMPHET UR QL CFM: NEGATIVE NG/ML
AMPHET UR QL CFM: NEGATIVE NG/ML
BUPRENORPHINE UR QL CFM: NEGATIVE NG/ML
BZE UR QL CFM: NEGATIVE NG/ML
CODEINE UR QL CFM: NEGATIVE NG/ML
EDDP UR QL CFM: NEGATIVE NG/ML
ETHYL GLUCURONIDE UR QL CFM: NEGATIVE NG/ML
ETHYL SULFATE UR QL SCN: NEGATIVE NG/ML
FENTANYL UR QL CFM: NEGATIVE NG/ML
HYDROCODONE UR QL CFM: NEGATIVE NG/ML
HYDROCODONE UR QL CFM: NEGATIVE NG/ML
HYDROMORPHONE UR QL CFM: NEGATIVE NG/ML
LORAZEPAM UR QL CFM: NEGATIVE NG/ML
MDMA UR QL CFM: NEGATIVE NG/ML
ME-PHENIDATE UR QL CFM: NEGATIVE NG/ML
METHADONE UR QL CFM: NEGATIVE NG/ML
METHAMPHET UR QL CFM: NEGATIVE NG/ML
MORPHINE UR QL CFM: NEGATIVE NG/ML
MORPHINE UR QL CFM: NEGATIVE NG/ML
NITRITE UR QL: NORMAL UG/ML
NORBUPRENORPHINE UR QL CFM: NEGATIVE NG/ML
NORDIAZEPAM UR QL CFM: NEGATIVE NG/ML
NORFENTANYL UR QL CFM: NEGATIVE NG/ML
NORHYDROCODONE UR QL CFM: NEGATIVE NG/ML
NORHYDROCODONE UR QL CFM: NEGATIVE NG/ML
NOROXYCODONE UR QL CFM: NORMAL NG/ML
OXAZEPAM UR QL CFM: NEGATIVE NG/ML
OXYCODONE UR QL CFM: NORMAL NG/ML
OXYMORPHONE UR QL CFM: NEGATIVE NG/ML
OXYMORPHONE UR QL CFM: NEGATIVE NG/ML
PARA-FLUOROFENTANYL QUANTIFICATION: NORMAL NG/ML
RESULT ALL_PRESCRIBED MEDS AND SPECIAL INSTRUCTIONS: NORMAL
SL AMB 4-ANPP QUANTIFICATION: NORMAL NG/ML
SL AMB ACETYL FENTANYL QUANTIFICATION: NORMAL NG/ML
SL AMB ACETYL NORFENTANYL QUANTIFICATION: NORMAL NG/ML
SL AMB ACRYL FENTANYL QUANTIFICATION: NORMAL NG/ML
SL AMB CARFENTANIL QUANTIFICATION: NORMAL NG/ML
SL AMB CITALOPRAM/ESCITALOPRAM QUANTIFICATION: NEGATIVE NG/ML
SL AMB CITALOPRAM/ESCITALOPRAM QUANTIFICATION: NEGATIVE NG/ML
SL AMB CTHC (MARIJUANA METABOLITE) QUANTIFICATION: NEGATIVE NG/ML
SL AMB N-DESMETHYL-TRAMADOL QUANTIFICATION: NEGATIVE NG/ML
SL AMB PHENTERMINE QUANTIFICATION: NEGATIVE NG/ML
SL AMB PREGABALIN QUANTIFICATION: NEGATIVE
SL AMB RITALINIC ACID QUANTIFICATION: NEGATIVE NG/ML
SPECIMEN PH ACCEPTABLE UR: NORMAL
TAPENTADOL UR QL CFM: NEGATIVE NG/ML
TEMAZEPAM UR QL CFM: NEGATIVE NG/ML
TEMAZEPAM UR QL CFM: NEGATIVE NG/ML
TRAMADOL UR QL CFM: NEGATIVE NG/ML
URATE/CREAT 24H UR: NEGATIVE NG/ML

## 2023-01-05 ENCOUNTER — RA CDI HCC (OUTPATIENT)
Dept: OTHER | Facility: HOSPITAL | Age: 63
End: 2023-01-05

## 2023-01-05 NOTE — PROGRESS NOTES
[facility]  coding opportunities       Chart reviewed, no opportunity found:   [address]        Patients Insurance     Medicare Insurance: [facility]

## 2023-01-06 ENCOUNTER — APPOINTMENT (OUTPATIENT)
Dept: LAB | Facility: IMAGING CENTER | Age: 63
End: 2023-01-06

## 2023-01-06 DIAGNOSIS — E78.5 HYPERLIPIDEMIA LDL GOAL <70: ICD-10-CM

## 2023-01-06 DIAGNOSIS — E11.9 TYPE 2 DIABETES MELLITUS WITHOUT COMPLICATION, WITHOUT LONG-TERM CURRENT USE OF INSULIN (HCC): ICD-10-CM

## 2023-01-06 LAB
ANION GAP SERPL CALCULATED.3IONS-SCNC: 5 MMOL/L (ref 4–13)
BUN SERPL-MCNC: 13 MG/DL (ref 5–25)
CALCIUM SERPL-MCNC: 9.2 MG/DL (ref 8.3–10.1)
CHLORIDE SERPL-SCNC: 105 MMOL/L (ref 96–108)
CHOLEST SERPL-MCNC: 106 MG/DL
CO2 SERPL-SCNC: 25 MMOL/L (ref 21–32)
CREAT SERPL-MCNC: 0.88 MG/DL (ref 0.6–1.3)
CREAT UR-MCNC: 113 MG/DL
GFR SERPL CREATININE-BSD FRML MDRD: 92 ML/MIN/1.73SQ M
GLUCOSE P FAST SERPL-MCNC: 149 MG/DL (ref 65–99)
HDLC SERPL-MCNC: 38 MG/DL
LDLC SERPL CALC-MCNC: 58 MG/DL (ref 0–100)
MICROALBUMIN UR-MCNC: 18.6 MG/L (ref 0–20)
MICROALBUMIN/CREAT 24H UR: 16 MG/G CREATININE (ref 0–30)
NONHDLC SERPL-MCNC: 68 MG/DL
POTASSIUM SERPL-SCNC: 4.9 MMOL/L (ref 3.5–5.3)
SODIUM SERPL-SCNC: 135 MMOL/L (ref 135–147)
TRIGL SERPL-MCNC: 51 MG/DL

## 2023-01-09 LAB
EST. AVERAGE GLUCOSE BLD GHB EST-MCNC: 123 MG/DL
HBA1C MFR BLD: 5.9 %

## 2023-01-12 ENCOUNTER — OFFICE VISIT (OUTPATIENT)
Dept: FAMILY MEDICINE CLINIC | Facility: CLINIC | Age: 63
End: 2023-01-12

## 2023-01-12 VITALS
BODY MASS INDEX: 32.34 KG/M2 | DIASTOLIC BLOOD PRESSURE: 74 MMHG | WEIGHT: 231 LBS | SYSTOLIC BLOOD PRESSURE: 124 MMHG | HEART RATE: 86 BPM | OXYGEN SATURATION: 97 % | HEIGHT: 71 IN | RESPIRATION RATE: 16 BRPM

## 2023-01-12 DIAGNOSIS — F17.200 SMOKER: ICD-10-CM

## 2023-01-12 DIAGNOSIS — E78.5 HYPERLIPIDEMIA LDL GOAL <70: ICD-10-CM

## 2023-01-12 DIAGNOSIS — E11.9 TYPE 2 DIABETES MELLITUS WITHOUT COMPLICATION, WITHOUT LONG-TERM CURRENT USE OF INSULIN (HCC): Primary | ICD-10-CM

## 2023-01-12 DIAGNOSIS — Z12.5 PROSTATE CANCER SCREENING: ICD-10-CM

## 2023-01-12 DIAGNOSIS — F11.20 UNCOMPLICATED OPIOID DEPENDENCE (HCC): ICD-10-CM

## 2023-01-12 DIAGNOSIS — Z12.2 ENCOUNTER FOR SCREENING FOR LUNG CANCER: ICD-10-CM

## 2023-01-12 DIAGNOSIS — G47.00 INSOMNIA, UNSPECIFIED TYPE: ICD-10-CM

## 2023-01-12 DIAGNOSIS — K63.5 POLYP OF COLON, UNSPECIFIED PART OF COLON, UNSPECIFIED TYPE: ICD-10-CM

## 2023-01-12 DIAGNOSIS — G89.4 CHRONIC PAIN SYNDROME: ICD-10-CM

## 2023-01-12 DIAGNOSIS — E11.42 DIABETIC POLYNEUROPATHY ASSOCIATED WITH TYPE 2 DIABETES MELLITUS (HCC): ICD-10-CM

## 2023-01-12 RX ORDER — QUETIAPINE FUMARATE 150 MG/1
100 TABLET, FILM COATED ORAL
Qty: 30 TABLET | Refills: 2 | Status: SHIPPED | OUTPATIENT
Start: 2023-01-12 | End: 2023-01-12

## 2023-01-12 RX ORDER — QUETIAPINE FUMARATE 150 MG/1
150 TABLET, FILM COATED ORAL
Qty: 30 TABLET | Refills: 2 | Status: SHIPPED | OUTPATIENT
Start: 2023-01-12

## 2023-01-12 NOTE — PROGRESS NOTES
Assessment/Plan:   Diagnoses and all orders for this visit:    Type 2 diabetes mellitus without complication, without long-term current use of insulin ([facility] )  -     Diabetic foot exam; Future  -     HEMOGLOBIN A1C W/ EAG ESTIMATION; Future  -     Comprehensive metabolic panel; Future  - A1c (1/6/2023): 5 9  - nml renal function and urine microalbumin   - DM foot exam as documented below  - UTD with annual Flu vaccine   - UTD with COVID IMMs and Booster x3  - UTD with PCV20  - UTD with Tdap   - cont Metformin 1000mg BID  - stopped Glipizide 10mg QD last week   - did see Podiatry 1/24/2022 - "He is on gabapentin and pain stimulator as well as chronic pain medication  Raj Anger is not much further I can offer"   - RTO in 6months, with labs, for f/u and AWV - pt aware and agreeable   Diabetic polyneuropathy associated with type 2 diabetes mellitus ([place] [address] )  -     Diabetic foot exam; Future    Hyperlipidemia LDL goal <70  - LDL 58  - cont current regimen   - The ASCVD Risk score (Jey MILES, et al , 2019) failed to calculate for the following reasons: The valid total cholesterol range is 130 to 320 mg/dL    Smoker  -     CT lung screening program; Future  - CT lung (9/9/2022): "Unchanged 5mm and smaller pulm nodule, repeat annually"  Encounter for screening for lung cancer    Polyp of colon, unspecified part of colon, unspecified type  - UTD with Colon Ca screening (3/2022) - h/o polyps - repeat in 2027 - pt aware    Chronic pain syndrome  - follows with PM   Uncomplicated opioid dependence ([facility] )    Insomnia, unspecified type  -     Discontinue: QUEtiapine 150 MG TABS; Take 100 mg by mouth daily at bedtime  -     QUEtiapine Fumarate 150 MG TABS; Take 150 mg by mouth daily at bedtime  - will increase to 150mg QHS   - RTO in 6wks for f/u - pt aware and agreeable     Prostate cancer screening  -     PSA, Total Screen; Future          Subjective:    Patient ID: Kaylie Ruiz is a 58 y o  male    HPI  65yo M presents to the office for f/u   - reviewed labs done 1/6/2023 - A1c 5 9, LDL 58, nml renal function and urine microalbumin   - CT lung (9/9/2022): "Unchanged 5mm and smaller pulm nodule, repeat annually"  - AAA screen (9/9/2022): Nml AAA screen   - UTD with Colon Ca screening (3/2022) - h/o polyps - repeat in 2027 - pt aware  - has been taking Metformin 1000mg BID, stopped Glipizide 10mg QD last week   - did see Podiatry 1/24/2022 - "He is on gabapentin and pain stimulator as well as chronic pain medication  Jah Cesar is not much further I can offer"   - UTD with annual Flu vaccine   - UTD with COVID IMMs and Booster x3  - UTD with PCV20  - UTD with Tdap   - having a hard time sleeping   - denies F/C/N/V/CP/palpitations/SOB/wheezing/abd pain/LE edema        The following portions of the patient's history were reviewed and updated as appropriate: allergies, current medications, past family history, past medical history, past social history, past surgical history and problem list     Review of Systems  as per HPI    Objective:  /74 (BP Location: Left arm, Patient Position: Sitting, Cuff Size: Large)   Pulse 86   Resp 16   Ht 5' 11" (1 803 m)   Wt 105 kg (231 lb)   SpO2 97%   BMI 32 22 kg/m²    Physical Exam  Vitals reviewed  Constitutional:       General: He is not in acute distress  Appearance: Normal appearance  He is not ill-appearing, toxic-appearing or diaphoretic  HENT:      Head: Normocephalic and atraumatic  Right Ear: External ear normal       Left Ear: External ear normal       Nose: Nose normal    Eyes:      General: No scleral icterus  Right eye: No discharge  Left eye: No discharge  Extraocular Movements: Extraocular movements intact  Conjunctiva/sclera: Conjunctivae normal    Cardiovascular:      Rate and Rhythm: Normal rate and regular rhythm  Pulses: no weak pulses          Dorsalis pedis pulses are 2+ on the right side and 2+ on the left side        Heart sounds: Normal heart sounds  No murmur heard  No friction rub  No gallop  Pulmonary:      Effort: Pulmonary effort is normal       Breath sounds: Normal breath sounds  Abdominal:      Palpations: Abdomen is soft  Musculoskeletal:         General: Normal range of motion  Cervical back: Normal range of motion  Right lower leg: No edema  Left lower leg: No edema  Feet:      Right foot:      Skin integrity: No ulcer, skin breakdown, erythema, warmth, callus or dry skin  Left foot:      Skin integrity: No ulcer, skin breakdown, erythema, warmth, callus or dry skin  Skin:     General: Skin is warm  Neurological:      General: No focal deficit present  Mental Status: He is alert and oriented to person, place, and time  Psychiatric:         Mood and Affect: Mood normal          Behavior: Behavior normal          Patient's shoes and socks removed  Right Foot/Ankle   Right Foot Inspection  Skin Exam: skin normal and skin intact  No dry skin, no warmth, no callus, no erythema, no maceration, no abnormal color, no pre-ulcer, no ulcer and no callus  Toe Exam: ROM and strength within normal limits  No swelling, no tenderness, erythema and  no right toe deformity    Sensory   Monofilament testing: diminished    Vascular  The right DP pulse is 2+  Left Foot/Ankle  Left Foot Inspection  Skin Exam: skin normal and skin intact  No dry skin, no warmth, no erythema, no maceration, normal color, no pre-ulcer, no ulcer and no callus  Toe Exam: ROM and strength within normal limits  No swelling, no tenderness, no erythema and no left toe deformity  Sensory   Monofilament testing: diminished    Vascular  The left DP pulse is 2+  Assign Risk Category  No deformity present  Loss of protective sensation  No weak pulses  Risk: 1      BMI Counseling: Body mass index is 32 22 kg/m²  The BMI is above normal  Nutrition recommendations include 3-5 servings of fruits/vegetables daily   Exercise recommendations include exercising 3-5 times per week

## 2023-01-30 DIAGNOSIS — G47.00 INSOMNIA, UNSPECIFIED TYPE: ICD-10-CM

## 2023-01-30 NOTE — TELEPHONE ENCOUNTER
Patient called and advised in message that he would like a 90 day supply to [facility]  However he then stated that this medication works but not really if there is something else he could take?

## 2023-02-02 DIAGNOSIS — G47.00 INSOMNIA, UNSPECIFIED TYPE: Primary | ICD-10-CM

## 2023-02-02 RX ORDER — QUETIAPINE FUMARATE 150 MG/1
150 TABLET, FILM COATED ORAL
Qty: 30 TABLET | Refills: 2 | OUTPATIENT
Start: 2023-02-02

## 2023-02-02 RX ORDER — MIRTAZAPINE 7.5 MG/1
7.5 TABLET, FILM COATED ORAL
Qty: 90 TABLET | Refills: 0 | Status: SHIPPED | OUTPATIENT
Start: 2023-02-02

## 2023-02-02 NOTE — TELEPHONE ENCOUNTER
Advise to STOP Seroquel and eRx for Remeron 7 5mg QHS (90pills) sent to [place]    Please schedule f/u appt for pt in 1month

## 2023-03-06 ENCOUNTER — OFFICE VISIT (OUTPATIENT)
Dept: PAIN MEDICINE | Facility: CLINIC | Age: 63
End: 2023-03-06

## 2023-03-06 VITALS
SYSTOLIC BLOOD PRESSURE: 105 MMHG | BODY MASS INDEX: 31.38 KG/M2 | DIASTOLIC BLOOD PRESSURE: 66 MMHG | TEMPERATURE: 98.5 F | WEIGHT: 225 LBS | HEART RATE: 78 BPM

## 2023-03-06 DIAGNOSIS — M96.1 POSTLAMINECTOMY SYNDROME, LUMBAR: ICD-10-CM

## 2023-03-06 DIAGNOSIS — G89.29 CHRONIC BILATERAL LOW BACK PAIN WITH RIGHT-SIDED SCIATICA: ICD-10-CM

## 2023-03-06 DIAGNOSIS — G89.29 CHRONIC LOW BACK PAIN: ICD-10-CM

## 2023-03-06 DIAGNOSIS — M96.1 POSTLAMINECTOMY SYNDROME, LUMBAR REGION: ICD-10-CM

## 2023-03-06 DIAGNOSIS — M54.50 CHRONIC LOW BACK PAIN: ICD-10-CM

## 2023-03-06 DIAGNOSIS — M54.41 CHRONIC BILATERAL LOW BACK PAIN WITH RIGHT-SIDED SCIATICA: ICD-10-CM

## 2023-03-06 DIAGNOSIS — Z79.891 LONG-TERM CURRENT USE OF OPIATE ANALGESIC: ICD-10-CM

## 2023-03-06 DIAGNOSIS — M47.816 LUMBAR SPONDYLOSIS: ICD-10-CM

## 2023-03-06 DIAGNOSIS — M54.16 LUMBAR RADICULOPATHY: ICD-10-CM

## 2023-03-06 DIAGNOSIS — G89.4 CHRONIC PAIN SYNDROME: Primary | ICD-10-CM

## 2023-03-06 DIAGNOSIS — F11.20 UNCOMPLICATED OPIOID DEPENDENCE (HCC): ICD-10-CM

## 2023-03-06 RX ORDER — OXYCODONE AND ACETAMINOPHEN 7.5; 325 MG/1; MG/1
1 TABLET ORAL 3 TIMES DAILY PRN
Qty: 90 TABLET | Refills: 0 | Status: SHIPPED | OUTPATIENT
Start: 2023-03-06 | End: 2023-04-05

## 2023-03-06 RX ORDER — OXYCODONE AND ACETAMINOPHEN 7.5; 325 MG/1; MG/1
1 TABLET ORAL 3 TIMES DAILY PRN
Qty: 90 TABLET | Refills: 0 | Status: SHIPPED | OUTPATIENT
Start: 2023-05-05 | End: 2023-06-04

## 2023-03-06 RX ORDER — GABAPENTIN 600 MG/1
1200 TABLET ORAL 2 TIMES DAILY
Qty: 360 TABLET | Refills: 0 | Status: SHIPPED | OUTPATIENT
Start: 2023-03-06 | End: 2023-06-04

## 2023-03-06 RX ORDER — OXYCODONE AND ACETAMINOPHEN 7.5; 325 MG/1; MG/1
1 TABLET ORAL 3 TIMES DAILY PRN
Qty: 90 TABLET | Refills: 0 | Status: SHIPPED | OUTPATIENT
Start: 2023-04-05 | End: 2023-05-05

## 2023-03-06 NOTE — PROGRESS NOTES
Pain Medicine Follow-Up Note    Assessment:  1  Chronic pain syndrome    2  Chronic bilateral low back pain with right-sided sciatica    3  Postlaminectomy syndrome, lumbar region    4  Lumbar spondylosis    5  Long-term current use of opiate analgesic    6  Uncomplicated opioid dependence ([facility] [address] )    7  Chronic low back pain    8  Postlaminectomy syndrome, lumbar    9   Lumbar radiculopathy        Plan:  Orders Placed This Encounter   Procedures   • MM OF_Alprazolam Definitive   • MM OF_Amphetamine Definitive Test   • MM OF_Buprenorphine Definitive Test   • MM OF_Carisoprodol Definitive Test   • MM OF_Clonazepam Definitive   • MM OF_Cocaine Definitive Test   • MM OF_Codeine Definitive   • MM OF_Dextromethorphan Definitive Test   • MM OF_Diazepam Definitive   • MM OF_Fentanyl Definitive Test   • MM OF_Gabapentin Definitive Test   • MM OF_Heroin Definitive Test   • MM OF_Hydrocodone Definitive   • MM OF_Hydromorphone Definitive   • MM OF_Levorphanol Definitive Test   • MM OF_Lorazepam Definitive   • MM OF_MDMA Definitive Test   • MM OF_Meperidine Definitive Test   • MM OF_Methadone Definitive Test   • MM OF_Methylphenidate Definitive Test   • MM OF_Morphine Definitive   • MM OF_Naltrexone Definitive Test   • MM OF_Oxazepam Definitive   • MM OF_Oxycodone Definitive Test - Oral Fluid   • MM OF_Oxymorphone Definitive Test   • MM OF_Phencyclidine Definitive Test   • MM OF_Pregabalin Definitive Test   • MM OF_Tapentadol Definitive Test   • MM OF_Temazepam Definitive   • MM OF_THC Definitive Test   • MM OF_Tramadol Definitive Test       New Medications Ordered This Visit   Medications   • gabapentin (Neurontin) 600 MG tablet     Sig: Take 2 tablets (1,200 mg total) by mouth 2 (two) times a day     Dispense:  360 tablet     Refill:  0   • oxyCODONE-acetaminophen (Percocet) 7 5-325 MG per tablet     Sig: Take 1 tablet by mouth 3 (three) times a day as needed for severe pain Max Daily Amount: 3 tablets Do not start before April 5, 2023  Dispense:  90 tablet     Refill:  0     May fill one day prior to 4/5/2023   • oxyCODONE-acetaminophen (Percocet) 7 5-325 MG per tablet     Sig: Take 1 tablet by mouth 3 (three) times a day as needed for severe pain Max Daily Amount: 3 tablets     Dispense:  90 tablet     Refill:  0     Fill today   • oxyCODONE-acetaminophen (Percocet) 7 5-325 MG per tablet     Sig: Take 1 tablet by mouth 3 (three) times a day as needed for severe pain Max Daily Amount: 3 tablets Do not start before May 5, 2023  Dispense:  90 tablet     Refill:  0     May fill one day prior to 5/5/2023   • etodolac (LODINE) 300 MG capsule     Sig: Take 1 capsule (300 mg total) by mouth 3 (three) times a day as needed (pain (with food))     Dispense:  270 capsule     Refill:  0       My impressions and treatment recommendations were discussed in detail with the patient who verbalized understanding and had no further questions  The patient continues to report an overall reduction of his pain level and improvement with his functioning without significant side effects using oxycodone/acetaminophen 7 5/325 mg tablet patient uses 1 tablet up to 3 times a day as needed for severe pain along with etodolac 300 mg capsule patient uses 1 capsule by mouth up to 3 times a day as needed and gabapentin 600 mg tablet patient takes 2 tablets twice a day, therefore I will continue the patient on these medications  oxycodone/acetaminophen 7 5/325 mg tablet E-prescribed to the patient's pharmacy with a do not fill until date of 3/6/2023, 4/5/2023 and 5/5/2023  Patient does report that he uses Dulcolax for constipation (patient states that he does not believe it has to do with his opioid medications) at this time I do recommend that the patient add senna 8 6 mg tablet patient to use 1 tablet at bedtime as needed for constipation    I also educated the patient on chronic NSAID use on how the patient should try not to use this medication consistently that it can have long-term effects on his kidneys  Patient stated that he will try to use less of this medication  [place] Prescription Drug Monitoring Program report was reviewed and was appropriate     There are risks associated with opioid medications, including dependence, addiction and tolerance  The patient understands and agrees to use these medications only as prescribed  Potential side effects of the medications include, but are not limited to, constipation, drowsiness, addiction, impaired judgment and risk of fatal overdose if not taken as prescribed  The patient was warned against driving while taking sedation medications  Sharing medications is a felony  At this point in time, the patient is showing no signs of addiction, abuse, diversion or suicidal ideation  An oral drug screen swab was collected at today's office visit  The swab will be sent for confirmatory testing  The drug screen is medically necessary because the patient is either dependent on opioid medication or is being considered for opioid medication therapy and the results could impact ongoing or future treatment  The drug screen is to evaluate for the presences or absence of prescribed, non-prescribed, and/or illicit drugs/substances  Follow-up is planned in 3 months time or sooner as warranted  Discharge instructions were provided  I personally saw and examined the patient and I agree with the above discussed plan of care  History of Present Illness:    Lexie Callaway is a 58 y o  male who presents to [facility] for interval re-evaluation of the above stated pain complaints  The patient has a past medical and chronic pain history as outlined in the assessment section  He was last seen on 12/5/2022  At today's visit patient states that his pain symptoms are the same with a pain score of 50 out of 10 on the verbal numeric pain scale    The patient's pain is worse in the morning, evening, and at night  The patient's pain is constant in nature  The quality of the patient's pain is described as dull-aching, throbbing, numbness, and pins-and-needles  Patient indicates that his pain is located in his bilateral low back and radiates down his right lateral leg  Patient reports the amount of pain relief he is obtaining from his current pain relievers is enough to make a difference in his life by reducing his pain symptoms by 30%  Pain Contract Signed:  3/6/2023  Last Urine Drug Screen:  12/5/2022  New drug screen: 3/6/2023    Other than as stated above, the patient denies any interval changes in medications, medical condition, mental condition, symptoms, or allergies since the last office visit  Review of Systems:    Review of Systems   Respiratory: Negative for shortness of breath  Cardiovascular: Negative for chest pain  Gastrointestinal: Positive for constipation  Negative for diarrhea, nausea and vomiting  Musculoskeletal: Positive for gait problem  Negative for arthralgias, joint swelling and myalgias  Decreased ROM, joint stiffness   Skin: Negative for rash  Neurological: Negative for dizziness, seizures and weakness  All other systems reviewed and are negative          Past Medical History:   Diagnosis Date   • Arthritis    • BPH (benign prostatic hypertrophy) with urinary obstruction    • Chronic narcotic dependence (HCC)     percocet TID   • Chronic pain disorder     back-spinal cord stimulator   • Colon polyp    • Diabetes mellitus ([facility] [address] )     type   • Ear infection    • Herniation of lumbar intervertebral disc with radiculopathy    • Myofascial pain syndrome    • Obesity    • Sciatica    • Spinal cord stimulator status 2017    implanted 2017   • Tinnitus        Past Surgical History:   Procedure Laterality Date   • ABSCESS DRAINAGE      groin   • BACK SURGERY      sciatic nerve- spinal cord stimulator 2017   • CAUDAL BLOCK N/A 10/26/2018    Procedure: Caudal Epidural Steroid Injection (53527); Surgeon: Lenny Bhatti MD;  Location: [facility] MAIN OR;  Service: Pain Management    • CAUDAL BLOCK N/A 11/30/2018    Procedure: Caudal Epidural Steroid Injection (57057); Surgeon: Lenny Bhatti MD;  Location: [facility] OR;  Service: Pain Management    • COLONOSCOPY     • EPIDURAL BLOCK INJECTION Right 5/10/2019    Procedure: L5 S1 transforaminal Epidural Steroid Injection ([id number];  Surgeon: Lenny Bhatti MD;  Location: [facility] MAIN OR;  Service: Pain Management    • EPIDURAL BLOCK INJECTION Right 8/16/2019    Procedure: BLOCK / INJECTION EPIDURAL STEROID TRANSFORAMINAL;  Surgeon: Lenny Bhatti MD;  Location: [facility] MAIN OR;  Service: Pain Management    • EPIDURAL BLOCK INJECTION Left 7/1/2021    Procedure: L5 S1 transforaminal epidural steroid injection ( [id number]); Surgeon: Lenny Bhatti MD;  Location: [facility] OR;  Service: Pain Management    • EPIDURAL BLOCK INJECTION Right 7/15/2021    Procedure: L5 S1 transforaminal epidural steroid injection ( [id number]); Surgeon: Lenny Bhatti MD;  Location: [facility] OR;  Service: Pain Management    • EPIDURAL BLOCK INJECTION Right 1/7/2022    Procedure: L5 S1 transforaminal epidural steroid injection ([id number]); Surgeon: Lenny Bhatti MD;  Location: [facility] OR;  Service: Pain Management    • EPIDURAL BLOCK INJECTION Left 1/28/2022    Procedure: L5 S1 transforaminal epidural steroid injection ([id number]); Surgeon: Lenny Bhatti MD;  Location: [facility] OR;  Service: Pain Management    • NERVE BLOCK Bilateral 4/26/2018    Procedure: B/L L3 L4 L5 S1 MBB #1 ([id number]);   Surgeon: Lenny Bhatti MD;  Location: [facility] OR;  Service: Pain Management    • NERVE BLOCK Bilateral 5/11/2018    Procedure: B/L L3 L4 L5 S1 Medial Branch Block #2;  Surgeon: Lenny Bhatti MD;  Location: [facility] MAIN OR;  Service: Pain Management    • NOSE SURGERY     • AZ COLONOSCOPY FLX DX W/COLLJ SPEC WHEN PFRMD N/A 3/6/2017    Procedure: COLONOSCOPY;  Surgeon: Racquel Mitchell MD;  Location: [facility]; Service: Gastroenterology   • PA INSJ/RPLCMT SPI NPGR DIR/INDUXIVE COUPLING Left 6/29/2021    Procedure: REPLACEMENT IMPLANTABLE PULSE GENERATOR DORSAL SPINAL COLUMN STIMULATOR, LEFT;  Surgeon: Rey Bowman MD;  Location:  MAIN OR;  Service: Neurosurgery   • PA AN IMPLTJ NSTIM ELTRDS PLATE/PADDLE EDRL Left 10/3/2017    Procedure: PLACEMENT THORACIC FOR INSERTION DORSAL COLUMN SPINAL CORD STIMULATOR (DCS) WITH BUTTOCK IMPLANTABLE PULSE GENERATOR(IMPULSE); Surgeon: Ann Conner MD;  Location:  MAIN OR;  Service: Neurosurgery   • RADIOFREQUENCY ABLATION Right 5/18/2018    Procedure: Rt L3 L4 L5 S1 Radio Frequency Ablation ([id number]); Surgeon: Kunal Pickard MD;  Location: [facility] MAIN OR;  Service: Pain Management    • RADIOFREQUENCY ABLATION Left 6/1/2018    Procedure: Lt L3 L4 L5 S1 Radio Frequency Ablation ([id number]);   Surgeon: Kunal Pickard MD;  Location: [facility] MAIN OR;  Service: Pain Management        Family History   Problem Relation Age of Onset   • Diabetes Mother    • Arthritis Mother    • Diabetes Father         mellitus    • Heart attack Father 58   • Hypertension Father    • Arthritis Father        Social History     Occupational History   • Occupation:  for [employer]    Tobacco Use   • Smoking status: Every Day     Packs/day: 1 00     Years: 42 00     Pack years: 42 00     Types: Cigarettes   • Smokeless tobacco: Never   • Tobacco comments:     encouraged smoking cessation - history of smoking 30 or more pack years    Vaping Use   • Vaping Use: Never used   Substance and Sexual Activity   • Alcohol use: No     Comment: rare   • Drug use: No   • Sexual activity: Not Currently     Partners: Female         Current Outpatient Medications:   •  atorvastatin (LIPITOR) 20 mg tablet, TAKE 1 TABLET EVERY DAY, Disp: 90 tablet, Rfl: 0  •  bisacodyl (DULCOLAX) 5 mg EC tablet, Take 20 mg by mouth 1 (one) time, Disp: , Rfl:   •  clobetasol (TEMOVATE) 0 05 % ointment, APPLY TOPICALLY 2 (TWO) TIMES A DAY (Patient taking differently: Apply topically 2 (two) times a day as needed), Disp: 30 g, Rfl: 1  •  etodolac (LODINE) 300 MG capsule, Take 1 capsule (300 mg total) by mouth 3 (three) times a day as needed (pain (with food)), Disp: 270 capsule, Rfl: 0  •  fluticasone (FLONASE) 50 mcg/act nasal spray, USE 2 SPRAYS IN EACH NOSTRIL EVERY DAY, Disp: 48 g, Rfl: 0  •  gabapentin (Neurontin) 600 MG tablet, Take 2 tablets (1,200 mg total) by mouth 2 (two) times a day, Disp: 360 tablet, Rfl: 0  •  lidocaine (LIDODERM) 5 %, Apply 2 patches topically daily Remove & Discard patch within 12 hours or as directed by MD (Patient taking differently: Apply 2 patches topically daily as needed Remove & Discard patch within 12 hours or as directed by MD), Disp: 60 patch, Rfl: 0  •  lisinopril (ZESTRIL) 2 5 mg tablet, TAKE 1 TABLET AT BEDTIME, Disp: 90 tablet, Rfl: 0  •  magnesium citrate solution, Take 296 mL by mouth once, Disp: , Rfl:   •  metFORMIN (GLUCOPHAGE) 1000 MG tablet, TAKE 1 TABLET TWICE DAILY WITH MEALS, Disp: 180 tablet, Rfl: 1  •  mirtazapine (REMERON) 7 5 MG tablet, Take 1 tablet (7 5 mg total) by mouth daily at bedtime, Disp: 90 tablet, Rfl: 0  •  omeprazole (PriLOSEC) 20 mg delayed release capsule, TAKE 1 CAPSULE EVERY DAY, Disp: 90 capsule, Rfl: 0  •  [START ON 4/5/2023] oxyCODONE-acetaminophen (Percocet) 7 5-325 MG per tablet, Take 1 tablet by mouth 3 (three) times a day as needed for severe pain Max Daily Amount: 3 tablets Do not start before April 5, 2023 , Disp: 90 tablet, Rfl: 0  •  oxyCODONE-acetaminophen (Percocet) 7 5-325 MG per tablet, Take 1 tablet by mouth 3 (three) times a day as needed for severe pain Max Daily Amount: 3 tablets, Disp: 90 tablet, Rfl: 0  •  [START ON 5/5/2023] oxyCODONE-acetaminophen (Percocet) 7 5-325 MG per tablet, Take 1 tablet by mouth 3 (three) times a day as needed for severe pain Max Daily Amount: 3 tablets Do not start before May 5, 2023 , Disp: 90 tablet, Rfl: 0  •  Polyethylene Glycol 3350 (MIRALAX PO), Take by mouth 1 (one) time, Disp: , Rfl:   •  sildenafil (VIAGRA) 50 MG tablet, Take 1 tablet (50 mg total) by mouth as needed for erectile dysfunction Take on an empty stomach, 1 hour before sexual activity, no alcohol  100 mg max dose , Disp: 30 tablet, Rfl: 0  •  tamsulosin (FLOMAX) 0 4 mg, TAKE 2 CAPSULES EVERY DAY WITH DINNER, Disp: 180 capsule, Rfl: 0  •  traZODone (DESYREL) 50 mg tablet, TAKE 2 TABLETS EVERY DAY AT BEDTIME, Disp: 180 tablet, Rfl: 0    Allergies   Allergen Reactions   • Penicillins Swelling     Mouth swelling       Physical Exam:    /66   Pulse 78   Temp 98 5 °F (36 9 °C)   Wt 102 kg (225 lb)   BMI 31 38 kg/m²     Constitutional:normal, well developed, well nourished, alert, in no distress and non-toxic and no overt pain behavior   and overweight  Eyes:anicteric  HEENT:grossly intact  Neck:supple, symmetric, trachea midline and no masses   Pulmonary:even and unlabored  Cardiovascular:No edema or pitting edema present  Skin:Normal without rashes or lesions and well hydrated  Psychiatric:Mood and affect appropriate  Neurologic:Cranial Nerves II-XII grossly intact  Musculoskeletal:antalgic      Orders Placed This Encounter   Procedures   • MM OF_Alprazolam Definitive   • MM OF_Amphetamine Definitive Test   • MM OF_Buprenorphine Definitive Test   • MM OF_Carisoprodol Definitive Test   • MM OF_Clonazepam Definitive   • MM OF_Cocaine Definitive Test   • MM OF_Codeine Definitive   • MM OF_Dextromethorphan Definitive Test   • MM OF_Diazepam Definitive   • MM OF_Fentanyl Definitive Test   • MM OF_Gabapentin Definitive Test   • MM OF_Heroin Definitive Test   • MM OF_Hydrocodone Definitive   • MM OF_Hydromorphone Definitive   • MM OF_Levorphanol Definitive Test   • MM OF_Lorazepam Definitive   • MM OF_MDMA Definitive Test   • MM OF_Meperidine Definitive Test   • MM OF_Methadone Definitive Test   • MM OF_Methylphenidate Definitive Test   • MM OF_Morphine Definitive   • MM OF_Naltrexone Definitive Test   • MM OF_Oxazepam Definitive   • MM OF_Oxycodone Definitive Test - Oral Fluid   • MM OF_Oxymorphone Definitive Test   • MM OF_Phencyclidine Definitive Test   • MM OF_Pregabalin Definitive Test   • MM OF_Tapentadol Definitive Test   • MM OF_Temazepam Definitive   • MM OF_THC Definitive Test   • MM OF_Tramadol Definitive Test     This document was created using speech voice recognition software  Grammatical errors, random word insertions, pronoun errors, and incomplete sentences are an occasional consequence of this system due to software limitations, ambient noise, and hardware issues  Any formal questions or concerns about content, text, or information contained within the body of this dictation should be directly addressed to the provider for clarification

## 2023-03-06 NOTE — PATIENT INSTRUCTIONS

## 2023-03-08 ENCOUNTER — OFFICE VISIT (OUTPATIENT)
Dept: FAMILY MEDICINE CLINIC | Facility: CLINIC | Age: 63
End: 2023-03-08

## 2023-03-08 VITALS
HEIGHT: 71 IN | SYSTOLIC BLOOD PRESSURE: 118 MMHG | RESPIRATION RATE: 16 BRPM | BODY MASS INDEX: 31.08 KG/M2 | WEIGHT: 222 LBS | DIASTOLIC BLOOD PRESSURE: 66 MMHG | HEART RATE: 88 BPM | OXYGEN SATURATION: 99 %

## 2023-03-08 DIAGNOSIS — G47.00 INSOMNIA, UNSPECIFIED TYPE: Primary | ICD-10-CM

## 2023-03-08 LAB
7AMINOCLONAZEPAM SAL QL CFM: NEGATIVE NG/ML
AMPHET SAL QL CFM: NEGATIVE NG/ML
BUPRENORPHINE SAL QL SCN: NEGATIVE NG/ML
CARBOXYTHC SAL QL CFM: NEGATIVE NG/ML
CCP IGG SERPL-ACNC: NEGATIVE
COCAINE SAL QL CFM: NEGATIVE NG/ML
CODEINE SAL QL CFM: NEGATIVE NG/ML
D-METHORPHAN SAL QL CFM: NEGATIVE NG/ML
DXO+LEVORPHANOL SAL QL CFM: NEGATIVE NG/ML
DXO+LEVORPHANOL SAL QL CFM: NEGATIVE NG/ML
EDDP SAL QL CFM: NEGATIVE NG/ML
GABAPENTIN SAL QL CFM: ABNORMAL NG/ML
HYDROCODONE SAL QL CFM: NEGATIVE NG/ML
HYDROCODONE SAL QL CFM: NEGATIVE NG/ML
HYDROMORPHONE SAL QL CFM: NEGATIVE NG/ML
LEUKEMIA MARKERS BLD-IMP: NEGATIVE NG/ML
M PROTEIN 3 UR ELPH-MCNC: ABNORMAL NG/ML
M TB TUBERC IGNF/MITOGEN IGNF CONTROL: NEGATIVE NG/ML
METHADONE SAL QL CFM: NEGATIVE NG/ML
MORPHINE SAL QL CFM: NEGATIVE NG/ML
MORPHINE SAL QL CFM: NEGATIVE NG/ML
NALTREXOL SAL QL CFM: NEGATIVE NG/ML
NALTREXONE SAL QL CFM: NEGATIVE NG/ML
OXYMORPHONE SAL QL CFM: NEGATIVE NG/ML
OXYMORPHONE SAL QL CFM: NEGATIVE NG/ML
PCP SAL QL CFM: NEGATIVE NG/ML
PREGABALIN SAL QL CFM: NEGATIVE NG/ML
SL AMB 6-MAM (HEROIN METABOLITE) QUANTIFICATION: NEGATIVE NG/ML
SL AMB ALPRAZOLAM QUANTIFICATION: NEGATIVE NG/ML
SL AMB CARISOPRODOL QUANTIFICATION: NEGATIVE NG/ML
SL AMB CLONAZEPAM QUANTIFICATION: NEGATIVE NG/ML
SL AMB DIAZEPAM QUANTIFICATION: NEGATIVE NG/ML
SL AMB FENTANYL QUANTIFICATION: NEGATIVE NG/ML
SL AMB MDMA QUANTIFICATION: NEGATIVE NG/ML
SL AMB MEPROBAMATE QUANTIFICATION: NEGATIVE NG/ML
SL AMB N-DESMETHYL-TRAMADOL QUANTIFICATION SALIVA: NEGATIVE NG/ML
SL AMB NORBUPRENORPHINE QUANTIFICATION: NEGATIVE NG/ML
SL AMB NORDIAZEPAM QUANTIFICATION: NEGATIVE NG/ML
SL AMB NORFENTANYL QUANTIFICATION: NEGATIVE NG/ML
SL AMB NORHYDROCODONE QUANTIFICATION: NEGATIVE NG/ML
SL AMB NORHYDROCODONE QUANTIFICATION: NEGATIVE NG/ML
SL AMB NORMEPERIDINE QUANTIFICATION: NEGATIVE NG/ML
SL AMB NOROXYCODONE QUANTIFICATION: ABNORMAL NG/ML
SL AMB OXAZEPAM QUANTIFICATION: NEGATIVE NG/ML
SL AMB RITALINIC ACID QUANTIFICATION: NEGATIVE
SL AMB TEMAZEPAM QUANTIFICATION: NEGATIVE NG/ML
SL AMB TEMAZEPAM QUANTIFICATION: NEGATIVE NG/ML
SL AMB TRAMADOL QUANTIFICATION: NEGATIVE NG/ML
SQUAMOUS #/AREA URNS HPF: NEGATIVE NG/ML
TAPENTADOL SAL QL CFM: NEGATIVE NG/ML

## 2023-03-08 RX ORDER — QUETIAPINE FUMARATE 200 MG/1
200 TABLET, FILM COATED ORAL
Qty: 90 TABLET | Refills: 0 | Status: SHIPPED | OUTPATIENT
Start: 2023-03-08

## 2023-03-08 NOTE — PROGRESS NOTES
Assessment/Plan:   Diagnoses and all orders for this visit:    Insomnia, unspecified type  -     QUEtiapine (SEROquel) 200 mg tablet; Take 1 tablet (200 mg total) by mouth daily at bedtime  - medication was changed from Seroquel 150mg QHS to Remeron 7 5mg QHS  - had no relief with Remeron but does get good sleep with Seroquel and would to cont with this medication but needs dose adjustment   - will increase to Seroquel 200mg QHS and RTO in 7/2023 as scheduled for f/u and AWV - pt aware and agreeable           Subjective:    Patient ID: Meg Harper is a 58 y o  male  HPI  65yo M presents to the office for f/u of Insomnia  - medication was changed from Seroquel 150mg QHS to Remeron 7 5mg QHS  - had no relief with Remeron but does get good sleep with Seroquel and would to cont with this medication but needs dose adjustment   - following with PM for chronic pain syndrome - having a hard time with walking, has to rest [id number] 2/2 pain   - has script for repeat labs and has AWV scheduled for 7/19/2023      The following portions of the patient's history were reviewed and updated as appropriate: allergies, current medications, past family history, past medical history, past social history, past surgical history and problem list     Review of Systems  as per HPI    Objective:  /66   Pulse 88   Resp 16   Ht 5' 11" (1 803 m)   Wt 101 kg (222 lb)   SpO2 99%   BMI 30 96 kg/m²    Physical Exam  Vitals reviewed  Constitutional:       General: He is not in acute distress  Appearance: Normal appearance  He is not ill-appearing, toxic-appearing or diaphoretic  HENT:      Head: Normocephalic and atraumatic  Right Ear: External ear normal       Left Ear: External ear normal       Nose: Nose normal    Eyes:      General: No scleral icterus  Right eye: No discharge  Left eye: No discharge  Extraocular Movements: Extraocular movements intact        Conjunctiva/sclera: Conjunctivae normal  Pulmonary:      Effort: Pulmonary effort is normal    Skin:     General: Skin is warm  Neurological:      General: No focal deficit present  Mental Status: He is alert and oriented to person, place, and time  Psychiatric:         Mood and Affect: Mood normal          Behavior: Behavior normal            BMI Counseling: Body mass index is 30 96 kg/m²  The BMI is above normal  Nutrition recommendations include 3-5 servings of fruits/vegetables daily  Exercise recommendations include exercising 3-5 times per week

## 2023-03-21 DIAGNOSIS — N40.1 BPH WITH URINARY OBSTRUCTION: ICD-10-CM

## 2023-03-21 DIAGNOSIS — N13.8 BPH WITH URINARY OBSTRUCTION: ICD-10-CM

## 2023-03-21 DIAGNOSIS — G47.00 INSOMNIA, UNSPECIFIED TYPE: ICD-10-CM

## 2023-03-21 RX ORDER — TAMSULOSIN HYDROCHLORIDE 0.4 MG/1
CAPSULE ORAL
Qty: 180 CAPSULE | Refills: 0 | Status: SHIPPED | OUTPATIENT
Start: 2023-03-21

## 2023-03-21 RX ORDER — TRAZODONE HYDROCHLORIDE 50 MG/1
50 TABLET ORAL
Qty: 180 TABLET | Refills: 0 | Status: SHIPPED | OUTPATIENT
Start: 2023-03-21

## 2023-05-31 DIAGNOSIS — G47.00 INSOMNIA, UNSPECIFIED TYPE: ICD-10-CM

## 2023-06-01 RX ORDER — QUETIAPINE FUMARATE 200 MG/1
TABLET, FILM COATED ORAL
Qty: 90 TABLET | Refills: 0 | Status: SHIPPED | OUTPATIENT
Start: 2023-06-01

## 2023-06-05 ENCOUNTER — TELEPHONE (OUTPATIENT)
Dept: PAIN MEDICINE | Facility: MEDICAL CENTER | Age: 63
End: 2023-06-05

## 2023-06-05 NOTE — TELEPHONE ENCOUNTER
Unfortunately I do not understand why he was scheduled so far out patient received a 3-month supply of opioid medications on March 6 therefore I cannot send 4-month in

## 2023-06-05 NOTE — TELEPHONE ENCOUNTER
Pt contacted Call Center requested refill of their medication  Medication Name: oxyCODONE-acetaminophen (Percocet)       Dosage of Med: 7 5-325 mg       Frequency of Med:Take 1 tablet by mouth 3 (three) times a day as needed for severe pain Max Daily Amount: 3 tablets       Remaining Medication: some left     Pharmacy and Location: [facility] ([facility] Comp Only) - [address]         Pt  Preferred Callback Phone Number: [phone]      Thank you

## 2023-06-08 DIAGNOSIS — G47.00 INSOMNIA, UNSPECIFIED TYPE: ICD-10-CM

## 2023-06-08 DIAGNOSIS — N40.1 BPH WITH URINARY OBSTRUCTION: ICD-10-CM

## 2023-06-08 DIAGNOSIS — N13.8 BPH WITH URINARY OBSTRUCTION: ICD-10-CM

## 2023-06-08 RX ORDER — TRAZODONE HYDROCHLORIDE 50 MG/1
TABLET ORAL
Qty: 180 TABLET | Refills: 0 | Status: SHIPPED | OUTPATIENT
Start: 2023-06-08

## 2023-06-08 RX ORDER — TAMSULOSIN HYDROCHLORIDE 0.4 MG/1
CAPSULE ORAL
Qty: 180 CAPSULE | Refills: 0 | Status: SHIPPED | OUTPATIENT
Start: 2023-06-08

## 2023-06-20 NOTE — TELEPHONE ENCOUNTER
Caller: Lalo Madrid PT    Doctor: DR Barkley Yarsanism    Reason for call: Pt called in , his wife tested positive for Covid  Pt has an OVS on 06/22/2023 should he still come in for medication refill check or reschedule the appt   Please advise    Call back#: [phone]

## 2023-06-20 NOTE — TELEPHONE ENCOUNTER
If he is asymptomatic he can just wear a mask during the office visit is my understanding  Unfortunately without an office visit I am unable to fulfill any opioid medication requests because he has already received 3-month supply

## 2023-06-20 NOTE — TELEPHONE ENCOUNTER
S/w pt and advised of same  Pt advised that if he should develop any symptoms at all or test positive for Covid to call our office to reschedule  Pt verbalized understanding

## 2023-06-21 DIAGNOSIS — E11.9 TYPE 2 DIABETES MELLITUS WITHOUT COMPLICATION, WITHOUT LONG-TERM CURRENT USE OF INSULIN (HCC): ICD-10-CM

## 2023-06-22 ENCOUNTER — OFFICE VISIT (OUTPATIENT)
Dept: PAIN MEDICINE | Facility: CLINIC | Age: 63
End: 2023-06-22
Payer: OTHER MISCELLANEOUS

## 2023-06-22 VITALS
WEIGHT: 221 LBS | BODY MASS INDEX: 30.82 KG/M2 | SYSTOLIC BLOOD PRESSURE: 127 MMHG | TEMPERATURE: 98.2 F | DIASTOLIC BLOOD PRESSURE: 72 MMHG | HEART RATE: 88 BPM

## 2023-06-22 DIAGNOSIS — M96.1 POSTLAMINECTOMY SYNDROME, LUMBAR: ICD-10-CM

## 2023-06-22 DIAGNOSIS — M54.16 LUMBAR RADICULOPATHY: ICD-10-CM

## 2023-06-22 DIAGNOSIS — M54.50 CHRONIC LOW BACK PAIN: ICD-10-CM

## 2023-06-22 DIAGNOSIS — G89.4 CHRONIC PAIN SYNDROME: Primary | ICD-10-CM

## 2023-06-22 DIAGNOSIS — M47.816 LUMBAR SPONDYLOSIS: ICD-10-CM

## 2023-06-22 DIAGNOSIS — G89.29 CHRONIC LOW BACK PAIN: ICD-10-CM

## 2023-06-22 DIAGNOSIS — F11.20 UNCOMPLICATED OPIOID DEPENDENCE (HCC): ICD-10-CM

## 2023-06-22 DIAGNOSIS — Z79.891 LONG-TERM CURRENT USE OF OPIATE ANALGESIC: ICD-10-CM

## 2023-06-22 PROCEDURE — 99214 OFFICE O/P EST MOD 30 MIN: CPT | Performed by: ANESTHESIOLOGY

## 2023-06-22 PROCEDURE — 80305 DRUG TEST PRSMV DIR OPT OBS: CPT | Performed by: ANESTHESIOLOGY

## 2023-06-22 RX ORDER — OXYCODONE AND ACETAMINOPHEN 7.5; 325 MG/1; MG/1
1 TABLET ORAL 3 TIMES DAILY PRN
Qty: 90 TABLET | Refills: 0 | Status: SHIPPED | OUTPATIENT
Start: 2023-06-22 | End: 2023-07-22

## 2023-06-22 RX ORDER — OXYCODONE AND ACETAMINOPHEN 7.5; 325 MG/1; MG/1
1 TABLET ORAL 3 TIMES DAILY PRN
Qty: 90 TABLET | Refills: 0 | Status: SHIPPED | OUTPATIENT
Start: 2023-07-22 | End: 2023-08-21

## 2023-06-22 RX ORDER — GABAPENTIN 600 MG/1
1200 TABLET ORAL 2 TIMES DAILY
Qty: 360 TABLET | Refills: 0 | Status: SHIPPED | OUTPATIENT
Start: 2023-06-22 | End: 2023-09-20

## 2023-06-22 NOTE — PROGRESS NOTES
Pain Medicine Follow-Up Note    Assessment:  1  Chronic pain syndrome    2  Uncomplicated opioid dependence ([address] )    3  Long-term current use of opiate analgesic    4  Lumbar spondylosis    5  Chronic low back pain    6  Postlaminectomy syndrome, lumbar    7  Lumbar radiculopathy        Plan:  Orders Placed This Encounter   Procedures   • MM ALL_Prescribed Meds and Special Instructions     Order Specific Question:   [place] Is ATORVASTATIN prescribed? Answer:   Yes     Order Specific Question:   [place] Is LIDODERM prescribed? Answer:   Yes     Order Specific Question:   [place] Is METFORMIN prescribed? Answer:   Yes     Order Specific Question:   [place] Is OMEPRAZOLE prescribed? Answer:   Yes     Order Specific Question:   [place] Is TRAZODONE prescribed? Answer:    Yes   • MM DT_Alprazolam Definitive Test   • MM DT_Amphetamine Definitive Test   • MM DT_Buprenorphine Definitive Test   • MM DT_Citalopram/Escitalopram Definitive Test   • MM DT_Clonazepam Definitive Test   • MM DT_Cocaine Definitive Test   • MM DT_Codeine Definitive Test   • MM Diazepam Definitive Test   • MM DT_Ethyl Glucuronide/Ethyl Sulfate Definitive Test   • MM DT_Fentanyl Definitive Test   • MM DT_Heroin Definitive Test   • MM DT_Hydrocodone Definitive Test   • MM DT_Hydromorphone Definitive Test   • MM Lorazepam Definitive Test   • MM DT_MDMA Definitive Test   • MM DT_Methadone Definitive Test   • MM DT_Methamphetamine Definitive Test   • MM DT_Methylphenidate Definitive Test   • MM DT_Morphine Definitive Test   • MM DT_Oxazepam Definitive Test   • MM DT_Oxycodone Definitive Test   • MM DT_Oxymorphone Definitive Test   • MM DT_Phentermine Definitive Test   • MM DT_Pregablin Definitive   • MM DT_Tapentadol Definitive Test   • MM DT_Temazapam Definitive Test   • MM DT_THC Definitive Test   • MM DT_Tramadol Definitive Test   • MM DT_Validity Creatinine   • MM DT_Validity Oxidant   • MM DT_Validity pH   • MM DT_Validity Specific       New Medications Ordered This Visit   Medications   • etodolac (LODINE) 300 MG capsule     Sig: Take 1 capsule (300 mg total) by mouth 3 (three) times a day as needed (pain (with food))     Dispense:  270 capsule     Refill:  0   • gabapentin (Neurontin) 600 MG tablet     Sig: Take 2 tablets (1,200 mg total) by mouth 2 (two) times a day     Dispense:  360 tablet     Refill:  0   • oxyCODONE-acetaminophen (Percocet) 7 5-325 MG per tablet     Sig: Take 1 tablet by mouth 3 (three) times a day as needed for severe pain Max Daily Amount: 3 tablets     Dispense:  90 tablet     Refill:  0   • oxyCODONE-acetaminophen (Percocet) 7 5-325 MG per tablet     Sig: Take 1 tablet by mouth 3 (three) times a day as needed for severe pain Max Daily Amount: 3 tablets Do not start before July 22, 2023  Dispense:  90 tablet     Refill:  0     My impressions and treatment recommendations were discussed in detail with the patient who verbalized understanding and had no further questions  Given that the patient reports overall reduced pain and improved level of functioning without significant side effects, I felt it reasonable to continue the patient on oxycodone/acetaminophen 7 5/325 mg 1 tablet 3 times daily as needed for pain, gabapentin 1200 mg twice daily, and etodolac 300 mg 3 times daily as needed for pain, with food  I sent in prescriptions for the oxycodone/acetaminophen dated June 22, 2023 and July 22, 2023  The risks and side effects of chronic opioid treatment were discussed in detail with the patient  Side effects include but are not limited to nausea, vomiting, GI intolerance, sedation, constipation, mental clouding, opioid-induced hyperalgesia, endocrine dysfunction, addiction, dependence, and tolerance  The patient was asked to take his medications only as prescribed and directed, never in excess, and never for any other reason other than for pain control   The patient was also asked to keep his medications out of the reach of others and away from children, preferably in a locked drawer  The patient verbalized understanding and wished to use these opioid medications  A urine drug test was collected at today's office visit as part of our medication management protocol  The point of care testing results were appropriate for what was being prescribed  The specimen will be sent for confirmatory testing  The drug screen is medically necessary because the patient is either dependent on opioid medication or is being considered for opioid medication therapy and the results could impact ongoing or future treatment  The drug screen is to evaluate for the presence or absence of prescribed, non-prescribed, and/or illicit drugs and substances  [place] Prescription Drug Monitoring Program report was reviewed and was appropriate     Follow-up is planned in 2 months time or sooner as warranted  Discharge instructions were provided  I personally saw and examined the patient and I agree with the above discussed plan of care  History of Present Illness:    Anna Carson is a 61 y o  male who presents to [facility] for interval re-evaluation of the above stated pain complaints  The patient has a past medical and chronic pain history as outlined in the assessment section  He was last seen on March 6, 2023  At today's office visit, the patient's pain score is 6 out of 10 on the verbal numerical pain rating scale  The patient states that his pain is worse in the morning, evening, and night  His pain is constant in nature  He reports the quality of his pain is sharp, cramping, shooting, numbness, and pins-and-needles  He is reporting 40% relief of symptoms with a combination of his medications  He would like to continue them as prescribed  He denies any side effects of his medications      Other than as stated above, the patient denies any interval changes in medications, medical condition, mental condition, symptoms, or allergies since the last office visit  Review of Systems:    Review of Systems   Constitutional: Negative for unexpected weight change  HENT: Negative for ear pain  Eyes: Negative for visual disturbance  Respiratory: Negative for shortness of breath and wheezing  Gastrointestinal: Negative for abdominal pain  Musculoskeletal: Positive for back pain and gait problem  Decreased ROM, joint stiffness   Neurological: Positive for weakness and numbness  Psychiatric/Behavioral: Positive for sleep disturbance  Negative for decreased concentration  Past Medical History:   Diagnosis Date   • Arthritis    • BPH (benign prostatic hypertrophy) with urinary obstruction    • Chronic narcotic dependence (HCC)     percocet TID   • Chronic pain disorder     back-spinal cord stimulator   • Colon polyp    • Diabetes mellitus ([facility] [address] )     type   • Ear infection    • Herniation of lumbar intervertebral disc with radiculopathy    • Myofascial pain syndrome    • Obesity    • Sciatica    • Spinal cord stimulator status 2017    implanted 2017   • Tinnitus        Past Surgical History:   Procedure Laterality Date   • ABSCESS DRAINAGE      groin   • BACK SURGERY      sciatic nerve- spinal cord stimulator 2017   • CAUDAL BLOCK N/A 10/26/2018    Procedure: Caudal Epidural Steroid Injection (60178); Surgeon: Virgil Lombardo MD;  Location: [facility] MAIN OR;  Service: Pain Management    • CAUDAL BLOCK N/A 11/30/2018    Procedure: Caudal Epidural Steroid Injection (94691);   Surgeon: Virgil Lombardo MD;  Location: [facility] MAIN OR;  Service: Pain Management    • COLONOSCOPY     • EPIDURAL BLOCK INJECTION Right 5/10/2019    Procedure: L5 S1 transforaminal Epidural Steroid Injection ([id number];  Surgeon: Virgil Lombardo MD;  Location: [place] [address] MAIN OR;  Service: Pain Management    • EPIDURAL BLOCK INJECTION Right 8/16/2019    Procedure: BLOCK / INJECTION EPIDURAL STEROID TRANSFORAMINAL;  Surgeon: Cruz Arauz MD;  Location: [facility] MAIN OR;  Service: Pain Management    • EPIDURAL BLOCK INJECTION Left 7/1/2021    Procedure: L5 S1 transforaminal epidural steroid injection ( [id number]); Surgeon: Cruz Arauz MD;  Location: [facility] MAIN OR;  Service: Pain Management    • EPIDURAL BLOCK INJECTION Right 7/15/2021    Procedure: L5 S1 transforaminal epidural steroid injection ( [id number]); Surgeon: Cruz Arauz MD;  Location: [facility] MAIN OR;  Service: Pain Management    • EPIDURAL BLOCK INJECTION Right 1/7/2022    Procedure: L5 S1 transforaminal epidural steroid injection ([id number]); Surgeon: Cruz Arauz MD;  Location: [facility] MAIN OR;  Service: Pain Management    • EPIDURAL BLOCK INJECTION Left 1/28/2022    Procedure: L5 S1 transforaminal epidural steroid injection ([id number]); Surgeon: Cruz Arauz MD;  Location: [facility] MAIN OR;  Service: Pain Management    • NERVE BLOCK Bilateral 4/26/2018    Procedure: B/L L3 L4 L5 S1 MBB #1 ([id number]); Surgeon: Cruz Arauz MD;  Location: [facility] MAIN OR;  Service: Pain Management    • NERVE BLOCK Bilateral 5/11/2018    Procedure: B/L L3 L4 L5 S1 Medial Branch Block #2;  Surgeon: Cruz Arauz MD;  Location: [facility] MAIN OR;  Service: Pain Management    • NOSE SURGERY     • [place] COLONOSCOPY FLX DX W/COLLJ SPEC WHEN PFRMD N/A 3/6/2017    Procedure: COLONOSCOPY;  Surgeon: Edi Lan MD;  Location: [facility]; Service: Gastroenterology   • [place] INSJ/RPLCMT SPI NPGR DIR/INDUXIVE COUPLING Left 6/29/2021    Procedure: REPLACEMENT IMPLANTABLE PULSE GENERATOR DORSAL SPINAL COLUMN STIMULATOR, LEFT;  Surgeon: George Avery MD;  Location:  MAIN OR;  Service: Neurosurgery   • [place] AN IMPLTJ NSTIM ELTRDS PLATE/PADDLE EDRL Left 10/3/2017    Procedure: PLACEMENT THORACIC FOR INSERTION DORSAL COLUMN SPINAL CORD STIMULATOR (DCS) WITH BUTTOCK IMPLANTABLE PULSE GENERATOR(IMPULSE);   Surgeon: Kristin Clarke MD;  Location:  MAIN OR; Service: Neurosurgery   • RADIOFREQUENCY ABLATION Right 5/18/2018    Procedure: Rt L3 L4 L5 S1 Radio Frequency Ablation ([id number]); Surgeon: Dashawn Morales MD;  Location: [facility] MAIN OR;  Service: Pain Management    • RADIOFREQUENCY ABLATION Left 6/1/2018    Procedure: Lt L3 L4 L5 S1 Radio Frequency Ablation ([id number]);   Surgeon: Dashawn Morales MD;  Location: [facility] MAIN OR;  Service: Pain Management        Family History   Problem Relation Age of Onset   • Diabetes Mother    • Arthritis Mother    • Diabetes Father         mellitus    • Heart attack Father 58   • Hypertension Father    • Arthritis Father        Social History     Occupational History   • Occupation:  for [employer]    Tobacco Use   • Smoking status: Every Day     Packs/day: 1 00     Years: 42 00     Total pack years: 42 00     Types: Cigarettes   • Smokeless tobacco: Never   • Tobacco comments:     encouraged smoking cessation - history of smoking 30 or more pack years    Vaping Use   • Vaping Use: Never used   Substance and Sexual Activity   • Alcohol use: No     Comment: rare   • Drug use: No   • Sexual activity: Not Currently     Partners: Female         Current Outpatient Medications:   •  atorvastatin (LIPITOR) 20 mg tablet, TAKE 1 TABLET EVERY DAY, Disp: 90 tablet, Rfl: 0  •  clobetasol (TEMOVATE) 0 05 % ointment, APPLY TOPICALLY 2 (TWO) TIMES A DAY (Patient taking differently: Apply topically 2 (two) times a day as needed), Disp: 30 g, Rfl: 1  •  etodolac (LODINE) 300 MG capsule, Take 1 capsule (300 mg total) by mouth 3 (three) times a day as needed (pain (with food)), Disp: 270 capsule, Rfl: 0  •  fluticasone (FLONASE) 50 mcg/act nasal spray, USE 2 SPRAYS IN EACH NOSTRIL EVERY DAY, Disp: 48 g, Rfl: 0  •  gabapentin (Neurontin) 600 MG tablet, Take 2 tablets (1,200 mg total) by mouth 2 (two) times a day, Disp: 360 tablet, Rfl: 0  •  lidocaine (LIDODERM) 5 %, Apply 2 patches topically daily Remove & Discard patch within 12 hours or as directed by MD (Patient taking differently: Apply 2 patches topically daily as needed Remove & Discard patch within 12 hours or as directed by MD), Disp: 60 patch, Rfl: 0  •  lisinopril (ZESTRIL) 2 5 mg tablet, TAKE 1 TABLET AT BEDTIME, Disp: 90 tablet, Rfl: 0  •  metFORMIN (GLUCOPHAGE) 1000 MG tablet, TAKE 1 TABLET TWICE DAILY WITH MEALS, Disp: 180 tablet, Rfl: 1  •  omeprazole (PriLOSEC) 20 mg delayed release capsule, TAKE 1 CAPSULE EVERY DAY, Disp: 90 capsule, Rfl: 0  •  oxyCODONE-acetaminophen (Percocet) 7 5-325 MG per tablet, Take 1 tablet by mouth 3 (three) times a day as needed for severe pain Max Daily Amount: 3 tablets, Disp: 90 tablet, Rfl: 0  •  [START ON 7/22/2023] oxyCODONE-acetaminophen (Percocet) 7 5-325 MG per tablet, Take 1 tablet by mouth 3 (three) times a day as needed for severe pain Max Daily Amount: 3 tablets Do not start before July 22, 2023 , Disp: 90 tablet, Rfl: 0  •  Polyethylene Glycol 3350 (MIRALAX PO), Take by mouth 1 (one) time, Disp: , Rfl:   •  QUEtiapine (SEROquel) 200 mg tablet, Take 1 tablet by mouth daily at bedtime, Disp: 90 tablet, Rfl: 0  •  tamsulosin (FLOMAX) 0 4 mg, TAKE 2 CAPSULES EVERY DAY WITH DINNER, Disp: 180 capsule, Rfl: 0  •  traZODone (DESYREL) 50 mg tablet, TAKE 2 TABLETS AT BEDTIME, Disp: 180 tablet, Rfl: 0  •  bisacodyl (DULCOLAX) 5 mg EC tablet, Take 20 mg by mouth 1 (one) time (Patient not taking: Reported on 6/22/2023), Disp: , Rfl:   •  magnesium citrate solution, Take 296 mL by mouth once (Patient not taking: Reported on 6/22/2023), Disp: , Rfl:   •  sildenafil (VIAGRA) 50 MG tablet, Take 1 tablet (50 mg total) by mouth as needed for erectile dysfunction Take on an empty stomach, 1 hour before sexual activity, no alcohol  100 mg max dose   (Patient not taking: Reported on 6/22/2023), Disp: 30 tablet, Rfl: 0    Allergies   Allergen Reactions   • Penicillins Swelling     Mouth swelling       Physical Exam:    /72   Pulse 88   Temp 98 2 °F (36 8 °C)   Wt 100 kg (221 lb)   BMI 30 82 kg/m²     Constitutional:obese  Eyes:anicteric  HEENT:grossly intact  Neck:supple, symmetric, trachea midline and no masses   Pulmonary:even and unlabored  Cardiovascular:No edema or pitting edema present  Skin:Normal without rashes or lesions and well hydrated  Psychiatric:Mood and affect appropriate  Neurologic:Cranial Nerves II-XII grossly intact  Musculoskeletal:normal    Orders Placed This Encounter   Procedures   • MM ALL_Prescribed Meds and Special Instructions   • MM DT_Alprazolam Definitive Test   • MM DT_Amphetamine Definitive Test   • MM DT_Buprenorphine Definitive Test   • MM DT_Citalopram/Escitalopram Definitive Test   • MM DT_Clonazepam Definitive Test   • MM DT_Cocaine Definitive Test   • MM DT_Codeine Definitive Test   • MM Diazepam Definitive Test   • MM DT_Ethyl Glucuronide/Ethyl Sulfate Definitive Test   • MM DT_Fentanyl Definitive Test   • MM DT_Heroin Definitive Test   • MM DT_Hydrocodone Definitive Test   • MM DT_Hydromorphone Definitive Test   • MM Lorazepam Definitive Test   • MM DT_MDMA Definitive Test   • MM DT_Methadone Definitive Test   • MM DT_Methamphetamine Definitive Test   • MM DT_Methylphenidate Definitive Test   • MM DT_Morphine Definitive Test   • MM DT_Oxazepam Definitive Test   • MM DT_Oxycodone Definitive Test   • MM DT_Oxymorphone Definitive Test   • MM DT_Phentermine Definitive Test   • MM DT_Pregablin Definitive   • MM DT_Tapentadol Definitive Test   • MM DT_Temazapam Definitive Test   • MM DT_THC Definitive Test   • MM DT_Tramadol Definitive Test   • MM DT_Validity Creatinine   • MM DT_Validity Oxidant   • MM DT_Validity pH   • MM DT_Validity Specific

## 2023-06-24 LAB
6MAM UR QL CFM: NEGATIVE NG/ML
7AMINOCLONAZEPAM UR QL CFM: NEGATIVE NG/ML
A-OH ALPRAZ UR QL CFM: NEGATIVE NG/ML
ACCEPTABLE CREAT UR QL: NORMAL MG/DL
ACCEPTIBLE SP GR UR QL: NORMAL
AMPHET UR QL CFM: NEGATIVE NG/ML
AMPHET UR QL CFM: NEGATIVE NG/ML
BUPRENORPHINE UR QL CFM: NEGATIVE NG/ML
BZE UR QL CFM: NEGATIVE NG/ML
CODEINE UR QL CFM: NEGATIVE NG/ML
EDDP UR QL CFM: NEGATIVE NG/ML
ETHYL GLUCURONIDE UR QL CFM: NEGATIVE NG/ML
ETHYL SULFATE UR QL SCN: NEGATIVE NG/ML
FENTANYL UR QL CFM: NEGATIVE NG/ML
HYDROCODONE UR QL CFM: NEGATIVE NG/ML
HYDROCODONE UR QL CFM: NEGATIVE NG/ML
HYDROMORPHONE UR QL CFM: NEGATIVE NG/ML
LORAZEPAM UR QL CFM: NEGATIVE NG/ML
MDMA UR QL CFM: NEGATIVE NG/ML
ME-PHENIDATE UR QL CFM: NEGATIVE NG/ML
METHADONE UR QL CFM: NEGATIVE NG/ML
METHAMPHET UR QL CFM: NEGATIVE NG/ML
MORPHINE UR QL CFM: NEGATIVE NG/ML
MORPHINE UR QL CFM: NEGATIVE NG/ML
NITRITE UR QL: NORMAL UG/ML
NORBUPRENORPHINE UR QL CFM: NEGATIVE NG/ML
NORDIAZEPAM UR QL CFM: NEGATIVE NG/ML
NORFENTANYL UR QL CFM: NEGATIVE NG/ML
NORHYDROCODONE UR QL CFM: NEGATIVE NG/ML
NORHYDROCODONE UR QL CFM: NEGATIVE NG/ML
NOROXYCODONE UR QL CFM: ABNORMAL NG/ML
OXAZEPAM UR QL CFM: NEGATIVE NG/ML
OXYCODONE UR QL CFM: ABNORMAL NG/ML
OXYMORPHONE UR QL CFM: NEGATIVE NG/ML
OXYMORPHONE UR QL CFM: NEGATIVE NG/ML
PARA-FLUOROFENTANYL QUANTIFICATION: NORMAL NG/ML
RESULT ALL_PRESCRIBED MEDS AND SPECIAL INSTRUCTIONS: NORMAL
SL AMB 4-ANPP QUANTIFICATION: NORMAL NG/ML
SL AMB ACETYL FENTANYL QUANTIFICATION: NORMAL NG/ML
SL AMB ACETYL NORFENTANYL QUANTIFICATION: NORMAL NG/ML
SL AMB ACRYL FENTANYL QUANTIFICATION: NORMAL NG/ML
SL AMB CARFENTANIL QUANTIFICATION: NORMAL NG/ML
SL AMB CITALOPRAM/ESCITALOPRAM QUANTIFICATION: NEGATIVE NG/ML
SL AMB CITALOPRAM/ESCITALOPRAM QUANTIFICATION: NEGATIVE NG/ML
SL AMB CTHC (MARIJUANA METABOLITE) QUANTIFICATION: NEGATIVE NG/ML
SL AMB N-DESMETHYL-TRAMADOL QUANTIFICATION: NEGATIVE NG/ML
SL AMB PHENTERMINE QUANTIFICATION: NEGATIVE NG/ML
SL AMB PREGABALIN QUANTIFICATION: NEGATIVE
SL AMB RITALINIC ACID QUANTIFICATION: NEGATIVE NG/ML
SPECIMEN PH ACCEPTABLE UR: NORMAL
TAPENTADOL UR QL CFM: NEGATIVE NG/ML
TEMAZEPAM UR QL CFM: NEGATIVE NG/ML
TEMAZEPAM UR QL CFM: NEGATIVE NG/ML
TRAMADOL UR QL CFM: NEGATIVE NG/ML
URATE/CREAT 24H UR: NEGATIVE NG/ML

## 2023-07-12 ENCOUNTER — APPOINTMENT (OUTPATIENT)
Dept: LAB | Facility: IMAGING CENTER | Age: 63
End: 2023-07-12
Payer: COMMERCIAL

## 2023-07-12 DIAGNOSIS — E11.9 TYPE 2 DIABETES MELLITUS WITHOUT COMPLICATION, WITHOUT LONG-TERM CURRENT USE OF INSULIN (HCC): ICD-10-CM

## 2023-07-12 DIAGNOSIS — Z12.5 PROSTATE CANCER SCREENING: ICD-10-CM

## 2023-07-12 LAB
ALBUMIN SERPL BCP-MCNC: 4 G/DL (ref 3.5–5)
ALP SERPL-CCNC: 72 U/L (ref 46–116)
ALT SERPL W P-5'-P-CCNC: 67 U/L (ref 12–78)
ANION GAP SERPL CALCULATED.3IONS-SCNC: 3 MMOL/L
AST SERPL W P-5'-P-CCNC: 26 U/L (ref 5–45)
BILIRUB SERPL-MCNC: 0.42 MG/DL (ref 0.2–1)
BUN SERPL-MCNC: 14 MG/DL (ref 5–25)
CALCIUM SERPL-MCNC: 9.3 MG/DL (ref 8.3–10.1)
CHLORIDE SERPL-SCNC: 110 MMOL/L (ref 96–108)
CO2 SERPL-SCNC: 26 MMOL/L (ref 21–32)
CREAT SERPL-MCNC: 0.83 MG/DL (ref 0.6–1.3)
GFR SERPL CREATININE-BSD FRML MDRD: 93 ML/MIN/1.73SQ M
GLUCOSE P FAST SERPL-MCNC: 184 MG/DL (ref 65–99)
POTASSIUM SERPL-SCNC: 4.8 MMOL/L (ref 3.5–5.3)
PROT SERPL-MCNC: 7.2 G/DL (ref 6.4–8.4)
PSA SERPL-MCNC: 0.18 NG/ML (ref 0–4)
SODIUM SERPL-SCNC: 139 MMOL/L (ref 135–147)

## 2023-07-12 PROCEDURE — G0103 PSA SCREENING: HCPCS

## 2023-07-12 PROCEDURE — 80053 COMPREHEN METABOLIC PANEL: CPT

## 2023-07-12 PROCEDURE — 83036 HEMOGLOBIN GLYCOSYLATED A1C: CPT

## 2023-07-12 PROCEDURE — 36415 COLL VENOUS BLD VENIPUNCTURE: CPT

## 2023-07-13 LAB
EST. AVERAGE GLUCOSE BLD GHB EST-MCNC: 157 MG/DL
HBA1C MFR BLD: 7.1 %

## 2023-07-19 ENCOUNTER — OFFICE VISIT (OUTPATIENT)
Dept: FAMILY MEDICINE CLINIC | Facility: CLINIC | Age: 63
End: 2023-07-19
Payer: COMMERCIAL

## 2023-07-19 VITALS
OXYGEN SATURATION: 99 % | WEIGHT: 225 LBS | HEART RATE: 93 BPM | DIASTOLIC BLOOD PRESSURE: 70 MMHG | BODY MASS INDEX: 31.5 KG/M2 | HEIGHT: 71 IN | SYSTOLIC BLOOD PRESSURE: 122 MMHG

## 2023-07-19 DIAGNOSIS — F17.200 SMOKER: ICD-10-CM

## 2023-07-19 DIAGNOSIS — Z12.5 PROSTATE CANCER SCREENING: ICD-10-CM

## 2023-07-19 DIAGNOSIS — K63.5 POLYP OF COLON, UNSPECIFIED PART OF COLON, UNSPECIFIED TYPE: ICD-10-CM

## 2023-07-19 DIAGNOSIS — Z00.00 ENCOUNTER FOR SUBSEQUENT ANNUAL WELLNESS VISIT (AWV) IN MEDICARE PATIENT: Primary | ICD-10-CM

## 2023-07-19 DIAGNOSIS — E11.42 DIABETIC POLYNEUROPATHY ASSOCIATED WITH TYPE 2 DIABETES MELLITUS (HCC): ICD-10-CM

## 2023-07-19 DIAGNOSIS — E11.9 TYPE 2 DIABETES MELLITUS WITHOUT COMPLICATION, WITHOUT LONG-TERM CURRENT USE OF INSULIN (HCC): ICD-10-CM

## 2023-07-19 DIAGNOSIS — Z12.2 ENCOUNTER FOR SCREENING FOR LUNG CANCER: ICD-10-CM

## 2023-07-19 DIAGNOSIS — E78.2 MIXED HYPERLIPIDEMIA: ICD-10-CM

## 2023-07-19 DIAGNOSIS — Z13.6 SCREENING FOR AAA (ABDOMINAL AORTIC ANEURYSM): ICD-10-CM

## 2023-07-19 DIAGNOSIS — E78.5 HYPERLIPIDEMIA LDL GOAL <70: ICD-10-CM

## 2023-07-19 PROCEDURE — 99214 OFFICE O/P EST MOD 30 MIN: CPT | Performed by: FAMILY MEDICINE

## 2023-07-19 PROCEDURE — G0439 PPPS, SUBSEQ VISIT: HCPCS | Performed by: FAMILY MEDICINE

## 2023-07-19 RX ORDER — GLIPIZIDE 5 MG/1
5 TABLET, FILM COATED, EXTENDED RELEASE ORAL DAILY
Qty: 90 TABLET | Refills: 0 | Status: SHIPPED | OUTPATIENT
Start: 2023-07-19 | End: 2023-10-17

## 2023-07-19 NOTE — PROGRESS NOTES
Assessment and Plan:     Problem List Items Addressed This Visit        Endocrine    DM type 2 (diabetes mellitus, type 2) ([address])    Relevant Medications    glipiZIDE (GLUCOTROL XL) 5 mg 24 hr tablet    Other Relevant Orders    HEMOGLOBIN A1C W/ EAG ESTIMATION  - A1c = 7.1 <-- 5.9   - nml renal function (7/12/2023)   - nml urine microalbumin (1/6/2023)   - DM foot exam done in the office on 1/12/2023   - did see Podiatry 1/24/2022 - "He is on gabapentin and pain stimulator as well as chronic pain medication. Catina Beltrán is not much further I can offer"   - has Optho exam scheduled in 2wks   - UTD with COVID IMMs and Booster x3  - UTD with PCV20  - UTD with Tdap   - cont Metformin 1000mg BID  - of note, his Glipizide 10mg QD was stopped in 1/2023 - given increasing A1c, advised to restart Glipizide 5mg QD   - RTO in 3months with repeat labs for f/u - pt aware and agreeable        Other    Smoker  - advised cessation       Hyperlipidemia LDL goal <70  - LDL 58 (1/6/2023)   - cont current regimen      Other Visit Diagnoses     Encounter for subsequent annual wellness visit (AWV) in Medicare patient  -  Primary  - reviewed labs done 7/12/2023   - nml PSA   - due for Lung Ca screening - has CT scheduled for 9/11/2023   - UTD with AAA screen  - UTD with DXA  - UTD with Colon Ca screening (3/2022) - h/o polyps - repeat in 2027 - pt aware  - UTD with PCV20   - RTO in 1yr for AWV - pt aware and agreeable       Diabetic polyneuropathy associated with type 2 diabetes mellitus ([address])        Relevant Medications    glipiZIDE (GLUCOTROL XL) 5 mg 24 hr tablet    Mixed hyperlipidemia        Prostate cancer screening      - nml PSA      Screening for AAA (abdominal aortic aneurysm)      - nml AAA screen done 9/9/2022      Encounter for screening for lung cancer      - due for Lung Ca screening - has CT scheduled for 9/11/2023       Polyp of colon, unspecified part of colon, unspecified type      - UTD with Colon Ca screening (3/2022) - h/o polyps - repeat in 2027 - pt aware           Preventive health issues were discussed with patient, and age appropriate screening tests were ordered as noted in patient's After Visit Summary. Personalized health advice and appropriate referrals for health education or preventive services given if needed, as noted in patient's After Visit Summary.      History of Present Illness:     Patient presents for a Medicare Wellness Visit    HPI as below     Patient Care Team:  Bri Ruiz DO as PCP - General (Family Medicine)  Cade Simental MD (Pain Medicine)  MD Federico Burrell MD Cyndee Fiddler, CRNP Tari Planas, MD Venessa Oliphant, MD as Endoscopist     Review of Systems:     Review of Systems as per HPI      Problem List:     Patient Active Problem List   Diagnosis   • Heartburn   • Erectile dysfunction of non-organic origin   • DM type 2 (diabetes mellitus, type 2) (HCC)   • Chronic low back pain   • BPH with urinary obstruction   • Blood pressure elevated without history of HTN   • Sciatica   • Myofascial pain syndrome   • Lumbar spondylosis   • Herniation of lumbar intervertebral disc with radiculopathy   • Acute post-operative pain   • Insomnia   • Chronic pain syndrome   • Low back pain   • Class 1 obesity due to excess calories with serious comorbidity and body mass index (BMI) of 30.0 to 30.9 in adult   • Smoker   • Seasonal allergic rhinitis   • Diabetic mononeuropathy associated with type 2 diabetes mellitus (HCC)   • Postlaminectomy syndrome, lumbar region   • Numbness and tingling in both hands   • Intervertebral disc disorder with radiculopathy of lumbosacral region   • Acute bronchitis   • Hyperlipidemia LDL goal <70   • Proteinuria   • Battery end of life of spinal cord stimulator   • HTN (hypertension)   • Gastroesophageal reflux disease   • Spinal cord stimulator status   • Uncomplicated opioid dependence [facility])      Past Medical and Surgical History:     Past Medical History: Diagnosis Date   • Arthritis    • BPH (benign prostatic hypertrophy) with urinary obstruction    • Chronic narcotic dependence (HCC)     percocet TID   • Chronic pain disorder     back-spinal cord stimulator   • Colon polyp    • Diabetes mellitus ([address])     type   • Ear infection    • Herniation of lumbar intervertebral disc with radiculopathy    • Myofascial pain syndrome    • Obesity    • Sciatica    • Spinal cord stimulator status 2017    implanted 2017   • Tinnitus      Past Surgical History:   Procedure Laterality Date   • ABSCESS DRAINAGE      groin   • BACK SURGERY      sciatic nerve- spinal cord stimulator 2017   • CAUDAL BLOCK N/A 10/26/2018    Procedure: Caudal Epidural Steroid Injection (14498); Surgeon: Benji Madera MD;  Location: [facility] MAIN OR;  Service: Pain Management    • CAUDAL BLOCK N/A 11/30/2018    Procedure: Caudal Epidural Steroid Injection ([id number]); Surgeon: Benji Madera MD;  Location: [facility] MAIN OR;  Service: Pain Management    • COLONOSCOPY     • EPIDURAL BLOCK INJECTION Right 5/10/2019    Procedure: L5 S1 transforaminal Epidural Steroid Injection ([id number];  Surgeon: Benji Madera MD;  Location: [address] [place] [address] MAIN OR;  Service: Pain Management    • EPIDURAL BLOCK INJECTION Right 8/16/2019    Procedure: BLOCK / INJECTION EPIDURAL STEROID TRANSFORAMINAL;  Surgeon: Benji Madera MD;  Location: [address] [place] [address] MAIN OR;  Service: Pain Management    • EPIDURAL BLOCK INJECTION Left 7/1/2021    Procedure: L5 S1 transforaminal epidural steroid injection ( [id number]); Surgeon: Benji Madera MD;  Location: [facility] MAIN OR;  Service: Pain Management    • EPIDURAL BLOCK INJECTION Right 7/15/2021    Procedure: L5 S1 transforaminal epidural steroid injection ( [id number]); Surgeon: Benji Madera MD;  Location: [facility] MAIN OR;  Service: Pain Management    • EPIDURAL BLOCK INJECTION Right 1/7/2022    Procedure: L5 S1 transforaminal epidural steroid injection ([id number]);   Surgeon: Benji Madera MD; Location: [address] [place] [address] [facility] [place] MAIN OR;  Service: Pain Management    • EPIDURAL BLOCK INJECTION Left 1/28/2022    Procedure: L5 S1 transforaminal epidural steroid injection ([id number]); Surgeon: Dana Medina MD;  Location: [facility] MAIN OR;  Service: Pain Management    • NERVE BLOCK Bilateral 4/26/2018    Procedure: B/L L3 L4 L5 S1 MBB #1 ([id number]); Surgeon: Dana Medina MD;  Location: [facility] MAIN OR;  Service: Pain Management    • NERVE BLOCK Bilateral 5/11/2018    Procedure: B/L L3 L4 L5 S1 Medial Branch Block #2;  Surgeon: Dana Medina MD;  Location: [facility] MAIN OR;  Service: Pain Management    • NOSE SURGERY     • AR COLONOSCOPY FLX DX W/COLLJ SPEC WHEN PFRMD N/A 3/6/2017    Procedure: COLONOSCOPY;  Surgeon: Chaka Cardoso MD;  Location:  GI LAB; Service: Gastroenterology   • AR INSJ/RPLCMT SPI NPGR DIR/INDUXIVE COUPLING Left 6/29/2021    Procedure: REPLACEMENT IMPLANTABLE PULSE GENERATOR DORSAL SPINAL COLUMN STIMULATOR, LEFT;  Surgeon: Shelby Sigala MD;  Location:  MAIN OR;  Service: Neurosurgery   • AR AN IMPLTJ NSTIM ELTRDS PLATE/PADDLE EDRL Left 10/3/2017    Procedure: PLACEMENT THORACIC FOR INSERTION DORSAL COLUMN SPINAL CORD STIMULATOR (DCS) WITH BUTTOCK IMPLANTABLE PULSE GENERATOR(IMPULSE); Surgeon: Tanesha Damon MD;  Location:  MAIN OR;  Service: Neurosurgery   • RADIOFREQUENCY ABLATION Right 5/18/2018    Procedure: Rt L3 L4 L5 S1 Radio Frequency Ablation ([id number]); Surgeon: Dana Medina MD;  Location: [facility] MAIN OR;  Service: Pain Management    • RADIOFREQUENCY ABLATION Left 6/1/2018    Procedure: Lt L3 L4 L5 S1 Radio Frequency Ablation ([id number]);   Surgeon: Dana Medina MD;  Location: [facility] MAIN OR;  Service: Pain Management       Family History:     Family History   Problem Relation Age of Onset   • Diabetes Mother    • Arthritis Mother    • Diabetes Father         mellitus    • Heart attack Father 58   • Hypertension Father    • Arthritis Father       Social History: Social History     Socioeconomic History   • Marital status: Single     Spouse name: None   • Number of children: None   • Years of education: None   • Highest education level: None   Occupational History   • Occupation:  for [employer]    Tobacco Use   • Smoking status: Every Day     Packs/day: 1.00     Years: 42.00     Total pack years: 42.00     Types: Cigarettes   • Smokeless tobacco: Never   • Tobacco comments:     encouraged smoking cessation - history of smoking 30 or more pack years    Vaping Use   • Vaping Use: Never used   Substance and Sexual Activity   • Alcohol use: No     Comment: rare   • Drug use: No   • Sexual activity: Not Currently     Partners: Female   Other Topics Concern   • None   Social History Narrative    Caffeine use - coffee      Social Determinants of Health     Financial Resource Strain: Low Risk  (7/19/2023)    Overall Financial Resource Strain (CARDIA)    • Difficulty of Paying Living Expenses: Not hard at all   Food Insecurity: Not on file   Transportation Needs: No Transportation Needs (7/19/2023)    PRAPARE - Transportation    • Lack of Transportation (Medical): No    • Lack of Transportation (Non-Medical):  No   Physical Activity: Not on file   Stress: Not on file   Social Connections: Not on file   Intimate Partner Violence: Not on file   Housing Stability: Not on file      Medications and Allergies:     Current Outpatient Medications   Medication Sig Dispense Refill   • atorvastatin (LIPITOR) 20 mg tablet TAKE 1 TABLET EVERY DAY 90 tablet 0   • clobetasol (TEMOVATE) 0.05 % ointment APPLY TOPICALLY 2 (TWO) TIMES A DAY (Patient taking differently: Apply topically 2 (two) times a day as needed) 30 g 1   • etodolac (LODINE) 300 MG capsule Take 1 capsule (300 mg total) by mouth 3 (three) times a day as needed (pain (with food)) 270 capsule 0   • fluticasone (FLONASE) 50 mcg/act nasal spray USE 2 SPRAYS IN EACH NOSTRIL EVERY DAY 48 g 0   • gabapentin (Neurontin) 600 MG tablet Take 2 tablets (1,200 mg total) by mouth 2 (two) times a day 360 tablet 0   • glipiZIDE (GLUCOTROL XL) 5 mg 24 hr tablet Take 1 tablet (5 mg total) by mouth daily 90 tablet 0   • lidocaine (LIDODERM) 5 % Apply 2 patches topically daily Remove & Discard patch within 12 hours or as directed by MD (Patient taking differently: Apply 2 patches topically daily as needed Remove & Discard patch within 12 hours or as directed by MD) 60 patch 0   • lisinopril (ZESTRIL) 2.5 mg tablet TAKE 1 TABLET AT BEDTIME 90 tablet 0   • metFORMIN (GLUCOPHAGE) 1000 MG tablet TAKE 1 TABLET TWICE DAILY WITH MEALS 180 tablet 1   • omeprazole (PriLOSEC) 20 mg delayed release capsule TAKE 1 CAPSULE EVERY DAY 90 capsule 0   • oxyCODONE-acetaminophen (Percocet) 7.5-325 MG per tablet Take 1 tablet by mouth 3 (three) times a day as needed for severe pain Max Daily Amount: 3 tablets 90 tablet 0   • [START ON 7/22/2023] oxyCODONE-acetaminophen (Percocet) 7.5-325 MG per tablet Take 1 tablet by mouth 3 (three) times a day as needed for severe pain Max Daily Amount: 3 tablets Do not start before July 22, 2023. 90 tablet 0   • Polyethylene Glycol 3350 (MIRALAX PO) Take by mouth 1 (one) time     • QUEtiapine (SEROquel) 200 mg tablet Take 1 tablet by mouth daily at bedtime 90 tablet 0   • tamsulosin (FLOMAX) 0.4 mg TAKE 2 CAPSULES EVERY DAY WITH DINNER 180 capsule 0   • traZODone (DESYREL) 50 mg tablet TAKE 2 TABLETS AT BEDTIME 180 tablet 0   • bisacodyl (DULCOLAX) 5 mg EC tablet Take 20 mg by mouth 1 (one) time (Patient not taking: Reported on 6/22/2023)     • magnesium citrate solution Take 296 mL by mouth once (Patient not taking: Reported on 7/19/2023)     • sildenafil (VIAGRA) 50 MG tablet Take 1 tablet (50 mg total) by mouth as needed for erectile dysfunction Take on an empty stomach, 1 hour before sexual activity, no alcohol. 100 mg max dose.  (Patient not taking: Reported on 6/22/2023) 30 tablet 0     No current facility-administered medications for this visit. Allergies   Allergen Reactions   • Penicillins Swelling     Mouth swelling      Immunizations:     Immunization History   Administered Date(s) Administered   • COVID-19 MODERNA VACC 0.25 ML IM BOOSTER 12/02/2021   • COVID-19 MODERNA VACC 0.5 ML IM 03/16/2021, 04/12/2021, 11/26/2022   • INFLUENZA 09/12/2018, 11/26/2022   • Influenza Injectable, MDCK, Preservative Free, Quadrivalent, 0.5 mL 09/24/2020   • Influenza, recombinant, quadrivalent,injectable, preservative free 09/12/2018, 09/15/2021   • Pneumococcal Conjugate Vaccine 20-valent (Pcv20), Polysace 07/01/2022   • Pneumococcal Polysaccharide PPV23 09/12/2018   • Tdap 09/12/2018      Health Maintenance:         Topic Date Due   • Lung Cancer Screening  09/09/2023   • HIV Screening  04/08/2024 (Originally 6/15/1975)   • Colorectal Cancer Screening  03/29/2027   • Hepatitis C Screening  Completed         Topic Date Due   • COVID-19 Vaccine (5 - Moderna series) 01/21/2023   • Influenza Vaccine (1) 09/01/2023      Medicare Screening Tests and Risk Assessments:     Analy Sigala is here for his Subsequent Wellness visit. Last Medicare Wellness visit information reviewed, patient interviewed and updates made to the record today. Health Risk Assessment:   Patient rates overall health as fair. Patient feels that their physical health rating is same. Patient is very satisfied with their life. Eyesight was rated as same. Hearing was rated as same. Patient feels that their emotional and mental health rating is same. Patients states they are never, rarely angry. Patient states they are never, rarely unusually tired/fatigued. Pain experienced in the last 7 days has been none. Patient states that he has experienced no weight loss or gain in last 6 months. Depression Screening:   PHQ-2 Score: 0      Fall Risk Screening:    In the past year, patient has experienced: no history of falling in past year      Home Safety:  Patient does not have trouble with stairs inside or outside of their home. Patient has working smoke alarms and has working carbon monoxide detector. Home safety hazards include: none. Nutrition:   Current diet is Regular. Medications:   Patient is currently taking over-the-counter supplements. OTC medications include: see medication list. Patient is able to manage medications. Activities of Daily Living (ADLs)/Instrumental Activities of Daily Living (IADLs):   Walk and transfer into and out of bed and chair?: Yes  Dress and groom yourself?: Yes    Bathe or shower yourself?: Yes    Feed yourself? Yes  Do your laundry/housekeeping?: Yes  Manage your money, pay your bills and track your expenses?: Yes  Make your own meals?: Yes    Do your own shopping?: Yes    Previous Hospitalizations:   Any hospitalizations or ED visits within the last 12 months?: Yes    How many hospitalizations have you had in the last year?: more than 4    Advance Care Planning:   Living will: Yes    Durable POA for healthcare:  Yes    Advanced directive: Yes      Cognitive Screening:   Provider or family/friend/caregiver concerned regarding cognition?: No    PREVENTIVE SCREENINGS      Cardiovascular Screening:    General: History Lipid Disorder, Risks and Benefits Discussed and Screening Current      Diabetes Screening:     General: History Diabetes, Risks and Benefits Discussed and Screening Current      Colorectal Cancer Screening:     General: Screening Current      Prostate Cancer Screening:    General: Screening Current      Osteoporosis Screening:    General: Screening Current      Abdominal Aortic Aneurysm (AAA) Screening:    Risk factors include: tobacco use        General: Screening Current      Lung Cancer Screening:     General: Screening Current    Due for: Low Dose CT (LDCT)      Hepatitis C Screening:    General: Screening Current      Preventive Screening Comments: 59yo M presents to the office for sub AWV and f/u   - reviewed labs done 7/12/2023 - nml PSA   - A1c = 7.1 <-- 5.9   - UTD with Colon Ca screening (3/2022) - h/o polyps - 5yr recall (due again in 2027)   - due for Lung Ca screening - has CT scheduled for 9/11/2023   - UTD with AAA (2022)   - UTD with PCV20   - denies F/C/N/V/CP/palpitations/SOB/abd pain/LE edema   - does not currently need RFs    Screening, Brief Intervention, and Referral to Treatment (SBIRT)    Screening  Typical number of drinks in a day: 0  Typical number of drinks in a week: 0  Interpretation: Low risk drinking behavior. Single Item Drug Screening:  How often have you used an illegal drug (including marijuana) or a prescription medication for non-medical reasons in the past year? never    Single Item Drug Screen Score: 0  Interpretation: Negative screen for possible drug use disorder    No results found. Physical Exam:     /70   Pulse 93   Ht 5' 11" (1.803 m)   Wt 102 kg (225 lb)   SpO2 99%   BMI 31.38 kg/m²     Physical Exam  Vitals reviewed. Constitutional:       General: He is not in acute distress. Appearance: Normal appearance. He is not ill-appearing, toxic-appearing or diaphoretic. HENT:      Head: Normocephalic and atraumatic. Right Ear: External ear normal.      Left Ear: External ear normal.      Nose: Nose normal.   Eyes:      General: No scleral icterus. Right eye: No discharge. Left eye: No discharge. Extraocular Movements: Extraocular movements intact. Conjunctiva/sclera: Conjunctivae normal.   Cardiovascular:      Rate and Rhythm: Normal rate and regular rhythm. Heart sounds: Normal heart sounds. Pulmonary:      Effort: Pulmonary effort is normal. No respiratory distress. Breath sounds: Normal breath sounds. No stridor. No wheezing, rhonchi or rales. Abdominal:      Palpations: Abdomen is soft. Musculoskeletal:         General: Normal range of motion. Cervical back: Normal range of motion. Right lower leg: No edema.       Left lower leg: No edema. Skin:     General: Skin is warm. Neurological:      General: No focal deficit present. Mental Status: He is alert and oriented to person, place, and time. Psychiatric:         Mood and Affect: Mood normal.         Behavior: Behavior normal.        BMI Counseling: Body mass index is 31.38 kg/m². The BMI is above normal. Nutrition recommendations include 3-5 servings of fruits/vegetables daily. Exercise recommendations include exercising 3-5 times per week.     Domenico Perera DO

## 2023-07-19 NOTE — PATIENT INSTRUCTIONS
Medicare Preventive Visit Patient Instructions  Thank you for completing your Welcome to Medicare Visit or Medicare Annual Wellness Visit today. Your next wellness visit will be due in one year (7/19/2024). The screening/preventive services that you may require over the next 5-10 years are detailed below. Some tests may not apply to you based off risk factors and/or age. Screening tests ordered at today's visit but not completed yet may show as past due. Also, please note that scanned in results may not display below. Preventive Screenings:  Service Recommendations Previous Testing/Comments   Colorectal Cancer Screening  · Colonoscopy    · Fecal Occult Blood Test (FOBT)/Fecal Immunochemical Test (FIT)  · Fecal DNA/Cologuard Test  · Flexible Sigmoidoscopy Age: 43-73 years old   Colonoscopy: every 10 years (May be performed more frequently if at higher risk)  OR  FOBT/FIT: every 1 year  OR  Cologuard: every 3 years  OR  Sigmoidoscopy: every 5 years  Screening may be recommended earlier than age 39 if at higher risk for colorectal cancer. Also, an individualized decision between you and your healthcare provider will decide whether screening between the ages of 77-80 would be appropriate.  Colonoscopy: 03/30/2022  FOBT/FIT: Not on file  Cologuard: Not on file  Sigmoidoscopy: Not on file    Screening Current     Prostate Cancer Screening Individualized decision between patient and health care provider in men between ages of 53-66   Medicare will cover every 12 months beginning on the day after your 50th birthday PSA: 0.18 ng/mL     Screening Current     Hepatitis C Screening Once for adults born between 1945 and 1965  More frequently in patients at high risk for Hepatitis C Hep C Antibody: 04/12/2021    Screening Current   Diabetes Screening 1-2 times per year if you're at risk for diabetes or have pre-diabetes Fasting glucose: 184 mg/dL (7/12/2023)  A1C: 7.1 % (7/12/2023)  Screening Not Indicated  History Diabetes Cholesterol Screening Once every 5 years if you don't have a lipid disorder. May order more often based on risk factors. Lipid panel: 01/06/2023  Screening Not Indicated  History Lipid Disorder      Other Preventive Screenings Covered by Medicare:  1. Abdominal Aortic Aneurysm (AAA) Screening: covered once if your at risk. You're considered to be at risk if you have a family history of AAA or a male between the age of 70-76 who smoking at least 100 cigarettes in your lifetime. 2. Lung Cancer Screening: covers low dose CT scan once per year if you meet all of the following conditions: (1) Age 48-67; (2) No signs or symptoms of lung cancer; (3) Current smoker or have quit smoking within the last 15 years; (4) You have a tobacco smoking history of at least 20 pack years (packs per day x number of years you smoked); (5) You get a written order from a healthcare provider. 3. Glaucoma Screening: covered annually if you're considered high risk: (1) You have diabetes OR (2) Family history of glaucoma OR (3)  aged 48 and older OR (3)  American aged 72 and older  3. Osteoporosis Screening: covered every 2 years if you meet one of the following conditions: (1) Have a vertebral abnormality; (2) On glucocorticoid therapy for more than 3 months; (3) Have primary hyperparathyroidism; (4) On osteoporosis medications and need to assess response to drug therapy. 5. HIV Screening: covered annually if you're between the age of 14-79. Also covered annually if you are younger than 13 and older than 72 with risk factors for HIV infection. For pregnant patients, it is covered up to 3 times per pregnancy.     Immunizations:  Immunization Recommendations   Influenza Vaccine Annual influenza vaccination during flu season is recommended for all persons aged >= 6 months who do not have contraindications   Pneumococcal Vaccine   * Pneumococcal conjugate vaccine = PCV13 (Prevnar 13), PCV15 (Vaxneuvance), PCV20 (Prevnar 20)  * Pneumococcal polysaccharide vaccine = PPSV23 (Pneumovax) Adults 2364 years old: 1-3 doses may be recommended based on certain risk factors  Adults 72 years old: 1-2 doses may be recommended based off what pneumonia vaccine you previously received   Hepatitis B Vaccine 3 dose series if at intermediate or high risk (ex: diabetes, end stage renal disease, liver disease)   Tetanus (Td) Vaccine - COST NOT COVERED BY MEDICARE PART B Following completion of primary series, a booster dose should be given every 10 years to maintain immunity against tetanus. Td may also be given as tetanus wound prophylaxis. Tdap Vaccine - COST NOT COVERED BY MEDICARE PART B Recommended at least once for all adults. For pregnant patients, recommended with each pregnancy. Shingles Vaccine (Shingrix) - COST NOT COVERED BY MEDICARE PART B  2 shot series recommended in those aged 48 and above     Health Maintenance Due:      Topic Date Due   • Lung Cancer Screening  09/09/2023   • HIV Screening  04/08/2024 (Originally 6/15/1975)   • Colorectal Cancer Screening  03/29/2027   • Hepatitis C Screening  Completed     Immunizations Due:      Topic Date Due   • COVID-19 Vaccine (5 - Moderna series) 01/21/2023   • Influenza Vaccine (1) 09/01/2023     Advance Directives   What are advance directives? Advance directives are legal documents that state your wishes and plans for medical care. These plans are made ahead of time in case you lose your ability to make decisions for yourself. Advance directives can apply to any medical decision, such as the treatments you want, and if you want to donate organs. What are the types of advance directives? There are many types of advance directives, and each state has rules about how to use them. You may choose a combination of any of the following:  · Living will: This is a written record of the treatment you want.  You can also choose which treatments you do not want, which to limit, and which to stop at a certain time. This includes surgery, medicine, IV fluid, and tube feedings. · Durable power of  for healthcare [facility]): This is a written record that states who you want to make healthcare choices for you when you are unable to make them for yourself. This person, called a proxy, is usually a family member or a friend. You may choose more than 1 proxy. · Do not resuscitate (DNR) order:  A DNR order is used in case your heart stops beating or you stop breathing. It is a request not to have certain forms of treatment, such as CPR. A DNR order may be included in other types of advance directives. · Medical directive: This covers the care that you want if you are in a coma, near death, or unable to make decisions for yourself. You can list the treatments you want for each condition. Treatment may include pain medicine, surgery, blood transfusions, dialysis, IV or tube feedings, and a ventilator (breathing machine). · Values history: This document has questions about your views, beliefs, and how you feel and think about life. This information can help others choose the care that you would choose. Why are advance directives important? An advance directive helps you control your care. Although spoken wishes may be used, it is better to have your wishes written down. Spoken wishes can be misunderstood, or not followed. Treatments may be given even if you do not want them. An advance directive may make it easier for your family to make difficult choices about your care. Cigarette Smoking and Your Health   Risks to your health if you smoke:  Nicotine and other chemicals found in tobacco damage every cell in your body. Even if you are a light smoker, you have an increased risk for cancer, heart disease, and lung disease. If you are pregnant or have diabetes, smoking increases your risk for complications.    Benefits to your health if you stop smoking:   · You decrease respiratory symptoms such as coughing, wheezing, and shortness of breath. · You reduce your risk for cancers of the lung, mouth, throat, kidney, bladder, pancreas, stomach, and cervix. If you already have cancer, you increase the benefits of chemotherapy. You also reduce your risk for cancer returning or a second cancer from developing. · You reduce your risk for heart disease, blood clots, heart attack, and stroke. · You reduce your risk for lung infections, and diseases such as pneumonia, asthma, chronic bronchitis, and emphysema. · Your circulation improves. More oxygen can be delivered to your body. If you have diabetes, you lower your risk for complications, such as kidney, artery, and eye diseases. You also lower your risk for nerve damage. Nerve damage can lead to amputations, poor vision, and blindness. · You improve your body's ability to heal and to fight infections. For more information and support to stop smoking:   · [employer]  Phone: 7- 344 - 730-8307  Web Address: www.[employer]  Weight Management   Why it is important to manage your weight:  Being overweight increases your risk of health conditions such as heart disease, high blood pressure, type 2 diabetes, and certain types of cancer. It can also increase your risk for osteoarthritis, sleep apnea, and other respiratory problems. Aim for a slow, steady weight loss. Even a small amount of weight loss can lower your risk of health problems. How to lose weight safely:  A safe and healthy way to lose weight is to eat fewer calories and get regular exercise. You can lose up about 1 pound a week by decreasing the number of calories you eat by 500 calories each day. Healthy meal plan for weight management:  A healthy meal plan includes a variety of foods, contains fewer calories, and helps you stay healthy. A healthy meal plan includes the following:  · Eat whole-grain foods more often. A healthy meal plan should contain fiber.  Fiber is the part of grains, fruits, and vegetables that is not broken down by your body. Whole-grain foods are healthy and provide extra fiber in your diet. Some examples of whole-grain foods are whole-wheat breads and pastas, oatmeal, brown rice, and bulgur. · Eat a variety of vegetables every day. Include dark, leafy greens such as spinach, kale, yumiko greens, and mustard greens. Eat yellow and orange vegetables such as carrots, sweet potatoes, and winter squash. · Eat a variety of fruits every day. Choose fresh or canned fruit (canned in its own juice or light syrup) instead of juice. Fruit juice has very little or no fiber. · Eat low-fat dairy foods. Drink fat-free (skim) milk or 1% milk. Eat fat-free yogurt and low-fat cottage cheese. Try low-fat cheeses such as mozzarella and other reduced-fat cheeses. · Choose meat and other protein foods that are low in fat. Choose beans or other legumes such as split peas or lentils. Choose fish, skinless poultry (chicken or turkey), or lean cuts of red meat (beef or pork). Before you cook meat or poultry, cut off any visible fat. · Use less fat and oil. Try baking foods instead of frying them. Add less fat, such as margarine, sour cream, regular salad dressing and mayonnaise to foods. Eat fewer high-fat foods. Some examples of high-fat foods include french fries, doughnuts, ice cream, and cakes. · Eat fewer sweets. Limit foods and drinks that are high in sugar. This includes candy, cookies, regular soda, and sweetened drinks. Exercise:  Exercise at least 30 minutes per day on most days of the week. Some examples of exercise include walking, biking, dancing, and swimming. You can also fit in more physical activity by taking the stairs instead of the elevator or parking farther away from stores. Ask your healthcare provider about the best exercise plan for you.       © Copyright [address] 2018 Information is for End User's use only and may not be sold, redistributed or otherwise used for commercial purposes.  All illustrations and images included in [employer]Notes® are the copyrighted property of [employer] or [address] [place] [address]

## 2023-08-18 LAB
LEFT EYE DIABETIC RETINOPATHY: NORMAL
RIGHT EYE DIABETIC RETINOPATHY: NORMAL

## 2023-08-21 ENCOUNTER — OFFICE VISIT (OUTPATIENT)
Dept: PAIN MEDICINE | Facility: CLINIC | Age: 63
End: 2023-08-21
Payer: OTHER MISCELLANEOUS

## 2023-08-21 VITALS
HEIGHT: 71 IN | WEIGHT: 219 LBS | OXYGEN SATURATION: 97 % | SYSTOLIC BLOOD PRESSURE: 146 MMHG | BODY MASS INDEX: 30.66 KG/M2 | HEART RATE: 88 BPM | DIASTOLIC BLOOD PRESSURE: 68 MMHG

## 2023-08-21 DIAGNOSIS — Z79.891 LONG-TERM CURRENT USE OF OPIATE ANALGESIC: ICD-10-CM

## 2023-08-21 DIAGNOSIS — M96.1 POSTLAMINECTOMY SYNDROME, LUMBAR: ICD-10-CM

## 2023-08-21 DIAGNOSIS — G89.4 CHRONIC PAIN SYNDROME: Primary | ICD-10-CM

## 2023-08-21 DIAGNOSIS — M54.50 CHRONIC LOW BACK PAIN: ICD-10-CM

## 2023-08-21 DIAGNOSIS — M47.816 LUMBAR SPONDYLOSIS: ICD-10-CM

## 2023-08-21 DIAGNOSIS — F11.20 UNCOMPLICATED OPIOID DEPENDENCE (HCC): ICD-10-CM

## 2023-08-21 DIAGNOSIS — M54.16 LUMBAR RADICULOPATHY: ICD-10-CM

## 2023-08-21 DIAGNOSIS — G89.29 CHRONIC LOW BACK PAIN: ICD-10-CM

## 2023-08-21 PROCEDURE — 99214 OFFICE O/P EST MOD 30 MIN: CPT

## 2023-08-21 RX ORDER — OXYCODONE AND ACETAMINOPHEN 7.5; 325 MG/1; MG/1
1 TABLET ORAL 3 TIMES DAILY PRN
Qty: 90 TABLET | Refills: 0 | Status: SHIPPED | OUTPATIENT
Start: 2023-08-22 | End: 2023-09-21

## 2023-08-21 RX ORDER — GABAPENTIN 600 MG/1
1200 TABLET ORAL 2 TIMES DAILY
Qty: 360 TABLET | Refills: 0 | Status: SHIPPED | OUTPATIENT
Start: 2023-08-21 | End: 2023-11-19

## 2023-08-21 RX ORDER — OXYCODONE AND ACETAMINOPHEN 7.5; 325 MG/1; MG/1
1 TABLET ORAL 3 TIMES DAILY PRN
Qty: 90 TABLET | Refills: 0 | Status: SHIPPED | OUTPATIENT
Start: 2023-09-20 | End: 2023-10-20

## 2023-08-21 NOTE — PROGRESS NOTES
Pain Medicine Follow-Up Note    Assessment:  1. Chronic pain syndrome    2. Lumbar spondylosis    3. Chronic low back pain    4. Postlaminectomy syndrome, lumbar    5. Lumbar radiculopathy    6. Long-term current use of opiate analgesic    7. Uncomplicated opioid dependence ([address])        Plan:    New Medications Ordered This Visit   Medications   • etodolac (LODINE) 300 MG capsule     Sig: Take 1 capsule (300 mg total) by mouth 3 (three) times a day as needed (pain (with food))     Dispense:  270 capsule     Refill:  0   • gabapentin (Neurontin) 600 MG tablet     Sig: Take 2 tablets (1,200 mg total) by mouth 2 (two) times a day     Dispense:  360 tablet     Refill:  0   • oxyCODONE-acetaminophen (Percocet) 7.5-325 MG per tablet     Sig: Take 1 tablet by mouth 3 (three) times a day as needed for severe pain Max Daily Amount: 3 tablets Do not start before September 20, 2023. Dispense:  90 tablet     Refill:  0     Due for fill on 9/20/2023. • oxyCODONE-acetaminophen (Percocet) 7.5-325 MG per tablet     Sig: Take 1 tablet by mouth 3 (three) times a day as needed for severe pain Max Daily Amount: 3 tablets Do not start before August 22, 2023. Dispense:  90 tablet     Refill:  0     Fill on 8/22/2023       My impressions and treatment recommendations were discussed in detail with the patient who verbalized understanding and had no further questions. The patient continues to report an overall reduction of his pain level and improvement with his functioning without significant side effects using oxycodone/acetaminophen 7.5/325 mg tablet patient uses 1 tablet up to 3 times a day as needed for severe pain along with etodolac 300 mg capsule patient uses 1 capsule by mouth up to 3 times a day as needed and gabapentin 600 mg tablet patient takes 2 tablets twice a day, therefore I will continue the patient on these medications.    oxycodone/acetaminophen 7.5/325 mg tablet E-prescribed to the patient's pharmacy with a do not fill until date of 8/22/2023 and 9/20/2023. [place] Prescription Drug Monitoring Program report was reviewed and was appropriate     There are risks associated with opioid medications, including dependence, addiction and tolerance. The patient understands and agrees to use these medications only as prescribed. Potential side effects of the medications include, but are not limited to, constipation, drowsiness, addiction, impaired judgment and risk of fatal overdose if not taken as prescribed. The patient was warned against driving while taking sedation medications. Sharing medications is a felony. At this point in time, the patient is showing no signs of addiction, abuse, diversion or suicidal ideation. Follow-up is planned in 8 weeks time or sooner as warranted. Discharge instructions were provided. I personally saw and examined the patient and I agree with the above discussed plan of care. History of Present Illness:    Vic Lilly is a 61 y.o. male who presents to [facility] for interval re-evaluation of the above stated pain complaints. The patient has a past medical and chronic pain history as outlined in the assessment section. He was last seen on 6/22/2023. At today's visit patient states that their pain symptoms are the same with a pain score of 6/10 on the verbal numeric pain scale. The patient's pain is worse morning, evening, and at night. The patient's pain is constant in nature. And the quality of the patient's pain is described as dull-aching, throbbing, numbness, and pins-and-needles. The patient's pain is located in the bilateral low back down the patient's right lateral leg. Patient states the amount of pain relief he is now obtaining from his current pain relievers is enough to make a difference in his life by reducing his pain symptoms by 30%.   Patient denies any side effects using gabapentin, etodolac, and oxycodone/acetaminophen 7.5/325 mg tablets. Pain Contract Signed:  03/06/23    Last Urine Drug Screen:  06/22/23    Oxycodone last taken 8/20 PM    Other than as stated above, the patient denies any interval changes in medications, medical condition, mental condition, symptoms, or allergies since the last office visit. Review of Systems:    Review of Systems   Respiratory: Negative for shortness of breath. Cardiovascular: Negative for chest pain. Gastrointestinal: Negative for constipation, diarrhea, nausea and vomiting. Musculoskeletal: Positive for back pain, gait problem, joint swelling and myalgias. Negative for arthralgias. Skin: Negative for rash. Neurological: Negative for dizziness, seizures and weakness. All other systems reviewed and are negative. Past Medical History:   Diagnosis Date   • Arthritis    • BPH (benign prostatic hypertrophy) with urinary obstruction    • Chronic narcotic dependence (HCC)     percocet TID   • Chronic pain disorder     back-spinal cord stimulator   • Colon polyp    • Diabetes mellitus ([address])     type   • Ear infection    • Herniation of lumbar intervertebral disc with radiculopathy    • Myofascial pain syndrome    • Obesity    • Sciatica    • Spinal cord stimulator status 2017    implanted 2017   • Tinnitus        Past Surgical History:   Procedure Laterality Date   • ABSCESS DRAINAGE      groin   • BACK SURGERY      sciatic nerve- spinal cord stimulator 2017   • CAUDAL BLOCK N/A 10/26/2018    Procedure: Caudal Epidural Steroid Injection (39203); Surgeon: Tahira Guevara MD;  Location: [facility] MAIN OR;  Service: Pain Management    • CAUDAL BLOCK N/A 11/30/2018    Procedure: Caudal Epidural Steroid Injection (45783);   Surgeon: Tahira Guevara MD;  Location: [facility] OR;  Service: Pain Management    • COLONOSCOPY     • EPIDURAL BLOCK INJECTION Right 5/10/2019    Procedure: L5 S1 transforaminal Epidural Steroid Injection ([id number];  Surgeon: Tahira Guevara MD;  Location: [address] [place] [address] MAIN OR;  Service: Pain Management    • EPIDURAL BLOCK INJECTION Right 8/16/2019    Procedure: BLOCK / INJECTION EPIDURAL STEROID TRANSFORAMINAL;  Surgeon: Karine Perez MD;  Location: [address] [place] [address] MAIN OR;  Service: Pain Management    • EPIDURAL BLOCK INJECTION Left 7/1/2021    Procedure: L5 S1 transforaminal epidural steroid injection ( [id number]); Surgeon: Karine Perez MD;  Location: [facility] MAIN OR;  Service: Pain Management    • EPIDURAL BLOCK INJECTION Right 7/15/2021    Procedure: L5 S1 transforaminal epidural steroid injection ( [id number]); Surgeon: Karine Perez MD;  Location: [facility] MAIN OR;  Service: Pain Management    • EPIDURAL BLOCK INJECTION Right 1/7/2022    Procedure: L5 S1 transforaminal epidural steroid injection ([id number]); Surgeon: Karine Perez MD;  Location: [facility] MAIN OR;  Service: Pain Management    • EPIDURAL BLOCK INJECTION Left 1/28/2022    Procedure: L5 S1 transforaminal epidural steroid injection ([id number]); Surgeon: Karine Perez MD;  Location: [facility] MAIN OR;  Service: Pain Management    • NERVE BLOCK Bilateral 4/26/2018    Procedure: B/L L3 L4 L5 S1 MBB #1 ([id number]); Surgeon: Karine Perez MD;  Location: [facility] MAIN OR;  Service: Pain Management    • NERVE BLOCK Bilateral 5/11/2018    Procedure: B/L L3 L4 L5 S1 Medial Branch Block #2;  Surgeon: Karine Perez MD;  Location: [facility] MAIN OR;  Service: Pain Management    • NOSE SURGERY     • NC COLONOSCOPY FLX DX W/COLLJ SPEC WHEN PFRMD N/A 3/6/2017    Procedure: COLONOSCOPY;  Surgeon: Hayden Quiñones MD;  Location: [facility];   Service: Gastroenterology   • NC INSJ/RPLCMT SPI NPGR DIR/INDUXIVE COUPLING Left 6/29/2021    Procedure: REPLACEMENT IMPLANTABLE PULSE GENERATOR DORSAL SPINAL COLUMN STIMULATOR, LEFT;  Surgeon: Payal Roe MD;  Location:  MAIN OR;  Service: Neurosurgery   • NC AN IMPLTJ NSTIM ELTRDS PLATE/PADDLE EDRL Left 10/3/2017    Procedure: PLACEMENT THORACIC FOR INSERTION DORSAL COLUMN SPINAL CORD STIMULATOR (DCS) WITH BUTTOCK IMPLANTABLE PULSE GENERATOR(IMPULSE); Surgeon: Gosia Rojas MD;  Location:  MAIN OR;  Service: Neurosurgery   • RADIOFREQUENCY ABLATION Right 5/18/2018    Procedure: Rt L3 L4 L5 S1 Radio Frequency Ablation ([id number]); Surgeon: Marcello Leal MD;  Location: [facility] MAIN OR;  Service: Pain Management    • RADIOFREQUENCY ABLATION Left 6/1/2018    Procedure: Lt L3 L4 L5 S1 Radio Frequency Ablation ([id number]);   Surgeon: Marcello Leal MD;  Location: [facility] MAIN OR;  Service: Pain Management        Family History   Problem Relation Age of Onset   • Diabetes Mother    • Arthritis Mother    • Diabetes Father         mellitus    • Heart attack Father 58   • Hypertension Father    • Arthritis Father        Social History     Occupational History   • Occupation:  for [employer] center    Tobacco Use   • Smoking status: Every Day     Packs/day: 1.00     Years: 42.00     Total pack years: 42.00     Types: Cigarettes   • Smokeless tobacco: Never   • Tobacco comments:     encouraged smoking cessation - history of smoking 30 or more pack years    Vaping Use   • Vaping Use: Never used   Substance and Sexual Activity   • Alcohol use: No     Comment: rare   • Drug use: No   • Sexual activity: Not Currently     Partners: Female         Current Outpatient Medications:   •  atorvastatin (LIPITOR) 20 mg tablet, TAKE 1 TABLET EVERY DAY, Disp: 90 tablet, Rfl: 0  •  clobetasol (TEMOVATE) 0.05 % ointment, APPLY TOPICALLY 2 (TWO) TIMES A DAY (Patient taking differently: Apply topically 2 (two) times a day as needed), Disp: 30 g, Rfl: 1  •  etodolac (LODINE) 300 MG capsule, Take 1 capsule (300 mg total) by mouth 3 (three) times a day as needed (pain (with food)), Disp: 270 capsule, Rfl: 0  •  fluticasone (FLONASE) 50 mcg/act nasal spray, USE 2 SPRAYS IN EACH NOSTRIL EVERY DAY, Disp: 48 g, Rfl: 0  •  gabapentin (Neurontin) 600 MG tablet, Take 2 tablets (1,200 mg total) by mouth 2 (two) times a day, Disp: 360 tablet, Rfl: 0  •  glipiZIDE (GLUCOTROL XL) 5 mg 24 hr tablet, Take 1 tablet (5 mg total) by mouth daily, Disp: 90 tablet, Rfl: 0  •  lidocaine (LIDODERM) 5 %, Apply 2 patches topically daily Remove & Discard patch within 12 hours or as directed by MD (Patient taking differently: Apply 2 patches topically daily as needed Remove & Discard patch within 12 hours or as directed by MD), Disp: 60 patch, Rfl: 0  •  lisinopril (ZESTRIL) 2.5 mg tablet, TAKE 1 TABLET AT BEDTIME, Disp: 90 tablet, Rfl: 0  •  metFORMIN (GLUCOPHAGE) 1000 MG tablet, TAKE 1 TABLET TWICE DAILY WITH MEALS, Disp: 180 tablet, Rfl: 1  •  omeprazole (PriLOSEC) 20 mg delayed release capsule, TAKE 1 CAPSULE EVERY DAY, Disp: 90 capsule, Rfl: 0  •  [START ON 9/20/2023] oxyCODONE-acetaminophen (Percocet) 7.5-325 MG per tablet, Take 1 tablet by mouth 3 (three) times a day as needed for severe pain Max Daily Amount: 3 tablets Do not start before September 20, 2023., Disp: 90 tablet, Rfl: 0  •  oxyCODONE-acetaminophen (Percocet) 7.5-325 MG per tablet, Take 1 tablet by mouth 3 (three) times a day as needed for severe pain Max Daily Amount: 3 tablets Do not start before August 22, 2023., Disp: 90 tablet, Rfl: 0  •  Polyethylene Glycol 3350 (MIRALAX PO), Take by mouth 1 (one) time, Disp: , Rfl:   •  QUEtiapine (SEROquel) 200 mg tablet, Take 1 tablet by mouth daily at bedtime, Disp: 90 tablet, Rfl: 0  •  tamsulosin (FLOMAX) 0.4 mg, TAKE 2 CAPSULES EVERY DAY WITH DINNER, Disp: 180 capsule, Rfl: 0  •  traZODone (DESYREL) 50 mg tablet, TAKE 2 TABLETS AT BEDTIME, Disp: 180 tablet, Rfl: 0  •  bisacodyl (DULCOLAX) 5 mg EC tablet, Take 20 mg by mouth 1 (one) time (Patient not taking: Reported on 6/22/2023), Disp: , Rfl:   •  magnesium citrate solution, Take 296 mL by mouth once (Patient not taking: Reported on 7/19/2023), Disp: , Rfl:   •  sildenafil (VIAGRA) 50 MG tablet, Take 1 tablet (50 mg total) by mouth as needed for erectile dysfunction Take on an empty stomach, 1 hour before sexual activity, no alcohol. 100 mg max dose. (Patient not taking: Reported on 6/22/2023), Disp: 30 tablet, Rfl: 0    Allergies   Allergen Reactions   • Penicillins Swelling     Mouth swelling       Physical Exam:    /68   Pulse 88   Ht 5' 11" (1.803 m)   Wt 99.3 kg (219 lb)   SpO2 97%   BMI 30.54 kg/m²     Constitutional:normal, well developed, well nourished, alert, in no distress and non-toxic and no overt pain behavior. and obese  Eyes:anicteric  HEENT:grossly intact  Neck:supple, symmetric, trachea midline and no masses   Pulmonary:even and unlabored  Cardiovascular:No edema or pitting edema present  Skin:Normal without rashes or lesions and well hydrated  Psychiatric:Mood and affect appropriate  Neurologic:Cranial Nerves II-XII grossly intact  Musculoskeletal:antalgic    This document was created using speech voice recognition software. Grammatical errors, random word insertions, pronoun errors, and incomplete sentences are an occasional consequence of this system due to software limitations, ambient noise, and hardware issues. Any formal questions or concerns about content, text, or information contained within the body of this dictation should be directly addressed to the provider for clarification.

## 2023-08-21 NOTE — PATIENT INSTRUCTIONS

## 2023-09-10 DIAGNOSIS — K21.9 GASTROESOPHAGEAL REFLUX DISEASE WITHOUT ESOPHAGITIS: ICD-10-CM

## 2023-09-10 DIAGNOSIS — R80.9 PROTEINURIA, UNSPECIFIED TYPE: ICD-10-CM

## 2023-09-10 DIAGNOSIS — E78.2 MIXED HYPERLIPIDEMIA: ICD-10-CM

## 2023-09-10 DIAGNOSIS — J30.2 SEASONAL ALLERGIC RHINITIS, UNSPECIFIED TRIGGER: ICD-10-CM

## 2023-09-11 ENCOUNTER — HOSPITAL ENCOUNTER (OUTPATIENT)
Dept: RADIOLOGY | Age: 63
Discharge: HOME | End: 2023-09-11
Payer: COMMERCIAL

## 2023-09-11 DIAGNOSIS — F17.200 SMOKER: ICD-10-CM

## 2023-09-11 PROCEDURE — 71271 CT THORAX LUNG CANCER SCR C-: CPT

## 2023-09-11 RX ORDER — OMEPRAZOLE 20 MG/1
CAPSULE, DELAYED RELEASE ORAL
Qty: 90 CAPSULE | Refills: 0 | Status: SHIPPED | OUTPATIENT
Start: 2023-09-11

## 2023-09-11 RX ORDER — ATORVASTATIN CALCIUM 20 MG/1
TABLET, FILM COATED ORAL
Qty: 90 TABLET | Refills: 0 | Status: SHIPPED | OUTPATIENT
Start: 2023-09-11

## 2023-09-11 RX ORDER — FLUTICASONE PROPIONATE 50 MCG
SPRAY, SUSPENSION (ML) NASAL
Qty: 48 G | Refills: 0 | Status: SHIPPED | OUTPATIENT
Start: 2023-09-11

## 2023-09-11 RX ORDER — LISINOPRIL 2.5 MG/1
TABLET ORAL
Qty: 90 TABLET | Refills: 0 | Status: SHIPPED | OUTPATIENT
Start: 2023-09-11

## 2023-09-20 ENCOUNTER — TELEPHONE (OUTPATIENT)
Dept: FAMILY MEDICINE CLINIC | Facility: CLINIC | Age: 63
End: 2023-09-20

## 2023-09-20 NOTE — TELEPHONE ENCOUNTER
----- Message from Kristina Rivera DO sent at 9/19/2023  2:59 PM EDT -----  Stable mild background emphysema with multiple stable small nodules. Continue annual screening in 12 months.

## 2023-09-20 NOTE — TELEPHONE ENCOUNTER
" Patient ID: Ramez Villalba is a 26 y.o. male.    Chief Complaint: Dizziness, Abdominal Pain, Emesis, and Nausea    HPI      Ramez Villalba is a 26 y.o. male here with acute onset of new dizziness.  Dizzy on two separate occasions feeling of nausea and emesis associated with such.  No fever chills.  No urinary problems.  Dizziness is brief.  Not induced by rapid movements such as turning had some getting up quickly.    Vitals:    03/03/22 1201   BP: 114/72   BP Location: Right arm   Patient Position: Sitting   Pulse: 91   Temp: 98.2 °F (36.8 °C)   TempSrc: Oral   SpO2: 97%   Weight: 72.6 kg (159 lb 15.1 oz)   Height: 5' 11" (1.803 m)            Review of Symptoms      Physical Exam    Constitutional:  Oriented to person, place, and time.appears well-developed and well-nourished.  No distress.      HENT  Head: Normocephalic and atraumatic  Right Ear: External ear normal.   Left Ear: External ear normal.   Nose: External nose normal.   Mouth:  Moist mucus membranes.  No nystagmus      Eyes:  Conjunctivae are normal. Right eye exhibits no discharge.  Left eye exhibits no discharge. No scleral icterus.  No periorbital edema    Cardiovascular:  Regular rate and rhythm with normal S1 and S2     Pulmonary/Chest:   Clear to auscultation bilaterally without wheezes, rhonchi or rales      Musculoskeletal:  No edema. No obvious deformity No wasting       Neurological:  Alert and oriented to person, place, and time.   Coordination normal. -normal gait-normal strength    Skin:   Skin is warm and dry.  No diaphoresis.   No rash noted.     Psychiatric: Normal mood and affect. Behavior is normal.  Judgment and thought content normal.     Complete Blood Count  Lab Results   Component Value Date    RBC 4.79 11/21/2021    HGB 14.7 11/21/2021    HCT 42.9 11/21/2021    MCV 90 11/21/2021    MCH 30.7 11/21/2021    MCHC 34.3 11/21/2021    RDW 11.8 11/21/2021     11/21/2021    MPV 10.0 11/21/2021    GRAN 8.9 (H) 11/21/2021    GRAN 79.7 (H) " Spoke with pt and made him aware 11/21/2021    LYMPH 1.5 11/21/2021    LYMPH 13.7 (L) 11/21/2021    MONO 0.6 11/21/2021    MONO 5.5 11/21/2021    EOS 0.1 11/21/2021    BASO 0.02 11/21/2021    EOSINOPHIL 0.6 11/21/2021    BASOPHIL 0.2 11/21/2021    DIFFMETHOD Automated 11/21/2021       Comprehensive Metabolic Panel  Lab Results   Component Value Date     11/21/2021    BUN 17 11/21/2021    CREATININE 0.84 11/21/2021     11/21/2021    K 4.2 11/21/2021     11/21/2021    PROT 7.1 11/21/2021    ALBUMIN 4.6 11/21/2021    BILITOT 0.5 11/21/2021    AST 34 11/21/2021    ALKPHOS 104 11/21/2021    CO2 26 11/21/2021    ALT 30 11/21/2021    ANIONGAP 11 11/21/2021    EGFRNONAA >60.0 11/21/2021    ESTGFRAFRICA >60.0 11/21/2021       TSH  No results found for: TSH    Assessment / Plan:      ICD-10-CM ICD-9-CM   1. Seizure disorder  G40.909 345.90     Seizure disorder  -     OXCARBAZEPINE METABOLITE ([facility]); Future; Expected date: 03/03/2022    Probable toxicity-suggest reducing medication by 300 milligrams every 3rd dose.  Ordered level-no insurance-unsure of the cost will try to have done.    Discussed pros and cons of reducing medication and not knowing exactly what the levels are of his Trileptal

## 2023-10-04 ENCOUNTER — OFFICE VISIT (OUTPATIENT)
Dept: PAIN MEDICINE | Facility: CLINIC | Age: 63
End: 2023-10-04
Payer: OTHER MISCELLANEOUS

## 2023-10-04 VITALS
HEART RATE: 73 BPM | SYSTOLIC BLOOD PRESSURE: 113 MMHG | WEIGHT: 228 LBS | DIASTOLIC BLOOD PRESSURE: 73 MMHG | HEIGHT: 71 IN | BODY MASS INDEX: 31.92 KG/M2

## 2023-10-04 DIAGNOSIS — M47.816 LUMBAR SPONDYLOSIS: ICD-10-CM

## 2023-10-04 DIAGNOSIS — M54.50 CHRONIC BILATERAL LOW BACK PAIN WITHOUT SCIATICA: ICD-10-CM

## 2023-10-04 DIAGNOSIS — K59.03 THERAPEUTIC OPIOID INDUCED CONSTIPATION: ICD-10-CM

## 2023-10-04 DIAGNOSIS — G89.29 CHRONIC BILATERAL LOW BACK PAIN WITHOUT SCIATICA: ICD-10-CM

## 2023-10-04 DIAGNOSIS — T40.2X5A THERAPEUTIC OPIOID INDUCED CONSTIPATION: ICD-10-CM

## 2023-10-04 DIAGNOSIS — M96.1 POSTLAMINECTOMY SYNDROME, LUMBAR: ICD-10-CM

## 2023-10-04 DIAGNOSIS — G89.4 CHRONIC PAIN SYNDROME: Primary | ICD-10-CM

## 2023-10-04 DIAGNOSIS — M54.16 LUMBAR RADICULOPATHY: ICD-10-CM

## 2023-10-04 DIAGNOSIS — Z96.89 SPINAL CORD STIMULATOR STATUS: ICD-10-CM

## 2023-10-04 PROCEDURE — 99214 OFFICE O/P EST MOD 30 MIN: CPT

## 2023-10-04 RX ORDER — OXYCODONE AND ACETAMINOPHEN 7.5; 325 MG/1; MG/1
1 TABLET ORAL 2 TIMES DAILY PRN
Qty: 60 TABLET | Refills: 0 | Status: SHIPPED | OUTPATIENT
Start: 2023-11-18 | End: 2023-12-18

## 2023-10-04 RX ORDER — OXYCODONE AND ACETAMINOPHEN 7.5; 325 MG/1; MG/1
1 TABLET ORAL 2 TIMES DAILY PRN
Qty: 60 TABLET | Refills: 0 | Status: SHIPPED | OUTPATIENT
Start: 2023-10-19 | End: 2023-11-18

## 2023-10-04 RX ORDER — GABAPENTIN 600 MG/1
1200 TABLET ORAL 2 TIMES DAILY
Qty: 360 TABLET | Refills: 0 | Status: SHIPPED | OUTPATIENT
Start: 2023-10-04 | End: 2024-01-02

## 2023-10-04 NOTE — PROGRESS NOTES
Pain Medicine Follow-Up Note    Assessment:  1. Chronic pain syndrome    2. Lumbar spondylosis    3. Chronic bilateral low back pain without sciatica    4. Postlaminectomy syndrome, lumbar    5. Lumbar radiculopathy    6. Therapeutic opioid induced constipation    7. Spinal cord stimulator status        Plan:    New Medications Ordered This Visit   Medications   • oxyCODONE-acetaminophen (Percocet) 7.5-325 MG per tablet     Sig: Take 1 tablet by mouth 2 (two) times a day as needed for severe pain Max Daily Amount: 2 tablets Do not start before November 18, 2023. Dispense:  60 tablet     Refill:  0     Due for fill on 11/18/2023   • oxyCODONE-acetaminophen (Percocet) 7.5-325 MG per tablet     Sig: Take 1 tablet by mouth 2 (two) times a day as needed for severe pain Max Daily Amount: 2 tablets Do not start before October 19, 2023. Dispense:  60 tablet     Refill:  0     Due for fill on 10/19/2023   • gabapentin (Neurontin) 600 MG tablet     Sig: Take 2 tablets (1,200 mg total) by mouth 2 (two) times a day     Dispense:  360 tablet     Refill:  0       My impressions and treatment recommendations were discussed in detail with the patient who verbalized understanding and had no further questions. The patient continues to report an overall reduction of his pain level and improvement with his functioning without significant side effects using oxycodone/acetaminophen 7.5/325 mg tablet patient uses 1 tablet up to 3 times a day as needed for severe pain. Patient does feel that he does not need 3 times a day dosing since it does not help him tremendously he is requesting that his medication be dropped down to twice daily dosing.   Oxycodone/acetaminophen 7.5/325 mg tablet patient may take 1 tablet twice daily as needed for pain along with etodolac 300 mg capsule patient uses 1 capsule by mouth up to 3 times a day as needed and gabapentin 600 mg tablet patient takes 2 tablets twice a day.  Oxycodone/acetaminophen 7.5/325 mg tablet E-prescribed to the patient's pharmacy with a do not fill until date of  10/19/2023 and 11/18/2023. Patient does report opioid-induced constipation he currently uses Culturelle regularly however I did explain to the patient that this is a probiotic. Patient states that he does have some lactose intolerance and will sometimes drink milk to induce a bowel movement. Patient encouraged to use MiraLAX and senna. [place] Prescription Drug Monitoring Program report was reviewed and was appropriate     There are risks associated with opioid medications, including dependence, addiction and tolerance. The patient understands and agrees to use these medications only as prescribed. Potential side effects of the medications include, but are not limited to, constipation, drowsiness, addiction, impaired judgment and risk of fatal overdose if not taken as prescribed. The patient was warned against driving while taking sedation medications. Sharing medications is a felony. At this point in time, the patient is showing no signs of addiction, abuse, diversion or suicidal ideation. Follow-up is planned in 8 weeks time or sooner as warranted. Discharge instructions were provided. I personally saw and examined the patient and I agree with the above discussed plan of care. History of Present Illness:    Vic Lilly is a 61 y.o. male who presents to [address] [facility] for interval re-evaluation of the above stated pain complaints. The patient has a past medical and chronic pain history as outlined in the assessment section. He was last seen on 8/21/2023. At today's visit patient states that their pain symptoms are the same with a pain score of 6/10 on the verbal numeric pain scale. The patient's pain is worse in the morning, evening, and at night. The patient's pain is constant in nature.   And the quality of the patient's pain is described as dull-aching, throbbing, shooting, numbness, and pins-and-needles. The patient's pain is located in the bilateral low back. Patient states the amount of pain relief he is now obtaining from his current pain relievers is enough to make a real difference in his life though he is unsure about the percentage of relief he obtained. Pain Contract Signed:  10/19/2023  Last Urine Drug Screen:  11/18/2023    Other than as stated above, the patient denies any interval changes in medications, medical condition, mental condition, symptoms, or allergies since the last office visit. Review of Systems:    Review of Systems   Constitutional: Positive for activity change (difficulty walking, decreased ROM). Respiratory: Negative for shortness of breath. Cardiovascular: Negative for chest pain. Gastrointestinal: Positive for constipation. Negative for diarrhea, nausea and vomiting. Musculoskeletal: Positive for joint swelling (stiffness). Negative for arthralgias, gait problem and myalgias. Skin: Negative for rash. Neurological: Positive for dizziness, weakness, numbness (right leg) and headaches. Negative for seizures. Psychiatric/Behavioral: Positive for sleep disturbance. All other systems reviewed and are negative.         Past Medical History:   Diagnosis Date   • Arthritis    • BPH (benign prostatic hypertrophy) with urinary obstruction    • Chronic narcotic dependence (HCC)     percocet TID   • Chronic pain disorder     back-spinal cord stimulator   • Colon polyp    • Diabetes mellitus ([address])     type   • Ear infection    • Herniation of lumbar intervertebral disc with radiculopathy    • Myofascial pain syndrome    • Obesity    • Sciatica    • Spinal cord stimulator status 2017    implanted 2017   • Tinnitus        Past Surgical History:   Procedure Laterality Date   • ABSCESS DRAINAGE      groin   • BACK SURGERY      sciatic nerve- spinal cord stimulator 2017   • CAUDAL BLOCK N/A 10/26/2018    Procedure: Caudal Epidural Steroid Injection (31072); Surgeon: Jose Sanchez MD;  Location: [facility] OR;  Service: Pain Management    • CAUDAL BLOCK N/A 11/30/2018    Procedure: Caudal Epidural Steroid Injection (87065); Surgeon: Jose Sanchez MD;  Location: [facility] OR;  Service: Pain Management    • COLONOSCOPY     • EPIDURAL BLOCK INJECTION Right 5/10/2019    Procedure: L5 S1 transforaminal Epidural Steroid Injection ([id number];  Surgeon: Jose Sanchez MD;  Location: [address] [place] [address] OR;  Service: Pain Management    • EPIDURAL BLOCK INJECTION Right 8/16/2019    Procedure: BLOCK / INJECTION EPIDURAL STEROID TRANSFORAMINAL;  Surgeon: Jose Sanchez MD;  Location: [address] [place] [address] OR;  Service: Pain Management    • EPIDURAL BLOCK INJECTION Left 7/1/2021    Procedure: L5 S1 transforaminal epidural steroid injection ( [id number]); Surgeon: Jose Sanchez MD;  Location: [facility] OR;  Service: Pain Management    • EPIDURAL BLOCK INJECTION Right 7/15/2021    Procedure: L5 S1 transforaminal epidural steroid injection ( [id number]); Surgeon: Jose Sanchez MD;  Location: [facility] OR;  Service: Pain Management    • EPIDURAL BLOCK INJECTION Right 1/7/2022    Procedure: L5 S1 transforaminal epidural steroid injection ([id number]); Surgeon: Jose Sanchez MD;  Location: [facility] OR;  Service: Pain Management    • EPIDURAL BLOCK INJECTION Left 1/28/2022    Procedure: L5 S1 transforaminal epidural steroid injection ([id number]); Surgeon: Jose Sanchez MD;  Location: [facility] OR;  Service: Pain Management    • NERVE BLOCK Bilateral 4/26/2018    Procedure: B/L L3 L4 L5 S1 MBB #1 ([id number]);   Surgeon: Jose Sanchez MD;  Location: [facility] OR;  Service: Pain Management    • NERVE BLOCK Bilateral 5/11/2018    Procedure: B/L L3 L4 L5 S1 Medial Branch Block #2;  Surgeon: Jose Sanchez MD;  Location: [address] [place] [address] OR;  Service: Pain Management    • NOSE SURGERY     • DC COLONOSCOPY FLX DX W/COLLJ SPEC WHEN PFRMD N/A 3/6/2017 Procedure: COLONOSCOPY;  Surgeon: Daron Schaumann, MD;  Location: [facility]; Service: Gastroenterology   • MN INSJ/RPLCMT SPI NPGR DIR/INDUXIVE COUPLING Left 6/29/2021    Procedure: REPLACEMENT IMPLANTABLE PULSE GENERATOR DORSAL SPINAL COLUMN STIMULATOR, LEFT;  Surgeon: Karla Peter MD;  Location:  MAIN OR;  Service: Neurosurgery   • MN AN IMPLTJ NSTIM ELTRDS PLATE/PADDLE EDRL Left 10/3/2017    Procedure: PLACEMENT THORACIC FOR INSERTION DORSAL COLUMN SPINAL CORD STIMULATOR (DCS) WITH BUTTOCK IMPLANTABLE PULSE GENERATOR(IMPULSE); Surgeon: Essie Hackett MD;  Location:  MAIN OR;  Service: Neurosurgery   • RADIOFREQUENCY ABLATION Right 5/18/2018    Procedure: Rt L3 L4 L5 S1 Radio Frequency Ablation ([id number],[id number]); Surgeon: Nevin Abernathy MD;  [facility] [facility];  Service: Pain Management    • RADIOFREQUENCY ABLATION Left 6/1/2018    Procedure: Lt L3 L4 L5 S1 Radio Frequency Ablation ([id number]);   Surgeon: Nevin Abernathy MD;  [place] [facility];  Service: Pain Management        Family History   Problem Relation Age of Onset   • Diabetes Mother    • Arthritis Mother    • Diabetes Father         mellitus    • Heart attack Father 58   • Hypertension Father    • Arthritis Father        Social History     Occupational History   • Occupation:  for distribution center    Tobacco Use   • Smoking status: Every Day     Packs/day: 1.00     Years: 42.00     Total pack years: 42.00     Types: Cigarettes   • Smokeless tobacco: Never   • Tobacco comments:     encouraged smoking cessation - history of smoking 30 or more pack years    Vaping Use   • Vaping Use: Never used   Substance and Sexual Activity   • Alcohol use: No     Comment: rare   • Drug use: No   • Sexual activity: Not Currently     Partners: Female         Current Outpatient Medications:   •  atorvastatin (LIPITOR) 20 mg tablet, TAKE 1 TABLET EVERY DAY, Disp: 90 tablet, Rfl: 0  •  etodolac (LODINE) 300 MG capsule, Take 1 capsule (300 mg total) by mouth 3 (three) times a day as needed (pain (with food)), Disp: 270 capsule, Rfl: 0  •  fluticasone (FLONASE) 50 mcg/act nasal spray, USE 2 SPRAYS IN EACH NOSTRIL EVERY DAY, Disp: 48 g, Rfl: 0  •  gabapentin (Neurontin) 600 MG tablet, Take 2 tablets (1,200 mg total) by mouth 2 (two) times a day, Disp: 360 tablet, Rfl: 0  •  glipiZIDE (GLUCOTROL XL) 5 mg 24 hr tablet, Take 1 tablet (5 mg total) by mouth daily, Disp: 90 tablet, Rfl: 0  •  lidocaine (LIDODERM) 5 %, Apply 2 patches topically daily Remove & Discard patch within 12 hours or as directed by MD (Patient taking differently: Apply 2 patches topically daily as needed Remove & Discard patch within 12 hours or as directed by MD), Disp: 60 patch, Rfl: 0  •  lisinopril (ZESTRIL) 2.5 mg tablet, TAKE 1 TABLET AT BEDTIME, Disp: 90 tablet, Rfl: 0  •  metFORMIN (GLUCOPHAGE) 1000 MG tablet, TAKE 1 TABLET TWICE DAILY WITH MEALS, Disp: 180 tablet, Rfl: 1  •  omeprazole (PriLOSEC) 20 mg delayed release capsule, TAKE 1 CAPSULE EVERY DAY, Disp: 90 capsule, Rfl: 0  •  [START ON 11/18/2023] oxyCODONE-acetaminophen (Percocet) 7.5-325 MG per tablet, Take 1 tablet by mouth 2 (two) times a day as needed for severe pain Max Daily Amount: 2 tablets Do not start before November 18, 2023., Disp: 60 tablet, Rfl: 0  •  [START ON 10/19/2023] oxyCODONE-acetaminophen (Percocet) 7.5-325 MG per tablet, Take 1 tablet by mouth 2 (two) times a day as needed for severe pain Max Daily Amount: 2 tablets Do not start before October 19, 2023., Disp: 60 tablet, Rfl: 0  •  Polyethylene Glycol 3350 (MIRALAX PO), Take by mouth 1 (one) time, Disp: , Rfl:   •  QUEtiapine (SEROquel) 200 mg tablet, Take 1 tablet by mouth daily at bedtime, Disp: 90 tablet, Rfl: 0  •  tamsulosin (FLOMAX) 0.4 mg, TAKE 2 CAPSULES EVERY DAY WITH DINNER, Disp: 180 capsule, Rfl: 0  •  traZODone (DESYREL) 50 mg tablet, TAKE 2 TABLETS AT BEDTIME, Disp: 180 tablet, Rfl: 0  •  bisacodyl (DULCOLAX) 5 mg EC tablet, Take 20 mg by mouth 1 (one) time (Patient not taking: Reported on 6/22/2023), Disp: , Rfl:   •  clobetasol (TEMOVATE) 0.05 % ointment, APPLY TOPICALLY 2 (TWO) TIMES A DAY (Patient not taking: Reported on 10/4/2023), Disp: 30 g, Rfl: 1  •  magnesium citrate solution, Take 296 mL by mouth once (Patient not taking: Reported on 7/19/2023), Disp: , Rfl:   •  sildenafil (VIAGRA) 50 MG tablet, Take 1 tablet (50 mg total) by mouth as needed for erectile dysfunction Take on an empty stomach, 1 hour before sexual activity, no alcohol. 100 mg max dose. (Patient not taking: Reported on 6/22/2023), Disp: 30 tablet, Rfl: 0    Allergies   Allergen Reactions   • Penicillins Swelling     Mouth swelling       Physical Exam:    /73   Pulse 73   Ht 5' 11" (1.803 m)   Wt 103 kg (228 lb)   BMI 31.80 kg/m²     Constitutional:normal, well developed, well nourished, alert, in no distress and non-toxic and no overt pain behavior. and obese  Eyes:anicteric  HEENT:grossly intact  Neck:supple, symmetric, trachea midline and no masses   Pulmonary:even and unlabored  Cardiovascular:No edema or pitting edema present  Skin:Normal without rashes or lesions and well hydrated  Psychiatric:Mood and affect appropriate  Neurologic:Cranial Nerves II-XII grossly intact  Musculoskeletal:normal      This document was created using speech voice recognition software. Grammatical errors, random word insertions, pronoun errors, and incomplete sentences are an occasional consequence of this system due to software limitations, ambient noise, and hardware issues. Any formal questions or concerns about content, text, or information contained within the body of this dictation should be directly addressed to the provider for clarification.

## 2023-10-04 NOTE — PATIENT INSTRUCTIONS

## 2023-10-19 ENCOUNTER — APPOINTMENT (OUTPATIENT)
Dept: LAB | Facility: IMAGING CENTER | Age: 63
End: 2023-10-19
Payer: COMMERCIAL

## 2023-10-19 DIAGNOSIS — E11.9 TYPE 2 DIABETES MELLITUS WITHOUT COMPLICATION, WITHOUT LONG-TERM CURRENT USE OF INSULIN (HCC): ICD-10-CM

## 2023-10-19 LAB
EST. AVERAGE GLUCOSE BLD GHB EST-MCNC: 140 MG/DL
HBA1C MFR BLD: 6.5 %

## 2023-10-19 PROCEDURE — 36415 COLL VENOUS BLD VENIPUNCTURE: CPT

## 2023-10-19 PROCEDURE — 83036 HEMOGLOBIN GLYCOSYLATED A1C: CPT

## 2023-10-25 DIAGNOSIS — G47.00 INSOMNIA, UNSPECIFIED TYPE: ICD-10-CM

## 2023-10-25 DIAGNOSIS — N40.1 BPH WITH URINARY OBSTRUCTION: ICD-10-CM

## 2023-10-25 DIAGNOSIS — N13.8 BPH WITH URINARY OBSTRUCTION: ICD-10-CM

## 2023-10-25 RX ORDER — TAMSULOSIN HYDROCHLORIDE 0.4 MG/1
CAPSULE ORAL
Qty: 180 CAPSULE | Refills: 1 | Status: SHIPPED | OUTPATIENT
Start: 2023-10-25

## 2023-10-25 RX ORDER — TRAZODONE HYDROCHLORIDE 50 MG/1
TABLET ORAL
Qty: 180 TABLET | Refills: 1 | Status: SHIPPED | OUTPATIENT
Start: 2023-10-25 | End: 2023-10-26 | Stop reason: SDUPTHER

## 2023-10-26 ENCOUNTER — OFFICE VISIT (OUTPATIENT)
Dept: FAMILY MEDICINE CLINIC | Facility: CLINIC | Age: 63
End: 2023-10-26
Payer: COMMERCIAL

## 2023-10-26 VITALS
RESPIRATION RATE: 16 BRPM | HEIGHT: 71 IN | BODY MASS INDEX: 31.5 KG/M2 | HEART RATE: 80 BPM | OXYGEN SATURATION: 97 % | WEIGHT: 225 LBS | SYSTOLIC BLOOD PRESSURE: 124 MMHG | DIASTOLIC BLOOD PRESSURE: 62 MMHG

## 2023-10-26 DIAGNOSIS — E78.2 MIXED HYPERLIPIDEMIA: ICD-10-CM

## 2023-10-26 DIAGNOSIS — J30.2 SEASONAL ALLERGIC RHINITIS, UNSPECIFIED TRIGGER: ICD-10-CM

## 2023-10-26 DIAGNOSIS — F17.200 SMOKER: ICD-10-CM

## 2023-10-26 DIAGNOSIS — E11.9 TYPE 2 DIABETES MELLITUS WITHOUT COMPLICATION, WITHOUT LONG-TERM CURRENT USE OF INSULIN (HCC): Primary | ICD-10-CM

## 2023-10-26 DIAGNOSIS — R80.9 PROTEINURIA, UNSPECIFIED TYPE: ICD-10-CM

## 2023-10-26 DIAGNOSIS — L40.9 PSORIASIS: ICD-10-CM

## 2023-10-26 DIAGNOSIS — G47.00 INSOMNIA, UNSPECIFIED TYPE: ICD-10-CM

## 2023-10-26 PROCEDURE — 99214 OFFICE O/P EST MOD 30 MIN: CPT | Performed by: FAMILY MEDICINE

## 2023-10-26 RX ORDER — FLUTICASONE PROPIONATE 50 MCG
2 SPRAY, SUSPENSION (ML) NASAL DAILY
Qty: 96 G | Refills: 2 | Status: SHIPPED | OUTPATIENT
Start: 2023-10-26

## 2023-10-26 RX ORDER — CLOBETASOL PROPIONATE 0.5 MG/G
OINTMENT TOPICAL 2 TIMES DAILY
Qty: 30 G | Refills: 1 | Status: SHIPPED | OUTPATIENT
Start: 2023-10-26 | End: 2023-10-31 | Stop reason: SDUPTHER

## 2023-10-26 RX ORDER — GLIPIZIDE 5 MG/1
5 TABLET, FILM COATED, EXTENDED RELEASE ORAL DAILY
Qty: 90 TABLET | Refills: 1 | Status: SHIPPED | OUTPATIENT
Start: 2023-10-26

## 2023-10-26 RX ORDER — LISINOPRIL 2.5 MG/1
2.5 TABLET ORAL
Qty: 90 TABLET | Refills: 1 | Status: SHIPPED | OUTPATIENT
Start: 2023-10-26

## 2023-10-26 RX ORDER — NICOTINE 14MG/24HR
1 PATCH, TRANSDERMAL 24 HOURS TRANSDERMAL DAILY
Qty: 90 CAPSULE | Refills: 1 | Status: SHIPPED | OUTPATIENT
Start: 2023-10-26

## 2023-10-26 RX ORDER — QUETIAPINE FUMARATE 200 MG/1
200 TABLET, FILM COATED ORAL
Qty: 90 TABLET | Refills: 1 | Status: SHIPPED | OUTPATIENT
Start: 2023-10-26

## 2023-10-26 RX ORDER — ATORVASTATIN CALCIUM 20 MG/1
20 TABLET, FILM COATED ORAL DAILY
Qty: 90 TABLET | Refills: 1 | Status: SHIPPED | OUTPATIENT
Start: 2023-10-26

## 2023-10-26 RX ORDER — TRAZODONE HYDROCHLORIDE 100 MG/1
100 TABLET ORAL
Qty: 90 TABLET | Refills: 1 | Status: SHIPPED | OUTPATIENT
Start: 2023-10-26

## 2023-10-26 NOTE — PROGRESS NOTES
Assessment/Plan:   Diagnoses and all orders for this visit:    Type 2 diabetes mellitus without complication, without long-term current use of insulin ([facility])  -     IRIS Diabetic eye exam  -     Saccharomyces boulardii (Probiotic) 250 MG CAPS; Take 1 capsule (250 mg total) by mouth in the morning  -     metFORMIN (GLUCOPHAGE) 1000 MG tablet; Take 1 tablet (1,000 mg total) by mouth 2 (two) times a day with meals  -     glipiZIDE (GLUCOTROL XL) 5 mg 24 hr tablet; Take 1 tablet (5 mg total) by mouth daily  -     HEMOGLOBIN A1C W/ EAG ESTIMATION; Future  -     Albumin / creatinine urine ratio; Future  -     Comprehensive metabolic panel; Future  - A1c 6.5 <-- 7.1  - nml renal function (7/12/2023)   - nml urine microalbumin (1/6/2023)   - DM foot exam done in the office on 1/12/2023   - did see Podiatry 1/24/2022 - "He is on gabapentin and pain stimulator as well as chronic pain medication. There is not much further I can offer"    - UTD with PCV20   - UTD with annual Flu vaccine  - received latest COVID Booster   - cont current regimen - Metformin 1000mg BID and Glipizide 5mg QD   - RTO in 6months, with labs, for routine f/u - pt aware and agreeable     Mixed hyperlipidemia  -     atorvastatin (LIPITOR) 20 mg tablet; Take 1 tablet (20 mg total) by mouth daily  -     Lipid panel; Future    Smoker  - advised cessation   - UTD with CT lung ca screening (9/11/2023): Stable mild background emphysema with multiple stable small nodules. Continue annual screening in 12 months. Seasonal allergic rhinitis, unspecified trigger  -     fluticasone (FLONASE) 50 mcg/act nasal spray; 2 sprays into each nostril daily    Psoriasis  -     clobetasol (TEMOVATE) 0.05 % ointment; Apply topically 2 (two) times a day    Insomnia, unspecified type  -     QUEtiapine (SEROquel) 200 mg tablet; Take 1 tablet (200 mg total) by mouth daily at bedtime  -     traZODone (DESYREL) 100 mg tablet;  Take 1 tablet (100 mg total) by mouth daily at bedtime    Proteinuria, unspecified type  -     lisinopril (ZESTRIL) 2.5 mg tablet; Take 1 tablet (2.5 mg total) by mouth daily at bedtime          Subjective:    Patient ID: [place] is a 61 y.o. male. HPI  61yo M presents to the office for routine f/u   - UTD with COVID booster, Flu vaccine and 1st Shingles vaccine   - A1c = 6.5 <-- 7.1  - UTD with DM eye exam   - has to establish with new PM as his physician left the practice   - UTD with CT lung ca screening (9/11/2023): Stable mild background emphysema with multiple stable small nodules. Continue annual screening in 12 months. - denies F/C/N/V/CP/palpitations/SOB/wheezing/abd pain/LE edema  - needs RFs        The following portions of the patient's history were reviewed and updated as appropriate: allergies, current medications, past family history, past medical history, past social history, past surgical history and problem list.    Review of Systems  as per HPI    Objective:  /62 (BP Location: Left arm, Patient Position: Sitting, Cuff Size: Large)   Pulse 80   Resp 16   Ht 5' 11" (1.803 m)   Wt 102 kg (225 lb)   SpO2 97%   BMI 31.38 kg/m²    Physical Exam  Vitals reviewed. Constitutional:       General: He is not in acute distress. Appearance: Normal appearance. He is not ill-appearing or toxic-appearing. HENT:      Head: Normocephalic and atraumatic. Right Ear: External ear normal.      Left Ear: External ear normal.      Nose: Nose normal.   Eyes:      General: No scleral icterus. Right eye: No discharge. Left eye: No discharge. Extraocular Movements: Extraocular movements intact. Conjunctiva/sclera: Conjunctivae normal.   Cardiovascular:      Rate and Rhythm: Normal rate and regular rhythm. Heart sounds: Normal heart sounds. Pulmonary:      Effort: Pulmonary effort is normal. No respiratory distress. Breath sounds: Normal breath sounds. No stridor. No wheezing, rhonchi or rales. Abdominal:      Palpations: Abdomen is soft. Musculoskeletal:         General: Normal range of motion. Cervical back: Normal range of motion. Right lower leg: No edema. Left lower leg: No edema. Neurological:      General: No focal deficit present. Mental Status: He is alert and oriented to person, place, and time. Psychiatric:         Mood and Affect: Mood normal.         Behavior: Behavior normal.           BMI Counseling: Body mass index is 31.38 kg/m². The BMI is above normal. Nutrition recommendations include 3-5 servings of fruits/vegetables daily. Exercise recommendations include exercising 3-5 times per week.

## 2023-10-30 ENCOUNTER — TELEPHONE (OUTPATIENT)
Age: 63
End: 2023-10-30

## 2023-10-30 NOTE — TELEPHONE ENCOUNTER
Reason for call:   [x] Refill   [] Prior Auth  [x] Other:     Office:   [x] PCP/Provider - Dr Bety Bowman  [] Specialty/Provider -     Medication: clobetasol    Dose/Frequency: 0.05%    Quantity: 90D supply    Pharmacy: [facility]    Does the patient have enough for 3 days? [x] Yes   [] No - Send as HP to POD    Pt requesting for clobetasol to be resent to pharm as 90D supply. Pt rec'd 2 boxes of clobetasol but is asking for a new script so he does not have to pay the amt out of pocket    Please contact pt or reorder.  Last order 10.26.2023

## 2023-10-31 DIAGNOSIS — L40.9 PSORIASIS: ICD-10-CM

## 2023-10-31 RX ORDER — CLOBETASOL PROPIONATE 0.5 MG/G
OINTMENT TOPICAL 2 TIMES DAILY
Qty: 180 G | Refills: 0 | Status: SHIPPED | OUTPATIENT
Start: 2023-10-31

## 2023-11-22 DIAGNOSIS — G47.00 INSOMNIA, UNSPECIFIED TYPE: ICD-10-CM

## 2023-11-28 RX ORDER — QUETIAPINE FUMARATE 200 MG/1
200 TABLET, FILM COATED ORAL
Qty: 90 TABLET | Refills: 0 | Status: SHIPPED | OUTPATIENT
Start: 2023-11-28

## 2023-12-04 ENCOUNTER — OFFICE VISIT (OUTPATIENT)
Dept: PAIN MEDICINE | Facility: CLINIC | Age: 63
End: 2023-12-04
Payer: OTHER MISCELLANEOUS

## 2023-12-04 VITALS
WEIGHT: 225 LBS | DIASTOLIC BLOOD PRESSURE: 76 MMHG | BODY MASS INDEX: 31.38 KG/M2 | HEART RATE: 70 BPM | SYSTOLIC BLOOD PRESSURE: 127 MMHG

## 2023-12-04 DIAGNOSIS — M51.17 INTERVERTEBRAL DISC DISORDER WITH RADICULOPATHY OF LUMBOSACRAL REGION: ICD-10-CM

## 2023-12-04 DIAGNOSIS — M46.1 SACROILIITIS (HCC): ICD-10-CM

## 2023-12-04 DIAGNOSIS — M54.16 LUMBAR RADICULOPATHY: ICD-10-CM

## 2023-12-04 DIAGNOSIS — M96.1 POSTLAMINECTOMY SYNDROME, LUMBAR: ICD-10-CM

## 2023-12-04 DIAGNOSIS — M54.50 CHRONIC BILATERAL LOW BACK PAIN WITHOUT SCIATICA: ICD-10-CM

## 2023-12-04 DIAGNOSIS — G89.4 CHRONIC PAIN SYNDROME: Primary | ICD-10-CM

## 2023-12-04 DIAGNOSIS — M96.1 POSTLAMINECTOMY SYNDROME, LUMBAR REGION: ICD-10-CM

## 2023-12-04 DIAGNOSIS — G89.29 CHRONIC BILATERAL LOW BACK PAIN WITHOUT SCIATICA: ICD-10-CM

## 2023-12-04 DIAGNOSIS — M47.816 LUMBAR SPONDYLOSIS: ICD-10-CM

## 2023-12-04 PROCEDURE — 99214 OFFICE O/P EST MOD 30 MIN: CPT | Performed by: ANESTHESIOLOGY

## 2023-12-04 NOTE — PROGRESS NOTES
Assessment:  1. Chronic pain syndrome    2. Sacroiliitis ([address])    3. Intervertebral disc disorder with radiculopathy of lumbosacral region    4. Postlaminectomy syndrome, lumbar region    5. Lumbar radiculopathy    6. Lumbar spondylosis    7. Postlaminectomy syndrome, lumbar    8. Chronic bilateral low back pain without sciatica        Plan:  The patient is experiencing ongoing pain in the lower back not controlled with the stimulator secondary to sacroiliitis. At this time we discussed performing bilateral sacroiliac joint injections which would be both diagnostic and therapeutic. He would like to proceed and will be scheduled under fluoroscopic guidance. In addition, he is stable on his current dose of Percocet and was given refills for this month, for next month and for the following month and will be followed up every 12 weeks for refills. [place] Prescription Drug Monitoring Program report was reviewed and was appropriate     There are risks associated with opioid medications, including dependence, addiction and tolerance. The patient understands and agrees to use these medications only as prescribed. Potential side effects of the medications include, but are not limited to, constipation, drowsiness, addiction, impaired judgment and risk of fatal overdose if not taken as prescribed. The patient was warned against driving while taking sedation medications. Sharing medications is a felony. At this point in time, the patient is showing no signs of addiction, abuse, diversion or suicidal ideation. Complete risks and benefits including bleeding, infection, tissue reaction, nerve injury and allergic reaction were discussed. The approach was demonstrated using models and literature was provided. Verbal and written consent was obtained. The patient will follow-up in 12 weeks for medication prescription refill and reevaluation.  The patient was advised to contact the office should their symptoms worsen in the interim. The patient was agreeable and verbalized an understanding. History of Present Illness: The patient is a 61 y.o. male previously seeing Dr. Debby Kramer and care not is now being transferred to me last seen on 10/4/2023 who presents for a follow up office visit in regards to chronic pain secondary to lumbar postlaminectomy syndrome with radiculopathy. The patient currently reports worsening low back pain across the sacrum. This is not being controlled well with the [employer] spinal cord stimulator which helps to control his leg symptoms well. He is currently on Percocet 7.5/325 which she takes twice daily in conjunction with etodolac which helps control overall symptoms well. Pain Contract Signed: 10/19/2023  Last Urine Drug Screen: 11/18/2023  Last dose of Oxycodone was 12/3/23    I have personally reviewed and/or updated the patient's past medical history, past surgical history, family history, social history, current medications, allergies, and vital signs today. Review of Systems:    Review of Systems   Respiratory:  Negative for shortness of breath. Cardiovascular:  Negative for chest pain. Gastrointestinal:  Negative for constipation, diarrhea, nausea and vomiting. Musculoskeletal:  Positive for back pain, gait problem and joint swelling. Negative for arthralgias and myalgias. Skin:  Negative for rash. Neurological:  Negative for dizziness, seizures and weakness. All other systems reviewed and are negative.         Past Medical History:   Diagnosis Date   • Arthritis    • BPH (benign prostatic hypertrophy) with urinary obstruction    • Chronic narcotic dependence (HCC)     percocet TID   • Chronic pain disorder     back-spinal cord stimulator   • Colon polyp    • Diabetes mellitus (HCC)     type   • Ear infection    • Herniation of lumbar intervertebral disc with radiculopathy    • Myofascial pain syndrome    • Obesity    • Sciatica    • Spinal cord stimulator status 2017    implanted 2017   • Tinnitus        Past Surgical History:   Procedure Laterality Date   • ABSCESS DRAINAGE      groin   • BACK SURGERY      sciatic nerve- spinal cord stimulator 2017   • CAUDAL BLOCK N/A 10/26/2018    Procedure: Caudal Epidural Steroid Injection (21718); Surgeon: Dory Elizabeth MD;  Location: [facility] MAIN OR;  Service: Pain Management    • CAUDAL BLOCK N/A 11/30/2018    Procedure: Caudal Epidural Steroid Injection ([id number]); Surgeon: Dory Elizabeth MD;  Location: [facility] MAIN OR;  Service: Pain Management    • COLONOSCOPY     • EPIDURAL BLOCK INJECTION Right 5/10/2019    Procedure: L5 S1 transforaminal Epidural Steroid Injection ([id number];  Surgeon: Dory Elizabeth MD;  Location: [address] [place] [address] MAIN OR;  Service: Pain Management    • EPIDURAL BLOCK INJECTION Right 8/16/2019    Procedure: BLOCK / INJECTION EPIDURAL STEROID TRANSFORAMINAL;  Surgeon: Dory Elizabeth MD;  Location: [address] [place] [address] MAIN OR;  Service: Pain Management    • EPIDURAL BLOCK INJECTION Left 7/1/2021    Procedure: L5 S1 transforaminal epidural steroid injection ( [id number]); Surgeon: Dory Elizabeth MD;  Location: [facility] OR;  Service: Pain Management    • EPIDURAL BLOCK INJECTION Right 7/15/2021    Procedure: L5 S1 transforaminal epidural steroid injection ( [id number]); Surgeon: Dory Elizabeth MD;  Location: [facility] OR;  Service: Pain Management    • EPIDURAL BLOCK INJECTION Right 1/7/2022    Procedure: L5 S1 transforaminal epidural steroid injection ([id number]); Surgeon: Dory Elizabeth MD;  Location: [facility] MAIN OR;  Service: Pain Management    • EPIDURAL BLOCK INJECTION Left 1/28/2022    Procedure: L5 S1 transforaminal epidural steroid injection ([id number]); Surgeon: Dory Elizabeth MD;  Location: [facility] OR;  Service: Pain Management    • NERVE BLOCK Bilateral 4/26/2018    Procedure: B/L L3 L4 L5 S1 MBB #1 ([id number]);   Surgeon: Dory Elizabeth MD;  Location: [facility] MAIN OR;  Service: Pain Management    • NERVE BLOCK Bilateral 5/11/2018    Procedure: B/L L3 L4 L5 S1 Medial Branch Block #2;  Surgeon: Nevin Abernathy MD;  Location: [facility] MAIN OR;  Service: Pain Management    • NOSE SURGERY     • ME COLONOSCOPY FLX DX W/COLLJ SPEC WHEN PFRMD N/A 3/6/2017    Procedure: COLONOSCOPY;  Surgeon: Daron Schaumann, MD;  Location:  GI LAB; Service: Gastroenterology   • ME INSJ/RPLCMT SPI NPGR DIR/INDUXIVE COUPLING Left 6/29/2021    Procedure: REPLACEMENT IMPLANTABLE PULSE GENERATOR DORSAL SPINAL COLUMN STIMULATOR, LEFT;  Surgeon: Karla Peter MD;  Location:  MAIN OR;  Service: Neurosurgery   • ME AN IMPLTJ NSTIM ELTRDS PLATE/PADDLE EDRL Left 10/3/2017    Procedure: PLACEMENT THORACIC FOR INSERTION DORSAL COLUMN SPINAL CORD STIMULATOR (DCS) WITH BUTTOCK IMPLANTABLE PULSE GENERATOR(IMPULSE); Surgeon: Essie Hackett MD;  Location:  MAIN OR;  Service: Neurosurgery   • RADIOFREQUENCY ABLATION Right 5/18/2018    Procedure: Rt L3 L4 L5 S1 Radio Frequency Ablation ([id number]); Surgeon: Nevin Abernathy MD;  Location: [facility] MAIN OR;  Service: Pain Management    • RADIOFREQUENCY ABLATION Left 6/1/2018    Procedure: Lt L3 L4 L5 S1 Radio Frequency Ablation ([id number]);   Surgeon: Nevin Abernathy MD;  Location: [facility] MAIN OR;  Service: Pain Management        Family History   Problem Relation Age of Onset   • Diabetes Mother    • Arthritis Mother    • Diabetes Father         mellitus    • Heart attack Father 58   • Hypertension Father    • Arthritis Father        Social History     Occupational History   • Occupation:  for [employer]    Tobacco Use   • Smoking status: Every Day     Packs/day: 1.00     Years: 42.00     Total pack years: 42.00     Types: Cigarettes   • Smokeless tobacco: Never   • Tobacco comments:     encouraged smoking cessation - history of smoking 30 or more pack years    Vaping Use   • Vaping Use: Never used   Substance and Sexual Activity   • Alcohol use: No     Comment: rare   • Drug use: No   • Sexual activity: Not Currently     Partners: Female         Current Outpatient Medications:   •  [START ON 12/19/2023] oxyCODONE-acetaminophen (Percocet) 7.5-325 MG per tablet, Take 1 tablet by mouth 2 (two) times a day as needed for severe pain Max Daily Amount: 2 tablets Do not start before December 19, 2023., Disp: 60 tablet, Rfl: 0  •  [START ON 2/15/2024] oxyCODONE-acetaminophen (Percocet) 7.5-325 MG per tablet, Take 1 tablet by mouth 2 (two) times a day as needed for moderate pain Max Daily Amount: 2 tablets Do not start before February 15, 2024., Disp: 60 tablet, Rfl: 0  •  [START ON 1/17/2024] oxyCODONE-acetaminophen (Percocet) 7.5-325 MG per tablet, Take 1 tablet by mouth 2 (two) times a day as needed for moderate pain Max Daily Amount: 2 tablets Do not start before January 17, 2024., Disp: 30 tablet, Rfl: 0  •  atorvastatin (LIPITOR) 20 mg tablet, Take 1 tablet (20 mg total) by mouth daily, Disp: 90 tablet, Rfl: 1  •  bisacodyl (DULCOLAX) 5 mg EC tablet, Take 20 mg by mouth 1 (one) time, Disp: , Rfl:   •  clobetasol (TEMOVATE) 0.05 % ointment, Apply topically 2 (two) times a day, Disp: 180 g, Rfl: 0  •  etodolac (LODINE) 300 MG capsule, Take 1 capsule by mouth 3 times a day as needed for pain. Take with food. , Disp: 270 capsule, Rfl: 0  •  fluticasone (FLONASE) 50 mcg/act nasal spray, 2 sprays into each nostril daily, Disp: 96 g, Rfl: 2  •  gabapentin (Neurontin) 600 MG tablet, Take 2 tablets (1,200 mg total) by mouth 2 (two) times a day, Disp: 360 tablet, Rfl: 0  •  glipiZIDE (GLUCOTROL XL) 5 mg 24 hr tablet, Take 1 tablet (5 mg total) by mouth daily, Disp: 90 tablet, Rfl: 1  •  lidocaine (LIDODERM) 5 %, Apply 2 patches topically daily Remove & Discard patch within 12 hours or as directed by MD (Patient taking differently: Apply 2 patches topically daily as needed Remove & Discard patch within 12 hours or as directed by MD), Disp: 60 patch, Rfl: 0  •  lisinopril (ZESTRIL) 2.5 mg tablet, Take 1 tablet (2.5 mg total) by mouth daily at bedtime, Disp: 90 tablet, Rfl: 1  •  magnesium citrate solution, Take 296 mL by mouth once, Disp: , Rfl:   •  metFORMIN (GLUCOPHAGE) 1000 MG tablet, Take 1 tablet (1,000 mg total) by mouth 2 (two) times a day with meals, Disp: 180 tablet, Rfl: 1  •  omeprazole (PriLOSEC) 20 mg delayed release capsule, TAKE 1 CAPSULE EVERY DAY, Disp: 90 capsule, Rfl: 0  •  Polyethylene Glycol 3350 (MIRALAX PO), Take by mouth 1 (one) time, Disp: , Rfl:   •  QUEtiapine (SEROquel) 200 mg tablet, Take 1 tablet by mouth daily at bedtime, Disp: 90 tablet, Rfl: 0  •  Saccharomyces boulardii (Probiotic) 250 MG CAPS, Take 1 capsule (250 mg total) by mouth in the morning, Disp: 90 capsule, Rfl: 1  •  sildenafil (VIAGRA) 50 MG tablet, Take 1 tablet (50 mg total) by mouth as needed for erectile dysfunction Take on an empty stomach, 1 hour before sexual activity, no alcohol. 100 mg max dose. (Patient not taking: Reported on 6/22/2023), Disp: 30 tablet, Rfl: 0  •  tamsulosin (FLOMAX) 0.4 mg, TAKE 2 CAPSULES EVERY DAY WITH DINNER, Disp: 180 capsule, Rfl: 1  •  traZODone (DESYREL) 100 mg tablet, Take 1 tablet (100 mg total) by mouth daily at bedtime, Disp: 90 tablet, Rfl: 1    Allergies   Allergen Reactions   • Penicillins Swelling     Mouth swelling       Physical Exam:    /76   Pulse 70   Wt 102 kg (225 lb)   BMI 31.38 kg/m²     Constitutional:normal, well developed, well nourished, alert, in no distress and non-toxic and no overt pain behavior.   Eyes:anicteric  HEENT:grossly intact  Neck:supple, symmetric, trachea midline and no masses   Pulmonary:even and unlabored  Cardiovascular:No edema or pitting edema present  Skin:Normal without rashes or lesions and well hydrated  Psychiatric:Mood and affect appropriate  Neurologic:Cranial Nerves II-XII grossly intact  Musculoskeletal:normal    Lumbar Spine Exam  Appearance:  Normal lordosis  Palpation/Tenderness:  left sacroiliac joint tenderness  right sacroiliac joint tenderness  Range of Motion:  Flexion:  Minimally limited  with pain  Extension:  Minimally limited  with pain  Motor Strength:  Left hip flexion:  5/5  Left hip extension:  5/5  Right hip flexion:  5/5  Right hip extension:  5/5  Left knee flexion:  5/5  Left knee extension:  5/5  Right knee flexion:  5/5  Right knee extension:  5/5  Left foot dorsiflexion:  5/5  Left foot plantar flexion:  5/5  Right foot dorsiflexion:  5/5  Right foot plantar flexion:  5/5  Special Tests:  Left Nino's Maneuver:  positive  Right Nino's Maneuver:  positive  Left Gaenslen's Test:  positive  Right Gaenslen's Test:  positive    Imaging      MRI LUMBAR SPINE WITHOUT CONTRAST (1/21/2019)     INDICATION: G89.4: Chronic pain syndrome  M54.5: Low back pain  G89.29: Other chronic pain  M47.816: Spondylosis without myelopathy or radiculopathy, lumbar region  M54.16: Radiculopathy, lumbar region  M96.1: Postlaminectomy syndrome, not elsewhere classified. COMPARISON:  8/26/2016     TECHNIQUE:  Sagittal SPGR, sagittal T2, sagittal inversion recovery, axial merge and axial T2, coronal T2. Medical device: The patient has a [place] stimulator/model 9595 which is MR compatible. Scanning was performed as per the 's gridlines with attention to device appropriate IRAIS levels. IMAGE QUALITY:  Diagnostic     FINDINGS:     VERTEBRAL BODIES:  Normal alignment of the lumbar spine. No spondylolysis or spondylolisthesis. No scoliosis. No compression fracture. Scattered degenerative endplate changes. No focally suspicious marrow lesions. No bone marrow edema or   compression abnormality. SACRUM:  Normal signal within the sacrum. No evidence of insufficiency or stress fracture. DISTAL CORD AND CONUS:  Normal size and signal within the distal cord and conus.        PARASPINAL SOFT TISSUES:  A few small subcentimeter T2 hyperintense renal lesions are too small to characterize, several which are stable from the prior study. LOWER THORACIC DISC SPACES:  Normal disc height and signal.  No disc herniation, canal stenosis or foraminal narrowing. LUMBAR DISC SPACES:     L1-L2:  Normal.     L2-L3:  Normal.     L3-L4:  There is a diffuse disk bulge. No significant central canal or neural foraminal narrowing. L4-L5:  There is a left neural foraminal disc herniation, protrusion type. There is moderate left neural foraminal narrowing. Mild central canal narrowing. Moderate right neural foraminal narrowing. This level is similar to the prior study. L5-S1:  There is a right neural foraminal disc protrusion. There is moderate right neural foraminal narrowing. Mild to moderate central canal narrowing. Mild left neural foraminal narrowing. This level is similar to the prior study.       Orders Placed This Encounter   Procedures   • FL spine and pain procedure

## 2023-12-11 RX ORDER — OXYCODONE AND ACETAMINOPHEN 7.5; 325 MG/1; MG/1
1 TABLET ORAL 2 TIMES DAILY PRN
Qty: 60 TABLET | Refills: 0 | Status: SHIPPED | OUTPATIENT
Start: 2024-02-15

## 2023-12-11 RX ORDER — OXYCODONE AND ACETAMINOPHEN 7.5; 325 MG/1; MG/1
1 TABLET ORAL 2 TIMES DAILY PRN
Qty: 30 TABLET | Refills: 0 | Status: SHIPPED | OUTPATIENT
Start: 2024-01-17

## 2023-12-11 RX ORDER — OXYCODONE AND ACETAMINOPHEN 7.5; 325 MG/1; MG/1
1 TABLET ORAL 2 TIMES DAILY PRN
Qty: 60 TABLET | Refills: 0 | Status: SHIPPED | OUTPATIENT
Start: 2023-12-19 | End: 2024-01-18

## 2023-12-21 ENCOUNTER — HOSPITAL ENCOUNTER (OUTPATIENT)
Dept: RADIOLOGY | Facility: CLINIC | Age: 63
End: 2023-12-21
Payer: OTHER MISCELLANEOUS

## 2023-12-21 VITALS
HEART RATE: 90 BPM | TEMPERATURE: 98.4 F | SYSTOLIC BLOOD PRESSURE: 122 MMHG | DIASTOLIC BLOOD PRESSURE: 51 MMHG | OXYGEN SATURATION: 95 % | RESPIRATION RATE: 18 BRPM

## 2023-12-21 DIAGNOSIS — M46.1 SACROILIITIS (HCC): ICD-10-CM

## 2023-12-21 PROCEDURE — 27096 INJECT SACROILIAC JOINT: CPT | Performed by: ANESTHESIOLOGY

## 2023-12-21 RX ORDER — 0.9 % SODIUM CHLORIDE 0.9 %
4 VIAL (ML) INJECTION ONCE
Status: COMPLETED | OUTPATIENT
Start: 2023-12-21 | End: 2023-12-21

## 2023-12-21 RX ORDER — BUPIVACAINE HCL/PF 2.5 MG/ML
7 VIAL (ML) INJECTION ONCE
Status: COMPLETED | OUTPATIENT
Start: 2023-12-21 | End: 2023-12-21

## 2023-12-21 RX ORDER — METHYLPREDNISOLONE ACETATE 80 MG/ML
80 INJECTION, SUSPENSION INTRA-ARTICULAR; INTRALESIONAL; INTRAMUSCULAR; PARENTERAL; SOFT TISSUE ONCE
Status: COMPLETED | OUTPATIENT
Start: 2023-12-21 | End: 2023-12-21

## 2023-12-21 RX ADMIN — IOHEXOL 2 ML: 300 INJECTION, SOLUTION INTRAVENOUS at 09:19

## 2023-12-21 RX ADMIN — Medication 4 ML: at 09:16

## 2023-12-21 RX ADMIN — METHYLPREDNISOLONE ACETATE 80 MG: 80 INJECTION, SUSPENSION INTRA-ARTICULAR; INTRALESIONAL; INTRAMUSCULAR; PARENTERAL; SOFT TISSUE at 09:19

## 2023-12-21 RX ADMIN — BUPIVACAINE HYDROCHLORIDE 7 ML: 2.5 INJECTION, SOLUTION EPIDURAL; INFILTRATION; INTRACAUDAL at 09:19

## 2023-12-21 NOTE — H&P
History of Present Illness: The patient is a 63 y.o. male who presents with complaints of lower back pain is here today for bilateral sacroiliac joint injections    Past Medical History:   Diagnosis Date    Arthritis     BPH (benign prostatic hypertrophy) with urinary obstruction     Chronic narcotic dependence (HCC)     percocet TID    Chronic pain disorder     back-spinal cord stimulator    Colon polyp     Diabetes mellitus (HCC)     type    Ear infection     Herniation of lumbar intervertebral disc with radiculopathy     Myofascial pain syndrome     Obesity     Sciatica     Spinal cord stimulator status 2017    implanted 2017    Tinnitus        Past Surgical History:   Procedure Laterality Date    ABSCESS DRAINAGE      groin    BACK SURGERY      sciatic nerve- spinal cord stimulator 2017    CAUDAL BLOCK N/A 10/26/2018    Procedure: Caudal Epidural Steroid Injection ([id number]);  Surgeon: Crystal Olmedo MD;  Location: [facility] MAIN OR;  Service: Pain Management     CAUDAL BLOCK N/A 11/30/2018    Procedure: Caudal Epidural Steroid Injection ([id number]);  Surgeon: Crystal Olmedo MD;  Location: [facility] MAIN OR;  Service: Pain Management     COLONOSCOPY      EPIDURAL BLOCK INJECTION Right 5/10/2019    Procedure: L5 S1 transforaminal Epidural Steroid Injection ([id number];  Surgeon: Crystal Olmedo MD;  Location: [facility] MAIN OR;  Service: Pain Management     EPIDURAL BLOCK INJECTION Right 8/16/2019    Procedure: BLOCK / INJECTION EPIDURAL STEROID TRANSFORAMINAL;  Surgeon: Crystal Olmedo MD;  Location: [facility] MAIN OR;  Service: Pain Management     EPIDURAL BLOCK INJECTION Left 7/1/2021    Procedure: L5 S1 transforaminal epidural steroid injection ( [id number]);  Surgeon: Crystal Olmedo MD;  Location: [facility] MAIN OR;  Service: Pain Management     EPIDURAL BLOCK INJECTION Right 7/15/2021    Procedure: L5 S1 transforaminal epidural steroid injection ( [id number]);  Surgeon: Crystal Olmedo MD;  Location: [facility] MAIN OR;  Service:  Pain Management     EPIDURAL BLOCK INJECTION Right 1/7/2022    Procedure: L5 S1 transforaminal epidural steroid injection ([id number]);  Surgeon: Crystal Olmedo MD;  Location: [facility] MAIN OR;  Service: Pain Management     EPIDURAL BLOCK INJECTION Left 1/28/2022    Procedure: L5 S1 transforaminal epidural steroid injection ([id number]);  Surgeon: Crystal Olmedo MD;  Location: [facility] MAIN OR;  Service: Pain Management     NERVE BLOCK Bilateral 4/26/2018    Procedure: B/L L3 L4 L5 S1 MBB #1 ([id number]);  Surgeon: Crystal Olmedo MD;  Location: [facility] MAIN OR;  Service: Pain Management     NERVE BLOCK Bilateral 5/11/2018    Procedure: B/L L3 L4 L5 S1 Medial Branch Block #2;  Surgeon: Crystal Olmedo MD;  Location: [facility] MAIN OR;  Service: Pain Management     NOSE SURGERY      MD COLONOSCOPY FLX DX W/COLLJ SPEC WHEN PFRMD N/A 3/6/2017    Procedure: COLONOSCOPY;  Surgeon: Leo Dhaliwal MD;  Location:  GI LAB;  Service: Gastroenterology    MD INSJ/RPLCMT SPI NPGR DIR/INDUXIVE COUPLING Left 6/29/2021    Procedure: REPLACEMENT IMPLANTABLE PULSE GENERATOR DORSAL SPINAL COLUMN STIMULATOR, LEFT;  Surgeon: Julian Anna MD;  Location:  MAIN OR;  Service: Neurosurgery    MD AN IMPLTJ NSTIM ELTRDS PLATE/PADDLE EDRL Left 10/3/2017    Procedure: PLACEMENT THORACIC FOR INSERTION DORSAL COLUMN SPINAL CORD STIMULATOR (DCS) WITH BUTTOCK IMPLANTABLE PULSE GENERATOR(IMPULSE);  Surgeon: Gregory Fry MD;  Location: [facility] MAIN OR;  Service: Neurosurgery    RADIOFREQUENCY ABLATION Right 5/18/2018    Procedure: Rt L3 L4 L5 S1 Radio Frequency Ablation ([id number]);  Surgeon: Crystal Olmedo MD;  Location: [facility] MAIN OR;  Service: Pain Management     RADIOFREQUENCY ABLATION Left 6/1/2018    Procedure: Lt L3 L4 L5 S1 Radio Frequency Ablation ([id number]);  Surgeon: Crystal Olmedo MD;  Location: [facility] MAIN OR;  Service: Pain Management          Current Outpatient Medications:     atorvastatin (LIPITOR) 20 mg tablet, Take 1  tablet (20 mg total) by mouth daily, Disp: 90 tablet, Rfl: 1    bisacodyl (DULCOLAX) 5 mg EC tablet, Take 20 mg by mouth 1 (one) time, Disp: , Rfl:     clobetasol (TEMOVATE) 0.05 % ointment, Apply topically 2 (two) times a day, Disp: 180 g, Rfl: 0    etodolac (LODINE) 300 MG capsule, Take 1 capsule by mouth 3 times a day as needed for pain.  Take with food., Disp: 270 capsule, Rfl: 0    fluticasone (FLONASE) 50 mcg/act nasal spray, 2 sprays into each nostril daily, Disp: 96 g, Rfl: 2    gabapentin (Neurontin) 600 MG tablet, Take 2 tablets (1,200 mg total) by mouth 2 (two) times a day, Disp: 360 tablet, Rfl: 0    glipiZIDE (GLUCOTROL XL) 5 mg 24 hr tablet, Take 1 tablet (5 mg total) by mouth daily, Disp: 90 tablet, Rfl: 1    lidocaine (LIDODERM) 5 %, Apply 2 patches topically daily Remove & Discard patch within 12 hours or as directed by MD (Patient taking differently: Apply 2 patches topically daily as needed Remove & Discard patch within 12 hours or as directed by MD), Disp: 60 patch, Rfl: 0    lisinopril (ZESTRIL) 2.5 mg tablet, Take 1 tablet (2.5 mg total) by mouth daily at bedtime, Disp: 90 tablet, Rfl: 1    magnesium citrate solution, Take 296 mL by mouth once, Disp: , Rfl:     metFORMIN (GLUCOPHAGE) 1000 MG tablet, Take 1 tablet (1,000 mg total) by mouth 2 (two) times a day with meals, Disp: 180 tablet, Rfl: 1    omeprazole (PriLOSEC) 20 mg delayed release capsule, TAKE 1 CAPSULE EVERY DAY, Disp: 90 capsule, Rfl: 0    oxyCODONE-acetaminophen (Percocet) 7.5-325 MG per tablet, Take 1 tablet by mouth 2 (two) times a day as needed for severe pain Max Daily Amount: 2 tablets Do not start before December 19, 2023., Disp: 60 tablet, Rfl: 0    [START ON 2/15/2024] oxyCODONE-acetaminophen (Percocet) 7.5-325 MG per tablet, Take 1 tablet by mouth 2 (two) times a day as needed for moderate pain Max Daily Amount: 2 tablets Do not start before February 15, 2024., Disp: 60 tablet, Rfl: 0    [START ON 1/17/2024]  oxyCODONE-acetaminophen (Percocet) 7.5-325 MG per tablet, Take 1 tablet by mouth 2 (two) times a day as needed for moderate pain Max Daily Amount: 2 tablets Do not start before January 17, 2024., Disp: 30 tablet, Rfl: 0    Polyethylene Glycol 3350 (MIRALAX PO), Take by mouth 1 (one) time, Disp: , Rfl:     QUEtiapine (SEROquel) 200 mg tablet, Take 1 tablet by mouth daily at bedtime, Disp: 90 tablet, Rfl: 0    Saccharomyces boulardii (Probiotic) 250 MG CAPS, Take 1 capsule (250 mg total) by mouth in the morning, Disp: 90 capsule, Rfl: 1    sildenafil (VIAGRA) 50 MG tablet, Take 1 tablet (50 mg total) by mouth as needed for erectile dysfunction Take on an empty stomach, 1 hour before sexual activity, no alcohol. 100 mg max dose. (Patient not taking: Reported on 6/22/2023), Disp: 30 tablet, Rfl: 0    tamsulosin (FLOMAX) 0.4 mg, TAKE 2 CAPSULES EVERY DAY WITH DINNER, Disp: 180 capsule, Rfl: 1    traZODone (DESYREL) 100 mg tablet, Take 1 tablet (100 mg total) by mouth daily at bedtime, Disp: 90 tablet, Rfl: 1  No current facility-administered medications for this encounter.    Allergies   Allergen Reactions    Penicillins Swelling     Mouth swelling       Physical Exam:   Vitals:    12/21/23 0931   BP: 122/51   Pulse: 90   Resp: 18   Temp:    SpO2: 95%     General: Awake, Alert, Oriented x 3, Mood and affect appropriate  Respiratory: Respirations even and unlabored  Cardiovascular: Peripheral pulses intact; no edema  Musculoskeletal Exam: Low back pain    ASA Score: 2    Patient/Chart Verification  Patient ID Verified: Verbal  ID Band Applied: No  Consents Confirmed: Procedural, To be obtained in the Pre-Procedure area  H&P( within 30 days) Verified: To be obtained in the Pre-Procedure area  Allergies Reviewed: Yes  Anticoag/NSAID held?: NA  Currently on antibiotics?: No    Assessment:   1. Sacroiliitis (HCC)        Plan: Bilateral SIJ Injections

## 2023-12-21 NOTE — DISCHARGE INSTR - LAB

## 2023-12-28 ENCOUNTER — TELEPHONE (OUTPATIENT)
Dept: RADIOLOGY | Facility: MEDICAL CENTER | Age: 63
End: 2023-12-28

## 2023-12-28 NOTE — TELEPHONE ENCOUNTER
Patient Reports     0    %     improvement post injection    Pain Level  8   /10  Left side is a little  better but the right side is painful

## 2024-01-17 ENCOUNTER — TELEPHONE (OUTPATIENT)
Dept: PAIN MEDICINE | Facility: CLINIC | Age: 64
End: 2024-01-17

## 2024-01-17 DIAGNOSIS — G89.4 CHRONIC PAIN SYNDROME: ICD-10-CM

## 2024-01-17 RX ORDER — OXYCODONE AND ACETAMINOPHEN 7.5; 325 MG/1; MG/1
1 TABLET ORAL 2 TIMES DAILY PRN
Qty: 60 TABLET | Refills: 0 | Status: SHIPPED | OUTPATIENT
Start: 2024-01-17

## 2024-01-17 NOTE — TELEPHONE ENCOUNTER
Kait KIMBROUGH requesting call back from office staff regarding Oxycodone 7.5-325 mg script-     They would like to have at least a 30 day supply, requesting more information from office.    Please review and call back.

## 2024-01-17 NOTE — TELEPHONE ENCOUNTER
Pls review message and advise. Looks like the script is written for twice a day but only for a quantity of #30? Is there a reason why #60 tabs isn't being prescribed if so I will pass it on to the Workers [employer] staff.

## 2024-01-30 DIAGNOSIS — M54.50 CHRONIC BILATERAL LOW BACK PAIN WITHOUT SCIATICA: ICD-10-CM

## 2024-01-30 DIAGNOSIS — M54.16 LUMBAR RADICULOPATHY: ICD-10-CM

## 2024-01-30 DIAGNOSIS — G89.29 CHRONIC LOW BACK PAIN: ICD-10-CM

## 2024-01-30 DIAGNOSIS — G47.00 INSOMNIA, UNSPECIFIED TYPE: ICD-10-CM

## 2024-01-30 DIAGNOSIS — G89.4 CHRONIC PAIN SYNDROME: ICD-10-CM

## 2024-01-30 DIAGNOSIS — M47.816 LUMBAR SPONDYLOSIS: ICD-10-CM

## 2024-01-30 DIAGNOSIS — M54.50 CHRONIC LOW BACK PAIN: ICD-10-CM

## 2024-01-30 DIAGNOSIS — G89.29 CHRONIC BILATERAL LOW BACK PAIN WITHOUT SCIATICA: ICD-10-CM

## 2024-01-30 DIAGNOSIS — M96.1 POSTLAMINECTOMY SYNDROME, LUMBAR: ICD-10-CM

## 2024-01-30 RX ORDER — GABAPENTIN 600 MG/1
1200 TABLET ORAL 2 TIMES DAILY
Qty: 360 TABLET | Refills: 0 | Status: CANCELLED | OUTPATIENT
Start: 2024-01-30 | End: 2024-04-29

## 2024-01-30 RX ORDER — QUETIAPINE FUMARATE 200 MG/1
200 TABLET, FILM COATED ORAL
Qty: 90 TABLET | Refills: 1 | Status: SHIPPED | OUTPATIENT
Start: 2024-01-30

## 2024-01-30 RX ORDER — OXYCODONE AND ACETAMINOPHEN 7.5; 325 MG/1; MG/1
1 TABLET ORAL 2 TIMES DAILY PRN
Qty: 60 TABLET | Refills: 0 | Status: CANCELLED | OUTPATIENT
Start: 2024-02-15

## 2024-01-30 NOTE — TELEPHONE ENCOUNTER
Patient called refill in regards to refill, he wants to make sure the medication refills got for a 90 day supply.

## 2024-01-30 NOTE — TELEPHONE ENCOUNTER
S/w pt, advised of the same. Pt verbalized understanding, agreeable to 30 day script, requesting it be sent to [facility] instead of [facility].     Pt also wanted to make FQ aware [facility] pharmacy does not have Etodolac 300mg (currently prescribed TID), asked if he can be switched to 400mg BID instead.

## 2024-01-30 NOTE — TELEPHONE ENCOUNTER
Patient is switching pharmacy, Also please read note in red about patient medication changes    Reason for call:   [x] Refill   [] Prior Auth  [] Other:     Office:   [] PCP/Provider -   [x] Specialty/Provider - Spine and Pain     Medication:   Oxycodone-acetaminophen 7.5-325mg per tablet- take 1 tablet by mouth 2 times a day as needed  Gabapentin 600mg- take 2 tablets by mouth 2 times a day  Etodolac 300mg- take 1 capsule by mouth 3 times aday (Patient called and stated that the [facility] pharmacy only carries the 400mg of the etodolac and wants to know if it could be switched to 400mg twice daily)      Pharmacy: [facility] order    Does the patient have enough for 3 days?   [x] Yes   [] No - Send as HP to POD

## 2024-01-30 NOTE — TELEPHONE ENCOUNTER
Reason for call: patient switching pharmacies  [x] Refill   [] Prior Auth  [] Other:     Office:   [x] PCP/Provider - Melinda Daily DO  [] Specialty/Provider -       QUEtiapine (SEROquel) 200 mg tablet   Dose: 200 mg Route: Oral Frequency: Daily at bedtime  Dispense Quantity: 90 tablet  Sig: Take 1 tablet by mouth daily at bedtime    Pharmacy  [facility]     Does the patient have enough for 3 days?   [x] Yes   [] No - Send as HP to POD

## 2024-01-30 NOTE — TELEPHONE ENCOUNTER
S/w pt, requesting next Percocet script be resent to [facility].   (Pt aware he is not due for next refill yet)

## 2024-01-31 ENCOUNTER — TELEPHONE (OUTPATIENT)
Dept: PAIN MEDICINE | Facility: CLINIC | Age: 64
End: 2024-01-31

## 2024-01-31 DIAGNOSIS — G89.29 CHRONIC BILATERAL LOW BACK PAIN WITHOUT SCIATICA: ICD-10-CM

## 2024-01-31 DIAGNOSIS — M96.1 POSTLAMINECTOMY SYNDROME, LUMBAR: ICD-10-CM

## 2024-01-31 DIAGNOSIS — M54.16 LUMBAR RADICULOPATHY: ICD-10-CM

## 2024-01-31 DIAGNOSIS — M54.50 CHRONIC BILATERAL LOW BACK PAIN WITHOUT SCIATICA: ICD-10-CM

## 2024-01-31 DIAGNOSIS — G89.4 CHRONIC PAIN SYNDROME: ICD-10-CM

## 2024-01-31 DIAGNOSIS — M47.816 LUMBAR SPONDYLOSIS: ICD-10-CM

## 2024-01-31 DIAGNOSIS — K21.9 GASTROESOPHAGEAL REFLUX DISEASE WITHOUT ESOPHAGITIS: ICD-10-CM

## 2024-01-31 RX ORDER — OMEPRAZOLE 20 MG/1
CAPSULE, DELAYED RELEASE ORAL
Qty: 90 CAPSULE | Refills: 1 | Status: SHIPPED | OUTPATIENT
Start: 2024-01-31

## 2024-01-31 RX ORDER — OXYCODONE AND ACETAMINOPHEN 7.5; 325 MG/1; MG/1
1 TABLET ORAL 2 TIMES DAILY PRN
Qty: 60 TABLET | Refills: 0 | Status: SHIPPED | OUTPATIENT
Start: 2024-02-15

## 2024-01-31 RX ORDER — GABAPENTIN 600 MG/1
1200 TABLET ORAL 2 TIMES DAILY
Qty: 360 TABLET | Refills: 3 | Status: SHIPPED | OUTPATIENT
Start: 2024-01-31 | End: 2025-01-25

## 2024-01-31 RX ORDER — ETODOLAC 400 MG/1
400 TABLET, FILM COATED ORAL 2 TIMES DAILY
Qty: 180 TABLET | Refills: 3 | Status: SHIPPED | OUTPATIENT
Start: 2024-01-31 | End: 2025-01-25

## 2024-01-31 NOTE — TELEPHONE ENCOUNTER
Pt informed that 90 day scripts for lodine 400 mg and gabapentin 600 mg and his percocet Rx were all sent to [facility].   Pt appreciative.

## 2024-02-19 ENCOUNTER — OFFICE VISIT (OUTPATIENT)
Dept: FAMILY MEDICINE CLINIC | Facility: CLINIC | Age: 64
End: 2024-02-19
Payer: COMMERCIAL

## 2024-02-19 ENCOUNTER — APPOINTMENT (OUTPATIENT)
Dept: RADIOLOGY | Facility: CLINIC | Age: 64
End: 2024-02-19
Payer: COMMERCIAL

## 2024-02-19 VITALS
HEART RATE: 82 BPM | WEIGHT: 225 LBS | RESPIRATION RATE: 16 BRPM | HEIGHT: 71 IN | TEMPERATURE: 97.8 F | OXYGEN SATURATION: 96 % | SYSTOLIC BLOOD PRESSURE: 128 MMHG | BODY MASS INDEX: 31.5 KG/M2 | DIASTOLIC BLOOD PRESSURE: 70 MMHG

## 2024-02-19 DIAGNOSIS — J20.9 ACUTE BRONCHITIS, UNSPECIFIED ORGANISM: Primary | ICD-10-CM

## 2024-02-19 DIAGNOSIS — F11.20 UNCOMPLICATED OPIOID DEPENDENCE (HCC): ICD-10-CM

## 2024-02-19 DIAGNOSIS — J20.9 ACUTE BRONCHITIS, UNSPECIFIED ORGANISM: ICD-10-CM

## 2024-02-19 DIAGNOSIS — E11.41 DIABETIC MONONEUROPATHY ASSOCIATED WITH TYPE 2 DIABETES MELLITUS (HCC): ICD-10-CM

## 2024-02-19 PROCEDURE — 99214 OFFICE O/P EST MOD 30 MIN: CPT | Performed by: FAMILY MEDICINE

## 2024-02-19 PROCEDURE — 71046 X-RAY EXAM CHEST 2 VIEWS: CPT

## 2024-02-19 RX ORDER — PREDNISONE 20 MG/1
40 TABLET ORAL DAILY
Qty: 10 TABLET | Refills: 0 | Status: SHIPPED | OUTPATIENT
Start: 2024-02-19 | End: 2024-02-24

## 2024-02-19 RX ORDER — AZITHROMYCIN 250 MG/1
TABLET, FILM COATED ORAL DAILY
Qty: 6 TABLET | Refills: 0 | Status: SHIPPED | OUTPATIENT
Start: 2024-02-19 | End: 2024-02-24

## 2024-02-19 RX ORDER — ALBUTEROL SULFATE 90 UG/1
2 AEROSOL, METERED RESPIRATORY (INHALATION) EVERY 6 HOURS PRN
Qty: 18 G | Refills: 0 | Status: SHIPPED | OUTPATIENT
Start: 2024-02-19

## 2024-02-19 NOTE — ASSESSMENT & PLAN NOTE
B/L wheezing and rhonchi on exam with sx for 1.5 weeks. Concern for pneumonia, will evaluate with CXR. F/U in 4 weeks.          XR chest pa & lateral; Future; Expected date: 02/19/2024  -     albuterol (ProAir HFA) 90 mcg/act inhaler; Inhale 2 puffs every 6 (six) hours as needed for wheezing  -     azithromycin (Zithromax) 250 mg tablet; Take 2 tablets (500 mg total) by mouth daily for 1 day, THEN 1 tablet (250 mg total) daily for 4 days.

## 2024-02-19 NOTE — ASSESSMENT & PLAN NOTE
Discussed with him with the prednisone his blood sugar will increase, advised to have low-carb and avoid sugar in his diet  Lab Results   Component Value Date    HGBA1C 6.5 (H) 10/19/2023

## 2024-02-19 NOTE — PROGRESS NOTES
Name: Cedric Gramajo      : 1960      MRN: [id number]  Encounter Provider: Krystle Antonio MD  Encounter Date: 2024   Encounter department: [facility]    Assessment & Plan     1. Acute bronchitis, unspecified organism  Plan:  B/L wheezing and rhonchi on exam with sx for 1.5 weeks. Concern for pneumonia, will evaluate with CXR. F/U in 4 weeks.   -     XR chest pa & lateral; Future; Expected date: 2024  -     albuterol (ProAir HFA) 90 mcg/act inhaler; Inhale 2 puffs every 6 (six) hours as needed for wheezing  -     azithromycin (Zithromax) 250 mg tablet; Take 2 tablets (500 mg total) by mouth daily for 1 day, THEN 1 tablet (250 mg total) daily for 4 days.    2. Uncomplicated opioid dependence (HCC)    3. Diabetic mononeuropathy associated with type 2 diabetes mellitus (HCC)  Assessment & Plan:    Lab Results   Component Value Date    HGBA1C 6.5 (H) 10/19/2023       Orders:  -     predniSONE 20 mg tablet; Take 2 tablets (40 mg total) by mouth daily for 5 days           Subjective     Cough  Associated symptoms include shortness of breath. Pertinent negatives include no chills, fever or sore throat.   Diarrhea   Associated symptoms include coughing. Pertinent negatives include no chills or fever.     Cough, congestion, and diarrhea started about 1.5 weeks ago and have not improved since onset. Has consistent, productive cough, tried dayquil and nyquil with slight sx improvement. Has slight sternal chest pain secondary to consistent cough. Current smoker, SOB at baseline has worsened since sx onset. Does not have any inhaler or nebulizer at home. No hx of COPD or asthma. BM is diarrhea consistency about once a day. Denies hematemesis, fever, chills, body aches, blood in stool. Has both influenza and Covid vaccines.   Had similar presenting sx in 2019, was treated with steroid and albuterol.     Review of Systems   Constitutional: Negative.  Negative for chills, fatigue and fever.    HENT:  Positive for congestion. Negative for sore throat.    Eyes: Negative.    Respiratory:  Positive for cough and shortness of breath.    Cardiovascular: Negative.    Gastrointestinal:  Positive for diarrhea. Negative for blood in stool.   Endocrine: Negative.    Genitourinary: Negative.    Musculoskeletal: Negative.    Skin: Negative.    Allergic/Immunologic: Negative.    Neurological: Negative.    Hematological: Negative.    Psychiatric/Behavioral: Negative.     All other systems reviewed and are negative.      Past Medical History:   Diagnosis Date   • Arthritis    • BPH (benign prostatic hypertrophy) with urinary obstruction    • Chronic narcotic dependence (HCC)     percocet TID   • Chronic pain disorder     back-spinal cord stimulator   • Colon polyp    • Diabetes mellitus (HCC)     type   • Ear infection    • Herniation of lumbar intervertebral disc with radiculopathy    • Myofascial pain syndrome    • Obesity    • Sciatica    • Spinal cord stimulator status 2017    implanted 2017   • Tinnitus      Past Surgical History:   Procedure Laterality Date   • ABSCESS DRAINAGE      groin   • BACK SURGERY      sciatic nerve- spinal cord stimulator 2017   • CAUDAL BLOCK N/A 10/26/2018    Procedure: Caudal Epidural Steroid Injection (07959);  Surgeon: Crystal Olmedo MD;  Location: [facility] MAIN OR;  Service: Pain Management    • CAUDAL BLOCK N/A 11/30/2018    Procedure: Caudal Epidural Steroid Injection (90574);  Surgeon: Crystal Olmedo MD;  Location: [facility] MAIN OR;  Service: Pain Management    • COLONOSCOPY     • EPIDURAL BLOCK INJECTION Right 5/10/2019    Procedure: L5 S1 transforaminal Epidural Steroid Injection ([id number];  Surgeon: Crystal Olmedo MD;  Location: [facility] MAIN OR;  Service: Pain Management    • EPIDURAL BLOCK INJECTION Right 8/16/2019    Procedure: BLOCK / INJECTION EPIDURAL STEROID TRANSFORAMINAL;  Surgeon: Crystal Olmedo MD;  Location: [facility] MAIN OR;  Service: Pain Management    • EPIDURAL  BLOCK INJECTION Left 7/1/2021    Procedure: L5 S1 transforaminal epidural steroid injection ( [id number]);  Surgeon: Crystal Olmedo MD;  Location: [facility] MAIN OR;  Service: Pain Management    • EPIDURAL BLOCK INJECTION Right 7/15/2021    Procedure: L5 S1 transforaminal epidural steroid injection ( [id number]);  Surgeon: Crystal Olmedo MD;  Location: [facility] MAIN OR;  Service: Pain Management    • EPIDURAL BLOCK INJECTION Right 1/7/2022    Procedure: L5 S1 transforaminal epidural steroid injection ([id number]);  Surgeon: Crystal Olmdeo MD;  Location: [facility] MAIN OR;  Service: Pain Management    • EPIDURAL BLOCK INJECTION Left 1/28/2022    Procedure: L5 S1 transforaminal epidural steroid injection ([id number]);  Surgeon: Crystal Olmedo MD;  Location: [facility] MAIN OR;  Service: Pain Management    • NERVE BLOCK Bilateral 4/26/2018    Procedure: B/L L3 L4 L5 S1 MBB #1 ([id number],[id number]);  Surgeon: Crystal Olmedo MD;  Location: [facility] MAIN OR;  Service: Pain Management    • NERVE BLOCK Bilateral 5/11/2018    Procedure: B/L L3 L4 L5 S1 Medial Branch Block #2;  Surgeon: Crystal Olmedo MD;  Location: [facility] MAIN OR;  Service: Pain Management    • NOSE SURGERY     • AR COLONOSCOPY FLX DX W/COLLJ SPEC WHEN PFRMD N/A 3/6/2017    Procedure: COLONOSCOPY;  Surgeon: Leo Dhaliwal MD;  [facility]: [facility];  Service: Gastroenterology   • AR INSJ/RPLCMT SPINAL NPG/RCVR POCKET CRTJ&CONNJ Left 6/29/2021    Procedure: REPLACEMENT IMPLANTABLE PULSE GENERATOR DORSAL SPINAL COLUMN STIMULATOR, LEFT;  Surgeon: Julian Anna MD;  Location:  MAIN OR;  Service: Neurosurgery   • AR AN IMPLTJ NSTIM ELTRDS PLATE/PADDLE EDRL Left 10/3/2017    Procedure: PLACEMENT THORACIC FOR INSERTION DORSAL COLUMN SPINAL CORD STIMULATOR (DCS) WITH BUTTOCK IMPLANTABLE PULSE GENERATOR(IMPULSE);  Surgeon: Gregory Fry MD;  Location:  MAIN OR;  Service: Neurosurgery   • RADIOFREQUENCY ABLATION Right 5/18/2018    Procedure: Rt L3 L4 L5 S1 Radio Frequency  Ablation ([id number]);  Surgeon: Crystal Olmedo MD;  Location: [facility] MAIN OR;  Service: Pain Management    • RADIOFREQUENCY ABLATION Left 6/1/2018    Procedure: Lt L3 L4 L5 S1 Radio Frequency Ablation ([id number]);  Surgeon: Crystal Olmedo MD;  Location: [facility] MAIN OR;  Service: Pain Management      Family History   Problem Relation Age of Onset   • Diabetes Mother    • Arthritis Mother    • Diabetes Father         mellitus    • Heart attack Father 62   • Hypertension Father    • Arthritis Father      Social History     Socioeconomic History   • Marital status: Single     Spouse name: None   • Number of children: None   • Years of education: None   • Highest education level: None   Occupational History   • Occupation:  for distribution center    Tobacco Use   • Smoking status: Every Day     Current packs/day: 1.00     Average packs/day: 1 pack/day for 42.0 years (42.0 ttl pk-yrs)     Types: Cigarettes   • Smokeless tobacco: Never   • Tobacco comments:     encouraged smoking cessation - history of smoking 30 or more pack years    Vaping Use   • Vaping status: Never Used   Substance and Sexual Activity   • Alcohol use: No     Comment: rare   • Drug use: No   • Sexual activity: Not Currently     Partners: Female   Other Topics Concern   • None   Social History Narrative    Caffeine use - coffee      Social Determinants of Health     Financial Resource Strain: Low Risk  (7/19/2023)    Overall Financial Resource Strain (CARDIA)    • Difficulty of Paying Living Expenses: Not hard at all   Food Insecurity: Not on file   Transportation Needs: No Transportation Needs (7/19/2023)    PRAPARE - Transportation    • Lack of Transportation (Medical): No    • Lack of Transportation (Non-Medical): No   Physical Activity: Not on file   Stress: Not on file   Social Connections: Not on file   Intimate Partner Violence: Not on file   Housing Stability: Not on file     Current Outpatient Medications on File Prior to Visit    Medication Sig   • atorvastatin (LIPITOR) 20 mg tablet Take 1 tablet (20 mg total) by mouth daily   • bisacodyl (DULCOLAX) 5 mg EC tablet Take 20 mg by mouth 1 (one) time   • clobetasol (TEMOVATE) 0.05 % ointment Apply topically 2 (two) times a day   • etodolac (LODINE) 300 MG capsule Take 1 capsule by mouth 3 times a day as needed for pain.  Take with food.   • etodolac (LODINE) 400 MG tablet Take 1 tablet (400 mg total) by mouth 2 (two) times a day   • fluticasone (FLONASE) 50 mcg/act nasal spray 2 sprays into each nostril daily   • gabapentin (Neurontin) 600 MG tablet Take 2 tablets (1,200 mg total) by mouth 2 (two) times a day   • glipiZIDE (GLUCOTROL XL) 5 mg 24 hr tablet Take 1 tablet (5 mg total) by mouth daily   • lidocaine (LIDODERM) 5 % Apply 2 patches topically daily Remove & Discard patch within 12 hours or as directed by MD (Patient taking differently: Apply 2 patches topically daily as needed Remove & Discard patch within 12 hours or as directed by MD)   • lisinopril (ZESTRIL) 2.5 mg tablet Take 1 tablet (2.5 mg total) by mouth daily at bedtime   • magnesium citrate solution Take 296 mL by mouth once   • metFORMIN (GLUCOPHAGE) 1000 MG tablet Take 1 tablet (1,000 mg total) by mouth 2 (two) times a day with meals   • omeprazole (PriLOSEC) 20 mg delayed release capsule TAKE 1 CAPSULE EVERY DAY   • oxyCODONE-acetaminophen (Percocet) 7.5-325 MG per tablet Take 1 tablet by mouth 2 (two) times a day as needed for moderate pain Max Daily Amount: 2 tablets   • oxyCODONE-acetaminophen (Percocet) 7.5-325 MG per tablet Take 1 tablet by mouth 2 (two) times a day as needed for moderate pain Max Daily Amount: 2 tablets Do not start before February 15, 2024.   • Polyethylene Glycol 3350 (MIRALAX PO) Take by mouth 1 (one) time   • QUEtiapine (SEROquel) 200 mg tablet Take 1 tablet (200 mg total) by mouth daily at bedtime   • Saccharomyces boulardii (Probiotic) 250 MG CAPS Take 1 capsule (250 mg total) by mouth in the  "morning   • sildenafil (VIAGRA) 50 MG tablet Take 1 tablet (50 mg total) by mouth as needed for erectile dysfunction Take on an empty stomach, 1 hour before sexual activity, no alcohol. 100 mg max dose. (Patient not taking: Reported on 6/22/2023)   • tamsulosin (FLOMAX) 0.4 mg TAKE 2 CAPSULES EVERY DAY WITH DINNER   • traZODone (DESYREL) 100 mg tablet Take 1 tablet (100 mg total) by mouth daily at bedtime     Allergies   Allergen Reactions   • Penicillins Swelling     Mouth swelling     Immunization History   Administered Date(s) Administered   • COVID-19 MODERNA VACC 0.25 ML IM BOOSTER 12/02/2021   • COVID-19 MODERNA VACC 0.5 ML IM 03/16/2021, 04/12/2021, 08/05/2022, 11/26/2022   • COVID-19 Moderna mRNA Vaccine 12 Yr+ 50 mcg/0.5 mL (Spikevax) 10/04/2023   • INFLUENZA 09/12/2018, 11/26/2022   • Influenza Injectable, MDCK, Preservative Free, Quadrivalent, 0.5 mL 09/24/2020   • Influenza, recombinant, quadrivalent,injectable, preservative free 09/12/2018, 09/15/2021   • Pneumococcal Conjugate Vaccine 20-valent (Pcv20), Polysace 07/01/2022   • Pneumococcal Polysaccharide PPV23 09/12/2018   • Tdap 09/12/2018       Objective     /70   Pulse 82   Temp 97.8 °F (36.6 °C)   Resp 16   Ht 5' 11\" (1.803 m)   Wt 102 kg (225 lb)   SpO2 96%   BMI 31.38 kg/m²     Physical Exam  Vitals and nursing note reviewed.   Constitutional:       Appearance: Normal appearance.   HENT:      Head: Normocephalic and atraumatic.      Right Ear: Tympanic membrane, ear canal and external ear normal.      Left Ear: Tympanic membrane, ear canal and external ear normal.      Nose: Nose normal.      Comments: Clear nasal secretions        Mouth/Throat:      Mouth: Mucous membranes are moist.      Comments: R tonsil erythematous and enlarged  Cardiovascular:      Rate and Rhythm: Normal rate and regular rhythm.      Pulses: Normal pulses.      Heart sounds: Normal heart sounds.   Pulmonary:      Effort: Pulmonary effort is normal.      " Breath sounds: Wheezing and rhonchi present.      Comments: Rhonchi and wheezing b/l  Abdominal:      General: Abdomen is flat.      Palpations: Abdomen is soft.   Neurological:      Mental Status: He is alert.       Krystle Antonio MD

## 2024-02-20 ENCOUNTER — TELEPHONE (OUTPATIENT)
Dept: FAMILY MEDICINE CLINIC | Facility: CLINIC | Age: 64
End: 2024-02-20

## 2024-02-20 NOTE — RESULT ENCOUNTER NOTE
Please inform the patient chest x-ray does not show any pneumonia, he will continue the treatment I gave him during the visit

## 2024-02-20 NOTE — TELEPHONE ENCOUNTER
----- Message from Krystle Antonio MD sent at 2/20/2024  8:55 AM EST -----  Please inform the patient chest x-ray does not show any pneumonia, he will continue the treatment I gave him during the visit

## 2024-03-04 ENCOUNTER — APPOINTMENT (EMERGENCY)
Dept: CT IMAGING | Facility: HOSPITAL | Age: 64
End: 2024-03-04
Payer: COMMERCIAL

## 2024-03-04 ENCOUNTER — HOSPITAL ENCOUNTER (EMERGENCY)
Facility: HOSPITAL | Age: 64
Discharge: HOME | End: 2024-03-04
Attending: STUDENT IN AN ORGANIZED HEALTH CARE EDUCATION/TRAINING PROGRAM
Payer: COMMERCIAL

## 2024-03-04 ENCOUNTER — OFFICE VISIT (OUTPATIENT)
Dept: FAMILY MEDICINE CLINIC | Facility: CLINIC | Age: 64
End: 2024-03-04
Payer: COMMERCIAL

## 2024-03-04 ENCOUNTER — APPOINTMENT (OUTPATIENT)
Dept: RADIOLOGY | Facility: CLINIC | Age: 64
End: 2024-03-04
Payer: COMMERCIAL

## 2024-03-04 ENCOUNTER — TELEPHONE (OUTPATIENT)
Age: 64
End: 2024-03-04

## 2024-03-04 VITALS
SYSTOLIC BLOOD PRESSURE: 118 MMHG | WEIGHT: 225 LBS | RESPIRATION RATE: 16 BRPM | BODY MASS INDEX: 31.5 KG/M2 | HEART RATE: 95 BPM | DIASTOLIC BLOOD PRESSURE: 62 MMHG | OXYGEN SATURATION: 97 % | HEIGHT: 71 IN

## 2024-03-04 VITALS
SYSTOLIC BLOOD PRESSURE: 118 MMHG | DIASTOLIC BLOOD PRESSURE: 55 MMHG | HEART RATE: 81 BPM | OXYGEN SATURATION: 95 % | TEMPERATURE: 98 F | RESPIRATION RATE: 18 BRPM

## 2024-03-04 DIAGNOSIS — R06.2 WHEEZING: ICD-10-CM

## 2024-03-04 DIAGNOSIS — J20.9 ACUTE BRONCHITIS, UNSPECIFIED ORGANISM: Primary | ICD-10-CM

## 2024-03-04 DIAGNOSIS — Z72.0 TOBACCO ABUSE: ICD-10-CM

## 2024-03-04 DIAGNOSIS — J20.9 ACUTE BRONCHITIS, UNSPECIFIED ORGANISM: ICD-10-CM

## 2024-03-04 DIAGNOSIS — G89.4 CHRONIC PAIN SYNDROME: ICD-10-CM

## 2024-03-04 DIAGNOSIS — M46.1 SACROILIITIS (HCC): ICD-10-CM

## 2024-03-04 DIAGNOSIS — R55 SYNCOPE: Primary | ICD-10-CM

## 2024-03-04 LAB
2HR DELTA HS TROPONIN: -0.5 NG/L
ALBUMIN SERPL BCP-MCNC: 4.4 G/DL (ref 3.5–5)
ALP SERPL-CCNC: 61 U/L (ref 34–104)
ALT SERPL W P-5'-P-CCNC: 50 U/L (ref 7–52)
ANION GAP SERPL CALCULATED.3IONS-SCNC: 9 MMOL/L
AST SERPL W P-5'-P-CCNC: 26 U/L (ref 13–39)
BASOPHILS # BLD AUTO: 0.06 THOUSANDS/ÂΜL (ref 0–0.1)
BASOPHILS NFR BLD AUTO: 1 % (ref 0–1)
BILIRUB SERPL-MCNC: 0.54 MG/DL (ref 0.2–1)
BUN SERPL-MCNC: 23 MG/DL (ref 5–25)
CALCIUM SERPL-MCNC: 9.2 MG/DL (ref 8.4–10.2)
CARDIAC TROPONIN I PNL SERPL HS: 3 NG/L
CARDIAC TROPONIN I PNL SERPL HS: 3.5 NG/L
CHLORIDE SERPL-SCNC: 104 MMOL/L (ref 96–108)
CO2 SERPL-SCNC: 24 MMOL/L (ref 21–32)
CREAT SERPL-MCNC: 0.82 MG/DL (ref 0.6–1.3)
EOSINOPHIL # BLD AUTO: 0.15 THOUSAND/ÂΜL (ref 0–0.61)
EOSINOPHIL NFR BLD AUTO: 1 % (ref 0–6)
ERYTHROCYTE [DISTWIDTH] IN BLOOD BY AUTOMATED COUNT: 13.4 % (ref 11.6–15.1)
GFR SERPL CREATININE-BSD FRML MDRD: 94 ML/MIN/1.73SQ M
GLUCOSE SERPL-MCNC: 174 MG/DL (ref 65–140)
HCT VFR BLD AUTO: 44.2 % (ref 36.5–49.3)
HGB BLD-MCNC: 14.6 G/DL (ref 12–17)
IMM GRANULOCYTES # BLD AUTO: 0.07 THOUSAND/UL (ref 0–0.2)
IMM GRANULOCYTES NFR BLD AUTO: 1 % (ref 0–2)
LYMPHOCYTES # BLD AUTO: 1.91 THOUSANDS/ÂΜL (ref 0.6–4.47)
LYMPHOCYTES NFR BLD AUTO: 17 % (ref 14–44)
MCH RBC QN AUTO: 30.9 PG (ref 26.8–34.3)
MCHC RBC AUTO-ENTMCNC: 33 G/DL (ref 31.4–37.4)
MCV RBC AUTO: 93 FL (ref 82–98)
MONOCYTES # BLD AUTO: 0.55 THOUSAND/ÂΜL (ref 0.17–1.22)
MONOCYTES NFR BLD AUTO: 5 % (ref 4–12)
NEUTROPHILS # BLD AUTO: 8.37 THOUSANDS/ÂΜL (ref 1.85–7.62)
NEUTS SEG NFR BLD AUTO: 75 % (ref 43–75)
NRBC BLD AUTO-RTO: 0 /100 WBCS
PLATELET # BLD AUTO: 207 THOUSANDS/UL (ref 149–390)
PMV BLD AUTO: 9.7 FL (ref 8.9–12.7)
POTASSIUM SERPL-SCNC: 4.3 MMOL/L (ref 3.5–5.3)
PROT SERPL-MCNC: 6.9 G/DL (ref 6.4–8.4)
RBC # BLD AUTO: 4.73 MILLION/UL (ref 3.88–5.62)
SODIUM SERPL-SCNC: 137 MMOL/L (ref 135–147)
WBC # BLD AUTO: 11.11 THOUSAND/UL (ref 4.31–10.16)

## 2024-03-04 PROCEDURE — 70450 CT HEAD/BRAIN W/O DYE: CPT

## 2024-03-04 PROCEDURE — 94760 N-INVAS EAR/PLS OXIMETRY 1: CPT

## 2024-03-04 PROCEDURE — 93005 ELECTROCARDIOGRAM TRACING: CPT

## 2024-03-04 PROCEDURE — 71046 X-RAY EXAM CHEST 2 VIEWS: CPT

## 2024-03-04 PROCEDURE — 94664 DEMO&/EVAL PT USE INHALER: CPT

## 2024-03-04 PROCEDURE — 96360 HYDRATION IV INFUSION INIT: CPT

## 2024-03-04 PROCEDURE — 99285 EMERGENCY DEPT VISIT HI MDM: CPT | Performed by: STUDENT IN AN ORGANIZED HEALTH CARE EDUCATION/TRAINING PROGRAM

## 2024-03-04 PROCEDURE — 36415 COLL VENOUS BLD VENIPUNCTURE: CPT

## 2024-03-04 PROCEDURE — 99214 OFFICE O/P EST MOD 30 MIN: CPT | Performed by: FAMILY MEDICINE

## 2024-03-04 PROCEDURE — 94060 EVALUATION OF WHEEZING: CPT | Performed by: FAMILY MEDICINE

## 2024-03-04 PROCEDURE — 84484 ASSAY OF TROPONIN QUANT: CPT | Performed by: STUDENT IN AN ORGANIZED HEALTH CARE EDUCATION/TRAINING PROGRAM

## 2024-03-04 PROCEDURE — 80053 COMPREHEN METABOLIC PANEL: CPT | Performed by: STUDENT IN AN ORGANIZED HEALTH CARE EDUCATION/TRAINING PROGRAM

## 2024-03-04 PROCEDURE — 99285 EMERGENCY DEPT VISIT HI MDM: CPT

## 2024-03-04 PROCEDURE — 85025 COMPLETE CBC W/AUTO DIFF WBC: CPT | Performed by: STUDENT IN AN ORGANIZED HEALTH CARE EDUCATION/TRAINING PROGRAM

## 2024-03-04 PROCEDURE — 94644 CONT INHLJ TX 1ST HOUR: CPT

## 2024-03-04 RX ORDER — METHYLPREDNISOLONE SODIUM SUCCINATE 40 MG/ML
60 INJECTION, POWDER, LYOPHILIZED, FOR SOLUTION INTRAMUSCULAR; INTRAVENOUS ONCE
Status: COMPLETED | OUTPATIENT
Start: 2024-03-04 | End: 2024-03-04

## 2024-03-04 RX ORDER — ALBUTEROL SULFATE 2.5 MG/3ML
2.5 SOLUTION RESPIRATORY (INHALATION) ONCE
Status: COMPLETED | OUTPATIENT
Start: 2024-03-04 | End: 2024-03-04

## 2024-03-04 RX ORDER — NICOTINE 21 MG/24HR
1 PATCH, TRANSDERMAL 24 HOURS TRANSDERMAL EVERY 24 HOURS
Qty: 28 PATCH | Refills: 0 | Status: SHIPPED | OUTPATIENT
Start: 2024-03-04 | End: 2024-03-12 | Stop reason: SDUPTHER

## 2024-03-04 RX ORDER — FLUTICASONE PROPIONATE AND SALMETEROL 500; 50 UG/1; UG/1
1 POWDER RESPIRATORY (INHALATION) 2 TIMES DAILY
Qty: 60 BLISTER | Refills: 5 | Status: SHIPPED | OUTPATIENT
Start: 2024-03-04 | End: 2024-08-31

## 2024-03-04 RX ORDER — OXYCODONE AND ACETAMINOPHEN 7.5; 325 MG/1; MG/1
1 TABLET ORAL 2 TIMES DAILY PRN
Qty: 60 TABLET | Refills: 0 | Status: SHIPPED | OUTPATIENT
Start: 2024-03-15

## 2024-03-04 RX ORDER — SODIUM CHLORIDE FOR INHALATION 0.9 %
12 VIAL, NEBULIZER (ML) INHALATION ONCE
Status: COMPLETED | OUTPATIENT
Start: 2024-03-04 | End: 2024-03-04

## 2024-03-04 RX ADMIN — ALBUTEROL SULFATE 2.5 MG: 2.5 SOLUTION RESPIRATORY (INHALATION) at 09:16

## 2024-03-04 RX ADMIN — SODIUM CHLORIDE 500 ML: 0.9 INJECTION, SOLUTION INTRAVENOUS at 11:31

## 2024-03-04 RX ADMIN — METHYLPREDNISOLONE SODIUM SUCCINATE 60 MG: 40 INJECTION, POWDER, LYOPHILIZED, FOR SOLUTION INTRAMUSCULAR; INTRAVENOUS at 09:14

## 2024-03-04 RX ADMIN — IPRATROPIUM BROMIDE 1 MG: 0.5 SOLUTION RESPIRATORY (INHALATION) at 11:41

## 2024-03-04 RX ADMIN — Medication 12 ML: at 11:41

## 2024-03-04 RX ADMIN — ALBUTEROL SULFATE 10 MG: 2.5 SOLUTION RESPIRATORY (INHALATION) at 11:41

## 2024-03-04 NOTE — TELEPHONE ENCOUNTER
Caller: Alcira - [facility]    Doctor: Bernadette    Reason for call: Alcira from [facility] called in regards to the pt's appt that he has with Dr. Fleming this morning.  The pt had a dizzy spell in their office and fell and hit is head.  They are sending him to the ED.      Pt is concerned about running out of medication and was hoping to get in to see Dr. Fleming tomorrow or sometime this week.  I r/s the pt for 3/25, if there are any sooner appts please call the pt.    Call back#: [phone]

## 2024-03-04 NOTE — ED NOTES
IVF started. Respiratory aware for administration of heart neb      Hillary Anton, RN  03/04/24 [id number]

## 2024-03-04 NOTE — ASSESSMENT & PLAN NOTE
B/L wheezing and rhonchi on exam. CXR on 2/19/24 showed no evidence of pneumonia. Given albuterol nebulizer treatment and 60mg methylprednisolone injection in office. Has slight sx improvement after treatment. Advised to get repeat CXR as sx have not improved, with concern for pneumonia. Given Advair for at home treatment. F/U in 1 week.

## 2024-03-04 NOTE — DISCHARGE INSTRUCTIONS
Return to ED if any worsening symptoms  As discussed, follow-up with cardiology to see if they think you need a stress test

## 2024-03-04 NOTE — TELEPHONE ENCOUNTER
Pt informed that FQ was made aware by his PCP office that they were sending him to the ED and would miss his appt today w/ FQ.  Pt told that  has sent another percocet Rx to the pharmacy that can be filled 3/16/24 and he should keep his 3/25/24 ovs w/ FQ.

## 2024-03-04 NOTE — TELEPHONE ENCOUNTER
I sent the prescription with a do not fill date of 3/16/2024 for his Percocet so we can just keep the appointment as it is now.  Hope he feels better

## 2024-03-04 NOTE — ASSESSMENT & PLAN NOTE
Has a 42 pack year smoking hx. Currently smoking 0.5-1 pack day and wants to quit. Start on nicotine patch. Advised proper usage.

## 2024-03-04 NOTE — ED PROVIDER NOTES
History  Chief Complaint   Patient presents with    Syncope     Pt presents to the ED for evaluation of syncope. URI like symptoms for approximately one month. Given a neb and steroid injection today. While getting an XR he passed out when taking a deep breath for the XR. +HS +LOC -anticoagulants. Reports multiple episodes of dizziness since uri symptoms but no syncope     63-year-old male currently being treated for bronchitis for the past 2 weeks presenting to the ED for evaluation of syncopal episode.  Reports he was getting outpatient chest x-ray done when he took a deep breath then had 30 second syncopal episode.  Did hit back of his head on x-ray machine.  No history of syncopal episodes in the past.  Does report for the past 2 months has had dizziness with walking and dizziness upon standing.  These dizziness episodes are not preceded by any symptoms.  No chest pain, diaphoresis, vision changes, shortness of breath, abdominal pain, nausea, vomiting, diarrhea.  No hemoptysis.  No history of PE or DVT.        Prior to Admission Medications   Prescriptions Last Dose Informant Patient Reported? Taking?   Fluticasone-Salmeterol (Advair) 500-50 mcg/dose inhaler   No No   Sig: Inhale 1 puff 2 (two) times a day Rinse mouth after use.   Polyethylene Glycol 3350 (MIRALAX PO)  Self Yes No   Sig: Take by mouth 1 (one) time   QUEtiapine (SEROquel) 200 mg tablet   No No   Sig: Take 1 tablet (200 mg total) by mouth daily at bedtime   Saccharomyces boulardii (Probiotic) 250 MG CAPS   No No   Sig: Take 1 capsule (250 mg total) by mouth in the morning   albuterol (ProAir HFA) 90 mcg/act inhaler   No No   Sig: Inhale 2 puffs every 6 (six) hours as needed for wheezing   atorvastatin (LIPITOR) 20 mg tablet   No No   Sig: Take 1 tablet (20 mg total) by mouth daily   bisacodyl (DULCOLAX) 5 mg EC tablet  Self Yes No   Sig: Take 20 mg by mouth 1 (one) time   clobetasol (TEMOVATE) 0.05 % ointment   No No   Sig: Apply topically 2 (two)  times a day   etodolac (LODINE) 300 MG capsule   No No   Sig: Take 1 capsule by mouth 3 times a day as needed for pain.  Take with food.   etodolac (LODINE) 400 MG tablet   No No   Sig: Take 1 tablet (400 mg total) by mouth 2 (two) times a day   fluticasone (FLONASE) 50 mcg/act nasal spray   No No   Si sprays into each nostril daily   gabapentin (Neurontin) 600 MG tablet   No No   Sig: Take 2 tablets (1,200 mg total) by mouth 2 (two) times a day   glipiZIDE (GLUCOTROL XL) 5 mg 24 hr tablet   No No   Sig: Take 1 tablet (5 mg total) by mouth daily   lidocaine (LIDODERM) 5 %  Self No No   Sig: Apply 2 patches topically daily Remove & Discard patch within 12 hours or as directed by MD   Patient taking differently: Apply 2 patches topically daily as needed Remove & Discard patch within 12 hours or as directed by MD   lisinopril (ZESTRIL) 2.5 mg tablet   No No   Sig: Take 1 tablet (2.5 mg total) by mouth daily at bedtime   magnesium citrate solution  Self Yes No   Sig: Take 296 mL by mouth once   metFORMIN (GLUCOPHAGE) 1000 MG tablet   No No   Sig: Take 1 tablet (1,000 mg total) by mouth 2 (two) times a day with meals   nicotine (NICODERM CQ) 21 mg/24 hr TD 24 hr patch   No No   Sig: Place 1 patch on the skin over 24 hours every 24 hours   omeprazole (PriLOSEC) 20 mg delayed release capsule   No No   Sig: TAKE 1 CAPSULE EVERY DAY   oxyCODONE-acetaminophen (Percocet) 7.5-325 MG per tablet   No No   Sig: Take 1 tablet by mouth 2 (two) times a day as needed for moderate pain Max Daily Amount: 2 tablets   oxyCODONE-acetaminophen (Percocet) 7.5-325 MG per tablet   No No   Sig: Take 1 tablet by mouth 2 (two) times a day as needed for moderate pain Max Daily Amount: 2 tablets Do not start before March 15, 2024.   sildenafil (VIAGRA) 50 MG tablet  Self No No   Sig: Take 1 tablet (50 mg total) by mouth as needed for erectile dysfunction Take on an empty stomach, 1 hour before sexual activity, no alcohol. 100 mg max dose.    Patient not taking: Reported on 6/22/2023   tamsulosin (FLOMAX) 0.4 mg   No No   Sig: TAKE 2 CAPSULES EVERY DAY WITH DINNER   traZODone (DESYREL) 100 mg tablet   No No   Sig: Take 1 tablet (100 mg total) by mouth daily at bedtime      Facility-Administered Medications Last Administration Doses Remaining   albuterol inhalation solution 2.5 mg 3/4/2024  9:16 AM 0   methylPREDNISolone sodium succinate (Solu-MEDROL) injection 60 mg 3/4/2024  9:14 AM 0          Past Medical History:   Diagnosis Date    Arthritis     BPH (benign prostatic hypertrophy) with urinary obstruction     Chronic narcotic dependence (HCC)     percocet TID    Chronic pain disorder     back-spinal cord stimulator    Colon polyp     Diabetes mellitus (HCC)     type    Ear infection     Herniation of lumbar intervertebral disc with radiculopathy     Myofascial pain syndrome     Obesity     Sciatica     Spinal cord stimulator status 2017    implanted 2017    Tinnitus        Past Surgical History:   Procedure Laterality Date    ABSCESS DRAINAGE      groin    BACK SURGERY      sciatic nerve- spinal cord stimulator 2017    CAUDAL BLOCK N/A 10/26/2018    Procedure: Caudal Epidural Steroid Injection (44513);  Surgeon: Crystal Olmedo MD;  Location: [facility] MAIN OR;  Service: Pain Management     CAUDAL BLOCK N/A 11/30/2018    Procedure: Caudal Epidural Steroid Injection (41282);  Surgeon: Crystal Olmedo MD;  Location: [facility] MAIN OR;  Service: Pain Management     COLONOSCOPY      EPIDURAL BLOCK INJECTION Right 5/10/2019    Procedure: L5 S1 transforaminal Epidural Steroid Injection ([id number];  Surgeon: Crystal Olmedo MD;  Location: [facility] MAIN OR;  Service: Pain Management     EPIDURAL BLOCK INJECTION Right 8/16/2019    Procedure: BLOCK / INJECTION EPIDURAL STEROID TRANSFORAMINAL;  Surgeon: Crystal Olmedo MD;  Location: [facility] MAIN OR;  Service: Pain Management     EPIDURAL BLOCK INJECTION Left 7/1/2021    Procedure: L5 S1 transforaminal epidural steroid  injection ( [id number]);  Surgeon: Crystal Olmedo MD;  Location: [facility] MAIN OR;  Service: Pain Management     EPIDURAL BLOCK INJECTION Right 7/15/2021    Procedure: L5 S1 transforaminal epidural steroid injection ( [id number]);  Surgeon: Crystal Olmedo MD;  Location: [facility] MAIN OR;  Service: Pain Management     EPIDURAL BLOCK INJECTION Right 1/7/2022    Procedure: L5 S1 transforaminal epidural steroid injection ([id number]);  Surgeon: Crystal Olmedo MD;  Location: [facility] MAIN OR;  Service: Pain Management     EPIDURAL BLOCK INJECTION Left 1/28/2022    Procedure: L5 S1 transforaminal epidural steroid injection ([id number]);  Surgeon: Crystal Olmedo MD;  Location: [facility] MAIN OR;  Service: Pain Management     NERVE BLOCK Bilateral 4/26/2018    Procedure: B/L L3 L4 L5 S1 MBB #1 ([id number]);  Surgeon: Crystal Olmedo MD;  Location: [facility] MAIN OR;  Service: Pain Management     NERVE BLOCK Bilateral 5/11/2018    Procedure: B/L L3 L4 L5 S1 Medial Branch Block #2;  Surgeon: Crystal Olmedo MD;  Location: [facility] MAIN OR;  Service: Pain Management     NOSE SURGERY      NE COLONOSCOPY FLX DX W/COLLJ SPEC WHEN PFRMD N/A 3/6/2017    Procedure: COLONOSCOPY;  Surgeon: Leo Dhaliwal MD;  Location:  GI LAB;  Service: Gastroenterology    NE INSJ/RPLCMT SPINAL NPG/RCVR POCKET CRTJ&CONNJ Left 6/29/2021    Procedure: REPLACEMENT IMPLANTABLE PULSE GENERATOR DORSAL SPINAL COLUMN STIMULATOR, LEFT;  Surgeon: Julian Anna MD;  Location: [facility] MAIN OR;  Service: Neurosurgery    NE AN IMPLTJ NSTIM ELTRDS PLATE/PADDLE EDRL Left 10/3/2017    Procedure: PLACEMENT THORACIC FOR INSERTION DORSAL COLUMN SPINAL CORD STIMULATOR (DCS) WITH BUTTOCK IMPLANTABLE PULSE GENERATOR(IMPULSE);  Surgeon: Gregory Fry MD;  Location: [facility] MAIN OR;  Service: Neurosurgery    RADIOFREQUENCY ABLATION Right 5/18/2018    Procedure: Rt L3 L4 L5 S1 Radio Frequency Ablation ([id number]);  Surgeon: Crystal Olmedo MD;  Location: [facility] MAIN OR;  Service:  Pain Management     RADIOFREQUENCY ABLATION Left 6/1/2018    Procedure: Lt L3 L4 L5 S1 Radio Frequency Ablation ([id number]);  Surgeon: Crystal Olmedo MD;  Location: [facility] MAIN OR;  Service: Pain Management        Family History   Problem Relation Age of Onset    Diabetes Mother     Arthritis Mother     Diabetes Father         mellitus     Heart attack Father 62    Hypertension Father     Arthritis Father      I have reviewed and agree with the history as documented.    E-Cigarette/Vaping    E-Cigarette Use Never User      E-Cigarette/Vaping Substances    Nicotine No     THC No     CBD No     Flavoring No     Other No     Unknown No      Social History     Tobacco Use    Smoking status: Every Day     Current packs/day: 1.00     Average packs/day: 1 pack/day for 42.0 years (42.0 ttl pk-yrs)     Types: Cigarettes    Smokeless tobacco: Never    Tobacco comments:     encouraged smoking cessation - history of smoking 30 or more pack years    Vaping Use    Vaping status: Never Used   Substance Use Topics    Alcohol use: No     Comment: rare    Drug use: No        Review of Systems   Constitutional:  Negative for chills and fever.   HENT:  Negative for ear pain and sore throat.    Eyes:  Negative for pain and visual disturbance.   Respiratory:  Negative for cough and shortness of breath.    Cardiovascular:  Negative for chest pain and palpitations.   Gastrointestinal:  Negative for abdominal pain and vomiting.   Genitourinary:  Negative for dysuria and hematuria.   Musculoskeletal:  Negative for arthralgias and back pain.   Skin:  Negative for color change and rash.   Neurological:  Positive for dizziness, syncope and light-headedness. Negative for seizures.   All other systems reviewed and are negative.      Physical Exam  ED Triage Vitals   Temperature Pulse Respirations Blood Pressure SpO2   03/04/24 1059 03/04/24 1044 03/04/24 1044 03/04/24 1044 03/04/24 1044   98 °F (36.7 °C) 80 18 139/63 98 %      Temp Source Heart  Rate Source Patient Position - Orthostatic VS BP Location FiO2 (%)   03/04/24 1059 03/04/24 1044 03/04/24 1044 03/04/24 1044 --   Oral Monitor Lying Right arm       Pain Score       --                    Orthostatic Vital Signs  Vitals:    03/04/24 1044 03/04/24 1130 03/04/24 1230 03/04/24 1330   BP: 139/63 111/65 120/57 118/55   Pulse: 80 73 84 81   Patient Position - Orthostatic VS: Lying Lying Lying Lying       Physical Exam  Vitals and nursing note reviewed.   Constitutional:       General: He is not in acute distress.     Appearance: He is well-developed.   HENT:      Head: Normocephalic and atraumatic.      Jaw: There is normal jaw occlusion.      Right Ear: Tympanic membrane, ear canal and external ear normal.      Left Ear: Tympanic membrane, ear canal and external ear normal.      Nose: Nose normal.      Mouth/Throat:      Lips: Pink.      Mouth: Mucous membranes are moist.      Pharynx: Oropharynx is clear. Uvula midline.      Comments: Atraumatic head  Eyes:      General: Lids are normal. Vision grossly intact. Gaze aligned appropriately.      Conjunctiva/sclera: Conjunctivae normal.      Pupils: Pupils are equal, round, and reactive to light.      Visual Fields: Right eye visual fields normal and left eye visual fields normal.   Neck:      Trachea: Trachea and phonation normal.   Cardiovascular:      Rate and Rhythm: Normal rate and regular rhythm.      Pulses:           Radial pulses are 2+ on the right side and 2+ on the left side.        Dorsalis pedis pulses are 2+ on the right side and 2+ on the left side.      Heart sounds: Normal heart sounds, S1 normal and S2 normal. No murmur heard.  Pulmonary:      Effort: Pulmonary effort is normal. No respiratory distress.      Breath sounds: Wheezing and rhonchi present.      Comments: Normal respiratory rate and effort.  Bilateral diffuse wheezing and rhonchi.  Satting 99% on room air.  Abdominal:      Palpations: Abdomen is soft.      Tenderness: There is  no abdominal tenderness.   Musculoskeletal:         General: No swelling.      Cervical back: Full passive range of motion without pain, normal range of motion and neck supple.      Right lower leg: No edema.      Left lower leg: No edema.   Skin:     General: Skin is warm and dry.      Capillary Refill: Capillary refill takes less than 2 seconds.   Neurological:      Mental Status: He is alert and oriented to person, place, and time.      GCS: GCS eye subscore is 4. GCS verbal subscore is 5. GCS motor subscore is 6.      Cranial Nerves: Cranial nerves 2-12 are intact.      Sensory: Sensation is intact.      Motor: Motor function is intact.      Coordination: Coordination is intact.      Gait: Gait is intact.   Psychiatric:         Mood and Affect: Mood normal.         ED Medications  Medications   albuterol inhalation solution 10 mg (10 mg Nebulization Given 3/4/24 1141)   ipratropium (ATROVENT) 0.02 % inhalation solution 1 mg (1 mg Nebulization Given 3/4/24 1141)   sodium chloride 0.9 % inhalation solution 12 mL (12 mL Nebulization Given 3/4/24 1141)   sodium chloride 0.9 % bolus 500 mL (0 mL Intravenous Stopped 3/4/24 1232)       Diagnostic Studies  Results Reviewed       Procedure Component Value Units Date/Time    HS Troponin I 2hr [[id number]]  (Normal) Collected: 03/04/24 1231    Lab Status: Final result Specimen: Blood from Arm, Left Updated: 03/04/24 1300     hs TnI 2hr 3 ng/L      Delta 2hr hsTnI -0.5 ng/L     HS Troponin 0hr (reflex protocol) [[id number]]  (Normal) Collected: 03/04/24 1053    Lab Status: Final result Specimen: Blood from Arm, Left Updated: 03/04/24 1142     hs TnI 0hr 3.5 ng/L     Comprehensive metabolic panel [[id number]]  (Abnormal) Collected: 03/04/24 1053    Lab Status: Final result Specimen: Blood from Arm, Left Updated: 03/04/24 1133     Sodium 137 mmol/L      Potassium 4.3 mmol/L      Chloride 104 mmol/L      CO2 24 mmol/L      ANION GAP 9 mmol/L      BUN 23 mg/dL      Creatinine 0.82  mg/dL      Glucose 174 mg/dL      Calcium 9.2 mg/dL      AST 26 U/L      ALT 50 U/L      Alkaline Phosphatase 61 U/L      Total Protein 6.9 g/dL      Albumin 4.4 g/dL      Total Bilirubin 0.54 mg/dL      eGFR 94 ml/min/1.73sq m     Narrative:      National Kidney Disease Foundation guidelines for Chronic Kidney Disease (CKD):     Stage 1 with normal or high GFR (GFR > 90 mL/min/1.73 square meters)    Stage 2 Mild CKD (GFR = 60-89 mL/min/1.73 square meters)    Stage 3A Moderate CKD (GFR = 45-59 mL/min/1.73 square meters)    Stage 3B Moderate CKD (GFR = 30-44 mL/min/1.73 square meters)    Stage 4 Severe CKD (GFR = 15-29 mL/min/1.73 square meters)    Stage 5 End Stage CKD (GFR <15 mL/min/1.73 square meters)  Note: GFR calculation is accurate only with a steady state creatinine    CBC and differential [[id number]]  (Abnormal) Collected: 03/04/24 1053    Lab Status: Final result Specimen: Blood from Arm, Left Updated: 03/04/24 1106     WBC 11.11 Thousand/uL      RBC 4.73 Million/uL      Hemoglobin 14.6 g/dL      Hematocrit 44.2 %      MCV 93 fL      MCH 30.9 pg      MCHC 33.0 g/dL      RDW 13.4 %      MPV 9.7 fL      Platelets 207 Thousands/uL      nRBC 0 /100 WBCs      Neutrophils Relative 75 %      Immat GRANS % 1 %      Lymphocytes Relative 17 %      Monocytes Relative 5 %      Eosinophils Relative 1 %      Basophils Relative 1 %      Neutrophils Absolute 8.37 Thousands/µL      Immature Grans Absolute 0.07 Thousand/uL      Lymphocytes Absolute 1.91 Thousands/µL      Monocytes Absolute 0.55 Thousand/µL      Eosinophils Absolute 0.15 Thousand/µL      Basophils Absolute 0.06 Thousands/µL                    CT head without contrast   Final Result by Jeancarlos Cancino MD (03/04 1325)      No acute intracranial abnormality.      Maxillary sinus air-fluid levels, acute sinusitis not excluded.                  Workstation performed: [id number]               Procedures  ECG 12 Lead Documentation Only    Date/Time: 3/4/2024 1:15  PM    Performed by: Nica Cabrales MD  Authorized by: Nica Cabrales MD    Indications / Diagnosis:  Syncope  Patient location:  ED  Previous ECG:     Previous ECG:  Compared to current    Comparison ECG info:  6/14/21    Similarity:  No change  Interpretation:     Interpretation: normal    Rate:     ECG rate:  77    ECG rate assessment: normal    Rhythm:     Rhythm: sinus rhythm    Ectopy:     Ectopy: none    QRS:     QRS axis:  Normal    QRS intervals:  Normal  Conduction:     Conduction: normal    ST segments:     ST segments:  Normal  T waves:     T waves: normal          ED Course  ED Course as of 03/04/24 2009   Mon Mar 04, 2024   1127 CXR taken today, 3/4/24 as outpatient reviewed below:    COMPARISON: CXR 2/19/2024 and 11/27/2018, chest CT 9/11/2023.     FINDINGS:     Clear lungs. No pneumothorax or pleural effusion.     Normal cardiomediastinal silhouette.     Bones are unremarkable for age. Spinal cord stimulator.     Normal upper abdomen.     IMPRESSION:     No acute cardiopulmonary disease.     1130 ECG 12 lead  Normal sinus rhythm, 77 bpm.  Normal axis, normal intervals, no ST elevations or depressions, no T wave inversions.   1337 Patient was updated about all labs and imaging findings.  Advise cardiology follow-up with heart score of 4 for possible stress test, verbalized understanding.             HEART Risk Score      Flowsheet Row Most Recent Value   Heart Score Risk Calculator    History 1 Filed at: 03/04/2024 1248   ECG 0 Filed at: 03/04/2024 1248   Age 1 Filed at: 03/04/2024 1248   Risk Factors 2 Filed at: 03/04/2024 1248   Troponin 0 Filed at: 03/04/2024 1248   HEART Score 4 Filed at: 03/04/2024 1248                            Wells' Criteria for PE      Flowsheet Row Most Recent Value   Wells' Criteria for PE    Clinical signs and symptoms of DVT 0 Filed at: 03/04/2024 1247   PE is primary diagnosis or equally likely 0 Filed at: 03/04/2024 1247   HR >100 0 Filed at: 03/04/2024 1247    Immobilization at least 3 days or Surgery in the previous 4 weeks 0 Filed at: 03/04/2024 1247   Previous, objectively diagnosed PE or DVT 0 Filed at: 03/04/2024 1247   Hemoptysis 0 Filed at: 03/04/2024 1247   Malignancy with treatment within 6 months or palliative 0 Filed at: 03/04/2024 1247   Wells' Criteria Total 0 Filed at: 03/04/2024 1247              Medical Decision Making  63-year-old male presenting to ED for evaluation of syncopal episode today with head strike, dizziness/lightheadedness for the past 2 months.    Differentials including but not limited to: cardiogenic syncope, vasovagal syncope, dehydration, ACS, arrhythmia, anemia, electrolyte abnormality, epidural, subdural, subarachnoid hemorrhage.  Doubt dissection.  Doubt PE, low risk Wells.  Doubt stroke/TIA.    Physical exam revealed bilateral wheezing and rhonchi, patient was given heart neb which improved his symptoms.  I did review his outpatient CXR which was taken today and was normal.    Will obtain labs, ECG, CT head, 500cc IV fluids    [place]buck syncope rule = 0, heart score = 4.  Normal sinus rhythm on ECG.  Troponin negative.  CT head without acute intracranial abnormality, did reveal air-fluid levels in sinuses. Patient discharged home with outpatient follow-up, strict return precautions.  Cardio referral given for possible Holter monitor and possible stress test.    Amount and/or Complexity of Data Reviewed  Labs: ordered.  Radiology: ordered.  ECG/medicine tests: ordered and independent interpretation performed. Decision-making details documented in ED Course.    Risk  Prescription drug management.          Disposition  Final diagnoses:   Syncope     Time reflects when diagnosis was documented in both MDM as applicable and the Disposition within this note       Time User Action Codes Description Comment    3/4/2024  1:38 PM Nica Cabrales Add [R55] Syncope           ED Disposition       ED Disposition   Discharge    Condition    Stable    Date/Time   Mon Mar 4, 2024 1338    Comment   Cedric Rox discharge to home/self care.                   Follow-up Information       Follow up With Specialties Details Why Contact Info Additional Information    Melinda Daily,  Family Medicine Schedule an appointment as soon as possible for a visit today for follow up [address] [facility] [address]  [phone]       [facility] Cardiology [facility] Cardiology Schedule an appointment as soon as possible for a visit today for follow up of dizziness for possible stress test [address]  [facility] [address] [phone] [facility], [address], [place], [place], [address] [phone]            Discharge Medication List as of 3/4/2024  1:39 PM        START taking these medications    Details   !! oxyCODONE-acetaminophen (Percocet) 7.5-325 MG per tablet Take 1 tablet by mouth 2 (two) times a day as needed for moderate pain Max Daily Amount: 2 tablets Do not start before March 15, 2024., Starting Fri 3/15/2024, Normal       !! - Potential duplicate medications found. Please discuss with provider.        CONTINUE these medications which have NOT CHANGED    Details   albuterol (ProAir HFA) 90 mcg/act inhaler Inhale 2 puffs every 6 (six) hours as needed for wheezing, Starting Mon 2/19/2024, Normal      atorvastatin (LIPITOR) 20 mg tablet Take 1 tablet (20 mg total) by mouth daily, Starting Thu 10/26/2023, Normal      bisacodyl (DULCOLAX) 5 mg EC tablet Take 20 mg by mouth 1 (one) time, Historical Med      clobetasol (TEMOVATE) 0.05 % ointment Apply topically 2 (two) times a day, Starting Tue 10/31/2023, Normal      etodolac (LODINE) 300 MG capsule Take 1 capsule by mouth 3 times a day as needed for pain.  Take with food., Normal      etodolac (LODINE) 400 MG tablet Take 1 tablet (400 mg total) by mouth 2 (two) times a day, Starting Wed 1/31/2024, Until Sat  1/25/2025, Normal      fluticasone (FLONASE) 50 mcg/act nasal spray 2 sprays into each nostril daily, Starting Thu 10/26/2023, Normal      Fluticasone-Salmeterol (Advair) 500-50 mcg/dose inhaler Inhale 1 puff 2 (two) times a day Rinse mouth after use., Starting Mon 3/4/2024, Until Sat 8/31/2024, Normal      gabapentin (Neurontin) 600 MG tablet Take 2 tablets (1,200 mg total) by mouth 2 (two) times a day, Starting Wed 1/31/2024, Until Sat 1/25/2025, Normal      glipiZIDE (GLUCOTROL XL) 5 mg 24 hr tablet Take 1 tablet (5 mg total) by mouth daily, Starting Thu 10/26/2023, Normal      lidocaine (LIDODERM) 5 % Apply 2 patches topically daily Remove & Discard patch within 12 hours or as directed by MD, Starting Mon 1/11/2021, Print      lisinopril (ZESTRIL) 2.5 mg tablet Take 1 tablet (2.5 mg total) by mouth daily at bedtime, Starting Thu 10/26/2023, Normal      magnesium citrate solution Take 296 mL by mouth once, Historical Med      metFORMIN (GLUCOPHAGE) 1000 MG tablet Take 1 tablet (1,000 mg total) by mouth 2 (two) times a day with meals, Starting Thu 10/26/2023, Normal      nicotine (NICODERM CQ) 21 mg/24 hr TD 24 hr patch Place 1 patch on the skin over 24 hours every 24 hours, Starting Mon 3/4/2024, Normal      omeprazole (PriLOSEC) 20 mg delayed release capsule TAKE 1 CAPSULE EVERY DAY, Normal      !! oxyCODONE-acetaminophen (Percocet) 7.5-325 MG per tablet Take 1 tablet by mouth 2 (two) times a day as needed for moderate pain Max Daily Amount: 2 tablets, Starting Wed 1/17/2024, Normal      Polyethylene Glycol 3350 (MIRALAX PO) Take by mouth 1 (one) time, Historical Med      QUEtiapine (SEROquel) 200 mg tablet Take 1 tablet (200 mg total) by mouth daily at bedtime, Starting Tue 1/30/2024, Normal      Saccharomyces boulardii (Probiotic) 250 MG CAPS Take 1 capsule (250 mg total) by mouth in the morning, Starting Thu 10/26/2023, Normal      sildenafil (VIAGRA) 50 MG tablet Take 1 tablet (50 mg total) by mouth as  needed for erectile dysfunction Take on an empty stomach, 1 hour before sexual activity, no alcohol. 100 mg max dose., Starting Wed 2/2/2022, Normal      tamsulosin (FLOMAX) 0.4 mg TAKE 2 CAPSULES EVERY DAY WITH DINNER, Normal      traZODone (DESYREL) 100 mg tablet Take 1 tablet (100 mg total) by mouth daily at bedtime, Starting Thu 10/26/2023, Normal       !! - Potential duplicate medications found. Please discuss with provider.            PDMP Review         Value Time User    PDMP Reviewed  Yes 3/4/2024 12:52 PM Dain Fleming MD             ED Provider  Attending physically available and evaluated Cedric Gramajo. I managed the patient along with the ED Attending.    Electronically Signed by           Nica Cabrales MD  03/04/24 2009

## 2024-03-04 NOTE — PROGRESS NOTES
Name: Cedric Gramajo      : 1960      MRN: [id number]  Encounter Provider: Krystle Antonio MD  Encounter Date: 3/4/2024   Encounter department: [facility]    Assessment & Plan     1. Acute bronchitis, unspecified organism  Assessment & Plan:  B/L wheezing and rhonchi on exam. CXR on 24 showed no evidence of pneumonia. Given albuterol nebulizer treatment and 60mg methylprednisolone injection in office. Has slight sx improvement after treatment. Advised to get repeat CXR as sx have not improved, with concern for pneumonia. Given Advair for at home treatment. F/U in 1 week.     Orders:  -     albuterol inhalation solution 2.5 mg  -     methylPREDNISolone sodium succinate (Solu-MEDROL) injection 60 mg  -     XR chest pa & lateral; Future; Expected date: 2024  -     XR chest pa & lateral; Future; Expected date: 2024  -     Fluticasone-Salmeterol (Advair) 500-50 mcg/dose inhaler; Inhale 1 puff 2 (two) times a day Rinse mouth after use.  -     nicotine (NICODERM CQ) 21 mg/24 hr TD 24 hr patch; Place 1 patch on the skin over 24 hours every 24 hours    2. Wheezing  Assessment & Plan:  B/L wheezing and rhonchi on exam. CXR on 24 showed no evidence of pneumonia. Given albuterol nebulizer treatment and 60mg methylprednisolone injection in office. Has slight sx improvement after treatment. Advised to get repeat CXR as sx have not improved, with concern for pneumonia. Given Advair for at home treatment. F/U in 1 week.      Orders:  -     albuterol inhalation solution 2.5 mg  -     methylPREDNISolone sodium succinate (Solu-MEDROL) injection 60 mg  -     XR chest pa & lateral; Future; Expected date: 2024  -     XR chest pa & lateral; Future; Expected date: 2024  -     Fluticasone-Salmeterol (Advair) 500-50 mcg/dose inhaler; Inhale 1 puff 2 (two) times a day Rinse mouth after use.  -     nicotine (NICODERM CQ) 21 mg/24 hr TD 24 hr patch; Place 1 patch on the skin over 24  hours every 24 hours    3. Sacroiliitis (HCC)  Assessment & Plan:  Stable       4. Tobacco abuse  Assessment & Plan:  Has a 42 pack year smoking hx. Currently smoking 0.5-1 pack day and wants to quit. Start on nicotine patch. Advised proper usage.     Orders:  -     nicotine (NICODERM CQ) 21 mg/24 hr TD 24 hr patch; Place 1 patch on the skin over 24 hours every 24 hours       F/u 1 wk     Subjective     Here for continued cough, congestion, and fatigue. He was seen 2 weeks ago for similar presenting sx with wheezing and rhonchi on exam B/L. Treated with prednisone, azithromycin course, and albuterol for acute bronchitis. CXR showed no signs of pneumonia. He finished medication courses with slight sx improvement, but now has continued cough, fatigue, chills, and dizziness. Dry Cough has not improved and he feels very run down the past 3-4 days. Feels slightly SOB after walking. He is currently still smoking about 0.5-1 pack/day and has a 42 pack year hx. He is trying to cut back and he is interested in quitting smoking. Denies HA, fever, nausea, vomiting, abd pain, diarrhea, chest pain, chest palpitations.     Cough  Associated symptoms include chills. Pertinent negatives include no chest pain, ear pain, fever, rash, sore throat or shortness of breath.       Review of Systems   Constitutional:  Positive for chills and fatigue. Negative for fever.   HENT:  Negative for ear pain and sore throat.    Eyes:  Negative for pain and visual disturbance.   Respiratory:  Positive for cough. Negative for chest tightness and shortness of breath.    Cardiovascular:  Negative for chest pain and palpitations.   Gastrointestinal:  Negative for abdominal pain and vomiting.   Genitourinary:  Negative for dysuria and hematuria.   Musculoskeletal:  Negative for arthralgias and back pain.   Skin:  Negative for color change and rash.   Neurological:  Positive for dizziness. Negative for seizures and syncope.   All other systems reviewed  and are negative.      Past Medical History:   Diagnosis Date   • Arthritis    • BPH (benign prostatic hypertrophy) with urinary obstruction    • Chronic narcotic dependence (HCC)     percocet TID   • Chronic pain disorder     back-spinal cord stimulator   • Colon polyp    • Diabetes mellitus (HCC)     type   • Ear infection    • Herniation of lumbar intervertebral disc with radiculopathy    • Myofascial pain syndrome    • Obesity    • Sciatica    • Spinal cord stimulator status 2017    implanted 2017   • Tinnitus      Past Surgical History:   Procedure Laterality Date   • ABSCESS DRAINAGE      groin   • BACK SURGERY      sciatic nerve- spinal cord stimulator 2017   • CAUDAL BLOCK N/A 10/26/2018    Procedure: Caudal Epidural Steroid Injection (92451);  Surgeon: Crystal Olmedo MD;  Location: [facility] MAIN OR;  Service: Pain Management    • CAUDAL BLOCK N/A 11/30/2018    Procedure: Caudal Epidural Steroid Injection ([id number]);  Surgeon: Crystal Olmedo MD;  Location: [facility] MAIN OR;  Service: Pain Management    • COLONOSCOPY     • EPIDURAL BLOCK INJECTION Right 5/10/2019    Procedure: L5 S1 transforaminal Epidural Steroid Injection ([id number];  Surgeon: Crystal Olmedo MD;  Location: [facility] MAIN OR;  Service: Pain Management    • EPIDURAL BLOCK INJECTION Right 8/16/2019    Procedure: BLOCK / INJECTION EPIDURAL STEROID TRANSFORAMINAL;  Surgeon: Crystal Olmedo MD;  Location: [facility] MAIN OR;  Service: Pain Management    • EPIDURAL BLOCK INJECTION Left 7/1/2021    Procedure: L5 S1 transforaminal epidural steroid injection ( [id number]);  Surgeon: Crystal Olmedo MD;  Location: [facility] MAIN OR;  Service: Pain Management    • EPIDURAL BLOCK INJECTION Right 7/15/2021    Procedure: L5 S1 transforaminal epidural steroid injection ( [id number]);  Surgeon: Crystal Olmedo MD;  Location: [facility] MAIN OR;  Service: Pain Management    • EPIDURAL BLOCK INJECTION Right 1/7/2022    Procedure: L5 S1 transforaminal epidural steroid injection  ([id number]);  Surgeon: Crystal Olmedo MD;  Location: [facility] MAIN OR;  Service: Pain Management    • EPIDURAL BLOCK INJECTION Left 1/28/2022    Procedure: L5 S1 transforaminal epidural steroid injection ([id number]);  Surgeon: Crystal Olmedo MD;  Location: [facility] MAIN OR;  Service: Pain Management    • NERVE BLOCK Bilateral 4/26/2018    Procedure: B/L L3 L4 L5 S1 MBB #1 ([id number]);  Surgeon: Crystal Olmedo MD;  Location: [facility] MAIN OR;  Service: Pain Management    • NERVE BLOCK Bilateral 5/11/2018    Procedure: B/L L3 L4 L5 S1 Medial Branch Block #2;  Surgeon: Crystal Olmedo MD;  Location: [facility] MAIN OR;  Service: Pain Management    • NOSE SURGERY     • IL COLONOSCOPY FLX DX W/COLLJ SPEC WHEN PFRMD N/A 3/6/2017    Procedure: COLONOSCOPY;  Surgeon: Leo Dhaliwal MD;  Location:  GI LAB;  Service: Gastroenterology   • IL INSJ/RPLCMT SPINAL NPG/RCVR POCKET CRTJ&CONNJ Left 6/29/2021    Procedure: REPLACEMENT IMPLANTABLE PULSE GENERATOR DORSAL SPINAL COLUMN STIMULATOR, LEFT;  Surgeon: Julian Anna MD;  Location:  MAIN OR;  Service: Neurosurgery   • IL AN IMPLTJ NSTIM ELTRDS PLATE/PADDLE EDRL Left 10/3/2017    Procedure: PLACEMENT THORACIC FOR INSERTION DORSAL COLUMN SPINAL CORD STIMULATOR (DCS) WITH BUTTOCK IMPLANTABLE PULSE GENERATOR(IMPULSE);  Surgeon: Gregory Fry MD;  Location:  MAIN OR;  Service: Neurosurgery   • RADIOFREQUENCY ABLATION Right 5/18/2018    Procedure: Rt L3 L4 L5 S1 Radio Frequency Ablation ([id number]);  Surgeon: Crystal Olmedo MD;  Location: [facility] MAIN OR;  Service: Pain Management    • RADIOFREQUENCY ABLATION Left 6/1/2018    Procedure: Lt L3 L4 L5 S1 Radio Frequency Ablation ([id number]);  Surgeon: Crystal Olmedo MD;  Location: [facility] MAIN OR;  Service: Pain Management      Family History   Problem Relation Age of Onset   • Diabetes Mother    • Arthritis Mother    • Diabetes Father         mellitus    • Heart attack Father 62   • Hypertension Father    • Arthritis  Father      Social History     Socioeconomic History   • Marital status: Single     Spouse name: None   • Number of children: None   • Years of education: None   • Highest education level: None   Occupational History   • Occupation:  for distribution center    Tobacco Use   • Smoking status: Every Day     Current packs/day: 1.00     Average packs/day: 1 pack/day for 42.0 years (42.0 ttl pk-yrs)     Types: Cigarettes   • Smokeless tobacco: Never   • Tobacco comments:     encouraged smoking cessation - history of smoking 30 or more pack years    Vaping Use   • Vaping status: Never Used   Substance and Sexual Activity   • Alcohol use: No     Comment: rare   • Drug use: No   • Sexual activity: Not Currently     Partners: Female   Other Topics Concern   • None   Social History Narrative    Caffeine use - coffee      Social Determinants of Health     Financial Resource Strain: Low Risk  (7/19/2023)    Overall Financial Resource Strain (CARDIA)    • Difficulty of Paying Living Expenses: Not hard at all   Food Insecurity: Not on file   Transportation Needs: No Transportation Needs (7/19/2023)    PRAPARE - Transportation    • Lack of Transportation (Medical): No    • Lack of Transportation (Non-Medical): No   Physical Activity: Not on file   Stress: Not on file   Social Connections: Not on file   Intimate Partner Violence: Not on file   Housing Stability: Not on file     Current Outpatient Medications on File Prior to Visit   Medication Sig   • albuterol (ProAir HFA) 90 mcg/act inhaler Inhale 2 puffs every 6 (six) hours as needed for wheezing   • atorvastatin (LIPITOR) 20 mg tablet Take 1 tablet (20 mg total) by mouth daily   • bisacodyl (DULCOLAX) 5 mg EC tablet Take 20 mg by mouth 1 (one) time   • clobetasol (TEMOVATE) 0.05 % ointment Apply topically 2 (two) times a day   • etodolac (LODINE) 300 MG capsule Take 1 capsule by mouth 3 times a day as needed for pain.  Take with food.   • etodolac (LODINE) 400 MG tablet  Take 1 tablet (400 mg total) by mouth 2 (two) times a day   • fluticasone (FLONASE) 50 mcg/act nasal spray 2 sprays into each nostril daily   • gabapentin (Neurontin) 600 MG tablet Take 2 tablets (1,200 mg total) by mouth 2 (two) times a day   • glipiZIDE (GLUCOTROL XL) 5 mg 24 hr tablet Take 1 tablet (5 mg total) by mouth daily   • lidocaine (LIDODERM) 5 % Apply 2 patches topically daily Remove & Discard patch within 12 hours or as directed by MD (Patient taking differently: Apply 2 patches topically daily as needed Remove & Discard patch within 12 hours or as directed by MD)   • lisinopril (ZESTRIL) 2.5 mg tablet Take 1 tablet (2.5 mg total) by mouth daily at bedtime   • magnesium citrate solution Take 296 mL by mouth once   • metFORMIN (GLUCOPHAGE) 1000 MG tablet Take 1 tablet (1,000 mg total) by mouth 2 (two) times a day with meals   • omeprazole (PriLOSEC) 20 mg delayed release capsule TAKE 1 CAPSULE EVERY DAY   • oxyCODONE-acetaminophen (Percocet) 7.5-325 MG per tablet Take 1 tablet by mouth 2 (two) times a day as needed for moderate pain Max Daily Amount: 2 tablets   • oxyCODONE-acetaminophen (Percocet) 7.5-325 MG per tablet Take 1 tablet by mouth 2 (two) times a day as needed for moderate pain Max Daily Amount: 2 tablets Do not start before February 15, 2024.   • Polyethylene Glycol 3350 (MIRALAX PO) Take by mouth 1 (one) time   • QUEtiapine (SEROquel) 200 mg tablet Take 1 tablet (200 mg total) by mouth daily at bedtime   • Saccharomyces boulardii (Probiotic) 250 MG CAPS Take 1 capsule (250 mg total) by mouth in the morning   • tamsulosin (FLOMAX) 0.4 mg TAKE 2 CAPSULES EVERY DAY WITH DINNER   • traZODone (DESYREL) 100 mg tablet Take 1 tablet (100 mg total) by mouth daily at bedtime   • sildenafil (VIAGRA) 50 MG tablet Take 1 tablet (50 mg total) by mouth as needed for erectile dysfunction Take on an empty stomach, 1 hour before sexual activity, no alcohol. 100 mg max dose. (Patient not taking: Reported on  "6/22/2023)     Allergies   Allergen Reactions   • Penicillins Swelling     Mouth swelling     Immunization History   Administered Date(s) Administered   • COVID-19 MODERNA VACC 0.25 ML IM BOOSTER 12/02/2021   • COVID-19 MODERNA VACC 0.5 ML IM 03/16/2021, 04/12/2021, 08/05/2022, 11/26/2022   • COVID-19 Moderna mRNA Vaccine 12 Yr+ 50 mcg/0.5 mL (Spikevax) 10/04/2023   • INFLUENZA 09/12/2018, 11/26/2022, 10/04/2023   • Influenza Injectable, MDCK, Preservative Free, Quadrivalent, 0.5 mL 09/24/2020   • Influenza, recombinant, quadrivalent,injectable, preservative free 09/12/2018, 09/15/2021   • Pneumococcal Conjugate Vaccine 20-valent (Pcv20), Polysace 07/01/2022   • Pneumococcal Polysaccharide PPV23 09/12/2018   • Respiratory Syncytial Virus Vaccine (Recombinant, Adjuvanted) 01/17/2024   • Tdap 09/12/2018   • Zoster Vaccine Recombinant 10/04/2023, 01/17/2024       Objective     /62 (BP Location: Left arm, Patient Position: Sitting, Cuff Size: Large)   Pulse 95   Resp 16   Ht 5' 11\" (1.803 m)   Wt 102 kg (225 lb)   SpO2 97%   BMI 31.38 kg/m²     Physical Exam  Vitals and nursing note reviewed.   Constitutional:       Appearance: Normal appearance. He is obese.   HENT:      Head: Normocephalic and atraumatic.      Right Ear: Tympanic membrane, ear canal and external ear normal.      Left Ear: Tympanic membrane, ear canal and external ear normal.      Nose: Nose normal.      Mouth/Throat:      Mouth: Mucous membranes are dry.      Pharynx: Oropharynx is clear.   Eyes:      Extraocular Movements: Extraocular movements intact.      Pupils: Pupils are equal, round, and reactive to light.   Cardiovascular:      Rate and Rhythm: Normal rate and regular rhythm.      Pulses: Normal pulses.      Heart sounds: Normal heart sounds. No murmur heard.     No friction rub. No gallop.   Pulmonary:      Effort: Pulmonary effort is normal. No respiratory distress.      Breath sounds: No stridor. Wheezing and rhonchi present. No " rales.   Abdominal:      General: Abdomen is flat. Bowel sounds are normal.      Palpations: Abdomen is soft.   Neurological:      Mental Status: He is alert.       Krystle Antonio MD

## 2024-03-04 NOTE — Clinical Note
Cedric Gramajo was seen and treated in our emergency department on 3/4/2024.                Diagnosis:     Cedric  .    He may return on this date: 03/06/2024    Can return sooner if feeling ok     If you have any questions or concerns, please don't hesitate to call.      Nica Cabrales MD    ______________________________           _______________          _______________  Hospital Representative                              Date                                Time

## 2024-03-05 ENCOUNTER — TELEPHONE (OUTPATIENT)
Age: 64
End: 2024-03-05

## 2024-03-05 NOTE — TELEPHONE ENCOUNTER
The patient report that he needs to speak with Dr. Daily. He went yesterday for his Xray as Dr. Antonio order for him but at the processed he pass out and hit his head with the tech table and would like to speak with his provider about that and also chest test.     Please contact

## 2024-03-06 ENCOUNTER — RA CDI HCC (OUTPATIENT)
Dept: OTHER | Facility: HOSPITAL | Age: 64
End: 2024-03-06

## 2024-03-06 PROBLEM — J20.9 ACUTE BRONCHITIS: Status: RESOLVED | Noted: 2019-06-03 | Resolved: 2024-03-06

## 2024-03-06 PROBLEM — E11.41 DIABETIC MONONEUROPATHY ASSOCIATED WITH TYPE 2 DIABETES MELLITUS (HCC): Status: ACTIVE | Noted: 2017-01-03

## 2024-03-06 LAB
ATRIAL RATE: 77 BPM
P AXIS: 66 DEGREES
PR INTERVAL: 184 MS
QRS AXIS: 46 DEGREES
QRSD INTERVAL: 84 MS
QT INTERVAL: 366 MS
QTC INTERVAL: 414 MS
T WAVE AXIS: 64 DEGREES
VENTRICULAR RATE: 77 BPM

## 2024-03-06 PROCEDURE — 93010 ELECTROCARDIOGRAM REPORT: CPT | Performed by: INTERNAL MEDICINE

## 2024-03-06 NOTE — PROGRESS NOTES
HCC coding opportunities          Chart Reviewed number of suggestions sent to Provider: 1  E11.36     Patients Insurance     Medicare Insurance: [employer]

## 2024-03-07 ENCOUNTER — TELEPHONE (OUTPATIENT)
Age: 64
End: 2024-03-07

## 2024-03-07 ENCOUNTER — OFFICE VISIT (OUTPATIENT)
Dept: FAMILY MEDICINE CLINIC | Facility: CLINIC | Age: 64
End: 2024-03-07
Payer: COMMERCIAL

## 2024-03-07 VITALS
WEIGHT: 225 LBS | DIASTOLIC BLOOD PRESSURE: 76 MMHG | HEART RATE: 95 BPM | BODY MASS INDEX: 31.5 KG/M2 | SYSTOLIC BLOOD PRESSURE: 140 MMHG | RESPIRATION RATE: 16 BRPM | HEIGHT: 71 IN | OXYGEN SATURATION: 97 %

## 2024-03-07 DIAGNOSIS — R55 SYNCOPE, UNSPECIFIED SYNCOPE TYPE: ICD-10-CM

## 2024-03-07 DIAGNOSIS — R06.2 WHEEZING: ICD-10-CM

## 2024-03-07 DIAGNOSIS — J01.00 ACUTE NON-RECURRENT MAXILLARY SINUSITIS: Primary | ICD-10-CM

## 2024-03-07 PROCEDURE — G2211 COMPLEX E/M VISIT ADD ON: HCPCS | Performed by: FAMILY MEDICINE

## 2024-03-07 PROCEDURE — 99214 OFFICE O/P EST MOD 30 MIN: CPT | Performed by: FAMILY MEDICINE

## 2024-03-07 RX ORDER — SULFAMETHOXAZOLE AND TRIMETHOPRIM 800; 160 MG/1; MG/1
1 TABLET ORAL EVERY 12 HOURS SCHEDULED
Qty: 20 TABLET | Refills: 0 | Status: SHIPPED | OUTPATIENT
Start: 2024-03-07 | End: 2024-03-17

## 2024-03-07 RX ORDER — PREDNISONE 20 MG/1
40 TABLET ORAL DAILY
Qty: 10 TABLET | Refills: 0 | Status: SHIPPED | OUTPATIENT
Start: 2024-03-07 | End: 2024-03-12

## 2024-03-07 RX ORDER — PREDNISONE 20 MG/1
40 TABLET ORAL DAILY
Qty: 10 TABLET | Refills: 0 | Status: SHIPPED | OUTPATIENT
Start: 2024-03-07 | End: 2024-03-07 | Stop reason: SDUPTHER

## 2024-03-07 RX ORDER — ALBUTEROL SULFATE 90 UG/1
2 AEROSOL, METERED RESPIRATORY (INHALATION) EVERY 6 HOURS PRN
Qty: 18 G | Refills: 2 | Status: SHIPPED | OUTPATIENT
Start: 2024-03-07 | End: 2024-03-12 | Stop reason: SDUPTHER

## 2024-03-07 NOTE — PROGRESS NOTES
"Assessment/Plan:   Diagnoses and all orders for this visit:    Acute non-recurrent maxillary sinusitis  -     sulfamethoxazole-trimethoprim (BACTRIM DS) 800-160 mg per tablet; Take 1 tablet by mouth every 12 (twelve) hours for 10 days  - feels no relief with Zpak   - CT scan done in ER = \"No acute intracranial abnormality.  Maxillary sinus air-fluid levels, acute sinusitis not excluded\"  - (+) L-sided maxillary sinus tenderness  - (+) PCN allergy   - advised to STOP Zpak and eRx for Bactrim DS BID sent to pharmacy on file     Wheezing  -     Discontinue: predniSONE 20 mg tablet; Take 2 tablets (40 mg total) by mouth daily for 5 days  -     albuterol (ProAir HFA) 90 mcg/act inhaler; Inhale 2 puffs every 6 (six) hours as needed for wheezing  - Pred Burst  - Advair BID and Albuterol Q4hr standing   - RTO in 3-4days for close f/u - pt aware and agreeable     Syncope, unspecified syncope type  - s/p syncope while getting CXR  - reviewed ER records and imaging   - pressures stable - cont current regimen   - has Cardio appt scheduled           Subjective:    Patient ID: Cedric Gramajo is a 63 y.o. male.  HPI  62yo M presents to the office for ER f/u  - was seen in the office on 3/4/2024 - d/w Acute Bronchitis and Wheezing - s/p steroid injection in office, waiting for Advair HFA  - s/p syncope while getting CXR   - CT head - ?sinusitis   - (+) L-sided temporal pain and L-maxillary sinus tendernes   - denies F/C/N/V  - (+) CODY and wheezing   - has not received Advair HFA yet       The following portions of the patient's history were reviewed and updated as appropriate: allergies, current medications, past family history, past medical history, past social history, past surgical history and problem list.    Review of Systems  as per HPI    Objective:  /76 (BP Location: Left arm, Patient Position: Sitting, Cuff Size: Standard)   Pulse 95   Resp 16   Ht 5' 11\" (1.803 m)   Wt 102 kg (225 lb)   SpO2 97%   BMI 31.38 " kg/m²    Physical Exam  Constitutional:       General: He is not in acute distress.     Appearance: Normal appearance. He is ill-appearing. He is not toxic-appearing or diaphoretic.   HENT:      Head:      Comments: (+) L-sided scalp tenderness     Right Ear: External ear normal.      Left Ear: External ear normal.      Nose: Congestion present.      Right Sinus: No maxillary sinus tenderness or frontal sinus tenderness.      Left Sinus: Maxillary sinus tenderness present. No frontal sinus tenderness.   Eyes:      General: No scleral icterus.        Right eye: No discharge.         Left eye: No discharge.      Extraocular Movements: Extraocular movements intact.      Conjunctiva/sclera: Conjunctivae normal.   Cardiovascular:      Rate and Rhythm: Normal rate and regular rhythm.   Pulmonary:      Effort: No respiratory distress.      Breath sounds: No stridor. Wheezing present. No rhonchi.   Abdominal:      Palpations: Abdomen is soft.   Musculoskeletal:         General: Normal range of motion.      Cervical back: Normal range of motion.   Skin:     General: Skin is warm.   Neurological:      General: No focal deficit present.      Mental Status: He is alert and oriented to person, place, and time.   Psychiatric:         Mood and Affect: Mood normal.         Behavior: Behavior normal.

## 2024-03-07 NOTE — TELEPHONE ENCOUNTER
Patient called in again and had two script transferred but they need the Prednisone 20mg resent to the [facility] on [facility]. Also stated they cannot fill the albuterol because insurance said it was too soon. Please advise once sent to the correct pharmacy. Patient is at the pharmacy now.

## 2024-03-07 NOTE — TELEPHONE ENCOUNTER
Cedric called in stating the pharmacy can fill the script on 3/9/24. He said he thinks he has enough to get him til then. Cedric does not have a nebulizer and does not want one.

## 2024-03-07 NOTE — TELEPHONE ENCOUNTER
Pt called to see where his med were sent today. I informed him and he said it was the wrong one. He is having the pharmacy take care of transferring it since he is already there. I removed the wrong pharmacy from his list.

## 2024-03-12 ENCOUNTER — TELEPHONE (OUTPATIENT)
Age: 64
End: 2024-03-12

## 2024-03-12 ENCOUNTER — OFFICE VISIT (OUTPATIENT)
Dept: FAMILY MEDICINE CLINIC | Facility: CLINIC | Age: 64
End: 2024-03-12
Payer: COMMERCIAL

## 2024-03-12 ENCOUNTER — TELEPHONE (OUTPATIENT)
Dept: OTHER | Facility: OTHER | Age: 64
End: 2024-03-12

## 2024-03-12 VITALS
DIASTOLIC BLOOD PRESSURE: 70 MMHG | WEIGHT: 228 LBS | SYSTOLIC BLOOD PRESSURE: 130 MMHG | BODY MASS INDEX: 31.92 KG/M2 | HEIGHT: 71 IN | OXYGEN SATURATION: 98 % | HEART RATE: 81 BPM

## 2024-03-12 DIAGNOSIS — Z72.0 TOBACCO ABUSE: ICD-10-CM

## 2024-03-12 DIAGNOSIS — R06.2 WHEEZING: ICD-10-CM

## 2024-03-12 DIAGNOSIS — J01.00 ACUTE NON-RECURRENT MAXILLARY SINUSITIS: Primary | ICD-10-CM

## 2024-03-12 DIAGNOSIS — R05.1 ACUTE COUGH: ICD-10-CM

## 2024-03-12 DIAGNOSIS — J20.9 ACUTE BRONCHITIS, UNSPECIFIED ORGANISM: ICD-10-CM

## 2024-03-12 PROCEDURE — G2211 COMPLEX E/M VISIT ADD ON: HCPCS | Performed by: FAMILY MEDICINE

## 2024-03-12 PROCEDURE — 99213 OFFICE O/P EST LOW 20 MIN: CPT | Performed by: FAMILY MEDICINE

## 2024-03-12 RX ORDER — PROMETHAZINE HYDROCHLORIDE AND CODEINE PHOSPHATE 6.25; 1 MG/5ML; MG/5ML
5 SYRUP ORAL EVERY 4 HOURS PRN
Qty: 118 ML | Refills: 0 | Status: SHIPPED | OUTPATIENT
Start: 2024-03-12 | End: 2024-03-12

## 2024-03-12 RX ORDER — ALBUTEROL SULFATE 90 UG/1
2 AEROSOL, METERED RESPIRATORY (INHALATION) EVERY 6 HOURS PRN
Qty: 18 G | Refills: 2 | Status: SHIPPED | OUTPATIENT
Start: 2024-03-12

## 2024-03-12 RX ORDER — NICOTINE 21 MG/24HR
1 PATCH, TRANSDERMAL 24 HOURS TRANSDERMAL EVERY 24 HOURS
Qty: 28 PATCH | Refills: 0 | Status: SHIPPED | OUTPATIENT
Start: 2024-03-12

## 2024-03-12 RX ORDER — HYDROCODONE BITARTRATE AND HOMATROPINE METHYLBROMIDE ORAL SOLUTION 5; 1.5 MG/5ML; MG/5ML
5 LIQUID ORAL 4 TIMES DAILY PRN
Qty: 120 ML | Refills: 0 | Status: SHIPPED | OUTPATIENT
Start: 2024-03-12 | End: 2024-03-12 | Stop reason: CLARIF

## 2024-03-12 RX ORDER — CODEINE PHOSPHATE/GUAIFENESIN 10-100MG/5
5 LIQUID (ML) ORAL 3 TIMES DAILY PRN
Qty: 118 ML | Refills: 0 | Status: SHIPPED | OUTPATIENT
Start: 2024-03-12 | End: 2024-03-12

## 2024-03-12 RX ORDER — DEXTROMETHORPHAN HYDROBROMIDE AND PROMETHAZINE HYDROCHLORIDE 15; 6.25 MG/5ML; MG/5ML
5 SYRUP ORAL 4 TIMES DAILY PRN
Qty: 120 ML | Refills: 0 | Status: SHIPPED | OUTPATIENT
Start: 2024-03-12

## 2024-03-12 NOTE — PROGRESS NOTES
"Assessment/Plan:   Diagnoses and all orders for this visit:    Acute non-recurrent maxillary sinusitis  Acute bronchitis, unspecified organism  - still taking abx (completed 5/10days of Bactrim DS)   - pain getting better but still is congested     Acute cough  -     Discontinue: promethazine-codeine (PHENERGAN WITH CODEINE) 6.25-10 mg/5 mL syrup; Take 5 mL by mouth every 4 (four) hours as needed for cough  -     Discontinue: HYDROcodone Bit-Homatrop MBr (HYCODAN) 5-1.5 mg/5 mL syrup; Take 5 mL by mouth 4 (four) times a day as needed for cough Max Daily Amount: 20 mL  -     Discontinue: guaifenesin-codeine (GUAIFENESIN AC) 100-10 MG/5ML liquid; Take 5 mL by mouth 3 (three) times a day as needed for cough  -     promethazine-dextromethorphan (PHENERGAN-DM) 6.25-15 mg/5 mL oral syrup; Take 5 mL by mouth 4 (four) times a day as needed for cough  Wheezing  -     albuterol (ProAir HFA) 90 mcg/act inhaler; Inhale 2 puffs every 6 (six) hours as needed for wheezing  - s/p Pred burst  - picking up Advair HFA today - 2puffs BID and advised to rinse mouth after use to prevent oral thrush   - Albuterol HFA 2puffs Q4hrs PRN   - f/u PRN - pt aware and agreeable     Tobacco abuse  -     nicotine (NICODERM CQ) 21 mg/24 hr TD 24 hr patch; Place 1 patch on the skin over 24 hours every 24 hours          Subjective:    Patient ID: Cedric Gramajo is a 63 y.o. male.  HPI  62yo M presents to the office for f/u   - completed pred taper   - has to  Advair HFA   - has been using Albuterol TID   - still with cough   - still taking abx (completed 5/10days of Bactrim DS)   - pain getting better but still is congested         The following portions of the patient's history were reviewed and updated as appropriate: allergies, current medications, past family history, past medical history, past social history, past surgical history and problem list.    Review of Systems  as per HPI    Objective:  /70   Pulse 81   Ht 5' 11\" (1.803 m) "   Wt 103 kg (228 lb)   SpO2 98%   BMI 31.80 kg/m²    Physical Exam  Vitals reviewed.   Constitutional:       General: He is not in acute distress.     Appearance: Normal appearance. He is not ill-appearing, toxic-appearing or diaphoretic.   HENT:      Head: Normocephalic and atraumatic.      Right Ear: External ear normal.      Left Ear: External ear normal.      Nose: Nose normal.   Eyes:      General: No scleral icterus.        Right eye: No discharge.         Left eye: No discharge.      Extraocular Movements: Extraocular movements intact.      Conjunctiva/sclera: Conjunctivae normal.   Cardiovascular:      Rate and Rhythm: Normal rate and regular rhythm.   Pulmonary:      Effort: Pulmonary effort is normal.      Breath sounds: Wheezing present.      Comments: (+) dry cough  Abdominal:      Palpations: Abdomen is soft.   Musculoskeletal:         General: Normal range of motion.      Cervical back: Normal range of motion.   Skin:     General: Skin is warm.   Neurological:      General: No focal deficit present.      Mental Status: He is alert and oriented to person, place, and time.   Psychiatric:         Mood and Affect: Mood normal.         Behavior: Behavior normal.

## 2024-03-12 NOTE — TELEPHONE ENCOUNTER
Jaguar (pharmacist) [facility] pharmacy called and states Phenergan with codeine is no longer on the market and can not be prescribed in the [place] Please send in alternate medication. Any questions aJguar can be reached at [phone]

## 2024-03-12 NOTE — TELEPHONE ENCOUNTER
Teofilo pharmacist from [facility] called in [facility] Percocet. Percocet competes with the medication Robitussin with codeine patient is currently taking.Please follow up with [facility] pharmacy.

## 2024-03-12 NOTE — TELEPHONE ENCOUNTER
Jaguar the pharmacist called back again and stated the alternative cough medication is also discontinued.Pharmacist stated that Guaifenesin AC is available.

## 2024-03-19 ENCOUNTER — OFFICE VISIT (OUTPATIENT)
Dept: FAMILY MEDICINE CLINIC | Facility: CLINIC | Age: 64
End: 2024-03-19
Payer: COMMERCIAL

## 2024-03-19 VITALS
HEIGHT: 71 IN | DIASTOLIC BLOOD PRESSURE: 68 MMHG | SYSTOLIC BLOOD PRESSURE: 144 MMHG | BODY MASS INDEX: 31.64 KG/M2 | RESPIRATION RATE: 16 BRPM | HEART RATE: 80 BPM | WEIGHT: 226 LBS | OXYGEN SATURATION: 97 %

## 2024-03-19 DIAGNOSIS — R06.2 WHEEZING: ICD-10-CM

## 2024-03-19 DIAGNOSIS — R42 DIZZINESS: ICD-10-CM

## 2024-03-19 DIAGNOSIS — J20.9 ACUTE BRONCHITIS, UNSPECIFIED ORGANISM: Primary | ICD-10-CM

## 2024-03-19 DIAGNOSIS — R55 SYNCOPE, UNSPECIFIED SYNCOPE TYPE: ICD-10-CM

## 2024-03-19 PROCEDURE — G2211 COMPLEX E/M VISIT ADD ON: HCPCS | Performed by: FAMILY MEDICINE

## 2024-03-19 PROCEDURE — 99214 OFFICE O/P EST MOD 30 MIN: CPT | Performed by: FAMILY MEDICINE

## 2024-03-19 RX ORDER — FLUTICASONE PROPIONATE AND SALMETEROL 500; 50 UG/1; UG/1
1 POWDER RESPIRATORY (INHALATION) 2 TIMES DAILY
Qty: 60 BLISTER | Refills: 5 | Status: SHIPPED | OUTPATIENT
Start: 2024-03-19 | End: 2024-09-15

## 2024-03-19 NOTE — PROGRESS NOTES
"Assessment/Plan:   Diagnoses and all orders for this visit:    Acute bronchitis, unspecified organism  -     Fluticasone-Salmeterol (Advair) 500-50 mcg/dose inhaler; Inhale 1 puff 2 (two) times a day Rinse mouth after use.  - completed Bactrim   - advised to get Mucinex and use Flonase  - has not gotten Advair yet - new erx sent to pharmacy on file - use BID and advised to rinse mouth after use   - Albuterol Q4 PRN -- advised that this is a rescue inhaler and NOT maintenance   Wheezing  -     Fluticasone-Salmeterol (Advair) 500-50 mcg/dose inhaler; Inhale 1 puff 2 (two) times a day Rinse mouth after use.    Syncope, unspecified syncope type  - nml EKG in the hospital   - essentially nml labs while in house  - has Cardio appt scheduled for 4/12/2024  - no changes to medication though he did start Nicotine patches a few weeks ago and has also been smoking -- for now advised to stop using nicotine patches   - encouraged hydration   - RTO in 1wk for close f/u - pt and wife aware and agreeable   Dizziness          Subjective:    Patient ID: Cedric Gramajo is a 63 y.o. male.  HPI  64yo M presents to the office with his wife for f/u   - another episode of dizziness (\"headrush\") on Sat during dinner  - Sunday - headrush - after using Albuterol   - Monday felt dizzy from the day prior   - no change in PM medication   - (+) completed Abx (Bactrim DS)   - still with congestion on L-side and intermittent dry cough   - has not yet gotten Advair but has 2 albuterol inhalers - last use was earlier today   - has been using Nicotine patches and leaving them on for 2days - still smoking 7 cigs      The following portions of the patient's history were reviewed and updated as appropriate: allergies, current medications, past family history, past medical history, past social history, past surgical history and problem list.    Review of Systems  as per HPI    Objective:  /68   Pulse 80   Resp 16   Ht 5' 11\" (1.803 m)   Wt 103 " kg (226 lb)   SpO2 97%   BMI 31.52 kg/m²    Physical Exam  Vitals reviewed.   Constitutional:       General: He is not in acute distress.     Appearance: Normal appearance. He is not ill-appearing, toxic-appearing or diaphoretic.   HENT:      Head: Normocephalic and atraumatic.      Right Ear: External ear normal. A middle ear effusion is present.      Left Ear: External ear normal. A middle ear effusion is present.      Nose: Congestion present.      Left Sinus: Maxillary sinus tenderness present.   Eyes:      Extraocular Movements: Extraocular movements intact.      Conjunctiva/sclera: Conjunctivae normal.   Cardiovascular:      Rate and Rhythm: Normal rate and regular rhythm.      Heart sounds: Normal heart sounds.   Pulmonary:      Effort: Pulmonary effort is normal.      Breath sounds: Wheezing present.   Musculoskeletal:         General: Normal range of motion.      Cervical back: Normal range of motion.      Right lower leg: No edema.      Left lower leg: No edema.   Skin:     General: Skin is warm.   Neurological:      General: No focal deficit present.      Mental Status: He is alert and oriented to person, place, and time.   Psychiatric:         Mood and Affect: Mood normal.         Behavior: Behavior normal.

## 2024-03-20 DIAGNOSIS — G47.00 INSOMNIA, UNSPECIFIED TYPE: ICD-10-CM

## 2024-03-20 RX ORDER — TRAZODONE HYDROCHLORIDE 100 MG/1
100 TABLET ORAL
Qty: 90 TABLET | Refills: 1 | Status: SHIPPED | OUTPATIENT
Start: 2024-03-20

## 2024-03-20 NOTE — PROGRESS NOTES
Assessment:  1. Chronic pain syndrome    2. Postlaminectomy syndrome, lumbar region    3. Sacroiliitis (HCC)    4. Opioid type dependence, continuous (HCC)    5. Long-term current use of opiate analgesic        Plan:  ***    {Oral Swab Statement:62436}    {Opioid Statement:61831}    {UDS Statement:16209}    {PDMP Statement:84399}    {Pain Management Procedure Statement:64877}    My impressions and treatment recommendations were discussed in detail with the patient who verbalized understanding and had no further questions.  Discharge instructions were provided. I personally saw and examined the patient and I agree with the above discussed plan of care.    Orders Placed This Encounter   Procedures   • MM ALL_Prescribed Meds and Special Instructions     Order Specific Question:   [facility] Is ALBUTEROL prescribed?     Answer:   Yes     Order Specific Question:   [facility] Is GABAPENTIN prescribed?     Answer:   Yes     Order Specific Question:   [facility] Is LIDODERM prescribed?     Answer:   Yes     Order Specific Question:   [facility] Is ATORVASTATIN prescribed?     Answer:   Yes     Order Specific Question:   [facility] Is METFORMIN prescribed?     Answer:   Yes     Order Specific Question:   [facility] Is OXYCODONE/APAP prescribed?     Answer:   Yes     Order Specific Question:   [facility] Is PROMETHAZINE prescribed?     Answer:   Yes     Order Specific Question:   [facility] Is OMEPRAZOLE prescribed?     Answer:   Yes     Order Specific Question:   [facility] Is TRAZODONE prescribed?     Answer:   Yes   • MM DT_Alprazolam Definitive Test   • MM DT_Amitriptyline Definitive Test   • MM DT_Tramadol Definitive Test   • MM DT_THC Definitive Test   • MM DT_Tapentadol Definitive Test   • MM DT_Spice Definitive Test   • MM DT_Oxymorphone Definitive Test   • MM DT_Phencyclidine Definitive Test   • MM DT_Secobarbital Definitive Test   • MM DT_Oxycodone Definitive Test   • MM DT_Morphine Definitive Test   • MM  DT_Methamphetamine Definitive Test   • MM DT_Methylphenidate Definitive Test   • MM Lorazepam Definitive Test   • MM DT_Levorphanol Definitive Test   • MM DT_Hydrocodone Definitive Test   • MM DT_Hydromorphone Definitive Test   • MM DT_Heroin Definitive Test   • MM DT_Fentanyl Definitive Test   • MM Diazepam Definitive Test   • MM DT_Codeine Definitive Test   • MM DT_Cocaine Definitive Test   • MM DT_Clonazepam Definitive Test     New Medications Ordered This Visit   Medications   • oxyCODONE-acetaminophen (Percocet) 7.5-325 MG per tablet     Sig: Take 1 tablet by mouth 2 (two) times a day as needed for moderate pain Max Daily Amount: 2 tablets Do not start before June 13, 2024.     Dispense:  60 tablet     Refill:  0   • oxyCODONE-acetaminophen (Percocet) 7.5-325 MG per tablet     Sig: Take 1 tablet by mouth 2 (two) times a day as needed for moderate pain Max Daily Amount: 2 tablets Do not start before May 15, 2024.     Dispense:  60 tablet     Refill:  0   • oxyCODONE-acetaminophen (Percocet) 7.5-325 MG per tablet     Sig: Take 1 tablet by mouth 2 (two) times a day as needed for moderate pain Max Daily Amount: 2 tablets Do not start before April 16, 2024.     Dispense:  60 tablet     Refill:  0       History of Present Illness:  Cedric Gramajo is a 63 y.o. male who presents for a follow up office visit in regards to Back Pain.   The patient’s current symptoms include ***    {SPA Pain Assessment:37723}    Opioid contract date    Last UDS Date                      last taken on        I have personally reviewed and/or updated the patient's past medical history, past surgical history, family history, social history, current medications, allergies, and vital signs today.     Review of Systems   Respiratory:  Negative for shortness of breath.    Cardiovascular:  Negative for chest pain.   Gastrointestinal:  Negative for constipation, diarrhea, nausea and vomiting.   Musculoskeletal:  Negative for arthralgias, gait  problem, joint swelling and myalgias.   Skin:  Negative for rash.   Neurological:  Negative for dizziness, seizures and weakness.   All other systems reviewed and are negative.      Patient Active Problem List   Diagnosis   • Heartburn   • Erectile dysfunction of non-organic origin   • Chronic low back pain   • BPH with urinary obstruction   • Blood pressure elevated without history of HTN   • Sciatica   • Myofascial pain syndrome   • Lumbar spondylosis   • Herniation of lumbar intervertebral disc with radiculopathy   • Acute post-operative pain   • Insomnia   • Chronic pain syndrome   • Low back pain   • Class 1 obesity due to excess calories with serious comorbidity and body mass index (BMI) of 30.0 to 30.9 in adult   • Tobacco abuse   • Seasonal allergic rhinitis   • Diabetic mononeuropathy associated with type 2 diabetes mellitus (HCC)   • Postlaminectomy syndrome, lumbar region   • Numbness and tingling in both hands   • Intervertebral disc disorder with radiculopathy of lumbosacral region   • Hyperlipidemia LDL goal <70   • Proteinuria   • Battery end of life of spinal cord stimulator   • HTN (hypertension)   • Gastroesophageal reflux disease   • Spinal cord stimulator status   • Uncomplicated opioid dependence (HCC)   • Sacroiliitis (HCC)   • Wheezing       Past Medical History:   Diagnosis Date   • Acute bronchitis 06/03/2019   • Arthritis    • BPH (benign prostatic hypertrophy) with urinary obstruction    • Chronic narcotic dependence (HCC)     percocet TID   • Chronic pain disorder     back-spinal cord stimulator   • Colon polyp    • Diabetes mellitus (HCC)     type   • Ear infection    • Herniation of lumbar intervertebral disc with radiculopathy    • Myofascial pain syndrome    • Obesity    • Sciatica    • Spinal cord stimulator status 2017    implanted 2017   • Tinnitus        Past Surgical History:   Procedure Laterality Date   • ABSCESS DRAINAGE      groin   • BACK SURGERY      sciatic nerve- spinal  cord stimulator 2017   • CAUDAL BLOCK N/A 10/26/2018    Procedure: Caudal Epidural Steroid Injection (70325);  Surgeon: Crystal Olmedo MD;  Location: [facility] MAIN OR;  Service: Pain Management    • CAUDAL BLOCK N/A 11/30/2018    Procedure: Caudal Epidural Steroid Injection (40976);  Surgeon: Crystal Olmedo MD;  Location: [facility] MAIN OR;  Service: Pain Management    • COLONOSCOPY     • EPIDURAL BLOCK INJECTION Right 5/10/2019    Procedure: L5 S1 transforaminal Epidural Steroid Injection ([id number];  Surgeon: Crystal Olmedo MD;  Location: [facility] MAIN OR;  Service: Pain Management    • EPIDURAL BLOCK INJECTION Right 8/16/2019    Procedure: BLOCK / INJECTION EPIDURAL STEROID TRANSFORAMINAL;  Surgeon: Crystal Olmedo MD;  Location: [facility] MAIN OR;  Service: Pain Management    • EPIDURAL BLOCK INJECTION Left 7/1/2021    Procedure: L5 S1 transforaminal epidural steroid injection ( [id number]);  Surgeon: Crystal Olmedo MD;  Location: [facility] MAIN OR;  Service: Pain Management    • EPIDURAL BLOCK INJECTION Right 7/15/2021    Procedure: L5 S1 transforaminal epidural steroid injection ( [id number]);  Surgeon: Crystal Olmedo MD;  Location: [facility] MAIN OR;  Service: Pain Management    • EPIDURAL BLOCK INJECTION Right 1/7/2022    Procedure: L5 S1 transforaminal epidural steroid injection ([id number] 13895);  Surgeon: Crystal Olmedo MD;  Location: [facility] MAIN OR;  Service: Pain Management    • EPIDURAL BLOCK INJECTION Left 1/28/2022    Procedure: L5 S1 transforaminal epidural steroid injection ([id number]);  Surgeon: Crystal Olmedo MD;  Location: [facility] MAIN OR;  Service: Pain Management    • NERVE BLOCK Bilateral 4/26/2018    Procedure: B/L L3 L4 L5 S1 MBB #1 (80875,23989,90381);  Surgeon: Crystal Olmedo MD;  Location: [facility] MAIN OR;  Service: Pain Management    • NERVE BLOCK Bilateral 5/11/2018    Procedure: B/L L3 L4 L5 S1 Medial Branch Block #2;  Surgeon: Crystal Olmedo MD;  Location: [facility] MAIN OR;  Service: Pain Management     • NOSE SURGERY     • OR COLONOSCOPY FLX DX W/COLLJ SPEC WHEN PFRMD N/A 3/6/2017    Procedure: COLONOSCOPY;  Surgeon: Leo Dhaliwal MD;  [facility]: [facility];  Service: Gastroenterology   • OR INSJ/RPLCMT SPINAL NPG/RCVR POCKET CRTJ&CONNJ Left 6/29/2021    Procedure: REPLACEMENT IMPLANTABLE PULSE GENERATOR DORSAL SPINAL COLUMN STIMULATOR, LEFT;  Surgeon: Julian Anna MD;  Location:  MAIN OR;  Service: Neurosurgery   • OR AN IMPLTJ NSTIM ELTRDS PLATE/PADDLE EDRL Left 10/3/2017    Procedure: PLACEMENT THORACIC FOR INSERTION DORSAL COLUMN SPINAL CORD STIMULATOR (DCS) WITH BUTTOCK IMPLANTABLE PULSE GENERATOR(IMPULSE);  Surgeon: Gregory Fry MD;  Location:  MAIN OR;  Service: Neurosurgery   • RADIOFREQUENCY ABLATION Right 5/18/2018    Procedure: Rt L3 L4 L5 S1 Radio Frequency Ablation ([id number]);  Surgeon: Crystal Olmedo MD;  Location: [facility] MAIN OR;  Service: Pain Management    • RADIOFREQUENCY ABLATION Left 6/1/2018    Procedure: Lt L3 L4 L5 S1 Radio Frequency Ablation ([id number],[id number]);  Surgeon: Crystal Olmedo MD;  Location: [facility] MAIN OR;  Service: Pain Management        Family History   Problem Relation Age of Onset   • Diabetes Mother    • Arthritis Mother    • Diabetes Father         mellitus    • Heart attack Father 62   • Hypertension Father    • Arthritis Father        Social History     Occupational History   • Occupation:  for [employer]    Tobacco Use   • Smoking status: Every Day     Current packs/day: 1.00     Average packs/day: 1 pack/day for 42.0 years (42.0 ttl pk-yrs)     Types: Cigarettes   • Smokeless tobacco: Never   • Tobacco comments:     encouraged smoking cessation - history of smoking 30 or more pack years    Vaping Use   • Vaping status: Never Used   Substance and Sexual Activity   • Alcohol use: No     Comment: rare   • Drug use: No   • Sexual activity: Not Currently     Partners: Female       Current Outpatient Medications on File Prior to Visit   Medication  Sig   • albuterol (ProAir HFA) 90 mcg/act inhaler Inhale 2 puffs every 6 (six) hours as needed for wheezing   • atorvastatin (LIPITOR) 20 mg tablet Take 1 tablet (20 mg total) by mouth daily   • bisacodyl (DULCOLAX) 5 mg EC tablet Take 20 mg by mouth 1 (one) time   • clobetasol (TEMOVATE) 0.05 % ointment Apply topically 2 (two) times a day   • etodolac (LODINE) 300 MG capsule Take 1 capsule by mouth 3 times a day as needed for pain.  Take with food.   • etodolac (LODINE) 400 MG tablet Take 1 tablet (400 mg total) by mouth 2 (two) times a day   • fluticasone (FLONASE) 50 mcg/act nasal spray 2 sprays into each nostril daily   • Fluticasone-Salmeterol (Advair) 500-50 mcg/dose inhaler Inhale 1 puff 2 (two) times a day Rinse mouth after use.   • gabapentin (Neurontin) 600 MG tablet Take 2 tablets (1,200 mg total) by mouth 2 (two) times a day   • glipiZIDE (GLUCOTROL XL) 5 mg 24 hr tablet Take 1 tablet (5 mg total) by mouth daily   • lidocaine (LIDODERM) 5 % Apply 2 patches topically daily Remove & Discard patch within 12 hours or as directed by MD (Patient taking differently: Apply 2 patches topically daily as needed Remove & Discard patch within 12 hours or as directed by MD)   • lisinopril (ZESTRIL) 2.5 mg tablet Take 1 tablet (2.5 mg total) by mouth daily at bedtime   • magnesium citrate solution Take 296 mL by mouth once   • metFORMIN (GLUCOPHAGE) 1000 MG tablet Take 1 tablet (1,000 mg total) by mouth 2 (two) times a day with meals   • nicotine (NICODERM CQ) 21 mg/24 hr TD 24 hr patch Place 1 patch on the skin over 24 hours every 24 hours   • omeprazole (PriLOSEC) 20 mg delayed release capsule TAKE 1 CAPSULE EVERY DAY   • Polyethylene Glycol 3350 (MIRALAX PO) Take by mouth 1 (one) time   • promethazine-dextromethorphan (PHENERGAN-DM) 6.25-15 mg/5 mL oral syrup Take 5 mL by mouth 4 (four) times a day as needed for cough   • QUEtiapine (SEROquel) 200 mg tablet TAKE 1 TABLET AT BEDTIME   • Saccharomyces boulardii  "(Probiotic) 250 MG CAPS Take 1 capsule (250 mg total) by mouth in the morning   • sildenafil (VIAGRA) 50 MG tablet Take 1 tablet (50 mg total) by mouth as needed for erectile dysfunction Take on an empty stomach, 1 hour before sexual activity, no alcohol. 100 mg max dose. (Patient not taking: Reported on 2023)   • tamsulosin (FLOMAX) 0.4 mg TAKE 2 CAPSULES EVERY DAY WITH DINNER   • traZODone (DESYREL) 100 mg tablet TAKE 1 TABLET AT BEDTIME   • [DISCONTINUED] oxyCODONE-acetaminophen (Percocet) 7.5-325 MG per tablet Take 1 tablet by mouth 2 (two) times a day as needed for moderate pain Max Daily Amount: 2 tablets (Patient not taking: Reported on 3/19/2024)   • [DISCONTINUED] oxyCODONE-acetaminophen (Percocet) 7.5-325 MG per tablet Take 1 tablet by mouth 2 (two) times a day as needed for moderate pain Max Daily Amount: 2 tablets Do not start before March 15, 2024.     No current facility-administered medications on file prior to visit.       Allergies   Allergen Reactions   • Penicillins Swelling     Mouth swelling       Physical Exam:    /74   Pulse 77   Wt 103 kg (226 lb)   BMI 31.52 kg/m²     Constitutional:{General Appearance:09531::\"normal, well developed, well nourished, alert, in no distress and non-toxic and no overt pain behavior.\"}  Eyes:{Sclera:71441::\"anicteric\"}  HEENT:{Hearin::\"grossly intact\"}  Neck:{Neck:79076::\"supple, symmetric, trachea midline and no masses \"}  Pulmonary:{Respiratory effort:02072::\"even and unlabored\"}  Cardiovascular:{Examination of Extremities:28477::\"No edema or pitting edema present\"}  Skin:{Skin and Subcutaneous tissues:97044::\"Normal without rashes or lesions and well hydrated\"}  Psychiatric:{Mood and Affect:34407::\"Mood and affect appropriate\"}  Neurologic:{Cranial Nerves:86707::\"Cranial Nerves II-XII grossly intact\"}  Musculoskeletal:{[place]:61352::\"normal\"}    Imaging    "

## 2024-03-21 RX ORDER — QUETIAPINE FUMARATE 200 MG/1
200 TABLET, FILM COATED ORAL
Qty: 90 TABLET | Refills: 3 | Status: SHIPPED | OUTPATIENT
Start: 2024-03-21

## 2024-03-22 NOTE — PROGRESS NOTES
Assessment:  1. Chronic pain syndrome    2. Postlaminectomy syndrome, lumbar region    3. Sacroiliitis (HCC)    4. Opioid type dependence, continuous (HCC)    5. Long-term current use of opiate analgesic        Plan:  The patient is doing very well following the sacroiliac joint injections that were performed in December.  At this time, he will continue on his current dose of Percocet which he uses just at bedtime.  Prescriptions were sent for the next 3 months.  He will follow back up in 3 months for medication prescription refill and reevaluation in approximately 3 months.    An opioid contract was reviewed with the patient. The patient was made aware they are only to receive opioid medication from our office, and must take the medication as prescribed. If the medication is lost or stolen, it will not be replaced. We also do not condone the use of illegal substances as well as the use of alcohol with opioid medication. Random urine drug screens will also be performed at office visits. Lastly, he was informed that office visits are needed for refills. Patient was agreeable and signed the contract.    [place] Prescription Drug Monitoring Program report was reviewed and was appropriate     A urine drug screen was collected at today's office visit as part of our medication management protocol. The point of care testing results were appropriate for what was being prescribed. The specimen will be sent for confirmatory testing. The drug screen is medically necessary because the patient is either dependent on opioid medication or is being considered for opioid medication therapy and the results could impact ongoing or future treatment. The drug screen is to evaluate for the presences or absence of prescribed, non-prescribed, and/or illicit drugs/substances.    The patient will follow-up in 12 weeks for medication prescription refill and reevaluation. The patient was advised to contact the office should their symptoms  worsen in the interim. The patient was agreeable and verbalized an understanding.      History of Present Illness:    The patient is a 63 y.o. male last seen on 12/21/2023 who presents for a follow up office visit in regards to chronic pain secondary to postlaminectomy syndrome with radiculopathy, sacroiliitis.  The patient reports that after the last sacroiliac injection performed in December he had significant pain for 3 weeks but then has had significant improvement in pain symptoms.  He remains on gabapentin, etodolac and Percocet which he uses 2 tablets at bedtime which helped significantly with nighttime pain.    Current pain medications includes: Percocet 7.5/325, gabapentin 600 mg, etodolac 300 mg?.  The patient reports that this regimen is providing 50% pain relief.  The patient is reporting no side effects from this pain medication regimen.    Pain Contract Signed: 3/25/24  Last Urine Drug Screen: 3/25/24  Last dose of Oxycodone was : 3/24/24    I have personally reviewed and/or updated the patient's past medical history, past surgical history, family history, social history, current medications, allergies, and vital signs today.       Review of Systems:    Review of Systems   Respiratory:  Negative for shortness of breath.    Cardiovascular:  Negative for chest pain.   Gastrointestinal:  Negative for constipation, diarrhea, nausea and vomiting.   Musculoskeletal:  Negative for arthralgias, gait problem, joint swelling and myalgias.   Skin:  Negative for rash.   Neurological:  Negative for dizziness, seizures and weakness.   All other systems reviewed and are negative.        Past Medical History:   Diagnosis Date   • Acute bronchitis 06/03/2019   • Arthritis    • BPH (benign prostatic hypertrophy) with urinary obstruction    • Chronic narcotic dependence (HCC)     percocet TID   • Chronic pain disorder     back-spinal cord stimulator   • Colon polyp    • Diabetes mellitus (HCC)     type   • Ear infection     • Herniation of lumbar intervertebral disc with radiculopathy    • Myofascial pain syndrome    • Obesity    • Sciatica    • Spinal cord stimulator status 2017    implanted 2017   • Tinnitus        Past Surgical History:   Procedure Laterality Date   • ABSCESS DRAINAGE      groin   • BACK SURGERY      sciatic nerve- spinal cord stimulator 2017   • CAUDAL BLOCK N/A 10/26/2018    Procedure: Caudal Epidural Steroid Injection (36227);  Surgeon: Crystal Olmedo MD;  Location: [facility] MAIN OR;  Service: Pain Management    • CAUDAL BLOCK N/A 11/30/2018    Procedure: Caudal Epidural Steroid Injection ([id number]);  Surgeon: Crystal Olmedo MD;  Location: [facility] MAIN OR;  Service: Pain Management    • COLONOSCOPY     • EPIDURAL BLOCK INJECTION Right 5/10/2019    Procedure: L5 S1 transforaminal Epidural Steroid Injection ([id number];  Surgeon: Crystal Olmedo MD;  Location: [facility] MAIN OR;  Service: Pain Management    • EPIDURAL BLOCK INJECTION Right 8/16/2019    Procedure: BLOCK / INJECTION EPIDURAL STEROID TRANSFORAMINAL;  Surgeon: Crystal Olmedo MD;  Location: [facility] MAIN OR;  Service: Pain Management    • EPIDURAL BLOCK INJECTION Left 7/1/2021    Procedure: L5 S1 transforaminal epidural steroid injection ( [id number]);  Surgeon: Crystal Olmedo MD;  Location: [facility] MAIN OR;  Service: Pain Management    • EPIDURAL BLOCK INJECTION Right 7/15/2021    Procedure: L5 S1 transforaminal epidural steroid injection ( [id number]);  Surgeon: Crystal Olmedo MD;  Location: [facility] MAIN OR;  Service: Pain Management    • EPIDURAL BLOCK INJECTION Right 1/7/2022    Procedure: L5 S1 transforaminal epidural steroid injection ([id number]);  Surgeon: Crystal Olmedo MD;  Location: [facility] MAIN OR;  Service: Pain Management    • EPIDURAL BLOCK INJECTION Left 1/28/2022    Procedure: L5 S1 transforaminal epidural steroid injection ([id number]);  Surgeon: Crystal Olmedo MD;  Location: [facility] MAIN OR;  Service: Pain Management    • NERVE BLOCK Bilateral  4/26/2018    Procedure: B/L L3 L4 L5 S1 MBB #1 ([id number]);  Surgeon: Crystal Olmedo MD;  Location: [facility] MAIN OR;  Service: Pain Management    • NERVE BLOCK Bilateral 5/11/2018    Procedure: B/L L3 L4 L5 S1 Medial Branch Block #2;  Surgeon: Crystal Olmedo MD;  Location: [facility] MAIN OR;  Service: Pain Management    • NOSE SURGERY     • AZ COLONOSCOPY FLX DX W/COLLJ SPEC WHEN PFRMD N/A 3/6/2017    Procedure: COLONOSCOPY;  Surgeon: Leo Dhaliwal MD;  Location:  GI LAB;  Service: Gastroenterology   • AZ INSJ/RPLCMT SPINAL NPG/RCVR POCKET CRTJ&CONNJ Left 6/29/2021    Procedure: REPLACEMENT IMPLANTABLE PULSE GENERATOR DORSAL SPINAL COLUMN STIMULATOR, LEFT;  Surgeon: Julian Anna MD;  Location:  MAIN OR;  Service: Neurosurgery   • AZ AN IMPLTJ NSTIM ELTRDS PLATE/PADDLE EDRL Left 10/3/2017    Procedure: PLACEMENT THORACIC FOR INSERTION DORSAL COLUMN SPINAL CORD STIMULATOR (DCS) WITH BUTTOCK IMPLANTABLE PULSE GENERATOR(IMPULSE);  Surgeon: Gregory Fry MD;  Location:  MAIN OR;  Service: Neurosurgery   • RADIOFREQUENCY ABLATION Right 5/18/2018    Procedure: Rt L3 L4 L5 S1 Radio Frequency Ablation ([id number]);  Surgeon: Crystal Olmedo MD;  Location: [facility] MAIN OR;  Service: Pain Management    • RADIOFREQUENCY ABLATION Left 6/1/2018    Procedure: Lt L3 L4 L5 S1 Radio Frequency Ablation ([id number]);  Surgeon: Crystal Olmedo MD;  Location: [facility] MAIN OR;  Service: Pain Management        Family History   Problem Relation Age of Onset   • Diabetes Mother    • Arthritis Mother    • Diabetes Father         mellitus    • Heart attack Father 62   • Hypertension Father    • Arthritis Father        Social History     Occupational History   • Occupation:  for [employer]    Tobacco Use   • Smoking status: Every Day     Current packs/day: 1.00     Average packs/day: 1 pack/day for 42.0 years (42.0 ttl pk-yrs)     Types: Cigarettes   • Smokeless tobacco: Never   • Tobacco comments:      encouraged smoking cessation - history of smoking 30 or more pack years    Vaping Use   • Vaping status: Never Used   Substance and Sexual Activity   • Alcohol use: No     Comment: rare   • Drug use: No   • Sexual activity: Not Currently     Partners: Female         Current Outpatient Medications:   •  [START ON 6/13/2024] oxyCODONE-acetaminophen (Percocet) 7.5-325 MG per tablet, Take 1 tablet by mouth 2 (two) times a day as needed for moderate pain Max Daily Amount: 2 tablets Do not start before June 13, 2024., Disp: 60 tablet, Rfl: 0  •  [START ON 5/15/2024] oxyCODONE-acetaminophen (Percocet) 7.5-325 MG per tablet, Take 1 tablet by mouth 2 (two) times a day as needed for moderate pain Max Daily Amount: 2 tablets Do not start before May 15, 2024., Disp: 60 tablet, Rfl: 0  •  [START ON 4/16/2024] oxyCODONE-acetaminophen (Percocet) 7.5-325 MG per tablet, Take 1 tablet by mouth 2 (two) times a day as needed for moderate pain Max Daily Amount: 2 tablets Do not start before April 16, 2024., Disp: 60 tablet, Rfl: 0  •  albuterol (ProAir HFA) 90 mcg/act inhaler, Inhale 2 puffs every 6 (six) hours as needed for wheezing, Disp: 18 g, Rfl: 2  •  atorvastatin (LIPITOR) 20 mg tablet, Take 1 tablet (20 mg total) by mouth daily, Disp: 90 tablet, Rfl: 1  •  bisacodyl (DULCOLAX) 5 mg EC tablet, Take 20 mg by mouth 1 (one) time, Disp: , Rfl:   •  clobetasol (TEMOVATE) 0.05 % ointment, Apply topically 2 (two) times a day, Disp: 180 g, Rfl: 0  •  etodolac (LODINE) 300 MG capsule, Take 1 capsule by mouth 3 times a day as needed for pain.  Take with food., Disp: 270 capsule, Rfl: 0  •  etodolac (LODINE) 400 MG tablet, Take 1 tablet (400 mg total) by mouth 2 (two) times a day, Disp: 180 tablet, Rfl: 3  •  fluticasone (FLONASE) 50 mcg/act nasal spray, 2 sprays into each nostril daily, Disp: 96 g, Rfl: 2  •  Fluticasone-Salmeterol (Advair) 500-50 mcg/dose inhaler, Inhale 1 puff 2 (two) times a day Rinse mouth after use., Disp: 60 blister,  Rfl: 5  •  gabapentin (Neurontin) 600 MG tablet, Take 2 tablets (1,200 mg total) by mouth 2 (two) times a day, Disp: 360 tablet, Rfl: 3  •  glipiZIDE (GLUCOTROL XL) 5 mg 24 hr tablet, Take 1 tablet (5 mg total) by mouth daily, Disp: 90 tablet, Rfl: 1  •  lidocaine (LIDODERM) 5 %, Apply 2 patches topically daily Remove & Discard patch within 12 hours or as directed by MD (Patient taking differently: Apply 2 patches topically daily as needed Remove & Discard patch within 12 hours or as directed by MD), Disp: 60 patch, Rfl: 0  •  lisinopril (ZESTRIL) 2.5 mg tablet, Take 1 tablet (2.5 mg total) by mouth daily at bedtime, Disp: 90 tablet, Rfl: 1  •  magnesium citrate solution, Take 296 mL by mouth once, Disp: , Rfl:   •  metFORMIN (GLUCOPHAGE) 1000 MG tablet, Take 1 tablet (1,000 mg total) by mouth 2 (two) times a day with meals, Disp: 180 tablet, Rfl: 1  •  nicotine (NICODERM CQ) 21 mg/24 hr TD 24 hr patch, Place 1 patch on the skin over 24 hours every 24 hours, Disp: 28 patch, Rfl: 0  •  omeprazole (PriLOSEC) 20 mg delayed release capsule, TAKE 1 CAPSULE EVERY DAY, Disp: 90 capsule, Rfl: 1  •  Polyethylene Glycol 3350 (MIRALAX PO), Take by mouth 1 (one) time, Disp: , Rfl:   •  promethazine-dextromethorphan (PHENERGAN-DM) 6.25-15 mg/5 mL oral syrup, Take 5 mL by mouth 4 (four) times a day as needed for cough, Disp: 120 mL, Rfl: 0  •  QUEtiapine (SEROquel) 200 mg tablet, TAKE 1 TABLET AT BEDTIME, Disp: 90 tablet, Rfl: 3  •  Saccharomyces boulardii (Probiotic) 250 MG CAPS, Take 1 capsule (250 mg total) by mouth in the morning, Disp: 90 capsule, Rfl: 1  •  sildenafil (VIAGRA) 50 MG tablet, Take 1 tablet (50 mg total) by mouth as needed for erectile dysfunction Take on an empty stomach, 1 hour before sexual activity, no alcohol. 100 mg max dose. (Patient not taking: Reported on 6/22/2023), Disp: 30 tablet, Rfl: 0  •  tamsulosin (FLOMAX) 0.4 mg, TAKE 2 CAPSULES EVERY DAY WITH DINNER, Disp: 180 capsule, Rfl: 1  •  traZODone  (DESYREL) 100 mg tablet, TAKE 1 TABLET AT BEDTIME, Disp: 90 tablet, Rfl: 1    Allergies   Allergen Reactions   • Penicillins Swelling     Mouth swelling       Physical Exam:    /74   Pulse 77   Wt 103 kg (226 lb)   BMI 31.52 kg/m²     Constitutional:normal, well developed, well nourished, alert, in no distress and non-toxic and no overt pain behavior.  Eyes:anicteric  HEENT:grossly intact  Neck:supple, symmetric, trachea midline and no masses   Pulmonary:even and unlabored  Cardiovascular:No edema or pitting edema present  Skin:Normal without rashes or lesions and well hydrated  Psychiatric:Mood and affect appropriate  Neurologic:Cranial Nerves II-XII grossly intact  Musculoskeletal:normal    Lumbar Spine Exam  Appearance:  Normal lordosis  Palpation/Tenderness:  no tenderness or spasm  Motor Strength:  Left hip flexion:  5/5  Left hip extension:  5/5  Right hip flexion:  5/5  Right hip extension:  5/5  Left knee flexion:  5/5  Left knee extension:  5/5  Right knee flexion:  5/5  Right knee extension:  5/5  Left foot dorsiflexion:  5/5  Left foot plantar flexion:  5/5  Right foot dorsiflexion:  5/5  Right foot plantar flexion:  5/5      Orders Placed This Encounter   Procedures   • MM ALL_Prescribed Meds and Special Instructions   • MM DT_Alprazolam Definitive Test   • MM DT_Amitriptyline Definitive Test   • MM DT_Tramadol Definitive Test   • MM DT_THC Definitive Test   • MM DT_Tapentadol Definitive Test   • MM DT_Spice Definitive Test   • MM DT_Oxymorphone Definitive Test   • MM DT_Phencyclidine Definitive Test   • MM DT_Secobarbital Definitive Test   • MM DT_Oxycodone Definitive Test   • MM DT_Morphine Definitive Test   • MM DT_Methamphetamine Definitive Test   • MM DT_Methylphenidate Definitive Test   • MM Lorazepam Definitive Test   • MM DT_Levorphanol Definitive Test   • MM DT_Hydrocodone Definitive Test   • MM DT_Hydromorphone Definitive Test   • MM DT_Heroin Definitive Test   • MM DT_Fentanyl  Definitive Test   • MM Diazepam Definitive Test   • MM DT_Codeine Definitive Test   • MM DT_Cocaine Definitive Test   • MM DT_Clonazepam Definitive Test

## 2024-03-25 ENCOUNTER — OFFICE VISIT (OUTPATIENT)
Dept: PAIN MEDICINE | Facility: CLINIC | Age: 64
End: 2024-03-25
Payer: OTHER MISCELLANEOUS

## 2024-03-25 VITALS
SYSTOLIC BLOOD PRESSURE: 127 MMHG | BODY MASS INDEX: 31.52 KG/M2 | DIASTOLIC BLOOD PRESSURE: 74 MMHG | WEIGHT: 226 LBS | HEART RATE: 77 BPM

## 2024-03-25 DIAGNOSIS — F11.20 OPIOID TYPE DEPENDENCE, CONTINUOUS (HCC): ICD-10-CM

## 2024-03-25 DIAGNOSIS — Z79.891 LONG-TERM CURRENT USE OF OPIATE ANALGESIC: ICD-10-CM

## 2024-03-25 DIAGNOSIS — M46.1 SACROILIITIS (HCC): ICD-10-CM

## 2024-03-25 DIAGNOSIS — G89.4 CHRONIC PAIN SYNDROME: Primary | ICD-10-CM

## 2024-03-25 DIAGNOSIS — M96.1 POSTLAMINECTOMY SYNDROME, LUMBAR REGION: ICD-10-CM

## 2024-03-25 PROCEDURE — 99214 OFFICE O/P EST MOD 30 MIN: CPT | Performed by: ANESTHESIOLOGY

## 2024-03-25 RX ORDER — OXYCODONE AND ACETAMINOPHEN 7.5; 325 MG/1; MG/1
1 TABLET ORAL 2 TIMES DAILY PRN
Qty: 60 TABLET | Refills: 0 | Status: SHIPPED | OUTPATIENT
Start: 2024-05-15

## 2024-03-25 RX ORDER — OXYCODONE AND ACETAMINOPHEN 7.5; 325 MG/1; MG/1
1 TABLET ORAL 2 TIMES DAILY PRN
Qty: 60 TABLET | Refills: 0 | Status: SHIPPED | OUTPATIENT
Start: 2024-06-13

## 2024-03-25 RX ORDER — OXYCODONE AND ACETAMINOPHEN 7.5; 325 MG/1; MG/1
1 TABLET ORAL 2 TIMES DAILY PRN
Qty: 60 TABLET | Refills: 0 | Status: SHIPPED | OUTPATIENT
Start: 2024-04-16

## 2024-03-26 ENCOUNTER — OFFICE VISIT (OUTPATIENT)
Dept: FAMILY MEDICINE CLINIC | Facility: CLINIC | Age: 64
End: 2024-03-26
Payer: COMMERCIAL

## 2024-03-26 VITALS
HEART RATE: 75 BPM | HEIGHT: 71 IN | BODY MASS INDEX: 32.06 KG/M2 | OXYGEN SATURATION: 95 % | DIASTOLIC BLOOD PRESSURE: 70 MMHG | WEIGHT: 229 LBS | SYSTOLIC BLOOD PRESSURE: 128 MMHG

## 2024-03-26 DIAGNOSIS — R55 SYNCOPE, UNSPECIFIED SYNCOPE TYPE: Primary | ICD-10-CM

## 2024-03-26 DIAGNOSIS — R42 DIZZINESS: ICD-10-CM

## 2024-03-26 DIAGNOSIS — R06.2 WHEEZING: ICD-10-CM

## 2024-03-26 DIAGNOSIS — R09.81 HEAD CONGESTION: ICD-10-CM

## 2024-03-26 DIAGNOSIS — Z72.0 TOBACCO ABUSE: ICD-10-CM

## 2024-03-26 PROCEDURE — 99214 OFFICE O/P EST MOD 30 MIN: CPT | Performed by: FAMILY MEDICINE

## 2024-03-26 PROCEDURE — G2211 COMPLEX E/M VISIT ADD ON: HCPCS | Performed by: FAMILY MEDICINE

## 2024-03-26 NOTE — PROGRESS NOTES
"Assessment/Plan:   Diagnoses and all orders for this visit:    Syncope, unspecified syncope type  - nml EKG in the hospital   - essentially nml labs while in house  - no changes to medication though he did start Nicotine patches a few weeks ago and has also been smoking -- for now advised to stop using nicotine patches   - encouraged hydration   - still with intermittent dizziness -- had an episode of \"headrush\" over the weekend when he was in the garage smoking   - has Cardio appt scheduled for tmrw   Dizziness    Tobacco abuse  - has cut down to 1/2 PPD   - aware to no longer smoke while using the Patch     Wheezing  - still with dry cough   - breathing/wheezing is better - has not been using Advair (last use was 2-3days ago), did use Albuterol this AM     Head congestion   - advised to cont using Flonase and Mucinex for the duration of this week           Subjective:    Patient ID: Cedric Gramajo is a 63 y.o. male.  HPI  - now smoking less than 1/2 PPD   - no longer using Nicotine Patch   - still with intermittent dizziness -- had an episode of \"headrush\" over the weekend when he was in the garage smoking   - still with dry cough   - breathing/wheezing is better - has not been using Advair (last use was 2-3days ago), did use Albuterol this AM   - has Cardio appt tmrw   - congestion has gotten better but still lingering - has not been using Flonase or Mucinex       The following portions of the patient's history were reviewed and updated as appropriate: allergies, current medications, past family history, past medical history, past social history, past surgical history and problem list.    Review of Systems  as per HPI    Objective:  /70   Pulse 75   Ht 5' 11\" (1.803 m)   Wt 104 kg (229 lb)   SpO2 95%   BMI 31.94 kg/m²    Physical Exam  Vitals reviewed.   Constitutional:       General: He is not in acute distress.     Appearance: Normal appearance. He is not ill-appearing, toxic-appearing or " diaphoretic.   HENT:      Head: Normocephalic and atraumatic.      Comments: (+) trace L-maxillary pressure and fluid in L-ear     Right Ear: External ear normal.      Left Ear: External ear normal.      Nose: Nose normal.   Eyes:      General: No scleral icterus.        Right eye: No discharge.         Left eye: No discharge.      Extraocular Movements: Extraocular movements intact.      Conjunctiva/sclera: Conjunctivae normal.   Cardiovascular:      Rate and Rhythm: Normal rate and regular rhythm.      Heart sounds: Normal heart sounds.   Pulmonary:      Effort: Pulmonary effort is normal. No respiratory distress.      Breath sounds: Normal breath sounds. No stridor. No wheezing, rhonchi or rales.   Abdominal:      Palpations: Abdomen is soft.   Musculoskeletal:         General: Normal range of motion.      Cervical back: Normal range of motion.      Right lower leg: No edema.      Left lower leg: No edema.   Skin:     General: Skin is warm.   Neurological:      General: No focal deficit present.      Mental Status: He is alert and oriented to person, place, and time.   Psychiatric:         Mood and Affect: Mood normal.         Behavior: Behavior normal.         BMI Counseling: Body mass index is 31.94 kg/m². The BMI is above normal. Nutrition recommendations include 3-5 servings of fruits/vegetables daily. Exercise recommendations include exercising 3-5 times per week.

## 2024-03-27 LAB
6MAM UR QL CFM: NEGATIVE NG/ML
7AMINOCLONAZEPAM UR QL CFM: NEGATIVE NG/ML
A-OH ALPRAZ UR QL CFM: NEGATIVE NG/ML
AMITRIP UR QL CFM: NEGATIVE NG/ML
AMPHET UR QL CFM: NEGATIVE NG/ML
BZE UR QL CFM: NEGATIVE NG/ML
CODEINE UR QL CFM: NEGATIVE NG/ML
FENTANYL UR QL CFM: NEGATIVE NG/ML
HYDROCODONE UR QL CFM: NEGATIVE NG/ML
HYDROCODONE UR QL CFM: NEGATIVE NG/ML
HYDROMORPHONE UR QL CFM: NEGATIVE NG/ML
LORAZEPAM UR QL CFM: NEGATIVE NG/ML
ME-PHENIDATE UR QL CFM: NEGATIVE NG/ML
METHAMPHET UR QL CFM: NEGATIVE NG/ML
MORPHINE UR QL CFM: NEGATIVE NG/ML
MORPHINE UR QL CFM: NEGATIVE NG/ML
NORDIAZEPAM UR QL CFM: NEGATIVE NG/ML
NORFENTANYL UR QL CFM: NEGATIVE NG/ML
NORHYDROCODONE UR QL CFM: NEGATIVE NG/ML
NORHYDROCODONE UR QL CFM: NEGATIVE NG/ML
NOROXYCODONE UR QL CFM: NORMAL NG/ML
NORTRIP UR QL CFM: NEGATIVE NG/ML
OXAZEPAM UR QL CFM: NEGATIVE NG/ML
OXYCODONE UR QL CFM: NORMAL NG/ML
OXYMORPHONE UR QL CFM: NEGATIVE NG/ML
OXYMORPHONE UR QL CFM: NEGATIVE NG/ML
PARA-FLUOROFENTANYL QUANTIFICATION: NORMAL NG/ML
PCP UR QL CFM: NEGATIVE NG/ML
RESULT ALL_PRESCRIBED MEDS AND SPECIAL INSTRUCTIONS: NORMAL
SECOBARBITAL UR QL CFM: NEGATIVE NG/ML
SL AMB 4-ANPP QUANTIFICATION: NORMAL NG/ML
SL AMB 5F-ADB-M7 METABOLITE QUANTIFICATION: NEGATIVE NG/ML
SL AMB AB-FUBINACA-M3 METABOLITE QUANTIFICATION: NEGATIVE NG/ML
SL AMB ACETYL FENTANYL QUANTIFICATION: NORMAL NG/ML
SL AMB ACETYL NORFENTANYL QUANTIFICATION: NORMAL NG/ML
SL AMB ACRYL FENTANYL QUANTIFICATION: NORMAL NG/ML
SL AMB CARFENTANIL QUANTIFICATION: NORMAL NG/ML
SL AMB CTHC (MARIJUANA METABOLITE) QUANTIFICATION: NEGATIVE NG/ML
SL AMB DEXTRORPHAN (DEXTROMETHORPHAN METABOLITE) QUANT: ABNORMAL NG/ML
SL AMB JWH018 METABOLITE QUANTIFICATION: NEGATIVE NG/ML
SL AMB JWH073 METABOLITE QUANTIFICATION: NEGATIVE NG/ML
SL AMB MDMB-FUBINACA-M1 METABOLITE QUANTIFICATION: NEGATIVE NG/ML
SL AMB N-DESMETHYL-TRAMADOL QUANTIFICATION: NEGATIVE NG/ML
SL AMB RCS4 METABOLITE QUANTIFICATION: NEGATIVE NG/ML
SL AMB RITALINIC ACID QUANTIFICATION: NEGATIVE NG/ML
SPECIMEN DRAWN SERPL: NEGATIVE NG/ML
TAPENTADOL UR QL CFM: NEGATIVE NG/ML
TEMAZEPAM UR QL CFM: NEGATIVE NG/ML
TRAMADOL UR QL CFM: NEGATIVE NG/ML
URATE/CREAT 24H UR: NEGATIVE NG/ML

## 2024-04-04 DIAGNOSIS — E11.9 TYPE 2 DIABETES MELLITUS WITHOUT COMPLICATION, WITHOUT LONG-TERM CURRENT USE OF INSULIN (HCC): ICD-10-CM

## 2024-04-09 ENCOUNTER — OFFICE VISIT (OUTPATIENT)
Dept: CARDIOLOGY CLINIC | Facility: CLINIC | Age: 64
End: 2024-04-09
Payer: COMMERCIAL

## 2024-04-09 VITALS
WEIGHT: 228 LBS | TEMPERATURE: 97.3 F | SYSTOLIC BLOOD PRESSURE: 122 MMHG | DIASTOLIC BLOOD PRESSURE: 66 MMHG | HEIGHT: 71 IN | OXYGEN SATURATION: 97 % | BODY MASS INDEX: 31.92 KG/M2 | HEART RATE: 78 BPM

## 2024-04-09 DIAGNOSIS — I10 PRIMARY HYPERTENSION: ICD-10-CM

## 2024-04-09 DIAGNOSIS — Z72.0 TOBACCO ABUSE: Primary | ICD-10-CM

## 2024-04-09 DIAGNOSIS — R55 SYNCOPE: ICD-10-CM

## 2024-04-09 PROCEDURE — 99204 OFFICE O/P NEW MOD 45 MIN: CPT | Performed by: INTERNAL MEDICINE

## 2024-04-09 NOTE — PROGRESS NOTES
[facility]    CHIEF COMPLAINT:   Chief Complaint   Patient presents with    Follow-up       HPI:  Cedric Gramajo is a 63 y.o. male with a past medical history of tobacco abuse, hypertension, diabetes mellitus.  He presents today in consultation for syncope.  Briefly, he presented to [facility] emergency department on 3/4/2024 for an episode of loss of consciousness.  He had been receiving treatment for bronchitis the 2 weeks prior.  He was going for a a repeat chest x-ray that day due to continued wheezing and rhonchi.  While he was taking a deep breath for his chest x-ray he lost consciousness.  He denies any preceding lightheadedness, chest pain, palpitations. He does recall a preceding sensation of warmth or flushing.  He did strike his head on the x-ray machine.  When he came to there is no confusion.  He denies any urinary incontinence or tongue biting.  His ECG was normal.  Blood work was unremarkable except for elevated glucose and very mild leukocytosis.  CT head showed maxillary sinus air-fluid levels.  He denies any prior history of syncope.     He does admit at that time that he was having symptoms of dizziness and was treated for an ear infection.  He denies any prior history of syncope.  He has no recurrence of syncope since that time.  Today, he has no acute complaints.  He denies any fever, chills, fatigue, visual changes, chest pain, palpitations, shortness of breath, orthopnea, PND, lower extremity swelling.      The following portions of the patient's history were reviewed and updated as appropriate: allergies, current medications, past family history, past medical history, past social history, past surgical history, and problem list.    SINCE LAST OV I REVIEWED WITH THE PATIENT THE INTERIM LABS, TEST RESULTS, CONSULTANT(S) NOTES AND PERFORMED AN INTERIM REVIEW OF HISTORY    Past Medical History:   Diagnosis Date    Acute bronchitis 06/03/2019    Arthritis     BPH (benign  prostatic hypertrophy) with urinary obstruction     Chronic narcotic dependence (HCC)     percocet TID    Chronic pain disorder     back-spinal cord stimulator    Colon polyp     Diabetes mellitus (HCC)     type    Ear infection     Herniation of lumbar intervertebral disc with radiculopathy     Myofascial pain syndrome     Obesity     Sciatica     Spinal cord stimulator status 2017    implanted 2017    Tinnitus        Past Surgical History:   Procedure Laterality Date    ABSCESS DRAINAGE      groin    BACK SURGERY      sciatic nerve- spinal cord stimulator 2017    CAUDAL BLOCK N/A 10/26/2018    Procedure: Caudal Epidural Steroid Injection (36148);  Surgeon: Crystal Olmedo MD;  Location: [facility] MAIN OR;  Service: Pain Management     CAUDAL BLOCK N/A 11/30/2018    Procedure: Caudal Epidural Steroid Injection ([id number]);  Surgeon: Crystal Olmedo MD;  Location: [facility] MAIN OR;  Service: Pain Management     COLONOSCOPY      EPIDURAL BLOCK INJECTION Right 5/10/2019    Procedure: L5 S1 transforaminal Epidural Steroid Injection ([id number];  Surgeon: Crystal Olmedo MD;  Location: [facility] MAIN OR;  Service: Pain Management     EPIDURAL BLOCK INJECTION Right 8/16/2019    Procedure: BLOCK / INJECTION EPIDURAL STEROID TRANSFORAMINAL;  Surgeon: Crystal Olmedo MD;  Location: [facility] MAIN OR;  Service: Pain Management     EPIDURAL BLOCK INJECTION Left 7/1/2021    Procedure: L5 S1 transforaminal epidural steroid injection ( [id number]);  Surgeon: Crystal Olmedo MD;  Location: [facility] MAIN OR;  Service: Pain Management     EPIDURAL BLOCK INJECTION Right 7/15/2021    Procedure: L5 S1 transforaminal epidural steroid injection ( [id number]);  Surgeon: Crystal Olmedo MD;  Location: [facility] MAIN OR;  Service: Pain Management     EPIDURAL BLOCK INJECTION Right 1/7/2022    Procedure: L5 S1 transforaminal epidural steroid injection ([id number]);  Surgeon: Crystal Olmedo MD;  Location: [facility] MAIN OR;  Service: Pain Management     EPIDURAL  BLOCK INJECTION Left 1/28/2022    Procedure: L5 S1 transforaminal epidural steroid injection ([id number]);  Surgeon: Crystal Olmedo MD;  Location: [facility] MAIN OR;  Service: Pain Management     NERVE BLOCK Bilateral 4/26/2018    Procedure: B/L L3 L4 L5 S1 MBB #1 ([id number]);  Surgeon: Crystal Olmedo MD;  Location: [facility] MAIN OR;  Service: Pain Management     NERVE BLOCK Bilateral 5/11/2018    Procedure: B/L L3 L4 L5 S1 Medial Branch Block #2;  Surgeon: Crystal Olmedo MD;  Location: [facility] MAIN OR;  Service: Pain Management     NOSE SURGERY      ME COLONOSCOPY FLX DX W/COLLJ SPEC WHEN PFRMD N/A 3/6/2017    Procedure: COLONOSCOPY;  Surgeon: Leo Dhaliwal MD;  Location:  GI LAB;  Service: Gastroenterology    ME INSJ/RPLCMT SPINAL NPG/RCVR POCKET CRTJ&CONNJ Left 6/29/2021    Procedure: REPLACEMENT IMPLANTABLE PULSE GENERATOR DORSAL SPINAL COLUMN STIMULATOR, LEFT;  Surgeon: Julian Anna MD;  Location:  MAIN OR;  Service: Neurosurgery    ME AN IMPLTJ NSTIM ELTRDS PLATE/PADDLE EDRL Left 10/3/2017    Procedure: PLACEMENT THORACIC FOR INSERTION DORSAL COLUMN SPINAL CORD STIMULATOR (DCS) WITH BUTTOCK IMPLANTABLE PULSE GENERATOR(IMPULSE);  Surgeon: Gregory Fry MD;  Location:  MAIN OR;  Service: Neurosurgery    RADIOFREQUENCY ABLATION Right 5/18/2018    Procedure: Rt L3 L4 L5 S1 Radio Frequency Ablation ([id number]);  Surgeon: Crystal Olmedo MD;  Location: [facility] MAIN OR;  Service: Pain Management     RADIOFREQUENCY ABLATION Left 6/1/2018    Procedure: Lt L3 L4 L5 S1 Radio Frequency Ablation ([id number]);  Surgeon: Crystal Olmedo MD;  Location: [facility] MAIN OR;  Service: Pain Management        Social History     Socioeconomic History    Marital status: Single     Spouse name: Not on file    Number of children: Not on file    Years of education: Not on file    Highest education level: Not on file   Occupational History    Occupation:  for [employer]    Tobacco Use    Smoking status:  Every Day     Current packs/day: 1.00     Average packs/day: 1 pack/day for 42.0 years (42.0 ttl pk-yrs)     Types: Cigarettes    Smokeless tobacco: Never    Tobacco comments:     encouraged smoking cessation - history of smoking 30 or more pack years    Vaping Use    Vaping status: Never Used   Substance and Sexual Activity    Alcohol use: No     Comment: rare    Drug use: No    Sexual activity: Not Currently     Partners: Female   Other Topics Concern    Not on file   Social History Narrative    Caffeine use - coffee      Social Determinants of Health     Financial Resource Strain: Low Risk  (7/19/2023)    Overall Financial Resource Strain (CARDIA)     Difficulty of Paying Living Expenses: Not hard at all   Food Insecurity: Not on file   Transportation Needs: No Transportation Needs (7/19/2023)    PRAPARE - Transportation     Lack of Transportation (Medical): No     Lack of Transportation (Non-Medical): No   Physical Activity: Not on file   Stress: Not on file   Social Connections: Not on file   Intimate Partner Violence: Not on file   Housing Stability: Not on file       Family History   Problem Relation Age of Onset    Diabetes Mother     Arthritis Mother     Diabetes Father         mellitus     Heart attack Father 62    Hypertension Father     Arthritis Father        Allergies   Allergen Reactions    Penicillins Swelling     Mouth swelling       Current Outpatient Medications   Medication Sig Dispense Refill    albuterol (ProAir HFA) 90 mcg/act inhaler Inhale 2 puffs every 6 (six) hours as needed for wheezing 18 g 2    atorvastatin (LIPITOR) 20 mg tablet Take 1 tablet (20 mg total) by mouth daily 90 tablet 1    clobetasol (TEMOVATE) 0.05 % ointment Apply topically 2 (two) times a day 180 g 0    etodolac (LODINE) 400 MG tablet Take 1 tablet (400 mg total) by mouth 2 (two) times a day 180 tablet 3    fluticasone (FLONASE) 50 mcg/act nasal spray 2 sprays into each nostril daily 96 g 2    Fluticasone-Salmeterol  (Advair) 500-50 mcg/dose inhaler Inhale 1 puff 2 (two) times a day Rinse mouth after use. 60 blister 5    gabapentin (Neurontin) 600 MG tablet Take 2 tablets (1,200 mg total) by mouth 2 (two) times a day 360 tablet 3    glipiZIDE (GLUCOTROL XL) 5 mg 24 hr tablet Take 1 tablet (5 mg total) by mouth daily 90 tablet 1    lidocaine (LIDODERM) 5 % Apply 2 patches topically daily Remove & Discard patch within 12 hours or as directed by MD (Patient taking differently: Apply 2 patches topically daily as needed Remove & Discard patch within 12 hours or as directed by MD) 60 patch 0    lisinopril (ZESTRIL) 2.5 mg tablet Take 1 tablet (2.5 mg total) by mouth daily at bedtime 90 tablet 1    metFORMIN (GLUCOPHAGE) 1000 MG tablet TAKE 1 TABLET TWICE DAILY WITH MEALS 180 tablet 3    omeprazole (PriLOSEC) 20 mg delayed release capsule TAKE 1 CAPSULE EVERY DAY 90 capsule 1    [START ON 6/13/2024] oxyCODONE-acetaminophen (Percocet) 7.5-325 MG per tablet Take 1 tablet by mouth 2 (two) times a day as needed for moderate pain Max Daily Amount: 2 tablets Do not start before June 13, 2024. 60 tablet 0    QUEtiapine (SEROquel) 200 mg tablet TAKE 1 TABLET AT BEDTIME 90 tablet 3    tamsulosin (FLOMAX) 0.4 mg TAKE 2 CAPSULES EVERY DAY WITH DINNER 180 capsule 1    traZODone (DESYREL) 100 mg tablet TAKE 1 TABLET AT BEDTIME 90 tablet 1    bisacodyl (DULCOLAX) 5 mg EC tablet Take 20 mg by mouth 1 (one) time      etodolac (LODINE) 300 MG capsule Take 1 capsule by mouth 3 times a day as needed for pain.  Take with food. (Patient not taking: Reported on 4/9/2024) 270 capsule 0    magnesium citrate solution Take 296 mL by mouth once (Patient not taking: Reported on 4/9/2024)      nicotine (NICODERM CQ) 21 mg/24 hr TD 24 hr patch Place 1 patch on the skin over 24 hours every 24 hours (Patient not taking: Reported on 4/9/2024) 28 patch 0    [START ON 5/15/2024] oxyCODONE-acetaminophen (Percocet) 7.5-325 MG per tablet Take 1 tablet by mouth 2 (two)  "times a day as needed for moderate pain Max Daily Amount: 2 tablets Do not start before May 15, 2024. (Patient not taking: Reported on 4/9/2024 Do not start before May 15, 2024.) 60 tablet 0    [START ON 4/16/2024] oxyCODONE-acetaminophen (Percocet) 7.5-325 MG per tablet Take 1 tablet by mouth 2 (two) times a day as needed for moderate pain Max Daily Amount: 2 tablets Do not start before April 16, 2024. (Patient not taking: Reported on 4/9/2024 Do not start before April 16, 2024.) 60 tablet 0    Polyethylene Glycol 3350 (MIRALAX PO) Take by mouth 1 (one) time (Patient not taking: Reported on 4/9/2024)      promethazine-dextromethorphan (PHENERGAN-DM) 6.25-15 mg/5 mL oral syrup Take 5 mL by mouth 4 (four) times a day as needed for cough (Patient not taking: Reported on 4/9/2024) 120 mL 0    Saccharomyces boulardii (Probiotic) 250 MG CAPS Take 1 capsule (250 mg total) by mouth in the morning (Patient not taking: Reported on 4/9/2024) 90 capsule 1    sildenafil (VIAGRA) 50 MG tablet Take 1 tablet (50 mg total) by mouth as needed for erectile dysfunction Take on an empty stomach, 1 hour before sexual activity, no alcohol. 100 mg max dose. (Patient not taking: Reported on 6/22/2023) 30 tablet 0     No current facility-administered medications for this visit.       /66 (BP Location: Left arm, Patient Position: Sitting, Cuff Size: Adult)   Pulse 78   Temp (!) 97.3 °F (36.3 °C)   Ht 5' 11\" (1.803 m)   Wt 103 kg (228 lb)   SpO2 97%   BMI 31.80 kg/m²     Review of Systems   All other systems reviewed and are negative.      Physical Exam  Vitals reviewed.   Constitutional:       General: He is not in acute distress.     Appearance: Normal appearance. He is well-developed. He is not toxic-appearing.   HENT:      Head: Normocephalic and atraumatic.   Eyes:      General: No scleral icterus.     Extraocular Movements: Extraocular movements intact.      Conjunctiva/sclera: Conjunctivae normal.   Neck:      Vascular: No " "carotid bruit.   Cardiovascular:      Rate and Rhythm: Normal rate and regular rhythm.      Pulses: Normal pulses.      Heart sounds: Normal heart sounds. No murmur heard.     No gallop.   Pulmonary:      Effort: Pulmonary effort is normal. No respiratory distress.      Breath sounds: Normal breath sounds. No wheezing or rales.   Abdominal:      General: Abdomen is flat. Bowel sounds are normal. There is no distension.      Palpations: Abdomen is soft.      Tenderness: There is no abdominal tenderness.   Musculoskeletal:      Right lower leg: No edema.      Left lower leg: No edema.   Skin:     General: Skin is warm and dry.      Capillary Refill: Capillary refill takes less than 2 seconds.   Neurological:      Mental Status: He is alert.   Psychiatric:         Mood and Affect: Mood normal.          Lab Results   Component Value Date    K 4.3 03/04/2024     03/04/2024    CO2 24 03/04/2024    BUN 23 03/04/2024    CREATININE 0.82 03/04/2024    CALCIUM 9.2 03/04/2024    ALT 50 03/04/2024    AST 26 03/04/2024    INR 1.01 06/14/2021       Lab Results   Component Value Date    HDL 38 (L) 01/06/2023    LDLCALC 58 01/06/2023    TRIG 51 01/06/2023       Lab Results   Component Value Date    WBC 11.11 (H) 03/04/2024    HGB 14.6 03/04/2024    HCT 44.2 03/04/2024     03/04/2024       Lab Results   Component Value Date     10/19/2023    HGBA1C 6.5 (H) 10/19/2023       No results found for: \"TSH\"    Cardiac studies:   ECG-3/4/2024: Normal sinus rhythm    ASSESSMENT AND PLAN:  Diagnoses and all orders for this visit:    Syncope: 63-year-old gentleman with a past medical history who presents for an episode of loss of consciousness. He had no prodromal symptoms of lightheadedness, chest pain, or palpitations.  He does recall preceding shortness of breath.     His twelve-lead ECG is normal. No carotid bruit on auscultation.  No appreciable murmur on exam.  No signs/symptoms of congestive heart failure or low " output state.  I suspect this was vasovagal syncope which may have occurred in the setting of his acute bronchitis as he was noted to have active wheezing and rhonchi that day.           He has no prior history of syncope and no recurrence since that time. He is asymptomatic from a cardiac perspective although we did discuss both an echocardiogram and cardiac monitor. He declines any testing at this time.  He will return to the office if symptoms recur.    #.  Hypertension: His blood pressure is well-controlled.  He is on a low-dose of lisinopril 2.5 mg.  Moderate intensity physical activity and low-sodium diet recommended.    #.  Hyperlipidemia: Lipid panel from 1/2023 shows total cholesterol 106, triglycerides 51, HDL 38, LDL 58.  Currently on atorvastatin 20 mg daily.  Diabetes is also well-controlled and at goal with last hemoglobin A1c 6.5%.    Mary Metz MD

## 2024-04-09 NOTE — PATIENT INSTRUCTIONS
You were seen today in the Cardiology office for an episode of loss of consciousness (syncope). Please monitor for any recurrence of symptoms. Return to the office if your symptoms worsen or fail to improve.    Thank you for choosing [facility].

## 2024-04-11 DIAGNOSIS — E78.2 MIXED HYPERLIPIDEMIA: ICD-10-CM

## 2024-04-11 DIAGNOSIS — R80.9 PROTEINURIA, UNSPECIFIED TYPE: ICD-10-CM

## 2024-04-11 RX ORDER — LISINOPRIL 2.5 MG/1
2.5 TABLET ORAL
Qty: 90 TABLET | Refills: 1 | Status: SHIPPED | OUTPATIENT
Start: 2024-04-11

## 2024-04-11 RX ORDER — ATORVASTATIN CALCIUM 20 MG/1
20 TABLET, FILM COATED ORAL DAILY
Qty: 90 TABLET | Refills: 1 | Status: SHIPPED | OUTPATIENT
Start: 2024-04-11

## 2024-04-22 ENCOUNTER — TELEPHONE (OUTPATIENT)
Age: 64
End: 2024-04-22

## 2024-04-22 NOTE — TELEPHONE ENCOUNTER
Pt asking for repeat injection for his B/L LB. Pain started coming back 3 wks ago. Pain level 7/10. Pt said last inj was helpful. Last inj was B/L SIJ inj on 12/21/23.   Told pt our  will call him to set up date and time for injection.

## 2024-04-22 NOTE — TELEPHONE ENCOUNTER
Patient scheduled for procedure with , 5/14/24.  Patient does not use mychart, so the following instructions were given to patient verbally, no vaccines 14 days prior or after procedure, nothing to eat or drink 1 hr prior to procedure, wear something loose and comfortable, no jewelry, if ill, infection or antibiotics, must call office to reschedule procedure.  Take prescription medications as normal and you will need a  18 yrs or older.  Patient denies taking any blood thinners, Aspirin or prescription.

## 2024-04-22 NOTE — TELEPHONE ENCOUNTER
Caller: Cedric     Doctor: Bernadette    Reason for call: Patient calling stating would like to get procedure and would like to schedule for this Thursday please advise     Call back#: [phone]

## 2024-04-29 ENCOUNTER — APPOINTMENT (OUTPATIENT)
Dept: LAB | Facility: IMAGING CENTER | Age: 64
End: 2024-04-29
Payer: COMMERCIAL

## 2024-04-29 DIAGNOSIS — E78.2 MIXED HYPERLIPIDEMIA: ICD-10-CM

## 2024-04-29 DIAGNOSIS — E11.9 TYPE 2 DIABETES MELLITUS WITHOUT COMPLICATION, WITHOUT LONG-TERM CURRENT USE OF INSULIN (HCC): ICD-10-CM

## 2024-04-29 LAB
ALBUMIN SERPL BCP-MCNC: 4.6 G/DL (ref 3.5–5)
ALP SERPL-CCNC: 58 U/L (ref 34–104)
ALT SERPL W P-5'-P-CCNC: 52 U/L (ref 7–52)
ANION GAP SERPL CALCULATED.3IONS-SCNC: 8 MMOL/L (ref 4–13)
AST SERPL W P-5'-P-CCNC: 24 U/L (ref 13–39)
BILIRUB SERPL-MCNC: 0.48 MG/DL (ref 0.2–1)
BUN SERPL-MCNC: 17 MG/DL (ref 5–25)
CALCIUM SERPL-MCNC: 9.3 MG/DL (ref 8.4–10.2)
CHLORIDE SERPL-SCNC: 106 MMOL/L (ref 96–108)
CHOLEST SERPL-MCNC: 107 MG/DL
CO2 SERPL-SCNC: 26 MMOL/L (ref 21–32)
CREAT SERPL-MCNC: 0.83 MG/DL (ref 0.6–1.3)
CREAT UR-MCNC: 113.7 MG/DL
EST. AVERAGE GLUCOSE BLD GHB EST-MCNC: 154 MG/DL
GFR SERPL CREATININE-BSD FRML MDRD: 93 ML/MIN/1.73SQ M
GLUCOSE P FAST SERPL-MCNC: 187 MG/DL (ref 65–99)
HBA1C MFR BLD: 7 %
HDLC SERPL-MCNC: 42 MG/DL
LDLC SERPL CALC-MCNC: 56 MG/DL (ref 0–100)
MICROALBUMIN UR-MCNC: 15.2 MG/L
MICROALBUMIN/CREAT 24H UR: 13 MG/G CREATININE (ref 0–30)
NONHDLC SERPL-MCNC: 65 MG/DL
POTASSIUM SERPL-SCNC: 4.6 MMOL/L (ref 3.5–5.3)
PROT SERPL-MCNC: 6.8 G/DL (ref 6.4–8.4)
SODIUM SERPL-SCNC: 140 MMOL/L (ref 135–147)
TRIGL SERPL-MCNC: 47 MG/DL

## 2024-04-29 PROCEDURE — 82570 ASSAY OF URINE CREATININE: CPT

## 2024-04-29 PROCEDURE — 36415 COLL VENOUS BLD VENIPUNCTURE: CPT

## 2024-04-29 PROCEDURE — 82043 UR ALBUMIN QUANTITATIVE: CPT

## 2024-04-29 PROCEDURE — 3051F HG A1C>EQUAL 7.0%<8.0%: CPT | Performed by: INTERNAL MEDICINE

## 2024-04-29 PROCEDURE — 80053 COMPREHEN METABOLIC PANEL: CPT

## 2024-04-29 PROCEDURE — 80061 LIPID PANEL: CPT

## 2024-04-29 PROCEDURE — 83036 HEMOGLOBIN GLYCOSYLATED A1C: CPT

## 2024-05-02 ENCOUNTER — TELEPHONE (OUTPATIENT)
Dept: FAMILY MEDICINE CLINIC | Facility: CLINIC | Age: 64
End: 2024-05-02

## 2024-05-02 NOTE — TELEPHONE ENCOUNTER
----- Message from Melinda Daily DO sent at 5/2/2024 12:26 PM EDT -----  A1c = 7.0 <-- 6.5   Nml renal function   Nml urine microalbumin   LDL 56

## 2024-05-03 ENCOUNTER — TELEPHONE (OUTPATIENT)
Dept: FAMILY MEDICINE CLINIC | Facility: CLINIC | Age: 64
End: 2024-05-03

## 2024-05-07 ENCOUNTER — TELEPHONE (OUTPATIENT)
Age: 64
End: 2024-05-07

## 2024-05-07 NOTE — TELEPHONE ENCOUNTER
Caller: Michelle cintron/ [facility]     Doctor:     Reason for call: Calling in regards to the prior auth for upcoming procedure. Additional information is needed       Tracking #: [id number]     All missing info will be in portal as well or fax: [phone]       Needs to be submitted within 72 hours     Call back#: [phone]

## 2024-05-10 ENCOUNTER — TELEPHONE (OUTPATIENT)
Dept: PAIN MEDICINE | Facility: CLINIC | Age: 64
End: 2024-05-10

## 2024-05-10 DIAGNOSIS — E11.9 TYPE 2 DIABETES MELLITUS WITHOUT COMPLICATION, WITHOUT LONG-TERM CURRENT USE OF INSULIN (HCC): ICD-10-CM

## 2024-05-10 RX ORDER — GLIPIZIDE 5 MG/1
5 TABLET, FILM COATED, EXTENDED RELEASE ORAL DAILY
Qty: 90 TABLET | Refills: 1 | Status: SHIPPED | OUTPATIENT
Start: 2024-05-10

## 2024-05-10 NOTE — TELEPHONE ENCOUNTER
Called patient, Lanny is requesting PT or HEP info.  Advised we are aware he is WC, but hospital policy dictates we verify personal ins and get auth if nec. In case.  Left message on  to call back asap.  [phone].

## 2024-05-27 DIAGNOSIS — N40.1 BPH WITH URINARY OBSTRUCTION: ICD-10-CM

## 2024-05-27 DIAGNOSIS — N13.8 BPH WITH URINARY OBSTRUCTION: ICD-10-CM

## 2024-05-27 RX ORDER — TAMSULOSIN HYDROCHLORIDE 0.4 MG/1
CAPSULE ORAL
Qty: 180 CAPSULE | Refills: 1 | Status: SHIPPED | OUTPATIENT
Start: 2024-05-27

## 2024-06-07 ENCOUNTER — HOSPITAL ENCOUNTER (OUTPATIENT)
Dept: RADIOLOGY | Facility: CLINIC | Age: 64
Discharge: HOME | End: 2024-06-07
Payer: OTHER MISCELLANEOUS

## 2024-06-07 VITALS
SYSTOLIC BLOOD PRESSURE: 123 MMHG | TEMPERATURE: 97.8 F | DIASTOLIC BLOOD PRESSURE: 72 MMHG | RESPIRATION RATE: 20 BRPM | HEART RATE: 66 BPM | OXYGEN SATURATION: 94 %

## 2024-06-07 DIAGNOSIS — M46.1 SACROILIITIS (HCC): ICD-10-CM

## 2024-06-07 PROCEDURE — 27096 INJECT SACROILIAC JOINT: CPT | Performed by: ANESTHESIOLOGY

## 2024-06-07 RX ORDER — 0.9 % SODIUM CHLORIDE 0.9 %
4 VIAL (ML) INJECTION ONCE
Status: COMPLETED | OUTPATIENT
Start: 2024-06-07 | End: 2024-06-07

## 2024-06-07 RX ORDER — METHYLPREDNISOLONE ACETATE 80 MG/ML
80 INJECTION, SUSPENSION INTRA-ARTICULAR; INTRALESIONAL; INTRAMUSCULAR; PARENTERAL; SOFT TISSUE ONCE
Status: COMPLETED | OUTPATIENT
Start: 2024-06-07 | End: 2024-06-07

## 2024-06-07 RX ORDER — ROPIVACAINE HYDROCHLORIDE 2 MG/ML
7 INJECTION, SOLUTION EPIDURAL; INFILTRATION; PERINEURAL ONCE
Status: COMPLETED | OUTPATIENT
Start: 2024-06-07 | End: 2024-06-07

## 2024-06-07 RX ADMIN — Medication 4 ML: at 10:52

## 2024-06-07 RX ADMIN — IOHEXOL 2 ML: 300 INJECTION, SOLUTION INTRAVENOUS at 10:53

## 2024-06-07 RX ADMIN — METHYLPREDNISOLONE ACETATE 80 MG: 80 INJECTION, SUSPENSION INTRA-ARTICULAR; INTRALESIONAL; INTRAMUSCULAR; PARENTERAL; SOFT TISSUE at 10:53

## 2024-06-07 RX ADMIN — ROPIVACAINE HYDROCHLORIDE 7 ML: 2 INJECTION, SOLUTION EPIDURAL; INFILTRATION; PERINEURAL at 10:53

## 2024-06-07 NOTE — H&P
History of Present Illness: The patient is a 63 y.o. male who presents with complaints of lower back pain and is here today for bilateral sacroiliac injections    Past Medical History:   Diagnosis Date    Acute bronchitis 06/03/2019    Arthritis     BPH (benign prostatic hypertrophy) with urinary obstruction     Chronic narcotic dependence (HCC)     percocet TID    Chronic pain disorder     back-spinal cord stimulator    Colon polyp     Diabetes mellitus (HCC)     type    Ear infection     Herniation of lumbar intervertebral disc with radiculopathy     Myofascial pain syndrome     Obesity     Sciatica     Spinal cord stimulator status 2017    implanted 2017    Tinnitus        Past Surgical History:   Procedure Laterality Date    ABSCESS DRAINAGE      groin    BACK SURGERY      sciatic nerve- spinal cord stimulator 2017    CAUDAL BLOCK N/A 10/26/2018    Procedure: Caudal Epidural Steroid Injection (16182);  Surgeon: Crystal Olmedo MD;  Location: [facility] MAIN OR;  Service: Pain Management     CAUDAL BLOCK N/A 11/30/2018    Procedure: Caudal Epidural Steroid Injection ([id number]);  Surgeon: Crystal Olmedo MD;  Location: [facility] MAIN OR;  Service: Pain Management     COLONOSCOPY      EPIDURAL BLOCK INJECTION Right 5/10/2019    Procedure: L5 S1 transforaminal Epidural Steroid Injection ([id number];  Surgeon: Crystal Olmedo MD;  Location: [facility] MAIN OR;  Service: Pain Management     EPIDURAL BLOCK INJECTION Right 8/16/2019    Procedure: BLOCK / INJECTION EPIDURAL STEROID TRANSFORAMINAL;  Surgeon: Crystal Olmedo MD;  Location: [facility] MAIN OR;  Service: Pain Management     EPIDURAL BLOCK INJECTION Left 7/1/2021    Procedure: L5 S1 transforaminal epidural steroid injection ( [id number]);  Surgeon: Crystal Olmedo MD;  Location: [facility] MAIN OR;  Service: Pain Management     EPIDURAL BLOCK INJECTION Right 7/15/2021    Procedure: L5 S1 transforaminal epidural steroid injection ( [id number]);  Surgeon: Crystal Olmedo MD;  Location:  [facility] MAIN OR;  Service: Pain Management     EPIDURAL BLOCK INJECTION Right 1/7/2022    Procedure: L5 S1 transforaminal epidural steroid injection ([id number]);  Surgeon: Crystal Olmedo MD;  Location: [facility] MAIN OR;  Service: Pain Management     EPIDURAL BLOCK INJECTION Left 1/28/2022    Procedure: L5 S1 transforaminal epidural steroid injection ([id number]);  Surgeon: Crystal Olmedo MD;  Location: [facility] MAIN OR;  Service: Pain Management     NERVE BLOCK Bilateral 4/26/2018    Procedure: B/L L3 L4 L5 S1 MBB #1 ([id number]);  Surgeon: Crystal Olmedo MD;  Location: [facility] MAIN OR;  Service: Pain Management     NERVE BLOCK Bilateral 5/11/2018    Procedure: B/L L3 L4 L5 S1 Medial Branch Block #2;  Surgeon: Crystal Olmedo MD;  Location: [facility] MAIN OR;  Service: Pain Management     NOSE SURGERY      WV COLONOSCOPY FLX DX W/COLLJ SPEC WHEN PFRMD N/A 3/6/2017    Procedure: COLONOSCOPY;  Surgeon: Leo Dhaliwal MD;  Location:  GI LAB;  Service: Gastroenterology    WV INSJ/RPLCMT SPINAL NPG/RCVR POCKET CRTJ&CONNJ Left 6/29/2021    Procedure: REPLACEMENT IMPLANTABLE PULSE GENERATOR DORSAL SPINAL COLUMN STIMULATOR, LEFT;  Surgeon: Julian Anna MD;  Location:  MAIN OR;  Service: Neurosurgery    WV AN IMPLTJ NSTIM ELTRDS PLATE/PADDLE EDRL Left 10/3/2017    Procedure: PLACEMENT THORACIC FOR INSERTION DORSAL COLUMN SPINAL CORD STIMULATOR (DCS) WITH BUTTOCK IMPLANTABLE PULSE GENERATOR(IMPULSE);  Surgeon: Gregory Fry MD;  Location:  MAIN OR;  Service: Neurosurgery    RADIOFREQUENCY ABLATION Right 5/18/2018    Procedure: Rt L3 L4 L5 S1 Radio Frequency Ablation ([id number]);  Surgeon: Crystal Olmedo MD;  Location: [facility] MAIN OR;  Service: Pain Management     RADIOFREQUENCY ABLATION Left 6/1/2018    Procedure: Lt L3 L4 L5 S1 Radio Frequency Ablation ([id number]);  Surgeon: Crystal Olmedo MD;  Location: [facility] MAIN OR;  Service: Pain Management          Current Outpatient Medications:     albuterol (ProAir  HFA) 90 mcg/act inhaler, Inhale 2 puffs every 6 (six) hours as needed for wheezing, Disp: 18 g, Rfl: 2    atorvastatin (LIPITOR) 20 mg tablet, TAKE 1 TABLET (20 MG TOTAL) BY MOUTH DAILY, Disp: 90 tablet, Rfl: 1    bisacodyl (DULCOLAX) 5 mg EC tablet, Take 20 mg by mouth 1 (one) time, Disp: , Rfl:     clobetasol (TEMOVATE) 0.05 % ointment, Apply topically 2 (two) times a day, Disp: 180 g, Rfl: 0    etodolac (LODINE) 300 MG capsule, Take 1 capsule by mouth 3 times a day as needed for pain.  Take with food. (Patient not taking: Reported on 4/9/2024), Disp: 270 capsule, Rfl: 0    etodolac (LODINE) 400 MG tablet, Take 1 tablet (400 mg total) by mouth 2 (two) times a day, Disp: 180 tablet, Rfl: 3    fluticasone (FLONASE) 50 mcg/act nasal spray, 2 sprays into each nostril daily, Disp: 96 g, Rfl: 2    Fluticasone-Salmeterol (Advair) 500-50 mcg/dose inhaler, Inhale 1 puff 2 (two) times a day Rinse mouth after use., Disp: 60 blister, Rfl: 5    gabapentin (Neurontin) 600 MG tablet, Take 2 tablets (1,200 mg total) by mouth 2 (two) times a day, Disp: 360 tablet, Rfl: 3    glipiZIDE (GLUCOTROL XL) 5 mg 24 hr tablet, TAKE 1 TABLET EVERY DAY, Disp: 90 tablet, Rfl: 1    lidocaine (LIDODERM) 5 %, Apply 2 patches topically daily Remove & Discard patch within 12 hours or as directed by MD (Patient taking differently: Apply 2 patches topically daily as needed Remove & Discard patch within 12 hours or as directed by MD), Disp: 60 patch, Rfl: 0    lisinopril (ZESTRIL) 2.5 mg tablet, TAKE 1 TABLET AT BEDTIME, Disp: 90 tablet, Rfl: 1    magnesium citrate solution, Take 296 mL by mouth once (Patient not taking: Reported on 4/9/2024), Disp: , Rfl:     metFORMIN (GLUCOPHAGE) 1000 MG tablet, TAKE 1 TABLET TWICE DAILY WITH MEALS, Disp: 180 tablet, Rfl: 3    nicotine (NICODERM CQ) 21 mg/24 hr TD 24 hr patch, Place 1 patch on the skin over 24 hours every 24 hours (Patient not taking: Reported on 4/9/2024), Disp: 28 patch, Rfl: 0    omeprazole  (PriLOSEC) 20 mg delayed release capsule, TAKE 1 CAPSULE EVERY DAY, Disp: 90 capsule, Rfl: 1    [START ON 6/13/2024] oxyCODONE-acetaminophen (Percocet) 7.5-325 MG per tablet, Take 1 tablet by mouth 2 (two) times a day as needed for moderate pain Max Daily Amount: 2 tablets Do not start before June 13, 2024., Disp: 60 tablet, Rfl: 0    oxyCODONE-acetaminophen (Percocet) 7.5-325 MG per tablet, Take 1 tablet by mouth 2 (two) times a day as needed for moderate pain Max Daily Amount: 2 tablets Do not start before May 15, 2024. (Patient not taking: Reported on 4/9/2024 Do not start before May 15, 2024.), Disp: 60 tablet, Rfl: 0    oxyCODONE-acetaminophen (Percocet) 7.5-325 MG per tablet, Take 1 tablet by mouth 2 (two) times a day as needed for moderate pain Max Daily Amount: 2 tablets Do not start before April 16, 2024. (Patient not taking: Reported on 4/9/2024 Do not start before April 16, 2024.), Disp: 60 tablet, Rfl: 0    Polyethylene Glycol 3350 (MIRALAX PO), Take by mouth 1 (one) time (Patient not taking: Reported on 4/9/2024), Disp: , Rfl:     promethazine-dextromethorphan (PHENERGAN-DM) 6.25-15 mg/5 mL oral syrup, Take 5 mL by mouth 4 (four) times a day as needed for cough (Patient not taking: Reported on 4/9/2024), Disp: 120 mL, Rfl: 0    QUEtiapine (SEROquel) 200 mg tablet, TAKE 1 TABLET AT BEDTIME, Disp: 90 tablet, Rfl: 3    Saccharomyces boulardii (Probiotic) 250 MG CAPS, Take 1 capsule (250 mg total) by mouth in the morning (Patient not taking: Reported on 4/9/2024), Disp: 90 capsule, Rfl: 1    sildenafil (VIAGRA) 50 MG tablet, Take 1 tablet (50 mg total) by mouth as needed for erectile dysfunction Take on an empty stomach, 1 hour before sexual activity, no alcohol. 100 mg max dose. (Patient not taking: Reported on 6/22/2023), Disp: 30 tablet, Rfl: 0    tamsulosin (FLOMAX) 0.4 mg, TAKE 2 CAPSULES EVERY DAY WITH DINNER, Disp: 180 capsule, Rfl: 1    traZODone (DESYREL) 100 mg tablet, TAKE 1 TABLET AT BEDTIME,  Disp: 90 tablet, Rfl: 1    Current Facility-Administered Medications:     iohexol (OMNIPAQUE) 300 mg/mL injection 2 mL, 2 mL, Intra-articular, Once, Dain Fleming MD    lidocaine (PF) (XYLOCAINE-MPF) 2 % injection 4 mL, 4 mL, Infiltration, Once, Dain Fleming MD    methylPREDNISolone acetate (DEPO-MEDROL) injection 80 mg, 80 mg, Intra-articular, Once, Dain Fleming MD    ropivacaine (NAROPIN) injection 7 mL, 7 mL, Intra-articular, Once, Dain Fleming MD    sodium chloride (PF) 0.9 % injection 4 mL, 4 mL, Infiltration, Once, Dain Fleming MD    Allergies   Allergen Reactions    Penicillins Swelling     Mouth swelling       Physical Exam:   Vitals:    06/07/24 1039   BP: 117/69   Pulse: 65   Resp: 20   Temp: 97.8 °F (36.6 °C)   SpO2: 92%     General: Awake, Alert, Oriented x 3, Mood and affect appropriate  Respiratory: Respirations even and unlabored  Cardiovascular: Peripheral pulses intact; no edema  Musculoskeletal Exam: Lower back pain    ASA Score: 3    Patient/Chart Verification  Patient ID Verified: Verbal  Consents Confirmed: To be obtained in the Pre-Procedure area  Interval H&P(within 24 hr) Complete (required for Outpatients and Surgery Admit only): To be obtained in the Pre-Procedure area  Allergies Reviewed: Yes  Anticoag/NSAID held?: NA  Currently on antibiotics?: No    Assessment:   1. Sacroiliitis (HCC)        Plan: bilateral sacroiliac injections

## 2024-06-07 NOTE — DISCHARGE INSTR - LAB

## 2024-06-21 ENCOUNTER — TELEPHONE (OUTPATIENT)
Dept: RADIOLOGY | Facility: MEDICAL CENTER | Age: 64
End: 2024-06-21

## 2024-06-21 DIAGNOSIS — K21.9 GASTROESOPHAGEAL REFLUX DISEASE WITHOUT ESOPHAGITIS: ICD-10-CM

## 2024-06-21 RX ORDER — OMEPRAZOLE 20 MG/1
CAPSULE, DELAYED RELEASE ORAL
Qty: 90 CAPSULE | Refills: 1 | Status: SHIPPED | OUTPATIENT
Start: 2024-06-21

## 2024-07-01 ENCOUNTER — TELEPHONE (OUTPATIENT)
Dept: PAIN MEDICINE | Facility: CLINIC | Age: 64
End: 2024-07-01

## 2024-07-01 ENCOUNTER — HOSPITAL ENCOUNTER (OUTPATIENT)
Dept: RADIOLOGY | Facility: HOSPITAL | Age: 64
Discharge: HOME | End: 2024-07-01
Payer: COMMERCIAL

## 2024-07-01 ENCOUNTER — OFFICE VISIT (OUTPATIENT)
Dept: PAIN MEDICINE | Facility: CLINIC | Age: 64
End: 2024-07-01
Payer: OTHER MISCELLANEOUS

## 2024-07-01 VITALS
HEIGHT: 71 IN | SYSTOLIC BLOOD PRESSURE: 142 MMHG | WEIGHT: 217 LBS | DIASTOLIC BLOOD PRESSURE: 78 MMHG | RESPIRATION RATE: 16 BRPM | HEART RATE: 76 BPM | BODY MASS INDEX: 30.38 KG/M2

## 2024-07-01 DIAGNOSIS — M46.1 SACROILIITIS (HCC): ICD-10-CM

## 2024-07-01 DIAGNOSIS — G89.4 CHRONIC PAIN SYNDROME: Primary | ICD-10-CM

## 2024-07-01 DIAGNOSIS — M51.16 HERNIATION OF LUMBAR INTERVERTEBRAL DISC WITH RADICULOPATHY: ICD-10-CM

## 2024-07-01 PROCEDURE — 73521 X-RAY EXAM HIPS BI 2 VIEWS: CPT

## 2024-07-01 PROCEDURE — 99214 OFFICE O/P EST MOD 30 MIN: CPT | Performed by: ANESTHESIOLOGY

## 2024-07-01 RX ORDER — OXYCODONE AND ACETAMINOPHEN 7.5; 325 MG/1; MG/1
1 TABLET ORAL 2 TIMES DAILY PRN
Qty: 60 TABLET | Refills: 0 | Status: SHIPPED | OUTPATIENT
Start: 2024-07-17

## 2024-07-01 RX ORDER — OXYCODONE AND ACETAMINOPHEN 7.5; 325 MG/1; MG/1
1 TABLET ORAL 2 TIMES DAILY PRN
Qty: 60 TABLET | Refills: 0 | Status: SHIPPED | OUTPATIENT
Start: 2024-08-14

## 2024-07-01 RX ORDER — OXYCODONE AND ACETAMINOPHEN 7.5; 325 MG/1; MG/1
1 TABLET ORAL 2 TIMES DAILY PRN
Qty: 60 TABLET | Refills: 0 | Status: SHIPPED | OUTPATIENT
Start: 2024-09-12

## 2024-07-01 NOTE — PROGRESS NOTES
Assessment:  1. Chronic pain syndrome    2. Herniation of lumbar intervertebral disc with radiculopathy    3. Sacroiliitis (HCC)        Plan:  The patient is doing well following the recent sacroiliac injections.  At this time, symptoms are stable and controlled well with his current medication regimen.  He was given prescriptions for the next 3 months with do not fill dates.  He will follow back up in 12 weeks for medication prescription refill and reevaluation.  In addition he will get his x-rays done of the hip/pelvis and if there is osteoarthritis there, we will call him to schedule a left hip intra-articular injection.    The patient will follow-up in 12 weeks for medication prescription refill and reevaluation. The patient was advised to contact the office should their symptoms worsen in the interim. The patient was agreeable and verbalized an understanding.        History of Present Illness:    The patient is a 64 y.o. male last seen on 6/7/2024 who presents for a follow up office visit in regards to chronic pain secondary to sacroiliitis and lumbar disc herniation with radiculopathy.  The patient currently reports significant improvement in back pain symptoms following the recent corticosteroid injection.  He has been having more pain however in his left hip and will be getting x-rays done to evaluate for any osteoarthritis there.  Overall though symptoms are well-controlled with his current medication regimen of Percocet 2 tablets at bedtime, gabapentin, etodolac.    Pain Contract Signed: 3/25/24  Last Urine Drug Screen: 3/25/24  Last Dose: 6/30/24    I have personally reviewed and/or updated the patient's past medical history, past surgical history, family history, social history, current medications, allergies, and vital signs today.       Review of Systems:    Review of Systems   Respiratory:  Negative for shortness of breath.    Cardiovascular:  Negative for chest pain.   Gastrointestinal:  Negative for  constipation, diarrhea, nausea and vomiting.   Musculoskeletal:  Positive for back pain, gait problem and joint swelling. Negative for arthralgias and myalgias.        B/L SIJ Pain   Skin:  Negative for rash.   Neurological:  Negative for dizziness, seizures and weakness.   All other systems reviewed and are negative.        Past Medical History:   Diagnosis Date   • Acute bronchitis 06/03/2019   • Arthritis    • BPH (benign prostatic hypertrophy) with urinary obstruction    • Chronic narcotic dependence (HCC)     percocet TID   • Chronic pain disorder     back-spinal cord stimulator   • Colon polyp    • Diabetes mellitus (HCC)     type   • Ear infection    • Herniation of lumbar intervertebral disc with radiculopathy    • Myofascial pain syndrome    • Obesity    • Sciatica    • Spinal cord stimulator status 2017    implanted 2017   • Tinnitus        Past Surgical History:   Procedure Laterality Date   • ABSCESS DRAINAGE      groin   • BACK SURGERY      sciatic nerve- spinal cord stimulator 2017   • CAUDAL BLOCK N/A 10/26/2018    Procedure: Caudal Epidural Steroid Injection ([id number]);  Surgeon: Crystal Olmedo MD;  Location: [facility] MAIN OR;  Service: Pain Management    • CAUDAL BLOCK N/A 11/30/2018    Procedure: Caudal Epidural Steroid Injection ([id number]);  Surgeon: Crystal Olmedo MD;  Location: [facility] MAIN OR;  Service: Pain Management    • COLONOSCOPY     • EPIDURAL BLOCK INJECTION Right 5/10/2019    Procedure: L5 S1 transforaminal Epidural Steroid Injection ([id number];  Surgeon: Crystal Olmedo MD;  Location: [facility] MAIN OR;  Service: Pain Management    • EPIDURAL BLOCK INJECTION Right 8/16/2019    Procedure: BLOCK / INJECTION EPIDURAL STEROID TRANSFORAMINAL;  Surgeon: Crystal Olmedo MD;  Location: [facility] MAIN OR;  Service: Pain Management    • EPIDURAL BLOCK INJECTION Left 7/1/2021    Procedure: L5 S1 transforaminal epidural steroid injection ( [id number]);  Surgeon: Crystal Olmedo MD;  Location: [facility] MAIN OR;   Service: Pain Management    • EPIDURAL BLOCK INJECTION Right 7/15/2021    Procedure: L5 S1 transforaminal epidural steroid injection ( [id number]);  Surgeon: Crystal Olmedo MD;  Location: [facility] MAIN OR;  Service: Pain Management    • EPIDURAL BLOCK INJECTION Right 1/7/2022    Procedure: L5 S1 transforaminal epidural steroid injection ([id number]);  Surgeon: Crystal Olmedo MD;  Location: [facility] MAIN OR;  Service: Pain Management    • EPIDURAL BLOCK INJECTION Left 1/28/2022    Procedure: L5 S1 transforaminal epidural steroid injection ([id number]);  Surgeon: Crystal Olmedo MD;  Location: [facility] MAIN OR;  Service: Pain Management    • NERVE BLOCK Bilateral 4/26/2018    Procedure: B/L L3 L4 L5 S1 MBB #1 ([id number]);  Surgeon: Crystal Olmedo MD;  Location: [facility] MAIN OR;  Service: Pain Management    • NERVE BLOCK Bilateral 5/11/2018    Procedure: B/L L3 L4 L5 S1 Medial Branch Block #2;  Surgeon: Crystal Olmedo MD;  Location: [facility] MAIN OR;  Service: Pain Management    • NOSE SURGERY     • WV COLONOSCOPY FLX DX W/COLLJ SPEC WHEN PFRMD N/A 3/6/2017    Procedure: COLONOSCOPY;  Surgeon: Leo Dhaliwal MD;  Location:  GI LAB;  Service: Gastroenterology   • WV INSJ/RPLCMT SPINAL NPG/RCVR POCKET CRTJ&CONNJ Left 6/29/2021    Procedure: REPLACEMENT IMPLANTABLE PULSE GENERATOR DORSAL SPINAL COLUMN STIMULATOR, LEFT;  Surgeon: Julian Anna MD;  Location:  MAIN OR;  Service: Neurosurgery   • WV AN IMPLTJ NSTIM ELTRDS PLATE/PADDLE EDRL Left 10/3/2017    Procedure: PLACEMENT THORACIC FOR INSERTION DORSAL COLUMN SPINAL CORD STIMULATOR (DCS) WITH BUTTOCK IMPLANTABLE PULSE GENERATOR(IMPULSE);  Surgeon: Gregory Fry MD;  Location:  MAIN OR;  Service: Neurosurgery   • RADIOFREQUENCY ABLATION Right 5/18/2018    Procedure: Rt L3 L4 L5 S1 Radio Frequency Ablation ([id number]);  Surgeon: Crystal Olmedo MD;  Location: [facility] MAIN OR;  Service: Pain Management    • RADIOFREQUENCY ABLATION Left 6/1/2018    Procedure:  Lt L3 L4 L5 S1 Radio Frequency Ablation ([id number]);  Surgeon: Crystal Olmedo MD;  Location: [facility] MAIN OR;  Service: Pain Management        Family History   Problem Relation Age of Onset   • Diabetes Mother    • Arthritis Mother    • Diabetes Father         mellitus    • Heart attack Father 62   • Hypertension Father    • Arthritis Father        Social History     Occupational History   • Occupation:  for [employer]    Tobacco Use   • Smoking status: Every Day     Current packs/day: 1.00     Average packs/day: 1 pack/day for 42.0 years (42.0 ttl pk-yrs)     Types: Cigarettes   • Smokeless tobacco: Never   • Tobacco comments:     encouraged smoking cessation - history of smoking 30 or more pack years    Vaping Use   • Vaping status: Never Used   Substance and Sexual Activity   • Alcohol use: No     Comment: rare   • Drug use: No   • Sexual activity: Not Currently     Partners: Female         Current Outpatient Medications:   •  albuterol (ProAir HFA) 90 mcg/act inhaler, Inhale 2 puffs every 6 (six) hours as needed for wheezing, Disp: 18 g, Rfl: 2  •  atorvastatin (LIPITOR) 20 mg tablet, TAKE 1 TABLET (20 MG TOTAL) BY MOUTH DAILY, Disp: 90 tablet, Rfl: 1  •  bisacodyl (DULCOLAX) 5 mg EC tablet, Take 20 mg by mouth 1 (one) time, Disp: , Rfl:   •  clobetasol (TEMOVATE) 0.05 % ointment, Apply topically 2 (two) times a day, Disp: 180 g, Rfl: 0  •  etodolac (LODINE) 400 MG tablet, Take 1 tablet (400 mg total) by mouth 2 (two) times a day, Disp: 180 tablet, Rfl: 3  •  fluticasone (FLONASE) 50 mcg/act nasal spray, 2 sprays into each nostril daily, Disp: 96 g, Rfl: 2  •  Fluticasone-Salmeterol (Advair) 500-50 mcg/dose inhaler, Inhale 1 puff 2 (two) times a day Rinse mouth after use., Disp: 60 blister, Rfl: 5  •  gabapentin (Neurontin) 600 MG tablet, Take 2 tablets (1,200 mg total) by mouth 2 (two) times a day, Disp: 360 tablet, Rfl: 3  •  glipiZIDE (GLUCOTROL XL) 5 mg 24 hr tablet, TAKE 1 TABLET EVERY  DAY, Disp: 90 tablet, Rfl: 1  •  lidocaine (LIDODERM) 5 %, Apply 2 patches topically daily Remove & Discard patch within 12 hours or as directed by MD (Patient taking differently: Apply 2 patches topically daily as needed Remove & Discard patch within 12 hours or as directed by MD), Disp: 60 patch, Rfl: 0  •  lisinopril (ZESTRIL) 2.5 mg tablet, TAKE 1 TABLET AT BEDTIME, Disp: 90 tablet, Rfl: 1  •  metFORMIN (GLUCOPHAGE) 1000 MG tablet, TAKE 1 TABLET TWICE DAILY WITH MEALS, Disp: 180 tablet, Rfl: 3  •  omeprazole (PriLOSEC) 20 mg delayed release capsule, TAKE 1 CAPSULE EVERY DAY, Disp: 90 capsule, Rfl: 1  •  [START ON 7/17/2024] oxyCODONE-acetaminophen (Percocet) 7.5-325 MG per tablet, Take 1 tablet by mouth 2 (two) times a day as needed for moderate pain Max Daily Amount: 2 tablets Do not start before July 17, 2024., Disp: 60 tablet, Rfl: 0  •  [START ON 8/14/2024] oxyCODONE-acetaminophen (Percocet) 7.5-325 MG per tablet, Take 1 tablet by mouth 2 (two) times a day as needed for moderate pain Max Daily Amount: 2 tablets Do not start before August 14, 2024., Disp: 60 tablet, Rfl: 0  •  [START ON 9/12/2024] oxyCODONE-acetaminophen (Percocet) 7.5-325 MG per tablet, Take 1 tablet by mouth 2 (two) times a day as needed for moderate pain Max Daily Amount: 2 tablets Do not start before September 12, 2024., Disp: 60 tablet, Rfl: 0  •  QUEtiapine (SEROquel) 200 mg tablet, TAKE 1 TABLET AT BEDTIME, Disp: 90 tablet, Rfl: 3  •  tamsulosin (FLOMAX) 0.4 mg, TAKE 2 CAPSULES EVERY DAY WITH DINNER, Disp: 180 capsule, Rfl: 1  •  traZODone (DESYREL) 100 mg tablet, TAKE 1 TABLET AT BEDTIME, Disp: 90 tablet, Rfl: 1  •  etodolac (LODINE) 300 MG capsule, Take 1 capsule by mouth 3 times a day as needed for pain.  Take with food. (Patient not taking: Reported on 4/9/2024), Disp: 270 capsule, Rfl: 0  •  magnesium citrate solution, Take 296 mL by mouth once (Patient not taking: Reported on 4/9/2024), Disp: , Rfl:   •  nicotine (NICODERM CQ) 21  "mg/24 hr TD 24 hr patch, Place 1 patch on the skin over 24 hours every 24 hours (Patient not taking: Reported on 4/9/2024), Disp: 28 patch, Rfl: 0  •  Polyethylene Glycol 3350 (MIRALAX PO), Take by mouth 1 (one) time (Patient not taking: Reported on 4/9/2024), Disp: , Rfl:   •  promethazine-dextromethorphan (PHENERGAN-DM) 6.25-15 mg/5 mL oral syrup, Take 5 mL by mouth 4 (four) times a day as needed for cough (Patient not taking: Reported on 4/9/2024), Disp: 120 mL, Rfl: 0  •  Saccharomyces boulardii (Probiotic) 250 MG CAPS, Take 1 capsule (250 mg total) by mouth in the morning (Patient not taking: Reported on 4/9/2024), Disp: 90 capsule, Rfl: 1  •  sildenafil (VIAGRA) 50 MG tablet, Take 1 tablet (50 mg total) by mouth as needed for erectile dysfunction Take on an empty stomach, 1 hour before sexual activity, no alcohol. 100 mg max dose. (Patient not taking: Reported on 6/22/2023), Disp: 30 tablet, Rfl: 0    Allergies   Allergen Reactions   • Penicillins Swelling     Mouth swelling       Physical Exam:    /78   Pulse 76   Resp 16   Ht 5' 11\" (1.803 m)   Wt 98.4 kg (217 lb)   BMI 30.27 kg/m²     Constitutional:normal, well developed, well nourished, alert, in no distress and non-toxic and no overt pain behavior.  Eyes:anicteric  HEENT:grossly intact  Neck:supple, symmetric, trachea midline and no masses   Pulmonary:even and unlabored  Cardiovascular:No edema or pitting edema present  Skin:Normal without rashes or lesions and well hydrated  Psychiatric:Mood and affect appropriate  Neurologic:Cranial Nerves II-XII grossly intact  Musculoskeletal:normal    Lumbar Spine Exam  Appearance:  Normal lordosis  Palpation/Tenderness:  no tenderness or spasm  Range of Motion: deferred  Motor Strength:  Left hip flexion:  5/5  Left hip extension:  5/5  Right hip flexion:  5/5  Right hip extension:  5/5  Left knee flexion:  5/5  Left knee extension:  5/5  Right knee flexion:  5/5  Right knee extension:  5/5  Left foot " dorsiflexion:  5/5  Left foot plantar flexion:  5/5  Right foot dorsiflexion:  5/5  Right foot plantar flexion:  5/5

## 2024-07-01 NOTE — TELEPHONE ENCOUNTER
Please let patient know that I reviewed the x-rays of the hips and he does not have any osteoarthritis.  His alignment looks good as well.

## 2024-08-01 ENCOUNTER — OFFICE VISIT (OUTPATIENT)
Dept: FAMILY MEDICINE CLINIC | Facility: CLINIC | Age: 64
End: 2024-08-01
Payer: COMMERCIAL

## 2024-08-01 VITALS
WEIGHT: 217 LBS | OXYGEN SATURATION: 97 % | SYSTOLIC BLOOD PRESSURE: 135 MMHG | HEIGHT: 71 IN | DIASTOLIC BLOOD PRESSURE: 78 MMHG | BODY MASS INDEX: 30.38 KG/M2 | HEART RATE: 81 BPM

## 2024-08-01 DIAGNOSIS — R26.9 GAIT ABNORMALITY: ICD-10-CM

## 2024-08-01 DIAGNOSIS — Z12.2 ENCOUNTER FOR SCREENING FOR LUNG CANCER: ICD-10-CM

## 2024-08-01 DIAGNOSIS — E11.69 TYPE 2 DIABETES MELLITUS WITH OTHER SPECIFIED COMPLICATION, WITHOUT LONG-TERM CURRENT USE OF INSULIN (HCC): ICD-10-CM

## 2024-08-01 DIAGNOSIS — Z72.0 TOBACCO ABUSE: ICD-10-CM

## 2024-08-01 DIAGNOSIS — N13.8 BPH WITH URINARY OBSTRUCTION: ICD-10-CM

## 2024-08-01 DIAGNOSIS — Z00.00 ENCOUNTER FOR SUBSEQUENT ANNUAL WELLNESS VISIT (AWV) IN MEDICARE PATIENT: Primary | ICD-10-CM

## 2024-08-01 DIAGNOSIS — Z12.5 PROSTATE CANCER SCREENING: ICD-10-CM

## 2024-08-01 DIAGNOSIS — N40.1 BPH WITH URINARY OBSTRUCTION: ICD-10-CM

## 2024-08-01 DIAGNOSIS — K63.5 POLYP OF COLON, UNSPECIFIED PART OF COLON, UNSPECIFIED TYPE: ICD-10-CM

## 2024-08-01 DIAGNOSIS — R41.3 MEMORY LOSS: ICD-10-CM

## 2024-08-01 DIAGNOSIS — Z87.891 PERSONAL HISTORY OF NICOTINE DEPENDENCE: ICD-10-CM

## 2024-08-01 PROCEDURE — G0439 PPPS, SUBSEQ VISIT: HCPCS | Performed by: FAMILY MEDICINE

## 2024-08-01 PROCEDURE — 99214 OFFICE O/P EST MOD 30 MIN: CPT | Performed by: FAMILY MEDICINE

## 2024-08-01 NOTE — PATIENT INSTRUCTIONS
Medicare Preventive Visit Patient Instructions  Thank you for completing your Welcome to Medicare Visit or Medicare Annual Wellness Visit today. Your next wellness visit will be due in one year (8/2/2025).  The screening/preventive services that you may require over the next 5-10 years are detailed below. Some tests may not apply to you based off risk factors and/or age. Screening tests ordered at today's visit but not completed yet may show as past due. Also, please note that scanned in results may not display below.  Preventive Screenings:  Service Recommendations Previous Testing/Comments   Colorectal Cancer Screening  Colonoscopy    Fecal Occult Blood Test (FOBT)/Fecal Immunochemical Test (FIT)  Fecal DNA/Cologuard Test  Flexible Sigmoidoscopy Age: 45-75 years old   Colonoscopy: every 10 years (May be performed more frequently if at higher risk)  OR  FOBT/FIT: every 1 year  OR  Cologuard: every 3 years  OR  Sigmoidoscopy: every 5 years  Screening may be recommended earlier than age 45 if at higher risk for colorectal cancer. Also, an individualized decision between you and your healthcare provider will decide whether screening between the ages of 76-85 would be appropriate. Colonoscopy: 03/30/2022  FOBT/FIT: Not on file  Cologuard: Not on file  Sigmoidoscopy: Not on file    Screening Current     Prostate Cancer Screening Individualized decision between patient and health care provider in men between ages of 55-69   Medicare will cover every 12 months beginning on the day after your 50th birthday PSA: 0.18 ng/mL     Screening Current     Hepatitis C Screening Once for adults born between 1945 and 1965  More frequently in patients at high risk for Hepatitis C Hep C Antibody: 04/12/2021    Screening Current   Diabetes Screening 1-2 times per year if you're at risk for diabetes or have pre-diabetes Fasting glucose: 187 mg/dL (4/29/2024)  A1C: 7.0 % (4/29/2024)  History Diabetes  Risks and Benefits  Discussed  Screening Current   Cholesterol Screening Once every 5 years if you don't have a lipid disorder. May order more often based on risk factors. Lipid panel: 04/29/2024  History Lipid Disorder  Risks and Benefits Discussed  Screening Current      Other Preventive Screenings Covered by Medicare:  Abdominal Aortic Aneurysm (AAA) Screening: covered once if your at risk. You're considered to be at risk if you have a family history of AAA or a male between the age of 65-75 who smoking at least 100 cigarettes in your lifetime.  Lung Cancer Screening: covers low dose CT scan once per year if you meet all of the following conditions: (1) Age 55-77; (2) No signs or symptoms of lung cancer; (3) Current smoker or have quit smoking within the last 15 years; (4) You have a tobacco smoking history of at least 20 pack years (packs per day x number of years you smoked); (5) You get a written order from a healthcare provider.  Glaucoma Screening: covered annually if you're considered high risk: (1) You have diabetes OR (2) Family history of glaucoma OR (3)  aged 50 and older OR (4)  American aged 65 and older  Osteoporosis Screening: covered every 2 years if you meet one of the following conditions: (1) Have a vertebral abnormality; (2) On glucocorticoid therapy for more than 3 months; (3) Have primary hyperparathyroidism; (4) On osteoporosis medications and need to assess response to drug therapy.  HIV Screening: covered annually if you're between the age of 15-65. Also covered annually if you are younger than 15 and older than 65 with risk factors for HIV infection. For pregnant patients, it is covered up to 3 times per pregnancy.    Immunizations:  Immunization Recommendations   Influenza Vaccine Annual influenza vaccination during flu season is recommended for all persons aged >= 6 months who do not have contraindications   Pneumococcal Vaccine   * Pneumococcal conjugate vaccine = PCV13 (Prevnar  13), PCV15 (Vaxneuvance), PCV20 (Prevnar 20)  * Pneumococcal polysaccharide vaccine = PPSV23 (Pneumovax) Adults 19-63 yo with certain risk factors or if 65+ yo  If never received any pneumonia vaccine: recommend Prevnar 20 (PCV20)  Give PCV20 if previously received 1 dose of PCV13 or PPSV23   Hepatitis B Vaccine 3 dose series if at intermediate or high risk (ex: diabetes, end stage renal disease, liver disease)   Respiratory syncytial virus (RSV) Vaccine - COVERED BY MEDICARE PART D  * RSVPreF3 (Arexvy) CDC recommends that adults 60 years of age and older may receive a single dose of RSV vaccine using shared clinical decision-making (SCDM)   Tetanus (Td) Vaccine - COST NOT COVERED BY MEDICARE PART B Following completion of primary series, a booster dose should be given every 10 years to maintain immunity against tetanus. Td may also be given as tetanus wound prophylaxis.   Tdap Vaccine - COST NOT COVERED BY MEDICARE PART B Recommended at least once for all adults. For pregnant patients, recommended with each pregnancy.   Shingles Vaccine (Shingrix) - COST NOT COVERED BY MEDICARE PART B  2 shot series recommended in those 19 years and older who have or will have weakened immune systems or those 50 years and older     Health Maintenance Due:      Topic Date Due   • HIV Screening  Never done   • Lung Cancer Screening  09/11/2024   • Colorectal Cancer Screening  03/29/2027   • Hepatitis C Screening  Completed     Immunizations Due:      Topic Date Due   • Influenza Vaccine (1) 09/01/2024     Advance Directives   What are advance directives?  Advance directives are legal documents that state your wishes and plans for medical care. These plans are made ahead of time in case you lose your ability to make decisions for yourself. Advance directives can apply to any medical decision, such as the treatments you want, and if you want to donate organs.   What are the types of advance directives?  There are many types of advance  directives, and each state has rules about how to use them. You may choose a combination of any of the following:  Living will:  This is a written record of the treatment you want. You can also choose which treatments you do not want, which to limit, and which to stop at a certain time. This includes surgery, medicine, IV fluid, and tube feedings.   Durable power of  for healthcare (DPAHC):  This is a written record that states who you want to make healthcare choices for you when you are unable to make them for yourself. This person, called a proxy, is usually a family member or a friend. You may choose more than 1 proxy.  Do not resuscitate (DNR) order:  A DNR order is used in case your heart stops beating or you stop breathing. It is a request not to have certain forms of treatment, such as CPR. A DNR order may be included in other types of advance directives.  Medical directive:  This covers the care that you want if you are in a coma, near death, or unable to make decisions for yourself. You can list the treatments you want for each condition. Treatment may include pain medicine, surgery, blood transfusions, dialysis, IV or tube feedings, and a ventilator (breathing machine).  Values history:  This document has questions about your views, beliefs, and how you feel and think about life. This information can help others choose the care that you would choose.  Why are advance directives important?  An advance directive helps you control your care. Although spoken wishes may be used, it is better to have your wishes written down. Spoken wishes can be misunderstood, or not followed. Treatments may be given even if you do not want them. An advance directive may make it easier for your family to make difficult choices about your care.   Cigarette Smoking and Your Health   Risks to your health if you smoke:  Nicotine and other chemicals found in tobacco damage every cell in your body. Even if you are a light  smoker, you have an increased risk for cancer, heart disease, and lung disease. If you are pregnant or have diabetes, smoking increases your risk for complications.   Benefits to your health if you stop smoking:   You decrease respiratory symptoms such as coughing, wheezing, and shortness of breath.   You reduce your risk for cancers of the lung, mouth, throat, kidney, bladder, pancreas, stomach, and cervix. If you already have cancer, you increase the benefits of chemotherapy. You also reduce your risk for cancer returning or a second cancer from developing.   You reduce your risk for heart disease, blood clots, heart attack, and stroke.   You reduce your risk for lung infections, and diseases such as pneumonia, asthma, chronic bronchitis, and emphysema.  Your circulation improves. More oxygen can be delivered to your body. If you have diabetes, you lower your risk for complications, such as kidney, artery, and eye diseases. You also lower your risk for nerve damage. Nerve damage can lead to amputations, poor vision, and blindness.  You improve your body's ability to heal and to fight infections.  For more information and support to stop smoking:   [employer]  Phone: 6- 022 - 980-2915  Web Address: www.[employer]  Weight Management   Why it is important to manage your weight:  Being overweight increases your risk of health conditions such as heart disease, high blood pressure, type 2 diabetes, and certain types of cancer. It can also increase your risk for osteoarthritis, sleep apnea, and other respiratory problems. Aim for a slow, steady weight loss. Even a small amount of weight loss can lower your risk of health problems.  How to lose weight safely:  A safe and healthy way to lose weight is to eat fewer calories and get regular exercise. You can lose up about 1 pound a week by decreasing the number of calories you eat by 500 calories each day.   Healthy meal plan for weight management:  A healthy meal plan  includes a variety of foods, contains fewer calories, and helps you stay healthy. A healthy meal plan includes the following:  Eat whole-grain foods more often.  A healthy meal plan should contain fiber. Fiber is the part of grains, fruits, and vegetables that is not broken down by your body. Whole-grain foods are healthy and provide extra fiber in your diet. Some examples of whole-grain foods are whole-wheat breads and pastas, oatmeal, brown rice, and bulgur.  Eat a variety of vegetables every day.  Include dark, leafy greens such as spinach, kale, yumiko greens, and mustard greens. Eat yellow and orange vegetables such as carrots, sweet potatoes, and winter squash.   Eat a variety of fruits every day.  Choose fresh or canned fruit (canned in its own juice or light syrup) instead of juice. Fruit juice has very little or no fiber.  Eat low-fat dairy foods.  Drink fat-free (skim) milk or 1% milk. Eat fat-free yogurt and low-fat cottage cheese. Try low-fat cheeses such as mozzarella and other reduced-fat cheeses.  Choose meat and other protein foods that are low in fat.  Choose beans or other legumes such as split peas or lentils. Choose fish, skinless poultry (chicken or turkey), or lean cuts of red meat (beef or pork). Before you cook meat or poultry, cut off any visible fat.   Use less fat and oil.  Try baking foods instead of frying them. Add less fat, such as margarine, sour cream, regular salad dressing and mayonnaise to foods. Eat fewer high-fat foods. Some examples of high-fat foods include french fries, doughnuts, ice cream, and cakes.  Eat fewer sweets.  Limit foods and drinks that are high in sugar. This includes candy, cookies, regular soda, and sweetened drinks.  Exercise:  Exercise at least 30 minutes per day on most days of the week. Some examples of exercise include walking, biking, dancing, and swimming. You can also fit in more physical activity by taking the stairs instead of the elevator or  parking farther away from stores. Ask your healthcare provider about the best exercise plan for you.      © Copyright [employer] 2018 Information is for End User's use only and may not be sold, redistributed or otherwise used for commercial purposes. All illustrations and images included in CareNotes® are the copyrighted property of [employer] Inc. or [employer]

## 2024-08-01 NOTE — PROGRESS NOTES
"Ambulatory Visit  Name: Cedric Gramajo      : 1960      MRN: [id number]  Encounter Provider: Melinda Daily DO  Encounter Date: 2024   Encounter department: [facility]    Assessment & Plan   1. Encounter for subsequent annual wellness visit (AWV) in Medicare patient  - script given for repeat labs  - due for Lung Ca screening - (last was in 2023)   - UTD with AAA screen (2022)   - UTD with Colon Ca screening (3/2022) - h/o polyps - repeat in  - pt aware  - UTD with PCV20   - RTO in 1yr for AWV - pt aware and agreeable     2. Prostate cancer screening  -     PSA, Total Screen; Future  3. BPH with urinary obstruction  -     Ambulatory Referral to Urology; Future  - has noted diminishing flow and has to \"push\" to urinate   - will check PSA and referred to Uro for further eval - pt aware and agreeable     4. Polyp of colon, unspecified part of colon, unspecified type  - UTD with Colon Ca screening (3/2022) - h/o polyps - repeat in  - pt aware    5. Tobacco abuse  -     CT lung screening program; Future; Expected date: 2024  6. Personal history of nicotine dependence  -     CT lung screening program; Future; Expected date: 2024  7. Encounter for screening for lung cancer  -     CT lung screening program; Future; Expected date: 2024    8. Type 2 diabetes mellitus with other specified complication, without long-term current use of insulin (HCC)  -     Hemoglobin A1C; Future; Expected date: 2024  -     Comprehensive metabolic panel; Future; Expected date: 2024  -     Albumin / creatinine urine ratio; Future; Expected date: 2024  - A1c (2024): 7.0   - cont current regimen   - UTD with PCV20   - annual Flu vaccine and COVID Booster to be scheduled in the Fall   - UTD with annual Optho   - will defer DM foot exam till next OV   - RTO in 3months, with labs for f/u - pt aware and agreeable     9. Memory loss  -     Ambulatory Referral to " "Neurology; Future  10. Gait abnormality  -     Ambulatory Referral to Neurology; Future  - (+) memory loss and feels unsteady/\"a little wobbly\" when he wakes up        Preventive health issues were discussed with patient, and age appropriate screening tests were ordered as noted in patient's After Visit Summary. Personalized health advice and appropriate referrals for health education or preventive services given if needed, as noted in patient's After Visit Summary.    History of Present Illness     HPI as below     Patient Care Team:  Melinda Daily DO as PCP - General (Family Medicine)  Crystal Olmedo MD (Pain Medicine)  MD Gregory Whiteside MD Mamie Nyobe, CRNP Dang Zhang, MD Loveleen K Sidhu, MD as Endoscopist    Review of Systems as per HPI     Medical History Reviewed by provider this encounter:  Tobacco  Allergies  Meds  Problems  Med Hx  Surg Hx  Fam Hx       Annual Wellness Visit Questionnaire   Cedric is here for his Subsequent Wellness visit. Last Medicare Wellness visit information reviewed, patient interviewed and updates made to the record today.      Health Risk Assessment:   Patient rates overall health as fair. Patient feels that their physical health rating is same. Patient is very satisfied with their life. Eyesight was rated as same. Hearing was rated as same. Patient feels that their emotional and mental health rating is same. Patients states they are never, rarely angry. Patient states they are never, rarely unusually tired/fatigued. Pain experienced in the last 7 days has been none. Patient states that he has experienced no weight loss or gain in last 6 months.     Depression Screening:   PHQ-2 Score: 0      Fall Risk Screening:   In the past year, patient has experienced: no history of falling in past year      Home Safety:  Patient does not have trouble with stairs inside or outside of their home. Patient has working smoke alarms and has working carbon monoxide " detector. Home safety hazards include: none.     Nutrition:   Current diet is Regular.     Medications:   Patient is currently taking over-the-counter supplements. OTC medications include: see medication list. Patient is able to manage medications.     Activities of Daily Living (ADLs)/Instrumental Activities of Daily Living (IADLs):   Walk and transfer into and out of bed and chair?: Yes  Dress and groom yourself?: Yes    Bathe or shower yourself?: Yes    Feed yourself? Yes  Do your laundry/housekeeping?: Yes  Manage your money, pay your bills and track your expenses?: Yes  Make your own meals?: Yes    Do your own shopping?: Yes    Previous Hospitalizations:   Any hospitalizations or ED visits within the last 12 months?: Yes    How many hospitalizations have you had in the last year?: more than 4    Advance Care Planning:   Living will: Yes    Durable POA for healthcare: Yes    Advanced directive: Yes      Cognitive Screening:   Provider or family/friend/caregiver concerned regarding cognition?: No    PREVENTIVE SCREENINGS      Cardiovascular Screening:    General: History Lipid Disorder, Risks and Benefits Discussed and Screening Current      Diabetes Screening:     General: History Diabetes, Risks and Benefits Discussed and Screening Current    Due for: Blood Glucose      Colorectal Cancer Screening:     General: Screening Current      Prostate Cancer Screening:    General: Screening Current and Risks and Benefits Discussed    Due for: PSA      Osteoporosis Screening:    General: Screening Current      Abdominal Aortic Aneurysm (AAA) Screening:    Risk factors include: tobacco use        General: Screening Current      Lung Cancer Screening:     General: Screening Current and Risks and Benefits Discussed    Due for: Low Dose CT (LDCT)      Hepatitis C Screening:    General: Screening Current      Preventive Screening Comments: 65yo M presents to the office for sub AWV and f/u   - due for screening labs   - UTD  "with Colon Ca screening (3/2022) - h/o polyps - 5yr recall (due again in 2027)   - due for Lung Ca screening (last was 9/11/2023)   - UTD with AAA (2022)   - UTD with PCV20   - (+) memory loss and feels unsteady/\"a little wobbly\" when he wakes up   - (+) has noted diminishing flow and has to \"push\" to urinate   - denies F/C/N/V/CP/palpitations/SOB/abd pain/LE edema     Screening, Brief Intervention, and Referral to Treatment (SBIRT)    Screening  Typical number of drinks in a day: 0  Typical number of drinks in a week: 0  Interpretation: Low risk drinking behavior.    Single Item Drug Screening:  How often have you used an illegal drug (including marijuana) or a prescription medication for non-medical reasons in the past year? never    Single Item Drug Screen Score: 0  Interpretation: Negative screen for possible drug use disorder    Social Determinants of Health     Financial Resource Strain: Low Risk  (7/19/2023)    Overall Financial Resource Strain (CARDIA)     Difficulty of Paying Living Expenses: Not hard at all   Food Insecurity: No Food Insecurity (8/1/2024)    Hunger Vital Sign     Worried About Running Out of Food in the Last Year: Never true     Ran Out of Food in the Last Year: Never true   Transportation Needs: No Transportation Needs (8/1/2024)    PRAPARE - Transportation     Lack of Transportation (Medical): No     Lack of Transportation (Non-Medical): No   Housing Stability: Low Risk  (8/1/2024)    Housing Stability Vital Sign     Unable to Pay for Housing in the Last Year: No     Number of Times Moved in the Last Year: 1     Homeless in the Last Year: No   Utilities: Not At Risk (8/1/2024)    [facility] Utilities     Threatened with loss of utilities: No     No results found.    Objective     /78   Pulse 81   Ht 5' 11\" (1.803 m)   Wt 98.4 kg (217 lb)   SpO2 97%   BMI 30.27 kg/m²     Physical Exam  Vitals reviewed.   Constitutional:       General: He is not in acute distress.     Appearance: " Normal appearance. He is not ill-appearing, toxic-appearing or diaphoretic.   HENT:      Head: Normocephalic and atraumatic.      Right Ear: External ear normal.      Left Ear: External ear normal.      Nose: Nose normal.   Eyes:      General: No scleral icterus.        Right eye: No discharge.         Left eye: No discharge.      Extraocular Movements: Extraocular movements intact.      Conjunctiva/sclera: Conjunctivae normal.   Cardiovascular:      Rate and Rhythm: Normal rate and regular rhythm.      Heart sounds: Normal heart sounds.   Pulmonary:      Effort: Pulmonary effort is normal.      Breath sounds: Normal breath sounds.   Abdominal:      Palpations: Abdomen is soft.   Musculoskeletal:      Right lower leg: No edema.      Left lower leg: No edema.   Neurological:      General: No focal deficit present.      Mental Status: He is alert and oriented to person, place, and time.   Psychiatric:         Mood and Affect: Mood normal.         Behavior: Behavior normal.

## 2024-08-08 DIAGNOSIS — G47.00 INSOMNIA, UNSPECIFIED TYPE: ICD-10-CM

## 2024-08-08 RX ORDER — TRAZODONE HYDROCHLORIDE 100 MG/1
100 TABLET ORAL
Qty: 100 TABLET | Refills: 1 | Status: SHIPPED | OUTPATIENT
Start: 2024-08-08

## 2024-09-03 DIAGNOSIS — E78.2 MIXED HYPERLIPIDEMIA: ICD-10-CM

## 2024-09-04 DIAGNOSIS — R80.9 PROTEINURIA, UNSPECIFIED TYPE: ICD-10-CM

## 2024-09-04 RX ORDER — ATORVASTATIN CALCIUM 20 MG/1
20 TABLET, FILM COATED ORAL DAILY
Qty: 90 TABLET | Refills: 1 | Status: SHIPPED | OUTPATIENT
Start: 2024-09-04

## 2024-09-04 RX ORDER — LISINOPRIL 2.5 MG/1
2.5 TABLET ORAL
Qty: 90 TABLET | Refills: 1 | Status: SHIPPED | OUTPATIENT
Start: 2024-09-04

## 2024-09-18 ENCOUNTER — HOSPITAL ENCOUNTER (OUTPATIENT)
Dept: CT IMAGING | Facility: HOSPITAL | Age: 64
Discharge: HOME | End: 2024-09-18
Payer: COMMERCIAL

## 2024-09-18 DIAGNOSIS — Z12.2 ENCOUNTER FOR SCREENING FOR LUNG CANCER: ICD-10-CM

## 2024-09-18 DIAGNOSIS — Z72.0 TOBACCO ABUSE: ICD-10-CM

## 2024-09-18 DIAGNOSIS — Z87.891 PERSONAL HISTORY OF NICOTINE DEPENDENCE: ICD-10-CM

## 2024-09-18 PROCEDURE — 71271 CT THORAX LUNG CANCER SCR C-: CPT

## 2024-09-19 ENCOUNTER — TELEPHONE (OUTPATIENT)
Dept: FAMILY MEDICINE CLINIC | Facility: CLINIC | Age: 64
End: 2024-09-19

## 2024-09-19 NOTE — TELEPHONE ENCOUNTER
----- Message from Melinda Daily DO sent at 9/19/2024  2:49 PM EDT -----  Mild emphysema with stable nodules - repeat annually

## 2024-09-23 ENCOUNTER — OFFICE VISIT (OUTPATIENT)
Dept: PAIN MEDICINE | Facility: CLINIC | Age: 64
End: 2024-09-23
Payer: OTHER MISCELLANEOUS

## 2024-09-23 VITALS
WEIGHT: 212 LBS | HEART RATE: 78 BPM | DIASTOLIC BLOOD PRESSURE: 66 MMHG | SYSTOLIC BLOOD PRESSURE: 124 MMHG | BODY MASS INDEX: 29.68 KG/M2 | RESPIRATION RATE: 18 BRPM | HEIGHT: 71 IN

## 2024-09-23 DIAGNOSIS — M46.1 SACROILIITIS (HCC): ICD-10-CM

## 2024-09-23 DIAGNOSIS — G89.4 CHRONIC PAIN SYNDROME: Primary | ICD-10-CM

## 2024-09-23 DIAGNOSIS — M51.17 INTERVERTEBRAL DISC DISORDER WITH RADICULOPATHY OF LUMBOSACRAL REGION: ICD-10-CM

## 2024-09-23 DIAGNOSIS — M96.1 POSTLAMINECTOMY SYNDROME, LUMBAR REGION: ICD-10-CM

## 2024-09-23 PROCEDURE — 99214 OFFICE O/P EST MOD 30 MIN: CPT | Performed by: ANESTHESIOLOGY

## 2024-09-23 RX ORDER — OXYCODONE AND ACETAMINOPHEN 7.5; 325 MG/1; MG/1
1 TABLET ORAL 2 TIMES DAILY PRN
Qty: 60 TABLET | Refills: 0 | Status: SHIPPED | OUTPATIENT
Start: 2024-11-11

## 2024-09-23 RX ORDER — OXYCODONE AND ACETAMINOPHEN 7.5; 325 MG/1; MG/1
1 TABLET ORAL 2 TIMES DAILY PRN
Qty: 60 TABLET | Refills: 0 | Status: SHIPPED | OUTPATIENT
Start: 2024-12-09

## 2024-09-23 RX ORDER — OXYCODONE AND ACETAMINOPHEN 7.5; 325 MG/1; MG/1
1 TABLET ORAL 2 TIMES DAILY PRN
Qty: 60 TABLET | Refills: 0 | Status: SHIPPED | OUTPATIENT
Start: 2024-10-14

## 2024-09-23 NOTE — PROGRESS NOTES
Assessment:  1. Chronic pain syndrome    2. Postlaminectomy syndrome, lumbar region    3. Sacroiliitis (HCC)    4. Intervertebral disc disorder with radiculopathy of lumbosacral region        Plan:  The patient has been having worsening pain down the left leg so at this time he had reprogramming done with our [employer] spinal cord stimulator rep.  For now, he will continue with his current medication regimen of Percocet 7.5/325 which he typically only needs to use twice daily.  Prescriptions were sent for 3 months.  He also remains on gabapentin but does not need any refills at this time.    He will follow back up in 12 weeks for medication prescription refill and reevaluation.    There are risks associated with opioid medications, including dependence, addiction and tolerance. The patient understands and agrees to use these medications only as prescribed. Potential side effects of the medications include, but are not limited to, constipation, drowsiness, addiction, impaired judgment and risk of fatal overdose if not taken as prescribed. The patient was warned against driving while taking sedation medications.  Sharing medications is a felony. At this point in time, the patient is showing no signs of addiction, abuse, diversion or suicidal ideation.    [place] Prescription Drug Monitoring Program report was reviewed and was appropriate     My impressions and treatment recommendations were discussed in detail with the patient who verbalized understanding and had no further questions.  Discharge instructions were provided. I personally saw and examined the patient and I agree with the above discussed plan of care.    No orders of the defined types were placed in this encounter.    New Medications Ordered This Visit   Medications    oxyCODONE-acetaminophen (Percocet) 7.5-325 MG per tablet     Sig: Take 1 tablet by mouth 2 (two) times a day as needed for moderate pain Max Daily Amount: 2 tablets Do not start before  October 14, 2024.     Dispense:  60 tablet     Refill:  0    oxyCODONE-acetaminophen (Percocet) 7.5-325 MG per tablet     Sig: Take 1 tablet by mouth 2 (two) times a day as needed for moderate pain Max Daily Amount: 2 tablets Do not start before November 11, 2024.     Dispense:  60 tablet     Refill:  0    oxyCODONE-acetaminophen (Percocet) 7.5-325 MG per tablet     Sig: Take 1 tablet by mouth 2 (two) times a day as needed for moderate pain Max Daily Amount: 2 tablets Do not start before December 9, 2024.     Dispense:  60 tablet     Refill:  0       History of Present Illness:  Cedric Gramajo is a 64 y.o. male who presents for a follow up office visit in regards to Back Pain and Hip Pain (B/L).   The patient has a history of chronic pain syndromy secondary to lumbar postlaminectomy syndrome, lumbar spondylosis, sacroiliitis and lumbar radiculopathy.  He reports that he been having worsening pain down the left leg.  Symptoms are moderate rated 7/10 on numeric rating scale.  He states that he also did go without the Percocet for 2 months because of pharmacy issues but that has now been resolved.  This provides more than 50% relief when taken in conjunction with his gabapentin.  Sacroiliac joint injection performed in June was very helpful.    I have personally reviewed and/or updated the patient's past medical history, past surgical history, family history, social history, current medications, allergies, and vital signs today.     Review of Systems   Respiratory:  Negative for shortness of breath.    Cardiovascular:  Negative for chest pain.   Gastrointestinal:  Negative for constipation, diarrhea, nausea and vomiting.   Musculoskeletal:  Positive for back pain, gait problem and joint swelling. Negative for arthralgias and myalgias.   Skin:  Negative for rash.   Neurological:  Negative for dizziness, seizures and weakness.   All other systems reviewed and are negative.      Patient Active Problem List   Diagnosis     Heartburn    Erectile dysfunction of non-organic origin    Chronic low back pain    BPH with urinary obstruction    Blood pressure elevated without history of HTN    Sciatica    Myofascial pain syndrome    Lumbar spondylosis    Herniation of lumbar intervertebral disc with radiculopathy    Acute post-operative pain    Insomnia    Chronic pain syndrome    Low back pain    Class 1 obesity due to excess calories with serious comorbidity and body mass index (BMI) of 30.0 to 30.9 in adult    Tobacco abuse    Seasonal allergic rhinitis    Diabetic mononeuropathy associated with type 2 diabetes mellitus (HCC)    Postlaminectomy syndrome, lumbar region    Numbness and tingling in both hands    Intervertebral disc disorder with radiculopathy of lumbosacral region    Hyperlipidemia LDL goal <70    Proteinuria    Battery end of life of spinal cord stimulator    HTN (hypertension)    Gastroesophageal reflux disease    Spinal cord stimulator status    Uncomplicated opioid dependence (HCC)    Sacroiliitis (HCC)    Wheezing       Past Medical History:   Diagnosis Date    Acute bronchitis 06/03/2019    Arthritis     BPH (benign prostatic hypertrophy) with urinary obstruction     Chronic narcotic dependence (HCC)     percocet TID    Chronic pain disorder     back-spinal cord stimulator    Colon polyp     Diabetes mellitus (HCC)     type    Ear infection     Herniation of lumbar intervertebral disc with radiculopathy     Myofascial pain syndrome     Obesity     Sciatica     Spinal cord stimulator status 2017    implanted 2017    Tinnitus        Past Surgical History:   Procedure Laterality Date    ABSCESS DRAINAGE      groin    BACK SURGERY      sciatic nerve- spinal cord stimulator 2017    CAUDAL BLOCK N/A 10/26/2018    Procedure: Caudal Epidural Steroid Injection (10055);  Surgeon: Crystal Olmedo MD;  Location: [facility] MAIN OR;  Service: Pain Management     CAUDAL BLOCK N/A 11/30/2018    Procedure: Caudal Epidural Steroid Injection  ([id number]);  Surgeon: Crystal Olmedo MD;  Location: [facility] MAIN OR;  Service: Pain Management     COLONOSCOPY      EPIDURAL BLOCK INJECTION Right 5/10/2019    Procedure: L5 S1 transforaminal Epidural Steroid Injection ([id number];  Surgeon: Crystal Olmedo MD;  Location: [facility] MAIN OR;  Service: Pain Management     EPIDURAL BLOCK INJECTION Right 8/16/2019    Procedure: BLOCK / INJECTION EPIDURAL STEROID TRANSFORAMINAL;  Surgeon: Crystal Olmedo MD;  Location: [facility] MAIN OR;  Service: Pain Management     EPIDURAL BLOCK INJECTION Left 7/1/2021    Procedure: L5 S1 transforaminal epidural steroid injection ( [id number]);  Surgeon: Crystal Olmedo MD;  Location: [facility] MAIN OR;  Service: Pain Management     EPIDURAL BLOCK INJECTION Right 7/15/2021    Procedure: L5 S1 transforaminal epidural steroid injection ( [id number]);  Surgeon: Crystal Olmedo MD;  Location: [facility] MAIN OR;  Service: Pain Management     EPIDURAL BLOCK INJECTION Right 1/7/2022    Procedure: L5 S1 transforaminal epidural steroid injection ([id number]);  Surgeon: Crystal Olmedo MD;  Location: [facility] MAIN OR;  Service: Pain Management     EPIDURAL BLOCK INJECTION Left 1/28/2022    Procedure: L5 S1 transforaminal epidural steroid injection ([id number]);  Surgeon: Crystal Olmedo MD;  Location: [facility] MAIN OR;  Service: Pain Management     NERVE BLOCK Bilateral 4/26/2018    Procedure: B/L L3 L4 L5 S1 MBB #1 ([id number]);  Surgeon: Crystal Olmedo MD;  Location: [facility] MAIN OR;  Service: Pain Management     NERVE BLOCK Bilateral 5/11/2018    Procedure: B/L L3 L4 L5 S1 Medial Branch Block #2;  Surgeon: Crystal Olmedo MD;  Location: [facility] MAIN OR;  Service: Pain Management     NOSE SURGERY      NH COLONOSCOPY FLX DX W/COLLJ SPEC WHEN PFRMD N/A 3/6/2017    Procedure: COLONOSCOPY;  Surgeon: Leo Dhaliwal MD;  Location: [facility];  Service: Gastroenterology    NH INSJ/RPLCMT SPINAL NPG/RCVR POCKET CRTJ&CONNJ Left 6/29/2021    Procedure: REPLACEMENT  IMPLANTABLE PULSE GENERATOR DORSAL SPINAL COLUMN STIMULATOR, LEFT;  Surgeon: Julian Anna MD;  Location:  MAIN OR;  Service: Neurosurgery    KY AN IMPLTJ NSTIM ELTRDS PLATE/PADDLE EDRL Left 10/3/2017    Procedure: PLACEMENT THORACIC FOR INSERTION DORSAL COLUMN SPINAL CORD STIMULATOR (DCS) WITH BUTTOCK IMPLANTABLE PULSE GENERATOR(IMPULSE);  Surgeon: Gregory Fry MD;  Location:  MAIN OR;  Service: Neurosurgery    RADIOFREQUENCY ABLATION Right 5/18/2018    Procedure: Rt L3 L4 L5 S1 Radio Frequency Ablation ([id number]);  Surgeon: Crystal Olmedo MD;  Location: [facility] MAIN OR;  Service: Pain Management     RADIOFREQUENCY ABLATION Left 6/1/2018    Procedure: Lt L3 L4 L5 S1 Radio Frequency Ablation ([id number]);  Surgeon: Crystal Olmedo MD;  Location: [facility] MAIN OR;  Service: Pain Management        Family History   Problem Relation Age of Onset    Diabetes Mother     Arthritis Mother     Diabetes Father         mellitus     Heart attack Father 62    Hypertension Father     Arthritis Father        Social History     Occupational History    Occupation:  for [employer]    Tobacco Use    Smoking status: Every Day     Current packs/day: 1.00     Average packs/day: 1 pack/day for 42.0 years (42.0 ttl pk-yrs)     Types: Cigarettes    Smokeless tobacco: Never    Tobacco comments:     encouraged smoking cessation - history of smoking 30 or more pack years    Vaping Use    Vaping status: Never Used   Substance and Sexual Activity    Alcohol use: No     Comment: rare    Drug use: No    Sexual activity: Not Currently     Partners: Female       Current Outpatient Medications on File Prior to Visit   Medication Sig    albuterol (ProAir HFA) 90 mcg/act inhaler Inhale 2 puffs every 6 (six) hours as needed for wheezing    atorvastatin (LIPITOR) 20 mg tablet TAKE 1 TABLET EVERY DAY    bisacodyl (DULCOLAX) 5 mg EC tablet Take 20 mg by mouth 1 (one) time    clobetasol (TEMOVATE) 0.05 % ointment Apply topically 2  (two) times a day    etodolac (LODINE) 400 MG tablet Take 1 tablet (400 mg total) by mouth 2 (two) times a day    fluticasone (FLONASE) 50 mcg/act nasal spray 2 sprays into each nostril daily    gabapentin (Neurontin) 600 MG tablet Take 2 tablets (1,200 mg total) by mouth 2 (two) times a day    glipiZIDE (GLUCOTROL XL) 5 mg 24 hr tablet TAKE 1 TABLET EVERY DAY    lidocaine (LIDODERM) 5 % Apply 2 patches topically daily Remove & Discard patch within 12 hours or as directed by MD (Patient taking differently: Apply 2 patches topically daily as needed Remove & Discard patch within 12 hours or as directed by MD)    lisinopril (ZESTRIL) 2.5 mg tablet TAKE 1 TABLET AT BEDTIME    metFORMIN (GLUCOPHAGE) 1000 MG tablet TAKE 1 TABLET TWICE DAILY WITH MEALS    omeprazole (PriLOSEC) 20 mg delayed release capsule TAKE 1 CAPSULE EVERY DAY    QUEtiapine (SEROquel) 200 mg tablet TAKE 1 TABLET AT BEDTIME    tamsulosin (FLOMAX) 0.4 mg TAKE 2 CAPSULES EVERY DAY WITH DINNER    traZODone (DESYREL) 100 mg tablet TAKE 1 TABLET AT BEDTIME    [DISCONTINUED] oxyCODONE-acetaminophen (Percocet) 7.5-325 MG per tablet Take 1 tablet by mouth 2 (two) times a day as needed for moderate pain Max Daily Amount: 2 tablets Do not start before July 17, 2024.    [DISCONTINUED] oxyCODONE-acetaminophen (Percocet) 7.5-325 MG per tablet Take 1 tablet by mouth 2 (two) times a day as needed for moderate pain Max Daily Amount: 2 tablets Do not start before August 14, 2024.    [DISCONTINUED] oxyCODONE-acetaminophen (Percocet) 7.5-325 MG per tablet Take 1 tablet by mouth 2 (two) times a day as needed for moderate pain Max Daily Amount: 2 tablets Do not start before September 12, 2024.    etodolac (LODINE) 300 MG capsule Take 1 capsule by mouth 3 times a day as needed for pain.  Take with food. (Patient not taking: Reported on 4/9/2024)    Fluticasone-Salmeterol (Advair) 500-50 mcg/dose inhaler Inhale 1 puff 2 (two) times a day Rinse mouth after use.    magnesium  "citrate solution Take 296 mL by mouth once (Patient not taking: Reported on 8/1/2024)    nicotine (NICODERM CQ) 21 mg/24 hr TD 24 hr patch Place 1 patch on the skin over 24 hours every 24 hours (Patient not taking: Reported on 4/9/2024)    Polyethylene Glycol 3350 (MIRALAX PO) Take by mouth 1 (one) time (Patient not taking: Reported on 4/9/2024)    Saccharomyces boulardii (Probiotic) 250 MG CAPS Take 1 capsule (250 mg total) by mouth in the morning (Patient not taking: Reported on 4/9/2024)    sildenafil (VIAGRA) 50 MG tablet Take 1 tablet (50 mg total) by mouth as needed for erectile dysfunction Take on an empty stomach, 1 hour before sexual activity, no alcohol. 100 mg max dose. (Patient not taking: Reported on 6/22/2023)    [DISCONTINUED] promethazine-dextromethorphan (PHENERGAN-DM) 6.25-15 mg/5 mL oral syrup Take 5 mL by mouth 4 (four) times a day as needed for cough (Patient not taking: Reported on 4/9/2024)     No current facility-administered medications on file prior to visit.       Allergies   Allergen Reactions    Penicillins Swelling     Mouth swelling       Physical Exam:    /66   Pulse 78   Resp 18   Ht 5' 11\" (1.803 m)   Wt 96.2 kg (212 lb)   BMI 29.57 kg/m²     Constitutional:normal, well developed, well nourished, alert, in no distress and non-toxic and no overt pain behavior.  Eyes:anicteric  HEENT:grossly intact  Neck:supple, symmetric, trachea midline and no masses   Pulmonary:even and unlabored  Cardiovascular:No edema or pitting edema present  Skin:Normal without rashes or lesions and well hydrated  Psychiatric:Mood and affect appropriate  Neurologic:Cranial Nerves II-XII grossly intact  Musculoskeletal: Examination of the bilateral lower extremity flexors/extensors reveal strength 5/5    Imaging  BILATERAL HIPS AND PELVIS     INDICATION:   Sacroiliitis, not elsewhere classified.      COMPARISON: None.     VIEWS:  XR HIPS BILATERAL 2 VW W PELVIS IF PERFORMED        FINDINGS:  The bony " pelvis appears intact.  Degenerative changes visualized lower lumbar spine.      LEFT HIP:  There is no acute fracture or dislocation.  No significant hip degenerative changes.  No lytic or blastic osseous lesion.  Soft tissues are unremarkable.     RIGHT HIP:  There is no acute fracture or dislocation.  No significant hip degenerative changes.  No lytic or blastic osseous lesion.  Soft tissues are unremarkable. A generator is seen with leads extending into the spine.     IMPRESSION:        No acute osseous abnormality of either hip.  Degenerative changes of visualized lower lumbar spine.        Electronically signed: 07/01/2024 01:02 PM Rudy Fonseca MD

## 2024-10-03 DIAGNOSIS — E11.9 TYPE 2 DIABETES MELLITUS WITHOUT COMPLICATION, WITHOUT LONG-TERM CURRENT USE OF INSULIN (HCC): ICD-10-CM

## 2024-10-03 RX ORDER — GLIPIZIDE 5 MG/1
5 TABLET, FILM COATED, EXTENDED RELEASE ORAL DAILY
Qty: 90 TABLET | Refills: 1 | Status: SHIPPED | OUTPATIENT
Start: 2024-10-03

## 2024-10-20 DIAGNOSIS — N40.1 BPH WITH URINARY OBSTRUCTION: ICD-10-CM

## 2024-10-20 DIAGNOSIS — N13.8 BPH WITH URINARY OBSTRUCTION: ICD-10-CM

## 2024-10-20 RX ORDER — TAMSULOSIN HYDROCHLORIDE 0.4 MG/1
CAPSULE ORAL
Qty: 180 CAPSULE | Refills: 1 | Status: SHIPPED | OUTPATIENT
Start: 2024-10-20

## 2024-11-14 ENCOUNTER — APPOINTMENT (OUTPATIENT)
Dept: LAB | Facility: IMAGING CENTER | Age: 64
End: 2024-11-14
Payer: COMMERCIAL

## 2024-11-14 ENCOUNTER — RESULTS FOLLOW-UP (OUTPATIENT)
Dept: FAMILY MEDICINE CLINIC | Facility: CLINIC | Age: 64
End: 2024-11-14

## 2024-11-14 DIAGNOSIS — Z12.5 PROSTATE CANCER SCREENING: ICD-10-CM

## 2024-11-14 DIAGNOSIS — E11.69 TYPE 2 DIABETES MELLITUS WITH OTHER SPECIFIED COMPLICATION, WITHOUT LONG-TERM CURRENT USE OF INSULIN (HCC): ICD-10-CM

## 2024-11-14 LAB
ALBUMIN SERPL BCG-MCNC: 4.2 G/DL (ref 3.5–5)
ALP SERPL-CCNC: 62 U/L (ref 34–104)
ALT SERPL W P-5'-P-CCNC: 30 U/L (ref 7–52)
ANION GAP SERPL CALCULATED.3IONS-SCNC: 7 MMOL/L (ref 4–13)
AST SERPL W P-5'-P-CCNC: 18 U/L (ref 13–39)
BILIRUB SERPL-MCNC: 0.44 MG/DL (ref 0.2–1)
BUN SERPL-MCNC: 15 MG/DL (ref 5–25)
CALCIUM SERPL-MCNC: 8.8 MG/DL (ref 8.4–10.2)
CHLORIDE SERPL-SCNC: 103 MMOL/L (ref 96–108)
CO2 SERPL-SCNC: 29 MMOL/L (ref 21–32)
CREAT SERPL-MCNC: 0.65 MG/DL (ref 0.6–1.3)
CREAT UR-MCNC: 114.9 MG/DL
EST. AVERAGE GLUCOSE BLD GHB EST-MCNC: 131 MG/DL
GFR SERPL CREATININE-BSD FRML MDRD: 102 ML/MIN/1.73SQ M
GLUCOSE P FAST SERPL-MCNC: 164 MG/DL (ref 65–99)
HBA1C MFR BLD: 6.2 %
MICROALBUMIN UR-MCNC: 12.1 MG/L
MICROALBUMIN/CREAT 24H UR: 11 MG/G CREATININE (ref 0–30)
POTASSIUM SERPL-SCNC: 4.6 MMOL/L (ref 3.5–5.3)
PROT SERPL-MCNC: 6.6 G/DL (ref 6.4–8.4)
PSA SERPL-MCNC: 0.21 NG/ML (ref 0–4)
SODIUM SERPL-SCNC: 139 MMOL/L (ref 135–147)

## 2024-11-14 PROCEDURE — 80053 COMPREHEN METABOLIC PANEL: CPT

## 2024-11-14 PROCEDURE — 36415 COLL VENOUS BLD VENIPUNCTURE: CPT

## 2024-11-14 PROCEDURE — G0103 PSA SCREENING: HCPCS

## 2024-11-14 PROCEDURE — 82570 ASSAY OF URINE CREATININE: CPT

## 2024-11-14 PROCEDURE — 83036 HEMOGLOBIN GLYCOSYLATED A1C: CPT

## 2024-11-14 PROCEDURE — 82043 UR ALBUMIN QUANTITATIVE: CPT

## 2024-11-16 DIAGNOSIS — K21.9 GASTROESOPHAGEAL REFLUX DISEASE WITHOUT ESOPHAGITIS: ICD-10-CM

## 2024-11-16 DIAGNOSIS — J30.2 SEASONAL ALLERGIC RHINITIS, UNSPECIFIED TRIGGER: ICD-10-CM

## 2024-11-16 RX ORDER — FLUTICASONE PROPIONATE 50 MCG
2 SPRAY, SUSPENSION (ML) NASAL DAILY
Qty: 48 G | Refills: 1 | Status: SHIPPED | OUTPATIENT
Start: 2024-11-16

## 2024-11-22 DIAGNOSIS — G89.4 CHRONIC PAIN SYNDROME: ICD-10-CM

## 2024-11-22 RX ORDER — ETODOLAC 400 MG/1
400 TABLET, FILM COATED ORAL 2 TIMES DAILY
Qty: 180 TABLET | Refills: 3 | Status: SHIPPED | OUTPATIENT
Start: 2024-11-22

## 2024-12-09 ENCOUNTER — OFFICE VISIT (OUTPATIENT)
Dept: FAMILY MEDICINE CLINIC | Facility: CLINIC | Age: 64
End: 2024-12-09
Payer: COMMERCIAL

## 2024-12-09 VITALS
OXYGEN SATURATION: 98 % | SYSTOLIC BLOOD PRESSURE: 130 MMHG | HEIGHT: 71 IN | DIASTOLIC BLOOD PRESSURE: 70 MMHG | BODY MASS INDEX: 30.94 KG/M2 | WEIGHT: 221 LBS | HEART RATE: 75 BPM

## 2024-12-09 DIAGNOSIS — E11.9 TYPE 2 DIABETES MELLITUS WITHOUT COMPLICATION, WITHOUT LONG-TERM CURRENT USE OF INSULIN (HCC): Primary | ICD-10-CM

## 2024-12-09 DIAGNOSIS — Z12.2 ENCOUNTER FOR SCREENING FOR LUNG CANCER: ICD-10-CM

## 2024-12-09 DIAGNOSIS — E78.2 MIXED HYPERLIPIDEMIA: ICD-10-CM

## 2024-12-09 DIAGNOSIS — K63.5 POLYP OF COLON, UNSPECIFIED PART OF COLON, UNSPECIFIED TYPE: ICD-10-CM

## 2024-12-09 DIAGNOSIS — Z87.891 PERSONAL HISTORY OF NICOTINE DEPENDENCE: ICD-10-CM

## 2024-12-09 DIAGNOSIS — R80.9 PROTEINURIA, UNSPECIFIED TYPE: ICD-10-CM

## 2024-12-09 DIAGNOSIS — G47.00 INSOMNIA, UNSPECIFIED TYPE: ICD-10-CM

## 2024-12-09 DIAGNOSIS — Z12.5 PROSTATE CANCER SCREENING: ICD-10-CM

## 2024-12-09 PROCEDURE — 99214 OFFICE O/P EST MOD 30 MIN: CPT | Performed by: FAMILY MEDICINE

## 2024-12-09 PROCEDURE — G2211 COMPLEX E/M VISIT ADD ON: HCPCS | Performed by: FAMILY MEDICINE

## 2024-12-09 NOTE — PROGRESS NOTES
Assessment/Plan:   Diagnoses and all orders for this visit:    Type 2 diabetes mellitus without complication, without long-term current use of insulin (HCC)  -     Diabetic foot exam; Future  -     Hemoglobin A1C; Future  -     Comprehensive metabolic panel; Future  -     Albumin / creatinine urine ratio; Future  - A1c = 6.2 <-- 7.0  - stable renal function and urine microalbumin   - DM foot exam as documented below   - UTD with annual Ophtho   - UTD with annual Flu vaccine  - UTD with latest COVID Booster  - UTD with PCV20   - cont current regimen   - RTO in 6months, with repeat labs, for f/u and AWV  - The ASCVD Risk score (Jey MILES, et al., 2019) failed to calculate for the following reasons:    The valid total cholesterol range is 130 to 320 mg/dL  Proteinuria, unspecified type  -     Albumin / creatinine urine ratio; Future    Mixed hyperlipidemia  -     Lipid panel; Future    Insomnia, unspecified type  - stable on Seroquel     Prostate cancer screening  - stable PSA     Polyp of colon, unspecified part of colon, unspecified type  - UTD with Colon Ca screening (3/2022) - h/o polyps - repeat in 2027 - pt aware     Personal history of nicotine dependence  Encounter for screening for lung cancer  - CT lung (9/18/2024): Mild emphysema with stable nodules - repeat annually           Subjective:    Patient ID: Cedric Gramajo is a 64 y.o. male.  HPI  65yo M presents to the office for f/u   - reviewed labs done 11/14/2024   - A1c = 6.2 <-- 7.0   - stable renal function, urine microalbumin and PSA   - CT lung (9/18/2024): Mild emphysema with stable nodules - repeat annually   - no longer with balance issues   - sometimes feels like he is forgetting but denies memory loss    - UTD with COVID Booster and annual Flu vaccine   - doing well - denies F/C/N/V/CP/palpitations/SOB/wheezing/abd pain/LE edema       The following portions of the patient's history were reviewed and updated as appropriate: allergies, current  "medications, past family history, past medical history, past social history, past surgical history and problem list.    Review of Systems  as per HPI    Objective:  /70   Pulse 75   Ht 5' 11\" (1.803 m)   Wt 100 kg (221 lb)   SpO2 98%   BMI 30.82 kg/m²    Physical Exam  Vitals reviewed.   Constitutional:       General: He is not in acute distress.     Appearance: Normal appearance. He is not ill-appearing, toxic-appearing or diaphoretic.   HENT:      Head: Normocephalic and atraumatic.      Right Ear: External ear normal.      Left Ear: External ear normal.      Nose: Nose normal.   Eyes:      General: No scleral icterus.        Right eye: No discharge.         Left eye: No discharge.      Extraocular Movements: Extraocular movements intact.      Conjunctiva/sclera: Conjunctivae normal.   Cardiovascular:      Rate and Rhythm: Normal rate and regular rhythm.      Pulses: no weak pulses.           Dorsalis pedis pulses are 2+ on the right side and 2+ on the left side.      Heart sounds: Normal heart sounds.   Pulmonary:      Effort: Pulmonary effort is normal. No respiratory distress.      Breath sounds: Normal breath sounds. No stridor. No wheezing, rhonchi or rales.   Abdominal:      Palpations: Abdomen is soft.   Musculoskeletal:         General: Normal range of motion.      Cervical back: Normal range of motion.      Right lower leg: No edema.      Left lower leg: No edema.   Feet:      Right foot:      Skin integrity: No ulcer, skin breakdown, erythema, warmth, callus or dry skin.      Left foot:      Skin integrity: No ulcer, skin breakdown, erythema, warmth, callus or dry skin.   Skin:     General: Skin is warm and dry.   Neurological:      General: No focal deficit present.      Mental Status: He is alert and oriented to person, place, and time.   Psychiatric:         Mood and Affect: Mood normal.         Behavior: Behavior normal.       Patient's shoes and socks removed.    Right Foot/Ankle   Right " Foot Inspection  Skin Exam: skin normal and skin intact. No dry skin, no warmth, no callus, no erythema, no maceration, no abnormal color, no pre-ulcer, no ulcer and no callus.     Toe Exam: ROM and strength within normal limits.     Sensory   Monofilament testing: intact    Vascular  The right DP pulse is 2+.     Left Foot/Ankle  Left Foot Inspection  Skin Exam: skin normal and skin intact. No dry skin, no warmth, no erythema, no maceration, normal color, no pre-ulcer, no ulcer and no callus.     Toe Exam: ROM and strength within normal limits.     Sensory   Monofilament testing: intact    Vascular  The left DP pulse is 2+.     Assign Risk Category  No deformity present  No loss of protective sensation  No weak pulses  Risk: 0

## 2024-12-23 DIAGNOSIS — G47.00 INSOMNIA, UNSPECIFIED TYPE: ICD-10-CM

## 2024-12-24 RX ORDER — TRAZODONE HYDROCHLORIDE 100 MG/1
100 TABLET ORAL
Qty: 90 TABLET | Refills: 1 | Status: SHIPPED | OUTPATIENT
Start: 2024-12-24

## 2025-01-06 ENCOUNTER — OFFICE VISIT (OUTPATIENT)
Dept: PAIN MEDICINE | Facility: CLINIC | Age: 65
End: 2025-01-06
Payer: OTHER MISCELLANEOUS

## 2025-01-06 DIAGNOSIS — M46.1 SACROILIITIS (HCC): ICD-10-CM

## 2025-01-06 DIAGNOSIS — G89.4 CHRONIC PAIN SYNDROME: Primary | ICD-10-CM

## 2025-01-06 DIAGNOSIS — M51.16 INTERVERTEBRAL DISC DISORDER WITH RADICULOPATHY OF LUMBAR REGION: ICD-10-CM

## 2025-01-06 DIAGNOSIS — M96.1 POSTLAMINECTOMY SYNDROME OF LUMBAR REGION: ICD-10-CM

## 2025-01-06 PROCEDURE — 99214 OFFICE O/P EST MOD 30 MIN: CPT | Performed by: ANESTHESIOLOGY

## 2025-01-06 NOTE — PROGRESS NOTES
Assessment:  1. Chronic pain syndrome    2. Postlaminectomy syndrome of lumbar region    3. Sacroiliitis (HCC)    4. Intervertebral disc disorder with radiculopathy of lumbar region        Plan:  The patient's pain symptoms are stable.  At this time, we did discuss about stopping the etodolac to see if that is making any difference in his pain and he is going to try that.  He will remain on the gabapentin at the current dosing as well as the Percocet 7.5/325.  Prescriptions were sent.    He will follow back up in 3 months at which time he will also meet with a spinal cord stimulator representative for some reprogramming.    There are risks associated with opioid medications, including dependence, addiction and tolerance. The patient understands and agrees to use these medications only as prescribed. Potential side effects of the medications include, but are not limited to, constipation, drowsiness, addiction, impaired judgment and risk of fatal overdose if not taken as prescribed. The patient was warned against driving while taking sedation medications.  Sharing medications is a felony. At this point in time, the patient is showing no signs of addiction, abuse, diversion or suicidal ideation.    [place] Prescription Drug Monitoring Program report was reviewed and was appropriate     My impressions and treatment recommendations were discussed in detail with the patient who verbalized understanding and had no further questions.  Discharge instructions were provided. I personally saw and examined the patient and I agree with the above discussed plan of care.    No orders of the defined types were placed in this encounter.    New Medications Ordered This Visit   Medications   • oxyCODONE-acetaminophen (Percocet) 7.5-325 MG per tablet     Sig: Take 1 tablet by mouth 2 (two) times a day as needed for moderate pain Max Daily Amount: 2 tablets Do not start before March 6, 2025.     Dispense:  60 tablet     Refill:  0    • oxyCODONE-acetaminophen (Percocet) 7.5-325 MG per tablet     Sig: Take 1 tablet by mouth 2 (two) times a day as needed for moderate pain Max Daily Amount: 2 tablets Do not start before February 6, 2025.     Dispense:  60 tablet     Refill:  0   • oxyCODONE-acetaminophen (Percocet) 7.5-325 MG per tablet     Sig: Take 1 tablet by mouth 2 (two) times a day as needed for moderate pain Max Daily Amount: 2 tablets Do not start before January 9, 2025.     Dispense:  60 tablet     Refill:  0         History of Present Illness:  Cedric Gramajo is a 64 y.o. male who presents for a follow up office visit in regards to Back Pain and Leg Pain (LLE).   The patient was last seen on 9/23/2024 and returns for follow-up.  He reports that pain symptoms are generally well-controlled.  He has been having a little bit more pain on the left side and he was post to meet with a spinal cord stimulator representative today but due to the weather that was canceled.  Pain is moderate rated 7/10 on numeric rating scale and manageable with his current regimen.  He is inquiring about whether or not Etodolac and gabapentin are still helpful.    I have personally reviewed and/or updated the patient's past medical history, past surgical history, family history, social history, current medications, allergies, and vital signs today.     Review of Systems   Respiratory:  Negative for shortness of breath.    Cardiovascular:  Negative for chest pain.   Gastrointestinal:  Negative for constipation, diarrhea, nausea and vomiting.   Musculoskeletal:  Positive for back pain, gait problem and joint swelling. Negative for arthralgias and myalgias.        LLE Pain   Skin:  Negative for rash.   Neurological:  Negative for dizziness, seizures and weakness.   All other systems reviewed and are negative.      Patient Active Problem List   Diagnosis   • Heartburn   • Erectile dysfunction of non-organic origin   • Chronic low back pain   • BPH with urinary  obstruction   • Blood pressure elevated without history of HTN   • Sciatica   • Myofascial pain syndrome   • Lumbar spondylosis   • Herniation of lumbar intervertebral disc with radiculopathy   • Acute post-operative pain   • Insomnia   • Chronic pain syndrome   • Low back pain   • Class 1 obesity due to excess calories with serious comorbidity and body mass index (BMI) of 30.0 to 30.9 in adult   • Tobacco abuse   • Seasonal allergic rhinitis   • Diabetic mononeuropathy associated with type 2 diabetes mellitus (HCC)   • Postlaminectomy syndrome, lumbar region   • Numbness and tingling in both hands   • Intervertebral disc disorder with radiculopathy of lumbosacral region   • Hyperlipidemia LDL goal <70   • Proteinuria   • Battery end of life of spinal cord stimulator   • HTN (hypertension)   • Gastroesophageal reflux disease   • Spinal cord stimulator status   • Uncomplicated opioid dependence (HCC)   • Sacroiliitis (HCC)   • Wheezing       Past Medical History:   Diagnosis Date   • Acute bronchitis 06/03/2019   • Arthritis    • BPH (benign prostatic hypertrophy) with urinary obstruction    • Chronic narcotic dependence (HCC)     percocet TID   • Chronic pain disorder     back-spinal cord stimulator   • Colon polyp    • Diabetes mellitus (HCC)     type   • Ear infection    • Herniation of lumbar intervertebral disc with radiculopathy    • Myofascial pain syndrome    • Obesity    • Sciatica    • Spinal cord stimulator status 2017    implanted 2017   • Tinnitus        Past Surgical History:   Procedure Laterality Date   • ABSCESS DRAINAGE      groin   • BACK SURGERY      sciatic nerve- spinal cord stimulator 2017   • CAUDAL BLOCK N/A 10/26/2018    Procedure: Caudal Epidural Steroid Injection (50533);  Surgeon: Crystal Olmedo MD;  Location: [facility] MAIN OR;  Service: Pain Management    • CAUDAL BLOCK N/A 11/30/2018    Procedure: Caudal Epidural Steroid Injection ([id number]);  Surgeon: Crystal Olmedo MD;  Location: [facility]  MAIN OR;  Service: Pain Management    • COLONOSCOPY     • EPIDURAL BLOCK INJECTION Right 5/10/2019    Procedure: L5 S1 transforaminal Epidural Steroid Injection ([id number];  Surgeon: Crystal Olmedo MD;  Location: [facility] MAIN OR;  Service: Pain Management    • EPIDURAL BLOCK INJECTION Right 8/16/2019    Procedure: BLOCK / INJECTION EPIDURAL STEROID TRANSFORAMINAL;  Surgeon: Crystal Olmedo MD;  Location: [facility] MAIN OR;  Service: Pain Management    • EPIDURAL BLOCK INJECTION Left 7/1/2021    Procedure: L5 S1 transforaminal epidural steroid injection ( [id number]);  Surgeon: Crystal Olmedo MD;  Location: [facility] MAIN OR;  Service: Pain Management    • EPIDURAL BLOCK INJECTION Right 7/15/2021    Procedure: L5 S1 transforaminal epidural steroid injection ( [id number]);  Surgeon: Crystal Olmedo MD;  Location: [facility] MAIN OR;  Service: Pain Management    • EPIDURAL BLOCK INJECTION Right 1/7/2022    Procedure: L5 S1 transforaminal epidural steroid injection ([id number]);  Surgeon: Crystal Olmedo MD;  Location: [facility] MAIN OR;  Service: Pain Management    • EPIDURAL BLOCK INJECTION Left 1/28/2022    Procedure: L5 S1 transforaminal epidural steroid injection ([id number]);  Surgeon: Crystal Olmedo MD;  Location: [facility] MAIN OR;  Service: Pain Management    • NERVE BLOCK Bilateral 4/26/2018    Procedure: B/L L3 L4 L5 S1 MBB #1 ([id number]);  Surgeon: Crystal Olmedo MD;  Location: [facility] MAIN OR;  Service: Pain Management    • NERVE BLOCK Bilateral 5/11/2018    Procedure: B/L L3 L4 L5 S1 Medial Branch Block #2;  Surgeon: Crystal Olmedo MD;  Location: [facility] MAIN OR;  Service: Pain Management    • NOSE SURGERY     • CA COLONOSCOPY FLX DX W/COLLJ SPEC WHEN PFRMD N/A 3/6/2017    Procedure: COLONOSCOPY;  Surgeon: Leo Dhaliwal MD;  [facility]: [facility];  Service: Gastroenterology   • CA INSJ/RPLCMT SPINAL NPG/RCVR POCKET CRTJ&CONNJ Left 6/29/2021    Procedure: REPLACEMENT IMPLANTABLE PULSE GENERATOR DORSAL SPINAL  COLUMN STIMULATOR, LEFT;  Surgeon: Julian Anna MD;  Location:  MAIN OR;  Service: Neurosurgery   • AZ AN IMPLTJ NSTIM ELTRDS PLATE/PADDLE EDRL Left 10/3/2017    Procedure: PLACEMENT THORACIC FOR INSERTION DORSAL COLUMN SPINAL CORD STIMULATOR (DCS) WITH BUTTOCK IMPLANTABLE PULSE GENERATOR(IMPULSE);  Surgeon: Gregory Fry MD;  Location:  MAIN OR;  Service: Neurosurgery   • RADIOFREQUENCY ABLATION Right 5/18/2018    Procedure: Rt L3 L4 L5 S1 Radio Frequency Ablation ([id number]);  Surgeon: Crystal Olmedo MD;  Location: [facility] MAIN OR;  Service: Pain Management    • RADIOFREQUENCY ABLATION Left 6/1/2018    Procedure: Lt L3 L4 L5 S1 Radio Frequency Ablation ([id number]);  Surgeon: Crystal Olmedo MD;  Location: [facility] MAIN OR;  Service: Pain Management        Family History   Problem Relation Age of Onset   • Diabetes Mother    • Arthritis Mother    • Diabetes Father         mellitus    • Heart attack Father 62   • Hypertension Father    • Arthritis Father        Social History     Occupational History   • Occupation:  for [employer]    Tobacco Use   • Smoking status: Every Day     Current packs/day: 1.00     Average packs/day: 1 pack/day for 42.0 years (42.0 ttl pk-yrs)     Types: Cigarettes   • Smokeless tobacco: Never   • Tobacco comments:     encouraged smoking cessation - history of smoking 30 or more pack years    Vaping Use   • Vaping status: Never Used   Substance and Sexual Activity   • Alcohol use: No     Comment: rare   • Drug use: No   • Sexual activity: Not Currently     Partners: Female       Current Outpatient Medications on File Prior to Visit   Medication Sig   • albuterol (ProAir HFA) 90 mcg/act inhaler Inhale 2 puffs every 6 (six) hours as needed for wheezing   • atorvastatin (LIPITOR) 20 mg tablet TAKE 1 TABLET EVERY DAY   • bisacodyl (DULCOLAX) 5 mg EC tablet Take 20 mg by mouth 1 (one) time   • clobetasol (TEMOVATE) 0.05 % ointment Apply topically 2 (two) times a day   •  etodolac (LODINE) 400 MG tablet TAKE 1 TABLET TWICE DAILY   • fluticasone (FLONASE) 50 mcg/act nasal spray USE 2 SPRAYS IN EACH NOSTRIL EVERY DAY   • gabapentin (Neurontin) 600 MG tablet Take 2 tablets (1,200 mg total) by mouth 2 (two) times a day   • glipiZIDE (GLUCOTROL XL) 5 mg 24 hr tablet TAKE 1 TABLET EVERY DAY   • lidocaine (LIDODERM) 5 % Apply 2 patches topically daily Remove & Discard patch within 12 hours or as directed by MD   • lisinopril (ZESTRIL) 2.5 mg tablet TAKE 1 TABLET AT BEDTIME   • metFORMIN (GLUCOPHAGE) 1000 MG tablet TAKE 1 TABLET TWICE DAILY WITH MEALS   • omeprazole (PriLOSEC) 20 mg delayed release capsule TAKE 1 CAPSULE EVERY DAY   • QUEtiapine (SEROquel) 200 mg tablet TAKE 1 TABLET AT BEDTIME   • Saccharomyces boulardii (Probiotic) 250 MG CAPS Take 1 capsule (250 mg total) by mouth in the morning   • tamsulosin (FLOMAX) 0.4 mg TAKE 2 CAPSULES EVERY DAY WITH DINNER   • traZODone (DESYREL) 100 mg tablet TAKE 1 TABLET AT BEDTIME   • [DISCONTINUED] oxyCODONE-acetaminophen (Percocet) 7.5-325 MG per tablet Take 1 tablet by mouth 2 (two) times a day as needed for moderate pain Max Daily Amount: 2 tablets Do not start before October 14, 2024.   • [DISCONTINUED] oxyCODONE-acetaminophen (Percocet) 7.5-325 MG per tablet Take 1 tablet by mouth 2 (two) times a day as needed for moderate pain Max Daily Amount: 2 tablets Do not start before November 11, 2024.   • [DISCONTINUED] oxyCODONE-acetaminophen (Percocet) 7.5-325 MG per tablet Take 1 tablet by mouth 2 (two) times a day as needed for moderate pain Max Daily Amount: 2 tablets Do not start before December 9, 2024.   • Fluticasone-Salmeterol (Advair) 500-50 mcg/dose inhaler Inhale 1 puff 2 (two) times a day Rinse mouth after use.   • magnesium citrate solution Take 296 mL by mouth once (Patient not taking: Reported on 8/1/2024)   • nicotine (NICODERM CQ) 21 mg/24 hr TD 24 hr patch Place 1 patch on the skin over 24 hours every 24 hours (Patient not  taking: Reported on 4/9/2024)   • Polyethylene Glycol 3350 (MIRALAX PO) Take by mouth 1 (one) time (Patient not taking: Reported on 4/9/2024)   • sildenafil (VIAGRA) 50 MG tablet Take 1 tablet (50 mg total) by mouth as needed for erectile dysfunction Take on an empty stomach, 1 hour before sexual activity, no alcohol. 100 mg max dose. (Patient not taking: Reported on 6/22/2023)     No current facility-administered medications on file prior to visit.       Allergies   Allergen Reactions   • Penicillins Swelling     Mouth swelling       Physical Exam:    There were no vitals taken for this visit.    Constitutional:normal, well developed, well nourished, alert, in no distress and non-toxic and no overt pain behavior.  Eyes:anicteric  HEENT:grossly intact  Neck:supple, symmetric, trachea midline and no masses   Pulmonary:even and unlabored  Cardiovascular:No edema or pitting edema present  Skin:Normal without rashes or lesions and well hydrated  Psychiatric:Mood and affect appropriate  Neurologic:Cranial Nerves II-XII grossly intact  Musculoskeletal:normal    Lumbar Spine Exam  Appearance:  Normal lordosis  Palpation/Tenderness:  left lumbar paraspinal tenderness  Range of Motion:  Flexion:  Minimally limited  with pain  Extension:  Minimally limited  with pain  Motor Strength:  Left hip flexion:  5/5  Left hip extension:  5/5  Right hip flexion:  5/5  Right hip extension:  5/5  Left knee flexion:  5/5  Left knee extension:  5/5  Right knee flexion:  5/5  Right knee extension:  5/5  Left foot dorsiflexion:  5/5  Left foot plantar flexion:  5/5  Right foot dorsiflexion:  5/5  Right foot plantar flexion:  5/5

## 2025-01-08 RX ORDER — OXYCODONE AND ACETAMINOPHEN 7.5; 325 MG/1; MG/1
1 TABLET ORAL 2 TIMES DAILY PRN
Qty: 60 TABLET | Refills: 0 | Status: SHIPPED | OUTPATIENT
Start: 2025-01-09

## 2025-01-08 RX ORDER — OXYCODONE AND ACETAMINOPHEN 7.5; 325 MG/1; MG/1
1 TABLET ORAL 2 TIMES DAILY PRN
Qty: 60 TABLET | Refills: 0 | Status: SHIPPED | OUTPATIENT
Start: 2025-03-06

## 2025-01-08 RX ORDER — OXYCODONE AND ACETAMINOPHEN 7.5; 325 MG/1; MG/1
1 TABLET ORAL 2 TIMES DAILY PRN
Qty: 60 TABLET | Refills: 0 | Status: SHIPPED | OUTPATIENT
Start: 2025-02-06

## 2025-01-14 DIAGNOSIS — E78.2 MIXED HYPERLIPIDEMIA: ICD-10-CM

## 2025-01-14 RX ORDER — ATORVASTATIN CALCIUM 20 MG/1
20 TABLET, FILM COATED ORAL DAILY
Qty: 90 TABLET | Refills: 3 | Status: SHIPPED | OUTPATIENT
Start: 2025-01-14

## 2025-01-17 NOTE — PROGRESS NOTES
HPI Notes    Name: Celestino Earl  : 1966         Chief Complaint:     Chief Complaint   Patient presents with    Hypertension     6 month follow up.        History of Present Illness:        HPI    This is a 59-year-old gentleman with essential hypertension and mixed hyperlipidemia presenting for an annual physical exam. He is normotensive and doing well on his antihypertensive regimen. However, he did stop his Lipitor noting SE of dizziness. He has no other concerns today and feels well.     Past Medical History:     Past Medical History:   Diagnosis Date    Chronic back pain     Hypertension       Reviewed all health maintenance requirements and ordered appropriate tests  Health Maintenance Due   Topic Date Due    Pneumococcal 0-64 years Vaccine (1 of 2 - PCV) Never done    Hepatitis B vaccine (1 of 3 - 19+ 3-dose series) Never done    DTaP/Tdap/Td vaccine (1 - Tdap) Never done    Shingles vaccine (1 of 2) Never done    Lung Cancer Screening &/or Counseling  Never done    Flu vaccine (1) Never done    COVID-19 Vaccine (3 - - season) 2024       Past Surgical History:     Past Surgical History:   Procedure Laterality Date    COLONOSCOPY N/A 9/15/2022    COLONOSCOPY POLYPECTOMY SNARE/COLD BIOPSY performed by Nanette Mora MD at [facility] ENDOSCOPY    HERNIA REPAIR          Medications:       Prior to Admission medications    Medication Sig Start Date End Date Taking? Authorizing Provider   lisinopril (PRINIVIL;ZESTRIL) 40 MG tablet TAKE 1 TABLET BY MOUTH EVERY DAY 24  Yes Dejon Lemus DO   metoprolol succinate (TOPROL XL) 100 MG extended release tablet TAKE 1 TABLET BY MOUTH EVERY DAY 24  Yes Dejon Lemus DO   Blood Pressure KIT 1 Dose by Does not apply route daily 20   Bandar Trinidad DNP        Allergies:       Patient has no known allergies.    Social History:     Tobacco:    reports that he has been smoking cigarettes. He has a 25 pack-year smoking history. He  PT Evaluation     Today's date: 2018  Patient name: Rios Fountain  : 1960  MRN: [id number]  Referring provider: Mayito Sierra  Dx:   Encounter Diagnosis     ICD-10-CM    1  Chronic lumbar radiculopathy M54 16 Ambulatory referral to Physical Therapy                  Assessment  Impairments: abnormal coordination, abnormal or restricted ROM, activity intolerance, impaired physical strength, lacks appropriate home exercise program and pain with function  Functional limitations: Patient reports to evaluation with Chronic lumbar radiculopathy with the following functional impairments: walking,  stair ambulation, squatting, lifting, sitting, driving   Assessment details: Rios Fountain is a 62y o  year old male who presents to IE with:   Chronic lumbar radiculopathy    Tegan Maya presents with the impairments as listed above and would benefit from Physical Therapy to address these impairments and to maximize function  Understanding of Dx/Px/POC: good   Prognosis: good  Prognosis details: Tegan Maya prognosis may be affected by the following: chronic pain, elevated fear avoidance, BMI > 30, type II diabetes, sleep dysfunction  Patient noting throughout treatment session "we'll see if this works, I don't know about this," "I haven't really been doing anything,"    Goals  Short-Term Goals:   1  Patient's ROM will increase by 25% in 4 weeks   2  Patient will have pain less than 5/10 in 4 weeks  Long-Term Goals:   1  Patient will be able to ambulate with minimal to no pain at time of discharge  2  Patient independent with HEP at time of discharge       Plan  Patient would benefit from: PT eval and skilled PT  Planned therapy interventions: body mechanics training, breathing training, home exercise program, IADL retraining, joint mobilization, manual therapy, motor coordination training, neuromuscular re-education, patient education, postural training, strengthening, stretching, therapeutic activities, therapeutic exercise and work reintegration  Frequency: 3x week  Duration in visits: 15  Duration in weeks: 5  Treatment plan discussed with: patient  Plan details: Thank you for referring Krissy Dwyer to Physical Therapy at [place] [address] and for the opportunity to coordinate care  Patient reporting elevated pain levels with entire physical examination  Subjective Evaluation    History of Present Illness  Date of onset: 8/10/2016  Mechanism of injury: Bandar Damon reports to  following injury at work that occurred on 8/10/16  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 , but has not been working since incident  Bandar Damon reports FISH when he was laying down working on a conveyer  He reports a co-worker tried to help him up and he felt back pain and notes "within a couple of days it started to get worse " He reports pain into R LE at this time reporting N/T into R foot at times  He also reports pain into "tailbone area and both sides of my lower back and my right butt cheek " He denies saddle anesthesia and loss of control of bowel and bladder  He denies any pain referring into L LE , but does report pain into L buttocks at times   He reports he had cortical stimulator implanted which has "helped with the pain, otherwise I wouldn't be walking "      CONSTITUTIONAL: No fever, no chills, no malaise, no recent weight loss or gain   EYES: No complaints of vision changes, no red eyes, no diplopia   ENT: no loss of hearing, no tinnitus, no nosebleeds, no sore throat, no dysphasia, no dysphagia  CARDIOVASCULAR: no complaints of chest pain, no palpitations, no LE edema; patient has HTN  RESPIRATORY: no complaints of SOB, no wheezing, no cough  GASTROINTESTINAL: no reports of abdominal pain, no constipation, no diarrhea, no vomiting, no bloody stools, no other changes in digestion, no loss of control of bowel  GENITOURINARY: no reports of dysuria, no incontinence, no loss of control of bladder  INTEGUMENTARY: no complaints of skin rash or irritation, no bleeding or drainage   NEUROLOGICAL:  DTR, myotomes, and dermatomes performed in neurological section     Quality of life: fair    Pain  Current pain ratin  At best pain ratin  At worst pain ratin  Location: Low back   Quality: throbbing  Aggravating factors: standing, walking and sitting  Progression: no change    Social Support    Employment status: working ([employer]- conveyer [employer])  Treatments  Current treatment: physical therapy  Patient Goals  Patient goals for therapy: decreased pain, increased motion, increased strength, independence with ADLs/IADLs and return to work          Objective     Special Questions  Positive for disturbed sleep  Negative for bladder dysfunction, bowel dysfunction and saddle (S4) numbness    Postural Observations    Additional Postural Observation Details  Patient demonstrates following posture: Increased thoracic kyphosis   Cervical protrusion  Rounded shoulders, increased IR  Patient demonstrating R lateral shift posture while sitting in chair with L LE extended  He currently uses back L pocket for wallet  He has scar along thoracic spine and L posterior ilium for cortical stimulator  Palpation   Left   Hypertonic in the quadratus lumborum  Tenderness of the erector spinae and quadratus lumborum  Right   Hypertonic in the quadratus lumborum  Tenderness of the erector spinae and quadratus lumborum  Tenderness     Lumbar Spine  Tenderness in the spinous process  Left Hip   Tenderness in the PSIS  Right Hip   Tenderness in the PSIS  Additional Tenderness Details  He reports pain with P/A mobilizations T10-S1    Neurological Testing     Sensation     Lumbar   Left   Intact: light touch    Right   Diminished: light touch    Additional Neurological Details  Patient reports N/T in R LE at this time  20% sensation loss in dermatomes L3, L4, and L5         Active Range of Motion     Additional Active Range of Motion Details  Lumbar flexion: 50%  Lumbar extension: 50%  L Lateral flexion: 25%  R lateral flexion: 25%  L lateral rotation: 25%  R lateral rotation: 25%  *patient reporting pain with all ROM  Strength/Myotome Testing     Left Hip   Planes of Motion   Flexion: 4  Extension: 3  Abduction: 3    Right Hip   Planes of Motion   Flexion: 4  Extension: 3  Abduction: 3    Left Knee   Flexion: 4  Extension: 4    Right Knee   Flexion: 4  Extension: 4    Left Ankle/Foot   Dorsiflexion: 4  Plantar flexion: 4    Right Ankle/Foot   Dorsiflexion: 4  Plantar flexion: 4    Tests       Thoracic   Positive slump  Lumbar   Positive slumped  Left   Positive passive SLR and lumbar push  Right   Positive passive SLR and lumbar push  Left Pelvic Girdle/Sacrum   Positive: sacrum compression, sacral spring and thigh thrust      Right Pelvic Girdle/Sacrum   Positive: sacrum compression, sacral spring and thigh thrust      General Comments     Lumbar Comments  Gait: Patient ambulating with R antalgic gait at this time, increased forward trunk flexion and R lateral flexion with decreased R hip extension with toe off         Precautions: chronic pain, elevated fear avoidance, BMI > 30, type II diabetes, sleep dysfunction  Access code: [email or web address]  * Indicates part of HEP     Daily Treatment Diary     Manual              QL TrP bilateral                                                                     Exercise Diary  4/23            Abdominal bracing* :05x10            LTR* :05x5            Clamshells              Isometrics              Hamstring stretch             DLS marching             DLS extension             Prone lying*             Frogs                                                                                                                                                                Modalities

## 2025-01-22 DIAGNOSIS — E11.9 TYPE 2 DIABETES MELLITUS WITHOUT COMPLICATION, WITHOUT LONG-TERM CURRENT USE OF INSULIN (HCC): ICD-10-CM

## 2025-01-28 ENCOUNTER — TELEPHONE (OUTPATIENT)
Dept: PAIN MEDICINE | Facility: CLINIC | Age: 65
End: 2025-01-28

## 2025-01-28 DIAGNOSIS — G89.4 CHRONIC PAIN SYNDROME: Primary | ICD-10-CM

## 2025-01-28 RX ORDER — NAPROXEN 500 MG/1
500 TABLET ORAL 2 TIMES DAILY WITH MEALS
Qty: 180 TABLET | Refills: 3 | Status: SHIPPED | OUTPATIENT
Start: 2025-01-28

## 2025-01-28 NOTE — TELEPHONE ENCOUNTER
S/w Pt, Pt requesting medication alternative. Pt is currently paying $83 for Etodolac, per pharmacy Meloxicam or Naproxen would be no cost to Pt. Pt requesting 90 day script be sent to [facility]. Please advise.

## 2025-01-28 NOTE — TELEPHONE ENCOUNTER
Patient called the RX Refill Line. Message is being forwarded to the office.     Patient called to see if the doctor could send a script in for Meloxicam or Naproxen. He stated the Etodolac is going to cost him too much. Please send script to [facility] - [place], [place] - [address].    Please contact patient at [phone] with any questions

## 2025-01-30 DIAGNOSIS — R80.9 PROTEINURIA, UNSPECIFIED TYPE: ICD-10-CM

## 2025-01-30 RX ORDER — LISINOPRIL 2.5 MG/1
2.5 TABLET ORAL
Qty: 90 TABLET | Refills: 1 | Status: SHIPPED | OUTPATIENT
Start: 2025-01-30

## 2025-02-26 DIAGNOSIS — E11.9 TYPE 2 DIABETES MELLITUS WITHOUT COMPLICATION, WITHOUT LONG-TERM CURRENT USE OF INSULIN (HCC): ICD-10-CM

## 2025-02-26 RX ORDER — GLIPIZIDE 5 MG/1
5 TABLET, FILM COATED, EXTENDED RELEASE ORAL DAILY
Qty: 90 TABLET | Refills: 1 | Status: SHIPPED | OUTPATIENT
Start: 2025-02-26

## 2025-03-05 DIAGNOSIS — M47.816 LUMBAR SPONDYLOSIS: ICD-10-CM

## 2025-03-05 DIAGNOSIS — M54.16 LUMBAR RADICULOPATHY: ICD-10-CM

## 2025-03-05 DIAGNOSIS — M96.1 POSTLAMINECTOMY SYNDROME, LUMBAR: ICD-10-CM

## 2025-03-05 DIAGNOSIS — M54.50 CHRONIC BILATERAL LOW BACK PAIN WITHOUT SCIATICA: ICD-10-CM

## 2025-03-05 DIAGNOSIS — G89.29 CHRONIC BILATERAL LOW BACK PAIN WITHOUT SCIATICA: ICD-10-CM

## 2025-03-05 DIAGNOSIS — G89.4 CHRONIC PAIN SYNDROME: ICD-10-CM

## 2025-03-05 DIAGNOSIS — G47.00 INSOMNIA, UNSPECIFIED TYPE: ICD-10-CM

## 2025-03-05 RX ORDER — GABAPENTIN 600 MG/1
1200 TABLET ORAL 2 TIMES DAILY
Qty: 360 TABLET | Refills: 3 | Status: SHIPPED | OUTPATIENT
Start: 2025-03-05

## 2025-03-07 DIAGNOSIS — N40.1 BPH WITH URINARY OBSTRUCTION: ICD-10-CM

## 2025-03-07 DIAGNOSIS — N13.8 BPH WITH URINARY OBSTRUCTION: ICD-10-CM

## 2025-03-07 RX ORDER — QUETIAPINE FUMARATE 200 MG/1
200 TABLET, FILM COATED ORAL
Qty: 90 TABLET | Refills: 3 | Status: SHIPPED | OUTPATIENT
Start: 2025-03-07

## 2025-03-07 RX ORDER — TAMSULOSIN HYDROCHLORIDE 0.4 MG/1
CAPSULE ORAL
Qty: 180 CAPSULE | Refills: 3 | Status: SHIPPED | OUTPATIENT
Start: 2025-03-07

## 2025-04-06 DIAGNOSIS — J30.2 SEASONAL ALLERGIC RHINITIS, UNSPECIFIED TRIGGER: ICD-10-CM

## 2025-04-07 ENCOUNTER — OFFICE VISIT (OUTPATIENT)
Dept: PAIN MEDICINE | Facility: CLINIC | Age: 65
End: 2025-04-07
Payer: COMMERCIAL

## 2025-04-07 VITALS
SYSTOLIC BLOOD PRESSURE: 104 MMHG | HEART RATE: 77 BPM | DIASTOLIC BLOOD PRESSURE: 63 MMHG | HEIGHT: 71 IN | BODY MASS INDEX: 30.94 KG/M2 | WEIGHT: 221 LBS

## 2025-04-07 DIAGNOSIS — G89.4 CHRONIC PAIN SYNDROME: Primary | ICD-10-CM

## 2025-04-07 DIAGNOSIS — M51.17 INTERVERTEBRAL DISC DISORDER WITH RADICULOPATHY OF LUMBOSACRAL REGION: ICD-10-CM

## 2025-04-07 DIAGNOSIS — M96.1 POSTLAMINECTOMY SYNDROME, LUMBAR REGION: ICD-10-CM

## 2025-04-07 PROCEDURE — 99214 OFFICE O/P EST MOD 30 MIN: CPT | Performed by: ANESTHESIOLOGY

## 2025-04-07 RX ORDER — OXYCODONE AND ACETAMINOPHEN 7.5; 325 MG/1; MG/1
1 TABLET ORAL 2 TIMES DAILY PRN
Qty: 60 TABLET | Refills: 0 | Status: SHIPPED | OUTPATIENT
Start: 2025-04-07

## 2025-04-07 RX ORDER — FLUTICASONE PROPIONATE 50 MCG
2 SPRAY, SUSPENSION (ML) NASAL DAILY
Qty: 48 G | Refills: 1 | Status: SHIPPED | OUTPATIENT
Start: 2025-04-07

## 2025-04-07 RX ORDER — OXYCODONE AND ACETAMINOPHEN 7.5; 325 MG/1; MG/1
1 TABLET ORAL 2 TIMES DAILY PRN
Qty: 60 TABLET | Refills: 0 | Status: SHIPPED | OUTPATIENT
Start: 2025-05-05

## 2025-04-07 RX ORDER — OXYCODONE AND ACETAMINOPHEN 7.5; 325 MG/1; MG/1
1 TABLET ORAL 2 TIMES DAILY PRN
Qty: 60 TABLET | Refills: 0 | Status: SHIPPED | OUTPATIENT
Start: 2025-06-02

## 2025-04-07 NOTE — PROGRESS NOTES
Assessment:  1. Chronic pain syndrome    2. Intervertebral disc disorder with radiculopathy of lumbosacral region    3. Postlaminectomy syndrome, lumbar region        Plan:  The patient at this time had reprogramming of his spinal cord stimulator to get more coverage of his left leg.  I advised him to give an update in about 2 weeks on how that is working.  For now, he will continue with the current dosing of gabapentin and naproxen.  Refills were also sent for his Percocet which he uses 2 tablets daily for this month as well as do not fill dates for May 5, 2025 and June 2, 2025.    He will follow back up in 12 weeks for reevaluation.    My impressions and treatment recommendations were discussed in detail with the patient who verbalized understanding and had no further questions.  Discharge instructions were provided. I personally saw and examined the patient and I agree with the above discussed plan of care.    No orders of the defined types were placed in this encounter.    New Medications Ordered This Visit   Medications   • oxyCODONE-acetaminophen (Percocet) 7.5-325 MG per tablet     Sig: Take 1 tablet by mouth 2 (two) times a day as needed for moderate pain Max Daily Amount: 2 tablets Do not start before June 2, 2025.     Dispense:  60 tablet     Refill:  0   • oxyCODONE-acetaminophen (Percocet) 7.5-325 MG per tablet     Sig: Take 1 tablet by mouth 2 (two) times a day as needed for moderate pain Max Daily Amount: 2 tablets Do not start before May 5, 2025.     Dispense:  60 tablet     Refill:  0   • oxyCODONE-acetaminophen (Percocet) 7.5-325 MG per tablet     Sig: Take 1 tablet by mouth 2 (two) times a day as needed for moderate pain Max Daily Amount: 2 tablets     Dispense:  60 tablet     Refill:  0       History of Present Illness:  Cedric Gramajo is a 64 y.o. male who presents for a follow up office visit in regards to Back Pain (Left side back pain), Buttocks Pain (Left side buttock), and Leg Pain (Left leg  pain).  The patient reports that he has been experiencing more pain on his left lower back hip and leg that radiates.  The [employer] spinal cord stimulator rep was available today for reprogramming of the stimulator and was able to get better coverage of his left leg.  He reports that he continues to use naproxen, gabapentin and Percocet which help control pain symptoms.    I have personally reviewed and/or updated the patient's past medical history, past surgical history, family history, social history, current medications, allergies, and vital signs today.     Review of Systems   Respiratory:  Negative for shortness of breath.    Cardiovascular:  Negative for chest pain.   Gastrointestinal:  Negative for constipation, diarrhea, nausea and vomiting.   Musculoskeletal:  Positive for back pain (left side). Negative for arthralgias, gait problem, joint swelling and myalgias.        Left buttock pain  Left leg pain   Skin:  Negative for rash.   Neurological:  Negative for dizziness, seizures and weakness.   All other systems reviewed and are negative.      Patient Active Problem List   Diagnosis   • Heartburn   • Erectile dysfunction of non-organic origin   • Chronic low back pain   • BPH with urinary obstruction   • Blood pressure elevated without history of HTN   • Sciatica   • Myofascial pain syndrome   • Lumbar spondylosis   • Herniation of lumbar intervertebral disc with radiculopathy   • Acute post-operative pain   • Insomnia   • Chronic pain syndrome   • Low back pain   • Class 1 obesity due to excess calories with serious comorbidity and body mass index (BMI) of 30.0 to 30.9 in adult   • Tobacco abuse   • Seasonal allergic rhinitis   • Diabetic mononeuropathy associated with type 2 diabetes mellitus (HCC)   • Postlaminectomy syndrome, lumbar region   • Numbness and tingling in both hands   • Intervertebral disc disorder with radiculopathy of lumbosacral region   • Hyperlipidemia LDL goal <70   • Proteinuria   •  Battery end of life of spinal cord stimulator   • HTN (hypertension)   • Gastroesophageal reflux disease   • Spinal cord stimulator status   • Uncomplicated opioid dependence (HCC)   • Sacroiliitis (HCC)   • Wheezing       Past Medical History:   Diagnosis Date   • Acute bronchitis 06/03/2019   • Arthritis    • BPH (benign prostatic hypertrophy) with urinary obstruction    • Chronic narcotic dependence (HCC)     percocet TID   • Chronic pain disorder     back-spinal cord stimulator   • Colon polyp    • Diabetes mellitus (HCC)     type   • Ear infection    • Herniation of lumbar intervertebral disc with radiculopathy    • Myofascial pain syndrome    • Obesity    • Sciatica    • Spinal cord stimulator status 2017    implanted 2017   • Tinnitus        Past Surgical History:   Procedure Laterality Date   • ABSCESS DRAINAGE      groin   • BACK SURGERY      sciatic nerve- spinal cord stimulator 2017   • CAUDAL BLOCK N/A 10/26/2018    Procedure: Caudal Epidural Steroid Injection (30926);  Surgeon: Crystal Olmedo MD;  Location: [facility] MAIN OR;  Service: Pain Management    • CAUDAL BLOCK N/A 11/30/2018    Procedure: Caudal Epidural Steroid Injection (50915);  Surgeon: Crystal Olmedo MD;  Location: [facility] MAIN OR;  Service: Pain Management    • COLONOSCOPY     • EPIDURAL BLOCK INJECTION Right 5/10/2019    Procedure: L5 S1 transforaminal Epidural Steroid Injection ([id number];  Surgeon: Crystal Olmedo MD;  Location: [facility] MAIN OR;  Service: Pain Management    • EPIDURAL BLOCK INJECTION Right 8/16/2019    Procedure: BLOCK / INJECTION EPIDURAL STEROID TRANSFORAMINAL;  Surgeon: Crystal Olmedo MD;  Location: [facility] MAIN OR;  Service: Pain Management    • EPIDURAL BLOCK INJECTION Left 7/1/2021    Procedure: L5 S1 transforaminal epidural steroid injection ( [id number]);  Surgeon: Crystal Olmedo MD;  Location: [facility] MAIN OR;  Service: Pain Management    • EPIDURAL BLOCK INJECTION Right 7/15/2021    Procedure: L5 S1 transforaminal  epidural steroid injection ( [id number]);  Surgeon: Crystal Olmedo MD;  Location: [facility] MAIN OR;  Service: Pain Management    • EPIDURAL BLOCK INJECTION Right 1/7/2022    Procedure: L5 S1 transforaminal epidural steroid injection ([id number]);  Surgeon: Crystal Olmedo MD;  Location: [facility] MAIN OR;  Service: Pain Management    • EPIDURAL BLOCK INJECTION Left 1/28/2022    Procedure: L5 S1 transforaminal epidural steroid injection ([id number]);  Surgeon: Crystal Olmedo MD;  Location: [facility] MAIN OR;  Service: Pain Management    • NERVE BLOCK Bilateral 4/26/2018    Procedure: B/L L3 L4 L5 S1 MBB #1 ([id number]);  Surgeon: Crystal Olmedo MD;  Location: [facility] MAIN OR;  Service: Pain Management    • NERVE BLOCK Bilateral 5/11/2018    Procedure: B/L L3 L4 L5 S1 Medial Branch Block #2;  Surgeon: Crystal Olmedo MD;  Location: [facility] MAIN OR;  Service: Pain Management    • NOSE SURGERY     • MT COLONOSCOPY FLX DX W/COLLJ SPEC WHEN PFRMD N/A 3/6/2017    Procedure: COLONOSCOPY;  Surgeon: Leo Dhaliwal MD;  Location:  GI LAB;  Service: Gastroenterology   • MT INSJ/RPLCMT SPINAL NPG/RCVR POCKET CRTJ&CONNJ Left 6/29/2021    Procedure: REPLACEMENT IMPLANTABLE PULSE GENERATOR DORSAL SPINAL COLUMN STIMULATOR, LEFT;  Surgeon: Julian Anna MD;  Location:  MAIN OR;  Service: Neurosurgery   • MT AN IMPLTJ NSTIM ELTRDS PLATE/PADDLE EDRL Left 10/3/2017    Procedure: PLACEMENT THORACIC FOR INSERTION DORSAL COLUMN SPINAL CORD STIMULATOR (DCS) WITH BUTTOCK IMPLANTABLE PULSE GENERATOR(IMPULSE);  Surgeon: Gregory Fry MD;  Location:  MAIN OR;  Service: Neurosurgery   • RADIOFREQUENCY ABLATION Right 5/18/2018    Procedure: Rt L3 L4 L5 S1 Radio Frequency Ablation ([id number]);  Surgeon: Crystal Olmedo MD;  Location: [facility] MAIN OR;  Service: Pain Management    • RADIOFREQUENCY ABLATION Left 6/1/2018    Procedure: Lt L3 L4 L5 S1 Radio Frequency Ablation ([id number]);  Surgeon: Crystal Olmedo MD;  Location: [facility] MAIN  OR;  Service: Pain Management        Family History   Problem Relation Age of Onset   • Diabetes Mother    • Arthritis Mother    • Diabetes Father         mellitus    • Heart attack Father 62   • Hypertension Father    • Arthritis Father        Social History     Occupational History   • Occupation:  for "[employer] center    Tobacco Use   • Smoking status: Every Day     Current packs/day: 1.00     Average packs/day: 1 pack/day for 42.0 years (42.0 ttl pk-yrs)     Types: Cigarettes   • Smokeless tobacco: Never   • Tobacco comments:     encouraged smoking cessation - history of smoking 30 or more pack years    Vaping Use   • Vaping status: Never Used   Substance and Sexual Activity   • Alcohol use: No     Comment: rare   • Drug use: No   • Sexual activity: Not Currently     Partners: Female       Current Outpatient Medications on File Prior to Visit   Medication Sig   • albuterol (ProAir HFA) 90 mcg/act inhaler Inhale 2 puffs every 6 (six) hours as needed for wheezing   • atorvastatin (LIPITOR) 20 mg tablet TAKE 1 TABLET EVERY DAY   • bisacodyl (DULCOLAX) 5 mg EC tablet Take 20 mg by mouth 1 (one) time   • clobetasol (TEMOVATE) 0.05 % ointment Apply topically 2 (two) times a day   • fluticasone (FLONASE) 50 mcg/act nasal spray USE 2 SPRAYS IN EACH NOSTRIL EVERY DAY   • gabapentin (NEURONTIN) 600 MG tablet TAKE 2 TABLETS TWICE DAILY   • glipiZIDE (GLUCOTROL XL) 5 mg 24 hr tablet TAKE 1 TABLET EVERY DAY   • lidocaine (LIDODERM) 5 % Apply 2 patches topically daily Remove & Discard patch within 12 hours or as directed by MD   • lisinopril (ZESTRIL) 2.5 mg tablet TAKE 1 TABLET AT BEDTIME   • metFORMIN (GLUCOPHAGE) 1000 MG tablet TAKE 1 TABLET TWICE DAILY WITH MEALS   • naproxen (Naprosyn) 500 mg tablet Take 1 tablet (500 mg total) by mouth 2 (two) times a day with meals   • omeprazole (PriLOSEC) 20 mg delayed release capsule TAKE 1 CAPSULE EVERY DAY   • QUEtiapine (SEROquel) 200 mg tablet TAKE 1 TABLET AT BEDTIME    • Saccharomyces boulardii (Probiotic) 250 MG CAPS Take 1 capsule (250 mg total) by mouth in the morning   • tamsulosin (FLOMAX) 0.4 mg TAKE 2 CAPSULES EVERY DAY WITH DINNER   • traZODone (DESYREL) 100 mg tablet TAKE 1 TABLET AT BEDTIME   • [DISCONTINUED] oxyCODONE-acetaminophen (Percocet) 7.5-325 MG per tablet Take 1 tablet by mouth 2 (two) times a day as needed for moderate pain Max Daily Amount: 2 tablets Do not start before March 6, 2025.   • Fluticasone-Salmeterol (Advair) 500-50 mcg/dose inhaler Inhale 1 puff 2 (two) times a day Rinse mouth after use.   • magnesium citrate solution Take 296 mL by mouth once (Patient not taking: Reported on 8/1/2024)   • nicotine (NICODERM CQ) 21 mg/24 hr TD 24 hr patch Place 1 patch on the skin over 24 hours every 24 hours (Patient not taking: Reported on 4/9/2024)   • Polyethylene Glycol 3350 (MIRALAX PO) Take by mouth 1 (one) time (Patient not taking: Reported on 4/9/2024)   • sildenafil (VIAGRA) 50 MG tablet Take 1 tablet (50 mg total) by mouth as needed for erectile dysfunction Take on an empty stomach, 1 hour before sexual activity, no alcohol. 100 mg max dose. (Patient not taking: Reported on 6/22/2023)   • [DISCONTINUED] oxyCODONE-acetaminophen (Percocet) 7.5-325 MG per tablet Take 1 tablet by mouth 2 (two) times a day as needed for moderate pain Max Daily Amount: 2 tablets Do not start before February 6, 2025. (Patient not taking: Reported on 4/7/2025)   • [DISCONTINUED] oxyCODONE-acetaminophen (Percocet) 7.5-325 MG per tablet Take 1 tablet by mouth 2 (two) times a day as needed for moderate pain Max Daily Amount: 2 tablets Do not start before January 9, 2025. (Patient not taking: Reported on 4/7/2025)     No current facility-administered medications on file prior to visit.       Allergies   Allergen Reactions   • Penicillins Swelling     Mouth swelling       Physical Exam:    /63 (BP Location: Right arm, Patient Position: Sitting, Cuff Size: Standard)   Pulse  "77   Ht 5' 11\" (1.803 m)   Wt 100 kg (221 lb)   BMI 30.82 kg/m²     Constitutional:normal, well developed, well nourished, alert, in no distress and non-toxic and no overt pain behavior.  Eyes:anicteric  HEENT:grossly intact  Neck:supple, symmetric, trachea midline and no masses   Pulmonary:even and unlabored  Cardiovascular:No edema or pitting edema present  Skin:Normal without rashes or lesions and well hydrated  Psychiatric:Mood and affect appropriate  Neurologic:Cranial Nerves II-XII grossly intact  Musculoskeletal:normal    Lumbar Spine Exam  Appearance:  Normal lordosis  Palpation/Tenderness:  left lumbar paraspinal tenderness  Range of Motion:  Flexion:  Minimally limited  without pain  Extension:  Minimally limited  without pain  Motor Strength:  Left hip flexion:  5/5  Left hip extension:  5/5  Right hip flexion:  5/5  Right hip extension:  5/5  Left knee flexion:  5/5  Left knee extension:  5/5  Right knee flexion:  5/5  Right knee extension:  5/5  Left foot dorsiflexion:  5/5  Left foot plantar flexion:  5/5  Right foot dorsiflexion:  5/5  Right foot plantar flexion:  5/5    Imaging    XR BILATERAL HIPS AND PELVIS (7/1/2024)     INDICATION:   Sacroiliitis, not elsewhere classified.      COMPARISON: None.     VIEWS:  XR HIPS BILATERAL 2 VW W PELVIS IF PERFORMED        FINDINGS:  The bony pelvis appears intact.  Degenerative changes visualized lower lumbar spine.      LEFT HIP:  There is no acute fracture or dislocation.  No significant hip degenerative changes.  No lytic or blastic osseous lesion.  Soft tissues are unremarkable.     RIGHT HIP:  There is no acute fracture or dislocation.  No significant hip degenerative changes.  No lytic or blastic osseous lesion.  Soft tissues are unremarkable. A generator is seen with leads extending into the spine.       "

## 2025-04-09 DIAGNOSIS — K21.9 GASTROESOPHAGEAL REFLUX DISEASE WITHOUT ESOPHAGITIS: ICD-10-CM

## 2025-04-09 RX ORDER — OMEPRAZOLE 20 MG/1
20 CAPSULE, DELAYED RELEASE ORAL DAILY
Qty: 90 CAPSULE | Refills: 1 | Status: SHIPPED | OUTPATIENT
Start: 2025-04-09

## 2025-05-06 ENCOUNTER — TELEPHONE (OUTPATIENT)
Age: 65
End: 2025-05-06

## 2025-05-06 NOTE — TELEPHONE ENCOUNTER
Caller: didier Steele    Doctor: Dr. Fleming     Reason for call:  Pt wanted Dr. Fleming to know that he he had to switch the STIM program back to the entire back due to the pain on the right side.    Call back#: [phone]

## 2025-05-15 LAB
LEFT EYE DIABETIC RETINOPATHY: POSITIVE
RIGHT EYE DIABETIC RETINOPATHY: NORMAL

## 2025-05-19 DIAGNOSIS — G47.00 INSOMNIA, UNSPECIFIED TYPE: ICD-10-CM

## 2025-05-19 RX ORDER — TRAZODONE HYDROCHLORIDE 100 MG/1
100 TABLET ORAL
Qty: 90 TABLET | Refills: 3 | Status: SHIPPED | OUTPATIENT
Start: 2025-05-19 | End: 2025-05-21 | Stop reason: SDUPTHER

## 2025-05-21 ENCOUNTER — TELEPHONE (OUTPATIENT)
Dept: FAMILY MEDICINE CLINIC | Facility: CLINIC | Age: 65
End: 2025-05-21

## 2025-05-21 DIAGNOSIS — G47.00 INSOMNIA, UNSPECIFIED TYPE: ICD-10-CM

## 2025-05-21 RX ORDER — TRAZODONE HYDROCHLORIDE 100 MG/1
200 TABLET ORAL
Qty: 180 TABLET | Refills: 1 | Status: SHIPPED | OUTPATIENT
Start: 2025-05-21

## 2025-05-21 NOTE — TELEPHONE ENCOUNTER
Pt states he takes 2 of the trazadone nightly but the script recently sent was just for one, pt requesting a new script with correct directions please advise

## 2025-06-09 ENCOUNTER — APPOINTMENT (OUTPATIENT)
Dept: LAB | Facility: IMAGING CENTER | Age: 65
End: 2025-06-09
Payer: COMMERCIAL

## 2025-06-09 DIAGNOSIS — R80.9 PROTEINURIA, UNSPECIFIED TYPE: ICD-10-CM

## 2025-06-09 DIAGNOSIS — E11.9 TYPE 2 DIABETES MELLITUS WITHOUT COMPLICATION, WITHOUT LONG-TERM CURRENT USE OF INSULIN (HCC): ICD-10-CM

## 2025-06-09 DIAGNOSIS — E78.2 MIXED HYPERLIPIDEMIA: ICD-10-CM

## 2025-06-09 LAB
ALBUMIN SERPL BCG-MCNC: 4.8 G/DL (ref 3.5–5)
ALP SERPL-CCNC: 82 U/L (ref 34–104)
ALT SERPL W P-5'-P-CCNC: 27 U/L (ref 7–52)
ANION GAP SERPL CALCULATED.3IONS-SCNC: 10 MMOL/L (ref 4–13)
AST SERPL W P-5'-P-CCNC: 17 U/L (ref 13–39)
BILIRUB SERPL-MCNC: 0.49 MG/DL (ref 0.2–1)
BUN SERPL-MCNC: 20 MG/DL (ref 5–25)
CALCIUM SERPL-MCNC: 9.5 MG/DL (ref 8.4–10.2)
CHLORIDE SERPL-SCNC: 100 MMOL/L (ref 96–108)
CHOLEST SERPL-MCNC: 118 MG/DL (ref ?–200)
CO2 SERPL-SCNC: 28 MMOL/L (ref 21–32)
CREAT SERPL-MCNC: 0.72 MG/DL (ref 0.6–1.3)
CREAT UR-MCNC: 86.1 MG/DL
EST. AVERAGE GLUCOSE BLD GHB EST-MCNC: 128 MG/DL
GFR SERPL CREATININE-BSD FRML MDRD: 98 ML/MIN/1.73SQ M
GLUCOSE P FAST SERPL-MCNC: 163 MG/DL (ref 65–99)
HBA1C MFR BLD: 6.1 %
HDLC SERPL-MCNC: 44 MG/DL
LDLC SERPL CALC-MCNC: 63 MG/DL (ref 0–100)
MICROALBUMIN UR-MCNC: 7.2 MG/L
MICROALBUMIN/CREAT 24H UR: 8 MG/G CREATININE (ref 0–30)
NONHDLC SERPL-MCNC: 74 MG/DL
POTASSIUM SERPL-SCNC: 4.9 MMOL/L (ref 3.5–5.3)
PROT SERPL-MCNC: 6.9 G/DL (ref 6.4–8.4)
SODIUM SERPL-SCNC: 138 MMOL/L (ref 135–147)
TRIGL SERPL-MCNC: 57 MG/DL (ref ?–150)

## 2025-06-09 PROCEDURE — 36415 COLL VENOUS BLD VENIPUNCTURE: CPT

## 2025-06-09 PROCEDURE — 82570 ASSAY OF URINE CREATININE: CPT

## 2025-06-09 PROCEDURE — 80053 COMPREHEN METABOLIC PANEL: CPT

## 2025-06-09 PROCEDURE — 82043 UR ALBUMIN QUANTITATIVE: CPT

## 2025-06-09 PROCEDURE — 83036 HEMOGLOBIN GLYCOSYLATED A1C: CPT

## 2025-06-09 PROCEDURE — 80061 LIPID PANEL: CPT

## 2025-06-10 ENCOUNTER — RESULTS FOLLOW-UP (OUTPATIENT)
Dept: FAMILY MEDICINE CLINIC | Facility: CLINIC | Age: 65
End: 2025-06-10

## 2025-06-17 ENCOUNTER — OFFICE VISIT (OUTPATIENT)
Dept: FAMILY MEDICINE CLINIC | Facility: CLINIC | Age: 65
End: 2025-06-17
Payer: COMMERCIAL

## 2025-06-17 VITALS
WEIGHT: 211 LBS | SYSTOLIC BLOOD PRESSURE: 128 MMHG | BODY MASS INDEX: 29.54 KG/M2 | DIASTOLIC BLOOD PRESSURE: 70 MMHG | OXYGEN SATURATION: 96 % | HEIGHT: 71 IN | HEART RATE: 81 BPM

## 2025-06-17 DIAGNOSIS — E78.2 MIXED HYPERLIPIDEMIA: ICD-10-CM

## 2025-06-17 DIAGNOSIS — E11.9 TYPE 2 DIABETES MELLITUS WITHOUT COMPLICATION, WITHOUT LONG-TERM CURRENT USE OF INSULIN (HCC): ICD-10-CM

## 2025-06-17 DIAGNOSIS — N13.8 BPH WITH URINARY OBSTRUCTION: ICD-10-CM

## 2025-06-17 DIAGNOSIS — Z12.2 ENCOUNTER FOR SCREENING FOR LUNG CANCER: ICD-10-CM

## 2025-06-17 DIAGNOSIS — N40.1 BPH WITH URINARY OBSTRUCTION: ICD-10-CM

## 2025-06-17 DIAGNOSIS — R80.9 PROTEINURIA, UNSPECIFIED TYPE: ICD-10-CM

## 2025-06-17 DIAGNOSIS — Z12.5 PROSTATE CANCER SCREENING: ICD-10-CM

## 2025-06-17 DIAGNOSIS — Z87.891 PERSONAL HISTORY OF NICOTINE DEPENDENCE: ICD-10-CM

## 2025-06-17 DIAGNOSIS — K63.5 POLYP OF COLON, UNSPECIFIED PART OF COLON, UNSPECIFIED TYPE: ICD-10-CM

## 2025-06-17 DIAGNOSIS — F11.20 OPIOID TYPE DEPENDENCE, CONTINUOUS (HCC): ICD-10-CM

## 2025-06-17 DIAGNOSIS — E11.9 TYPE 2 DIABETES MELLITUS WITHOUT COMPLICATION, WITHOUT LONG-TERM CURRENT USE OF INSULIN (HCC): Primary | ICD-10-CM

## 2025-06-17 DIAGNOSIS — E78.5 HYPERLIPIDEMIA LDL GOAL <70: ICD-10-CM

## 2025-06-17 DIAGNOSIS — F17.210 SMOKING GREATER THAN 20 PACK YEARS: ICD-10-CM

## 2025-06-17 PROCEDURE — G2211 COMPLEX E/M VISIT ADD ON: HCPCS | Performed by: FAMILY MEDICINE

## 2025-06-17 PROCEDURE — 99214 OFFICE O/P EST MOD 30 MIN: CPT | Performed by: FAMILY MEDICINE

## 2025-06-17 RX ORDER — LISINOPRIL 2.5 MG/1
2.5 TABLET ORAL
Qty: 90 TABLET | Refills: 1 | Status: SHIPPED | OUTPATIENT
Start: 2025-06-17

## 2025-06-17 NOTE — PROGRESS NOTES
Name: Cedric Gramajo      : 1960      MRN: [id number]  Encounter Provider: Melinda Daily DO  Encounter Date: 2025   Encounter department: [facility]  :  Assessment & Plan  Type 2 diabetes mellitus without complication, without long-term current use of insulin (HCC)  - A1c stable at 6.1   - stable renal function and urine microalbumin   - DM foot exam done in office on 2024   - UTD with annual Ophtho   - UTD with latest COVID Booster  - UTD with PCV20   - cont current regimen   - RTO in 6months, with repeat labs, for f/u and AWV  Lab Results   Component Value Date    HGBA1C 6.1 (H) 2025     Orders:    Hemoglobin A1C; Future    Comprehensive metabolic panel; Future    Proteinuria, unspecified type  - stable - cont ACEI 2.5mg QD        Mixed hyperlipidemia  - LDL 63   - diet/exercise  - cont statin   - The ASCVD Risk score (Jey MILES, et al., 2019) failed to calculate for the following reasons:    The valid total cholesterol range is 130 to 320 mg/dL         Hyperlipidemia LDL goal <70         Smoking greater than 20 pack years  - advised smoking cessation   - CT lung (2024): Mild emphysema with stable nodules - repeat annually -- ordered   Orders:    CT Lung Screening Program; Future    Personal history of nicotine dependence         Encounter for screening for lung cancer  I discussed with him that he is a candidate for lung cancer CT screening.     The following Shared Decision-Making points were covered:  Benefits of screening were discussed, including the rates of reduction in death from lung cancer and other causes.  Harms of screening were reviewed, including false positive tests, radiation exposure levels, risks of invasive procedures, risks of complications of screening, and risk of overdiagnosis.  I counseled on the importance of adherence to annual lung cancer LDCT screening, impact of co-morbidities, and ability or willingness to undergo diagnosis and  "treatment.  I counseled on the importance of maintaining abstinence as a former smoker or was counseled on the importance of smoking cessation if a current smoker    Review of Eligibility Criteria: He meets all of the criteria for Lung Cancer Screening.   He is 65 y.o.   He has 20 pack year tobacco history and is a current smoker or has quit within the past 15 years  He presents no signs or symptoms of lung cancer    After discussion, the patient decided to elect lung cancer screening.       Polyp of colon, unspecified part of colon, unspecified type  - UTD with Colon Ca screening (3/2022) - h/o polyps - 5yr recall (due again in 2027)        Prostate cancer screening    Orders:    PSA, total and free; Future    BPH with urinary obstruction    Orders:    PSA, total and free; Future    Opioid type dependence, continuous (HCC)  - follows with Pain Management               History of Present Illness   HPI  66yo M presents to the office for f/u   - reviewed labs done 6/2025   - UTD with Colon Ca screening (3/2022) - h/o polyps - 5yr recall (due again in 2027)   - due for Lung Ca screening (last was 9/18/2024)   - UTD with AAA (2022)   - UTD with PCV20   - (+) has noted diminishing flow and has to \"push\" to urinate -- declined referral to Uro at this OV   - denies F/C/N/V/CP/palpitations/SOB/abd pain/LE edema       Review of Systems as per HPI     Objective   /70   Pulse 81   Ht 5' 11\" (1.803 m)   Wt 95.7 kg (211 lb)   SpO2 96%   BMI 29.43 kg/m²      Physical Exam  Vitals reviewed.   Constitutional:       General: He is not in acute distress.     Appearance: Normal appearance. He is not ill-appearing, toxic-appearing or diaphoretic.   HENT:      Head: Normocephalic and atraumatic.      Right Ear: External ear normal.      Left Ear: External ear normal.      Nose: Nose normal.     Eyes:      General: No scleral icterus.        Right eye: No discharge.         Left eye: No discharge.      Extraocular Movements: " Extraocular movements intact.      Conjunctiva/sclera: Conjunctivae normal.       Cardiovascular:      Rate and Rhythm: Normal rate and regular rhythm.      Heart sounds: Normal heart sounds.   Pulmonary:      Effort: Pulmonary effort is normal. No respiratory distress.      Breath sounds: Wheezing present.   Abdominal:      Palpations: Abdomen is soft.     Musculoskeletal:         General: Normal range of motion.      Cervical back: Normal range of motion.      Right lower leg: No edema.      Left lower leg: No edema.     Skin:     General: Skin is warm.     Neurological:      General: No focal deficit present.      Mental Status: He is alert and oriented to person, place, and time.     Psychiatric:         Mood and Affect: Mood normal.         Behavior: Behavior normal.

## 2025-06-17 NOTE — PATIENT INSTRUCTIONS
Medicare Preventive Visit Patient Instructions  Thank you for completing your Welcome to Medicare Visit or Medicare Annual Wellness Visit today. Your next wellness visit will be due in one year (6/18/2026).  The screening/preventive services that you may require over the next 5-10 years are detailed below. Some tests may not apply to you based off risk factors and/or age. Screening tests ordered at today's visit but not completed yet may show as past due. Also, please note that scanned in results may not display below.  Preventive Screenings:  Service Recommendations Previous Testing/Comments   Colorectal Cancer Screening  Colonoscopy    Fecal Occult Blood Test (FOBT)/Fecal Immunochemical Test (FIT)  Fecal DNA/Cologuard Test  Flexible Sigmoidoscopy Age: 45-75 years old   Colonoscopy: every 10 years (May be performed more frequently if at higher risk)  OR  FOBT/FIT: every 1 year  OR  Cologuard: every 3 years  OR  Sigmoidoscopy: every 5 years  Screening may be recommended earlier than age 45 if at higher risk for colorectal cancer. Also, an individualized decision between you and your healthcare provider will decide whether screening between the ages of 76-85 would be appropriate. Colonoscopy: 03/30/2022  FOBT/FIT: Not on file  Cologuard: Not on file  Sigmoidoscopy: Not on file    Screening Current     Prostate Cancer Screening Individualized decision between patient and health care provider in men between ages of 55-69   Medicare will cover every 12 months beginning on the day after your 50th birthday PSA: 0.213 ng/mL     Screening Current  Due for PSA     Hepatitis C Screening Once for adults born between 1945 and 1965  More frequently in patients at high risk for Hepatitis C Hep C Antibody: 04/12/2021    Screening Current   Diabetes Screening 1-2 times per year if you're at risk for diabetes or have pre-diabetes Fasting glucose: 163 mg/dL (6/9/2025)  A1C: 6.1 % (6/9/2025)  Screening Not Indicated  History  Diabetes  Due for Blood Glucose   Cholesterol Screening Once every 5 years if you don't have a lipid disorder. May order more often based on risk factors. Lipid panel: 06/09/2025  Screening Not Indicated  History Lipid Disorder      Other Preventive Screenings Covered by Medicare:  Abdominal Aortic Aneurysm (AAA) Screening: covered once if your at risk. You're considered to be at risk if you have a family history of AAA or a male between the age of 65-75 who smoking at least 100 cigarettes in your lifetime.  Lung Cancer Screening: covers low dose CT scan once per year if you meet all of the following conditions: (1) Age 55-77; (2) No signs or symptoms of lung cancer; (3) Current smoker or have quit smoking within the last 15 years; (4) You have a tobacco smoking history of at least 20 pack years (packs per day x number of years you smoked); (5) You get a written order from a healthcare provider.  Glaucoma Screening: covered annually if you're considered high risk: (1) You have diabetes OR (2) Family history of glaucoma OR (3)  aged 50 and older OR (4)  American aged 65 and older  Osteoporosis Screening: covered every 2 years if you meet one of the following conditions: (1) Have a vertebral abnormality; (2) On glucocorticoid therapy for more than 3 months; (3) Have primary hyperparathyroidism; (4) On osteoporosis medications and need to assess response to drug therapy.  HIV Screening: covered annually if you're between the age of 15-65. Also covered annually if you are younger than 15 and older than 65 with risk factors for HIV infection. For pregnant patients, it is covered up to 3 times per pregnancy.    Immunizations:  Immunization Recommendations   Influenza Vaccine Annual influenza vaccination during flu season is recommended for all persons aged >= 6 months who do not have contraindications   Pneumococcal Vaccine   * Pneumococcal conjugate vaccine = PCV13 (Prevnar 13), PCV15  (Vaxneuvance), PCV20 (Prevnar 20)  * Pneumococcal polysaccharide vaccine = PPSV23 (Pneumovax) Adults 19-65 yo with certain risk factors or if 65+ yo  If never received any pneumonia vaccine: recommend Prevnar 20 (PCV20)  Give PCV20 if previously received 1 dose of PCV13 or PPSV23   Hepatitis B Vaccine 3 dose series if at intermediate or high risk (ex: diabetes, end stage renal disease, liver disease)   Respiratory syncytial virus (RSV) Vaccine - COVERED BY MEDICARE PART D  * RSVPreF3 (Arexvy) CDC recommends that adults 60 years of age and older may receive a single dose of RSV vaccine using shared clinical decision-making (SCDM)   Tetanus (Td) Vaccine - COST NOT COVERED BY MEDICARE PART B Following completion of primary series, a booster dose should be given every 10 years to maintain immunity against tetanus. Td may also be given as tetanus wound prophylaxis.   Tdap Vaccine - COST NOT COVERED BY MEDICARE PART B Recommended at least once for all adults. For pregnant patients, recommended with each pregnancy.   Shingles Vaccine (Shingrix) - COST NOT COVERED BY MEDICARE PART B  2 shot series recommended in those 19 years and older who have or will have weakened immune systems or those 50 years and older     Health Maintenance Due:      Topic Date Due   • HIV Screening  Never done   • Lung Cancer Screening  09/18/2025   • Colorectal Cancer Screening  03/29/2027   • Hepatitis C Screening  Completed     Immunizations Due:      Topic Date Due   • COVID-19 Vaccine (9 - 2024-25 season) 01/19/2025   • Influenza Vaccine (Season Ended) 09/01/2025     Advance Directives   What are advance directives?  Advance directives are legal documents that state your wishes and plans for medical care. These plans are made ahead of time in case you lose your ability to make decisions for yourself. Advance directives can apply to any medical decision, such as the treatments you want, and if you want to donate organs.   What are the types of  advance directives?  There are many types of advance directives, and each state has rules about how to use them. You may choose a combination of any of the following:  Living will:  This is a written record of the treatment you want. You can also choose which treatments you do not want, which to limit, and which to stop at a certain time. This includes surgery, medicine, IV fluid, and tube feedings.   Durable power of  for healthcare (DPAHC):  This is a written record that states who you want to make healthcare choices for you when you are unable to make them for yourself. This person, called a proxy, is usually a family member or a friend. You may choose more than 1 proxy.  Do not resuscitate (DNR) order:  A DNR order is used in case your heart stops beating or you stop breathing. It is a request not to have certain forms of treatment, such as CPR. A DNR order may be included in other types of advance directives.  Medical directive:  This covers the care that you want if you are in a coma, near death, or unable to make decisions for yourself. You can list the treatments you want for each condition. Treatment may include pain medicine, surgery, blood transfusions, dialysis, IV or tube feedings, and a ventilator (breathing machine).  Values history:  This document has questions about your views, beliefs, and how you feel and think about life. This information can help others choose the care that you would choose.  Why are advance directives important?  An advance directive helps you control your care. Although spoken wishes may be used, it is better to have your wishes written down. Spoken wishes can be misunderstood, or not followed. Treatments may be given even if you do not want them. An advance directive may make it easier for your family to make difficult choices about your care.   Cigarette Smoking and Your Health   Risks to your health if you smoke:  Nicotine and other chemicals found in tobacco damage  every cell in your body. Even if you are a light smoker, you have an increased risk for cancer, heart disease, and lung disease. If you are pregnant or have diabetes, smoking increases your risk for complications.   Benefits to your health if you stop smoking:   You decrease respiratory symptoms such as coughing, wheezing, and shortness of breath.   You reduce your risk for cancers of the lung, mouth, throat, kidney, bladder, pancreas, stomach, and cervix. If you already have cancer, you increase the benefits of chemotherapy. You also reduce your risk for cancer returning or a second cancer from developing.   You reduce your risk for heart disease, blood clots, heart attack, and stroke.   You reduce your risk for lung infections, and diseases such as pneumonia, asthma, chronic bronchitis, and emphysema.  Your circulation improves. More oxygen can be delivered to your body. If you have diabetes, you lower your risk for complications, such as kidney, artery, and eye diseases. You also lower your risk for nerve damage. Nerve damage can lead to amputations, poor vision, and blindness.  You improve your body's ability to heal and to fight infections.  For more information and support to stop smoking:   [employer]  Phone: 4- 581 - [phone]  Web Address: www.[employer]  Weight Management   Why it is important to manage your weight:  Being overweight increases your risk of health conditions such as heart disease, high blood pressure, type 2 diabetes, and certain types of cancer. It can also increase your risk for osteoarthritis, sleep apnea, and other respiratory problems. Aim for a slow, steady weight loss. Even a small amount of weight loss can lower your risk of health problems.  How to lose weight safely:  A safe and healthy way to lose weight is to eat fewer calories and get regular exercise. You can lose up about 1 pound a week by decreasing the number of calories you eat by 500 calories each day.   Healthy meal  plan for weight management:  A healthy meal plan includes a variety of foods, contains fewer calories, and helps you stay healthy. A healthy meal plan includes the following:  Eat whole-grain foods more often.  A healthy meal plan should contain fiber. Fiber is the part of grains, fruits, and vegetables that is not broken down by your body. Whole-grain foods are healthy and provide extra fiber in your diet. Some examples of whole-grain foods are whole-wheat breads and pastas, oatmeal, brown rice, and bulgur.  Eat a variety of vegetables every day.  Include dark, leafy greens such as spinach, kale, yumiko greens, and mustard greens. Eat yellow and orange vegetables such as carrots, sweet potatoes, and winter squash.   Eat a variety of fruits every day.  Choose fresh or canned fruit (canned in its own juice or light syrup) instead of juice. Fruit juice has very little or no fiber.  Eat low-fat dairy foods.  Drink fat-free (skim) milk or 1% milk. Eat fat-free yogurt and low-fat cottage cheese. Try low-fat cheeses such as mozzarella and other reduced-fat cheeses.  Choose meat and other protein foods that are low in fat.  Choose beans or other legumes such as split peas or lentils. Choose fish, skinless poultry (chicken or turkey), or lean cuts of red meat (beef or pork). Before you cook meat or poultry, cut off any visible fat.   Use less fat and oil.  Try baking foods instead of frying them. Add less fat, such as margarine, sour cream, regular salad dressing and mayonnaise to foods. Eat fewer high-fat foods. Some examples of high-fat foods include french fries, doughnuts, ice cream, and cakes.  Eat fewer sweets.  Limit foods and drinks that are high in sugar. This includes candy, cookies, regular soda, and sweetened drinks.  Exercise:  Exercise at least 30 minutes per day on most days of the week. Some examples of exercise include walking, biking, dancing, and swimming. You can also fit in more physical activity by  taking the stairs instead of the elevator or parking farther away from stores. Ask your healthcare provider about the best exercise plan for you.    © Copyright [employer] 2018 Information is for End User's use only and may not be sold, redistributed or otherwise used for commercial purposes. All illustrations and images included in CareNotes® are the copyrighted property of [employer] or [employer]

## 2025-06-17 NOTE — PROGRESS NOTES
Name: Cedric Gramajo      : 1960      MRN: [id number]  Encounter Provider: Melinda Daily DO  Encounter Date: 2025   Encounter department: [facility]  :  Assessment & Plan  Encounter for subsequent annual wellness visit (AWV) in Medicare patient         Screening for AAA (abdominal aortic aneurysm)         Smoking greater than 20 pack years    Orders:    CT Lung Screening Program; Future    Personal history of nicotine dependence         Encounter for screening for lung cancer  I discussed with him that he is a candidate for lung cancer CT screening.     The following Shared Decision-Making points were covered:  Benefits of screening were discussed, including the rates of reduction in death from lung cancer and other causes.  Harms of screening were reviewed, including false positive tests, radiation exposure levels, risks of invasive procedures, risks of complications of screening, and risk of overdiagnosis.  I counseled on the importance of adherence to annual lung cancer LDCT screening, impact of co-morbidities, and ability or willingness to undergo diagnosis and treatment.  I counseled on the importance of maintaining abstinence as a former smoker or was counseled on the importance of smoking cessation if a current smoker    Review of Eligibility Criteria: He meets all of the criteria for Lung Cancer Screening.   He is 65 y.o.   He has 20 pack year tobacco history and is a current smoker or has quit within the past 15 years  He presents no signs or symptoms of lung cancer    After discussion, the patient decided to elect lung cancer screening.         Type 2 diabetes mellitus without complication, without long-term current use of insulin (HCC)    Lab Results   Component Value Date    HGBA1C 6.1 (H) 2025       Orders:    Hemoglobin A1C; Future    Comprehensive metabolic panel; Future    Proteinuria, unspecified type         Polyp of colon, unspecified part of colon,  unspecified type         Mixed hyperlipidemia         Hyperlipidemia LDL goal <70         Prostate cancer screening    Orders:    PSA, total and free; Future    BPH with urinary obstruction    Orders:    PSA, total and free; Future    Opioid type dependence, continuous (HCC)            Preventive health issues were discussed with patient, and age appropriate screening tests were ordered as noted in patient's After Visit Summary. Personalized health advice and appropriate referrals for health education or preventive services given if needed, as noted in patient's After Visit Summary.    History of Present Illness   {?Quick Links Encounters * My Last Note * Last Note in Specialty * Snapshot * Since Last Visit * History :03275}  HPI as below     Patient Care Team:  Melinda Daily DO as PCP - General (Family Medicine)  Crystal Olmedo MD (Pain Medicine)  MD Gregory Whiteside MD Mamie Nyobe, CRNP Dang Zhang, MD Loveleen K Sidhu, MD as Endoscopist    Review of Systems as per HPI     Medical History Reviewed by provider this encounter:  Tobacco  Allergies  Meds  Problems  Med Hx  Surg Hx  Fam Hx       Annual Wellness Visit Questionnaire   Cedric is here for his Subsequent Wellness visit. Last Medicare Wellness visit information reviewed, patient interviewed and updates made to the record today.      Health Risk Assessment:   Patient rates overall health as fair. Patient feels that their physical health rating is same. Patient is very satisfied with their life. Eyesight was rated as same. Hearing was rated as same. Patient feels that their emotional and mental health rating is same. Patients states they are never, rarely angry. Patient states they are never, rarely unusually tired/fatigued. Pain experienced in the last 7 days has been none. Patient states that he has experienced no weight loss or gain in last 6 months.     Depression Screening:   PHQ-2 Score: 0      Fall Risk Screening:   In the  past year, patient has experienced: no history of falling in past year      Home Safety:  Patient does not have trouble with stairs inside or outside of their home. Patient has working smoke alarms and has working carbon monoxide detector. Home safety hazards include: none.     Nutrition:   Current diet is Regular.     Medications:   Patient is currently taking over-the-counter supplements. OTC medications include: see medication list. Patient is able to manage medications.     Activities of Daily Living (ADLs)/Instrumental Activities of Daily Living (IADLs):   Walk and transfer into and out of bed and chair?: Yes  Dress and groom yourself?: Yes    Bathe or shower yourself?: Yes    Feed yourself? Yes  Do your laundry/housekeeping?: Yes  Manage your money, pay your bills and track your expenses?: Yes  Make your own meals?: Yes    Do your own shopping?: Yes    Previous Hospitalizations:   Any hospitalizations or ED visits within the last 12 months?: Yes    How many hospitalizations have you had in the last year?: more than 4    Advance Care Planning:   Living will: Yes    Durable POA for healthcare: Yes    Advanced directive: Yes      Cognitive Screening:   Provider or family/friend/caregiver concerned regarding cognition?: No    Preventive Screenings      Cardiovascular Screening:    General: History Lipid Disorder and Screening Current      Diabetes Screening:     General: History Diabetes and Screening Current      Colorectal Cancer Screening:     General: Screening Current      Prostate Cancer Screening:    General: Screening Current    Due for: PSA      Osteoporosis Screening:    General: Screening Current    Due for: DXA Axial      Abdominal Aortic Aneurysm (AAA) Screening:    Risk factors include: age between 65-74 yo and tobacco use        General: Screening Current      Lung Cancer Screening:     General: Screening Current    Due for: Low Dose CT (LDCT)      Hepatitis C Screening:    General: Screening  "Current      Preventive Screening Comments: 66yo M presents to the office for sub AWV and f/u   - reviewed labs done 6/2025   - UTD with Colon Ca screening (3/2022) - h/o polyps - 5yr recall (due again in 2027)   - due for Lung Ca screening (last was 9/18/2024)   - UTD with AAA (2022)   - UTD with PCV20   - (+) memory loss and feels unsteady/\"a little wobbly\" when he wakes up   - (+) has noted diminishing flow and has to \"push\" to urinate   - denies F/C/N/V/CP/palpitations/SOB/abd pain/LE edema     Screening, Brief Intervention, and Referral to Treatment (SBIRT)     Screening  Typical number of drinks in a day: 0  Typical number of drinks in a week: 0  Interpretation: Low risk drinking behavior.    Single Item Drug Screening:  How often have you used an illegal drug (including marijuana) or a prescription medication for non-medical reasons in the past year? never    Single Item Drug Screen Score: 0  Interpretation: Negative screen for possible drug use disorder    Social Drivers of Health     Financial Resource Strain: Low Risk  (7/19/2023)    Overall Financial Resource Strain (CARDIA)     Difficulty of Paying Living Expenses: Not hard at all   Food Insecurity: No Food Insecurity (6/17/2025)    Nursing - Inadequate Food Risk Classification     Worried About Running Out of Food in the Last Year: Never true     Ran Out of Food in the Last Year: Never true   Transportation Needs: No Transportation Needs (6/17/2025)    PRAPARE - Transportation     Lack of Transportation (Medical): No     Lack of Transportation (Non-Medical): No   Housing Stability: Low Risk  (6/17/2025)    Housing Stability Vital Sign     Unable to Pay for Housing in the Last Year: No     Number of Times Moved in the Last Year: 0     Homeless in the Last Year: No   Utilities: Not At Risk (6/17/2025)    [facility] Utilities     Threatened with loss of utilities: No     No results found.    Objective {?Quick Links Trend Vitals * Enter New Vitals * Results Review " "* Timeline (Adult) * Labs * Imaging * Cardiology * Procedures * Lung Cancer Screening * Surgical eConsent :48354}  /70   Pulse 81   Ht 5' 11\" (1.803 m)   Wt 95.7 kg (211 lb)   SpO2 96%   BMI 29.43 kg/m²     Physical Exam  Vitals reviewed.   Constitutional:       General: He is not in acute distress.     Appearance: Normal appearance. He is not ill-appearing, toxic-appearing or diaphoretic.   HENT:      Head: Normocephalic and atraumatic.      Right Ear: External ear normal.      Left Ear: External ear normal.      Nose: Nose normal.     Eyes:      General: No scleral icterus.        Right eye: No discharge.         Left eye: No discharge.      Extraocular Movements: Extraocular movements intact.      Conjunctiva/sclera: Conjunctivae normal.       Cardiovascular:      Rate and Rhythm: Normal rate and regular rhythm.      Heart sounds: Normal heart sounds.   Pulmonary:      Effort: Pulmonary effort is normal.      Breath sounds: Wheezing present.   Abdominal:      Palpations: Abdomen is soft.     Musculoskeletal:         General: Normal range of motion.      Cervical back: Normal range of motion.      Right lower leg: No edema.      Left lower leg: No edema.     Skin:     General: Skin is warm.     Neurological:      General: No focal deficit present.      Mental Status: He is alert and oriented to person, place, and time.     Psychiatric:         Mood and Affect: Mood normal.         Behavior: Behavior normal.       {Administrative / Billing Section (Optional):85845}  "

## 2025-06-30 ENCOUNTER — OFFICE VISIT (OUTPATIENT)
Dept: NEUROSURGERY | Facility: CLINIC | Age: 65
End: 2025-06-30
Payer: COMMERCIAL

## 2025-06-30 VITALS
SYSTOLIC BLOOD PRESSURE: 128 MMHG | BODY MASS INDEX: 29.54 KG/M2 | TEMPERATURE: 98.4 F | WEIGHT: 211 LBS | HEART RATE: 82 BPM | HEIGHT: 71 IN | OXYGEN SATURATION: 98 % | DIASTOLIC BLOOD PRESSURE: 70 MMHG

## 2025-06-30 DIAGNOSIS — Z45.42 BATTERY END OF LIFE OF SPINAL CORD STIMULATOR: Primary | ICD-10-CM

## 2025-06-30 PROCEDURE — 99204 OFFICE O/P NEW MOD 45 MIN: CPT | Performed by: NEUROLOGICAL SURGERY

## 2025-06-30 RX ORDER — CHLORHEXIDINE GLUCONATE ORAL RINSE 1.2 MG/ML
15 SOLUTION DENTAL ONCE
OUTPATIENT
Start: 2025-06-30 | End: 2025-06-30

## 2025-06-30 RX ORDER — CEFAZOLIN SODIUM 2 G/50ML
2000 SOLUTION INTRAVENOUS ONCE
OUTPATIENT
Start: 2025-06-30 | End: 2025-06-30

## 2025-06-30 NOTE — ASSESSMENT & PLAN NOTE
Orders:  •  Case request operating room: REPLACEMENT IMPLANTABLE PULSE GENERATOR DORSAL SPINAL COLUMN STIMULATOR - Left flank; Standing

## 2025-06-30 NOTE — PROGRESS NOTES
Name: Cedric Gramajo      : 1960      MRN: [id number]  Encounter Provider: Ty Andrade MD  Encounter Date: 2025   Encounter department: [facility]  :  Assessment & Plan  Battery end of life of spinal cord stimulator    Orders:  •  Case request operating room: REPLACEMENT IMPLANTABLE PULSE GENERATOR DORSAL SPINAL COLUMN STIMULATOR - Left flank; Standing    [employer] spinal cord stimulator placed by Dr. Fry .  Had left flank IPG replaced by Dr. LETY Anna in 2021.  Was recently notified that his IPG was reaching end-of-life.    His left flank IPG incision site is well healed. He states this is a non-rechargeable IPG.     He was seen earlier today in our office by the [employer] representative, who confirmed the IPG was at end of life. In their discussion, they agreed upon the '7 year battery.'    The patient relates that he does get good efficacy from the SCS, that he 'couldn't walk without it.'    I reviewed with the patient the process of IPG replacement - that it is an outpatient procedure. We reviewed attendant risks, namely infection, hematoma, device failure/damage, risks of anesthesia, etc. He would like to proceed and e-consent was signed.     The patient did mention specifically that he would like his incision closed with dissolvable suture, not staples, etc.     25 Metrics: [id number]; VAS 70      History of Present Illness     Cedric Gramajo is a 65 y.o. male who presents to discuss spinal cord stimulator IPG replacement    HPI     Pain Score:   3      Review of Systems   Musculoskeletal:  Positive for back pain.        LBP, can't walk long distances  Neuropathy   All other systems reviewed and are negative.    I have personally reviewed the MA's review of systems and made changes as necessary.        Past Medical History   Past Medical History[1]  Past Surgical History[2]  Family History[3]  he reports that he has been smoking cigarettes. He  has a 42 pack-year smoking history. He has never used smokeless tobacco. He reports that he does not drink alcohol and does not use drugs.  Current Outpatient Medications   Medication Instructions   • albuterol (ProAir HFA) 90 mcg/act inhaler 2 puffs, Inhalation, Every 6 hours PRN   • atorvastatin (LIPITOR) 20 mg, Oral, Daily   • bisacodyl (DULCOLAX) 20 mg, Once   • clobetasol (TEMOVATE) 0.05 % ointment Topical, 2 times daily   • fluticasone (FLONASE) 50 mcg/act nasal spray 2 sprays, Daily   • Fluticasone-Salmeterol (Advair) 500-50 mcg/dose inhaler 1 puff, Inhalation, 2 times daily, Rinse mouth after use.   • gabapentin (NEURONTIN) 1,200 mg, Oral, 2 times daily   • glipiZIDE (GLUCOTROL XL) 5 mg, Oral, Daily   • lidocaine (LIDODERM) 5 % 2 patches, Topical, Daily, Remove & Discard patch within 12 hours or as directed by MD   • lisinopril (ZESTRIL) 2.5 mg, Oral, Daily at bedtime   • magnesium citrate solution 296 mL, Once   • metFORMIN (GLUCOPHAGE) 1,000 mg, Oral, 2 times daily with meals   • naproxen (NAPROSYN) 500 mg, Oral, 2 times daily with meals   • nicotine (NICODERM CQ) 21 mg/24 hr TD 24 hr patch 1 patch, Transdermal, Every 24 hours   • omeprazole (PRILOSEC) 20 mg, Oral, Daily   • oxyCODONE-acetaminophen (Percocet) 7.5-325 MG per tablet 1 tablet, Oral, 2 times daily PRN   • oxyCODONE-acetaminophen (Percocet) 7.5-325 MG per tablet 1 tablet, Oral, 2 times daily PRN   • oxyCODONE-acetaminophen (Percocet) 7.5-325 MG per tablet 1 tablet, Oral, 2 times daily PRN   • Polyethylene Glycol 3350 (MIRALAX PO) Once   • Probiotic 250 mg, Oral, Daily   • QUEtiapine (SEROQUEL) 200 mg, Oral, Daily at bedtime   • sildenafil (VIAGRA) 50 mg, Oral, As needed, Take on an empty stomach, 1 hour before sexual activity, no alcohol. 100 mg max dose.   • tamsulosin (FLOMAX) 0.4 mg TAKE 2 CAPSULES EVERY DAY WITH DINNER   • traZODone (DESYREL) 200 mg, Oral, Daily at bedtime   Allergies[4]   Objective   /70 (BP Location: Right arm,  "Patient Position: Sitting, Cuff Size: Adult)   Pulse 82   Temp 98.4 °F (36.9 °C) (Temporal)   Ht 5' 11\" (1.803 m)   Wt 95.7 kg (211 lb)   SpO2 98%   BMI 29.43 kg/m²     Physical Exam  Neurological Exam    Hemoglobin A1C 6.1 on 6/9/25.                      [1]  Past Medical History:  Diagnosis Date   • Acute bronchitis 06/03/2019   • Arthritis    • BPH (benign prostatic hypertrophy) with urinary obstruction    • Chronic narcotic dependence (HCC)     percocet TID   • Chronic pain disorder     back-spinal cord stimulator   • Colon polyp    • Diabetes mellitus (HCC)     type   • Ear infection    • Herniation of lumbar intervertebral disc with radiculopathy    • Myofascial pain syndrome    • Obesity    • Sciatica    • Spinal cord stimulator status 2017    implanted 2017   • Tinnitus    [2]  Past Surgical History:  Procedure Laterality Date   • ABSCESS DRAINAGE      groin   • BACK SURGERY      sciatic nerve- spinal cord stimulator 2017   • CAUDAL BLOCK N/A 10/26/2018    Procedure: Caudal Epidural Steroid Injection (99592);  Surgeon: Crystal Olmedo MD;  Location: [facility] MAIN OR;  Service: Pain Management    • CAUDAL BLOCK N/A 11/30/2018    Procedure: Caudal Epidural Steroid Injection (56373);  Surgeon: Crystal Olmedo MD;  Location: [facility] MAIN OR;  Service: Pain Management    • COLONOSCOPY     • EPIDURAL BLOCK INJECTION Right 5/10/2019    Procedure: L5 S1 transforaminal Epidural Steroid Injection ([id number];  Surgeon: Crystal Olmedo MD;  Location: [facility] MAIN OR;  Service: Pain Management    • EPIDURAL BLOCK INJECTION Right 8/16/2019    Procedure: BLOCK / INJECTION EPIDURAL STEROID TRANSFORAMINAL;  Surgeon: Crystal Olmedo MD;  Location: [facility] MAIN OR;  Service: Pain Management    • EPIDURAL BLOCK INJECTION Left 7/1/2021    Procedure: L5 S1 transforaminal epidural steroid injection ( [id number]);  Surgeon: Crystal Olmedo MD;  Location: [facility] MAIN OR;  Service: Pain Management    • EPIDURAL BLOCK INJECTION Right " 7/15/2021    Procedure: L5 S1 transforaminal epidural steroid injection ( [id number]);  Surgeon: Crystal Olmedo MD;  Location: [facility] MAIN OR;  Service: Pain Management    • EPIDURAL BLOCK INJECTION Right 1/7/2022    Procedure: L5 S1 transforaminal epidural steroid injection ([id number]);  Surgeon: Crystal Olmedo MD;  Location: [facility] MAIN OR;  Service: Pain Management    • EPIDURAL BLOCK INJECTION Left 1/28/2022    Procedure: L5 S1 transforaminal epidural steroid injection ([id number]);  Surgeon: Crystal Olmedo MD;  Location: [facility] MAIN OR;  Service: Pain Management    • NERVE BLOCK Bilateral 4/26/2018    Procedure: B/L L3 L4 L5 S1 MBB #1 ([id number]);  Surgeon: Crystal Olmedo MD;  Location: [facility] MAIN OR;  Service: Pain Management    • NERVE BLOCK Bilateral 5/11/2018    Procedure: B/L L3 L4 L5 S1 Medial Branch Block #2;  Surgeon: Crystal Olmedo MD;  Location: [facility] MAIN OR;  Service: Pain Management    • NOSE SURGERY     • WA COLONOSCOPY FLX DX W/COLLJ SPEC WHEN PFRMD N/A 3/6/2017    Procedure: COLONOSCOPY;  Surgeon: Leo Dhaliwal MD;  Location:  GI LAB;  Service: Gastroenterology   • WA INSJ/RPLCMT SPINAL NPG/RCVR POCKET CRTJ&CONNJ Left 6/29/2021    Procedure: REPLACEMENT IMPLANTABLE PULSE GENERATOR DORSAL SPINAL COLUMN STIMULATOR, LEFT;  Surgeon: Julian Anna MD;  Location:  MAIN OR;  Service: Neurosurgery   • WA AN IMPLTJ NSTIM ELTRDS PLATE/PADDLE EDRL Left 10/3/2017    Procedure: PLACEMENT THORACIC FOR INSERTION DORSAL COLUMN SPINAL CORD STIMULATOR (DCS) WITH BUTTOCK IMPLANTABLE PULSE GENERATOR(IMPULSE);  Surgeon: Gregory Fry MD;  Location:  MAIN OR;  Service: Neurosurgery   • RADIOFREQUENCY ABLATION Right 5/18/2018    Procedure: Rt L3 L4 L5 S1 Radio Frequency Ablation ([id number]);  Surgeon: Crystal Olmedo MD;  Location: [facility] MAIN OR;  Service: Pain Management    • RADIOFREQUENCY ABLATION Left 6/1/2018    Procedure: Lt L3 L4 L5 S1 Radio Frequency Ablation ([id number]);   Surgeon: Crystal Olmedo MD;  Location: [facility] MAIN OR;  Service: Pain Management    [3]  Family History  Problem Relation Name Age of Onset   • Diabetes Mother Polina Granda    • Arthritis Mother Polina Granda    • Diabetes Father Phu granda         mellitus    • Heart attack Father Phu granda 62   • Hypertension Father Phu granda    • Arthritis Father Phu granda    [4]  Allergies  Allergen Reactions   • Penicillins Swelling     Mouth swelling

## 2025-07-01 ENCOUNTER — TELEPHONE (OUTPATIENT)
Age: 65
End: 2025-07-01

## 2025-07-01 NOTE — TELEPHONE ENCOUNTER
PT CALLED REQUESTING TO SPEAK TO [facility] PER LOV 6/30/2025 HDM.    ADVISED PT THAT [facility] WAS NOT AVAILABLE AND CONFIRMED [facility] PHONE [phone].    ADVISED PT THAT A MESSAGE WOULD BE SENT FOR CB TO DISCUSS SURGERY.    PT WAS APPRECIATIVE.

## 2025-07-03 ENCOUNTER — TELEPHONE (OUTPATIENT)
Dept: NEUROSURGERY | Facility: CLINIC | Age: 65
End: 2025-07-03

## 2025-07-05 ENCOUNTER — OFFICE VISIT (OUTPATIENT)
Dept: URGENT CARE | Age: 65
End: 2025-07-05
Payer: COMMERCIAL

## 2025-07-05 VITALS
DIASTOLIC BLOOD PRESSURE: 65 MMHG | WEIGHT: 211 LBS | TEMPERATURE: 96.9 F | HEART RATE: 88 BPM | OXYGEN SATURATION: 99 % | BODY MASS INDEX: 29.43 KG/M2 | SYSTOLIC BLOOD PRESSURE: 140 MMHG | RESPIRATION RATE: 20 BRPM

## 2025-07-05 DIAGNOSIS — Z23 ENCOUNTER FOR IMMUNIZATION: Primary | ICD-10-CM

## 2025-07-05 DIAGNOSIS — S61.217A LACERATION OF LEFT LITTLE FINGER WITHOUT FOREIGN BODY WITHOUT DAMAGE TO NAIL, INITIAL ENCOUNTER: ICD-10-CM

## 2025-07-05 DIAGNOSIS — S61.219A LACERATION WITHOUT FOREIGN BODY OF UNSPECIFIED FINGER WITHOUT DAMAGE TO NAIL, INITIAL ENCOUNTER: ICD-10-CM

## 2025-07-05 PROCEDURE — 90715 TDAP VACCINE 7 YRS/> IM: CPT

## 2025-07-05 PROCEDURE — 99213 OFFICE O/P EST LOW 20 MIN: CPT | Performed by: PHYSICIAN ASSISTANT

## 2025-07-05 PROCEDURE — 12041 INTMD RPR N-HF/GENIT 2.5CM/<: CPT | Performed by: PHYSICIAN ASSISTANT

## 2025-07-05 PROCEDURE — G0463 HOSPITAL OUTPT CLINIC VISIT: HCPCS | Performed by: PHYSICIAN ASSISTANT

## 2025-07-05 RX ORDER — CLINDAMYCIN HYDROCHLORIDE 300 MG/1
300 CAPSULE ORAL 4 TIMES DAILY
Qty: 40 CAPSULE | Refills: 0 | Status: SHIPPED | OUTPATIENT
Start: 2025-07-05 | End: 2025-07-15

## 2025-07-05 NOTE — PROGRESS NOTES
[facility]        NAME: Cedric Gramajo is a 65 y.o. male  : 1960    MRN: [id number]  DATE: 2025  TIME: 11:11 AM    /65   Pulse 88   Temp (!) 96.9 °F (36.1 °C) (Tympanic)   Resp 20   Wt 95.7 kg (211 lb)   SpO2 99%   BMI 29.43 kg/m²     Assessment and Plan   Encounter for immunization [Z23]  1. Encounter for immunization  Tdap Vaccine greater than or equal to 6yo    clindamycin (CLEOCIN) 300 MG capsule      2. Laceration without foreign body of unspecified finger without damage to nail, initial encounter  Tdap Vaccine greater than or equal to 6yo    clindamycin (CLEOCIN) 300 MG capsule      3. Laceration of left little finger without foreign body without damage to nail, initial encounter  Tdap Vaccine greater than or equal to 6yo            Patient Instructions       Follow up with PCP in 3-5 days.  Proceed to  ER if symptoms worsen.    Chief Complaint     Chief Complaint   Patient presents with    Finger Laceration     Cut 4th and 5th fingers on left hand with a piece of steel yesterday. Started bleeding bad last evening and wife wanted pt to get checked today.          History of Present Illness       Pt cut left 4th and 5h fingers on sharp metal edge yesterday morning      Finger Laceration        Review of Systems   Review of Systems   Constitutional: Negative.    HENT: Negative.     Eyes: Negative.    Respiratory: Negative.     Cardiovascular: Negative.    Gastrointestinal: Negative.    Endocrine: Negative.    Genitourinary: Negative.    Musculoskeletal: Negative.    Skin: Negative.    Allergic/Immunologic: Negative.    Neurological: Negative.    Hematological: Negative.    Psychiatric/Behavioral: Negative.     All other systems reviewed and are negative.        Current Medications     Current Medications[1]    Current Allergies     Allergies as of 2025 - Reviewed 2025   Allergen Reaction Noted    Penicillins Swelling 2017            The following portions of  the patient's history were reviewed and updated as appropriate: allergies, current medications, past family history, past medical history, past social history, past surgical history and problem list.     Past Medical History[2]    Past Surgical History[3]    Family History[4]      Medications have been verified.        Objective   /65   Pulse 88   Temp (!) 96.9 °F (36.1 °C) (Tympanic)   Resp 20   Wt 95.7 kg (211 lb)   SpO2 99%   BMI 29.43 kg/m²        Physical Exam     Physical Exam  Vitals and nursing note reviewed.   Constitutional:       Appearance: Normal appearance. He is normal weight.   HENT:      Head: Normocephalic and atraumatic.      Right Ear: Tympanic membrane, ear canal and external ear normal.      Left Ear: Tympanic membrane, ear canal and external ear normal.     Cardiovascular:      Rate and Rhythm: Normal rate and regular rhythm.      Pulses: Normal pulses.      Heart sounds: Normal heart sounds.   Pulmonary:      Breath sounds: Normal breath sounds.     Musculoskeletal:         General: Normal range of motion.      Cervical back: Normal range of motion and neck supple.      Comments: Left 4th finger  dip superficial laceration .125cm  Left 5th finger  prox phalanx superficial .125cm laceration   From all joints distal neuro and vascular wnl  nail and nail bed wnl      Skin:     General: Skin is warm.     Neurological:      Mental Status: He is alert and oriented to person, place, and time.               Universal Protocol:  procedure performed by consultantConsent: Verbal consent obtained. Written consent not obtained  Consent given by: patient  Patient identity confirmed: verbally with patient  Laceration repair    Date/Time: 7/5/2025 10:30 AM    Performed by: Darrell Walton Jr., PA-C  Authorized by: Darrell Walton Jr., PA-C  Body area: upper extremity  Location details: left ring finger  Laceration length: 0.1 cm  Foreign bodies: no foreign bodies  Tendon involvement: none  Nerve  involvement: none    Sedation:  Patient sedated: no      Wound Dehiscence:  Superficial Wound Dehiscence: simple closure      Procedure Details:  Preparation: Patient was prepped and draped in the usual sterile fashion.  Irrigation solution: saline  Irrigation method: syringe  Amount of cleaning: extensive  Debridement: none  Degree of undermining: none  Skin closure: Steri-Strips  Approximation: close  Approximation difficulty: simple  Patient tolerance: patient tolerated the procedure well with no immediate complications                     [1]   Current Outpatient Medications:     albuterol (ProAir HFA) 90 mcg/act inhaler, Inhale 2 puffs every 6 (six) hours as needed for wheezing, Disp: 18 g, Rfl: 2    atorvastatin (LIPITOR) 20 mg tablet, TAKE 1 TABLET EVERY DAY, Disp: 90 tablet, Rfl: 3    bisacodyl (DULCOLAX) 5 mg EC tablet, Take 20 mg by mouth 1 (one) time, Disp: , Rfl:     clindamycin (CLEOCIN) 300 MG capsule, Take 1 capsule (300 mg total) by mouth 4 (four) times a day for 10 days, Disp: 40 capsule, Rfl: 0    clobetasol (TEMOVATE) 0.05 % ointment, Apply topically 2 (two) times a day, Disp: 180 g, Rfl: 0    fluticasone (FLONASE) 50 mcg/act nasal spray, USE 2 SPRAYS IN EACH NOSTRIL EVERY DAY, Disp: 48 g, Rfl: 1    gabapentin (NEURONTIN) 600 MG tablet, TAKE 2 TABLETS TWICE DAILY, Disp: 360 tablet, Rfl: 3    glipiZIDE (GLUCOTROL XL) 5 mg 24 hr tablet, TAKE 1 TABLET EVERY DAY, Disp: 90 tablet, Rfl: 1    lidocaine (LIDODERM) 5 %, Apply 2 patches topically daily Remove & Discard patch within 12 hours or as directed by MD, Disp: 60 patch, Rfl: 0    lisinopril (ZESTRIL) 2.5 mg tablet, TAKE 1 TABLET AT BEDTIME, Disp: 90 tablet, Rfl: 1    metFORMIN (GLUCOPHAGE) 1000 MG tablet, TAKE 1 TABLET TWICE DAILY WITH MEALS, Disp: 180 tablet, Rfl: 1    naproxen (Naprosyn) 500 mg tablet, Take 1 tablet (500 mg total) by mouth 2 (two) times a day with meals, Disp: 180 tablet, Rfl: 3    omeprazole (PriLOSEC) 20 mg delayed release  capsule, TAKE 1 CAPSULE EVERY DAY, Disp: 90 capsule, Rfl: 1    oxyCODONE-acetaminophen (Percocet) 7.5-325 MG per tablet, Take 1 tablet by mouth 2 (two) times a day as needed for moderate pain Max Daily Amount: 2 tablets Do not start before June 2, 2025., Disp: 60 tablet, Rfl: 0    oxyCODONE-acetaminophen (Percocet) 7.5-325 MG per tablet, Take 1 tablet by mouth 2 (two) times a day as needed for moderate pain Max Daily Amount: 2 tablets Do not start before May 5, 2025., Disp: 60 tablet, Rfl: 0    oxyCODONE-acetaminophen (Percocet) 7.5-325 MG per tablet, Take 1 tablet by mouth 2 (two) times a day as needed for moderate pain Max Daily Amount: 2 tablets, Disp: 60 tablet, Rfl: 0    QUEtiapine (SEROquel) 200 mg tablet, TAKE 1 TABLET AT BEDTIME, Disp: 90 tablet, Rfl: 3    Saccharomyces boulardii (Probiotic) 250 MG CAPS, Take 1 capsule (250 mg total) by mouth in the morning, Disp: 90 capsule, Rfl: 1    tamsulosin (FLOMAX) 0.4 mg, TAKE 2 CAPSULES EVERY DAY WITH DINNER, Disp: 180 capsule, Rfl: 3    traZODone (DESYREL) 100 mg tablet, Take 2 tablets (200 mg total) by mouth daily at bedtime, Disp: 180 tablet, Rfl: 1    Fluticasone-Salmeterol (Advair) 500-50 mcg/dose inhaler, Inhale 1 puff 2 (two) times a day Rinse mouth after use., Disp: 60 blister, Rfl: 5    magnesium citrate solution, Take 296 mL by mouth once (Patient not taking: Reported on 8/1/2024), Disp: , Rfl:     nicotine (NICODERM CQ) 21 mg/24 hr TD 24 hr patch, Place 1 patch on the skin over 24 hours every 24 hours (Patient not taking: Reported on 4/9/2024), Disp: 28 patch, Rfl: 0    Polyethylene Glycol 3350 (MIRALAX PO), Take by mouth 1 (one) time (Patient not taking: Reported on 4/9/2024), Disp: , Rfl:     sildenafil (VIAGRA) 50 MG tablet, Take 1 tablet (50 mg total) by mouth as needed for erectile dysfunction Take on an empty stomach, 1 hour before sexual activity, no alcohol. 100 mg max dose. (Patient not taking: Reported on 6/22/2023), Disp: 30 tablet, Rfl: 0  [2]    Past Medical History:  Diagnosis Date    Acute bronchitis 06/03/2019    Arthritis     BPH (benign prostatic hypertrophy) with urinary obstruction     Chronic narcotic dependence (HCC)     percocet TID    Chronic pain disorder     back-spinal cord stimulator    Colon polyp     Diabetes mellitus (HCC)     type    Ear infection     Herniation of lumbar intervertebral disc with radiculopathy     Myofascial pain syndrome     Obesity     Sciatica     Spinal cord stimulator status 2017    implanted 2017    Tinnitus    [3]   Past Surgical History:  Procedure Laterality Date    ABSCESS DRAINAGE      groin    BACK SURGERY      sciatic nerve- spinal cord stimulator 2017    CAUDAL BLOCK N/A 10/26/2018    Procedure: Caudal Epidural Steroid Injection ([id number]);  Surgeon: Crystal Olemdo MD;  Location: [facility] MAIN OR;  Service: Pain Management     CAUDAL BLOCK N/A 11/30/2018    Procedure: Caudal Epidural Steroid Injection ([id number]);  Surgeon: Crystal Olmedo MD;  Location: [facility] MAIN OR;  Service: Pain Management     COLONOSCOPY      EPIDURAL BLOCK INJECTION Right 5/10/2019    Procedure: L5 S1 transforaminal Epidural Steroid Injection ([id number];  Surgeon: Crystal Olmedo MD;  Location: [facility] MAIN OR;  Service: Pain Management     EPIDURAL BLOCK INJECTION Right 8/16/2019    Procedure: BLOCK / INJECTION EPIDURAL STEROID TRANSFORAMINAL;  Surgeon: Crystal Olmedo MD;  Location: [facility] MAIN OR;  Service: Pain Management     EPIDURAL BLOCK INJECTION Left 7/1/2021    Procedure: L5 S1 transforaminal epidural steroid injection ( [id number]);  Surgeon: Crystal Olmedo MD;  Location: [facility] MAIN OR;  Service: Pain Management     EPIDURAL BLOCK INJECTION Right 7/15/2021    Procedure: L5 S1 transforaminal epidural steroid injection ( [id number]);  Surgeon: Crystal Olmedo MD;  Location: [facility] MAIN OR;  Service: Pain Management     EPIDURAL BLOCK INJECTION Right 1/7/2022    Procedure: L5 S1 transforaminal epidural steroid injection ([id number]  [id number]);  Surgeon: Crystal Olmedo MD;  Location: [facility] MAIN OR;  Service: Pain Management     EPIDURAL BLOCK INJECTION Left 1/28/2022    Procedure: L5 S1 transforaminal epidural steroid injection ([id number]);  Surgeon: Crystal Olmedo MD;  Location: [facility] MAIN OR;  Service: Pain Management     NERVE BLOCK Bilateral 4/26/2018    Procedure: B/L L3 L4 L5 S1 MBB #1 ([id number]);  Surgeon: Crystal Olmedo MD;  Location: [facility] MAIN OR;  Service: Pain Management     NERVE BLOCK Bilateral 5/11/2018    Procedure: B/L L3 L4 L5 S1 Medial Branch Block #2;  Surgeon: Crystal Olmedo MD;  Location: [facility] MAIN OR;  Service: Pain Management     NOSE SURGERY      PA COLONOSCOPY FLX DX W/COLLJ SPEC WHEN PFRMD N/A 3/6/2017    Procedure: COLONOSCOPY;  Surgeon: Leo Dhaliwal MD;  Location:  GI LAB;  Service: Gastroenterology    PA INSJ/RPLCMT SPINAL NPG/RCVR POCKET CRTJ&CONNJ Left 6/29/2021    Procedure: REPLACEMENT IMPLANTABLE PULSE GENERATOR DORSAL SPINAL COLUMN STIMULATOR, LEFT;  Surgeon: Julian Anna MD;  Location:  MAIN OR;  Service: Neurosurgery    PA AN IMPLTJ NSTIM ELTRDS PLATE/PADDLE EDRL Left 10/3/2017    Procedure: PLACEMENT THORACIC FOR INSERTION DORSAL COLUMN SPINAL CORD STIMULATOR (DCS) WITH BUTTOCK IMPLANTABLE PULSE GENERATOR(IMPULSE);  Surgeon: Gregory Fry MD;  Location:  MAIN OR;  Service: Neurosurgery    RADIOFREQUENCY ABLATION Right 5/18/2018    Procedure: Rt L3 L4 L5 S1 Radio Frequency Ablation ([id number]);  Surgeon: Crystal Olmedo MD;  Location: [facility] MAIN OR;  Service: Pain Management     RADIOFREQUENCY ABLATION Left 6/1/2018    Procedure: Lt L3 L4 L5 S1 Radio Frequency Ablation ([id number]);  Surgeon: Crystal Olmedo MD;  Location: [facility] MAIN OR;  Service: Pain Management    [4]   Family History  Problem Relation Name Age of Onset    Diabetes Mother Polina Gramajo     Arthritis Mother Polina Jesus Alberto     Diabetes Father Phu jesus alberto         mellitus     Heart attack Father Phu  jesus alberto 62    Hypertension Father Phu granda     Arthritis Father Phu granda

## 2025-07-07 ENCOUNTER — DOCUMENTATION (OUTPATIENT)
Dept: ADMINISTRATIVE | Facility: OTHER | Age: 65
End: 2025-07-07

## 2025-07-07 NOTE — PROGRESS NOTES
Darrell Walton Jr., PA-C  P Patient Reported Team         Blood pressure elevated  Appointment department: [facility]  Appointment provider: Darrell Walton Jr., PA-C  Blood pressure  07/05/25 1105 140/65  07/05/25 1053 140/65    07/07/25 2:53 PM    Patient was called after the Urgent Care visit ; a message was left for the patient to return the call    Thank you.  Janina Raymundo MA  [facility]

## 2025-07-14 ENCOUNTER — OFFICE VISIT (OUTPATIENT)
Dept: PAIN MEDICINE | Facility: CLINIC | Age: 65
End: 2025-07-14
Payer: COMMERCIAL

## 2025-07-14 ENCOUNTER — HOSPITAL ENCOUNTER (OUTPATIENT)
Dept: RADIOLOGY | Facility: HOSPITAL | Age: 65
Discharge: HOME | End: 2025-07-14
Payer: COMMERCIAL

## 2025-07-14 VITALS
WEIGHT: 214 LBS | BODY MASS INDEX: 29.96 KG/M2 | SYSTOLIC BLOOD PRESSURE: 130 MMHG | HEART RATE: 75 BPM | HEIGHT: 71 IN | DIASTOLIC BLOOD PRESSURE: 67 MMHG

## 2025-07-14 DIAGNOSIS — G89.4 CHRONIC PAIN SYNDROME: ICD-10-CM

## 2025-07-14 DIAGNOSIS — M25.521 RIGHT ELBOW PAIN: Primary | ICD-10-CM

## 2025-07-14 DIAGNOSIS — M25.521 RIGHT ELBOW PAIN: ICD-10-CM

## 2025-07-14 PROCEDURE — 73070 X-RAY EXAM OF ELBOW: CPT

## 2025-07-14 PROCEDURE — 99214 OFFICE O/P EST MOD 30 MIN: CPT | Performed by: ANESTHESIOLOGY

## 2025-07-14 RX ORDER — OXYCODONE AND ACETAMINOPHEN 7.5; 325 MG/1; MG/1
1 TABLET ORAL 2 TIMES DAILY PRN
Qty: 60 TABLET | Refills: 0 | Status: SHIPPED | OUTPATIENT
Start: 2025-08-11

## 2025-07-14 RX ORDER — OXYCODONE AND ACETAMINOPHEN 7.5; 325 MG/1; MG/1
1 TABLET ORAL 2 TIMES DAILY PRN
Qty: 60 TABLET | Refills: 0 | Status: SHIPPED | OUTPATIENT
Start: 2025-07-14

## 2025-07-14 RX ORDER — OXYCODONE AND ACETAMINOPHEN 7.5; 325 MG/1; MG/1
1 TABLET ORAL 2 TIMES DAILY PRN
Qty: 60 TABLET | Refills: 0 | Status: SHIPPED | OUTPATIENT
Start: 2025-09-08

## 2025-07-14 NOTE — ASSESSMENT & PLAN NOTE
Orders:  •  oxyCODONE-acetaminophen (Percocet) 7.5-325 MG per tablet; Take 1 tablet by mouth 2 (two) times a day as needed for moderate pain Max Daily Amount: 2 tablets Do not start before August 11, 2025.  •  oxyCODONE-acetaminophen (Percocet) 7.5-325 MG per tablet; Take 1 tablet by mouth 2 (two) times a day as needed for moderate pain Max Daily Amount: 2 tablets  •  oxyCODONE-acetaminophen (Percocet) 7.5-325 MG per tablet; Take 1 tablet by mouth 2 (two) times a day as needed for moderate pain Max Daily Amount: 2 tablets Do not start before September 8, 2025.    The patient is stable in regards to his chronic pain medications for his chronic back pain so will be refilled on the Percocet for this month and 2 additional months.  He will follow back up in 3 months for medication refill and reevaluation.

## 2025-07-14 NOTE — PROGRESS NOTES
Name: Cedric Gramajo      : 1960      MRN: [id number]  Encounter Provider: Dain Fleming MD  Encounter Date: 2025   Encounter department: [facility]  :  Assessment & Plan  Chronic pain syndrome    Orders:  •  oxyCODONE-acetaminophen (Percocet) 7.5-325 MG per tablet; Take 1 tablet by mouth 2 (two) times a day as needed for moderate pain Max Daily Amount: 2 tablets Do not start before 2025.  •  oxyCODONE-acetaminophen (Percocet) 7.5-325 MG per tablet; Take 1 tablet by mouth 2 (two) times a day as needed for moderate pain Max Daily Amount: 2 tablets  •  oxyCODONE-acetaminophen (Percocet) 7.5-325 MG per tablet; Take 1 tablet by mouth 2 (two) times a day as needed for moderate pain Max Daily Amount: 2 tablets Do not start before 2025.    The patient is stable in regards to his chronic pain medications for his chronic back pain so will be refilled on the Percocet for this month and 2 additional months.  He will follow back up in 3 months for medication refill and reevaluation.    Right elbow pain    Orders:  •  XR elbow 2 vw right; Future    In regards to his right elbow pain, I will order x-rays of the right elbow to further evaluate.  Advised would likely recommend proceeding with corticosteroid injection.      History of Present Illness     Cedric Gramajo is a 65 y.o. male who presents for a follow up office visit in regards to Back Pain (Back pain - patient is scheduled for a Spinal Stimulator replacement on 2025 with neurosurgery.).  The patient is having replacement of his spinal cord stimulator battery in August.  He states that his chronic back pain is managed well with the Percocet, lidocaine patch and gabapentin.  He is however having new onset pain in his right elbow giving him difficulty with lifting.  Symptoms are moderate 1000 mg scale.    Review of Systems   Respiratory:  Negative for shortness of breath.    Cardiovascular:  Negative for  "chest pain.   Gastrointestinal:  Negative for constipation, diarrhea, nausea and vomiting.   Musculoskeletal:  Positive for back pain. Negative for arthralgias, gait problem, joint swelling and myalgias.   Skin:  Negative for rash.   Neurological:  Negative for dizziness, seizures and weakness.   Psychiatric/Behavioral:  Negative for dysphoric mood.    All other systems reviewed and are negative.      Medical History Reviewed by provider this encounter:  Tobacco  Allergies  Meds  Problems  Med Hx  Surg Hx  Fam Hx     .  Medications Ordered Prior to Encounter[1]   Social History[1]     Objective   /67 (BP Location: Left arm, Patient Position: Sitting, Cuff Size: Standard)   Pulse 75   Ht 5' 11\" (1.803 m)   Wt 97.1 kg (214 lb)   BMI 29.85 kg/m²      Pain Score:   4  Physical Exam  Constitutional: normal, well developed, well nourished, alert, in no distress and non-toxic and no overt pain behavior.  Eyes: anicteric  HEENT: grossly intact  Neck: supple, symmetric, trachea midline and no masses   Pulmonary: even and unlabored  Cardiovascular: No edema or pitting edema present  Skin: Normal without rashes or lesions and well hydrated  Psychiatric: Mood and affect appropriate  Neurologic: Cranial Nerves II-XII grossly intact  Musculoskeletal: Right elbow pain with wrist extension and forearm pronation         [1]  Current Outpatient Medications on File Prior to Visit   Medication Sig Dispense Refill   • albuterol (ProAir HFA) 90 mcg/act inhaler Inhale 2 puffs every 6 (six) hours as needed for wheezing 18 g 2   • atorvastatin (LIPITOR) 20 mg tablet TAKE 1 TABLET EVERY DAY 90 tablet 3   • bisacodyl (DULCOLAX) 5 mg EC tablet Take 20 mg by mouth 1 (one) time     • clobetasol (TEMOVATE) 0.05 % ointment Apply topically 2 (two) times a day 180 g 0   • fluticasone (FLONASE) 50 mcg/act nasal spray USE 2 SPRAYS IN EACH NOSTRIL EVERY DAY 48 g 1   • gabapentin (NEURONTIN) 600 MG tablet TAKE 2 TABLETS TWICE DAILY 360 " tablet 3   • lidocaine (LIDODERM) 5 % Apply 2 patches topically daily Remove & Discard patch within 12 hours or as directed by MD 60 patch 0   • lisinopril (ZESTRIL) 2.5 mg tablet TAKE 1 TABLET AT BEDTIME 90 tablet 1   • metFORMIN (GLUCOPHAGE) 1000 MG tablet TAKE 1 TABLET TWICE DAILY WITH MEALS 180 tablet 1   • naproxen (Naprosyn) 500 mg tablet Take 1 tablet (500 mg total) by mouth 2 (two) times a day with meals 180 tablet 3   • omeprazole (PriLOSEC) 20 mg delayed release capsule TAKE 1 CAPSULE EVERY DAY 90 capsule 1   • QUEtiapine (SEROquel) 200 mg tablet TAKE 1 TABLET AT BEDTIME 90 tablet 3   • Saccharomyces boulardii (Probiotic) 250 MG CAPS Take 1 capsule (250 mg total) by mouth in the morning 90 capsule 1   • tamsulosin (FLOMAX) 0.4 mg TAKE 2 CAPSULES EVERY DAY WITH DINNER 180 capsule 3   • traZODone (DESYREL) 100 mg tablet Take 2 tablets (200 mg total) by mouth daily at bedtime 180 tablet 1   • Fluticasone-Salmeterol (Advair) 500-50 mcg/dose inhaler Inhale 1 puff 2 (two) times a day Rinse mouth after use. 60 blister 5   • magnesium citrate solution Take 296 mL by mouth once (Patient not taking: Reported on 8/1/2024)     • nicotine (NICODERM CQ) 21 mg/24 hr TD 24 hr patch Place 1 patch on the skin over 24 hours every 24 hours 28 patch 0   • Polyethylene Glycol 3350 (MIRALAX PO) Take by mouth 1 (one) time (Patient not taking: Reported on 4/9/2024)     • sildenafil (VIAGRA) 50 MG tablet Take 1 tablet (50 mg total) by mouth as needed for erectile dysfunction Take on an empty stomach, 1 hour before sexual activity, no alcohol. 100 mg max dose. (Patient not taking: Reported on 6/22/2023) 30 tablet 0     No current facility-administered medications on file prior to visit.   [1]  Social History  Tobacco Use   • Smoking status: Every Day     Current packs/day: 1.00     Average packs/day: 1 pack/day for 42.0 years (42.0 ttl pk-yrs)     Types: Cigarettes   • Smokeless tobacco: Never   • Tobacco comments:     encouraged  smoking cessation - history of smoking 30 or more pack years    Vaping Use   • Vaping status: Never Used   Substance and Sexual Activity   • Alcohol use: No     Comment: rare   • Drug use: No   • Sexual activity: Not Currently     Partners: Female

## 2025-07-15 ENCOUNTER — TELEPHONE (OUTPATIENT)
Dept: PAIN MEDICINE | Facility: CLINIC | Age: 65
End: 2025-07-15

## 2025-07-19 DIAGNOSIS — E11.9 TYPE 2 DIABETES MELLITUS WITHOUT COMPLICATION, WITHOUT LONG-TERM CURRENT USE OF INSULIN (HCC): ICD-10-CM

## 2025-07-21 RX ORDER — GLIPIZIDE 5 MG/1
5 TABLET, FILM COATED, EXTENDED RELEASE ORAL DAILY
Qty: 90 TABLET | Refills: 1 | Status: SHIPPED | OUTPATIENT
Start: 2025-07-21

## 2025-07-28 ENCOUNTER — PROCEDURE VISIT (OUTPATIENT)
Dept: PAIN MEDICINE | Facility: CLINIC | Age: 65
End: 2025-07-28
Payer: COMMERCIAL

## 2025-07-28 VITALS
HEIGHT: 71 IN | SYSTOLIC BLOOD PRESSURE: 117 MMHG | BODY MASS INDEX: 29.96 KG/M2 | HEART RATE: 79 BPM | WEIGHT: 214 LBS | DIASTOLIC BLOOD PRESSURE: 65 MMHG

## 2025-07-28 DIAGNOSIS — M25.521 RIGHT ELBOW PAIN: Primary | ICD-10-CM

## 2025-07-28 PROCEDURE — 20606 DRAIN/INJ JOINT/BURSA W/US: CPT | Performed by: ANESTHESIOLOGY

## 2025-07-28 RX ORDER — BUPIVACAINE HYDROCHLORIDE 2.5 MG/ML
3 INJECTION, SOLUTION EPIDURAL; INFILTRATION; INTRACAUDAL; PERINEURAL ONCE
Status: COMPLETED | OUTPATIENT
Start: 2025-07-28 | End: 2025-07-28

## 2025-07-28 RX ORDER — METHYLPREDNISOLONE ACETATE 40 MG/ML
40 INJECTION, SUSPENSION INTRA-ARTICULAR; INTRALESIONAL; INTRAMUSCULAR; SOFT TISSUE ONCE
Status: COMPLETED | OUTPATIENT
Start: 2025-07-28 | End: 2025-07-28

## 2025-07-28 RX ADMIN — METHYLPREDNISOLONE ACETATE 40 MG: 40 INJECTION, SUSPENSION INTRA-ARTICULAR; INTRALESIONAL; INTRAMUSCULAR; SOFT TISSUE at 09:35

## 2025-07-28 RX ADMIN — BUPIVACAINE HYDROCHLORIDE 3 ML: 2.5 INJECTION, SOLUTION EPIDURAL; INFILTRATION; INTRACAUDAL; PERINEURAL at 09:35

## 2025-07-29 ENCOUNTER — OFFICE VISIT (OUTPATIENT)
Dept: LAB | Facility: CLINIC | Age: 65
End: 2025-07-29
Attending: NEUROLOGICAL SURGERY
Payer: COMMERCIAL

## 2025-07-29 ENCOUNTER — APPOINTMENT (OUTPATIENT)
Dept: RADIOLOGY | Facility: CLINIC | Age: 65
End: 2025-07-29
Attending: FAMILY MEDICINE
Payer: COMMERCIAL

## 2025-07-29 ENCOUNTER — APPOINTMENT (OUTPATIENT)
Dept: RADIOLOGY | Facility: CLINIC | Age: 65
End: 2025-07-29
Payer: COMMERCIAL

## 2025-07-29 ENCOUNTER — APPOINTMENT (OUTPATIENT)
Dept: LAB | Facility: CLINIC | Age: 65
End: 2025-07-29
Payer: COMMERCIAL

## 2025-07-29 ENCOUNTER — CONSULT (OUTPATIENT)
Dept: FAMILY MEDICINE CLINIC | Facility: CLINIC | Age: 65
End: 2025-07-29
Payer: COMMERCIAL

## 2025-07-29 VITALS
RESPIRATION RATE: 16 BRPM | SYSTOLIC BLOOD PRESSURE: 120 MMHG | WEIGHT: 211 LBS | HEIGHT: 71 IN | BODY MASS INDEX: 29.54 KG/M2 | DIASTOLIC BLOOD PRESSURE: 60 MMHG | HEART RATE: 90 BPM | OXYGEN SATURATION: 96 %

## 2025-07-29 DIAGNOSIS — R80.9 PROTEINURIA, UNSPECIFIED TYPE: ICD-10-CM

## 2025-07-29 DIAGNOSIS — F17.210 SMOKING GREATER THAN 20 PACK YEARS: ICD-10-CM

## 2025-07-29 DIAGNOSIS — E11.9 TYPE 2 DIABETES MELLITUS WITHOUT COMPLICATION, WITHOUT LONG-TERM CURRENT USE OF INSULIN (HCC): ICD-10-CM

## 2025-07-29 DIAGNOSIS — Z45.42 BATTERY END OF LIFE OF SPINAL CORD STIMULATOR: ICD-10-CM

## 2025-07-29 DIAGNOSIS — G89.4 CHRONIC PAIN SYNDROME: ICD-10-CM

## 2025-07-29 DIAGNOSIS — Z01.818 PREOP EXAMINATION: ICD-10-CM

## 2025-07-29 DIAGNOSIS — Z01.818 PRE-PROCEDURAL EXAMINATION: ICD-10-CM

## 2025-07-29 DIAGNOSIS — Z01.818 PREOP EXAMINATION: Primary | ICD-10-CM

## 2025-07-29 DIAGNOSIS — E78.5 HYPERLIPIDEMIA LDL GOAL <70: ICD-10-CM

## 2025-07-29 DIAGNOSIS — E78.2 MIXED HYPERLIPIDEMIA: ICD-10-CM

## 2025-07-29 LAB
ALBUMIN SERPL BCG-MCNC: 4.6 G/DL (ref 3.5–5)
ALP SERPL-CCNC: 82 U/L (ref 34–104)
ALT SERPL W P-5'-P-CCNC: 29 U/L (ref 7–52)
ANION GAP SERPL CALCULATED.3IONS-SCNC: 10 MMOL/L (ref 4–13)
APTT PPP: 30 SECONDS (ref 23–34)
AST SERPL W P-5'-P-CCNC: 17 U/L (ref 13–39)
ATRIAL RATE: 0 BPM
BASOPHILS # BLD AUTO: 0.12 THOUSANDS/ÂΜL (ref 0–0.1)
BASOPHILS NFR BLD AUTO: 1 % (ref 0–1)
BILIRUB SERPL-MCNC: 0.35 MG/DL (ref 0.2–1)
BILIRUB UR QL STRIP: NEGATIVE
BUN SERPL-MCNC: 14 MG/DL (ref 5–25)
CALCIUM SERPL-MCNC: 9.9 MG/DL (ref 8.4–10.2)
CHLORIDE SERPL-SCNC: 100 MMOL/L (ref 96–108)
CLARITY UR: CLEAR
CO2 SERPL-SCNC: 27 MMOL/L (ref 21–32)
COLOR UR: YELLOW
CREAT SERPL-MCNC: 0.71 MG/DL (ref 0.6–1.3)
EOSINOPHIL # BLD AUTO: 0.25 THOUSAND/ÂΜL (ref 0–0.61)
EOSINOPHIL NFR BLD AUTO: 2 % (ref 0–6)
ERYTHROCYTE [DISTWIDTH] IN BLOOD BY AUTOMATED COUNT: 12.9 % (ref 11.6–15.1)
EST. AVERAGE GLUCOSE BLD GHB EST-MCNC: 140 MG/DL
GFR SERPL CREATININE-BSD FRML MDRD: 98 ML/MIN/1.73SQ M
GLUCOSE P FAST SERPL-MCNC: 150 MG/DL (ref 65–99)
GLUCOSE UR STRIP-MCNC: ABNORMAL MG/DL
HBA1C MFR BLD: 6.5 %
HCT VFR BLD AUTO: 46.1 % (ref 36.5–49.3)
HGB BLD-MCNC: 15.3 G/DL (ref 12–17)
HGB UR QL STRIP.AUTO: NEGATIVE
IMM GRANULOCYTES # BLD AUTO: 0.06 THOUSAND/UL (ref 0–0.2)
IMM GRANULOCYTES NFR BLD AUTO: 1 % (ref 0–2)
INR PPP: 1.01 (ref 0.85–1.19)
KETONES UR STRIP-MCNC: NEGATIVE MG/DL
LEUKOCYTE ESTERASE UR QL STRIP: NEGATIVE
LYMPHOCYTES # BLD AUTO: 2.95 THOUSANDS/ÂΜL (ref 0.6–4.47)
LYMPHOCYTES NFR BLD AUTO: 24 % (ref 14–44)
MCH RBC QN AUTO: 30.5 PG (ref 26.8–34.3)
MCHC RBC AUTO-ENTMCNC: 33.2 G/DL (ref 31.4–37.4)
MCV RBC AUTO: 92 FL (ref 82–98)
MONOCYTES # BLD AUTO: 0.94 THOUSAND/ÂΜL (ref 0.17–1.22)
MONOCYTES NFR BLD AUTO: 8 % (ref 4–12)
NEUTROPHILS # BLD AUTO: 8.19 THOUSANDS/ÂΜL (ref 1.85–7.62)
NEUTS SEG NFR BLD AUTO: 64 % (ref 43–75)
NITRITE UR QL STRIP: NEGATIVE
NRBC BLD AUTO-RTO: 0 /100 WBCS
PH UR STRIP.AUTO: 7 [PH]
PLATELET # BLD AUTO: 251 THOUSANDS/UL (ref 149–390)
PMV BLD AUTO: 10.1 FL (ref 8.9–12.7)
POTASSIUM SERPL-SCNC: 4.8 MMOL/L (ref 3.5–5.3)
PROT SERPL-MCNC: 7.1 G/DL (ref 6.4–8.4)
PROT UR STRIP-MCNC: NEGATIVE MG/DL
PROTHROMBIN TIME: 13.6 SECONDS (ref 12.3–15)
QRS AXIS: 0 DEGREES
QRSD INTERVAL: 0 MS
QT INTERVAL: 0 MS
QTC INTERVAL: 0 MS
RBC # BLD AUTO: 5.01 MILLION/UL (ref 3.88–5.62)
SODIUM SERPL-SCNC: 137 MMOL/L (ref 135–147)
SP GR UR STRIP.AUTO: 1.02 (ref 1–1.03)
T WAVE AXIS: 0 DEGREES
UROBILINOGEN UR STRIP-ACNC: <2 MG/DL
VENTRICULAR RATE: 0 BPM
WBC # BLD AUTO: 12.51 THOUSAND/UL (ref 4.31–10.16)

## 2025-07-29 PROCEDURE — 85730 THROMBOPLASTIN TIME PARTIAL: CPT

## 2025-07-29 PROCEDURE — 80053 COMPREHEN METABOLIC PANEL: CPT

## 2025-07-29 PROCEDURE — 83036 HEMOGLOBIN GLYCOSYLATED A1C: CPT

## 2025-07-29 PROCEDURE — 99244 OFF/OP CNSLTJ NEW/EST MOD 40: CPT | Performed by: FAMILY MEDICINE

## 2025-07-29 PROCEDURE — 93005 ELECTROCARDIOGRAM TRACING: CPT

## 2025-07-29 PROCEDURE — 36415 COLL VENOUS BLD VENIPUNCTURE: CPT

## 2025-07-29 PROCEDURE — 85025 COMPLETE CBC W/AUTO DIFF WBC: CPT

## 2025-07-29 PROCEDURE — 85610 PROTHROMBIN TIME: CPT

## 2025-07-29 PROCEDURE — 71046 X-RAY EXAM CHEST 2 VIEWS: CPT

## 2025-07-30 DIAGNOSIS — L81.9 ATYPICAL PIGMENTED SKIN LESION: Primary | ICD-10-CM

## 2025-08-05 ENCOUNTER — ANESTHESIA EVENT (OUTPATIENT)
Dept: PERIOP | Facility: HOSPITAL | Age: 65
End: 2025-08-05
Payer: COMMERCIAL

## 2025-08-05 ENCOUNTER — TELEPHONE (OUTPATIENT)
Age: 65
End: 2025-08-05

## 2025-08-06 ENCOUNTER — HOSPITAL ENCOUNTER (OUTPATIENT)
Facility: HOSPITAL | Age: 65
Setting detail: OUTPATIENT SURGERY
Discharge: HOME | End: 2025-08-06
Attending: NEUROLOGICAL SURGERY | Admitting: NEUROLOGICAL SURGERY
Payer: COMMERCIAL

## 2025-08-06 ENCOUNTER — ANESTHESIA (OUTPATIENT)
Dept: PERIOP | Facility: HOSPITAL | Age: 65
End: 2025-08-06
Payer: COMMERCIAL

## 2025-08-06 RX ORDER — VANCOMYCIN HYDROCHLORIDE 1 G/20ML
INJECTION, POWDER, LYOPHILIZED, FOR SOLUTION INTRAVENOUS AS NEEDED
Status: DISCONTINUED | OUTPATIENT
Start: 2025-08-06 | End: 2025-08-06

## 2025-08-06 RX ORDER — SODIUM CHLORIDE, SODIUM LACTATE, POTASSIUM CHLORIDE, CALCIUM CHLORIDE 600; 310; 30; 20 MG/100ML; MG/100ML; MG/100ML; MG/100ML
INJECTION, SOLUTION INTRAVENOUS CONTINUOUS PRN
Status: DISCONTINUED | OUTPATIENT
Start: 2025-08-06 | End: 2025-08-06

## 2025-08-06 RX ORDER — PROPOFOL 10 MG/ML
INJECTION, EMULSION INTRAVENOUS CONTINUOUS PRN
Status: DISCONTINUED | OUTPATIENT
Start: 2025-08-06 | End: 2025-08-06

## 2025-08-06 RX ORDER — EPHEDRINE SULFATE 50 MG/ML
INJECTION INTRAVENOUS AS NEEDED
Status: DISCONTINUED | OUTPATIENT
Start: 2025-08-06 | End: 2025-08-06

## 2025-08-06 RX ORDER — GLYCOPYRROLATE 0.2 MG/ML
INJECTION INTRAMUSCULAR; INTRAVENOUS AS NEEDED
Status: DISCONTINUED | OUTPATIENT
Start: 2025-08-06 | End: 2025-08-06

## 2025-08-06 RX ORDER — FENTANYL CITRATE 50 UG/ML
INJECTION, SOLUTION INTRAMUSCULAR; INTRAVENOUS AS NEEDED
Status: DISCONTINUED | OUTPATIENT
Start: 2025-08-06 | End: 2025-08-06

## 2025-08-06 RX ORDER — KETAMINE HCL IN NACL, ISO-OSM 100MG/10ML
SYRINGE (ML) INJECTION AS NEEDED
Status: DISCONTINUED | OUTPATIENT
Start: 2025-08-06 | End: 2025-08-06

## 2025-08-06 RX ORDER — ONDANSETRON 2 MG/ML
INJECTION INTRAMUSCULAR; INTRAVENOUS AS NEEDED
Status: DISCONTINUED | OUTPATIENT
Start: 2025-08-06 | End: 2025-08-06

## 2025-08-06 RX ORDER — MIDAZOLAM HYDROCHLORIDE 2 MG/2ML
INJECTION, SOLUTION INTRAMUSCULAR; INTRAVENOUS AS NEEDED
Status: DISCONTINUED | OUTPATIENT
Start: 2025-08-06 | End: 2025-08-06

## 2025-08-06 RX ADMIN — VANCOMYCIN HYDROCHLORIDE 1 G: 1025 INJECTION, POWDER, FOR SOLUTION INTRAVENOUS; ORAL at 08:26

## 2025-08-06 RX ADMIN — EPHEDRINE SULFATE 10 MG: 50 INJECTION, SOLUTION INTRAVENOUS at 08:38

## 2025-08-06 RX ADMIN — PROPOFOL 80 MCG/KG/MIN: 10 INJECTION, EMULSION INTRAVENOUS at 08:26

## 2025-08-06 RX ADMIN — MIDAZOLAM 2 MG: 1 INJECTION INTRAMUSCULAR; INTRAVENOUS at 08:23

## 2025-08-06 RX ADMIN — FENTANYL CITRATE 25 MCG: 50 INJECTION INTRAMUSCULAR; INTRAVENOUS at 08:41

## 2025-08-06 RX ADMIN — Medication 10 MG: at 08:40

## 2025-08-06 RX ADMIN — GLYCOPYRROLATE 0.1 MG: 0.2 INJECTION INTRAMUSCULAR; INTRAVENOUS at 08:36

## 2025-08-06 RX ADMIN — Medication 10 MG: at 08:39

## 2025-08-06 RX ADMIN — FENTANYL CITRATE 50 MCG: 50 INJECTION INTRAMUSCULAR; INTRAVENOUS at 08:26

## 2025-08-06 RX ADMIN — ONDANSETRON 4 MG: 2 INJECTION INTRAMUSCULAR; INTRAVENOUS at 08:27

## 2025-08-06 RX ADMIN — SODIUM CHLORIDE, SODIUM LACTATE, POTASSIUM CHLORIDE, AND CALCIUM CHLORIDE: .6; .31; .03; .02 INJECTION, SOLUTION INTRAVENOUS at 08:21

## 2025-08-06 RX ADMIN — Medication 10 MG: at 08:27

## 2025-08-06 RX ADMIN — Medication 10 MG: at 08:33

## 2025-08-06 RX ADMIN — FENTANYL CITRATE 25 MCG: 50 INJECTION INTRAMUSCULAR; INTRAVENOUS at 08:39

## 2025-08-08 ENCOUNTER — TELEPHONE (OUTPATIENT)
Dept: NEUROSURGERY | Facility: CLINIC | Age: 65
End: 2025-08-08

## 2025-08-11 ENCOUNTER — TELEPHONE (OUTPATIENT)
Dept: RADIOLOGY | Facility: MEDICAL CENTER | Age: 65
End: 2025-08-11

## 2025-08-21 ENCOUNTER — CLINICAL SUPPORT (OUTPATIENT)
Dept: NEUROSURGERY | Facility: CLINIC | Age: 65
End: 2025-08-21

## 2025-08-21 ENCOUNTER — TELEPHONE (OUTPATIENT)
Dept: NEUROSURGERY | Facility: CLINIC | Age: 65
End: 2025-08-21

## 2025-08-21 VITALS — DIASTOLIC BLOOD PRESSURE: 62 MMHG | TEMPERATURE: 97 F | SYSTOLIC BLOOD PRESSURE: 140 MMHG

## 2025-08-21 DIAGNOSIS — Z98.890 POSTOPERATIVE STATE: Primary | ICD-10-CM

## 2025-08-21 PROCEDURE — 99024 POSTOP FOLLOW-UP VISIT: CPT | Performed by: NEUROLOGICAL SURGERY

## 2025-08-25 PROBLEM — M77.11 LATERAL EPICONDYLITIS OF RIGHT ELBOW: Status: ACTIVE | Noted: 2025-08-25

## (undated) DEVICE — TRAY EPID CONT PERIFIX 18G X 3.5IN 5ML CLSD TIP DRAPE

## (undated) DEVICE — WIPES BABY PAMPERS SENSITIVE 36/PK

## (undated) DEVICE — RADIOLOGY STERILE LABELS: Brand: CENTURION

## (undated) DEVICE — ATTUNE KNEE SYSTEM REVISION FEMORAL SLEEVE POROCOAT FULLY COATED 35MM: Brand: ATTUNE

## (undated) DEVICE — NEEDLE SPINAL 22G X 3.5IN  QUINCKE

## (undated) DEVICE — SYRINGE 5ML LL

## (undated) DEVICE — BETADINE SCRUB BRUSH

## (undated) DEVICE — CHLORAPREP APPLICATOR TINTED 10.5ML ONE-STEP

## (undated) DEVICE — NEEDLE BLUNT 18 G X 1 1/2 W FILTER

## (undated) DEVICE — PLASTIC ADHESIVE BANDAGE: Brand: CURITY

## (undated) DEVICE — UNIVERSAL BLOCK TRAY: Brand: INTEGRA®

## (undated) DEVICE — NEEDLE SPINAL 22G X 5IN QUINCKE

## (undated) DEVICE — GLOVE SRG BIOGEL 7.5

## (undated) DEVICE — SMALL NEEDLE COUNTER NEST

## (undated) DEVICE — ADHESIVE SKIN HIGH VISCOSITY EXOFIN 1ML

## (undated) DEVICE — SUT MONOCRYL 4-0 PS-2 27 IN Y426H

## (undated) DEVICE — SUT SILK 2-0 SH 30 IN K833H

## (undated) DEVICE — INTENDED FOR TISSUE SEPARATION, AND OTHER PROCEDURES THAT REQUIRE A SHARP SURGICAL BLADE TO PUNCTURE OR CUT.: Brand: BARD-PARKER SAFETY BLADES SIZE 10, STERILE

## (undated) DEVICE — GLOVE SRG BIOGEL ECLIPSE 7

## (undated) DEVICE — NEUROSTIM MULTILEAD TRIAL CABLE

## (undated) DEVICE — TOWEL SET X-RAY

## (undated) DEVICE — TELFA NON-ADHERENT ABSORBENT DRESSING: Brand: TELFA

## (undated) DEVICE — VIAL DECANTER

## (undated) DEVICE — TIBURON SPLIT SHEET: Brand: CONVERTORS

## (undated) DEVICE — DRAPE C-ARM X-RAY

## (undated) DEVICE — FLEXIBLE ADHESIVE BANDAGE,X-LARGE: Brand: CURITY

## (undated) DEVICE — SKIN MARKER DUAL TIP WITH RULER CAP, FLEXIBLE RULER AND LABELS: Brand: DEVON

## (undated) DEVICE — TUNNELING TOOL 20IN

## (undated) DEVICE — 2000CC GUARDIAN II: Brand: GUARDIAN

## (undated) DEVICE — ELECTRODE BLADE MOD E-Z CLEAN  2.75IN 7CM -0012AM

## (undated) DEVICE — NEEDLE HYPO 22G X 1-1/2 IN

## (undated) DEVICE — SYRINGE 20ML LL

## (undated) DEVICE — ANTIBACTERIAL VIOLET BRAIDED (POLYGLACTIN 910), SYNTHETIC ABSORBABLE SUTURE: Brand: COATED VICRYL

## (undated) DEVICE — GLOVE SRG BIOGEL 8

## (undated) DEVICE — PENCIL ELECTROSURG E-Z CLEAN -0035H

## (undated) DEVICE — PAD GROUNDING ADULT

## (undated) DEVICE — 3M™ TEGADERM™ TRANSPARENT FILM DRESSING FRAME STYLE, 1626W, 4 IN X 4-3/4 IN (10 CM X 12 CM), 50/CT 4CT/CASE: Brand: 3M™ TEGADERM™

## (undated) DEVICE — DRAPE SHEET THREE QUARTER

## (undated) DEVICE — GLOVE INDICATOR PI UNDERGLOVE SZ 8 BLUE

## (undated) DEVICE — SCD SEQUENTIAL COMPRESSION COMFORT SLEEVE MEDIUM KNEE LENGTH: Brand: KENDALL SCD

## (undated) DEVICE — PROGRAMMER PATIENT PROCLAIM

## (undated) DEVICE — PAD GROUNDING SLF ADHESIVE

## (undated) DEVICE — GLOVE INDICATOR PI UNDERGLOVE SZ 7 BLUE

## (undated) DEVICE — SPONGE SCRUB 4 PCT CHLORHEXIDINE

## (undated) DEVICE — 3M™ IOBAN™ 2 ANTIMICROBIAL INCISE DRAPE 6650EZ: Brand: IOBAN™ 2

## (undated) DEVICE — UNIVERSAL SPINE,KIT: Brand: CARDINAL HEALTH

## (undated) DEVICE — NEEDLE SPINAL 25G X 3.5 IN QUINCKE

## (undated) DEVICE — PROXIMATE PLUS MD MULTI-DIRECTIONAL RELEASE SKIN STAPLERS CONTAINS 35 STAINLESS STEEL STAPLES APPROXIMATE CLOSED DIMENSIONS: 6.9MM X 3.9MM WIDE: Brand: PROXIMATE

## (undated) DEVICE — PREP SURGICAL PURPREP 26ML

## (undated) DEVICE — Device

## (undated) DEVICE — FLOSEAL MATRIX IS INDICATED IN SURGICAL PROCEDURES (OTHER THAN IN OPHTHALMIC) AS AN ADJUNCT TO HEMOSTASIS WHEN CONTROL OF BLEEDING BY LIGATURE OR CONVENTIONAL PROCEDURES IS INEFFECTIVE OR IMPRACTICAL.: Brand: FLOSEAL HEMOSTATIC MATRIX

## (undated) DEVICE — GLOVE SRG BIOGEL 7

## (undated) DEVICE — GLOVE INDICATOR PI UNDERGLOVE SZ 7.5 BLUE

## (undated) DEVICE — TOOL 14MH30 LEGEND 14CM 3MM: Brand: MIDAS REX ™